# Patient Record
Sex: MALE | Race: WHITE | Employment: OTHER | URBAN - METROPOLITAN AREA
[De-identification: names, ages, dates, MRNs, and addresses within clinical notes are randomized per-mention and may not be internally consistent; named-entity substitution may affect disease eponyms.]

---

## 2017-02-03 ENCOUNTER — OFFICE VISIT (OUTPATIENT)
Dept: FAMILY MEDICINE | Facility: CLINIC | Age: 67
End: 2017-02-03
Payer: COMMERCIAL

## 2017-02-03 VITALS
BODY MASS INDEX: 34.47 KG/M2 | OXYGEN SATURATION: 97 % | WEIGHT: 240.2 LBS | HEART RATE: 81 BPM | TEMPERATURE: 97.8 F | DIASTOLIC BLOOD PRESSURE: 84 MMHG | SYSTOLIC BLOOD PRESSURE: 132 MMHG

## 2017-02-03 DIAGNOSIS — M70.21 OLECRANON BURSITIS OF RIGHT ELBOW: Primary | ICD-10-CM

## 2017-02-03 PROCEDURE — 20605 DRAIN/INJ JOINT/BURSA W/O US: CPT | Performed by: PHYSICIAN ASSISTANT

## 2017-02-03 NOTE — PROGRESS NOTES
"  SUBJECTIVE:                                                    Tee Hilton is a 66 year old male who presents to clinic today for the following health issues:      Musculoskeletal problem/pain      Duration: ***    Description  Location: Elbow    Intensity:  moderate    Accompanying signs and symptoms: none    History  Previous similar problem: YES  Previous evaluation:  {PREVIOUS ms evaluation:988120::\"none\"}    Precipitating or alleviating factors:  Trauma or overuse: { :646036}  Aggravating factors include: {AGGRAVATING MS FACTORS CHRONIC PROB:642969::\"none\"}    Therapies tried and outcome: {MS RELIEF ITEMS:422970::\"nothing\"}       {additional problems for provider to add:566748}    Problem list and histories reviewed & adjusted, as indicated.  Additional history: {NONE - AS DOCUMENTED:988903::\"as documented\"}    {HIST REVIEW/ LINKS 2:892599}    {PROVIDER CHARTING PREFERENCE:907750}    "

## 2017-02-03 NOTE — NURSING NOTE
"Chief Complaint   Patient presents with     Elbow Pain       Initial /84 mmHg  Pulse 81  Temp(Src) 97.8  F (36.6  C) (Oral)  Wt 240 lb 3.2 oz (108.954 kg)  SpO2 97% Estimated body mass index is 34.47 kg/(m^2) as calculated from the following:    Height as of 11/11/16: 5' 10\" (1.778 m).    Weight as of this encounter: 240 lb 3.2 oz (108.954 kg).  BP completed using cuff size: regular    Ramirez Lai MA    "

## 2017-02-03 NOTE — PROGRESS NOTES
SUBJECTIVE:                                                    Tee Hilton is a 66 year old male who presents to clinic today for the following health issues:      Right elbow f/u: swollen. No pain.  Bone spur. Likely resulting in recurrent bursitis.      Problem list and histories reviewed & adjusted, as indicated.  Additional history: as documented    Patient Active Problem List   Diagnosis     Elevated blood pressure reading without diagnosis of hypertension     Other male erectile dysfunction     Past Surgical History   Procedure Laterality Date     Tonsillectomy       Open reduction internal fixation tibia         Social History   Substance Use Topics     Smoking status: Never Smoker      Smokeless tobacco: Not on file     Alcohol Use: No      Comment: social use     Family History   Problem Relation Age of Onset     Gout Father          BP Readings from Last 3 Encounters:   02/03/17 132/84   11/11/16 154/94   10/28/16 130/86    Wt Readings from Last 3 Encounters:   02/03/17 240 lb 3.2 oz (108.954 kg)   11/11/16 250 lb (113.399 kg)   10/28/16 255 lb (115.667 kg)                    All other systems negative except as outline above  OBJECTIVE:    Modest olecranon bursitis. Minimal tenderness. No redness.  PROCEDURE:  JOINT ASPIRATION/INJECTION  After a discussion of risks, benefits and side effects of procedure, informed patient consent was obtained.  The right elbow was prepped and draped in the usual clean fashion (sterile not required for this procedure).  1.0 cc of 1% lidocaine was used for local analgesia.    ASPIRATION: Not performed. (or)  Using a 16 gauge needle and 20 cc syringe, 12 cc of yellow fluid was      aspirated without complications.      INJECTION:  Using 1.5 cc of 1% lidocaine mixed with 20 mg of depo medrol, the elbow was successfully injected without complication.  Patient did experience some pain relief following injection.    Tee was seen today for elbow pain.    Diagnoses and  all orders for this visit:    Olecranon bursitis of right elbow  -     DRAIN/INJECT MEDIUM JOINT/BURSA  -     DEPO MEDROL 40 MG  -     ORTHO  REFERRAL      Advised supportive and symptomatic treatment.  Follow up with Provider - if condition persists or worsens.

## 2017-02-03 NOTE — MR AVS SNAPSHOT
After Visit Summary   2/3/2017    Tee Hilton    MRN: [id number]           Patient Information     Date Of Birth          1950        Visit Information        Provider Department      2/3/2017 1:40 PM Jon Denson PA-C [facility]        Today's Diagnoses     Olecranon bursitis of right elbow    -  1        Follow-ups after your visit        Additional Services     ORTHO  REFERRAL       [facility] is referring you to the Orthopedic  Services at [facility].       The  Representative will assist you in the coordination of your Orthopedic and Musculoskeletal Care as prescribed by your physician.    The  Representative will call you within 1 business day to help schedule your appointment, or you may contact the  Representative at:    All areas ~ [phone]     Type of Referral : Surgical / Specialist     Dr jacob  Timeframe requested: Within 2 weeks    Coverage of these services is subject to the terms and limitations of your health insurance plan.  Please call member services at your health plan with any benefit or coverage questions.      If X-rays, CT or MRI's have been performed, please contact the facility where they were done to arrange for , prior to your scheduled appointment.  Please bring this referral request to your appointment and present it to your specialist.                  Who to contact     Normal or non-critical lab and imaging results will be communicated to you by MyChart, letter or phone within 4 business days after the clinic has received the results. If you do not hear from us within 7 days, please contact the clinic through MyChart or phone. If you have a critical or abnormal lab result, we will notify you by phone as soon as possible.  Submit refill requests through [employer] or call your pharmacy and they will forward the refill request to us. Please allow 3  "business days for your refill to be completed.          If you need to speak with a  for additional information , please call: [phone]             Additional Information About Your Visit        [employer] Information     [employer] lets you send messages to your doctor, view your test results, renew your prescriptions, schedule appointments and more. To sign up, go to [email or web address] . Click on \"Log in\" on the left side of the screen, which will take you to the Welcome page. Then click on \"Sign up Now\" on the right side of the page.     You will be asked to enter the access code listed below, as well as some personal information. Please follow the directions to create your username and password.     Your access code is: [email or web address]  Expires: 2017  2:18 PM     Your access code will  in 90 days. If you need help or a new code, please call your [facility] clinic or [phone].        Care EveryWhere ID     This is your Care EveryWhere ID. This could be used by other organizations to access your [facility] medical records  [id number]        Your Vitals Were     Pulse Temperature Pulse Oximetry             81 97.8  F (36.6  C) (Oral) 97%          Blood Pressure from Last 3 Encounters:   17 132/84   16 154/94   10/28/16 130/86    Weight from Last 3 Encounters:   17 240 lb 3.2 oz (108.954 kg)   16 250 lb (113.399 kg)   10/28/16 255 lb (115.667 kg)              We Performed the Following     DEPO MEDROL 40 MG     DRAIN/INJECT MEDIUM JOINT/BURSA     ORTHO  REFERRAL        Primary Care Provider Office Phone # Fax #    Jon Denson PA-C [phone]       [facility] [place] [address] [place] [address]        Thank you!     [facility] your care. Our goal is always to provide you with excellent care. Hearing back from our patients is one way we can continue to improve our services. Please take a " few minutes to complete the written survey that you may receive in the mail after your visit with us. Thank you!             Your Updated Medication List - Protect others around you: Learn how to safely use, store and throw away your medicines at www.disposemymeds.org.          This list is accurate as of: 2/3/17  2:18 PM.  Always use your most recent med list.                   Brand Name Dispense Instructions for use    PRILOSEC PO          tadalafil 20 MG tablet    CIALIS    6 tablet    Take 0.5-1 tablets (10-20 mg) by mouth daily as needed for erectile dysfunction Never use with nitroglycerin, terazosin or doxazosin.

## 2017-02-09 ENCOUNTER — OFFICE VISIT (OUTPATIENT)
Dept: ORTHOPEDICS | Facility: CLINIC | Age: 67
End: 2017-02-09
Payer: COMMERCIAL

## 2017-02-09 VITALS — WEIGHT: 238 LBS | HEIGHT: 70 IN | RESPIRATION RATE: 16 BRPM | BODY MASS INDEX: 34.07 KG/M2

## 2017-02-09 DIAGNOSIS — M70.21 OLECRANON BURSITIS OF RIGHT ELBOW: Primary | ICD-10-CM

## 2017-02-09 PROCEDURE — 99243 OFF/OP CNSLTJ NEW/EST LOW 30: CPT | Performed by: ORTHOPAEDIC SURGERY

## 2017-02-09 ASSESSMENT — PAIN SCALES - GENERAL: PAINLEVEL: MILD PAIN (3)

## 2017-02-09 NOTE — MR AVS SNAPSHOT
After Visit Summary   2/9/2017    Tee Hilton    MRN: [id number]           Patient Information     Date Of Birth          1950        Visit Information        Provider Department      2/9/2017 3:30 PM Jarrell Blanc MD [facility] [place]        Care Instructions    Please remember to call and schedule a follow up appointment if one was recommended at your earliest convenience.  Orthopedics CLINIC HOURS TELEPHONE NUMBER   Dr. Guanaco Contreras  Certified Medical Assistant   Monday & Wednesday   8am - 5pm  Thursday 1pm - 5pm  Friday 8am -11:30am Specialty schedulers:   [phone] to schedule your surgery.  Main Clinic:   [phone] to make an appointment with any provider.    Urgent Care locations:    [facility] Monday-Friday Closed  Saturday-Sunday 9am-5pm      Monday-Friday 12pm - 8pm  Saturday-Sunday 9am-5pm [phone]     If SURGERY has been recommended, please call our Specialty Schedulers at [phone] to schedule your procedure.    If you need a medication refill, please contact your pharmacy. Please allow 3 business days for your refill to be completed.    If an MRI or CT scan has been recommended, please call [facility] Schedulers at [phone] to schedule your appointment.  Use testhub (secure e-mail communication and access to your chart) to send a message or to make an appointment. Please ask how you can sign up for testhub.  Your care team's suggested websites for health information:   [email or web address] : Up to date and easily searchable information on multiple topics.   Www.health.[email or web address].[email or web address] : [place] dept of heat, public health issues in [place], N1N1            Follow-ups after your visit        Your next 10 appointments already scheduled     Feb 09, 2017  3:30 PM   New Visit with MD [facility] [place] ([facility] [place])     "[address] [address]  [place] [address] [phone]              Who to contact     If you have questions or need follow up information about today's clinic visit or your schedule please contact [facility] [place] directly at [phone].  Normal or non-critical lab and imaging results will be communicated to you by MyChart, letter or phone within 4 business days after the clinic has received the results. If you do not hear from us within 7 days, please contact the clinic through MyChart or phone. If you have a critical or abnormal lab result, we will notify you by phone as soon as possible.  Submit refill requests through [email or web address] or call your pharmacy and they will forward the refill request to us. Please allow 3 business days for your refill to be completed.          Additional Information About Your Visit        MyChart Information     [email or web address] lets you send messages to your doctor, view your test results, renew your prescriptions, schedule appointments and more. To sign up, go to [email or web address] . Click on \"Log in\" on the left side of the screen, which will take you to the Welcome page. Then click on \"Sign up Now\" on the right side of the page.     You will be asked to enter the access code listed below, as well as some personal information. Please follow the directions to create your username and password.     Your access code is: [email or web address]  Expires: 2017  2:18 PM     Your access code will  in 90 days. If you need help or a new code, please call your [facility] clinic or [phone].        Care EveryWhere ID     This is your Care EveryWhere ID. This could be used by other organizations to access your [facility] medical records  [id number]        Your Vitals Were     Respirations Height BMI (Body Mass Index)             16 1.778 m (5' 10\") 34.15 kg/m2          Blood Pressure from Last 3 Encounters:   17 132/84   16 154/94   10/28/16 " 130/86    Weight from Last 3 Encounters:   02/09/17 107.956 kg (238 lb)   02/03/17 108.954 kg (240 lb 3.2 oz)   11/11/16 113.399 kg (250 lb)              Today, you had the following     No orders found for display       Primary Care Provider Office Phone # Fax #    Jon Delia Denson PA-C [phone]       [facility] [address] [facility] [address]        Thank you!     Thank you for choosing [facility]  for your care. Our goal is always to provide you with excellent care. Hearing back from our patients is one way we can continue to improve our services. Please take a few minutes to complete the written survey that you may receive in the mail after your visit with us. Thank you!             Your Updated Medication List - Protect others around you: Learn how to safely use, store and throw away your medicines at www.disposemymeds.org.          This list is accurate as of: 2/9/17  3:20 PM.  Always use your most recent med list.                   Brand Name Dispense Instructions for use    PRILOSEC PO          tadalafil 20 MG tablet    CIALIS    6 tablet    Take 0.5-1 tablets (10-20 mg) by mouth daily as needed for erectile dysfunction Never use with nitroglycerin, terazosin or doxazosin.

## 2017-02-09 NOTE — PROGRESS NOTES
Chief Complaint:   Chief Complaint   Patient presents with     Elbow Pain     Right elbow recurrent olecranon bursitis. Onset: 8/2016. Last aspiration and injection: 2/3/17 with Jon Denson. He has had it drained 3 times. First time lasted about a 2 weeks, just aspiration, 2nd time lasted about a month. He has been putting up with it. He will have some pain with it. He has a spur on the elbow and if he hits that he will have a sharp pain but goes away quickly. He has had the spur for a long time. Doesn't remember any injury.       Tee Hilton is seen today in the [facility] for evaluation of recurrent right olecranon bursitis at the request of Mr Jno Denson PA-C.      HPI: eTe Hilton is a 66 year old male , right -hand dominant, who presents for evaluation and management of a recurrent olecranon bursitis and elbow pain, without specific injury. Pain has been present for 6 months, in 8/2016. His last aspiration and injection was on 2/3/2017 with Jon Denson. He has had the elbow drained 3 times. The first time lasted about week, with just an aspiration. The second time was injected with cortisone and lasted a month. He has been putting up with it. Intermittent pain. Elbow spur present. Hitting the spur will cause a sharp pain, but will pain dissipate quickly. He has had the spur for a long time.       He reports having mild pain/discomfort at the elbow. He denies numbness or tingling.   Pain severity: 3/10  Pain quality: aching and sharp  Frequency of symptoms: frequently  Associated symptoms: none  Aggravated by: with palpation, bumping the elbow  Relieved by: rest    Treatment up to this point: aspiration x3 and injection x2.  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    Past medical history:  has a past medical history of Gastroesophageal reflux disease without esophagitis.   Patient Active Problem List    Diagnosis Date Noted     Elevated blood pressure reading without diagnosis of hypertension 11/11/2016     Priority: Medium     Other male erectile dysfunction 11/11/2016     Priority: Medium       Past surgical history:  has past surgical history that includes tonsillectomy and Open reduction internal fixation tibia.     Medications:    Current Outpatient Prescriptions   Medication Sig Dispense Refill     tadalafil (CIALIS) 20 MG tablet Take 0.5-1 tablets (10-20 mg) by mouth daily as needed for erectile dysfunction Never use with nitroglycerin, terazosin or doxazosin. 6 tablet 11     Omeprazole (PRILOSEC PO)           Allergies:     Allergies   Allergen Reactions     Erythromycin Unknown     Upset stomach        Family History: family history includes Gout in his father.     Social History: marketing.  reports that he has never smoked. He does not have any smokeless tobacco history on file. He reports that he does not drink alcohol or use illicit drugs.    Review of Systems:  ROS: 10 point ROS neg other than the symptoms noted above in the HPI and past medical history.    Physical Exam  GENERAL APPEARANCE: healthy, alert, no distress.   SKIN: no suspicious lesions or rashes  RESPIRATORY: No increased work of breathing.  NEURO: Normal strength and tone, mentation intact and speech normal  PSYCH:  mentation appears normal and affect normal. Not anxious.    Resp 16  Ht 1.778 m (5' 10\")  Wt 107.956 kg (238 lb)  BMI 34.15 kg/m2       right UPPER EXTREMITY:    Sensation intact to light touch in median, radial, ulnar and axillary nerve distributions  Palpable 2+ radial pulse, brisk capillary refill to all fingers, wwp  Intact epl fpl fdp edc wrist flexion/extension biceps triceps deltoid  DTR's normal biceps and brachioradialis    ELBOW:  Skin intact. No open wounds. No " skin maceration.  Inspection: slight olecranon bursal swelling; otherwise no swelling, no ecchymosis, no distal bicep tendon defect  Tender: none  nontender to palpation over olecranon, tip of olecranon and palpable osteophyte  There is bogginess over the olecranon bursa  Range of Motion: all normal  Strength: within normal limits.   There is no deformity in the area.      X-rays: right elbow 10/14/2016  No evidence of acute fracture or malalignment. There is a prominent osteophyte off the olecranon with overlying soft tissue swelling.    Assessment: 66 year old male , right -hand dominant, with recurrent olecranon bursitis, olecranon osteophyte.    Plan:  * discussed with patient that history and examination consistent with olecranon bursitis, inflammation and swelling of the bursa over the tip of the elbow. Also underlying olecranon osteophyte or spur. That spur may have lucency to suggest fracture.     Treatment options discussed including nonoperative and surgical excision, with most cases resolving with nonoperative treatment. Risks and perceived benefits of each discussed.  * I recommend since recent injection/aspiration, to see how it does. I'd also recommend compression wrapping for several weeks to see if that can help prevents such a quick recurrence.    * rest  * activity modification  * NSAIDS  * compression wrapping  * elbow pad  * refrain from leaning on elbow or direct elbow pressure  * ice/heat  * return to clinic as needed.          Jarrell Blanc M.D., M.S.  Dept. of Orthopaedic Surgery  [facility]

## 2017-02-09 NOTE — PATIENT INSTRUCTIONS
Please remember to call and schedule a follow up appointment if one was recommended at your earliest convenience.  Orthopedics CLINIC HOURS TELEPHONE NUMBER   Dr. Guanaco Contreras  Certified Medical Assistant   Monday & Wednesday   8am - 5pm  Thursday 1pm - 5pm  Friday 8am -11:30am Specialty schedulers:   [phone] to schedule your surgery.  Main Clinic:   [phone] to make an appointment with any provider.    Urgent Care locations:    [facility] Monday-Friday Closed  Saturday-Sunday 9am-5pm      Monday-Friday 12pm - 8pm  Saturday-Sunday 9am-5pm [phone]     If SURGERY has been recommended, please call our Specialty Schedulers at [phone] to schedule your procedure.    If you need a medication refill, please contact your pharmacy. Please allow 3 business days for your refill to be completed.    If an MRI or CT scan has been recommended, please call [facility] Schedulers at [phone] to schedule your appointment.  Use [employer] (secure e-mail communication and access to your chart) to send a message or to make an appointment. Please ask how you can sign up for [employer].  Your care team's suggested websites for health information:   [email or web address] : Up to date and easily searchable information on multiple topics.   Www.health.[email or web address].mn.us : [place] dept of heat, public health issues in [place], N1N1

## 2017-02-09 NOTE — NURSING NOTE
"Chief Complaint   Patient presents with     Elbow Pain     Right elbow recurrent olecranon bursitis. Onset: 8/2016. Last aspiration and injection: 2/3/17 with Jon Denson. He has had it drained 3 times. First time lasted about a 2 weeks, just aspiration, 2nd time lasted about a month. He has been putting up with it. He will have some pain with it. He has a spur on the elbow and if he hits that he will have a sharp pain but goes away quickly. He has had the spur for a long time. Doesn't remember any injury.        Initial Resp 16  Ht 1.778 m (5' 10\")  Wt 107.956 kg (238 lb)  BMI 34.15 kg/m2 Estimated body mass index is 34.15 kg/(m^2) as calculated from the following:    Height as of this encounter: 1.778 m (5' 10\").    Weight as of this encounter: 107.956 kg (238 lb).  Medication Reconciliation: hany Miles Certified Medical Assistant    "

## 2017-02-09 NOTE — NURSING NOTE
The following medication was given:     MEDICATION: Depo Medrol 40mg  ROUTE: IM  SITE: Right Elbow  DOSE: 1cc  LOT #: [id number]  :  Fred Mcneal  EXPIRATION DATE:  01/2019  NDC#: 2168-1084-48

## 2017-03-24 ENCOUNTER — OFFICE VISIT (OUTPATIENT)
Dept: FAMILY MEDICINE | Facility: CLINIC | Age: 67
End: 2017-03-24
Payer: COMMERCIAL

## 2017-03-24 VITALS
WEIGHT: 220 LBS | TEMPERATURE: 97.6 F | HEART RATE: 70 BPM | SYSTOLIC BLOOD PRESSURE: 137 MMHG | BODY MASS INDEX: 31.57 KG/M2 | OXYGEN SATURATION: 96 % | DIASTOLIC BLOOD PRESSURE: 77 MMHG | RESPIRATION RATE: 18 BRPM

## 2017-03-24 DIAGNOSIS — J01.01 ACUTE RECURRENT MAXILLARY SINUSITIS: Primary | ICD-10-CM

## 2017-03-24 DIAGNOSIS — J20.9 ACUTE BRONCHITIS, UNSPECIFIED ORGANISM: ICD-10-CM

## 2017-03-24 PROCEDURE — 99213 OFFICE O/P EST LOW 20 MIN: CPT | Performed by: PHYSICIAN ASSISTANT

## 2017-03-24 RX ORDER — ALBUTEROL SULFATE 90 UG/1
2 AEROSOL, METERED RESPIRATORY (INHALATION) EVERY 6 HOURS PRN
Qty: 1 INHALER | Refills: 1 | Status: SHIPPED | OUTPATIENT
Start: 2017-03-24 | End: 2018-01-18

## 2017-03-24 RX ORDER — CODEINE PHOSPHATE AND GUAIFENESIN 10; 100 MG/5ML; MG/5ML
1 SOLUTION ORAL
Qty: 120 ML | Refills: 0 | Status: SHIPPED | OUTPATIENT
Start: 2017-03-24 | End: 2018-01-18

## 2017-03-24 RX ORDER — AMOXICILLIN 875 MG
875 TABLET ORAL 2 TIMES DAILY
Qty: 20 TABLET | Refills: 0 | Status: SHIPPED | OUTPATIENT
Start: 2017-03-24 | End: 2017-05-25

## 2017-03-24 NOTE — PROGRESS NOTES
SUBJECTIVE:                                                    Tee Hilton is a 66 year old male who presents to clinic today for the following health issues:      Acute Illness   Acute illness concerns: cough  Onset: 1 week    Fever: no    Chills/Sweats: no    Headache (location?): no    Sinus Pressure:no    Conjunctivitis:  no    Ear Pain: no    Rhinorrhea: YES    Congestion: YES    Sore Throat: no     Cough: yes productive green and brown with some blood    Wheeze: yes    Decreased Appetite: no    Nausea: no    Vomiting: no    Diarrhea:  no    Dysuria/Freq.: no    Fatigue/Achiness: no    Sick/Strep Exposure: no     Therapies Tried and outcome: tessalon given in urgent care 3 days ago with no relief          Problem list and histories reviewed & adjusted, as indicated.  Additional history: as documented        Reviewed and updated as needed this visit by clinical staff  Tobacco  Allergies  Meds  Med Hx  Surg Hx  Fam Hx  Soc Hx      Reviewed and updated as needed this visit by Provider         All other systems negative except as outline above  OBJECTIVE:  ENT exam reveals - bilateral TM fluid noted, neck without nodes, throat normal without erythema or exudate, maxillary sinus tender, post nasal drip noted, nasal mucosa congested and nasal mucosa pale and congested.  CHEST:no tachypnea, retractions or cyanosis, mild inspiratory wheezing heard diffusely throughout both lungs and S1, S2 normal, no murmur, no gallop, rate regular.    Tee was seen today for nose problem.    Diagnoses and all orders for this visit:    Acute recurrent maxillary sinusitis  -     amoxicillin (AMOXIL) 875 MG tablet; Take 1 tablet (875 mg) by mouth 2 times daily    Acute bronchitis, unspecified organism  -     albuterol (PROAIR HFA/PROVENTIL HFA/VENTOLIN HFA) 108 (90 BASE) MCG/ACT Inhaler; Inhale 2 puffs into the lungs every 6 hours as needed for shortness of breath / dyspnea or wheezing  -     guaiFENesin-codeine (ROBITUSSIN  AC) 100-10 MG/5ML SOLN solution; Take 5 mLs by mouth nightly as needed for cough      Advised supportive and symptomatic treatment.  Follow up with Provider - if condition persists or worsens.

## 2017-03-24 NOTE — MR AVS SNAPSHOT
"              After Visit Summary   3/24/2017    Tee Hilton    MRN: [id number]           Patient Information     Date Of Birth          1950        Visit Information        Provider Department      3/24/2017 10:00 AM Jon Denson PA-C [facility]        Today's Diagnoses     Acute recurrent maxillary sinusitis    -  1    Acute bronchitis, unspecified organism          Care Instructions    advil cold and sinus for congestion  Robitussin DM for daytime cough  Steamy showers  Fluids          Follow-ups after your visit        Who to contact     Normal or non-critical lab and imaging results will be communicated to you by Monetatehart, letter or phone within 4 business days after the clinic has received the results. If you do not hear from us within 7 days, please contact the clinic through Monetatehart or phone. If you have a critical or abnormal lab result, we will notify you by phone as soon as possible.  Submit refill requests through Night Zookeeper or call your pharmacy and they will forward the refill request to us. Please allow 3 business days for your refill to be completed.          If you need to speak with a  for additional information , please call: [phone]             Additional Information About Your Visit        Night Zookeeper Information     Night Zookeeper lets you send messages to your doctor, view your test results, renew your prescriptions, schedule appointments and more. To sign up, go to [email or web address] . Click on \"Log in\" on the left side of the screen, which will take you to the Welcome page. Then click on \"Sign up Now\" on the right side of the page.     You will be asked to enter the access code listed below, as well as some personal information. Please follow the directions to create your username and password.     Your access code is: [email or web address]  Expires: 2017  3:18 PM     Your access code will  in 90 days. If you need help or a new code, please call " your [place] clinic or [phone].        Care EveryWhere ID     This is your Care EveryWhere ID. This could be used by other organizations to access your [place] medical records  [id number]        Your Vitals Were     Pulse Temperature Respirations Pulse Oximetry BMI (Body Mass Index)       70 97.6  F (36.4  C) (Oral) 18 96% 31.57 kg/m2        Blood Pressure from Last 3 Encounters:   03/24/17 137/77   02/03/17 132/84   11/11/16 (!) 154/94    Weight from Last 3 Encounters:   03/24/17 220 lb (99.8 kg)   02/09/17 238 lb (108 kg)   02/03/17 240 lb 3.2 oz (109 kg)              Today, you had the following     No orders found for display         Today's Medication Changes          These changes are accurate as of: 3/24/17 10:47 AM.  If you have any questions, ask your nurse or doctor.               Start taking these medicines.        Dose/Directions    albuterol 108 (90 BASE) MCG/ACT Inhaler   Commonly known as:  PROAIR HFA/PROVENTIL HFA/VENTOLIN HFA   Used for:  Acute bronchitis, unspecified organism   Started by:  Jon Denson PA-C        Dose:  2 puff   Inhale 2 puffs into the lungs every 6 hours as needed for shortness of breath / dyspnea or wheezing   Quantity:  1 Inhaler   Refills:  1       amoxicillin 875 MG tablet   Commonly known as:  AMOXIL   Used for:  Acute recurrent maxillary sinusitis   Started by:  Jon Denson PA-C        Dose:  875 mg   Take 1 tablet (875 mg) by mouth 2 times daily   Quantity:  20 tablet   Refills:  0       guaiFENesin-codeine 100-10 MG/5ML Soln solution   Commonly known as:  ROBITUSSIN AC   Used for:  Acute bronchitis, unspecified organism   Started by:  Jon Denson PA-C        Dose:  1 tsp.   Take 5 mLs by mouth nightly as needed for cough   Quantity:  120 mL   Refills:  0            Where to get your medicines      These medications were sent to [facility] [address] [facility] [address]  [address] [address]      Phone:  [phone]     albuterol 108 (90 BASE) MCG/ACT Inhaler    amoxicillin 875 MG tablet         Some of these will need a paper prescription and others can be bought over the counter.  Ask your nurse if you have questions.     Bring a paper prescription for each of these medications     guaiFENesin-codeine 100-10 MG/5ML Soln solution                Primary Care Provider Office Phone # Fax #    Jon Denson PA-C [phone]       [facility] [address] [facility] [address]        Thank you!     Thank you for choosing [facility]  for your care. Our goal is always to provide you with excellent care. Hearing back from our patients is one way we can continue to improve our services. Please take a few minutes to complete the written survey that you may receive in the mail after your visit with us. Thank you!             Your Updated Medication List - Protect others around you: Learn how to safely use, store and throw away your medicines at www.disposemymeds.org.          This list is accurate as of: 3/24/17 10:47 AM.  Always use your most recent med list.                   Brand Name Dispense Instructions for use    albuterol 108 (90 BASE) MCG/ACT Inhaler    PROAIR HFA/PROVENTIL HFA/VENTOLIN HFA    1 Inhaler    Inhale 2 puffs into the lungs every 6 hours as needed for shortness of breath / dyspnea or wheezing       amoxicillin 875 MG tablet    AMOXIL    20 tablet    Take 1 tablet (875 mg) by mouth 2 times daily       guaiFENesin-codeine 100-10 MG/5ML Soln solution    ROBITUSSIN AC    120 mL    Take 5 mLs by mouth nightly as needed for cough       PRILOSEC PO          tadalafil 20 MG tablet    CIALIS    6 tablet    Take 0.5-1 tablets (10-20 mg) by mouth daily as needed for erectile dysfunction Never use with nitroglycerin, terazosin or doxazosin.

## 2017-05-18 RX ORDER — LIDOCAINE 40 MG/G
CREAM TOPICAL
Status: CANCELLED | OUTPATIENT
Start: 2017-05-18

## 2017-05-25 ENCOUNTER — SURGERY (OUTPATIENT)
Age: 67
End: 2017-05-25

## 2017-05-25 ENCOUNTER — HOSPITAL ENCOUNTER (OUTPATIENT)
Facility: AMBULATORY SURGERY CENTER | Age: 67
Discharge: HOME | End: 2017-05-25
Attending: SURGERY | Admitting: SURGERY
Payer: COMMERCIAL

## 2017-05-25 VITALS
RESPIRATION RATE: 14 BRPM | SYSTOLIC BLOOD PRESSURE: 129 MMHG | DIASTOLIC BLOOD PRESSURE: 78 MMHG | OXYGEN SATURATION: 95 %

## 2017-05-25 LAB — COLONOSCOPY: NORMAL

## 2017-05-25 PROCEDURE — 45385 COLONOSCOPY W/LESION REMOVAL: CPT | Mod: PT | Performed by: SURGERY

## 2017-05-25 PROCEDURE — 45380 COLONOSCOPY AND BIOPSY: CPT | Mod: XS

## 2017-05-25 PROCEDURE — 45385 COLONOSCOPY W/LESION REMOVAL: CPT

## 2017-05-25 PROCEDURE — 88305 TISSUE EXAM BY PATHOLOGIST: CPT | Performed by: SURGERY

## 2017-05-25 PROCEDURE — G8918 PT W/O PREOP ORDER IV AB PRO: HCPCS

## 2017-05-25 PROCEDURE — 45380 COLONOSCOPY AND BIOPSY: CPT | Mod: 59 | Performed by: SURGERY

## 2017-05-25 PROCEDURE — G8907 PT DOC NO EVENTS ON DISCHARG: HCPCS

## 2017-05-25 RX ORDER — FENTANYL CITRATE 50 UG/ML
INJECTION, SOLUTION INTRAMUSCULAR; INTRAVENOUS PRN
Status: DISCONTINUED | OUTPATIENT
Start: 2017-05-25 | End: 2017-05-25 | Stop reason: HOSPADM

## 2017-05-25 RX ORDER — DIPHENHYDRAMINE HYDROCHLORIDE 50 MG/ML
INJECTION INTRAMUSCULAR; INTRAVENOUS PRN
Status: DISCONTINUED | OUTPATIENT
Start: 2017-05-25 | End: 2017-05-25 | Stop reason: HOSPADM

## 2017-05-25 RX ADMIN — FENTANYL CITRATE 100 MCG: 50 INJECTION, SOLUTION INTRAMUSCULAR; INTRAVENOUS at 08:12

## 2017-05-25 RX ADMIN — DIPHENHYDRAMINE HYDROCHLORIDE 12.5 MG: 50 INJECTION INTRAMUSCULAR; INTRAVENOUS at 08:12

## 2017-05-25 RX ADMIN — FENTANYL CITRATE 50 MCG: 50 INJECTION, SOLUTION INTRAMUSCULAR; INTRAVENOUS at 08:17

## 2017-05-30 LAB — COPATH REPORT: NORMAL

## 2017-10-31 ENCOUNTER — RADIANT APPOINTMENT (OUTPATIENT)
Dept: GENERAL RADIOLOGY | Facility: CLINIC | Age: 67
End: 2017-10-31
Attending: PHYSICIAN ASSISTANT
Payer: COMMERCIAL

## 2017-10-31 ENCOUNTER — OFFICE VISIT (OUTPATIENT)
Dept: FAMILY MEDICINE | Facility: CLINIC | Age: 67
End: 2017-10-31
Payer: COMMERCIAL

## 2017-10-31 VITALS
HEIGHT: 70 IN | BODY MASS INDEX: 34.23 KG/M2 | DIASTOLIC BLOOD PRESSURE: 92 MMHG | WEIGHT: 239.13 LBS | OXYGEN SATURATION: 98 % | HEART RATE: 79 BPM | TEMPERATURE: 98.1 F | SYSTOLIC BLOOD PRESSURE: 150 MMHG

## 2017-10-31 DIAGNOSIS — M54.50 CHRONIC MIDLINE LOW BACK PAIN WITHOUT SCIATICA: Primary | ICD-10-CM

## 2017-10-31 DIAGNOSIS — G89.29 CHRONIC MIDLINE LOW BACK PAIN WITHOUT SCIATICA: ICD-10-CM

## 2017-10-31 DIAGNOSIS — R03.0 ELEVATED BLOOD PRESSURE READING WITHOUT DIAGNOSIS OF HYPERTENSION: ICD-10-CM

## 2017-10-31 DIAGNOSIS — M54.50 CHRONIC MIDLINE LOW BACK PAIN WITHOUT SCIATICA: ICD-10-CM

## 2017-10-31 DIAGNOSIS — G89.29 CHRONIC MIDLINE LOW BACK PAIN WITHOUT SCIATICA: Primary | ICD-10-CM

## 2017-10-31 PROCEDURE — 99214 OFFICE O/P EST MOD 30 MIN: CPT | Performed by: PHYSICIAN ASSISTANT

## 2017-10-31 PROCEDURE — 72100 X-RAY EXAM L-S SPINE 2/3 VWS: CPT

## 2017-10-31 RX ORDER — PREDNISONE 20 MG/1
20 TABLET ORAL 2 TIMES DAILY
Qty: 14 TABLET | Refills: 0 | Status: SHIPPED | OUTPATIENT
Start: 2017-10-31 | End: 2017-11-07

## 2017-10-31 NOTE — MR AVS SNAPSHOT
After Visit Summary   10/31/2017    Tee Hilton    MRN: [id number]           Patient Information     Date Of Birth          1950        Visit Information        Provider Department      10/31/2017 9:20 AM Jon Denson PA-C [facility]        Today's Diagnoses     Chronic midline low back pain without sciatica    -  1    Elevated blood pressure reading without diagnosis of hypertension           Follow-ups after your visit        Additional Services     CHIROPRACTIC REFERRAL       Your provider has referred you to: [facility]: [facility] [phone]    Please be aware that coverage of these services is subject to the terms and limitations of your health insurance plan.  Call member services at your health plan with any benefit or coverage questions.      Please bring the following to your appointment:    >>   Any x-rays, CTs or MRIs which have been performed.  Contact the facility where they were done to arrange for  prior to your scheduled appointment.    >>   List of current medications   >>   This referral request   >>   Any documents/labs given to you for this referral                  Who to contact     Normal or non-critical lab and imaging results will be communicated to you by RF Arrayshart, letter or phone within 4 business days after the clinic has received the results. If you do not hear from us within 7 days, please contact the clinic through RF Arrayshart or phone. If you have a critical or abnormal lab result, we will notify you by phone as soon as possible.  Submit refill requests through OurStage or call your pharmacy and they will forward the refill request to us. Please allow 3 business days for your refill to be completed.          If you need to speak with a  for additional information , please call: [phone]             Additional Information About Your Visit        RF Arrayshart Information     OurStage lets you send  "messages to your doctor, view your test results, renew your prescriptions, schedule appointments and more. To sign up, go to [email or web address] . Click on \"Log in\" on the left side of the screen, which will take you to the Welcome page. Then click on \"Sign up Now\" on the right side of the page.     You will be asked to enter the access code listed below, as well as some personal information. Please follow the directions to create your username and password.     Your access code is: [email or web address]  Expires: 2018 10:17 AM     Your access code will  in 90 days. If you need help or a new code, please call your [place] clinic or [phone].        Care EveryWhere ID     This is your Care EveryWhere ID. This could be used by other organizations to access your [place] medical records  [id number]        Your Vitals Were     Pulse Temperature Height Pulse Oximetry BMI (Body Mass Index)       79 98.1  F (36.7  C) (Oral) 5' 10\" (1.778 m) 98% 34.31 kg/m2        Blood Pressure from Last 3 Encounters:   10/31/17 (!) 150/92   17 129/78   17 137/77    Weight from Last 3 Encounters:   10/31/17 239 lb 2 oz (108.5 kg)   17 220 lb (99.8 kg)   17 238 lb (108 kg)              We Performed the Following     CHIROPRACTIC REFERRAL          Today's Medication Changes          These changes are accurate as of: 10/31/17 10:17 AM.  If you have any questions, ask your nurse or doctor.               Start taking these medicines.        Dose/Directions    predniSONE 20 MG tablet   Commonly known as:  DELTASONE   Used for:  Chronic midline low back pain without sciatica   Started by:  Jon Denson PA-C        Dose:  20 mg   Take 1 tablet (20 mg) by mouth 2 times daily for 7 days   Quantity:  14 tablet   Refills:  0            Where to get your medicines      These medications were sent to [facility]  [facility] [address] [facility]  [address] [facility] [address]     Phone: "  [phone]     predniSONE 20 MG tablet                Primary Care Provider Office Phone # Fax #    Jonwilliam Denson PA-C [phone]       [address]  [place] [address]        Equal Access to Services     [facility]: Hadii aad ku hadasho Soomaali, waaxda luqadaha, qaybta kaalmada adeegyada, waxay idiin hayaan adeeg kharash la'colinn ah. So [facility] [phone].    ATENCIÓN: Si habla español, tiene a campoverde disposición servicios gratuitos de asistencia lingüística. Llame al [phone].    We comply with applicable federal civil rights laws and [place] laws. We do not discriminate on the basis of race, color, national origin, age, disability, sex, sexual orientation, or gender identity.            Thank you!     Thank you for choosing [facility]  for your care. Our goal is always to provide you with excellent care. Hearing back from our patients is one way we can continue to improve our services. Please take a few minutes to complete the written survey that you may receive in the mail after your visit with us. Thank you!             Your Updated Medication List - Protect others around you: Learn how to safely use, store and throw away your medicines at www.disposemymeds.org.          This list is accurate as of: 10/31/17 10:17 AM.  Always use your most recent med list.                   Brand Name Dispense Instructions for use Diagnosis    albuterol 108 (90 BASE) MCG/ACT Inhaler    PROAIR HFA/PROVENTIL HFA/VENTOLIN HFA    1 Inhaler    Inhale 2 puffs into the lungs every 6 hours as needed for shortness of breath / dyspnea or wheezing    Acute bronchitis, unspecified organism       guaiFENesin-codeine 100-10 MG/5ML Soln solution    ROBITUSSIN AC    120 mL    Take 5 mLs by mouth nightly as needed for cough    Acute bronchitis, unspecified organism       omeprazole 20 MG CR capsule    priLOSEC     Take 20 mg by mouth        predniSONE 20 MG tablet    DELTASONE    14 tablet    Take 1  tablet (20 mg) by mouth 2 times daily for 7 days    Chronic midline low back pain without sciatica       tadalafil 20 MG tablet    CIALIS    6 tablet    Take 0.5-1 tablets (10-20 mg) by mouth daily as needed for erectile dysfunction Never use with nitroglycerin, terazosin or doxazosin.    Other male erectile dysfunction

## 2017-10-31 NOTE — NURSING NOTE
"Chief Complaint   Patient presents with     Back Pain     low back pain       Initial /90  Pulse 79  Temp 98.1  F (36.7  C) (Oral)  Ht 5' 10\" (1.778 m)  Wt 239 lb 2 oz (108.5 kg)  SpO2 98%  BMI 34.31 kg/m2 Estimated body mass index is 34.31 kg/(m^2) as calculated from the following:    Height as of this encounter: 5' 10\" (1.778 m).    Weight as of this encounter: 239 lb 2 oz (108.5 kg).  Medication Reconciliation: complete   Carleen Myles MA      "

## 2017-10-31 NOTE — PROGRESS NOTES
SUBJECTIVE:   Tee Hilton is a 66 year old male who presents to clinic today for the following health issues:      Back Pain       Duration: ongoing problem X2-3 years. Worse X2-3 weeks        Specific cause: none    Description:   Location of pain: low back both  Character of pain: sharp  Pain radiation:none  New numbness or weakness in legs, not attributed to pain:  no     Intensity: At its worst 8/10    History:   Pain interferes with job: No  History of back problems: no prior back problems  Any previous MRI or X-rays: None  Sees a specialist for back pain:  No  Therapies tried without relief: heat and NSAIDs    Alleviating factors:   Improved by: NSAIDs      Precipitating factors:  Worsened by: Lifting    Functional and Psychosocial Screen (GraphSQL STarT Back):      Last 2 scores:     MITCH START BACK TOTAL SCORE 10/31/2017   Total Score (all 9) 5                 Accompanying Signs & Symptoms:  Risk of Fracture:  None  Risk of Cauda Equina:  None  Risk of Infection:  None  Risk of Cancer:  None  Risk of Ankylosing Spondylitis:  Onset at age <35, male, AND morning back stiffness. no                       Problem list and histories reviewed & adjusted, as indicated.  Additional history: as documented    Patient Active Problem List   Diagnosis     Elevated blood pressure reading without diagnosis of hypertension     Other male erectile dysfunction     Chronic midline low back pain without sciatica     Past Surgical History:   Procedure Laterality Date     COLONOSCOPY N/A 5/25/2017    Procedure: COMBINED COLONOSCOPY, SINGLE OR MULTIPLE BIOPSY/POLYPECTOMY BY BIOPSY;;  Surgeon: Aquilino Garcia MD;  Location: [facility] OR     COLONOSCOPY WITH CO2 INSUFFLATION N/A 5/25/2017    Procedure: COLONOSCOPY WITH CO2 INSUFFLATION;  COLONOSCOPY SCREEN/ ORDAZ;  Surgeon: Aquilino Garcia MD;  Location: [facility] OR     OPEN REDUCTION INTERNAL FIXATION TIBIA       TONSILLECTOMY         Social History   Substance Use Topics      Smoking status: Never Smoker     Smokeless tobacco: Not on file     Alcohol use No      Comment: social use     Family History   Problem Relation Age of Onset     Gout Father          Current Outpatient Prescriptions   Medication Sig Dispense Refill     omeprazole (PRILOSEC) 20 MG CR capsule Take 20 mg by mouth       albuterol (PROAIR HFA/PROVENTIL HFA/VENTOLIN HFA) 108 (90 BASE) MCG/ACT Inhaler Inhale 2 puffs into the lungs every 6 hours as needed for shortness of breath / dyspnea or wheezing 1 Inhaler 1     guaiFENesin-codeine (ROBITUSSIN AC) 100-10 MG/5ML SOLN solution Take 5 mLs by mouth nightly as needed for cough 120 mL 0     tadalafil (CIALIS) 20 MG tablet Take 0.5-1 tablets (10-20 mg) by mouth daily as needed for erectile dysfunction Never use with nitroglycerin, terazosin or doxazosin. 6 tablet 11     Allergies   Allergen Reactions     Erythromycin Unknown     Upset stomach     Recent Labs   Lab Test  11/11/16   0744   LDL  151*   HDL  71   TRIG  73   CR  0.98   GFRESTIMATED  77   GFRESTBLACK  >90   GFR Calc     POTASSIUM  5.1      BP Readings from Last 3 Encounters:   10/31/17 (!) 150/92   05/25/17 129/78   03/24/17 137/77    Wt Readings from Last 3 Encounters:   10/31/17 239 lb 2 oz (108.5 kg)   03/24/17 220 lb (99.8 kg)   02/09/17 238 lb (108 kg)                          Reviewed and updated as needed this visit by clinical staffAllergies  Meds  Problems  Med Hx  Surg Hx  Fam Hx  Soc Hx      Reviewed and updated as needed this visit by Provider  Allergies  Meds  Problems  Med Hx  Surg Hx  Fam Hx  Soc Hx        All other systems negative except as outline above  OBJECTIVE:  BACK: Lumbosacral spine area reveals local tenderness.  Painful and reduced LS ROM noted. Straight leg raise is negative .  DTR's, motor strength and sensation normal, including heel and toe gait.  Perifpheral pulses are palpable.  Hipes and knees have full range of motion without pain.  No abdominal  tenderness, mass or organomegaly.  CHEST:chest clear to IPPA, no tachypnea, retractions or cyanosis and S1, S2 normal, no murmur, no gallop, rate regular.  Left eye normal, pupil reactive, normal conjunctiva, normal fundus, normal cornea.    Tee was seen today for back pain.    Diagnoses and all orders for this visit:    Chronic midline low back pain without sciatica  -     XR Lumbar Spine 2/3 Views; Future  -     predniSONE (DELTASONE) 20 MG tablet; Take 1 tablet (20 mg) by mouth 2 times daily for 7 days  -     CHIROPRACTIC REFERRAL    Elevated blood pressure reading without diagnosis of hypertension      Advised supportive and symptomatic treatment.  Follow up with Provider - if condition persists or worsens.   Recheck of his blood pressure at a cpe in 6-8 wks.

## 2018-01-18 ENCOUNTER — OFFICE VISIT (OUTPATIENT)
Dept: FAMILY MEDICINE | Facility: CLINIC | Age: 68
End: 2018-01-18
Payer: COMMERCIAL

## 2018-01-18 VITALS
HEART RATE: 80 BPM | OXYGEN SATURATION: 97 % | RESPIRATION RATE: 18 BRPM | DIASTOLIC BLOOD PRESSURE: 94 MMHG | SYSTOLIC BLOOD PRESSURE: 142 MMHG | TEMPERATURE: 98.4 F | BODY MASS INDEX: 34.36 KG/M2 | WEIGHT: 240 LBS | HEIGHT: 70 IN

## 2018-01-18 DIAGNOSIS — Z23 IMMUNIZATION DUE: ICD-10-CM

## 2018-01-18 DIAGNOSIS — Z13.6 CARDIOVASCULAR SCREENING; LDL GOAL LESS THAN 160: Primary | ICD-10-CM

## 2018-01-18 DIAGNOSIS — N52.8 OTHER MALE ERECTILE DYSFUNCTION: ICD-10-CM

## 2018-01-18 DIAGNOSIS — Z13.29 SCREENING FOR THYROID DISORDER: ICD-10-CM

## 2018-01-18 DIAGNOSIS — G89.29 CHRONIC MIDLINE LOW BACK PAIN WITHOUT SCIATICA: ICD-10-CM

## 2018-01-18 DIAGNOSIS — M54.50 CHRONIC MIDLINE LOW BACK PAIN WITHOUT SCIATICA: ICD-10-CM

## 2018-01-18 DIAGNOSIS — Z12.5 SPECIAL SCREENING FOR MALIGNANT NEOPLASM OF PROSTATE: ICD-10-CM

## 2018-01-18 DIAGNOSIS — R03.0 ELEVATED BLOOD PRESSURE READING WITHOUT DIAGNOSIS OF HYPERTENSION: ICD-10-CM

## 2018-01-18 LAB
ANION GAP SERPL CALCULATED.3IONS-SCNC: 7 MMOL/L (ref 3–14)
BUN SERPL-MCNC: 16 MG/DL (ref 7–30)
CALCIUM SERPL-MCNC: 8.9 MG/DL (ref 8.5–10.1)
CHLORIDE SERPL-SCNC: 104 MMOL/L (ref 94–109)
CHOLEST SERPL-MCNC: 220 MG/DL
CO2 SERPL-SCNC: 29 MMOL/L (ref 20–32)
CREAT SERPL-MCNC: 0.79 MG/DL (ref 0.66–1.25)
GFR SERPL CREATININE-BSD FRML MDRD: >90 ML/MIN/1.7M2
GLUCOSE SERPL-MCNC: 101 MG/DL (ref 70–99)
HDLC SERPL-MCNC: 77 MG/DL
LDLC SERPL CALC-MCNC: 129 MG/DL
NONHDLC SERPL-MCNC: 143 MG/DL
POTASSIUM SERPL-SCNC: 4 MMOL/L (ref 3.4–5.3)
PSA SERPL-ACNC: 3.42 UG/L (ref 0–4)
SODIUM SERPL-SCNC: 140 MMOL/L (ref 133–144)
TRIGL SERPL-MCNC: 68 MG/DL
TSH SERPL DL<=0.005 MIU/L-ACNC: 1.84 MU/L (ref 0.4–4)

## 2018-01-18 PROCEDURE — 80061 LIPID PANEL: CPT | Performed by: PHYSICIAN ASSISTANT

## 2018-01-18 PROCEDURE — G0103 PSA SCREENING: HCPCS | Performed by: PHYSICIAN ASSISTANT

## 2018-01-18 PROCEDURE — 80048 BASIC METABOLIC PNL TOTAL CA: CPT | Performed by: PHYSICIAN ASSISTANT

## 2018-01-18 PROCEDURE — 90732 PPSV23 VACC 2 YRS+ SUBQ/IM: CPT | Performed by: PHYSICIAN ASSISTANT

## 2018-01-18 PROCEDURE — 84443 ASSAY THYROID STIM HORMONE: CPT | Performed by: PHYSICIAN ASSISTANT

## 2018-01-18 PROCEDURE — 90471 IMMUNIZATION ADMIN: CPT | Performed by: PHYSICIAN ASSISTANT

## 2018-01-18 PROCEDURE — 36415 COLL VENOUS BLD VENIPUNCTURE: CPT | Performed by: PHYSICIAN ASSISTANT

## 2018-01-18 PROCEDURE — 99214 OFFICE O/P EST MOD 30 MIN: CPT | Mod: 25 | Performed by: PHYSICIAN ASSISTANT

## 2018-01-18 PROCEDURE — 90662 IIV NO PRSV INCREASED AG IM: CPT | Performed by: PHYSICIAN ASSISTANT

## 2018-01-18 PROCEDURE — 90472 IMMUNIZATION ADMIN EACH ADD: CPT | Performed by: PHYSICIAN ASSISTANT

## 2018-01-18 RX ORDER — TADALAFIL 20 MG/1
20 TABLET ORAL DAILY PRN
Qty: 6 TABLET | Refills: 11 | Status: SHIPPED | OUTPATIENT
Start: 2018-01-18 | End: 2018-02-22

## 2018-01-18 RX ORDER — SILDENAFIL CITRATE 20 MG/1
TABLET ORAL
Qty: 30 TABLET | Refills: 11 | Status: SHIPPED | OUTPATIENT
Start: 2018-01-18 | End: 2018-02-22

## 2018-01-18 ASSESSMENT — ANXIETY QUESTIONNAIRES
3. WORRYING TOO MUCH ABOUT DIFFERENT THINGS: NOT AT ALL
6. BECOMING EASILY ANNOYED OR IRRITABLE: NOT AT ALL
1. FEELING NERVOUS, ANXIOUS, OR ON EDGE: NOT AT ALL
7. FEELING AFRAID AS IF SOMETHING AWFUL MIGHT HAPPEN: NOT AT ALL
5. BEING SO RESTLESS THAT IT IS HARD TO SIT STILL: NOT AT ALL
IF YOU CHECKED OFF ANY PROBLEMS ON THIS QUESTIONNAIRE, HOW DIFFICULT HAVE THESE PROBLEMS MADE IT FOR YOU TO DO YOUR WORK, TAKE CARE OF THINGS AT HOME, OR GET ALONG WITH OTHER PEOPLE: NOT DIFFICULT AT ALL
2. NOT BEING ABLE TO STOP OR CONTROL WORRYING: NOT AT ALL
GAD7 TOTAL SCORE: 0

## 2018-01-18 ASSESSMENT — PATIENT HEALTH QUESTIONNAIRE - PHQ9
SUM OF ALL RESPONSES TO PHQ QUESTIONS 1-9: 0
5. POOR APPETITE OR OVEREATING: NOT AT ALL

## 2018-01-18 NOTE — MR AVS SNAPSHOT
"              After Visit Summary   1/18/2018    Tee Hilton    MRN: [id number]           Patient Information     Date Of Birth          1950        Visit Information        Provider Department      1/18/2018 8:20 AM Jon Denson PA-C [facility] [place]        Today's Diagnoses     CARDIOVASCULAR SCREENING; LDL GOAL LESS THAN 160    -  1    Other male erectile dysfunction        Elevated blood pressure reading without diagnosis of hypertension        Immunization due        Special screening for malignant neoplasm of prostate        Screening for thyroid disorder        Chronic midline low back pain without sciatica           Follow-ups after your visit        Future tests that were ordered for you today     Open Future Orders        Priority Expected Expires Ordered    MR Lumbar Spine w/o Contrast Routine  1/18/2019 1/18/2018            Who to contact     Normal or non-critical lab and imaging results will be communicated to you by Twelvehart, letter or phone within 4 business days after the clinic has received the results. If you do not hear from us within 7 days, please contact the clinic through Twelvehart or phone. If you have a critical or abnormal lab result, we will notify you by phone as soon as possible.  Submit refill requests through XillianTV or call your pharmacy and they will forward the refill request to us. Please allow 3 business days for your refill to be completed.          If you need to speak with a  for additional information , please call: [phone]             Additional Information About Your Visit        XillianTV Information     XillianTV lets you send messages to your doctor, view your test results, renew your prescriptions, schedule appointments and more. To sign up, go to [email or web address] . Click on \"Log in\" on the left side of the screen, which will take you to the Welcome page. Then click on \"Sign up Now\" on the right side of the page.     You " "will be asked to enter the access code listed below, as well as some personal information. Please follow the directions to create your username and password.     Your access code is: [email or web address]  Expires: 2018  9:17 AM     Your access code will  in 90 days. If you need help or a new code, please call your [place] clinic or [phone].        Care EveryWhere ID     This is your Care EveryWhere ID. This could be used by other organizations to access your [place] medical records  [id number]        Your Vitals Were     Pulse Temperature Respirations Height Pulse Oximetry BMI (Body Mass Index)    80 98.4  F (36.9  C) (Tympanic) 18 5' 10\" (1.778 m) 97% 34.44 kg/m2       Blood Pressure from Last 3 Encounters:   18 (!) 142/94   10/31/17 (!) 150/92   17 129/78    Weight from Last 3 Encounters:   18 240 lb (108.9 kg)   10/31/17 239 lb 2 oz (108.5 kg)   17 220 lb (99.8 kg)              We Performed the Following     Basic metabolic panel  (Ca, Cl, CO2, Creat, Gluc, K, Na, BUN)     FLU VACCINE, INCREASED ANTIGEN, PRESV FREE     Lipid panel reflex to direct LDL Fasting     PNEUMOCOCCAL VACCINE,ADULT,SQ OR IM     PSA, screen     TSH          Today's Medication Changes          These changes are accurate as of: 18  9:30 AM.  If you have any questions, ask your nurse or doctor.               Start taking these medicines.        Dose/Directions    sildenafil 20 MG tablet   Commonly known as:  REVATIO   Used for:  Other male erectile dysfunction   Started by:  Jon Denson PA-C        Take 2-5 tabs daily prn   Quantity:  30 tablet   Refills:  11         These medicines have changed or have updated prescriptions.        Dose/Directions    tadalafil 20 MG tablet   Commonly known as:  CIALIS   This may have changed:    - how much to take  - reasons to take this   Used for:  Other male erectile dysfunction   Changed by:  Jon Denson PA-C        Dose:  20 mg   Take 1 tablet (20 " mg) by mouth daily as needed Never use with nitroglycerin, terazosin or doxazosin.   Quantity:  6 tablet   Refills:  11         Stop taking these medicines if you haven't already. Please contact your care team if you have questions.     albuterol 108 (90 BASE) MCG/ACT Inhaler   Commonly known as:  PROAIR HFA/PROVENTIL HFA/VENTOLIN HFA   Stopped by:  Jon Denson PA-C           guaiFENesin-codeine 100-10 MG/5ML Soln solution   Commonly known as:  ROBITUSSIN AC   Stopped by:  Jon Denson PA-C                Where to get your medicines      These medications were sent to [facility] [address] [facility] [address] [facility]  [address] [facility] [address]     Phone:  [phone]     tadalafil 20 MG tablet         Some of these will need a paper prescription and others can be bought over the counter.  Ask your nurse if you have questions.     Bring a paper prescription for each of these medications     sildenafil 20 MG tablet                Primary Care Provider Office Phone # Fax #    Jon Denson PA-C [phone]       [address] [facility] [address]        Equal Access to Services     [facility]: Hadii aad ku hadasho Soomaali, waaxda luqadaha, qaybta kaalmada adeegyada, waxay idiin haycolinn laci doran. So [facility] [phone].    ATENCIÓN: Si habla español, tiene a campoverde disposición servicios gratuitos de asistencia lingüística. Llame al [phone].    We comply with applicable federal civil rights laws and [place] laws. We do not discriminate on the basis of race, color, national origin, age, disability, sex, sexual orientation, or gender identity.            Thank you!     Thank you for choosing [facility]  for your care. Our goal is always to provide you with excellent care. Hearing back from our patients is one way we can continue to improve our services. Please take a few minutes to complete the written survey that you may receive in  the mail after your visit with us. Thank you!             Your Updated Medication List - Protect others around you: Learn how to safely use, store and throw away your medicines at www.disposemymeds.org.          This list is accurate as of: 1/18/18  9:30 AM.  Always use your most recent med list.                   Brand Name Dispense Instructions for use Diagnosis    omeprazole 20 MG CR capsule    priLOSEC     Take 20 mg by mouth        sildenafil 20 MG tablet    REVATIO    30 tablet    Take 2-5 tabs daily prn    Other male erectile dysfunction       tadalafil 20 MG tablet    CIALIS    6 tablet    Take 1 tablet (20 mg) by mouth daily as needed Never use with nitroglycerin, terazosin or doxazosin.    Other male erectile dysfunction

## 2018-01-18 NOTE — PROGRESS NOTES
SUBJECTIVE:   Tee Hilton is a 67 year old male who presents to clinic today for the following health issues:      Hyperlipidemia Follow-Up      Rate your low fat/cholesterol diet?:none    Taking statin?  No    Other lipid medications/supplements?:  none    Hypertension Follow-up      Outpatient blood pressures are not being checked.    Low Salt Diet: not monitoring salt          Amount of exercise or physical activity: 4-5 days/week for an average of 30-45 minutes    Problems taking medications regularly: No    Medication side effects: none  Diet: regular (no restrictions)      Recheck of his low back pain. No radiculopathy.     Problem list and histories reviewed & adjusted, as indicated.  Additional history: as documented        Reviewed and updated as needed this visit by clinical staffTobacco  Allergies  Meds       Reviewed and updated as needed this visit by Provider         All other systems negative except as outline above  OBJECTIVE:  BACK: Lumbosacral spine area reveals local tenderness.  Painful and reduced LS ROM noted. Straight leg raise is negative  .  DTR's, motor strength and sensation normal, including heel and toe gait.  Perifpheral pulses are palpable.  Hipes and knees have full range of motion without pain.  No abdominal tenderness, mass or organomegaly.  Thyroid not palpable, not enlarged, no nodules detected.  Eye exam - right eye normal lid, conjunctiva, cornea, pupil and fundus, left eye normal lid, conjunctiva, cornea, pupil and fundus.  CHEST:chest clear to IPPA, no tachypnea, retractions or cyanosis and S1, S2 normal, no murmur, no gallop, rate regular.  Foot exam - both sides normal; no swelling, tenderness or skin or vascular lesions. Color and temperature is normal. Sensation is intact. Peripheral pulses are palpable. Toenails are normal.    Tee was seen today for hypertension and lipids.    Diagnoses and all orders for this visit:    CARDIOVASCULAR SCREENING; LDL GOAL LESS  THAN 160  -     Lipid panel reflex to direct LDL Fasting    Other male erectile dysfunction  -     tadalafil (CIALIS) 20 MG tablet; Take 1 tablet (20 mg) by mouth daily as needed Never use with nitroglycerin, terazosin or doxazosin.  -     sildenafil (REVATIO) 20 MG tablet; Take 2-5 tabs daily prn    Elevated blood pressure reading without diagnosis of hypertension  -     Basic metabolic panel  (Ca, Cl, CO2, Creat, Gluc, K, Na, BUN)    Immunization due  -     PNEUMOCOCCAL VACCINE,ADULT,SQ OR IM  -     FLU VACCINE, INCREASED ANTIGEN, PRESV FREE    Special screening for malignant neoplasm of prostate  -     PSA, screen    Screening for thyroid disorder  -     TSH    Chronic midline low back pain without sciatica  -     MR Lumbar Spine w/o Contrast; Future      work on lifestyle modification  Recheck of blood pressure in 3 mos

## 2018-01-18 NOTE — LETTER
February 6, 2018      Tee GONGORA Yobani  [address] [facility] [address]        Dear Tee,    We are writing to inform you of your test results.      Your PSA has improved as compared to last year and is now within normal limits.     Your cholesterol numbers have also improved. I still want you really working on a lower fat, higher fiber diet and consistent exercise.     The rest of your labs came back nice and normal as well.     Resulted Orders   Lipid panel reflex to direct LDL Fasting   Result Value Ref Range    Cholesterol 220 (H) <200 mg/dL      Comment:      Desirable:       <200 mg/dl    Triglycerides 68 <150 mg/dL      Comment:      Fasting specimen    HDL Cholesterol 77 >39 mg/dL    LDL Cholesterol Calculated 129 (H) <100 mg/dL      Comment:      Above desirable:  100-129 mg/dl  Borderline High:  130-159 mg/dL  High:             160-189 mg/dL  Very high:       >189 mg/dl      Non HDL Cholesterol 143 (H) <130 mg/dL      Comment:      Above Desirable:  130-159 mg/dl  Borderline high:  160-189 mg/dl  High:             190-219 mg/dl  Very high:       >219 mg/dl     Basic metabolic panel  (Ca, Cl, CO2, Creat, Gluc, K, Na, BUN)   Result Value Ref Range    Sodium 140 133 - 144 mmol/L    Potassium 4.0 3.4 - 5.3 mmol/L    Chloride 104 94 - 109 mmol/L    Carbon Dioxide 29 20 - 32 mmol/L    Anion Gap 7 3 - 14 mmol/L    Glucose 101 (H) 70 - 99 mg/dL      Comment:      Fasting specimen    Urea Nitrogen 16 7 - 30 mg/dL    Creatinine 0.79 0.66 - 1.25 mg/dL    GFR Estimate >90 >60 mL/min/1.7m2      Comment:      Non  GFR Calc    GFR Estimate If Black >90 >60 mL/min/1.7m2      Comment:       GFR Calc    Calcium 8.9 8.5 - 10.1 mg/dL   PSA, screen   Result Value Ref Range    PSA 3.42 0 - 4 ug/L      Comment:      Assay Method:  Chemiluminescence using Siemens Vista analyzer   TSH   Result Value Ref Range    TSH 1.84 0.40 - 4.00 mU/L       If you have any questions or concerns, please  call the clinic at the number listed above.       Sincerely,    Jon Denson PA-C/chirag

## 2018-01-19 ASSESSMENT — ANXIETY QUESTIONNAIRES: GAD7 TOTAL SCORE: 0

## 2018-01-23 ENCOUNTER — OFFICE VISIT (OUTPATIENT)
Dept: FAMILY MEDICINE | Facility: CLINIC | Age: 68
End: 2018-01-23
Payer: COMMERCIAL

## 2018-01-23 VITALS
OXYGEN SATURATION: 97 % | DIASTOLIC BLOOD PRESSURE: 92 MMHG | TEMPERATURE: 97.9 F | SYSTOLIC BLOOD PRESSURE: 165 MMHG | BODY MASS INDEX: 34.58 KG/M2 | HEART RATE: 84 BPM | WEIGHT: 241 LBS

## 2018-01-23 DIAGNOSIS — B34.9 VIRAL SYNDROME: Primary | ICD-10-CM

## 2018-01-23 DIAGNOSIS — R03.0 ELEVATED BLOOD PRESSURE READING WITHOUT DIAGNOSIS OF HYPERTENSION: ICD-10-CM

## 2018-01-23 PROCEDURE — 99213 OFFICE O/P EST LOW 20 MIN: CPT | Performed by: FAMILY MEDICINE

## 2018-01-23 NOTE — PATIENT INSTRUCTIONS

## 2018-01-23 NOTE — MR AVS SNAPSHOT
"              After Visit Summary   1/23/2018    Tee Hilton    MRN: [id number]           Patient Information     Date Of Birth          1950        Visit Information        Provider Department      1/23/2018 3:00 PM Jacqueline Moe MD [facility]        Today's Diagnoses     Viral syndrome    -  1    Elevated blood pressure reading without diagnosis of hypertension          Care Instructions      Viral Syndrome (Adult)  A viral illness may cause a number of symptoms. The symptoms depend on the part of the body that the virus affects. If it settles in your nose, throat, and lungs, it may cause cough, sore throat, congestion, and sometimes headache. If it settles in your stomach and intestinal tract, it may cause vomiting and diarrhea. Sometimes it causes vague symptoms like \"aching all over,\" feeling tired, loss of appetite, or fever.  A viral illness usually lasts 1 to 2 weeks, but sometimes it lasts longer. In some cases, a more serious infection can look like a viral syndrome in the first few days of the illness. You may need another exam and additional tests to know the difference. Watch for the warning signs listed below.  Home care  Follow these guidelines for taking care of yourself at home:    If symptoms are severe, rest at home for the first 2 to 3 days.    Stay away from cigarette smoke - both your smoke and the smoke from others.    You may use over-the-counter acetaminophen or ibuprofen for fever, muscle aching, and headache, unless another medicine was prescribed for this. If you have chronic liver or kidney disease or ever had a stomach ulcer or GI bleeding, talk with your doctor before using these medicines. No one who is younger than 18 and ill with a fever should take aspirin. It may cause severe disease or death.    Your appetite may be poor, so a light diet is fine. Avoid dehydration by drinking 8 to 12 8-ounce glasses of fluids each day. This may include water; orange " juice; lemonade; apple, grape, and cranberry juice; clear fruit drinks; electrolyte replacement and sports drinks; and decaffeinated teas and coffee. If you have been diagnosed with a kidney disease, ask your doctor how much and what types of fluids you should drink to prevent dehydration. If you have kidney disease, drinking too much fluid can cause it build up in the your body and be dangerous to your health.    Over-the-counter remedies won't shorten the length of the illness but may be helpful for cough, sore throat; and nasal and sinus congestion. Don't use decongestants if you have high blood pressure.  Follow-up care  Follow up with your healthcare provider if you do not improve over the next week.  Call 911  Get emergency medical care if any of the following occur:    Convulsion    Feeling weak, dizzy, or like you are going to faint    Chest pain, shortness of breath, wheezing, or difficulty breathing  When to seek medical advice  Call your healthcare provider right away if any of these occur:    Cough with lots of colored sputum (mucus) or blood in your sputum    Chest pain, shortness of breath, wheezing, or difficulty breathing    Severe headache; face, neck, or ear pain    Severe, constant pain in the lower right side of your belly (abdominal)    Continued vomiting (can t keep liquids down)    Frequent diarrhea (more than 5 times a day); blood (red or black color) or mucus in diarrhea    Feeling weak, dizzy, or like you are going to faint    Extreme thirst    Fever of 100.4 F (38 C) or higher, or as directed by your healthcare provider  Date Last Reviewed: 9/25/2015 2000-2017 [employer]. [address] [place] [address]. All rights reserved. This information is not intended as a substitute for professional medical care. Always follow your healthcare professional's instructions.                Follow-ups after your visit        Follow-up notes from your care team     Return if  "symptoms worsen or fail to improve.      Who to contact     Normal or non-critical lab and imaging results will be communicated to you by [employer]hart, letter or phone within 4 business days after the clinic has received the results. If you do not hear from us within 7 days, please contact the clinic through MyChart or phone. If you have a critical or abnormal lab result, we will notify you by phone as soon as possible.  Submit refill requests through Yelp or call your pharmacy and they will forward the refill request to us. Please allow 3 business days for your refill to be completed.          If you need to speak with a  for additional information , please call: [phone]             Additional Information About Your Visit        Yelp Information     Yelp lets you send messages to your doctor, view your test results, renew your prescriptions, schedule appointments and more. To sign up, go to [email or web address] . Click on \"Log in\" on the left side of the screen, which will take you to the Welcome page. Then click on \"Sign up Now\" on the right side of the page.     You will be asked to enter the access code listed below, as well as some personal information. Please follow the directions to create your username and password.     Your access code is: [email or web address]  Expires: 2018  9:17 AM     Your access code will  in 90 days. If you need help or a new code, please call your [facility] clinic or [phone].        Care EveryWhere ID     This is your Care EveryWhere ID. This could be used by other organizations to access your [facility] medical records  [id number]        Your Vitals Were     Pulse Temperature Pulse Oximetry BMI (Body Mass Index)          84 97.9  F (36.6  C) (Tympanic) 97% 34.58 kg/m2         Blood Pressure from Last 3 Encounters:   18 (!) 165/92   18 (!) 142/94   10/31/17 (!) 150/92    Weight from Last 3 Encounters:   18 241 lb (109.3 kg) "   01/18/18 240 lb (108.9 kg)   10/31/17 239 lb 2 oz (108.5 kg)              Today, you had the following     No orders found for display       Primary Care Provider Office Phone # Fax #    Jon Denson PA-C [phone]       [address] [place] [address]        Equal Access to Services     [facility]: Hadii aad ku hadasho Soomaali, waaxda luqadaha, qaybta kaalmada adeegyada, waxay idiin hayaan adeeg kharash la'aan . So [facility] [place] [phone].    ATENCIÓN: Si habla español, tiene a campoverde disposición servicios gratuitos de asistencia lingüística. [facility] [phone].    We comply with applicable federal civil rights laws and [place] laws. We do not discriminate on the basis of race, color, national origin, age, disability, sex, sexual orientation, or gender identity.            Thank you!     Thank you for choosing [facility]  for your care. Our goal is always to provide you with excellent care. Hearing back from our patients is one way we can continue to improve our services. Please take a few minutes to complete the written survey that you may receive in the mail after your visit with us. Thank you!             Your Updated Medication List - Protect others around you: Learn how to safely use, store and throw away your medicines at www.disposemymeds.org.          This list is accurate as of: 1/23/18  3:33 PM.  Always use your most recent med list.                   Brand Name Dispense Instructions for use Diagnosis    omeprazole 20 MG CR capsule    priLOSEC     Take 20 mg by mouth        sildenafil 20 MG tablet    REVATIO    30 tablet    Take 2-5 tabs daily prn    Other male erectile dysfunction       tadalafil 20 MG tablet    CIALIS    6 tablet    Take 1 tablet (20 mg) by mouth daily as needed Never use with nitroglycerin, terazosin or doxazosin.    Other male erectile dysfunction

## 2018-01-23 NOTE — PROGRESS NOTES
SUBJECTIVE:  Tee Hilton is a 67 year old male who presents with the following concerns;              Symptoms: cc Present Absent Comment   Fever/Chills  x  Highest 102- fevers for x4 days   Fatigue  x     Muscle Aches  x     Eye Irritation   x    Sneezing  x     Nasal Antoni/Drg  x  Nasal drinage   Sinus Pressure/Pain   x    Loss of smell   x    Dental pain   x    Sore Throat   x    Swollen Glands   x    Ear Pain/Fullness   x    Cough  x     Wheeze   x    Chest Pain   x    Shortness of breath   x    Rash       Other   x      Symptom duration:  x4 days   Sympom severity:  fevers have improved as of today   Treatments tried:  ibuprofen at 11 AM   Contacts:  none specific      Nonsmoker. No known history of asthma/copd.     Medications updated and reviewed.  Current Outpatient Prescriptions   Medication     omeprazole (PRILOSEC) 20 MG CR capsule     tadalafil (CIALIS) 20 MG tablet     sildenafil (REVATIO) 20 MG tablet     No current facility-administered medications for this visit.        Past, family and surgical history is updated and reviewed in the record.    ROS:  Other than noted above, general, HEENT, respiratory, cardiac and gastrointestinal systems are negative.    OBJECTIVE:  BP (!) 165/92  Pulse 84  Temp 97.9  F (36.6  C) (Tympanic)  Wt 241 lb (109.3 kg)  SpO2 97%  BMI 34.58 kg/m2  GENERAL:  Alert, no acute distress  EYES:  PERRL, EOM normal, conjunctiva and lids normal  HEENT:  Ears and TMs normal, oral mucosa and posterior oropharynx normal  RESP:  Lungs clear to auscultation.  CV: normal rate, regular rhythm, no murmur or gallop.    Assessment/Plan:     Tee was seen today for uri.    Diagnoses and all orders for this visit:    Viral syndrome  Reassured that this is self limiting.   Recommended supportive management. Increase fluid intake. Plenty of rest.   Tylenol+/-Ibuprofen as needed for discomfort and fever.      Elevated blood pressure reading without diagnosis of hypertension  Repeat BP  at the end of the visit was still elevated above goal.   Recommend a repeat BP with ancillary in 2-4 weeks  Continue dietary and lifestyle changes, if BP remains > 130/80 follow up with Jon for hypertension evaluation and management. Patient verbalized understanding.    Patient education and Handout given       Follow up if symptoms fail to improve or worsen.      The patient was in agreement with the plan today and had no questions or concerns prior to leaving the clinic.    Jacqueline Moe M.D    [facility]

## 2018-02-22 ENCOUNTER — OFFICE VISIT (OUTPATIENT)
Dept: FAMILY MEDICINE | Facility: CLINIC | Age: 68
End: 2018-02-22
Payer: COMMERCIAL

## 2018-02-22 VITALS
BODY MASS INDEX: 34.44 KG/M2 | DIASTOLIC BLOOD PRESSURE: 88 MMHG | RESPIRATION RATE: 18 BRPM | WEIGHT: 240 LBS | OXYGEN SATURATION: 97 % | SYSTOLIC BLOOD PRESSURE: 146 MMHG | HEART RATE: 98 BPM

## 2018-02-22 DIAGNOSIS — I10 BENIGN ESSENTIAL HYPERTENSION: ICD-10-CM

## 2018-02-22 DIAGNOSIS — F40.240 CLAUSTROPHOBIA: ICD-10-CM

## 2018-02-22 DIAGNOSIS — M54.42 ACUTE MIDLINE LOW BACK PAIN WITH LEFT-SIDED SCIATICA: Primary | ICD-10-CM

## 2018-02-22 PROCEDURE — 99213 OFFICE O/P EST LOW 20 MIN: CPT | Performed by: PHYSICIAN ASSISTANT

## 2018-02-22 RX ORDER — PREDNISONE 20 MG/1
20 TABLET ORAL 2 TIMES DAILY
Qty: 14 TABLET | Refills: 0 | Status: SHIPPED | OUTPATIENT
Start: 2018-02-22 | End: 2018-08-23

## 2018-02-22 RX ORDER — LISINOPRIL 10 MG/1
10 TABLET ORAL DAILY
Qty: 30 TABLET | Refills: 1 | Status: SHIPPED | OUTPATIENT
Start: 2018-02-22 | End: 2018-08-23

## 2018-02-22 RX ORDER — LISINOPRIL 10 MG/1
10 TABLET ORAL DAILY
Qty: 30 TABLET | Refills: 1 | Status: SHIPPED | OUTPATIENT
Start: 2018-02-22 | End: 2018-02-22

## 2018-02-22 RX ORDER — DIAZEPAM 5 MG
TABLET ORAL
Qty: 2 TABLET | Refills: 0 | Status: SHIPPED | OUTPATIENT
Start: 2018-02-22 | End: 2018-08-23

## 2018-02-22 RX ORDER — PREDNISONE 20 MG/1
20 TABLET ORAL 2 TIMES DAILY
Qty: 14 TABLET | Refills: 0 | Status: SHIPPED | OUTPATIENT
Start: 2018-02-22 | End: 2018-02-22

## 2018-02-22 NOTE — MR AVS SNAPSHOT
"              After Visit Summary   2018    Tee Hilton    MRN: [id number]           Patient Information     Date Of Birth          1950        Visit Information        Provider Department      2018 3:40 PM Jon Denson PA-C [facility] [place]        Today's Diagnoses     Acute midline low back pain with left-sided sciatica    -  1    Claustrophobia        Benign essential hypertension           Follow-ups after your visit        Who to contact     Normal or non-critical lab and imaging results will be communicated to you by [employer]art, letter or phone within 4 business days after the clinic has received the results. If you do not hear from us within 7 days, please contact the clinic through Oneexchangestreett or phone. If you have a critical or abnormal lab result, we will notify you by phone as soon as possible.  Submit refill requests through [employer] or call your pharmacy and they will forward the refill request to us. Please allow 3 business days for your refill to be completed.          If you need to speak with a  for additional information , please call: [phone]             Additional Information About Your Visit        ProgrammrharBevSpot Information     [employer] lets you send messages to your doctor, view your test results, renew your prescriptions, schedule appointments and more. To sign up, go to [email or web address] . Click on \"Log in\" on the left side of the screen, which will take you to the Welcome page. Then click on \"Sign up Now\" on the right side of the page.     You will be asked to enter the access code listed below, as well as some personal information. Please follow the directions to create your username and password.     Your access code is: [email or web address]  Expires: 2018  4:49 PM     Your access code will  in 90 days. If you need help or a new code, please call your [facility] clinic or [phone].        Care EveryWhere ID     This is your Care " EveryWhere ID. This could be used by other organizations to access your [facility] medical records  [id number]        Your Vitals Were     Pulse Respirations Pulse Oximetry BMI (Body Mass Index)          98 18 97% 34.44 kg/m2         Blood Pressure from Last 3 Encounters:   02/22/18 146/88   01/23/18 (!) 165/92   01/18/18 (!) 142/94    Weight from Last 3 Encounters:   02/22/18 240 lb (108.9 kg)   01/23/18 241 lb (109.3 kg)   01/18/18 240 lb (108.9 kg)              Today, you had the following     No orders found for display         Today's Medication Changes          These changes are accurate as of 2/22/18  4:49 PM.  If you have any questions, ask your nurse or doctor.               Start taking these medicines.        Dose/Directions    diazepam 5 MG tablet   Commonly known as:  VALIUM   Used for:  Acute midline low back pain with left-sided sciatica   Started by:  Jon Denson PA-C        Take 1-2 tabs one hour prior to mri.   Quantity:  2 tablet   Refills:  0       lisinopril 10 MG tablet   Commonly known as:  PRINIVIL/ZESTRIL   Used for:  Benign essential hypertension   Started by:  Jon Denson PA-C        Dose:  10 mg   Take 1 tablet (10 mg) by mouth daily   Quantity:  30 tablet   Refills:  1       predniSONE 20 MG tablet   Commonly known as:  DELTASONE   Used for:  Claustrophobia   Started by:  Jon Denson PA-C        Dose:  20 mg   Take 1 tablet (20 mg) by mouth 2 times daily   Quantity:  14 tablet   Refills:  0         Stop taking these medicines if you haven't already. Please contact your care team if you have questions.     sildenafil 20 MG tablet   Commonly known as:  REVATIO   Stopped by:  Jon Denson PA-C           tadalafil 20 MG tablet   Commonly known as:  CIALIS   Stopped by:  Jon Denson PA-C                Where to get your medicines      These medications were sent to [facility] - [address] [address]  [address] [address],  [place] [address]     Phone:  [phone]     lisinopril 10 MG tablet    predniSONE 20 MG tablet         Some of these will need a paper prescription and others can be bought over the counter.  Ask your nurse if you have questions.     Bring a paper prescription for each of these medications     diazepam 5 MG tablet                Primary Care Provider Office Phone # Fax #    Jon Delia Denson PA-C [phone]       [address]  [place] [address]        Equal Access to Services     [facility] : Hadii aad ku hadasho Soomaali, waaxda luqadaha, qaybta kaalmada adeegyada, waxay idiin hayaan adeeg kharash laclair . So [facility] [phone].    ATENCIÓN: Si habla español, tiene a campoverde disposición servicios gratuitos de asistencia lingüística. [facility] [phone].    We comply with applicable federal civil rights laws and [place] laws. We do not discriminate on the basis of race, color, national origin, age, disability, sex, sexual orientation, or gender identity.            Thank you!     Thank you for choosing [facility]  for your care. Our goal is always to provide you with excellent care. Hearing back from our patients is one way we can continue to improve our services. Please take a few minutes to complete the written survey that you may receive in the mail after your visit with us. Thank you!             Your Updated Medication List - Protect others around you: Learn how to safely use, store and throw away your medicines at www.disposemymeds.org.          This list is accurate as of 2/22/18  4:49 PM.  Always use your most recent med list.                   Brand Name Dispense Instructions for use Diagnosis    diazepam 5 MG tablet    VALIUM    2 tablet    Take 1-2 tabs one hour prior to mri.    Acute midline low back pain with left-sided sciatica       lisinopril 10 MG tablet    PRINIVIL/ZESTRIL    30 tablet    Take 1 tablet (10 mg) by mouth daily    Benign essential hypertension        omeprazole 20 MG CR capsule    priLOSEC     Take 20 mg by mouth        predniSONE 20 MG tablet    DELTASONE    14 tablet    Take 1 tablet (20 mg) by mouth 2 times daily    Claustrophobia

## 2018-02-22 NOTE — PROGRESS NOTES
SUBJECTIVE:   Tee Hilton is a 67 year old male who presents to clinic today for the following health issues:      Back pain follow up-was unable to do MRI needing sedative prescribed today    Left leg radiculopathy       Problem list and histories reviewed & adjusted, as indicated.  Additional history: as documented    BP Readings from Last 3 Encounters:   02/22/18 146/88   01/23/18 (!) 165/92   01/18/18 (!) 142/94    Wt Readings from Last 3 Encounters:   02/22/18 240 lb (108.9 kg)   01/23/18 241 lb (109.3 kg)   01/18/18 240 lb (108.9 kg)                    Reviewed and updated as needed this visit by clinical staff  Tobacco  Allergies  Meds  Med Hx  Surg Hx  Fam Hx  Soc Hx      Reviewed and updated as needed this visit by Provider         All other systems negative except as outline above  OBJECTIVE:  BACK: Lumbosacral spine area reveals local tenderness.  Painful and reduced LS ROM noted. Straight leg raise is negative.  DTR's, motor strength and sensation normal, including heel and toe gait.  Perifpheral pulses are palpable.  Hipes and knees have full range of motion without pain.  No abdominal tenderness, mass or organomegaly.    Tee was seen today for back pain.    Diagnoses and all orders for this visit:    Acute midline low back pain with left-sided sciatica  -     diazepam (VALIUM) 5 MG tablet; Take 1-2 tabs one hour prior to mri.    Claustrophobia  -     predniSONE (DELTASONE) 20 MG tablet; Take 1 tablet (20 mg) by mouth 2 times daily for 7 days    Benign essential hypertension  -     lisinopril (PRINIVIL/ZESTRIL) 10 MG tablet; Take 1 tablet (10 mg) by mouth daily      work on lifestyle modification  Mri pending of his lumbar spine.  Recheck of blood pressure in 1 mos .

## 2018-02-26 ENCOUNTER — RADIANT APPOINTMENT (OUTPATIENT)
Dept: MRI IMAGING | Facility: CLINIC | Age: 68
End: 2018-02-26
Attending: PHYSICIAN ASSISTANT
Payer: COMMERCIAL

## 2018-02-26 PROCEDURE — 72148 MRI LUMBAR SPINE W/O DYE: CPT | Mod: TC

## 2018-02-27 ENCOUNTER — MYC REFILL (OUTPATIENT)
Dept: FAMILY MEDICINE | Facility: CLINIC | Age: 68
End: 2018-02-27

## 2018-02-27 ENCOUNTER — TELEPHONE (OUTPATIENT)
Dept: NURSING | Facility: CLINIC | Age: 68
End: 2018-02-27

## 2018-02-27 ENCOUNTER — MYC MEDICAL ADVICE (OUTPATIENT)
Dept: FAMILY MEDICINE | Facility: CLINIC | Age: 68
End: 2018-02-27

## 2018-02-27 DIAGNOSIS — F40.240 CLAUSTROPHOBIA: ICD-10-CM

## 2018-02-27 DIAGNOSIS — M54.42 ACUTE MIDLINE LOW BACK PAIN WITH LEFT-SIDED SCIATICA: Primary | ICD-10-CM

## 2018-02-27 RX ORDER — OXYCODONE HYDROCHLORIDE 5 MG/1
5 TABLET ORAL EVERY 4 HOURS PRN
Qty: 20 TABLET | Refills: 0 | Status: SHIPPED | OUTPATIENT
Start: 2018-02-27 | End: 2018-08-23

## 2018-02-27 RX ORDER — PREDNISONE 20 MG/1
20 TABLET ORAL 2 TIMES DAILY
Qty: 14 TABLET | Refills: 0 | Status: CANCELLED | OUTPATIENT
Start: 2018-02-27

## 2018-02-27 NOTE — TELEPHONE ENCOUNTER
Routing refill request to provider for review/approval because:  Drug not on the [facility] refill protocol, last filled 2/22/18.  Ely Irene RN

## 2018-02-27 NOTE — TELEPHONE ENCOUNTER
[facility] has been reviewed. He had degenerative disk changes at multiple levels of his lumbar spine, but more specifically, he has a disk herniation at the L3-L4 level that appears to impinge his left nerve root resulting in referred pain into his right leg. As such, I am referring him to a spine specialist.

## 2018-02-27 NOTE — TELEPHONE ENCOUNTER
Message from Sferrat:  Original authorizing provider: BINTA Delvalle FRANSISCALizbeth Hilton would like a refill of the following medications:  predniSONE (DELTASONE) 20 MG tablet [Jon Denson PA-C]    Preferred pharmacy: [facility] - [address] [facility]    Comment:  Need for pain unless you would have something else I need after reviewing my MRI.

## 2018-02-27 NOTE — TELEPHONE ENCOUNTER
Call to pt to discuss all below , number to spine clinic given. All questions answered .  Pt state he wanted to get in tomorrow to discuss pain management , he states he would like something to get him through til he see spine specialist .

## 2018-02-28 RX ORDER — METHYLPREDNISOLONE 4 MG
TABLET, DOSE PACK ORAL
Qty: 21 TABLET | Refills: 0 | Status: SHIPPED | OUTPATIENT
Start: 2018-02-28 | End: 2018-08-23

## 2018-02-28 NOTE — TELEPHONE ENCOUNTER
"Message taken from other encounter \"[facility] has been reviewed. He had degenerative disk changes at multiple levels of his lumbar spine, but more specifically, he has a disk herniation at the L3-L4 level that appears to impinge his left nerve root resulting in referred pain into his right leg. As such, I am referring him to a spine specialist. \"  Patient given message.  "

## 2018-02-28 NOTE — TELEPHONE ENCOUNTER
Spoke with pharmacy, patient is there picking up meds now, gave pharmacist the below information, he will relay to patient.  Ely Irene RN

## 2018-02-28 NOTE — TELEPHONE ENCOUNTER
I routed a medrol dose darrell instead. This may work better than the prednisone I had him on and will allow me to taper him.

## 2018-03-05 ENCOUNTER — TRANSFERRED RECORDS (OUTPATIENT)
Dept: HEALTH INFORMATION MANAGEMENT | Facility: CLINIC | Age: 68
End: 2018-03-05

## 2018-03-23 ENCOUNTER — TRANSFERRED RECORDS (OUTPATIENT)
Dept: HEALTH INFORMATION MANAGEMENT | Facility: CLINIC | Age: 68
End: 2018-03-23

## 2018-06-18 ENCOUNTER — TRANSFERRED RECORDS (OUTPATIENT)
Dept: HEALTH INFORMATION MANAGEMENT | Facility: CLINIC | Age: 68
End: 2018-06-18

## 2018-07-16 DIAGNOSIS — I10 BENIGN ESSENTIAL HYPERTENSION: ICD-10-CM

## 2018-07-16 RX ORDER — LISINOPRIL 10 MG/1
TABLET ORAL
Qty: 30 TABLET | Refills: 0 | Status: SHIPPED | OUTPATIENT
Start: 2018-07-16 | End: 2018-08-23

## 2018-07-16 NOTE — TELEPHONE ENCOUNTER
kathrine is due for a recheck. Have him make an appt to see me for a recheck of his blood pressure

## 2018-07-16 NOTE — LETTER
July 17, 2018      Tee Hilton  [address] [facility] [address]        Dear Tee,     Your medication has been approved for lisinopril for a 30 day supply.    However, you are due for a follow up visit.     No further refills until you are seen.     Please schedule this visit at your earliest convenience.       Sincerely,        Jon Denson PA-C/chari

## 2018-08-23 ENCOUNTER — OFFICE VISIT (OUTPATIENT)
Dept: FAMILY MEDICINE | Facility: CLINIC | Age: 68
End: 2018-08-23
Payer: COMMERCIAL

## 2018-08-23 VITALS
DIASTOLIC BLOOD PRESSURE: 94 MMHG | HEIGHT: 70 IN | RESPIRATION RATE: 16 BRPM | TEMPERATURE: 98.2 F | OXYGEN SATURATION: 99 % | BODY MASS INDEX: 35.36 KG/M2 | HEART RATE: 104 BPM | SYSTOLIC BLOOD PRESSURE: 138 MMHG | WEIGHT: 247 LBS

## 2018-08-23 DIAGNOSIS — M54.42 CHRONIC MIDLINE LOW BACK PAIN WITH LEFT-SIDED SCIATICA: ICD-10-CM

## 2018-08-23 DIAGNOSIS — Z11.4 SCREENING FOR HIV (HUMAN IMMUNODEFICIENCY VIRUS): ICD-10-CM

## 2018-08-23 DIAGNOSIS — N52.8 OTHER MALE ERECTILE DYSFUNCTION: ICD-10-CM

## 2018-08-23 DIAGNOSIS — G89.29 CHRONIC MIDLINE LOW BACK PAIN WITH LEFT-SIDED SCIATICA: ICD-10-CM

## 2018-08-23 DIAGNOSIS — I49.9 IRREGULAR HEART RATE: ICD-10-CM

## 2018-08-23 DIAGNOSIS — R03.0 ELEVATED BLOOD PRESSURE READING WITHOUT DIAGNOSIS OF HYPERTENSION: Primary | ICD-10-CM

## 2018-08-23 DIAGNOSIS — I48.20 CHRONIC ATRIAL FIBRILLATION (H): ICD-10-CM

## 2018-08-23 DIAGNOSIS — I10 BENIGN ESSENTIAL HYPERTENSION: ICD-10-CM

## 2018-08-23 DIAGNOSIS — R06.83 SNORING: ICD-10-CM

## 2018-08-23 LAB
ANION GAP SERPL CALCULATED.3IONS-SCNC: 8 MMOL/L (ref 3–14)
BUN SERPL-MCNC: 24 MG/DL (ref 7–30)
CALCIUM SERPL-MCNC: 9.2 MG/DL (ref 8.5–10.1)
CHLORIDE SERPL-SCNC: 107 MMOL/L (ref 94–109)
CO2 SERPL-SCNC: 27 MMOL/L (ref 20–32)
CREAT SERPL-MCNC: 1.12 MG/DL (ref 0.66–1.25)
GFR SERPL CREATININE-BSD FRML MDRD: 66 ML/MIN/1.7M2
GLUCOSE SERPL-MCNC: 96 MG/DL (ref 70–99)
POTASSIUM SERPL-SCNC: 4.9 MMOL/L (ref 3.4–5.3)
SODIUM SERPL-SCNC: 142 MMOL/L (ref 133–144)
TSH SERPL DL<=0.005 MIU/L-ACNC: 2.41 MU/L (ref 0.4–4)

## 2018-08-23 PROCEDURE — 36415 COLL VENOUS BLD VENIPUNCTURE: CPT | Performed by: PHYSICIAN ASSISTANT

## 2018-08-23 PROCEDURE — 99215 OFFICE O/P EST HI 40 MIN: CPT | Performed by: PHYSICIAN ASSISTANT

## 2018-08-23 PROCEDURE — 80048 BASIC METABOLIC PNL TOTAL CA: CPT | Performed by: PHYSICIAN ASSISTANT

## 2018-08-23 PROCEDURE — 93000 ELECTROCARDIOGRAM COMPLETE: CPT | Performed by: PHYSICIAN ASSISTANT

## 2018-08-23 PROCEDURE — 84443 ASSAY THYROID STIM HORMONE: CPT | Performed by: PHYSICIAN ASSISTANT

## 2018-08-23 PROCEDURE — 87389 HIV-1 AG W/HIV-1&-2 AB AG IA: CPT | Performed by: PHYSICIAN ASSISTANT

## 2018-08-23 RX ORDER — METOPROLOL SUCCINATE 25 MG/1
25 TABLET, EXTENDED RELEASE ORAL DAILY
Qty: 90 TABLET | Refills: 1 | Status: SHIPPED | OUTPATIENT
Start: 2018-08-23 | End: 2018-12-06

## 2018-08-23 RX ORDER — LISINOPRIL 20 MG/1
20 TABLET ORAL DAILY
Qty: 90 TABLET | Refills: 1 | Status: SHIPPED | OUTPATIENT
Start: 2018-08-23 | End: 2018-09-20

## 2018-08-23 RX ORDER — SILDENAFIL CITRATE 20 MG/1
TABLET ORAL
Qty: 30 TABLET | Refills: 11 | Status: SHIPPED | OUTPATIENT
Start: 2018-08-23 | End: 2019-12-22

## 2018-08-23 ASSESSMENT — PAIN SCALES - GENERAL: PAINLEVEL: MODERATE PAIN (4)

## 2018-08-23 NOTE — MR AVS SNAPSHOT
After Visit Summary   8/23/2018    Tee Hilton    MRN: [id number]           Patient Information     Date Of Birth          1950        Visit Information        Provider Department      8/23/2018 10:20 AM Jon Denson PA-C [facility]        Today's Diagnoses     Elevated blood pressure reading without diagnosis of hypertension    -  1    Screening for HIV (human immunodeficiency virus)        Chronic midline low back pain with left-sided sciatica        Irregular heart rate        Chronic atrial fibrillation (H)        Benign essential hypertension        Snoring          Care Instructions      Understanding Atrial Fibrillation    An arrhythmia is any problem with the speed or pattern of the heartbeat. Atrial fibrillation (AFib) is a common type of arrhythmia. It causes fast, chaotic electrical signals in the atria. This leads to poor functioning of the heart. It also affects how much blood your heart can pump out to the body.  Afib may occur once in a while and go away on its own. Or it may continue for longer periods and need treatment.  AFib can lead to serious problems, such as stroke. Your healthcare provider will need to monitor and manage it.  What happens during atrial fibrillation?   The heart has an electrical system that sends signals to control the heartbeat. As signals move through the heart, they tell the heart s upper chambers (atria) and lower chambers (ventricles) when to squeeze (contract) and relax. This lets blood move through the heart and out to the body and lungs.  With AFib, the atria receive abnormal signals. This causes them to contract in a fast and irregular way, and out of sync with the ventricles. When this happens, the atria also have a harder time moving blood into the ventricles. Blood may then pool in the atria, which increases the risk for blood clots and stroke. The ventricles also may contract too quickly and irregularly. As a result,  they may not pump blood to the body and lungs as well as they should. This can weaken the heart muscle over time and cause heart failure.  What causes atrial fibrillation?  AFib is more common in older adults. It has many possible causes including:    Coronary artery disease    Heart valve disease    Heart attack    Heart surgery    High blood pressure    Thyroid disease    Diabetes    Lung disease    Sleep apnea    Heavy alcohol use  In some cases of AFib, doctors do not know the cause.  What are the symptoms of atrial fibrillation?  AFib may or may not cause symptoms. If symptoms do occur, they may include:    A fast, pounding, irregular heartbeat    Shortness of breath    Tiredness    Dizziness or fainting    Chest pain  How is atrial fibrillation treated?  Treatments for AFib can include any of the options below.    Medicines. You may be prescribed:  ? Heart rate medicines to help slow down the heartbeat  ? Heart rhythm medicines to help the heart beat more regularly  ? Anti-clotting medicines to help reduce the risk for blood clots and stroke    Electrical cardioversion. Your healthcare provider uses special pads or paddles to send one or more brief electrical shocks to the heart. This can help reset the heartbeat to normal.    Ablation. Long, thin tubes called catheters are threaded through a blood vessel to the heart. There, the catheters send out hot or cold energy to the areas causing the abnormal signals. This energy destroys the problem tissue or cells. This improves the chances that your heart will stay in normal rhythm without using medicines. If your heart rate and rhythm can t be controlled, you may need ablation and a pacemaker. These will help control the heart rate and regularity of the heartbeat.    Surgery. During surgery, your healthcare provider may use different methods to create scar tissue in the areas of the heart causing the abnormal signals. The scar tissue disrupts the abnormal signals  and may stop AFib from occurring.  What are the complications of atrial fibrillation?  These can include:    Blood clots    Stroke    Heart failure. This problem occurs when the heart muscle weakens so much that it can no longer pump blood well.  When should I call my healthcare provider?  Call your healthcare provider right away if you have any of these:    Symptoms that don t get better with treatment, or get worse    New symptoms   Date Last Reviewed: 5/1/2016 2000-2017 [employer]. [address] [place] [address]. All rights reserved. This information is not intended as a substitute for professional medical care. Always follow your healthcare professional's instructions.                Follow-ups after your visit        Additional Services     CARDIOLOGY EVAL ADULT REFERRAL       Preferred location:  [facility] [phone]   [email or web address]    Please be aware that coverage of these services is subject to the terms and limitations of your health insurance plan.  Call member services at your health plan with any benefit or coverage questions.      Please bring the following to your appointment:  Any x-rays, CTs or MRIs which have been performed. Contact the facility where they were done to arrange for  prior to your scheduled appointment.    List of current medications  This referral request   Any documents/labs given to you for this referral            SLEEP EVALUATION & MANAGEMENT REFERRAL - ADULT -[facility] [place]  [phone] (Age 15 and up)       Please be aware that coverage of these services is subject to the terms and limitations of your health insurance plan.  Call member services at your health plan with any benefit or coverage questions.      Please bring the following to your appointment:    >>   List of current medications   >>   This referral request   >>   Any documents/labs  given to you for this referral                SPINE SURGERY REFERRAL       Please choose Medical Spine Specialist (unless patient was seen by a Medical Spine Specialist within the past 6 months).  Surgical Evaluation is advised if the patient presents with one or more of the following red flags:     **Cauda Equina Syndrome  **Evidence of Spinal Tumor  **Fracture  **Infection  **Loss of Bowel or Bladder Control  **Sudden or Progressive Weakness  **Any other documented emergent neurological condition resulting from a Lumbar Spinal Condition.    You have been referred to: Spine Lumbar: Spine Surgeon: [facility]: [facility] [place] [phone]   [email or web address]    Dr Sheets    Please be aware that coverage of these services is subject to the terms and limitations of your health insurance plan.  Call member services at your health plan with any benefit or coverage questions.      Please bring the following to your appointment:    **Any x-rays, CTs or MRIs which have been performed.  Contact the facility where they were done to arrange for  prior to your scheduled appointment.    **List of current medications   **This referral request   **Any documents/labs given to you regarding this referral                  Future tests that were ordered for you today     Open Future Orders        Priority Expected Expires Ordered    SLEEP EVALUATION & MANAGEMENT REFERRAL - ADULT -[facility] [place]  [phone] (Age 15 and up) Routine  8/23/2019 8/23/2018            Who to contact     Normal or non-critical lab and imaging results will be communicated to you by MyChart, letter or phone within 4 business days after the clinic has received the results. If you do not hear from us within 7 days, please contact the clinic through MyChart or phone. If you have a critical or abnormal lab result, we will notify you by phone as soon as  "possible.  Submit refill requests through [email or web address] or call your pharmacy and they will forward the refill request to us. Please allow 3 business days for your refill to be completed.          If you need to speak with a  for additional information , please call: [phone]             Additional Information About Your Visit        [email or web address] Information     [email or web address] gives you secure access to your electronic health record. If you see a primary care provider, you can also send messages to your care team and make appointments. If you have questions, please call your primary care clinic.  If you do not have a primary care provider, please call [phone] and they will assist you.        Care EveryWhere ID     This is your Care EveryWhere ID. This could be used by other organizations to access your [facility] medical records  [id number]        Your Vitals Were     Pulse Temperature Respirations Height Pulse Oximetry BMI (Body Mass Index)    104 98.2  F (36.8  C) (Oral) 16 5' 10\" (1.778 m) 99% 35.44 kg/m2       Blood Pressure from Last 3 Encounters:   08/23/18 (!) 138/94   02/22/18 146/88   01/23/18 (!) 165/92    Weight from Last 3 Encounters:   08/23/18 247 lb (112 kg)   02/22/18 240 lb (108.9 kg)   01/23/18 241 lb (109.3 kg)              We Performed the Following     Basic metabolic panel  (Ca, Cl, CO2, Creat, Gluc, K, Na, BUN)     CARDIOLOGY EVAL ADULT REFERRAL     EKG 12-lead complete w/read - Clinics     HIV Screening     SPINE SURGERY REFERRAL     TSH with free T4 reflex          Today's Medication Changes          These changes are accurate as of 8/23/18 11:31 AM.  If you have any questions, ask your nurse or doctor.               Start taking these medicines.        Dose/Directions    metoprolol succinate 25 MG 24 hr tablet   Commonly known as:  TOPROL-XL   Used for:  Chronic atrial fibrillation (H)   Started by:  Jon Denson PA-C        Dose:  25 mg   Take 1 tablet (25 mg) by mouth daily "   Quantity:  90 tablet   Refills:  1       rivaroxaban ANTICOAGULANT 20 MG Tabs tablet   Commonly known as:  XARELTO   Used for:  Chronic atrial fibrillation (H)   Started by:  Jon Denson PA-C        Dose:  20 mg   Take 1 tablet (20 mg) by mouth daily (with dinner)   Quantity:  30 tablet   Refills:  3         These medicines have changed or have updated prescriptions.        Dose/Directions    lisinopril 20 MG tablet   Commonly known as:  PRINIVIL/ZESTRIL   This may have changed:    - medication strength  - See the new instructions.   Used for:  Benign essential hypertension   Changed by:  Jon Denson PA-C        Dose:  20 mg   Take 1 tablet (20 mg) by mouth daily   Quantity:  90 tablet   Refills:  1            Where to get your medicines      These medications were sent to [facility] - [place], [place] - [address] [facility]  [address] [facility] [address]     Phone:  [phone]     lisinopril 20 MG tablet    metoprolol succinate 25 MG 24 hr tablet    rivaroxaban ANTICOAGULANT 20 MG Tabs tablet                Primary Care Provider Office Phone # Fax #    Jon Denson PA-C [phone]       [address] [facility] [address]        Equal Access to Services     [facility]ZYNA [facility] HospitalFRANSISCA : Hadii aad ku hadasho Soomaali, waaxda luqadaha, qaybta kaalmada adeegyada, waxay idiin hayaan ademu overton . So [facility] [phone].    ATENCIÓN: Si habla español, tiene a campoverde disposición servicios gratuitos de asistencia lingüística. Llame al [phone].    We comply with applicable federal civil rights laws and [place] laws. We do not discriminate on the basis of race, color, national origin, age, disability, sex, sexual orientation, or gender identity.            Thank you!     Thank you for choosing [facility]  for your care. Our goal is always to provide you with excellent care. Hearing back from our patients is one way we can continue to improve our services.  Please take a few minutes to complete the written survey that you may receive in the mail after your visit with us. Thank you!             Your Updated Medication List - Protect others around you: Learn how to safely use, store and throw away your medicines at www.disposemymeds.org.          This list is accurate as of 8/23/18 11:31 AM.  Always use your most recent med list.                   Brand Name Dispense Instructions for use Diagnosis    lisinopril 20 MG tablet    PRINIVIL/ZESTRIL    90 tablet    Take 1 tablet (20 mg) by mouth daily    Benign essential hypertension       metoprolol succinate 25 MG 24 hr tablet    TOPROL-XL    90 tablet    Take 1 tablet (25 mg) by mouth daily    Chronic atrial fibrillation (H)       omeprazole 20 MG CR capsule    priLOSEC     Take 20 mg by mouth        rivaroxaban ANTICOAGULANT 20 MG Tabs tablet    XARELTO    30 tablet    Take 1 tablet (20 mg) by mouth daily (with dinner)    Chronic atrial fibrillation (H)

## 2018-08-23 NOTE — PROGRESS NOTES
SUBJECTIVE:   Tee Hilton is a 67 year old male who presents to clinic today for the following health issues:      Hypertension Follow-up      Outpatient blood pressures are not being checked.    Low Salt Diet: no added salt      Amount of exercise or physical activity: 4-5 times weekly    Problems taking medications regularly: No    Medication side effects: pt states when he lies down quickly he is getting some dizziness, room is spinning (last month)    Diet: regular (no restrictions)      irreg heart rate noted today.  Patient snores, but no witnessed apneic episodes.  Chronic low back pain with left sided sciatica. Currently symptoms are not that bothersome.     Problem list and histories reviewed & adjusted, as indicated.  Additional history: as documented    Patient Active Problem List   Diagnosis     Elevated blood pressure reading without diagnosis of hypertension     Other male erectile dysfunction     Chronic midline low back pain without sciatica     Past Surgical History:   Procedure Laterality Date     COLONOSCOPY N/A 5/25/2017    Procedure: COMBINED COLONOSCOPY, SINGLE OR MULTIPLE BIOPSY/POLYPECTOMY BY BIOPSY;;  Surgeon: Aquilino Garcia MD;  Location: [facility] OR     COLONOSCOPY WITH CO2 INSUFFLATION N/A 5/25/2017    Procedure: COLONOSCOPY WITH CO2 INSUFFLATION;  COLONOSCOPY SCREEN/ ORDAZ;  Surgeon: Aquilino Garcia MD;  Location: [facility] OR     OPEN REDUCTION INTERNAL FIXATION TIBIA       TONSILLECTOMY         Social History   Substance Use Topics     Smoking status: Never Smoker     Smokeless tobacco: Never Used     Alcohol use No      Comment: social use     Family History   Problem Relation Age of Onset     Gout Father          Current Outpatient Prescriptions   Medication Sig Dispense Refill     lisinopril (PRINIVIL/ZESTRIL) 20 MG tablet Take 1 tablet (20 mg) by mouth daily 90 tablet 1     metoprolol succinate (TOPROL-XL) 25 MG 24 hr tablet Take 1 tablet (25 mg) by mouth daily 90  tablet 1     omeprazole (PRILOSEC) 20 MG CR capsule Take 20 mg by mouth       rivaroxaban ANTICOAGULANT (XARELTO) 20 MG TABS tablet Take 1 tablet (20 mg) by mouth daily (with dinner) 30 tablet 3     [DISCONTINUED] lisinopril (PRINIVIL/ZESTRIL) 10 MG tablet TAKE 1 TABLET BY MOUTH ONCE DAILY 30 tablet 0     [DISCONTINUED] lisinopril (PRINIVIL/ZESTRIL) 10 MG tablet Take 1 tablet (10 mg) by mouth daily 30 tablet 1     Allergies   Allergen Reactions     Erythromycin Unknown     Upset stomach     Recent Labs   Lab Test  01/18/18   0932  11/11/16   0744   LDL  129*  151*   HDL  77  71   TRIG  68  73   CR  0.79  0.98   GFRESTIMATED  >90  77   GFRESTBLACK  >90  >90  African American GFR Calc     POTASSIUM  4.0  5.1   TSH  1.84   --       BP Readings from Last 3 Encounters:   08/23/18 (!) 138/94   02/22/18 146/88   01/23/18 (!) 165/92    Wt Readings from Last 3 Encounters:   08/23/18 247 lb (112 kg)   02/22/18 240 lb (108.9 kg)   01/23/18 241 lb (109.3 kg)                  Labs reviewed in EPIC    Reviewed and updated as needed this visit by clinical staff  Tobacco  Allergies  Meds  Med Hx  Surg Hx  Fam Hx  Soc Hx      Reviewed and updated as needed this visit by Provider         All other systems negative except as outline above  OBJECTIVE:  Eye exam - right eye normal lid, conjunctiva, cornea, pupil and fundus, left eye normal lid, conjunctiva, cornea, pupil and fundus.  Thyroid not palpable, not enlarged, no nodules detected.  CHEST:chest clear to IPPA, no tachypnea, retractions or cyanosis and Heart exam detail:irregularly irregular rhythm, chest is clear without rales or wheezing, no pedal edema, no JVD, no hepatosplenomegaly.  Foot exam - both sides normal; no swelling, tenderness or skin or vascular lesions. Color and temperature is normal. Sensation is intact. Peripheral pulses are palpable. Toenails are normal.  BACK: Lumbosacral spine area reveals local tenderness.  Painful and reduced LS ROM noted. Straight  leg raise is negative . DTR's, motor strength and sensation normal, including heel and toe gait.  Perifpheral pulses are palpable.  Hipes and knees have full range of motion without pain.  No abdominal tenderness, mass or organomegaly.    Tee was seen today for hypertension.    Diagnoses and all orders for this visit:    Elevated blood pressure reading without diagnosis of hypertension  -     Basic metabolic panel  (Ca, Cl, CO2, Creat, Gluc, K, Na, BUN)    Screening for HIV (human immunodeficiency virus)  -     HIV Screening    Chronic midline low back pain with left-sided sciatica  -     SPINE SURGERY REFERRAL    Irregular heart rate  -     TSH with free T4 reflex  -     EKG 12-lead complete w/read - Clinics    Chronic atrial fibrillation (H)  -     rivaroxaban ANTICOAGULANT (XARELTO) 20 MG TABS tablet; Take 1 tablet (20 mg) by mouth daily (with dinner)  -     metoprolol succinate (TOPROL-XL) 25 MG 24 hr tablet; Take 1 tablet (25 mg) by mouth daily  -     CARDIOLOGY EVAL ADULT REFERRAL  -     SLEEP EVALUATION & MANAGEMENT REFERRAL - ADULT -[facility] [place]  [phone] (Age 15 and up); Future    Benign essential hypertension  -     lisinopril (PRINIVIL/ZESTRIL) 20 MG tablet; Take 1 tablet (20 mg) by mouth daily    Snoring  -     SLEEP EVALUATION & MANAGEMENT REFERRAL - ADULT -[facility] [place]  [phone] (Age 15 and up); Future      work on lifestyle modification  Recheck in 3-4 wks for blood pressure recheck.

## 2018-08-23 NOTE — PATIENT INSTRUCTIONS
Understanding Atrial Fibrillation    An arrhythmia is any problem with the speed or pattern of the heartbeat. Atrial fibrillation (AFib) is a common type of arrhythmia. It causes fast, chaotic electrical signals in the atria. This leads to poor functioning of the heart. It also affects how much blood your heart can pump out to the body.  Afib may occur once in a while and go away on its own. Or it may continue for longer periods and need treatment.  AFib can lead to serious problems, such as stroke. Your healthcare provider will need to monitor and manage it.  What happens during atrial fibrillation?   The heart has an electrical system that sends signals to control the heartbeat. As signals move through the heart, they tell the heart s upper chambers (atria) and lower chambers (ventricles) when to squeeze (contract) and relax. This lets blood move through the heart and out to the body and lungs.  With AFib, the atria receive abnormal signals. This causes them to contract in a fast and irregular way, and out of sync with the ventricles. When this happens, the atria also have a harder time moving blood into the ventricles. Blood may then pool in the atria, which increases the risk for blood clots and stroke. The ventricles also may contract too quickly and irregularly. As a result, they may not pump blood to the body and lungs as well as they should. This can weaken the heart muscle over time and cause heart failure.  What causes atrial fibrillation?  AFib is more common in older adults. It has many possible causes including:    Coronary artery disease    Heart valve disease    Heart attack    Heart surgery    High blood pressure    Thyroid disease    Diabetes    Lung disease    Sleep apnea    Heavy alcohol use  In some cases of AFib, doctors do not know the cause.  What are the symptoms of atrial fibrillation?  AFib may or may not cause symptoms. If symptoms do occur, they may include:    A fast, pounding,  irregular heartbeat    Shortness of breath    Tiredness    Dizziness or fainting    Chest pain  How is atrial fibrillation treated?  Treatments for AFib can include any of the options below.    Medicines. You may be prescribed:  ? Heart rate medicines to help slow down the heartbeat  ? Heart rhythm medicines to help the heart beat more regularly  ? Anti-clotting medicines to help reduce the risk for blood clots and stroke    Electrical cardioversion. Your healthcare provider uses special pads or paddles to send one or more brief electrical shocks to the heart. This can help reset the heartbeat to normal.    Ablation. Long, thin tubes called catheters are threaded through a blood vessel to the heart. There, the catheters send out hot or cold energy to the areas causing the abnormal signals. This energy destroys the problem tissue or cells. This improves the chances that your heart will stay in normal rhythm without using medicines. If your heart rate and rhythm can t be controlled, you may need ablation and a pacemaker. These will help control the heart rate and regularity of the heartbeat.    Surgery. During surgery, your healthcare provider may use different methods to create scar tissue in the areas of the heart causing the abnormal signals. The scar tissue disrupts the abnormal signals and may stop AFib from occurring.  What are the complications of atrial fibrillation?  These can include:    Blood clots    Stroke    Heart failure. This problem occurs when the heart muscle weakens so much that it can no longer pump blood well.  When should I call my healthcare provider?  Call your healthcare provider right away if you have any of these:    Symptoms that don t get better with treatment, or get worse    New symptoms   Date Last Reviewed: 5/1/2016 2000-2017 [employer]. [address] [place] [address], [place] [address]. All rights reserved. This information is not intended as a substitute for professional  medical care. Always follow your healthcare professional's instructions.

## 2018-08-24 LAB — HIV 1+2 AB+HIV1 P24 AG SERPL QL IA: NONREACTIVE

## 2018-08-30 ENCOUNTER — OFFICE VISIT (OUTPATIENT)
Dept: CARDIOLOGY | Facility: CLINIC | Age: 68
End: 2018-08-30
Payer: COMMERCIAL

## 2018-08-30 VITALS
DIASTOLIC BLOOD PRESSURE: 75 MMHG | BODY MASS INDEX: 34.72 KG/M2 | SYSTOLIC BLOOD PRESSURE: 118 MMHG | HEART RATE: 75 BPM | WEIGHT: 242 LBS

## 2018-08-30 DIAGNOSIS — I48.19 PERSISTENT ATRIAL FIBRILLATION (H): Primary | ICD-10-CM

## 2018-08-30 PROCEDURE — 99204 OFFICE O/P NEW MOD 45 MIN: CPT | Performed by: INTERNAL MEDICINE

## 2018-08-30 NOTE — MR AVS SNAPSHOT
After Visit Summary   8/30/2018    Tee Hilton    MRN: [id number]           Patient Information     Date Of Birth          1950        Visit Information        Provider Department      8/30/2018 1:00 PM CLAY Erwin MD [facility] PHYSICIANS HEART AT [facility]        Today's Diagnoses     Persistent atrial fibrillation (H)    -  1      Care Instructions    Thank you for coming to the [facility] @ [facility]; please note the following instructions:    1. Nuclear stress test/ Lexiscan is shcedule on Wednesday September 19 at 10:45 AM in [place]. Address is printed in your discharge instructions.    2. ECHO is scheduled on Thursday September 6 at 3: 00 PM in [place]     3. 3 months follow-up with Dr Lake         If you have any questions regarding your visit please contact your care team:     Cardiology  Telephone Number   Samia GALE, ESTELITA PERAZA, ESTELITA MONTALVO, MALATHI HARMAN MA   [phone]    *After hours: [phone]   For scheduling appts:     [phone] or    [phone] (select option 1)    *After hours: [phone]     For the Device Clinic (Pacemakers and ICD's)  RN's :  Rachael Harris   During business hours: [phone]    *After business hours:  [phone] (select option 4)      Normal test result notifications will be released via [employer] or mailed within 7 business days.  All other test results, will be communicated via telephone once reviewed by your cardiologist.    If you need a medication refill please contact your pharmacy.  Please allow 3 business days for your refill to be completed.    As always, thank you for trusting us with your health care needs!  '          Follow-ups after your visit        Follow-up notes from your care team     Return in about 3 months (around 11/30/2018).      Your next 10 appointments already scheduled     Sep 06, 2018 11:20 AM CDT   [facility] Rupal Kwan NP    [facility]    [address] [place] [id number]   [phone]            Sep 06, 2018  3:00 PM CDT   Ech Complete with FKECHR1   [facility] ([facility])    [address] [place] [place] [address]  [facility] [address]   [phone]           1.  Please bring or wear a comfortable two-piece outfit. 2.  You may eat, drink and take your normal medicines. 3.  For any questions that cannot be answered, please contact the ordering physician 4.  Please do not wear perfumes or scented lotions on the day of your exam.            Sep 18, 2018 11:00 AM CDT   New Sleep Patient with Alexandre Rivers MD   [facility] ([facility])    [address] [place] [id number] [phone]            Sep 19, 2018 10:45 AM CDT   NM INJECTION with MGNMINJ1   [facility])    [address] [place] [id number] [phone]            Sep 19, 2018 11:30 AM CDT   NM SCAN3 with MGNM1   [facility])    [address] [place] [id number] [phone]            Sep 19, 2018 12:00 PM CDT   Ekg Stress Nm Lexiscan with MG STRESS RM, MG IMAGING NURSE, MG CARD, MG CV TECH   [facility])    [address] [place] [id number] [phone]            Sep 19, 2018 12:30 PM CDT   NM MPI WITH LEXISCAN with MGNM1   [facility])    [address] [place] [id number] [phone]           For a ONE day exam: Allow 3-4 hours for test. For a TWO day exam: Allow  minutes PER day for test.  On the day of your resting scan: Please stop eating 3 hours before the test. You may drink water or juice.  On the day of your stress test: Stop all caffeine  12 hours before the test. This includes coffee, tea, soda pop, chocolate and certain medicines (such as Anacin, Excedrin and NoDoz). Also avoid decaf coffee and tea, as these contain small amounts of caffeine.  Stop eating 3 hours before the test. You may drink water.  You may need to stop some medicines before the test. Follow your doctor s orders. - If you take a beta blocker: Do not take your beta-blocker on the day before your test, unless specifically told to by your doctor. And do not take it on the day of your test. Bring it with you to take after the test. - If you take Aggrenox or dipyridamole (Persantine, Permole), stop taking it 48 hours before your test. - If you take Viagra, Cialis or Levitra, stop taking it 48 hours before your test. - If you take theophylline or aminophylline, stop taking it 12 hours before your test.  Do not take nitrates on the day of your test. Do not wear your Nitro-Patch.  Please wear a loose two-piece outfit. If you will have an exercise test, bring rubber-soled walking shoes.  When you arrive, please tell us if you have a pacemaker or ICD (implantable defibrillator).  Please call your Imaging Department at your exam site with any questions.              Future tests that were ordered for you today     Open Future Orders        Priority Expected Expires Ordered    NM Lexiscan stress test Routine  10/29/2018 8/30/2018    Echocardiogram Complete Routine  8/30/2019 8/30/2018            Who to contact     If you have questions or need follow up information about today's clinic visit or your schedule please contact [facility] PHYSICIANS HEART AT [facility] directly at [phone].  Normal or non-critical lab and imaging results will be communicated to you by MyChart, letter or phone within 4 business days after the clinic has received the results. If you do not hear from us within 7 days, please contact the clinic through MyChart or phone. If you have a critical or  abnormal lab result, we will notify you by phone as soon as possible.  Submit refill requests through [employer] or call your pharmacy and they will forward the refill request to us. Please allow 3 business days for your refill to be completed.          Additional Information About Your Visit        [employer] Information     [employer] gives you secure access to your electronic health record. If you see a primary care provider, you can also send messages to your care team and make appointments. If you have questions, please call your primary care clinic.  If you do not have a primary care provider, please call [phone] and they will assist you.        Care EveryWhere ID     This is your Care EveryWhere ID. This could be used by other organizations to access your [place] medical records  [id number]        Your Vitals Were     Pulse BMI (Body Mass Index)                75 34.72 kg/m2           Blood Pressure from Last 3 Encounters:   08/30/18 118/75   08/23/18 (!) 138/94   02/22/18 146/88    Weight from Last 3 Encounters:   08/30/18 109.8 kg (242 lb)   08/23/18 112 kg (247 lb)   02/22/18 108.9 kg (240 lb)               Primary Care Provider Office Phone # Fax #    Jon Denson PA-C [phone]       [address] [place] [address]        Equal Access to Services     [facility] CenterFRANSISCA : Hadii aad ku hadasho Soomaali, waaxda luqadaha, qaybta kaalmada adeegyada, waxmckenna toro hayclaudia doran. So [facility] [phone].    ATENCIÓN: Si habla español, tiene a campoverde disposición servicios gratuitos de asistencia lingüística. Llame al [phone].    We comply with applicable federal civil rights laws and [place] laws. We do not discriminate on the basis of race, color, national origin, age, disability, sex, sexual orientation, or gender identity.            Thank you!     Thank you for choosing [facility] PHYSICIANS HEART AT [facility]  for your care. Our goal is always to provide  you with excellent care. Hearing back from our patients is one way we can continue to improve our services. Please take a few minutes to complete the written survey that you may receive in the mail after your visit with us. Thank you!             Your Updated Medication List - Protect others around you: Learn how to safely use, store and throw away your medicines at www.disposemymeds.org.          This list is accurate as of 8/30/18  1:48 PM.  Always use your most recent med list.                   Brand Name Dispense Instructions for use Diagnosis    IBUPROFEN PO      Take 600 mg by mouth 1-2 times daily        lisinopril 20 MG tablet    PRINIVIL/ZESTRIL    90 tablet    Take 1 tablet (20 mg) by mouth daily    Benign essential hypertension       metoprolol succinate 25 MG 24 hr tablet    TOPROL-XL    90 tablet    Take 1 tablet (25 mg) by mouth daily    Chronic atrial fibrillation (H)       omeprazole 20 MG CR capsule    priLOSEC     Take 20 mg by mouth        rivaroxaban ANTICOAGULANT 20 MG Tabs tablet    XARELTO    30 tablet    Take 1 tablet (20 mg) by mouth daily (with dinner)    Chronic atrial fibrillation (H)       sildenafil 20 MG tablet    REVATIO    30 tablet    Take 1 -5 tabs daily prn    Other male erectile dysfunction

## 2018-08-30 NOTE — NURSING NOTE
"Chief Complaint   Patient presents with     Atrial Fib     NEW 8/30/18 Dr. Jaya taylor diagnosis of afib. Seen by primary 8/23/18 EKG complete showing afib with HR 89 bpm. Started on Xarelto and Toprol. Pt reports no cardiac symptoms.       Initial /75 (BP Location: Right arm, Patient Position: Chair, Cuff Size: Adult Large)  Pulse 75  Wt 109.8 kg (242 lb)  BMI 34.72 kg/m2 Estimated body mass index is 34.72 kg/(m^2) as calculated from the following:    Height as of 8/23/18: 1.778 m (5' 10\").    Weight as of this encounter: 109.8 kg (242 lb)..  BP completed using cuff size: arin Orosco MA    "

## 2018-08-30 NOTE — PROGRESS NOTES
SUBJECTIVE:  Tee Hilton is a 67 year old male who presents for evaluation of Afib.  Detected on routine Physical recently. Started on NOAC and BB.  Patient asymptomatic.  HTN+. Lipids acceptable. No DM. Non smoker.  No excess coffee/alcohol.  Patient Active Problem List    Diagnosis Date Noted     Chronic midline low back pain without sciatica 10/31/2017     Priority: Medium     Elevated blood pressure reading without diagnosis of hypertension 11/11/2016     Priority: Medium     Other male erectile dysfunction 11/11/2016     Priority: Medium    .  Current Outpatient Prescriptions   Medication Sig     lisinopril (PRINIVIL/ZESTRIL) 20 MG tablet Take 1 tablet (20 mg) by mouth daily     metoprolol succinate (TOPROL-XL) 25 MG 24 hr tablet Take 1 tablet (25 mg) by mouth daily     omeprazole (PRILOSEC) 20 MG CR capsule Take 20 mg by mouth     rivaroxaban ANTICOAGULANT (XARELTO) 20 MG TABS tablet Take 1 tablet (20 mg) by mouth daily (with dinner)     sildenafil (REVATIO) 20 MG tablet Take 1 -5 tabs daily prn     No current facility-administered medications for this visit.      Past Medical History:   Diagnosis Date     Gastroesophageal reflux disease without esophagitis      Past Surgical History:   Procedure Laterality Date     COLONOSCOPY N/A 5/25/2017    Procedure: COMBINED COLONOSCOPY, SINGLE OR MULTIPLE BIOPSY/POLYPECTOMY BY BIOPSY;;  Surgeon: Aquilino Garcia MD;  Location: [facility] OR     COLONOSCOPY WITH CO2 INSUFFLATION N/A 5/25/2017    Procedure: COLONOSCOPY WITH CO2 INSUFFLATION;  COLONOSCOPY SCREEN/ ORDAZ;  Surgeon: Aquilino Garcia MD;  Location: [facility] OR     OPEN REDUCTION INTERNAL FIXATION TIBIA       TONSILLECTOMY       Allergies   Allergen Reactions     Erythromycin Unknown     Upset stomach     Social History     Social History     Marital status:      Spouse name: N/A     Number of children: 3     Years of education: N/A     Occupational History     sales and marketing      Social  History Main Topics     Smoking status: Never Smoker     Smokeless tobacco: Never Used     Alcohol use No      Comment: social use     Drug use: No     Sexual activity: Yes     Partners: Female     Other Topics Concern     Not on file     Social History Narrative     Family History   Problem Relation Age of Onset     Gout Father           REVIEW OF SYSTEMS:  General: negative, fever, chills, night sweats  Skin: negative, acne, rash and scaling  Eyes: negative, double vision, eye pain and photophobia  Ears/Nose/Throat: negative, nasal congestion and purulent rhinorrhea  Respiratory: No dyspnea on exertion, No cough, No hemoptysis and negative  Cardiovascular: negative, palpitations, tachycardia, irregular heart beat, chest pain, exertional chest pain or pressure, paroxysmal nocturnal dyspnea, dyspnea on exertion and orthopnea  Gastrointestinal: negative, dysphagia, nausea and vomiting  Genitourinary: negative, nocturia, dysuria and frequency  Musculoskeletal: negative, fracture, arthritis, joint pain and joint swelling  Neurologic: negative, headaches, syncope, stroke and seizures  Psychiatric: negative, nervous breakdown, thoughts of self-harm and thoughts of hurting someone else  Hematologic/Lymphatic/Immunologic: negative, bleeding disorder, chills and fever  Endocrine: negative, cold intolerance, heat intolerance and hot flashes       OBJECTIVE:  There were no vitals taken for this visit.  General Appearance: healthy, alert, active and no distress  Head: Normocephalic. No masses, lesions, tenderness or abnormalities  Eyes: conjuctiva clear, PERRL, EOM intact  Ears: External ears normal. Canals clear. TM's normal.  Nose: Nares normal  Mouth: normal  Neck: Supple, no cervical adenopathy, no thyromegaly  Lungs: clear to auscultation  Cardiac: regular rate and rhythm, normal S1 and S2, no murmur  Abdomen: Soft, nontender.  Normal bowel sounds.  No hepatosplenomegaly or abnormal masses  Extremities: no peripheral  edema, peripheral pulses normal  Musculoskeletal: Back with normal ROM. Motor strength intact.  Neurological: Cranial nerves 2-12 intact, motor strength intact       ASSESSMENT/PLAN:  Newly diagnosed Afib. Asymptomatic. Detected on routine physical.  On BB and NOAC.  HTN+. No other risk factors.  Not very active currently due to sciatica.  Reviewed EKG. Afib. Controlled rate.  Discussed options.  Continue current /Cardioversion/antiarrhythmic drugs/ablation.  Patient want to continue current meds for 3 months.  Will check a Lexiscan and an echo.  Per orders.   Return to Clinic 3 months.

## 2018-08-30 NOTE — PATIENT INSTRUCTIONS
Thank you for coming to the [facility] @ [facility]; please note the following instructions:    1. Nuclear stress test/ Lexiscan is shcedule on Wednesday September 19 at 10:45 AM in [place]. Address is printed in your discharge instructions.    2. ECHO is scheduled on Thursday September 6 at 3: 00 PM in [place]     3. 3 months follow-up with Dr Lake         If you have any questions regarding your visit please contact your care team:     Cardiology  Telephone Number   Samia GALE, ESTELITA PERAZA, ESTELITA MONTALVO, MALATHI HARMAN MA   [phone]    *After hours: [phone]   For scheduling appts:     [phone] or    [phone] (select option 1)    *After hours: [phone]     For the Device Clinic (Pacemakers and ICD's)  RN's :  Rachael Harris   During business hours: [phone]    *After business hours:  [phone] (select option 4)      Normal test result notifications will be released via [employer] or mailed within 7 business days.  All other test results, will be communicated via telephone once reviewed by your cardiologist.    If you need a medication refill please contact your pharmacy.  Please allow 3 business days for your refill to be completed.    As always, thank you for trusting us with your health care needs!  '

## 2018-08-30 NOTE — LETTER
8/30/2018    RE: Tee Hilton  [address] [facility] [address]     Dear Colleague,    Thank you for the opportunity to participate in the care of your patient, Tee Hilton, at the [facility]ART AT [facility]. Please see a copy of my visit note below.     SUBJECTIVE:  Tee Hilton is a 67 year old male who presents for evaluation of Afib.  Detected on routine Physical recently. Started on NOAC and BB.  Patient asymptomatic.  HTN+. Lipids acceptable. No DM. Non smoker.  No excess coffee/alcohol.  Patient Active Problem List    Diagnosis Date Noted     Chronic midline low back pain without sciatica 10/31/2017     Priority: Medium     Elevated blood pressure reading without diagnosis of hypertension 11/11/2016     Priority: Medium     Other male erectile dysfunction 11/11/2016     Priority: Medium    .  Current Outpatient Prescriptions   Medication Sig     lisinopril (PRINIVIL/ZESTRIL) 20 MG tablet Take 1 tablet (20 mg) by mouth daily     metoprolol succinate (TOPROL-XL) 25 MG 24 hr tablet Take 1 tablet (25 mg) by mouth daily     omeprazole (PRILOSEC) 20 MG CR capsule Take 20 mg by mouth     rivaroxaban ANTICOAGULANT (XARELTO) 20 MG TABS tablet Take 1 tablet (20 mg) by mouth daily (with dinner)     sildenafil (REVATIO) 20 MG tablet Take 1 -5 tabs daily prn     No current facility-administered medications for this visit.      Past Medical History:   Diagnosis Date     Gastroesophageal reflux disease without esophagitis      Past Surgical History:   Procedure Laterality Date     COLONOSCOPY N/A 5/25/2017    Procedure: COMBINED COLONOSCOPY, SINGLE OR MULTIPLE BIOPSY/POLYPECTOMY BY BIOPSY;;  Surgeon: Aquilino Garcia MD;  Location: [facility] OR     COLONOSCOPY WITH CO2 INSUFFLATION N/A 5/25/2017    Procedure: COLONOSCOPY WITH CO2 INSUFFLATION;  COLONOSCOPY SCREEN/ ORDAZ;  Surgeon: Aquilino Garcia MD;   Location: [facility] OR     OPEN REDUCTION INTERNAL FIXATION TIBIA       TONSILLECTOMY       Allergies   Allergen Reactions     Erythromycin Unknown     Upset stomach     Social History     Social History     Marital status:      Spouse name: N/A     Number of children: 3     Years of education: N/A     Occupational History     sales and marketing      Social History Main Topics     Smoking status: Never Smoker     Smokeless tobacco: Never Used     Alcohol use No      Comment: social use     Drug use: No     Sexual activity: Yes     Partners: Female     Other Topics Concern     Not on file     Social History Narrative     Family History   Problem Relation Age of Onset     Gout Father           REVIEW OF SYSTEMS:  General: negative, fever, chills, night sweats  Skin: negative, acne, rash and scaling  Eyes: negative, double vision, eye pain and photophobia  Ears/Nose/Throat: negative, nasal congestion and purulent rhinorrhea  Respiratory: No dyspnea on exertion, No cough, No hemoptysis and negative  Cardiovascular: negative, palpitations, tachycardia, irregular heart beat, chest pain, exertional chest pain or pressure, paroxysmal nocturnal dyspnea, dyspnea on exertion and orthopnea  Gastrointestinal: negative, dysphagia, nausea and vomiting  Genitourinary: negative, nocturia, dysuria and frequency  Musculoskeletal: negative, fracture, arthritis, joint pain and joint swelling  Neurologic: negative, headaches, syncope, stroke and seizures  Psychiatric: negative, nervous breakdown, thoughts of self-harm and thoughts of hurting someone else  Hematologic/Lymphatic/Immunologic: negative, bleeding disorder, chills and fever  Endocrine: negative, cold intolerance, heat intolerance and hot flashes       OBJECTIVE:  There were no vitals taken for this visit.  General Appearance: healthy, alert, active and no distress  Head: Normocephalic. No masses, lesions, tenderness or abnormalities  Eyes: conjuctiva clear, PERRL, EOM  intact  Ears: External ears normal. Canals clear. TM's normal.  Nose: Nares normal  Mouth: normal  Neck: Supple, no cervical adenopathy, no thyromegaly  Lungs: clear to auscultation  Cardiac: regular rate and rhythm, normal S1 and S2, no murmur  Abdomen: Soft, nontender.  Normal bowel sounds.  No hepatosplenomegaly or abnormal masses  Extremities: no peripheral edema, peripheral pulses normal  Musculoskeletal: Back with normal ROM. Motor strength intact.  Neurological: Cranial nerves 2-12 intact, motor strength intact       ASSESSMENT/PLAN:  Newly diagnosed Afib. Asymptomatic. Detected on routine physical.  On BB and NOAC.  HTN+. No other risk factors.  Not very active currently due to sciatica.  Reviewed EKG. Afib. Controlled rate.  Discussed options.  Continue current /Cardioversion/antiarrhythmic drugs/ablation.  Patient want to continue current meds for 3 months.  Will check a Lexiscan and an echo.  Per orders.   Return to Clinic 3 months.    Please do not hesitate to contact me if you have any questions/concerns.     Sincerely,     CLAY Erwin MD

## 2018-09-06 ENCOUNTER — RADIANT APPOINTMENT (OUTPATIENT)
Dept: CARDIOLOGY | Facility: CLINIC | Age: 68
End: 2018-09-06
Attending: INTERNAL MEDICINE
Payer: COMMERCIAL

## 2018-09-06 ENCOUNTER — OFFICE VISIT (OUTPATIENT)
Dept: NEUROSURGERY | Facility: CLINIC | Age: 68
End: 2018-09-06
Payer: COMMERCIAL

## 2018-09-06 VITALS
SYSTOLIC BLOOD PRESSURE: 148 MMHG | HEIGHT: 70 IN | DIASTOLIC BLOOD PRESSURE: 96 MMHG | HEART RATE: 86 BPM | OXYGEN SATURATION: 98 % | BODY MASS INDEX: 35.5 KG/M2 | WEIGHT: 248 LBS | TEMPERATURE: 97 F

## 2018-09-06 DIAGNOSIS — M54.16 LUMBAR RADICULOPATHY: Primary | ICD-10-CM

## 2018-09-06 DIAGNOSIS — I48.19 PERSISTENT ATRIAL FIBRILLATION (H): ICD-10-CM

## 2018-09-06 PROCEDURE — 99203 OFFICE O/P NEW LOW 30 MIN: CPT | Performed by: NURSE PRACTITIONER

## 2018-09-06 PROCEDURE — 40000264 ZZHC STATISTIC IV PUSH SINGLE INITIAL SUBSTANCE: Performed by: INTERNAL MEDICINE

## 2018-09-06 PROCEDURE — 93306 TTE W/DOPPLER COMPLETE: CPT | Mod: GC | Performed by: INTERNAL MEDICINE

## 2018-09-06 RX ADMIN — Medication 6 ML: at 16:00

## 2018-09-06 ASSESSMENT — PAIN SCALES - GENERAL: PAINLEVEL: MODERATE PAIN (5)

## 2018-09-06 NOTE — LETTER
9/6/2018         RE: Tee Hilton  [address] [facility] [address]        Dear Colleague,    Thank you for referring your patient, Tee Hilton, to the [facility]. Please see a copy of my visit note below.    Dr. Jarrett Sheets  [facility]  Neurosurgery Clinic Visit      CC: Low back and left leg pain    Primary care Provider: Jon Denson      Reason For Visit:   I was asked by Jon Denson PA-C to consult on the patient for chronic midline low back pain and left sciatica.      HPI: Tee Hilton is a 67 year old male with low back pain for approximately the past year with no known cause.  He states currently his pain is a stiffness mostly to the right lower back.  The pain increases with pressure from his belt to the right lower back as well as with increased activities throughout the day.  He also has a h/o pain along the left thigh and back of the left leg.  He states the LLE pain has occurred in episodes over the past year, and currently is minimal.  He attributes the improvement to PT and a series of injections with Dr. Ricky Dyer.  He denies weakness to BLE or foot drop.  He denies changes to bowel or bladder.        Pain right now:  5    Past Medical History:   Diagnosis Date     Gastroesophageal reflux disease without esophagitis        Past Medical History reviewed with patient during visit.    Past Surgical History:   Procedure Laterality Date     COLONOSCOPY N/A 5/25/2017    Procedure: COMBINED COLONOSCOPY, SINGLE OR MULTIPLE BIOPSY/POLYPECTOMY BY BIOPSY;;  Surgeon: Aquilino Garcia MD;  Location: [facility] OR     COLONOSCOPY WITH CO2 INSUFFLATION N/A 5/25/2017    Procedure: COLONOSCOPY WITH CO2 INSUFFLATION;  COLONOSCOPY SCREEN/ SUSHMA;  Surgeon: Aquilino Garcia MD;  Location: [facility] OR     OPEN REDUCTION INTERNAL FIXATION TIBIA       TONSILLECTOMY       Past Surgical History reviewed with patient during visit.    Current Outpatient  "Prescriptions   Medication     Famotidine (PEPCID PO)     IBUPROFEN PO     lisinopril (PRINIVIL/ZESTRIL) 20 MG tablet     metoprolol succinate (TOPROL-XL) 25 MG 24 hr tablet     omeprazole (PRILOSEC) 20 MG CR capsule     rivaroxaban ANTICOAGULANT (XARELTO) 20 MG TABS tablet     sildenafil (REVATIO) 20 MG tablet     No current facility-administered medications for this visit.        Allergies   Allergen Reactions     Erythromycin Unknown     Upset stomach       Social History     Social History     Marital status:      Spouse name: N/A     Number of children: 3     Years of education: N/A     Occupational History     sales and marketing      Social History Main Topics     Smoking status: Never Smoker     Smokeless tobacco: Never Used      Comment: never smoked     Alcohol use No      Comment: social use     Drug use: No     Sexual activity: Yes     Partners: Female     Other Topics Concern     None     Social History Narrative       Family History   Problem Relation Age of Onset     Gout Father          ROS: 10 point ROS neg other than the symptoms noted above in the HPI.    Vital Signs: BP (!) 152/96  Pulse 86  Temp 97  F (36.1  C) (Oral)  Ht 5' 10\" (1.778 m)  Wt 248 lb (112.5 kg)  SpO2 98%  BMI 35.58 kg/m2    Examination:  Constitutional:  Alert, well nourished, NAD.  HEENT: Normocephalic, atraumatic.   Pulm:  Without shortness of breath   CV:  No pitting edema of BLE.    Neurological:  Awake  Alert  Oriented x 3  Speech clear  Cranial nerves II - XII intact    Motor:  Shoulder Abduction:  Right:  5/5   Left:  5/5  Biceps:                      Right:  5/5   Left:  5/5  Triceps:                     Right:  5/5   Left:  5/5  Wrist Extensors:       Right:  5/5   Left:  5/5  Wrist Flexors:           Right:  5/5   Left:  5/5  Intrinsics:                   Right:  5/5   Left:  5/5  Hip Flexor:                Right: 5/5  Left:  5/5  Hip Adductor:             Right:  5/5  Left:  5/5  Hip Abductor:             " Right:  5/5  Left:  5/5  Gastroc Soleus:        Right:  5/5  Left:  5/5  Tib/Ant:                      Right:  5/5  Left:  5/5  EHL:                          Right:  5/5  Left:  5/5   Sensation normal to bilateral upper and lower extremities  Clonus negative  DTRs 1+ symmetric  Gait: Able to stand from a seated position. Normal non-antalgic, non-myelopathic gait.  Able to heel/toe walk without loss of balance  Cervical examination reveals good range of motion.  No tenderness to palpation of the cervical spine or paraspinous muscles bilaterally.    Lumbar examination reveals minimal tenderness of the spine and paraspinous muscles.  Hip height is symmetrical. Negative SI joint, sciatic notch or greater trochanteric tenderness to palpation bilaterally.  Straight leg raise is negative bilaterally.  FROM without difficulty    Imagin18:  IMPRESSION:   1. Multilevel degenerative disc disease. Mild central stenosis at  L2-L3 and L4-L5 related to multiple factors.  2. Small left foraminal extruded disc at L3-L4. Clinical correlation  for left-sided L3 nerve root symptoms is recommended.  3. Multilevel mild and moderate foraminal stenosis bilaterally as  described above.    Assessment/Plan:   Lumbar DDD  Lumbar Radiculopathy    Tee Hilton is a 67 year old male with low back pain for approximately the past year with no known cause.  He states currently his pain is a stiffness mostly to the right lower back.  The pain increases with pressure from his belt to the right lower back as well as with increased activities throughout the day.  He also has a h/o pain along the left thigh and back of the left leg.  He states the LLE pain has occurred in episodes over the past year, and currently is minimal.  He attributes the improvement to PT and a series of injections with Dr. Ricky Dyer.  He denies weakness to BLE or foot drop.  He denies changes to bowel or bladder.  We reviewed MRI results from February of this year  "and discussed significant findings at left L3-4, however, pt is unsure regarding pain to the left antolateral thigh when it does occur, \"I think it's in the thigh.\"  He seems to describe it more in the posterior leg, \"It's my sciatica.\"  He has noted benefit with injections, but considering he is concerned with recurring episodes of severe pain, he wants to see if surgical intervention is a consideration.  He states Dr. Dyer had suggested an updated MRI as well if pain continues.  The patient stated he would like a second opinion with us as well.  We will order the MRI and patient will schedule at his convenience.  We will contact him with results.    Patient Instructions   -Schedule lumbar MRI  -We will call you with restults  -Please contact the clinic if pain persists at [phone].  Patient to follow up with Primary Care provider regarding elevated blood pressure.          Rupal Kwan CNP  [facility]  [address] [address]    Tel [phone]  Pager [phone]    Again, thank you for allowing me to participate in the care of your patient.        Sincerely,        Rupal Kwan, NP    "

## 2018-09-06 NOTE — NURSING NOTE
"Tee Hilton is a 67 year old male who presents for:  Chief Complaint   Patient presents with     Neurologic Problem     referral from Dr. MELIDA Denson for low back pain with left sciatica        Vitals:    There were no vitals filed for this visit.    BMI:  Estimated body mass index is 34.72 kg/(m^2) as calculated from the following:    Height as of 8/23/18: 5' 10\" (1.778 m).    Weight as of 8/30/18: 242 lb (109.8 kg).    Pain Score:  Data Unavailable        Angeli Duvall, FERNANDA, AAS      "

## 2018-09-06 NOTE — PATIENT INSTRUCTIONS
-Schedule lumbar MRI  -We will call you with restults  -Please contact the clinic if pain persists at [phone].  Patient to follow up with Primary Care provider regarding elevated blood pressure.

## 2018-09-06 NOTE — PROGRESS NOTES
Dr. Jarrett Sheets  [facility]  Neurosurgery Clinic Visit      CC: Low back and left leg pain    Primary care Provider: Jon Denson      Reason For Visit:   I was asked by Jon Denson PA-C to consult on the patient for chronic midline low back pain and left sciatica.      HPI: Tee Hilton is a 67 year old male with low back pain for approximately the past year with no known cause.  He states currently his pain is a stiffness mostly to the right lower back.  The pain increases with pressure from his belt to the right lower back as well as with increased activities throughout the day.  He also has a h/o pain along the left thigh and back of the left leg.  He states the LLE pain has occurred in episodes over the past year, and currently is minimal.  He attributes the improvement to PT and a series of injections with Dr. Ricky Dyer.  He denies weakness to BLE or foot drop.  He denies changes to bowel or bladder.        Pain right now:  5    Past Medical History:   Diagnosis Date     Gastroesophageal reflux disease without esophagitis        Past Medical History reviewed with patient during visit.    Past Surgical History:   Procedure Laterality Date     COLONOSCOPY N/A 5/25/2017    Procedure: COMBINED COLONOSCOPY, SINGLE OR MULTIPLE BIOPSY/POLYPECTOMY BY BIOPSY;;  Surgeon: Aquilino Garcia MD;  Location: [facility]     COLONOSCOPY WITH CO2 INSUFFLATION N/A 5/25/2017    Procedure: COLONOSCOPY WITH CO2 INSUFFLATION;  COLONOSCOPY SCREEN/ SUSHMA;  Surgeon: Aquilino Garcia MD;  Location: [facility] OR     OPEN REDUCTION INTERNAL FIXATION TIBIA       TONSILLECTOMY       Past Surgical History reviewed with patient during visit.    Current Outpatient Prescriptions   Medication     Famotidine (PEPCID PO)     IBUPROFEN PO     lisinopril (PRINIVIL/ZESTRIL) 20 MG tablet     metoprolol succinate (TOPROL-XL) 25 MG 24 hr tablet     omeprazole (PRILOSEC) 20 MG CR capsule     rivaroxaban ANTICOAGULANT  "(XARELTO) 20 MG TABS tablet     sildenafil (REVATIO) 20 MG tablet     No current facility-administered medications for this visit.        Allergies   Allergen Reactions     Erythromycin Unknown     Upset stomach       Social History     Social History     Marital status:      Spouse name: N/A     Number of children: 3     Years of education: N/A     Occupational History     sales and marketing      Social History Main Topics     Smoking status: Never Smoker     Smokeless tobacco: Never Used      Comment: never smoked     Alcohol use No      Comment: social use     Drug use: No     Sexual activity: Yes     Partners: Female     Other Topics Concern     None     Social History Narrative       Family History   Problem Relation Age of Onset     Gout Father          ROS: 10 point ROS neg other than the symptoms noted above in the HPI.    Vital Signs: BP (!) 152/96  Pulse 86  Temp 97  F (36.1  C) (Oral)  Ht 5' 10\" (1.778 m)  Wt 248 lb (112.5 kg)  SpO2 98%  BMI 35.58 kg/m2    Examination:  Constitutional:  Alert, well nourished, NAD.  HEENT: Normocephalic, atraumatic.   Pulm:  Without shortness of breath   CV:  No pitting edema of BLE.    Neurological:  Awake  Alert  Oriented x 3  Speech clear  Cranial nerves II - XII intact    Motor:  Shoulder Abduction:  Right:  5/5   Left:  5/5  Biceps:                      Right:  5/5   Left:  5/5  Triceps:                     Right:  5/5   Left:  5/5  Wrist Extensors:       Right:  5/5   Left:  5/5  Wrist Flexors:           Right:  5/5   Left:  5/5  Intrinsics:                   Right:  5/5   Left:  5/5  Hip Flexor:                Right: 5/5  Left:  5/5  Hip Adductor:             Right:  5/5  Left:  5/5  Hip Abductor:             Right:  5/5  Left:  5/5  Gastroc Soleus:        Right:  5/5  Left:  5/5  Tib/Ant:                      Right:  5/5  Left:  5/5  EHL:                          Right:  5/5  Left:  5/5   Sensation normal to bilateral upper and lower " "extremities  Clonus negative  DTRs 1+ symmetric  Gait: Able to stand from a seated position. Normal non-antalgic, non-myelopathic gait.  Able to heel/toe walk without loss of balance  Cervical examination reveals good range of motion.  No tenderness to palpation of the cervical spine or paraspinous muscles bilaterally.    Lumbar examination reveals minimal tenderness of the spine and paraspinous muscles.  Hip height is symmetrical. Negative SI joint, sciatic notch or greater trochanteric tenderness to palpation bilaterally.  Straight leg raise is negative bilaterally.  FROM without difficulty    Imagin18:  IMPRESSION:   1. Multilevel degenerative disc disease. Mild central stenosis at  L2-L3 and L4-L5 related to multiple factors.  2. Small left foraminal extruded disc at L3-L4. Clinical correlation  for left-sided L3 nerve root symptoms is recommended.  3. Multilevel mild and moderate foraminal stenosis bilaterally as  described above.    Assessment/Plan:   Lumbar DDD  Lumbar Radiculopathy    Tee Hilton is a 67 year old male with low back pain for approximately the past year with no known cause.  He states currently his pain is a stiffness mostly to the right lower back.  The pain increases with pressure from his belt to the right lower back as well as with increased activities throughout the day.  He also has a h/o pain along the left thigh and back of the left leg.  He states the LLE pain has occurred in episodes over the past year, and currently is minimal.  He attributes the improvement to PT and a series of injections with Dr. Ricky Dyer.  He denies weakness to BLE or foot drop.  He denies changes to bowel or bladder.  We reviewed MRI results from February of this year and discussed significant findings at left L3-4, however, pt is unsure regarding pain to the left antolateral thigh when it does occur, \"I think it's in the thigh.\"  He seems to describe it more in the posterior leg, \"It's my " "sciatica.\"  He has noted benefit with injections, but considering he is concerned with recurring episodes of severe pain, he wants to see if surgical intervention is a consideration.  He states Dr. Dyer had suggested an updated MRI as well if pain continues.  The patient stated he would like a second opinion with us as well.  We will order the MRI and patient will schedule at his convenience.  We will contact him with results.    Patient Instructions   -Schedule lumbar MRI  -We will call you with restults  -Please contact the clinic if pain persists at [phone].  Patient to follow up with Primary Care provider regarding elevated blood pressure.          Rupal Kwan [facility]  [facility]  [address] [address]    Tel [phone]  Pager [phone]  "

## 2018-09-06 NOTE — MR AVS SNAPSHOT
After Visit Summary   9/6/2018    Tee Hilton    MRN: [id number]           Patient Information     Date Of Birth          1950        Visit Information        Provider Department      9/6/2018 11:20 AM Rupal Kwan NP [facility]        Today's Diagnoses     Lumbar radiculopathy    -  1      Care Instructions    -Schedule lumbar MRI  -We will call you with restults  -Please contact the clinic if pain persists at [phone].            Follow-ups after your visit        Your next 10 appointments already scheduled     Sep 06, 2018  3:00 PM CDT   Ech Complete with [id number]   [facility] ([facility])    [address]  [facility] [id number] [phone]           1.  Please bring or wear a comfortable two-piece outfit. 2.  You may eat, drink and take your normal medicines. 3.  For any questions that cannot be answered, please contact the ordering physician 4.  Please do not wear perfumes or scented lotions on the day of your exam.            Sep 18, 2018 11:00 AM CDT   New Sleep Patient with Alexandre Rivers MD   [facility] ([facility])    [address] [place] [id number] [phone]            Sep 19, 2018 10:45 AM CDT   NM INJECTION with [id number]   [facility])    [address] [place] [address] [id number] [phone]            Sep 19, 2018 11:30 AM CDT   NM SCAN3 with MGNM1   [facility])    [address] [place] [id number] [phone]            Sep 19, 2018 12:00 PM CDT   Ekg Stress Nm Lexiscan with MG STRESS RM, MG IMAGING NURSE, MG CARD, MG CV TECH   [facility])    [address] [place] [id number] [phone]            Sep 19, 2018  12:30 PM CDT   NM MPI WITH LEXISCAN with MGNM1   [facility]    [address] [place] [address] [phone]           For a ONE day exam: Allow 3-4 hours for test. For a TWO day exam: Allow  minutes PER day for test.  On the day of your resting scan: Please stop eating 3 hours before the test. You may drink water or juice.  On the day of your stress test: Stop all caffeine 12 hours before the test. This includes coffee, tea, soda pop, chocolate and certain medicines (such as Anacin, Excedrin and NoDoz). Also avoid decaf coffee and tea, as these contain small amounts of caffeine.  Stop eating 3 hours before the test. You may drink water.  You may need to stop some medicines before the test. Follow your doctor s orders. - If you take a beta blocker: Do not take your beta-blocker on the day before your test, unless specifically told to by your doctor. And do not take it on the day of your test. Bring it with you to take after the test. - If you take Aggrenox or dipyridamole (Persantine, Permole), stop taking it 48 hours before your test. - If you take Viagra, Cialis or Levitra, stop taking it 48 hours before your test. - If you take theophylline or aminophylline, stop taking it 12 hours before your test.  Do not take nitrates on the day of your test. Do not wear your Nitro-Patch.  Please wear a loose two-piece outfit. If you will have an exercise test, bring rubber-soled walking shoes.  When you arrive, please tell us if you have a pacemaker or ICD (implantable defibrillator).  Please call your Imaging Department at your exam site with any questions.            Sep 20, 2018  9:00 AM CDT   SHORT with Jon Denson PA-C   [facility] [place] ([facility] [place])    [address]  [place] [address] [phone]              Future tests that were ordered for you today     Open Future Orders        Priority Expected  "Expires Ordered    MR Lumbar Spine w/o Contrast Routine  9/6/2019 9/6/2018            Who to contact     If you have questions or need follow up information about today's clinic visit or your schedule please contact [facility] directly at [phone].  Normal or non-critical lab and imaging results will be communicated to you by MyChart, letter or phone within 4 business days after the clinic has received the results. If you do not hear from us within 7 days, please contact the clinic through [employer] or phone. If you have a critical or abnormal lab result, we will notify you by phone as soon as possible.  Submit refill requests through [employer] or call your pharmacy and they will forward the refill request to us. Please allow 3 business days for your refill to be completed.          Additional Information About Your Visit        [employer] Information     [employer] gives you secure access to your electronic health record. If you see a primary care provider, you can also send messages to your care team and make appointments. If you have questions, please call your primary care clinic.  If you do not have a primary care provider, please call [phone] and they will assist you.        Care EveryWhere ID     This is your Care EveryWhere ID. This could be used by other organizations to access your [place] medical records  [id number]        Your Vitals Were     Pulse Temperature Height Pulse Oximetry BMI (Body Mass Index)       86 97  F (36.1  C) (Oral) 5' 10\" (1.778 m) 98% 35.58 kg/m2        Blood Pressure from Last 3 Encounters:   09/06/18 (!) 152/96   08/30/18 118/75   08/23/18 (!) 138/94    Weight from Last 3 Encounters:   09/06/18 248 lb (112.5 kg)   08/30/18 242 lb (109.8 kg)   08/23/18 247 lb (112 kg)               Primary Care Provider Office Phone # Fax #    Jon Denson PA-C [phone] [address] [place] [address]        Equal Access to Services     GREGG BLACK " AH: Hadii janey szymanskitonieadelaida Kirsty, waaxda luqadaha, qaybta kabecky baltazar, harry michellein hayaadebora justicemu wise tian doran. So [facility] [phone].    ATENCIÓN: Si habla viv, tiene a campoverde disposición servicios gratuitos de asistencia lingüística. Llame al [phone].    We comply with applicable federal civil rights laws and [place] laws. We do not discriminate on the basis of race, color, national origin, age, disability, sex, sexual orientation, or gender identity.            Thank you!     Thank you for choosing [facility]  for your care. Our goal is always to provide you with excellent care. Hearing back from our patients is one way we can continue to improve our services. Please take a few minutes to complete the written survey that you may receive in the mail after your visit with us. Thank you!             Your Updated Medication List - Protect others around you: Learn how to safely use, store and throw away your medicines at www.disposemymeds.org.          This list is accurate as of 9/6/18 11:41 AM.  Always use your most recent med list.                   Brand Name Dispense Instructions for use Diagnosis    IBUPROFEN PO      Take 600 mg by mouth 1-2 times daily        lisinopril 20 MG tablet    PRINIVIL/ZESTRIL    90 tablet    Take 1 tablet (20 mg) by mouth daily    Benign essential hypertension       metoprolol succinate 25 MG 24 hr tablet    TOPROL-XL    90 tablet    Take 1 tablet (25 mg) by mouth daily    Chronic atrial fibrillation (H)       omeprazole 20 MG CR capsule    priLOSEC     Take 20 mg by mouth        PEPCID PO      Take 10 mg by mouth        rivaroxaban ANTICOAGULANT 20 MG Tabs tablet    XARELTO    30 tablet    Take 1 tablet (20 mg) by mouth daily (with dinner)    Chronic atrial fibrillation (H)       sildenafil 20 MG tablet    REVATIO    30 tablet    Take 1 -5 tabs daily prn    Other male erectile dysfunction

## 2018-09-07 ENCOUNTER — TELEPHONE (OUTPATIENT)
Dept: CARDIOLOGY | Facility: CLINIC | Age: 68
End: 2018-09-07

## 2018-09-07 NOTE — TELEPHONE ENCOUNTER
Patient called this AM to inquiry ECHO result, patient sounds anxious. Writer explained to patient that the  ECHO did show that he was on  Atrial fibrillation. The abnormalities that were found are more related to his AFIB. Writer explained to patient that the result of the Lexiscan scheduled on the 09.19  will tell us more about how  the blood is  flowing through the heart.Patient verbalized understanding    Writer sent a message to Dr Lkae to review ECHO in order to release final result to patient. Patient denied any symptoms, he is going out of the town for a few days. Writer advised patient to call the clinic if having any symptoms or to go to the ED. Patient agreeable with the plan. Writer awaiting Dr Lake's response.    Anita Tate RN

## 2018-09-11 ENCOUNTER — MYC MEDICAL ADVICE (OUTPATIENT)
Dept: CARDIOLOGY | Facility: CLINIC | Age: 68
End: 2018-09-11

## 2018-09-11 NOTE — TELEPHONE ENCOUNTER
Result note reviewed with patient.  Patient verbalized understanding.  Patient is scheduled for his nuclear stress test on 9/19 and to see Dr. Lake on 9/20 to review results and discuss cardioversion.  Samia Ryan RN      Notes Recorded by CLAY Erwin MD on 9/11/2018 at 8:29 AM  Hi Tee,  Your heart function is mildly reduced/ This si due to atrial fibrillation. Ideally need electrical shock and conversion to normal rhythm.Clinic will be contacting you regarding this.  Thanks. Dr. Lake.

## 2018-09-18 ENCOUNTER — TELEPHONE (OUTPATIENT)
Dept: CARDIOLOGY | Facility: CLINIC | Age: 68
End: 2018-09-18

## 2018-09-18 ENCOUNTER — OFFICE VISIT (OUTPATIENT)
Dept: SLEEP MEDICINE | Facility: CLINIC | Age: 68
End: 2018-09-18
Attending: PHYSICIAN ASSISTANT
Payer: COMMERCIAL

## 2018-09-18 VITALS
OXYGEN SATURATION: 98 % | HEART RATE: 78 BPM | WEIGHT: 241 LBS | HEIGHT: 71 IN | SYSTOLIC BLOOD PRESSURE: 156 MMHG | DIASTOLIC BLOOD PRESSURE: 101 MMHG | BODY MASS INDEX: 33.74 KG/M2

## 2018-09-18 DIAGNOSIS — I48.20 CHRONIC ATRIAL FIBRILLATION (H): ICD-10-CM

## 2018-09-18 DIAGNOSIS — I50.22 CHRONIC SYSTOLIC CONGESTIVE HEART FAILURE (H): ICD-10-CM

## 2018-09-18 DIAGNOSIS — Z78.9 ALCOHOL USE: ICD-10-CM

## 2018-09-18 DIAGNOSIS — I10 ESSENTIAL HYPERTENSION: Primary | ICD-10-CM

## 2018-09-18 DIAGNOSIS — E66.09 NON MORBID OBESITY DUE TO EXCESS CALORIES: ICD-10-CM

## 2018-09-18 DIAGNOSIS — G47.33 OSA (OBSTRUCTIVE SLEEP APNEA): ICD-10-CM

## 2018-09-18 PROBLEM — F10.90 ALCOHOL USE: Status: ACTIVE | Noted: 2018-09-18

## 2018-09-18 PROCEDURE — 99204 OFFICE O/P NEW MOD 45 MIN: CPT | Performed by: INTERNAL MEDICINE

## 2018-09-18 RX ORDER — ZOLPIDEM TARTRATE 5 MG/1
TABLET ORAL
Qty: 1 TABLET | Refills: 0 | Status: SHIPPED | OUTPATIENT
Start: 2018-09-18 | End: 2018-09-20

## 2018-09-18 NOTE — LETTER
9/18/2018         RE: Tee Hilton  [address] [facility] [address]        Dear Colleague,    Thank you for referring your patient, Tee Hilton, to the [facility]. Please see a copy of my visit note below.      Sleep Consultation:    Date on this visit: 9/18/2018    Tee Hilton is sent by Jon Denson for a sleep consultation regarding atrial fibrillation and hypertension in the context of possible sleep apnea.    Primary Physician: Jon Denson     He has a history of HTN, chronic low back pain, obesity, and recent diagnosis of atrial fibrillation with reduced heart function (EF 40 - 45%).    Schedule - Retired from [employer] as of June.  Has been enjoying long-term and trying to keep active.     Usually getting to bed around 9 - 10 PM and up around 5 AM to feed cat.  May sleep in a bit on the weekends.    Sleep Disordered Breathing - Snoring is variable but can get quite loud, especially when drinking.  No witnessed apneas but does have snort arousals; however, wife wears ear-plugs.   Has frequent nocturia (4/night).  No nocturnal GERD.   Upon waking feels ok, but never great.  He denies morning headaches.  Occasional morning dry mouth.  No morning sinus congestion.   Patient was counseled on the importance of driving while alert, to pull over if drowsy, or nap before getting into the vehicle if sleepy.    Movement/Behaviors - Occasional somniloquy.  No somnambulism.  No sleep related eating.  No dream enactment behavior.   Patient denies typical restless legs syndrome symptoms.     He denies bruxism.     Alcohol use - 3 - 4 nights per week will have 4 - 6 drinks in a night.   Caffeine intake - 4 cups/day of coffee, 1 sodas/day. Last caffeine intake is usually dinner.  Tobacco exposure - Former cigar smoker  Illicit substances - None    Lives with wife.  Youngest daughter just graduated.  Other kids are living independently.  Has 1 pet, a cat.     Does  have family history of snoring in father and brother.    Allergies:    Allergies   Allergen Reactions     Erythromycin Unknown     Upset stomach       Medications:    Current Outpatient Prescriptions   Medication Sig Dispense Refill     Famotidine (PEPCID PO) Take 10 mg by mouth       IBUPROFEN PO Take 600 mg by mouth 1-2 times daily       lisinopril (PRINIVIL/ZESTRIL) 20 MG tablet Take 1 tablet (20 mg) by mouth daily 90 tablet 1     metoprolol succinate (TOPROL-XL) 25 MG 24 hr tablet Take 1 tablet (25 mg) by mouth daily 90 tablet 1     omeprazole (PRILOSEC) 20 MG CR capsule Take 20 mg by mouth       rivaroxaban ANTICOAGULANT (XARELTO) 20 MG TABS tablet Take 1 tablet (20 mg) by mouth daily (with dinner) 30 tablet 3     sildenafil (REVATIO) 20 MG tablet Take 1 -5 tabs daily prn 30 tablet 11     zolpidem (AMBIEN) 5 MG tablet Take tablet by mouth 15 minutes prior to sleep, for Sleep Study 1 tablet 0       Problem List:  Patient Active Problem List    Diagnosis Date Noted     Chronic atrial fibrillation (H) 09/18/2018     Priority: Medium     Non morbid obesity due to excess calories 09/18/2018     Priority: Medium     Chronic systolic congestive heart failure (H) 09/18/2018     Priority: Medium     Alcohol use 09/18/2018     Priority: Medium     Chronic midline low back pain without sciatica 10/31/2017     Priority: Medium     Essential hypertension 11/11/2016     Priority: Medium     Other male erectile dysfunction 11/11/2016     Priority: Medium        Past Medical/Surgical History:  Past Medical History:   Diagnosis Date     Gastroesophageal reflux disease without esophagitis      Past Surgical History:   Procedure Laterality Date     COLONOSCOPY N/A 5/25/2017    Procedure: COMBINED COLONOSCOPY, SINGLE OR MULTIPLE BIOPSY/POLYPECTOMY BY BIOPSY;;  Surgeon: Aquilino Garcia MD;  Location: [facility] OR     COLONOSCOPY WITH CO2 INSUFFLATION N/A 5/25/2017    Procedure: COLONOSCOPY WITH CO2 INSUFFLATION;  COLONOSCOPY  "SCREEN/ SUSHMA;  Surgeon: Aquilino Garcia MD;  Location: [facility] OR     OPEN REDUCTION INTERNAL FIXATION TIBIA       TONSILLECTOMY         Social History:  Social History     Social History     Marital status:      Spouse name: N/A     Number of children: 3     Years of education: N/A     Occupational History     sales and marketing      Social History Main Topics     Smoking status: Never Smoker     Smokeless tobacco: Never Used      Comment: never smoked     Alcohol use No      Comment: social use     Drug use: No     Sexual activity: Yes     Partners: Female     Other Topics Concern     Not on file     Social History Narrative       Family History:  Family History   Problem Relation Age of Onset     Gout Father        Review of Systems:  A complete review of systems reviewed by me is negative with the exeption of what has been mentioned in the history of present illness.  Dry cough  Urinary frequency.    Physical Examination:  Vitals: BP (!) 156/101  Pulse 78  Ht 1.791 m (5' 10.5\")  Wt 109.3 kg (241 lb)  SpO2 98%  BMI 34.09 kg/m2  BMI= Body mass index is 34.09 kg/(m^2).    Neck Cir (cm): 40 cm    Vina Total Score 9/18/2018   Total score - Vina 5     KAUSHIK Total Score: 16 (09/18/18 1000)    General appearance: No apparent distress, well groomed.    HEENT:   Head: Normocephalic, atraumatic.  Eyes: PERRL  Nose: Nares patent.  No exudate.  No septal deviation noted.  Mouth: Teeth: Good   Tongue: Normal  Oropharynx:  Mallampati Classification: IV    Tonsils: Not visible    Uvula: Not visible    Neck: Supple without bruit.     Neck Cir (cm): 40 cm (9/18/2018 10:49 AM)    Cardiac: Irr/Irr.  No murmurs.  Radial pulses are intermittently weak, but symmetric.  Pulmonary: Symmetric air movement, lungs clear to auscultation bilaterally.  Musculoskeletal: No edema noted.  No clubbing or cyanosis.  Skin: Warm, dry, intact.  Neurologic: Alert and oriented to person, place and time   Gait is " normal.  Psychiatric: Affect pleasant.  Mood goood.    Impression/Plan:  1. AUGUSTO (obstructive sleep apnea)  Urgent split Polysomnography for upcoming cardioversion.  There is significant evidence that untreated Obstructive Sleep Apnea can adversely affect chances of successful and persistent cardioversion outcomes (i.e. Return to normal sinus rhythm).    At risk for Obstructive Sleep Apnea and Central Sleep Apnea.  No Adaptive Servo-Ventilation.    I have a high suspicion for AUGUSTO based on presence of snoring, snort arousals, and obesity with excessive daytime sleepiness.   Also at Risk for Central Sleep Apnea due to heart failure and atrial fibrillation  We discussed pathophysiology of sleep apnea, testing with overnight polysomnography, and treatment modalities (CPAP only discussed at this visit). We discussed consequences of untreated AUGUSTO. Patient is interested in treatment and willing to undergo overnight sleep testing.    Home sleep testing is inappropriate for this patient due to risk for Central Sleep Apnea (EF < 45%).    Study has been ordered today.      In case of insomnia during the study: one-time medication has been ordered.  - SLEEP EVALUATION & MANAGEMENT REFERRAL - [facility]  [phone] (Age 15 and up)  - Comprehensive Sleep Study; Future  - zolpidem (AMBIEN) 5 MG tablet; Take tablet by mouth 15 minutes prior to sleep, for Sleep Study  Dispense: 1 tablet; Refill: 0    2. Chronic atrial fibrillation (H)  Discussed link between atrial fibrillation and Sleep Apnea, both in terms of Obstructive Sleep Apnea as a risk factor for atrial fibrillation occurrence, recurrence, and persistence, and in terms of atrial fibrillation as a risk factor for Central Sleep Apnea.  - SLEEP EVALUATION & MANAGEMENT REFERRAL - [facility]  [phone] (Age 15 and up)    3. Non morbid obesity due to excess calories  We discussed the link between obesity,  sleep apnea, and health outcomes.  Patient was encouraged to decrease caloric intake and increase activity levels to try to move towards a normal weight.  He was encouraged to discuss further strategies with his primary care provider.     4. Chronic systolic congestive heart failure (H)  Concern for bidirectional risk with Central Sleep Apnea and association with atrial fibrillation.     5. Essential hypertension  Patient was counseled on the effects of untreated/sub-optimally treated hypertension.  He was told to discuss findings with his PCP.     6. Alcohol use  We discussed the link between alcohol and Obstructive Sleep Apnea, through multiple mechanisms, including upper airway relaxation, sleep fragmentation, and indirectly through weight gain.  He was encouraged to consider reduction of alcohol consumption and discuss further with primary care provider.      Literature provided regarding sleep apnea.      He will follow up with me in approximately two weeks after his sleep study has been competed to review the results and discuss plan of care.       Polysomnography reviewed.  Obstructive sleep apnea reviewed.  Complications of untreated sleep apnea were reviewed.    Alexandre Rivers MD, Sleep Physician  Sep 18, 2018     CC: Jon Denson          Again, thank you for allowing me to participate in the care of your patient.        Sincerely,        Alexandre Rivers MD

## 2018-09-18 NOTE — PROGRESS NOTES
Sleep Consultation:    Date on this visit: 9/18/2018    Tee Hilton is sent by Jon Denson for a sleep consultation regarding atrial fibrillation and hypertension in the context of possible sleep apnea.    Primary Physician: Jon Denson     He has a history of HTN, chronic low back pain, obesity, and recent diagnosis of atrial fibrillation with reduced heart function (EF 40 - 45%).    Schedule - Retired from [email or web address] as of June.  Has been enjoying [employer] and trying to keep active.     Usually getting to bed around 9 - 10 PM and up around 5 AM to feed cat.  May sleep in a bit on the weekends.    Sleep Disordered Breathing - Snoring is variable but can get quite loud, especially when drinking.  No witnessed apneas but does have snort arousals; however, wife wears ear-plugs.   Has frequent nocturia (4/night).  No nocturnal GERD.   Upon waking feels ok, but never great.  He denies morning headaches.  Occasional morning dry mouth.  No morning sinus congestion.   Patient was counseled on the importance of driving while alert, to pull over if drowsy, or nap before getting into the vehicle if sleepy.    Movement/Behaviors - Occasional somniloquy.  No somnambulism.  No sleep related eating.  No dream enactment behavior.   Patient denies typical restless legs syndrome symptoms.     He denies bruxism.     Alcohol use - 3 - 4 nights per week will have 4 - 6 drinks in a night.   Caffeine intake - 4 cups/day of coffee, 1 sodas/day. Last caffeine intake is usually dinner.  Tobacco exposure - Former cigar smoker  Illicit substances - None    Lives with wife.  Youngest daughter just graduated.  Other kids are living independently.  Has 1 pet, a cat.     Does have family history of snoring in father and brother.    Allergies:    Allergies   Allergen Reactions     Erythromycin Unknown     Upset stomach       Medications:    Current Outpatient Prescriptions   Medication Sig Dispense Refill     Famotidine  (PEPCID PO) Take 10 mg by mouth       IBUPROFEN PO Take 600 mg by mouth 1-2 times daily       lisinopril (PRINIVIL/ZESTRIL) 20 MG tablet Take 1 tablet (20 mg) by mouth daily 90 tablet 1     metoprolol succinate (TOPROL-XL) 25 MG 24 hr tablet Take 1 tablet (25 mg) by mouth daily 90 tablet 1     omeprazole (PRILOSEC) 20 MG CR capsule Take 20 mg by mouth       rivaroxaban ANTICOAGULANT (XARELTO) 20 MG TABS tablet Take 1 tablet (20 mg) by mouth daily (with dinner) 30 tablet 3     sildenafil (REVATIO) 20 MG tablet Take 1 -5 tabs daily prn 30 tablet 11     zolpidem (AMBIEN) 5 MG tablet Take tablet by mouth 15 minutes prior to sleep, for Sleep Study 1 tablet 0       Problem List:  Patient Active Problem List    Diagnosis Date Noted     Chronic atrial fibrillation (H) 09/18/2018     Priority: Medium     Non morbid obesity due to excess calories 09/18/2018     Priority: Medium     Chronic systolic congestive heart failure (H) 09/18/2018     Priority: Medium     Alcohol use 09/18/2018     Priority: Medium     Chronic midline low back pain without sciatica 10/31/2017     Priority: Medium     Essential hypertension 11/11/2016     Priority: Medium     Other male erectile dysfunction 11/11/2016     Priority: Medium        Past Medical/Surgical History:  Past Medical History:   Diagnosis Date     Gastroesophageal reflux disease without esophagitis      Past Surgical History:   Procedure Laterality Date     COLONOSCOPY N/A 5/25/2017    Procedure: COMBINED COLONOSCOPY, SINGLE OR MULTIPLE BIOPSY/POLYPECTOMY BY BIOPSY;;  Surgeon: Aquilino Garcia MD;  Location: [facility] OR     COLONOSCOPY WITH CO2 INSUFFLATION N/A 5/25/2017    Procedure: COLONOSCOPY WITH CO2 INSUFFLATION;  COLONOSCOPY SCREEN/ ORDAZ;  Surgeon: Aquilino Garcia MD;  Location: [facility] OR     OPEN REDUCTION INTERNAL FIXATION TIBIA       TONSILLECTOMY         Social History:  Social History     Social History     Marital status:      Spouse name: N/A     Number  "of children: 3     Years of education: N/A     Occupational History     sales and marketing      Social History Main Topics     Smoking status: Never Smoker     Smokeless tobacco: Never Used      Comment: never smoked     Alcohol use No      Comment: social use     Drug use: No     Sexual activity: Yes     Partners: Female     Other Topics Concern     Not on file     Social History Narrative       Family History:  Family History   Problem Relation Age of Onset     Gout Father        Review of Systems:  A complete review of systems reviewed by me is negative with the exeption of what has been mentioned in the history of present illness.  Dry cough  Urinary frequency.    Physical Examination:  Vitals: BP (!) 156/101  Pulse 78  Ht 1.791 m (5' 10.5\")  Wt 109.3 kg (241 lb)  SpO2 98%  BMI 34.09 kg/m2  BMI= Body mass index is 34.09 kg/(m^2).    Neck Cir (cm): 40 cm    [place]ona Total Score 9/18/2018   Total score - Corona 5     KAUSHIK Total Score: 16 (09/18/18 1000)    General appearance: No apparent distress, well groomed.    HEENT:   Head: Normocephalic, atraumatic.  Eyes: PERRL  Nose: Nares patent.  No exudate.  No septal deviation noted.  Mouth: Teeth: Good   Tongue: Normal  Oropharynx:  Mallampati Classification: IV    Tonsils: Not visible    Uvula: Not visible    Neck: Supple without bruit.     Neck Cir (cm): 40 cm (9/18/2018 10:49 AM)    Cardiac: Irr/Irr.  No murmurs.  Radial pulses are intermittently weak, but symmetric.  Pulmonary: Symmetric air movement, lungs clear to auscultation bilaterally.  Musculoskeletal: No edema noted.  No clubbing or cyanosis.  Skin: Warm, dry, intact.  Neurologic: Alert and oriented to person, place and time   Gait is normal.  Psychiatric: Affect pleasant.  Mood goood.    Impression/Plan:  1. AUGUSTO (obstructive sleep apnea)  Urgent split Polysomnography for upcoming cardioversion.  There is significant evidence that untreated Obstructive Sleep Apnea can adversely affect chances of " successful and persistent cardioversion outcomes (i.e. Return to normal sinus rhythm).    At risk for Obstructive Sleep Apnea and Central Sleep Apnea.  No Adaptive Servo-Ventilation.    I have a high suspicion for AUGUSTO based on presence of snoring, snort arousals, and obesity with excessive daytime sleepiness.   Also at Risk for Central Sleep Apnea due to heart failure and atrial fibrillation  We discussed pathophysiology of sleep apnea, testing with overnight polysomnography, and treatment modalities (CPAP only discussed at this visit). We discussed consequences of untreated AUGUSTO. Patient is interested in treatment and willing to undergo overnight sleep testing.    Home sleep testing is inappropriate for this patient due to risk for Central Sleep Apnea (EF < 45%).    Study has been ordered today.      In case of insomnia during the study: one-time medication has been ordered.  - SLEEP EVALUATION & MANAGEMENT REFERRAL - [facility]  [phone] (Age 15 and up)  - Comprehensive Sleep Study; Future  - zolpidem (AMBIEN) 5 MG tablet; Take tablet by mouth 15 minutes prior to sleep, for Sleep Study  Dispense: 1 tablet; Refill: 0    2. Chronic atrial fibrillation (H)  Discussed link between atrial fibrillation and Sleep Apnea, both in terms of Obstructive Sleep Apnea as a risk factor for atrial fibrillation occurrence, recurrence, and persistence, and in terms of atrial fibrillation as a risk factor for Central Sleep Apnea.  - SLEEP EVALUATION & MANAGEMENT REFERRAL - [facility]  [phone] (Age 15 and up)    3. Non morbid obesity due to excess calories  We discussed the link between obesity, sleep apnea, and health outcomes.  Patient was encouraged to decrease caloric intake and increase activity levels to try to move towards a normal weight.  He was encouraged to discuss further strategies with his primary care provider.     4. Chronic systolic  congestive heart failure (H)  Concern for bidirectional risk with Central Sleep Apnea and association with atrial fibrillation.     5. Essential hypertension  Patient was counseled on the effects of untreated/sub-optimally treated hypertension.  He was told to discuss findings with his PCP.     6. Alcohol use  We discussed the link between alcohol and Obstructive Sleep Apnea, through multiple mechanisms, including upper airway relaxation, sleep fragmentation, and indirectly through weight gain.  He was encouraged to consider reduction of alcohol consumption and discuss further with primary care provider.      Literature provided regarding sleep apnea.      He will follow up with me in approximately two weeks after his sleep study has been competed to review the results and discuss plan of care.       Polysomnography reviewed.  Obstructive sleep apnea reviewed.  Complications of untreated sleep apnea were reviewed.    Alexandre Rivers MD, Sleep Physician  Sep 18, 2018     CC: Jon Denson

## 2018-09-18 NOTE — PATIENT INSTRUCTIONS

## 2018-09-18 NOTE — NURSING NOTE
"Chief Complaint   Patient presents with     Sleep Problem     consult- Sleep contributing to A-Fib condition and lack of sleep.        Initial BP (!) 156/101  Pulse 78  Ht 1.791 m (5' 10.5\")  Wt 109.3 kg (241 lb)  SpO2 98%  BMI 34.09 kg/m2 Estimated body mass index is 34.09 kg/(m^2) as calculated from the following:    Height as of this encounter: 1.791 m (5' 10.5\").    Weight as of this encounter: 109.3 kg (241 lb).    Medication Reconciliation: complete    Neck circumference: 15.75 inches / 40 centimeters.        "

## 2018-09-19 ENCOUNTER — RADIANT APPOINTMENT (OUTPATIENT)
Dept: NUCLEAR MEDICINE | Facility: CLINIC | Age: 68
End: 2018-09-19
Attending: INTERNAL MEDICINE
Payer: COMMERCIAL

## 2018-09-19 ENCOUNTER — OFFICE VISIT (OUTPATIENT)
Dept: CARDIOLOGY | Facility: CLINIC | Age: 68
End: 2018-09-19
Attending: INTERNAL MEDICINE
Payer: COMMERCIAL

## 2018-09-19 VITALS — SYSTOLIC BLOOD PRESSURE: 149 MMHG | DIASTOLIC BLOOD PRESSURE: 97 MMHG | HEART RATE: 97 BPM

## 2018-09-19 DIAGNOSIS — I48.19 PERSISTENT ATRIAL FIBRILLATION (H): ICD-10-CM

## 2018-09-19 DIAGNOSIS — I48.19 PERSISTENT ATRIAL FIBRILLATION (H): Primary | ICD-10-CM

## 2018-09-19 PROCEDURE — 78452 HT MUSCLE IMAGE SPECT MULT: CPT | Performed by: INTERNAL MEDICINE

## 2018-09-19 PROCEDURE — 93017 CV STRESS TEST TRACING ONLY: CPT

## 2018-09-19 PROCEDURE — A9502 TC99M TETROFOSMIN: HCPCS | Performed by: INTERNAL MEDICINE

## 2018-09-19 PROCEDURE — 93016 CV STRESS TEST SUPVJ ONLY: CPT

## 2018-09-19 RX ORDER — REGADENOSON 0.08 MG/ML
0.4 INJECTION, SOLUTION INTRAVENOUS ONCE
Status: COMPLETED | OUTPATIENT
Start: 2018-09-19 | End: 2018-09-19

## 2018-09-19 RX ORDER — METOPROLOL TARTRATE 1 MG/ML
1 INJECTION, SOLUTION INTRAVENOUS ONCE
Status: COMPLETED | OUTPATIENT
Start: 2018-09-19 | End: 2018-09-19

## 2018-09-19 RX ADMIN — REGADENOSON 0.4 MG: 0.08 INJECTION, SOLUTION INTRAVENOUS at 11:50

## 2018-09-19 RX ADMIN — METOPROLOL TARTRATE 1 MG: 1 INJECTION, SOLUTION INTRAVENOUS at 11:58

## 2018-09-19 NOTE — PROGRESS NOTES
IV was started in the right AC with a 22g Jelco catheter and resting images were completed.      Patient's IV site was injected by RN with 0.4mg's of Lexiscan (Regadenoson) over a 15 second period, followed by a 5-mL saline flush.  The Nuclear Tech injected the Myoview tracer through the IV site 20 seconds later.      Patient offered C/O: short of breath      Total dose of Lexiscan was 0.4mg's.   Lexiscan NDC# 8939-1520-89   Total dose of Aminophylline was 0 mg  Aminophylline NDC# 6025-6087-36  Total dose of Metoprolol was 1 mg  Metoprolol NDC#  [address]-10  Total dose of Labetalol was 0 mg   Labetalol NDC# 5045-3785-11    Dr. Patton provided supervision of the tests performed today.

## 2018-09-19 NOTE — MR AVS SNAPSHOT
MRN:[id number]                      After Visit Summary   9/19/2018    Tee Hilton    MRN: [id number]           Visit Information        Provider Department      9/19/2018 12:00 PM MG CV TECH; MG CARD; MG IMAGING NURSE; MG STRESS RM [facility]        Your next 10 appointments already scheduled     Sep 19, 2018 12:00 PM CDT   Ekg Stress Nm Lexiscan with MG STRESS RM, MG IMAGING NURSE, MG CARD, MG CV TECH   [facility] ([facility])    [id number] [place] [address] [id number] [phone]            Sep 19, 2018 12:30 PM CDT   NM MPI WITH LEXISCAN with MGNM1   [facility])    [address] [place] [address] [id number] [phone]           How do I prepare for my exam? (Food and drink instructions) Day 1 & Day 2: Stop all caffeine 12 hours before the test. This includes coffee, tea, soda pop, chocolate and certain medicines (such as Anacin, Excedrin and NoDoz). Also avoid decaf coffee and tea, as these contain small amounts of caffeine. Stop eating 4 hours before the test. You may drink water.  How do I prepare for my exam? (Other instructions) You may need to stop some medicines before the test. Follow your doctor s orders. Day 1 & Day 2: *If you take a beta blocker: Do not take your beta-blocker on the day before your test, unless specifically told to by your doctor. And do not take it on the day of your test. Bring it with you to take after the test.  *If you take Aggrenox or dipyridamole (Persantine, Permole), stop taking it 48 hours before your test. *If you take Viagra, Cialis or Levitra, stop taking it 48 hours before your test. *If you take theophylline or aminophylline, stop taking it 12 hours before your test.  For patients with diabetes: *If you take insulin, call your diabetes care team. Ask if you should take a 1/2 dose the morning of your test. *If you take diabetes  medicine by mouth, don t take it on the morning of your test. Bring it with you to take after the test. (If you have questions, call your diabetes care team.)  *Do not take nitrates on the day of your test. Do not wear your Nitro-Patch. *No alcohol, smoking or other tobacco for 12 hours before the test.  What should I wear: Please wear a loose two-piece outfit. If you will have an exercise test, bring rubber-soled walking shoes.  How long does the exam take: *This test can take 1-2 days.* ONE day exam: Allow 3-4 hours for test. IF TWO day exam: Allow 30-60 minutes PER day for test.  What should I bring: Please bring a list of your medicines (including vitamins, minerals and over-the-counter drugs). Leave your valuables at home.  Do I need a :  No  is needed.  What do I need to tell my doctor? When you arrive, please tell us if you: * Have diabetes * Are breastfeeding * May be pregnant * Have a pacemaker of ICD (implantable defibrillator).  What should I do after the exam: No restrictions, You may resume normal activities.  What is this test: Your doctor has ordered a nuclear stress test to check how well blood is flowing through your heart. You will either exercise or take a medicine that mimics exercise; we will watch your heart.  Who should I call with questions: If you have any questions, please call the Imaging Department where you will have your exam. Directions, parking instructions, and other information is available on our website, [email or web address].            Sep 20, 2018  8:30 AM CDT   Return Visit with CLAY Erwin MD   [facility] PHYSIC[facility] HE[facility] AT [facility] ([facility])    [address] [address]   [phone]            Sep 20, 2018  9:40 AM CDT   SHORT with Jon Denson PA-C   [facility] ([facility])    [address] [facility] [id number] [phone]            Sep 22, 2018   8:00 PM CDT   PSG Split with BK BED 1   [facility] ([facility])    [address] [facility] [address]  [facility] [address]   [phone]            Oct 05, 2018  7:00 AM CDT   Return Sleep Patient with LINDA Calloway   [facility] ([facility])    [address] [facility] [address]  [facility] [address]   [phone]            Dec 06, 2018 11:00 AM CST   Return Visit with CLAY Erwin MD   [facility] PHYSICIANS HEART AT [facility] ([facility])    [address] [place] [address] 2nd Floor  [facility] [id number] [phone]              eVoter Information     eVoter gives you secure access to your electronic health record. If you see a primary care provider, you can also send messages to your care team and make appointments. If you have questions, please call your primary care clinic.  If you do not have a primary care provider, please call [phone] and they will assist you.      [employer]oter is an electronic gateway that provides easy, online access to your medical records. With [employer], you can request a clinic appointment, read your test results, renew a prescription or communicate with your care team.     To access your existing account, please contact your [facility] or call [phone] for assistance.        Care EveryWhere ID     This is your Care EveryWhere ID. This could be used by other organizations to access your [facility] medical records  [id number]        Equal Access to Services     GREGG BLACK : Hadii aad ku hadasho Soomaali, waaxda luqadaha, qaybta kaalmada adeegyada, harry doran. So [facility] [phone].    ATENCIÓN: Si habla español, tiene a campoverde disposición servicios gratuitos de asistencia lingüística. Llame al [phone].    We comply with applicable federal civil rights laws and [place] laws. We do not  discriminate on the basis of race, color, national origin, age, disability, sex, sexual orientation, or gender identity.

## 2018-09-20 ENCOUNTER — OFFICE VISIT (OUTPATIENT)
Dept: CARDIOLOGY | Facility: CLINIC | Age: 68
End: 2018-09-20
Payer: COMMERCIAL

## 2018-09-20 ENCOUNTER — CARE COORDINATION (OUTPATIENT)
Dept: CARDIOLOGY | Facility: CLINIC | Age: 68
End: 2018-09-20

## 2018-09-20 ENCOUNTER — OFFICE VISIT (OUTPATIENT)
Dept: FAMILY MEDICINE | Facility: CLINIC | Age: 68
End: 2018-09-20
Payer: COMMERCIAL

## 2018-09-20 VITALS
OXYGEN SATURATION: 99 % | TEMPERATURE: 98.1 F | RESPIRATION RATE: 16 BRPM | SYSTOLIC BLOOD PRESSURE: 134 MMHG | BODY MASS INDEX: 34.09 KG/M2 | WEIGHT: 241 LBS | HEART RATE: 83 BPM | DIASTOLIC BLOOD PRESSURE: 84 MMHG

## 2018-09-20 VITALS
SYSTOLIC BLOOD PRESSURE: 148 MMHG | OXYGEN SATURATION: 99 % | BODY MASS INDEX: 33.95 KG/M2 | HEART RATE: 100 BPM | WEIGHT: 240 LBS | DIASTOLIC BLOOD PRESSURE: 88 MMHG

## 2018-09-20 DIAGNOSIS — I10 BENIGN ESSENTIAL HYPERTENSION: ICD-10-CM

## 2018-09-20 DIAGNOSIS — I48.19 PERSISTENT ATRIAL FIBRILLATION (H): Primary | ICD-10-CM

## 2018-09-20 DIAGNOSIS — I48.20 CHRONIC ATRIAL FIBRILLATION (H): ICD-10-CM

## 2018-09-20 DIAGNOSIS — I48.0 PAROXYSMAL ATRIAL FIBRILLATION (H): Primary | ICD-10-CM

## 2018-09-20 DIAGNOSIS — I42.8 NON-ISCHEMIC CARDIOMYOPATHY (H): ICD-10-CM

## 2018-09-20 DIAGNOSIS — F40.240 CLAUSTROPHOBIA: Primary | ICD-10-CM

## 2018-09-20 PROCEDURE — 99214 OFFICE O/P EST MOD 30 MIN: CPT | Performed by: PHYSICIAN ASSISTANT

## 2018-09-20 PROCEDURE — 99214 OFFICE O/P EST MOD 30 MIN: CPT | Performed by: INTERNAL MEDICINE

## 2018-09-20 RX ORDER — LIDOCAINE 40 MG/G
CREAM TOPICAL
Status: CANCELLED | OUTPATIENT
Start: 2018-09-20

## 2018-09-20 RX ORDER — POTASSIUM CHLORIDE 1500 MG/1
20 TABLET, EXTENDED RELEASE ORAL
Status: CANCELLED | OUTPATIENT
Start: 2018-09-20

## 2018-09-20 RX ORDER — MAGNESIUM SULFATE HEPTAHYDRATE 40 MG/ML
2 INJECTION, SOLUTION INTRAVENOUS
Status: CANCELLED | OUTPATIENT
Start: 2018-09-20

## 2018-09-20 RX ORDER — LISINOPRIL 20 MG/1
40 TABLET ORAL DAILY
Qty: 180 TABLET | Refills: 1 | Status: SHIPPED | OUTPATIENT
Start: 2018-09-20 | End: 2019-04-15

## 2018-09-20 RX ORDER — POTASSIUM CHLORIDE 1500 MG/1
20 TABLET, EXTENDED RELEASE ORAL
Status: CANCELLED | OUTPATIENT
Start: 2018-09-20 | End: 2018-09-20

## 2018-09-20 RX ORDER — DIAZEPAM 5 MG
TABLET ORAL
Qty: 2 TABLET | Refills: 0 | Status: SHIPPED | OUTPATIENT
Start: 2018-09-20 | End: 2018-11-29

## 2018-09-20 RX ORDER — POTASSIUM CHLORIDE 1500 MG/1
40 TABLET, EXTENDED RELEASE ORAL
Status: CANCELLED | OUTPATIENT
Start: 2018-09-20

## 2018-09-20 NOTE — PROGRESS NOTES
SUBJECTIVE:   Tee Hilton is a 67 year old male who presents to clinic today for the following health issues:      Hypertension Follow-up--saw cardiology this morning currently in A-fib      Outpatient blood pressures are being checked at drLizbeth visits.  Results are elevated.    Low Salt Diet: not monitoring salt      Amount of exercise or physical activity: 4-5 days/week for an average of 30-45 minutes    Problems taking medications regularly: No    Medication side effects: none    Diet: regular (no restrictions)            Problem list and histories reviewed & adjusted, as indicated.  Additional history: as documented    BP Readings from Last 3 Encounters:   09/20/18 134/84   09/20/18 148/88   09/19/18 (!) 149/97    Wt Readings from Last 3 Encounters:   09/20/18 241 lb (109.3 kg)   09/20/18 240 lb (108.9 kg)   09/18/18 241 lb (109.3 kg)                    Reviewed and updated as needed this visit by clinical staff  Tobacco  Allergies  Meds       Reviewed and updated as needed this visit by Provider         All other systems negative except as outline above  OBJECTIVE:    Eye exam - right eye normal lid, conjunctiva, cornea, pupil and fundus, left eye normal lid, conjunctiva, cornea, pupil and fundus.  Thyroid not palpable, not enlarged, no nodules detected.  CHEST:chest clear to IPPA, no tachypnea, retractions or cyanosis and Heart exam detail:irregularly irregular rhythm, chest is clear without rales or wheezing, no pedal edema, no JVD, no hepatosplenomegaly.    Tee was seen today for hypertension.    Diagnoses and all orders for this visit:    Claustrophobia  -     diazepam (VALIUM) 5 MG tablet; Take 1-2 tabs 1 hour prior to his mri    Benign essential hypertension  -     lisinopril (PRINIVIL/ZESTRIL) 20 MG tablet; Take 2 tablets (40 mg) by mouth daily    Chronic atrial fibrillation (H)  -     rivaroxaban ANTICOAGULANT (XARELTO) 20 MG TABS tablet; Take 1 tablet (20 mg) by mouth daily (with  dinner)      work on lifestyle modification

## 2018-09-20 NOTE — NURSING NOTE
"Chief Complaint   Patient presents with     RECHECK     review results, discuss possible cardioversion. Pt reports some palpitations.       Initial /88 (BP Location: Left arm, Patient Position: Chair, Cuff Size: Adult Large)  Pulse 100  Wt 108.9 kg (240 lb)  SpO2 99%  BMI 33.95 kg/m2 Estimated body mass index is 33.95 kg/(m^2) as calculated from the following:    Height as of 9/18/18: 1.791 m (5' 10.5\").    Weight as of this encounter: 108.9 kg (240 lb)..  BP completed using cuff size: arin Orosco MA    "

## 2018-09-20 NOTE — PROGRESS NOTES
SUBJECTIVE:  Tee Hilton is a 67 year old male who presents for F/U.  Recently diagnosed afib on routine physical.  No symptoms.   Echo shows moderate LV dilation and reduced EF of 35 to 40%.  Here for discussion about cardioversion.    Patient Active Problem List    Diagnosis Date Noted     Chronic atrial fibrillation (H) 09/18/2018     Priority: Medium     Non morbid obesity due to excess calories 09/18/2018     Priority: Medium     Chronic systolic congestive heart failure (H) 09/18/2018     Priority: Medium     Alcohol use 09/18/2018     Priority: Medium     Chronic midline low back pain without sciatica 10/31/2017     Priority: Medium     Essential hypertension 11/11/2016     Priority: Medium     Other male erectile dysfunction 11/11/2016     Priority: Medium    .  Current Outpatient Prescriptions   Medication Sig     Famotidine (PEPCID PO) Take 10 mg by mouth     IBUPROFEN PO Take 600 mg by mouth 1-2 times daily     lisinopril (PRINIVIL/ZESTRIL) 20 MG tablet Take 1 tablet (20 mg) by mouth daily     metoprolol succinate (TOPROL-XL) 25 MG 24 hr tablet Take 1 tablet (25 mg) by mouth daily     omeprazole (PRILOSEC) 20 MG CR capsule Take 20 mg by mouth     rivaroxaban ANTICOAGULANT (XARELTO) 20 MG TABS tablet Take 1 tablet (20 mg) by mouth daily (with dinner)     sildenafil (REVATIO) 20 MG tablet Take 1 -5 tabs daily prn     No current facility-administered medications for this visit.      Facility-Administered Medications Ordered in Other Visits   Medication     sodium chloride (PF) 0.9% PF flush 10 mL     Past Medical History:   Diagnosis Date     Gastroesophageal reflux disease without esophagitis      Past Surgical History:   Procedure Laterality Date     COLONOSCOPY N/A 5/25/2017    Procedure: COMBINED COLONOSCOPY, SINGLE OR MULTIPLE BIOPSY/POLYPECTOMY BY BIOPSY;;  Surgeon: Aquilino Garcia MD;  Location: [facility] OR     COLONOSCOPY WITH CO2 INSUFFLATION N/A 5/25/2017    Procedure: COLONOSCOPY WITH  CO2 INSUFFLATION;  COLONOSCOPY SCREEN/ ORDAZ;  Surgeon: Aquilino Garcia MD;  Location: [facility] OR     OPEN REDUCTION INTERNAL FIXATION TIBIA       TONSILLECTOMY       Allergies   Allergen Reactions     Erythromycin Unknown     Upset stomach     Social History     Social History     Marital status:      Spouse name: N/A     Number of children: 3     Years of education: N/A     Occupational History     sales and marketing      Social History Main Topics     Smoking status: Never Smoker     Smokeless tobacco: Never Used      Comment: never smoked     Alcohol use No      Comment: social use     Drug use: No     Sexual activity: Yes     Partners: Female     Other Topics Concern     Not on file     Social History Narrative     Family History   Problem Relation Age of Onset     Gout Father           REVIEW OF SYSTEMS:  General: negative, fever, chills, night sweats  Skin: negative, acne, rash and scaling  Eyes: negative, double vision, eye pain and photophobia  Ears/Nose/Throat: negative  Respiratory: No dyspnea on exertion, No cough, No hemoptysis and negative  Cardiovascular: negative, palpitations, tachycardia, irregular heart beat, chest pain, exertional chest pain or pressure, paroxysmal nocturnal dyspnea, dyspnea on exertion and orthopnea         OBJECTIVE:  Blood pressure 148/88, pulse 100, weight 108.9 kg (240 lb), SpO2 99 %.  General Appearance: healthy, alert, active and no distress  Head: Normocephalic. No masses, lesions, tenderness or abnormalities  Eyes: conjuctiva clear, PERRL, EOM intact  Ears: External ears normal. Canals clear. TM's normal.  Nose: Nares normal  Mouth: normal  Neck: Supple, no cervical adenopathy, no thyromegaly  Lungs: clear to auscultation  Cardiac: regular rate and rhythm, normal S1 and S2, no murmur         ASSESSMENT/PLAN:  Afib of unknown duration,asympromatic,diagnosed during routine annual physical by PCP.  ECho low EF 35-40%. Moderate LVE.  Stress MPI normal,but severe  LVE and EF 41%.  Discussed the need to attempt establishing normal sinus rhythm.  On NOAC. Still will check a ELYSIA and plan for cardioverion.  Post cardioversion if no improvement in EF, will plan for angiogram.  Risk/benefit of Cardioversion explained. Will schedule cardioversion next week.  Per orders.   Return to Clinic 3 months with echo and EKG.

## 2018-09-20 NOTE — LETTER
9/20/2018      RE: Tee Hilton  [address] [facility] [address]       Dear Colleague,    Thank you for the opportunity to participate in the care of your patient, Tee Hilton, at the [facility] PHYSICIANS HEART AT [facility]. Please see a copy of my visit note below.       SUBJECTIVE:  Tee Hilton is a 67 year old male who presents for F/U.  Recently diagnosed afib on routine physical.  No symptoms.   Echo shows moderate LV dilation and reduced EF of 35 to 40%.  Here for discussion about cardioversion.    Patient Active Problem List    Diagnosis Date Noted     Chronic atrial fibrillation (H) 09/18/2018     Priority: Medium     Non morbid obesity due to excess calories 09/18/2018     Priority: Medium     Chronic systolic congestive heart failure (H) 09/18/2018     Priority: Medium     Alcohol use 09/18/2018     Priority: Medium     Chronic midline low back pain without sciatica 10/31/2017     Priority: Medium     Essential hypertension 11/11/2016     Priority: Medium     Other male erectile dysfunction 11/11/2016     Priority: Medium    .  Current Outpatient Prescriptions   Medication Sig     Famotidine (PEPCID PO) Take 10 mg by mouth     IBUPROFEN PO Take 600 mg by mouth 1-2 times daily     lisinopril (PRINIVIL/ZESTRIL) 20 MG tablet Take 1 tablet (20 mg) by mouth daily     metoprolol succinate (TOPROL-XL) 25 MG 24 hr tablet Take 1 tablet (25 mg) by mouth daily     omeprazole (PRILOSEC) 20 MG CR capsule Take 20 mg by mouth     rivaroxaban ANTICOAGULANT (XARELTO) 20 MG TABS tablet Take 1 tablet (20 mg) by mouth daily (with dinner)     sildenafil (REVATIO) 20 MG tablet Take 1 -5 tabs daily prn     No current facility-administered medications for this visit.      Facility-Administered Medications Ordered in Other Visits   Medication     sodium chloride (PF) 0.9% PF flush 10 mL     Past Medical History:   Diagnosis Date      Gastroesophageal reflux disease without esophagitis      Past Surgical History:   Procedure Laterality Date     COLONOSCOPY N/A 5/25/2017    Procedure: COMBINED COLONOSCOPY, SINGLE OR MULTIPLE BIOPSY/POLYPECTOMY BY BIOPSY;;  Surgeon: Aquilino Garcia MD;  Location: [facility]     COLONOSCOPY WITH CO2 INSUFFLATION N/A 5/25/2017    Procedure: COLONOSCOPY WITH CO2 INSUFFLATION;  COLONOSCOPY SCREEN/ ORDAZ;  Surgeon: Aquilino Garcia MD;  Location: [facility]     OPEN REDUCTION INTERNAL FIXATION TIBIA       TONSILLECTOMY       Allergies   Allergen Reactions     Erythromycin Unknown     Upset stomach     Social History     Social History     Marital status:      Spouse name: N/A     Number of children: 3     Years of education: N/A     Occupational History     sales and marketing      Social History Main Topics     Smoking status: Never Smoker     Smokeless tobacco: Never Used      Comment: never smoked     Alcohol use No      Comment: social use     Drug use: No     Sexual activity: Yes     Partners: Female     Other Topics Concern     Not on file     Social History Narrative     Family History   Problem Relation Age of Onset     Gout Father           REVIEW OF SYSTEMS:  General: negative, fever, chills, night sweats  Skin: negative, acne, rash and scaling  Eyes: negative, double vision, eye pain and photophobia  Ears/Nose/Throat: negative  Respiratory: No dyspnea on exertion, No cough, No hemoptysis and negative  Cardiovascular: negative, palpitations, tachycardia, irregular heart beat, chest pain, exertional chest pain or pressure, paroxysmal nocturnal dyspnea, dyspnea on exertion and orthopnea         OBJECTIVE:  Blood pressure 148/88, pulse 100, weight 108.9 kg (240 lb), SpO2 99 %.  General Appearance: healthy, alert, active and no distress  Head: Normocephalic. No masses, lesions, tenderness or abnormalities  Eyes: conjuctiva clear, PERRL, EOM intact  Ears: External ears normal. Canals clear. TM's  normal.  Nose: Nares normal  Mouth: normal  Neck: Supple, no cervical adenopathy, no thyromegaly  Lungs: clear to auscultation  Cardiac: regular rate and rhythm, normal S1 and S2, no murmur         ASSESSMENT/PLAN:  Afib of unknown duration,asympromatic,diagnosed during routine annual physical by PCP.  ECho low EF 35-40%. Moderate LVE.  Stress MPI normal,but severe LVE and EF 41%.  Discussed the need to attempt establishing normal sinus rhythm.  On NOAC. Still will check a ELYSIA and plan for cardioverion.  Post cardioversion if no improvement in EF, will plan for angiogram.  Risk/benefit of Cardioversion explained. Will schedule cardioversion next week.  Per orders.   Return to Clinic 3 months with echo and EKG.

## 2018-09-20 NOTE — PATIENT INSTRUCTIONS
.Thank you for coming to the [facility] @ [facility]; please note the following instructions:    You are scheduled for a Transesophageal Echocardiogram followed by a Cardioversion at the [facility] ([address], [place] [address], [phone]).       Follow these instructions:    1. Report to the GOLD waiting room in the [facility] on: _________    2. DO NOT EAT OR DRINK ANYTHING FOR 8 HOURS PRIOR TO ARRIVAL.     3. The morning of your procedure you may take your scheduled medications with a SIP of water. If you take diabetic medications or a diuretic, you may hold these.     4. You will receive medication that makes you sleepy; you will need a  and someone to stay with you for 24 hours following this procedure.    You should not make any legal decisions for 24 hours following discharge.         If your question concerns the schedule/appointment times, contact:  [facility] Procedure   [phone]                   If you have any questions regarding your visit please contact your care team:     Cardiology  Telephone Number   Samia GALE, ESTELITA PERAZA, ESTELITA MONTALVO, JAZMYNE HARMAN MA   [phone]    *After hours: [phone]   For scheduling appts:     [phone] or    [phone] (select option 1)    *After hours: [phone]     For the Device Clinic (Pacemakers and ICD's)  RN's :  Rachael aHrris   During business hours: [phone]    *After business hours:  [phone] (select option 4)      Normal test result notifications will be released via [employer] or mailed within 7 business days.  All other test results, will be communicated via telephone once reviewed by your cardiologist.    If you need a medication refill please contact your pharmacy.  Please allow 3 business days for your refill to be completed.    As always, thank you for trusting us with your health care needs!    Electrical Cardioversion     Cut away image of  heart showing SA node, right atrium, AV node, and left atrium   You had a cardioversion today. This is an electrical shock applied to the chest. The shock reset your heart rhythm back to normal. Your chest wall and chest muscles may feel sore for a few days. Some redness may appear on the skin on your chest where the cardioversion patches were applied. That will go away within a week.  To get ready for this procedure, you may have been given medicine to help you relax and to reduce pain. Depending on the medicine used, it could take up to 8 hours for the effect to wear off.  Home care  Follow these guidelines when caring for yourself at home:    You should be watched by a responsible adult for the next 8 hours. This is in case your condition gets worse.    Don t take any oral medicine for pain or sleep during the next 4 hours. These medicines might react with the medicines you were given in the hospital. This can cause a much stronger response than usual.    Don t drink any alcohol for the next 24 hours.    Don t drive or operate dangerous machinery during the next 24 hours.    Your healthcare provider will have prescribed medicines to stop the abnormal heart rhythm from coming back. Take these medicines as directed. Expect to take blood thinners for at least 4 weeks. This course of blood thinners should not be interrupted. Do not schedule other invasive procedures during this time. Interrupting this medicine could increase your risk for a stroke after a cardioversion.    You may use acetaminophen or ibuprofen to control pain, unless another pain medicine was prescribed. If you have chronic liver or kidney disease, talk with your provider before taking these medicines. Also talk with your provider if you ve had a stomach ulcer or gastrointestinal bleeding.  Follow-up care  Follow up with your healthcare provider, or as advised, if you aren t alert and back to your usual level of activity within 12 hours.   Call  911  Call 911 if you have:     Pain in your chest, arm, shoulder, neck or upper back    You have problems speaking or seeing    Weakness in an arm or leg    You are unable to move your arm or leg on one side of your body    You have uncrontrolled bleeding from blood thinning medicines   When to seek medical advice  Call your healthcare provider right away if any of these occur:    Weakness, dizziness, lightheadedness, or fainting    Shortness of breath    You feel like your heart is fluttering or beating fast, hard, or irregularly (palpitations)    More than minor skin discomfort or redness where the cardioversion pads were placed   Date Last Reviewed: 1/1/2017 2000-2017 [employer]. [address] [place] [address], [place] [address]. All rights reserved. This information is not intended as a substitute for professional medical care. Always follow your healthcare professional's instructions.

## 2018-09-20 NOTE — MR AVS SNAPSHOT
After Visit Summary   9/20/2018    Tee Hilton    MRN: [id number]           Patient Information     Date Of Birth          1950        Visit Information        Provider Department      9/20/2018 8:30 AM CLAY Erwin MD [facility] PHYSICIANS HEART AT [facility]        Care Instructions    .Thank you for coming to the [facility] @ [facility]; please note the following instructions:    You are scheduled for a Transesophageal Echocardiogram followed by a Cardioversion at the [facility] ([address], [facility] [address], [phone]).       Follow these instructions:    1. Report to the GOLD waiting room in the [facility] hospital on: _________    2. DO NOT EAT OR DRINK ANYTHING FOR 8 HOURS PRIOR TO ARRIVAL.     3. The morning of your procedure you may take your scheduled medications with a SIP of water. If you take diabetic medications or a diuretic, you may hold these.     4. You will receive medication that makes you sleepy; you will need a  and someone to stay with you for 24 hours following this procedure.    You should not make any legal decisions for 24 hours following discharge.         If your question concerns the schedule/appointment times, contact:  RONEY Herrmann Procedure   [phone]                   If you have any questions regarding your visit please contact your care team:     Cardiology  Telephone Number   Samia GALE, ESTELITA PERAZA, ESTELITA MONTALVO, JAZMYNE HARMAN MA   [phone]    *After hours: [phone]   For scheduling appts:     [phone] or    [phone] (select option 1)    *After hours: [phone]     For the Device Clinic (Pacemakers and ICD's)  RN's :  Rachael Harris   During business hours: [phone]    *After business hours:  [phone] (select option 4)      Normal test result notifications will be released via [employer] or mailed within 7 business days.  All  other test results, will be communicated via telephone once reviewed by your cardiologist.    If you need a medication refill please contact your pharmacy.  Please allow 3 business days for your refill to be completed.    As always, thank you for trusting us with your health care needs!    Electrical Cardioversion     Cut away image of heart showing SA node, right atrium, AV node, and left atrium   You had a cardioversion today. This is an electrical shock applied to the chest. The shock reset your heart rhythm back to normal. Your chest wall and chest muscles may feel sore for a few days. Some redness may appear on the skin on your chest where the cardioversion patches were applied. That will go away within a week.  To get ready for this procedure, you may have been given medicine to help you relax and to reduce pain. Depending on the medicine used, it could take up to 8 hours for the effect to wear off.  Home care  Follow these guidelines when caring for yourself at home:    You should be watched by a responsible adult for the next 8 hours. This is in case your condition gets worse.    Don t take any oral medicine for pain or sleep during the next 4 hours. These medicines might react with the medicines you were given in the hospital. This can cause a much stronger response than usual.    Don t drink any alcohol for the next 24 hours.    Don t drive or operate dangerous machinery during the next 24 hours.    Your healthcare provider will have prescribed medicines to stop the abnormal heart rhythm from coming back. Take these medicines as directed. Expect to take blood thinners for at least 4 weeks. This course of blood thinners should not be interrupted. Do not schedule other invasive procedures during this time. Interrupting this medicine could increase your risk for a stroke after a cardioversion.    You may use acetaminophen or ibuprofen to control pain, unless another pain medicine was prescribed. If you have  chronic liver or kidney disease, talk with your provider before taking these medicines. Also talk with your provider if you ve had a stomach ulcer or gastrointestinal bleeding.  Follow-up care  Follow up with your healthcare provider, or as advised, if you aren t alert and back to your usual level of activity within 12 hours.   Call 911  Call 911 if you have:     Pain in your chest, arm, shoulder, neck or upper back    You have problems speaking or seeing    Weakness in an arm or leg    You are unable to move your arm or leg on one side of your body    You have uncrontrolled bleeding from blood thinning medicines   When to seek medical advice  Call your healthcare provider right away if any of these occur:    Weakness, dizziness, lightheadedness, or fainting    Shortness of breath    You feel like your heart is fluttering or beating fast, hard, or irregularly (palpitations)    More than minor skin discomfort or redness where the cardioversion pads were placed   Date Last Reviewed: 1/1/2017 2000-2017 [employer]. [address] [place] [address]. All rights reserved. This information is not intended as a substitute for professional medical care. Always follow your healthcare professional's instructions.                Follow-ups after your visit        Your next 10 appointments already scheduled     Sep 20, 2018  9:40 AM CDT   [facility]-C   [facility]    [address] [facility] [address]   [phone]            Sep 22, 2018  8:00 PM CDT   PSG Split with BK BED 1   [facility] ([facility])    [address] [address]   [phone]            Oct 05, 2018  7:00 AM CDT   Return Sleep Patient with LINDA Calloway   [facility] ([facility])    [address] [address]  [address]   [phone]            Dec 06, 2018 11:00 AM CST   Return Visit with CLAY Erwin MD   [facility] PHYSICIANS HEART AT [facility] ([facility])    [address] [place] [address] [address]nd [facility] [id number] [phone]              Who to contact     If you have questions or need follow up information about today's clinic visit or your schedule please contact [facility] PHYSICIANS HEART AT [facility] directly at [phone].  Normal or non-critical lab and imaging results will be communicated to you by [employer], letter or phone within 4 business days after the clinic has received the results. If you do not hear from us within 7 days, please contact the clinic through [employer] or phone. If you have a critical or abnormal lab result, we will notify you by phone as soon as possible.  Submit refill requests through [employer] or call your pharmacy and they will forward the refill request to us. Please allow 3 business days for your refill to be completed.          Additional Information About Your Visit        [employer] Information     [employer] gives you secure access to your electronic health record. If you see a primary care provider, you can also send messages to your care team and make appointments. If you have questions, please call your primary care clinic.  If you do not have a primary care provider, please call [phone] and they will assist you.        Care EveryWhere ID     This is your Care EveryWhere ID. This could be used by other organizations to access your [place] medical records  [id number]        Your Vitals Were     Pulse Pulse Oximetry BMI (Body Mass Index)             100 99% 33.95 kg/m2          Blood Pressure from Last 3 Encounters:   09/20/18 148/88   09/19/18 (!) 149/97   09/18/18 (!) 156/101    Weight from Last 3 Encounters:   09/20/18 108.9 kg (240 lb)   09/18/18 109.3 kg (241 lb)   09/06/18 112.5 kg (248 lb)              Today, you had  the following     No orders found for display         Today's Medication Changes          These changes are accurate as of 9/20/18  8:47 AM.  If you have any questions, ask your nurse or doctor.               Stop taking these medicines if you haven't already. Please contact your care team if you have questions.     zolpidem 5 MG tablet   Commonly known as:  AMBIEN   Stopped by:  Jon Denson PA-C                    Primary Care Provider Office Phone # Fax #    Jon Denson PA-C [phone] [address] [facility] [address]        Equal Access to Services     [facility]: Hadii aad ku hadasho Soomaali, waaxda luqadaha, qaybta kaalmada adeegyada, waxmckenna idiin hayaan laci khkate overton . So [place] [phone].    ATENCIÓN: Si habla español, tiene a campoverde disposición servicios gratuitos de asistencia lingüística. [facility] [phone].    We comply with applicable federal civil rights laws and [place] laws. We do not discriminate on the basis of race, color, national origin, age, disability, sex, sexual orientation, or gender identity.            Thank you!     Thank you for choosing [facility] PHYSICIANS HEART AT [facility]  for your care. Our goal is always to provide you with excellent care. Hearing back from our patients is one way we can continue to improve our services. Please take a few minutes to complete the written survey that you may receive in the mail after your visit with us. Thank you!             Your Updated Medication List - Protect others around you: Learn how to safely use, store and throw away your medicines at www.disposemymeds.org.          This list is accurate as of 9/20/18  8:47 AM.  Always use your most recent med list.                   Brand Name Dispense Instructions for use Diagnosis    IBUPROFEN PO      Take 600 mg by mouth 1-2 times daily        lisinopril 20 MG tablet    PRINIVIL/ZESTRIL    90 tablet    Take 1 tablet (20 mg) by  mouth daily    Benign essential hypertension       metoprolol succinate 25 MG 24 hr tablet    TOPROL-XL    90 tablet    Take 1 tablet (25 mg) by mouth daily    Chronic atrial fibrillation (H)       omeprazole 20 MG CR capsule    priLOSEC     Take 20 mg by mouth        PEPCID PO      Take 10 mg by mouth        rivaroxaban ANTICOAGULANT 20 MG Tabs tablet    XARELTO    30 tablet    Take 1 tablet (20 mg) by mouth daily (with dinner)    Chronic atrial fibrillation (H)       sildenafil 20 MG tablet    REVATIO    30 tablet    Take 1 -5 tabs daily prn    Other male erectile dysfunction

## 2018-09-20 NOTE — MR AVS SNAPSHOT
After Visit Summary   9/20/2018    Tee Hilton    MRN: [id number]           Patient Information     Date Of Birth          1950        Visit Information        Provider Department      9/20/2018 9:40 AM Jon Denson PA-C [facility]        Today's Diagnoses     Claustrophobia    -  1    Benign essential hypertension        Chronic atrial fibrillation (H)           Follow-ups after your visit        Your next 10 appointments already scheduled     Sep 22, 2018  8:00 PM CDT   PSG Split with BK BED 1   [facility]    [address] [address] [address]   [phone]            Oct 05, 2018  7:00 AM CDT   Return Sleep Patient with LINDA Calloway   [facility]    [address] [address] [address]   [phone]            Dec 06, 2018 11:00 AM CST   Return Visit with CLAY Erwin MD   [facility] PHYSICIANS HEART AT [facility] ([facility])    [address] [place] [address] [address] [facility] [address]   [phone]              Future tests that were ordered for you today     Open Future Orders        Priority Expected Expires Ordered    Transesophageal Echocardiogram Routine  9/20/2019 9/20/2018            Who to contact     Normal or non-critical lab and imaging results will be communicated to you by MyChart, letter or phone within 4 business days after the clinic has received the results. If you do not hear from us within 7 days, please contact the clinic through MyChart or phone. If you have a critical or abnormal lab result, we will notify you by phone as soon as possible.  Submit refill requests through [employer] or call your pharmacy and they will forward the refill request to us. Please allow 3 business days for your refill to be completed.          If you need to speak with a   for additional information , please call: [phone]             Additional Information About Your Visit        [employer] Information     [employer] gives you secure access to your electronic health record. If you see a primary care provider, you can also send messages to your care team and make appointments. If you have questions, please call your primary care clinic.  If you do not have a primary care provider, please call [phone] and they will assist you.        Care EveryWhere ID     This is your Care EveryWhere ID. This could be used by other organizations to access your [place] medical records  [id number]        Your Vitals Were     Pulse Temperature Respirations Pulse Oximetry BMI (Body Mass Index)       83 98.1  F (36.7  C) (Tympanic) 16 99% 34.09 kg/m2        Blood Pressure from Last 3 Encounters:   09/20/18 134/84   09/20/18 148/88   09/19/18 (!) 149/97    Weight from Last 3 Encounters:   09/20/18 241 lb (109.3 kg)   09/20/18 240 lb (108.9 kg)   09/18/18 241 lb (109.3 kg)              Today, you had the following     No orders found for display         Today's Medication Changes          These changes are accurate as of 9/20/18 10:07 AM.  If you have any questions, ask your nurse or doctor.               Start taking these medicines.        Dose/Directions    diazepam 5 MG tablet   Commonly known as:  VALIUM   Used for:  Claustrophobia   Started by:  Jon Denson PA-C        Take 1-2 tabs 1 hour prior to his mri   Quantity:  2 tablet   Refills:  0         These medicines have changed or have updated prescriptions.        Dose/Directions    lisinopril 20 MG tablet   Commonly known as:  PRINIVIL/ZESTRIL   This may have changed:  how much to take   Used for:  Benign essential hypertension   Changed by:  Jon Denson PA-C        Dose:  40 mg   Take 2 tablets (40 mg) by mouth daily   Quantity:  180 tablet   Refills:  1         Stop taking these medicines if you haven't  already. Please contact your care team if you have questions.     IBUPROFEN PO   Stopped by:  Jon Denson PA-C           zolpidem 5 MG tablet   Commonly known as:  AMBIEN   Stopped by:  Jon Denson PA-C                Where to get your medicines      These medications were sent to [facility] - [place], [place] - [address] [facility]  [address] [facility] [address] [facility] [address]     Phone:  [phone]     lisinopril 20 MG tablet    rivaroxaban ANTICOAGULANT 20 MG Tabs tablet         Some of these will need a paper prescription and others can be bought over the counter.  Ask your nurse if you have questions.     Bring a paper prescription for each of these medications     diazepam 5 MG tablet                Primary Care Provider Office Phone # Fax #    Jon Denson PA-C [phone] [address]  [facility] [address]        Equal Access to Services     [facility]: Hadii aad ku hadasho Soomaali, waaxda luqadaha, qaybta kaalmada adeegyada, waxay idiin hayaan adeeg kharademetrius la'colinn . So [facility] [phone].    ATENCIÓN: Si habla español, tiene a campoverde disposición servicios gratuitos de asistencia lingüística. [facility] [phone].    We comply with applicable federal civil rights laws and [place] laws. We do not discriminate on the basis of race, color, national origin, age, disability, sex, sexual orientation, or gender identity.            Thank you!     Thank you for choosing [facility]  for your care. Our goal is always to provide you with excellent care. Hearing back from our patients is one way we can continue to improve our services. Please take a few minutes to complete the written survey that you may receive in the mail after your visit with us. Thank you!             Your Updated Medication List - Protect others around you: Learn how to safely use, store and throw away your medicines at www.disposemymeds.org.          This list is accurate as of 9/20/18  10:07 AM.  Always use your most recent med list.                   Brand Name Dispense Instructions for use Diagnosis    diazepam 5 MG tablet    VALIUM    2 tablet    Take 1-2 tabs 1 hour prior to his mri    Claustrophobia       lisinopril 20 MG tablet    PRINIVIL/ZESTRIL    180 tablet    Take 2 tablets (40 mg) by mouth daily    Benign essential hypertension       metoprolol succinate 25 MG 24 hr tablet    TOPROL-XL    90 tablet    Take 1 tablet (25 mg) by mouth daily    Chronic atrial fibrillation (H)       omeprazole 20 MG CR capsule    priLOSEC     Take 20 mg by mouth        PEPCID PO      Take 10 mg by mouth        rivaroxaban ANTICOAGULANT 20 MG Tabs tablet    XARELTO    30 tablet    Take 1 tablet (20 mg) by mouth daily (with dinner)    Chronic atrial fibrillation (H)       sildenafil 20 MG tablet    REVATIO    30 tablet    Take 1 -5 tabs daily prn    Other male erectile dysfunction

## 2018-09-22 ENCOUNTER — THERAPY VISIT (OUTPATIENT)
Dept: SLEEP MEDICINE | Facility: CLINIC | Age: 68
End: 2018-09-22
Payer: COMMERCIAL

## 2018-09-22 DIAGNOSIS — G47.39 OTHER SLEEP APNEA: Chronic | ICD-10-CM

## 2018-09-22 DIAGNOSIS — G47.33 OSA (OBSTRUCTIVE SLEEP APNEA): ICD-10-CM

## 2018-09-22 PROCEDURE — 95811 POLYSOM 6/>YRS CPAP 4/> PARM: CPT | Performed by: INTERNAL MEDICINE

## 2018-09-22 NOTE — MR AVS SNAPSHOT
After Visit Summary   9/22/2018    Tee Hilton    MRN: [id number]           Patient Information     Date Of Birth          1950        Visit Information        Provider Department      9/22/2018 8:00 PM BK BED 1 [facility]        Today's Diagnoses     AUGUSTO (obstructive sleep apnea)           Follow-ups after your visit        Your next 10 appointments already scheduled     Sep 24, 2018  7:00 AM CDT   LAB with UKELLEN LAB GOLD WAITING   [facility], Lab ([facility])    [address] [address]              Please do not eat 10-12 hours before your appointment if you are coming in fasting for labs on lipids, cholesterol, or glucose (sugar). This does not apply to pregnant women. Water, hot tea and black coffee (with nothing added) are okay. Do not drink other fluids, diet soda or chew gum.            Sep 24, 2018  7:30 AM CDT   Procedure 4 hour with U2A ROOM 2   Unit 2A [facility] ([facility])    [address] [facility] [address] [address]               Sep 24, 2018  8:30 AM CDT   [facility] ([facility]    [address] [address]   [phone]           1) NPO for 8 hours prior to the procedure except for sips of water with medications. 2) Hold insulin or oral diabetic medications morning of procedure. May take   dose long acting insulin. Follow further instructions of PMD. 3) Continue daily Coumadin. ~ If taking Digoxin, hold AM of procedure. ~ Plan to have a responsible adult take you home after the exam. You may not drive, take a bus or taxi by yourself. A responsible adult must stay with you for 24 hours after the test.            Sep 24, 2018  9:00 AM CDT   Ech Tc with DIXIETEERAutumn   [facility] ([facility])    [address] [facility] [address]   [phone]           1.  Please bring or wear a comfortable two-piece outfit. 2.  Arrival time: -   [facility]:  arrive 75 minutes prior to examination time. -   [facility]:  arrive 90 minutes prior to examination time. -   [facility]:   arrive 15 minutes prior to examination time. 3.  Plan to have a responsible adult take you home after the test. After the exam you may not drive, take a bus or taxi by yourself. -   Someone should stay with you for 6 hours after your test. 4.  No food or drink: -   6 hours before the test 5.  If you take antacids or water pills (diuretics): Do not take them until after your test. You may take blood pressure medicine with a few sips of water. 6.  If you have diabetes: -   Morning slots preferred -   If you take insulin, call your diabetes care team. Ask if you should take a   dose the morning of your test. -   If you take diabetes medicine by mouth, don't take it on the morning of your test. Bring it with you to take after the test. (If you have questions, call your diabetes care team.) 7.  Bring a list of any medicines you are taking. 8.  Do not drive for 24 hours after the test. 9.  For any questions that cannot be answered, please contact the ordering physician 10. Please do not wear perfumes or scented lotions on the day of your exam.            Oct 05, 2018  7:00 AM CDT   Return Sleep Patient with LINDA Calloway   [facility] ([facility])    [address] [place] [address]   [phone]            Oct 23, 2018  8:45 AM CDT   Return Visit with ANDRE Patel CNP   [facility] PHYSICIANS HEART AT [facility] ([facility])    [address] [place] [address] [address] [facility] [address]   [phone]            Dec 06, 2018 11:00 AM CST   Return Visit with CLAY Erwin MD   [facility]  PHYSICIANS HEART AT [facility] ([facility])    [address] [place] [address] 2nd Floor  [place] [address] [phone]              Who to contact     If you have questions or need follow up information about today's clinic visit or your schedule please contact [facility] directly at [phone].  Normal or non-critical lab and imaging results will be communicated to you by MyChart, letter or phone within 4 business days after the clinic has received the results. If you do not hear from us within 7 days, please contact the clinic through [employer] or phone. If you have a critical or abnormal lab result, we will notify you by phone as soon as possible.  Submit refill requests through Ling[employer] or call your pharmacy and they will forward the refill request to us. Please allow 3 business days for your refill to be completed.          Additional Information About Your Visit        [employer] Information     [employer] gives you secure access to your electronic health record. If you see a primary care provider, you can also send messages to your care team and make appointments. If you have questions, please call your primary care clinic.  If you do not have a primary care provider, please call [phone] and they will assist you.        Care EveryWhere ID     This is your Care EveryWhere ID. This could be used by other organizations to access your [place] medical records  [id number]         Blood Pressure from Last 3 Encounters:   09/20/18 134/84   09/20/18 148/88   09/19/18 (!) 149/97    Weight from Last 3 Encounters:   09/20/18 109.3 kg (241 lb)   09/20/18 108.9 kg (240 lb)   09/18/18 109.3 kg (241 lb)              We Performed the Following     Comprehensive Sleep Study        Primary Care Provider Office Phone # Fax #    Jon Denson PA-C [phone]       [address] [place] [address]        Equal Access to Services     GREGG BLACK AH: Krystyna Lofton,  wavickieda edsonadaha, qaybta kaalmada giovanny, harry garciadebora ah. So [facility] [phone].    ATENCIÓN: Si francesca nam, tiene a campoverde disposición servicios gratuitos de asistencia lingüística. Isha al [phone].    We comply with applicable federal civil rights laws and [place] laws. We do not discriminate on the basis of race, color, national origin, age, disability, sex, sexual orientation, or gender identity.            Thank you!     Thank you for choosing [facility]  for your care. Our goal is always to provide you with excellent care. Hearing back from our patients is one way we can continue to improve our services. Please take a few minutes to complete the written survey that you may receive in the mail after your visit with us. Thank you!             Your Updated Medication List - Protect others around you: Learn how to safely use, store and throw away your medicines at www.disposemymeds.org.          This list is accurate as of 9/22/18 11:59 PM.  Always use your most recent med list.                   Brand Name Dispense Instructions for use Diagnosis    diazepam 5 MG tablet    VALIUM    2 tablet    Take 1-2 tabs 1 hour prior to his mri    Claustrophobia       lisinopril 20 MG tablet    PRINIVIL/ZESTRIL    180 tablet    Take 2 tablets (40 mg) by mouth daily    Benign essential hypertension       metoprolol succinate 25 MG 24 hr tablet    TOPROL-XL    90 tablet    Take 1 tablet (25 mg) by mouth daily    Chronic atrial fibrillation (H)       omeprazole 20 MG CR capsule    priLOSEC     Take 20 mg by mouth        PEPCID PO      Take 10 mg by mouth        rivaroxaban ANTICOAGULANT 20 MG Tabs tablet    XARELTO    30 tablet    Take 1 tablet (20 mg) by mouth daily (with dinner)    Chronic atrial fibrillation (H)       sildenafil 20 MG tablet    REVATIO    30 tablet    Take 1 -5 tabs daily prn    Other male erectile dysfunction

## 2018-09-24 ENCOUNTER — SURGERY (OUTPATIENT)
Age: 68
End: 2018-09-24

## 2018-09-24 ENCOUNTER — APPOINTMENT (OUTPATIENT)
Dept: MEDSURG UNIT | Facility: CLINIC | Age: 68
End: 2018-09-24
Attending: INTERNAL MEDICINE
Payer: COMMERCIAL

## 2018-09-24 ENCOUNTER — APPOINTMENT (OUTPATIENT)
Dept: LAB | Facility: CLINIC | Age: 68
End: 2018-09-24
Attending: INTERNAL MEDICINE
Payer: COMMERCIAL

## 2018-09-24 ENCOUNTER — HOSPITAL ENCOUNTER (OUTPATIENT)
Facility: CLINIC | Age: 68
Discharge: HOME | End: 2018-09-24
Attending: INTERNAL MEDICINE | Admitting: INTERNAL MEDICINE
Payer: COMMERCIAL

## 2018-09-24 ENCOUNTER — HOSPITAL ENCOUNTER (OUTPATIENT)
Dept: CARDIOLOGY | Facility: CLINIC | Age: 68
End: 2018-09-24
Attending: INTERNAL MEDICINE | Admitting: INTERNAL MEDICINE
Payer: COMMERCIAL

## 2018-09-24 ENCOUNTER — ANESTHESIA EVENT (OUTPATIENT)
Dept: SURGERY | Facility: CLINIC | Age: 68
End: 2018-09-24
Payer: COMMERCIAL

## 2018-09-24 ENCOUNTER — ANESTHESIA (OUTPATIENT)
Dept: SURGERY | Facility: CLINIC | Age: 68
End: 2018-09-24
Payer: COMMERCIAL

## 2018-09-24 VITALS
DIASTOLIC BLOOD PRESSURE: 86 MMHG | SYSTOLIC BLOOD PRESSURE: 122 MMHG | TEMPERATURE: 98 F | HEART RATE: 73 BPM | RESPIRATION RATE: 14 BRPM | OXYGEN SATURATION: 98 %

## 2018-09-24 VITALS
RESPIRATION RATE: 18 BRPM | SYSTOLIC BLOOD PRESSURE: 120 MMHG | HEART RATE: 67 BPM | DIASTOLIC BLOOD PRESSURE: 76 MMHG | OXYGEN SATURATION: 96 %

## 2018-09-24 VITALS
OXYGEN SATURATION: 99 % | SYSTOLIC BLOOD PRESSURE: 110 MMHG | HEART RATE: 86 BPM | DIASTOLIC BLOOD PRESSURE: 78 MMHG | RESPIRATION RATE: 14 BRPM

## 2018-09-24 DIAGNOSIS — I48.0 PAROXYSMAL ATRIAL FIBRILLATION (H): ICD-10-CM

## 2018-09-24 DIAGNOSIS — I42.8 NON-ISCHEMIC CARDIOMYOPATHY (H): ICD-10-CM

## 2018-09-24 DIAGNOSIS — I48.19 PERSISTENT ATRIAL FIBRILLATION (H): ICD-10-CM

## 2018-09-24 PROBLEM — G47.39 OTHER SLEEP APNEA: Chronic | Status: ACTIVE | Noted: 2018-09-24

## 2018-09-24 LAB
ANION GAP SERPL CALCULATED.3IONS-SCNC: 6 MMOL/L (ref 3–14)
BUN SERPL-MCNC: 20 MG/DL (ref 7–30)
CALCIUM SERPL-MCNC: 9 MG/DL (ref 8.5–10.1)
CHLORIDE SERPL-SCNC: 106 MMOL/L (ref 94–109)
CO2 SERPL-SCNC: 26 MMOL/L (ref 20–32)
CREAT SERPL-MCNC: 1.03 MG/DL (ref 0.66–1.25)
GFR SERPL CREATININE-BSD FRML MDRD: 72 ML/MIN/1.7M2
GLUCOSE SERPL-MCNC: 104 MG/DL (ref 70–99)
MAGNESIUM SERPL-MCNC: 2.1 MG/DL (ref 1.6–2.3)
POTASSIUM SERPL-SCNC: 4.9 MMOL/L (ref 3.4–5.3)
SODIUM SERPL-SCNC: 139 MMOL/L (ref 133–144)

## 2018-09-24 PROCEDURE — 40000166 ZZH STATISTIC PP CARE STAGE 1

## 2018-09-24 PROCEDURE — 93320 DOPPLER ECHO COMPLETE: CPT | Mod: 26 | Performed by: INTERNAL MEDICINE

## 2018-09-24 PROCEDURE — 83735 ASSAY OF MAGNESIUM: CPT | Performed by: INTERNAL MEDICINE

## 2018-09-24 PROCEDURE — 25000128 H RX IP 250 OP 636: Performed by: NURSE ANESTHETIST, CERTIFIED REGISTERED

## 2018-09-24 PROCEDURE — 93312 ECHO TRANSESOPHAGEAL: CPT | Mod: 26 | Performed by: INTERNAL MEDICINE

## 2018-09-24 PROCEDURE — 25000128 H RX IP 250 OP 636: Performed by: INTERNAL MEDICINE

## 2018-09-24 PROCEDURE — 93325 DOPPLER ECHO COLOR FLOW MAPG: CPT | Mod: 26 | Performed by: INTERNAL MEDICINE

## 2018-09-24 PROCEDURE — 99152 MOD SED SAME PHYS/QHP 5/>YRS: CPT

## 2018-09-24 PROCEDURE — 93325 DOPPLER ECHO COLOR FLOW MAPG: CPT

## 2018-09-24 PROCEDURE — 92960 CARDIOVERSION ELECTRIC EXT: CPT | Performed by: INTERNAL MEDICINE

## 2018-09-24 PROCEDURE — 37000008 ZZH ANESTHESIA TECHNICAL FEE, 1ST 30 MIN

## 2018-09-24 PROCEDURE — 25000125 ZZHC RX 250: Performed by: INTERNAL MEDICINE

## 2018-09-24 PROCEDURE — 92960 CARDIOVERSION ELECTRIC EXT: CPT

## 2018-09-24 PROCEDURE — 80048 BASIC METABOLIC PNL TOTAL CA: CPT | Performed by: INTERNAL MEDICINE

## 2018-09-24 PROCEDURE — 93005 ELECTROCARDIOGRAM TRACING: CPT

## 2018-09-24 PROCEDURE — 36415 COLL VENOUS BLD VENIPUNCTURE: CPT | Performed by: INTERNAL MEDICINE

## 2018-09-24 PROCEDURE — 25800025 ZZH RX 258: Performed by: INTERNAL MEDICINE

## 2018-09-24 RX ORDER — PROPOFOL 10 MG/ML
INJECTION, EMULSION INTRAVENOUS PRN
Status: DISCONTINUED | OUTPATIENT
Start: 2018-09-24 | End: 2018-09-24

## 2018-09-24 RX ORDER — LIDOCAINE 40 MG/G
CREAM TOPICAL
Status: DISCONTINUED | OUTPATIENT
Start: 2018-09-24 | End: 2018-09-24 | Stop reason: HOSPADM

## 2018-09-24 RX ORDER — NALOXONE HYDROCHLORIDE 0.4 MG/ML
.1-.4 INJECTION, SOLUTION INTRAMUSCULAR; INTRAVENOUS; SUBCUTANEOUS
Status: DISCONTINUED | OUTPATIENT
Start: 2018-09-24 | End: 2018-09-25 | Stop reason: HOSPADM

## 2018-09-24 RX ORDER — FENTANYL CITRATE 50 UG/ML
25-50 INJECTION, SOLUTION INTRAMUSCULAR; INTRAVENOUS
Status: COMPLETED | OUTPATIENT
Start: 2018-09-24 | End: 2018-09-24

## 2018-09-24 RX ORDER — SODIUM CHLORIDE 9 MG/ML
INJECTION, SOLUTION INTRAVENOUS CONTINUOUS PRN
Status: DISCONTINUED | OUTPATIENT
Start: 2018-09-24 | End: 2018-09-24

## 2018-09-24 RX ORDER — FENTANYL CITRATE 50 UG/ML
25 INJECTION, SOLUTION INTRAMUSCULAR; INTRAVENOUS
Status: DISCONTINUED | OUTPATIENT
Start: 2018-09-24 | End: 2018-09-25 | Stop reason: HOSPADM

## 2018-09-24 RX ORDER — ACYCLOVIR 200 MG/1
5 CAPSULE ORAL ONCE
Status: COMPLETED | OUTPATIENT
Start: 2018-09-24 | End: 2018-09-24

## 2018-09-24 RX ORDER — POTASSIUM CHLORIDE 750 MG/1
20 TABLET, EXTENDED RELEASE ORAL
Status: DISCONTINUED | OUTPATIENT
Start: 2018-09-24 | End: 2018-09-24 | Stop reason: HOSPADM

## 2018-09-24 RX ORDER — POTASSIUM CHLORIDE 750 MG/1
40 TABLET, EXTENDED RELEASE ORAL
Status: DISCONTINUED | OUTPATIENT
Start: 2018-09-24 | End: 2018-09-24 | Stop reason: HOSPADM

## 2018-09-24 RX ORDER — LIDOCAINE 40 MG/G
CREAM TOPICAL
Status: DISCONTINUED | OUTPATIENT
Start: 2018-09-24 | End: 2018-09-25 | Stop reason: HOSPADM

## 2018-09-24 RX ORDER — FLUMAZENIL 0.1 MG/ML
0.2 INJECTION, SOLUTION INTRAVENOUS
Status: DISCONTINUED | OUTPATIENT
Start: 2018-09-24 | End: 2018-09-25 | Stop reason: HOSPADM

## 2018-09-24 RX ORDER — SODIUM CHLORIDE 9 MG/ML
INJECTION, SOLUTION INTRAVENOUS CONTINUOUS PRN
Status: DISCONTINUED | OUTPATIENT
Start: 2018-09-24 | End: 2018-09-25 | Stop reason: HOSPADM

## 2018-09-24 RX ADMIN — MIDAZOLAM HYDROCHLORIDE 2 MG: 2 INJECTION, SOLUTION INTRAMUSCULAR; INTRAVENOUS at 09:20

## 2018-09-24 RX ADMIN — SODIUM CHLORIDE: 9 INJECTION, SOLUTION INTRAVENOUS at 10:17

## 2018-09-24 RX ADMIN — SODIUM CHLORIDE 5 ML: 9 INJECTION, SOLUTION INTRAMUSCULAR; INTRAVENOUS; SUBCUTANEOUS at 09:25

## 2018-09-24 RX ADMIN — FENTANYL CITRATE 100 MCG: 50 INJECTION INTRAMUSCULAR; INTRAVENOUS at 09:17

## 2018-09-24 RX ADMIN — LIDOCAINE HYDROCHLORIDE 30 ML: 20 SOLUTION ORAL; TOPICAL at 09:05

## 2018-09-24 RX ADMIN — TOPICAL ANESTHETIC 0.5 ML: 200 SPRAY DENTAL; PERIODONTAL at 09:05

## 2018-09-24 RX ADMIN — PROPOFOL 60 MG: 10 INJECTION, EMULSION INTRAVENOUS at 10:19

## 2018-09-24 RX ADMIN — PHENYLEPHRINE HYDROCHLORIDE 100 MCG: 10 INJECTION, SOLUTION INTRAMUSCULAR; INTRAVENOUS; SUBCUTANEOUS at 10:19

## 2018-09-24 NOTE — ANESTHESIA CARE TRANSFER NOTE
Patient: Tee Hilton    Procedure(s):  Cardioversion     Diagnosis: Atrial Fibrillation   Diagnosis Additional Information: No value filed.    Anesthesia Type:   No value filed.     Note:  Airway :Nasal Cannula  Patient transferred to:Phase II  Comments: Pt denies pain or nausea. Report given to echo RN. Handoff Report: Identifed the Patient, Identified the Reponsible Provider, Reviewed the pertinent medical history, Discussed the surgical course, Reviewed Intra-OP anesthesia mangement and issues during anesthesia, Set expectations for post-procedure period and Allowed opportunity for questions and acknowledgement of understanding      Vitals: (Last set prior to Anesthesia Care Transfer)    CRNA VITALS  9/24/2018 1010 - 9/24/2018 1040      9/24/2018             NIBP: 121/79    Pulse: 60    SpO2: 100 %    Resp Rate (observed): 15    EKG: NSR;Sinus bradycardia                Electronically Signed By: ANDRE Durán CRNA  September 24, 2018  10:40 AM

## 2018-09-24 NOTE — OP NOTE
CARDIOVERSION    PROCEDURE:  direct current cardioversion  PROCEDURE DATE: 9/24/2018     Pre-procedure diagnosis:  At fib  Post-procedure diagnosis: same  Complications:  none    BRIEF CLINICAL HISTORY: \  Patient here for DCCV due to A Fib. This is first DCCV for him.     PROCEDURE:  The patient arrived at the Echo Laboratory in a fasting, non-sedated state.  Informed consent was previously obtained from patient, who understood indications, risks, and benefits of the procedure.    With help from Anesthesia, patient was deeply sedated.  150J of synchronized biphasic shock was delivered through the cardiac monitor, and the patient was successfully converted to normal sinus rhythm.  After sedation wore off, patient awoke and remained neurologically intact.  The patient tolerated the procedure well from a hemodynamic and respiratory standpoint without any complications.  He was observed in the Echo Laboratory and then discharged to home after sedation wore off and he was ambulatory.    After procedure I reviewed outcome with spouse on 2A.    IMPRESSION:  1.  Successful direct current cardioversion with 150J biphasic shock.    RECOMMENDATIONS/PLANS:  1.   Follow-up with Dr Lake  2.   Continue anticoagulation    I appreciated the opportunity to see and assess Mr Hilton and direct the procedure described above with EP Fellow. The above note summarizes my findings and current recommendations. Please do not hesitate to contact me if you have any questions or concerns.    Juvencio Garcia MD [facility]   Pager [phone]  Office [phone]

## 2018-09-24 NOTE — PROCEDURES
" SLEEP STUDY INTERPRETATION  SPLIT NIGHT STUDY      Patient: ELDA HERNÁNDEZ  YOB: 1950  Study Date: 9/22/2018  MRN: [id number]  Referring Provider: Jon Denson PA-C  Ordering Provider: Dr. Rivers    Indications for Polysomnography: The patient is a 67 y old Male who is 5' 10\" and weighs 241.0 lbs. His BMI is 34.1, Merrill sleepiness scale 5.0 and neck circumference is 40.0 cm. A diagnostic polysomnogram was performed to evaluate for snoring, snort arousals, obesity with excessive daytime sleepiness. Comorbid hypertension, heart failure and atrial fibrillation. After 125.5 minutes of sleep time the patient exhibited sufficient respiratory events qualifying him for a CPAP trial which was then initiated.    Polysomnogram Data: A full night polysomnogram recorded the standard physiologic parameters including EEG, EOG, EMG, ECG, nasal and oral airflow. Respiratory parameters of chest and abdominal movements were recorded with respiratory inductance plethysmography. Oxygen saturation was recorded by pulse oximetry.  Hypopnea scoring rule used: 1B 4%    Diagnostic PSG  Sleep Architecture:   The total recording time of polysomnogram was 230.9 minutes. The total sleep time was 125.5 minutes. Sleep latency was increased at 34.9 minutes with use of a sleep aid (zolpidem). REM latency was 113.5 minutes. Arousal index was increased at 54.0 arousals per hour. Sleep efficiency was decreased at 54.3%. Wake after sleep onset was 58.0 minutes. The patient spent 42.2% of sleep time in Stage N1, 47.0% in Stage N2, 0.0% in Stage N3, and 10.8% in REM. Time in REM supine was 0 minutes.    Respiration:     Events ? The polysomnogram revealed a presence of 41 obstructive, 14 central, and 38 mixed apneas resulting in an apnea index of 44.5 events per hour. There were 7 obstructive hypopneas and 0 central hypopneas resulting in an obstructive hypopnea index of 3.3 and central hypopnea index of 0 events per hour. The " combined apnea/hypopnea index was 47.8 events per hour (central apnea/hypopnea index was 6.7 events per hour).  The REM AHI was n/a. The supine AHI was 69.0 events per hour. The RERA index was 6.2 events per hour. The RDI was 54.0 events per hour.    Snoring - was reported as loud and intermittent.    Respiratory rate and pattern - was notable for improvement of events during REM sleep,  periods where periodic breathing is suspected and circulation times that are prolonged. Most of the events however appear to be terminated by a gasp arousal.      Sustained Sleep Associated Hypoventilation - Transcutaneous carbon dioxide monitoring was not used, however significant hypoventilation was not suggested by oximetry    Sleep Associated Hypoxemia - (Greater than 5 minutes O2 sat at or below 88%) was not present. Baseline oxygen saturation was 97.0%. Lowest oxygen saturation was 83.9%. Time spent less than or equal to 88% was 0.4 minutes. Time spent less than or equal to 89% was 0.9 minutes.     Treatment PSG  Sleep Architecture:   At 12:45:32 AM the patient was placed on PAP treatment and was titrated at pressures ranging from CPAP 5 cmH2O up to BiLevel 9/5/0 cmH2O. The total recording time of the treatment portion of the study was 292.0 minutes. The total sleep time was 52.0 minutes. During the treatment portion of the study the sleep latency was 77.5 minutes. REM latency was 142.5 minutes. Arousal index was 36.9 arousals per hour. Sleep efficiency was poor at 17.8%. Wake after sleep onset was 162.0 minutes. The patient spent 69.2% of total sleep time in Stage N1, 18.3% in Stage N2, 0.0% in Stage N3, and 12.5% in REM. Time in REM supine was 6.5 minutes.     Respiration:     No optimal pressure was determined due to poor sleep and poor tolerance of low-level PAP and bilevel PAP.    Movement Activity:   Periodic Limb Movements  o During the diagnostic portion of the study, there were 20 PLMs recorded. The PLM index was 9.6  movements per hour. The PLM Arousal Index was 1.0 per hour.  o During the treatment portion of the study, there were 0 PLMs recorded.     REM EMG Activity - Excessive transient/sustained muscle activity was not present.    Nocturnal Behavior - Abnormal sleep related behaviors were not noted     Bruxism - None apparent.    Cardiac Summary:  Principle rhythm was atrial fibrillation  During the diagnostic portion of the study, the average pulse rate was 75.1 bpm. The minimum pulse rate was 43.5 bpm while the maximum pulse rate was 105.4 bpm.    During the treatment portion of the study, the average pulse rate was 74.5 bpm. The minimum pulse rate was 41.9 bpm while the maximum pulse rate was 170.0 bpm.       Assessment:     Severe sleep disordered breathing, many findings suggested of periodic breathing though given presence of obstruction and poorly consolidated sleep this study was not definitive    Recommendations:    Recommend polysomnogram with all-night titration of PAP, possible ASV.     Could consider a trial of treatment of AUGUSTO with Auto?titrating PAP therapy with a range of 5 cmH2O to 18 cmH2O for acclimatization.  Recommend clinical follow up with sleep management team, including review of compliance measures.    Patient may be a candidate for dental appliance through referral to Sleep Dentistry for the treatment of obstructive sleep apnea and/or socially disruptive snoring.    If devices are not acceptable or effective, patient may benefit from evaluation of possible surgical options for the treatment of obstructive sleep apnea and/or socially disruptive snoring. If he is interested, would recommend referral to specialized ENT?Sleep provider.    Advice regarding the risks of drowsy driving.    Suggest optimizing sleep schedule and avoiding sleep deprivation.    Weight management (if BMI > 30).    Pharmacologic therapy should be used for management of restless legs syndrome only if present and clinically  indicated and not based on the presence of periodic limb movements alone.    Diagnostic Codes:   Obstructive Sleep Apnea G47.33        _____________________________________   Electronically Signed By: Marlo Ovalle MD 9/24/18         Range(%) Time in range (min)   0.0 - 88.0 0.4   0.0 - 89.0 0.9       Stage Min(mm Hg) Max(mm Hg)   Wake - -   NREM(1+2+3) - -   REM - -         Range(mmHg) Time in range (min)   55.0 - 100.0 -   Excluded data <20.0 & >65.0 231.0

## 2018-09-24 NOTE — PROGRESS NOTES
Pt arrived in ECHO department  for scheduled ELYSIA.   Procedure explained, questions answered and consent signed. Discharge instructions discussed with patient.  Pt's throat sprayed at 0905, therefore pt will not be able to eat or drink until 2 hours after at 1105.  Informed pt of this time and encouraged to start with warm fluids and soft foods.    Pt tolerated procedure well, and was given a total of 100 mcg IV fentanyl and 2 mg IV versed for conscious sedation.  Pt denied throat or chest pain after ELYSIA complete.   ELYSIA probe 56used for procedure.  No clots visualized on ELYSIA so proceeded with DCCV.  Anesthesia gave pt propofol for sedation and pt was DCCV at 150Joules to a SR.  Pt denied C.P or throat pain after procedure and was transferred back to 2A after awake and VSS  Report given to 2A RN.

## 2018-09-24 NOTE — PROGRESS NOTES
Recommend waiting for face to face visit to formulate a treatment plan.     Was already cardioverted today.

## 2018-09-24 NOTE — ANESTHESIA PREPROCEDURE EVALUATION
Anesthesia Plan      History & Physical Review  History and physical reviewed and following examination; no interval change.    ASA Status:  3 .    NPO Status:  > 8 hours    Plan for MAC with Intravenous induction. Maintenance will be Balanced.  Reason for MAC:  Chronic cardiopulmonary disease (G9)         Postoperative Care      Consents           Allergies   Allergen Reactions     Erythromycin Unknown     Upset stomach     Recent Results (from the past 4320 hour(s))   Echo complete with definity    Narrative    [id number]  [id number]  [id number]  [id number]^CHAYITO^K^P           [facility], Echocardiography Laboratory  [address] [place] [address]        Name: ELDA HERNÁNDEZ  MRN: [id number]  : 1950  Study Date: 2018 02:58 PM  Age: 67 yrs  Gender: Male  Patient Location: [facility]  Reason For Study: Persistent atrial fibrillation (H)  Ordering Physician: CLAY STRATTON  Referring Physician: CLAY STRATTON  Performed By: Bibiana Estrada RDCS     BSA: 2.3 m2  Height: 70 in  Weight: 242 lb  HR: 113  BP: 154/87 mmHg  _____________________________________________________________________________  __        Procedure  Echocardiogram with two-dimensional, color and spectral Doppler performed.  Contrast Definity. Definity (NDC #15638-700-67) given intravenously. Patient  was given 6ml mixture of 1.5ml Definity and 8.5ml saline. 4 ml wasted. IV  start location RAC .  _____________________________________________________________________________  __        Interpretation Summary     Patient in atrial fibrillation during the study.  Mildly (EF 40-45%) reduced left ventricular function is present. Mild diffuse  hypokinesis is present.  Right ventricular function, chamber size, wall motion, and thickness are  normal.  The inferior vena cava was normal in size with preserved respiratory  variability.Estimated mean right atrial pressure is 3 mmHg (normal).  No  pericardial effusion.  Mild tricuspid insufficiency is present.  _____________________________________________________________________________  __        Left Ventricle  Left ventricular wall thickness is normal. Left ventricular size is normal.  Diastolic function not assessed due to atrial fibrillation. Mildly (EF 40-45%)  reduced left ventricular function is present. The Ejection Fraction was  calculated using Bi-plane contrast. Mild diffuse hypokinesis is present.     Right Ventricle  Right ventricular function, chamber size, wall motion, and thickness are  normal.     Atria  Moderate biatrial enlargement is present. The atrial septum is intact as  assessed by color Doppler .     Mitral Valve  Mild mitral annular calcification is present. Mild mitral insufficiency is  present.        Aortic Valve  Mild aortic valve sclerosis is present. Trace aortic insufficiency is present.     Tricuspid Valve  The tricuspid valve is normal. Mild tricuspid insufficiency is present. Right  ventricular systolic pressure is 28mmHg above the right atrial pressure.  Pulmonary artery systolic pressure is normal.     Pulmonic Valve  Trace to mild pulmonic insufficiency is present.     Vessels  Visualized portions of the aorta are normal in size. The inferior vena cava  was normal in size with preserved respiratory variability. The pulmonary  artery cannot be assessed. Estimated mean right atrial pressure is 3 mmHg  (normal).     Pericardium  No pericardial effusion is present.        Compared to Previous Study  Previous study not available for comparison.     Attestation  I have personally viewed the imaging and agree with the interpretation and  report as documented by the fellow, Elizabeth Coker, and/or edited by me.  _____________________________________________________________________________  __     MMode/2D Measurements & Calculations  IVSd: 0.89 cm  LVIDd: 5.5 cm  LVIDs: 4.7 cm  LVPWd: 1.0 cm  FS: 14.5 %  LV mass(C)d: 201.2  grams  LV mass(C)dI: 88.9 grams/m2  Ao root diam: 3.5 cm  LA dimension: 5.0 cm  asc Aorta Diam: 3.3 cm  LA/Ao: 1.4  LVOT diam: 2.5 cm  LVOT area: 4.8 cm2  LA Volume (BP): 113.0 ml     LA Volume Index (BP): 50.0 ml/m2  RWT: 0.37        Doppler Measurements & Calculations  MV E max lobito: 109.1 cm/sec  MV dec time: 0.12 sec  Ao V2 max: 138.0 cm/sec  Ao max P.0 mmHg  ANDRES(V,D): 2.9 cm2  LV V1 max P.8 mmHg  LV V1 max: 82.7 cm/sec  LV V1 VTI: 14.5 cm  MR ERO: 0.17 cm2  MR volume: 24.3 ml  CO(LVOT): 6.9 l/min  CI(LVOT): 3.0 l/min/m2  SV(LVOT): 69.7 ml  SI(LVOT): 30.8 ml/m2  PA V2 max: 100.4 cm/sec  PA max P.0 mmHg  PI end-d lobito: 201.0 cm/sec  PI max lobito: 271.5 cm/sec  PI max P.5 mmHg  TR max lobito: 265.3 cm/sec  TR max P.2 mmHg  Pulm Sys Lobito: 21.7 cm/sec  Pulm Urbina Lobito: 87.9 cm/sec  Pulm S/D: 0.25  AV Lobito Ratio (DI): 0.60  E/E' av.9     Lateral E/e': 8.3  Medial E/e': 15.5           _____________________________________________________________________________  __           Report approved by: Lee Barraza 2018 05:38 PM        PAST MEDICAL HISTORY:   Past Medical History:   Diagnosis Date     Gastroesophageal reflux disease without esophagitis        PAST SURGICAL HISTORY:   Past Surgical History:   Procedure Laterality Date     COLONOSCOPY N/A 2017    Procedure: COMBINED COLONOSCOPY, SINGLE OR MULTIPLE BIOPSY/POLYPECTOMY BY BIOPSY;;  Surgeon: Aquilino Garcia MD;  Location: [facility] OR     COLONOSCOPY WITH CO2 INSUFFLATION N/A 2017    Procedure: COLONOSCOPY WITH CO2 INSUFFLATION;  COLONOSCOPY SCREEN/ ORDAZ;  Surgeon: Aquilino Garcia MD;  Location: [facility] OR     OPEN REDUCTION INTERNAL FIXATION TIBIA       TONSILLECTOMY         FAMILY HISTORY:   Family History   Problem Relation Age of Onset     Gout Father        SOCIAL HISTORY:   Social History   Substance Use Topics     Smoking status: Never Smoker     Smokeless tobacco: Never Used      Comment: never smoked      Alcohol use No      Comment: social use     Lab Results   Component Value Date    HGB 9.5 (L) 06/05/2007    CHOL 220 (H) 01/18/2018    TRIG 68 01/18/2018    HDL 77 01/18/2018     09/24/2018    BUN 20 09/24/2018    CO2 26 09/24/2018    TSH 2.41 08/23/2018    PSA 3.42 01/18/2018    INR 1.02 06/04/2007     Prescriptions Prior to Admission   Medication Sig Dispense Refill Last Dose     diazepam (VALIUM) 5 MG tablet Take 1-2 tabs 1 hour prior to his mri 2 tablet 0 Past Week at Unknown time     Famotidine (PEPCID PO) Take 10 mg by mouth   9/23/2018 at Unknown time     lisinopril (PRINIVIL/ZESTRIL) 20 MG tablet Take 2 tablets (40 mg) by mouth daily 180 tablet 1 9/24/2018 at 0600     metoprolol succinate (TOPROL-XL) 25 MG 24 hr tablet Take 1 tablet (25 mg) by mouth daily 90 tablet 1 9/24/2018 at 0600     omeprazole (PRILOSEC) 20 MG CR capsule Take 20 mg by mouth   9/23/2018 at Unknown time     rivaroxaban ANTICOAGULANT (XARELTO) 20 MG TABS tablet Take 1 tablet (20 mg) by mouth daily (with dinner) 30 tablet 3 9/23/2018 at 1700     sildenafil (REVATIO) 20 MG tablet Take 1 -5 tabs daily prn 30 tablet 11 Unknown at Unknown time

## 2018-09-24 NOTE — ANESTHESIA POSTPROCEDURE EVALUATION
Patient: Tee Hilton    Procedure(s):  Cardioversion     Diagnosis:Atrial Fibrillation   Diagnosis Additional Information: No value filed.    Anesthesia Type:  No value filed.    Note:  Anesthesia Post Evaluation    Patient location during evaluation: Echo Lab  Patient participation: Able to fully participate in evaluation  Level of consciousness: awake and alert  Pain management: adequate  Airway patency: patent  Cardiovascular status: acceptable and hemodynamically stable  Respiratory status: acceptable  Hydration status: acceptable  PONV: none     Anesthetic complications: None          Last vitals:  Vitals:    09/24/18 0740   BP: (!) 147/100   Pulse: 76   Temp: 36.7  C (98  F)   SpO2: 100%         Electronically Signed By: Israel Cruz MD  September 24, 2018  10:29 AM

## 2018-09-25 ENCOUNTER — TELEPHONE (OUTPATIENT)
Dept: CARDIOLOGY | Facility: CLINIC | Age: 68
End: 2018-09-25

## 2018-09-25 LAB
INTERPRETATION ECG - MUSE: NORMAL
SLPCOMP: NORMAL

## 2018-09-25 NOTE — TELEPHONE ENCOUNTER
"Patient had  Cardioversion done yesterday.Called clinic, left voice mail at 2:49 PM. Patient reported that he had an episode of AFIB last night his  heart rate was between 79 to 95 and  blood pressure 166/100. When he woke up this morning the blood pressure went down to 122/75, and he is having a little bit of chest pain that comes and goes.     Patient denied feeling any episode after last night. Patient reported talking  to Dr Lake over the phone,and Dr Lake's  office will contact him to set-up an appointment on 10.01 at 2: 00 pm.Patient denied any other symptoms like dizziness,fainting spells. Patient is schedule for an EKG at the clinic tomorrow.    Writer advised patient to go to the ED patient stated:\" I think I should be fine waiting for tomorrow, if I am feeling worse I will go.''      Writer advised patient to call 911 or go to ED for a full evaluation if symptoms are becoming severe and disabling during an episode of AFIB.    Anita Tate RN        "

## 2018-09-26 ENCOUNTER — ALLIED HEALTH/NURSE VISIT (OUTPATIENT)
Dept: CARDIOLOGY | Facility: CLINIC | Age: 68
End: 2018-09-26
Payer: COMMERCIAL

## 2018-09-26 DIAGNOSIS — I48.19 PERSISTENT ATRIAL FIBRILLATION (H): Primary | ICD-10-CM

## 2018-09-26 LAB — INTERPRETATION ECG - MUSE: NORMAL

## 2018-09-26 NOTE — MR AVS SNAPSHOT
After Visit Summary   9/26/2018    Tee Hilton    MRN: [id number]           Patient Information     Date Of Birth          1950        Visit Information        Provider Department      9/26/2018 9:00 AM FK ANCILLARY CARDS [facility] HEART AT [facility]        Today's Diagnoses     Persistent atrial fibrillation (H)    -  1       Follow-ups after your visit        Your next 10 appointments already scheduled     Oct 05, 2018  7:00 AM CDT   Return Sleep Patient with LINDA Calloway   [facility] ([facility])    [address] [place] [address]   [phone]            Oct 08, 2018  3:30 PM CDT   New Visit with Ariane Skinner MD   [facility] ([facility])    [address] [place] [address]   [phone]            Oct 23, 2018  8:45 AM CDT   Return Visit with ANDRE Patel CNP   [facility] ([facility])    [address] [place] [address]   [phone]            Dec 06, 2018 11:00 AM CST   Return Visit with CLAY Erwin MD   [facility] ([facility])    [address] [place] [address]   [phone]              Who to contact     If you have questions or need follow up information about today's clinic visit or your schedule please contact [facility] directly at [phone].  Normal or non-critical lab and imaging results will be communicated to you by MyChart, letter or phone within 4 business days after the clinic has received the results. If you do not hear from us within 7 days, please contact the clinic through MyChart or phone. If you have a critical or abnormal lab result, we will notify  you by phone as soon as possible.  Submit refill requests through [employer]Mx or call your pharmacy and they will forward the refill request to us. Please allow 3 business days for your refill to be completed.          Additional Information About Your Visit        Mitokynehart Information     [employer] gives you secure access to your electronic health record. If you see a primary care provider, you can also send messages to your care team and make appointments. If you have questions, please call your primary care clinic.  If you do not have a primary care provider, please call [phone] and they will assist you.        Care EveryWhere ID     This is your Care EveryWhere ID. This could be used by other organizations to access your [place] medical records  [id number]         Blood Pressure from Last 3 Encounters:   09/24/18 122/86   09/24/18 110/78   09/24/18 120/76    Weight from Last 3 Encounters:   09/20/18 109.3 kg (241 lb)   09/20/18 108.9 kg (240 lb)   09/18/18 109.3 kg (241 lb)              Today, you had the following     No orders found for display       Primary Care Provider Office Phone # Fax #    Jon Denson PA-C [phone]       [address] [place] [facility] [address]        Equal Access to Services     KATARZYNA BLACK : Hadii aad ku hadasho Soomaali, waaxda luqadaha, qaybta kaalmada adeegyada, waxay idiin haycolinn laci overton . So [facility] [phone].    ATENCIÓN: Si habla español, tiene a campoverde disposición servicios gratuitos de asistencia lingüística. Llcarlos al [phone].    We comply with applicable federal civil rights laws and [place] laws. We do not discriminate on the basis of race, color, national origin, age, disability, sex, sexual orientation, or gender identity.            Thank you!     Thank you for choosing [facility] PHYSICIANS HEART AT [facility]  for your care. Our goal is always to provide you with excellent care. Hearing back from our patients is  one way we can continue to improve our services. Please take a few minutes to complete the written survey that you may receive in the mail after your visit with us. Thank you!             Your Updated Medication List - Protect others around you: Learn how to safely use, store and throw away your medicines at www.disposemymeds.org.          This list is accurate as of 9/26/18 10:52 AM.  Always use your most recent med list.                   Brand Name Dispense Instructions for use Diagnosis    diazepam 5 MG tablet    VALIUM    2 tablet    Take 1-2 tabs 1 hour prior to his mri    Claustrophobia       lisinopril 20 MG tablet    PRINIVIL/ZESTRIL    180 tablet    Take 2 tablets (40 mg) by mouth daily    Benign essential hypertension       metoprolol succinate 25 MG 24 hr tablet    TOPROL-XL    90 tablet    Take 1 tablet (25 mg) by mouth daily    Chronic atrial fibrillation (H)       omeprazole 20 MG CR capsule    priLOSEC     Take 20 mg by mouth        PEPCID PO      Take 10 mg by mouth        rivaroxaban ANTICOAGULANT 20 MG Tabs tablet    XARELTO    30 tablet    Take 1 tablet (20 mg) by mouth daily (with dinner)    Chronic atrial fibrillation (H)       sildenafil 20 MG tablet    REVATIO    30 tablet    Take 1 -5 tabs daily prn    Other male erectile dysfunction

## 2018-09-26 NOTE — NURSING NOTE
"Patient came into the clinic for an EKG to determine if he is in afib or not.  EKG is interpreted as normal sinus rhythm.  Patient states that Tuesday in the morning 1:00 am he felt \"rotten\" and \"my blood pressure was high\".  Then Tuesday he continued to not feel well and felt he was in afib.  By Tuesday evening he felt better because he increased his water intake.  He feels he felt \"rotten\" because he was dehydrated.  Today he feels fine.  Patient wondering if he should follow up with Dr. Skinner for an ablation.      Will discuss situation with Dr. Lake in clinic tomorrow-possibly cancel consultation with Dr. Skinner.    Samia Ryan, RN  Care Coordinator  [facility]  [phone]      "

## 2018-09-27 ENCOUNTER — ALLIED HEALTH/NURSE VISIT (OUTPATIENT)
Dept: CARDIOLOGY | Facility: CLINIC | Age: 68
End: 2018-09-27
Payer: COMMERCIAL

## 2018-09-27 DIAGNOSIS — I48.91 ATRIAL FIBRILLATION (H): Primary | ICD-10-CM

## 2018-09-27 PROCEDURE — 93000 ELECTROCARDIOGRAM COMPLETE: CPT | Performed by: INTERNAL MEDICINE

## 2018-09-27 NOTE — MR AVS SNAPSHOT
After Visit Summary   9/27/2018    Tee Hilton    MRN: [id number]           Patient Information     Date Of Birth          1950        Visit Information        Provider Department      9/27/2018 1:30 PM FK ANCILLARY CARDS [facility] HEART AT [facility]        Today's Diagnoses     Atrial fibrillation (H)    -  1       Follow-ups after your visit        Your next 10 appointments already scheduled     Oct 05, 2018  7:00 AM CDT   Return Sleep Patient with LINDA Calloway   [facility]    [address] [address] [address]   [phone]            Dec 06, 2018 11:00 AM CST   Return Visit with CLAY Erwin MD   [facility] ([facility])    [address] [place] [address] 2nd Floor  [facility] [id number] [phone]              Who to contact     If you have questions or need follow up information about today's clinic visit or your schedule please contact [facility] directly at [phone].  Normal or non-critical lab and imaging results will be communicated to you by MusicNowhart, letter or phone within 4 business days after the clinic has received the results. If you do not hear from us within 7 days, please contact the clinic through MusicNowhart or phone. If you have a critical or abnormal lab result, we will notify you by phone as soon as possible.  Submit refill requests through [employer] or call your pharmacy and they will forward the refill request to us. Please allow 3 business days for your refill to be completed.          Additional Information About Your Visit        MyChart Information     [employer] gives you secure access to your electronic health record. If you see a primary care provider, you can also send messages to your care team and make appointments. If you have questions,  please call your primary care clinic.  If you do not have a primary care provider, please call [phone] and they will assist you.        Care EveryWhere ID     This is your Care EveryWhere ID. This could be used by other organizations to access your [place] medical records  [id number]         Blood Pressure from Last 3 Encounters:   09/24/18 122/86   09/24/18 110/78   09/24/18 120/76    Weight from Last 3 Encounters:   09/20/18 109.3 kg (241 lb)   09/20/18 108.9 kg (240 lb)   09/18/18 109.3 kg (241 lb)              We Performed the Following     EKG 12-lead complete w/read - Clinics        Primary Care Provider Office Phone # Fax #    Jon Denson PA-C [phone]       [address] [place] [address]        Equal Access to Services     [facility]: Hadii aad ku hadasho Soomaali, waaxda luqadaha, qaybta kaalmada adeegyada, waxay idiin hayaan adeeg kharash laclair . So [facility] [phone].    ATENCIÓN: Si habla español, tiene a campoverde disposición servicios gratuitos de asistencia lingüística. Isha al [phone].    We comply with applicable federal civil rights laws and [place] laws. We do not discriminate on the basis of race, color, national origin, age, disability, sex, sexual orientation, or gender identity.            Thank you!     Thank you for choosing [facility] PHYSICIANS HEART AT [facility]  for your care. Our goal is always to provide you with excellent care. Hearing back from our patients is one way we can continue to improve our services. Please take a few minutes to complete the written survey that you may receive in the mail after your visit with us. Thank you!             Your Updated Medication List - Protect others around you: Learn how to safely use, store and throw away your medicines at www.disposemymeds.org.          This list is accurate as of 9/27/18  1:41 PM.  Always use your most recent med list.                   Brand Name Dispense  Instructions for use Diagnosis    diazepam 5 MG tablet    VALIUM    2 tablet    Take 1-2 tabs 1 hour prior to his mri    Claustrophobia       lisinopril 20 MG tablet    PRINIVIL/ZESTRIL    180 tablet    Take 2 tablets (40 mg) by mouth daily    Benign essential hypertension       metoprolol succinate 25 MG 24 hr tablet    TOPROL-XL    90 tablet    Take 1 tablet (25 mg) by mouth daily    Chronic atrial fibrillation (H)       omeprazole 20 MG CR capsule    priLOSEC     Take 20 mg by mouth        PEPCID PO      Take 10 mg by mouth        rivaroxaban ANTICOAGULANT 20 MG Tabs tablet    XARELTO    30 tablet    Take 1 tablet (20 mg) by mouth daily (with dinner)    Chronic atrial fibrillation (H)       sildenafil 20 MG tablet    REVATIO    30 tablet    Take 1 -5 tabs daily prn    Other male erectile dysfunction

## 2018-09-28 ENCOUNTER — TELEPHONE (OUTPATIENT)
Dept: CARDIOLOGY | Facility: CLINIC | Age: 68
End: 2018-09-28

## 2018-09-28 NOTE — TELEPHONE ENCOUNTER
Per Dr Lake's verbal  order patient upcoming appointment with Dr Stephenson is cancelled as well as the one in October 26.    Patient is aware of cancellation. Patient was instructed to return in clinic to follow-up with Dr Lake on December 6 in [place] with and ECHO prior, scheduled in November 26.     Patient given instructions regarding ECHO. Patient verbalized understanding and in agreement with plan.      Anita Tate RN

## 2018-10-04 PROBLEM — I50.22 CHRONIC SYSTOLIC CONGESTIVE HEART FAILURE (H): Chronic | Status: ACTIVE | Noted: 2018-09-18

## 2018-10-05 ENCOUNTER — OFFICE VISIT (OUTPATIENT)
Dept: SLEEP MEDICINE | Facility: CLINIC | Age: 68
End: 2018-10-05
Payer: COMMERCIAL

## 2018-10-05 ENCOUNTER — TELEPHONE (OUTPATIENT)
Dept: SLEEP MEDICINE | Facility: CLINIC | Age: 68
End: 2018-10-05

## 2018-10-05 VITALS
SYSTOLIC BLOOD PRESSURE: 139 MMHG | HEART RATE: 98 BPM | HEIGHT: 71 IN | OXYGEN SATURATION: 100 % | WEIGHT: 234 LBS | DIASTOLIC BLOOD PRESSURE: 82 MMHG | BODY MASS INDEX: 32.76 KG/M2

## 2018-10-05 DIAGNOSIS — G47.39 OTHER SLEEP APNEA: ICD-10-CM

## 2018-10-05 DIAGNOSIS — G47.33 OSA (OBSTRUCTIVE SLEEP APNEA): Primary | ICD-10-CM

## 2018-10-05 PROCEDURE — 99214 OFFICE O/P EST MOD 30 MIN: CPT | Performed by: PHYSICIAN ASSISTANT

## 2018-10-05 RX ORDER — ZOLPIDEM TARTRATE 5 MG/1
5 TABLET ORAL
Qty: 30 TABLET | Refills: 0 | Status: SHIPPED | OUTPATIENT
Start: 2018-10-05 | End: 2018-11-29

## 2018-10-05 NOTE — PROGRESS NOTES
Sleep Study Follow-Up Visit:    Date on this visit: 10/5/2018    Tee Hilton comes in today for follow-up of his sleep study done on 9/22/2018 at the [facility] for A diagnostic polysomnogram was performed to evaluate for snoring, snort arousals, obesity with excessive daytime sleepiness. Comorbid hypertension, heart failure and atrial fibrillation. Sleep study interpreted by Dr. Ovalle.     Diagnostic PSG  Sleep Architecture:   The total recording time of polysomnogram was 230.9 minutes. The total sleep time was 125.5 minutes. Sleep latency was increased at 34.9 minutes with use of a sleep aid (zolpidem). REM latency was 113.5 minutes. Arousal index was increased at 54.0 arousals per hour. Sleep efficiency was decreased at 54.3%. Wake after sleep onset was 58.0 minutes. The patient spent 42.2% of sleep time in Stage N1, 47.0% in Stage N2, 0.0% in Stage N3, and 10.8% in REM. Time in REM supine was 0 minutes.     Respiration:     Events ? The polysomnogram revealed a presence of 41 obstructive, 14 central, and 38 mixed apneas resulting in an apnea index of 44.5 events per hour. There were 7 obstructive hypopneas and 0 central hypopneas resulting in an obstructive hypopnea index of 3.3 and central hypopnea index of 0 events per hour. The combined apnea/hypopnea index was 47.8 events per hour (central apnea/hypopnea index was 6.7 events per hour).  The REM AHI was n/a. The supine AHI was 69.0 events per hour. The RERA index was 6.2 events per hour. The RDI was 54.0 events per hour.    Snoring - was reported as loud and intermittent.    Respiratory rate and pattern - was notable for improvement of events during REM sleep,  periods where periodic breathing is suspected and circulation times that are prolonged. Most of the events however appear to be terminated by a gasp arousal.      Sustained Sleep Associated Hypoventilation - Transcutaneous carbon dioxide monitoring was not used, however  significant hypoventilation was not suggested by oximetry    Sleep Associated Hypoxemia - (Greater than 5 minutes O2 sat at or below 88%) was not present. Baseline oxygen saturation was 97.0%. Lowest oxygen saturation was 83.9%. Time spent less than or equal to 88% was 0.4 minutes. Time spent less than or equal to 89% was 0.9 minutes.      Treatment PSG  Sleep Architecture:   At 12:45:32 AM the patient was placed on PAP treatment and was titrated at pressures ranging from CPAP 5 cmH2O up to BiLevel 9/5/0 cmH2O. The total recording time of the treatment portion of the study was 292.0 minutes. The total sleep time was 52.0 minutes. During the treatment portion of the study the sleep latency was 77.5 minutes. REM latency was 142.5 minutes. Arousal index was 36.9 arousals per hour. Sleep efficiency was poor at 17.8%. Wake after sleep onset was 162.0 minutes. The patient spent 69.2% of total sleep time in Stage N1, 18.3% in Stage N2, 0.0% in Stage N3, and 12.5% in REM. Time in REM supine was 6.5 minutes.      Respiration:     No optimal pressure was determined due to poor sleep and poor tolerance of low-level PAP and bilevel PAP.     Movement Activity:   Periodic Limb Movements    During the diagnostic portion of the study, there were 20 PLMs recorded. The PLM index was 9.6 movements per hour. The PLM Arousal Index was 1.0 per hour.    During the treatment portion of the study, there were 0 PLMs recorded.     REM EMG Activity - Excessive transient/sustained muscle activity was not present.    Nocturnal Behavior - Abnormal sleep related behaviors were not noted     Bruxism - None apparent.     Cardiac Summary:  Principle rhythm was atrial fibrillation  During the diagnostic portion of the study, the average pulse rate was 75.1 bpm. The minimum pulse rate was 43.5 bpm while the maximum pulse rate was 105.4 bpm.     During the treatment portion of the study, the average pulse rate was 74.5 bpm. The minimum pulse rate was  41.9 bpm while the maximum pulse rate was 170.0 bpm.        Past medical/surgical history, family history, social history, medications and allergies were reviewed.      Problem List:  Patient Active Problem List    Diagnosis Date Noted     Other sleep apnea 09/24/2018     Priority: Medium     Study Date: 9/22/2018- (241.0 lbs) Polysomnogram revealed 41 obstructive, 14 central, and 38 mixed apneas resulting in an apnea index of 44.5 events per hour. There were 7 obstructive hypopneas and 0 central hypopneas resulting in an obstructive hypopnea index of 3.3 and central hypopnea index of 0 events per hour. The combined apnea/hypopnea index was 47.8 events per hour (central apnea/hypopnea index was 6.7 events per hour).  REM AHI was 0. The supine AHI was 69.0 events per hour.  RDI was 54.0 events per hour. Respiratory rate and pattern was notable for improvement of events during REM sleep,  periods where periodic breathing is suspected and circulation times that are prolonged. Most of the events however appear to be terminated by a gasp arousal.  Lowest oxygen saturation was 83.9%. Time spent less than or equal to 88% was 0.4 minutes. Time spent less than or equal to 89% was 0.9 minutes. No optimal pressure was determined due to poor sleep and poor tolerance of low-level PAP and bilevel PAP. Severe sleep disordered breathing, many findings suggested of periodic breathing though given presence of obstruction and poorly consolidated sleep this study was not definitive       Gastroesophageal reflux disease without esophagitis      Priority: Medium     Persistent atrial fibrillation (H) 09/18/2018     Priority: Medium     Non morbid obesity due to excess calories 09/18/2018     Priority: Medium     Chronic systolic congestive heart failure (H) 09/18/2018     Priority: Medium     Alcohol use 09/18/2018     Priority: Medium     Chronic midline low back pain without sciatica 10/31/2017     Priority: Medium     Essential  "hypertension 11/11/2016     Priority: Medium     Other male erectile dysfunction 11/11/2016     Priority: Medium    /82  Pulse 98  Ht 1.803 m (5' 11\")  Wt 106.1 kg (234 lb)  SpO2 100%  BMI 32.64 kg/m2    Impression/Plan:  1. Severe sleep disordered breathing, many findings suggested of periodic breathing though given presence of obstruction and poorly consolidated sleep this study was not definitive.  2. Principle rhythm was atrial fibrillation. He underwent cardioversion after the sleep test on 9/24/2018.    - Overnight polysomnogram was reviewed in detail today and a copy given to him for his records. Treatment options reviewed including all night PAP titration PSG. Patient is not prepared to undergo a sleep study at this time.  In any case patient is not a good candidate for ASV titration with last ECHO showing EF 40-45%. He is due for repeat ECHO in November.   - Will arrange treatment with auto-CPAP 7-12 cm/H20  - Ambien 5 mg prn while he acclimates to CPAP. Reviewed side effects including sedation, advised not to mix with alcohol and other sedating medications and call with any concerns or questions.   He will follow up with me in about 7 weeks, sooner if questions/concerns.     Twenty-five minutes spent with patient, all of which were spent face-to-face counseling, consulting, coordinating plan of care regarding sleep disordered breathing.      Sera Mcknight PA-C      CC: Jon Denson     "

## 2018-10-05 NOTE — NURSING NOTE
"Chief Complaint   Patient presents with     Study Results       Initial /85  Pulse 98  Ht 1.803 m (5' 11\")  Wt 106.1 kg (234 lb)  SpO2 100%  BMI 32.64 kg/m2 Estimated body mass index is 32.64 kg/(m^2) as calculated from the following:    Height as of this encounter: 1.803 m (5' 11\").    Weight as of this encounter: 106.1 kg (234 lb).    Medication Reconciliation: complete      "

## 2018-10-05 NOTE — MR AVS SNAPSHOT
After Visit Summary   10/5/2018    Tee Hilton    MRN: [id number]           Patient Information     Date Of Birth          1950        Visit Information        Provider Department      10/5/2018 7:00 AM Sera Mcknight PA [facility]        Today's Diagnoses     AUGUSTO (obstructive sleep apnea)    -  1    Other sleep apnea          Care Instructions      Your BMI is Body mass index is 32.64 kg/(m^2).  Weight management is a personal decision.  If you are interested in exploring weight loss strategies, the following discussion covers the approaches that may be successful. Body mass index (BMI) is one way to tell whether you are at a healthy weight, overweight, or obese. It measures your weight in relation to your height.  A BMI of 18.5 to 24.9 is in the healthy range. A person with a BMI of 25 to 29.9 is considered overweight, and someone with a BMI of 30 or greater is considered obese. More than two-thirds of American adults are considered overweight or obese.  Being overweight or obese increases the risk for further weight gain. Excess weight may lead to heart disease and diabetes.  Creating and following plans for healthy eating and physical activity may help you improve your health.  Weight control is part of healthy lifestyle and includes exercise, emotional health, and healthy eating habits. Careful eating habits lifelong are the mainstay of weight control. Though there are significant health benefits from weight loss, long-term weight loss with diet alone may be very difficult to achieve- studies show long-term success with dietary management in less than 10% of people. Attaining a healthy weight may be especially difficult to achieve in those with severe obesity. In some cases, medications, devices and surgical management might be considered.  What can you do?  If you are overweight or obese and are interested in methods for weight loss, you should discuss this with your  provider.     Consider reducing daily calorie intake by 500 calories.     Keep a food journal.     Avoiding skipping meals, consider cutting portions instead.    Diet combined with exercise helps maintain muscle while optimizing fat loss. Strength training is particularly important for building and maintaining muscle mass. Exercise helps reduce stress, increase energy, and improves fitness. Increasing exercise without diet control, however, may not burn enough calories to loose weight.       Start walking three days a week 10-20 minutes at a time    Work towards walking thirty minutes five days a week     Eventually, increase the speed of your walking for 1-2 minutes at time    In addition, we recommend that you review healthy lifestyles and methods for weight loss available through the National Institutes of Health patient information sites:  http://win.niddk.nih.gov/publications/index.htm    And look into health and wellness programs that may be available through your health insurance provider, employer, local community center, or eleuterio club.    Weight management plan: Patient was referred to their PCP to discuss a diet and exercise plan.                  Follow-ups after your visit        Follow-up notes from your care team     Return in about 6 weeks (around 11/16/2018).      Your next 10 appointments already scheduled     Nov 26, 2018  9:00 AM CST   Ech Complete with [id number]   [facility] [place] ([facility])    [address] [place] [id number] [phone]           1.  Please bring or wear a comfortable two-piece outfit. 2.  You may eat, drink and take your normal medicines. 3.  For any questions that cannot be answered, please contact the ordering physician 4.  Please do not wear perfumes or scented lotions on the day of your exam.            Dec 06, 2018 11:00 AM CST   Return Visit with CLAY Erwin MD   [facility] ([facility])    [address] [place] [address] 2nd Floor  [facility] [address] [phone]              Who to contact     If you have questions or need follow up information about today's clinic visit or your schedule please contact [facility] directly at [phone].  Normal or non-critical lab and imaging results will be communicated to you by MyChart, letter or phone within 4 business days after the clinic has received the results. If you do not hear from us within 7 days, please contact the clinic through [employer] or phone. If you have a critical or abnormal lab result, we will notify you by phone as soon as possible.  Submit refill requests through [employer] or call your pharmacy and they will forward the refill request to us. Please allow 3 business days for your refill to be completed.          Additional Information About Your Visit        [employer] Information     [employer] gives you secure access to your electronic health record. If you see a primary care provider, you can also send messages to your care team and make appointments. If you have questions, please call your primary care clinic.  If you do not have a primary care provider, please call [phone] and they will assist you.        Care EveryWhere ID     This is your Care EveryWhere ID. This could be used by other organizations to access your [place] medical records  [id number]        Your Vitals Were     Pulse Height Pulse Oximetry BMI (Body Mass Index)          98 1.803 m (5' 11\") 100% 32.64 kg/m2         Blood Pressure from Last 3 Encounters:   10/05/18 145/85   09/24/18 122/86   09/24/18 110/78    Weight from Last 3 Encounters:   10/05/18 106.1 kg (234 lb)   09/20/18 109.3 kg (241 lb)   09/20/18 108.9 kg (240 lb)              We Performed the Following     Sleep Comprehensive DME          Today's Medication Changes          These changes are accurate as of 10/5/18  7:27 AM.  If you have any " questions, ask your nurse or doctor.               Start taking these medicines.        Dose/Directions    zolpidem 5 MG tablet   Commonly known as:  AMBIEN   Used for:  AUGUSTO (obstructive sleep apnea)   Started by:  Sera Mcknight PA        Dose:  5 mg   Take 1 tablet (5 mg) by mouth nightly as needed for sleep   Quantity:  30 tablet   Refills:  0            Where to get your medicines      Some of these will need a paper prescription and others can be bought over the counter.  Ask your nurse if you have questions.     Bring a paper prescription for each of these medications     zolpidem 5 MG tablet                Primary Care Provider Office Phone # Fax #    Jon Delia Denson PA-C [phone]       [address] [place] [address]        Equal Access to Services     [facility] Hospital[facility]ANSISCA : Hadii janey harrington hadasho Soomaali, waaxda luqadaha, qaybta kaalmada adeegyada, harry overton . So [facility] [place] [phone].    ATENCIÓN: Si habla español, tiene a campoverde disposición servicios gratuitos de asistencia lingüística. [facility] [phone].    We comply with applicable federal civil rights laws and [place] laws. We do not discriminate on the basis of race, color, national origin, age, disability, sex, sexual orientation, or gender identity.            Thank you!     Thank you for choosing [facility]  for your care. Our goal is always to provide you with excellent care. Hearing back from our patients is one way we can continue to improve our services. Please take a few minutes to complete the written survey that you may receive in the mail after your visit with us. Thank you!             Your Updated Medication List - Protect others around you: Learn how to safely use, store and throw away your medicines at www.disposemymeds.org.          This list is accurate as of 10/5/18  7:27 AM.  Always use your most recent med list.                   Brand Name Dispense Instructions for use  Diagnosis    diazepam 5 MG tablet    VALIUM    2 tablet    Take 1-2 tabs 1 hour prior to his mri    Claustrophobia       lisinopril 20 MG tablet    PRINIVIL/ZESTRIL    180 tablet    Take 2 tablets (40 mg) by mouth daily    Benign essential hypertension       metoprolol succinate 25 MG 24 hr tablet    TOPROL-XL    90 tablet    Take 1 tablet (25 mg) by mouth daily    Chronic atrial fibrillation (H)       omeprazole 20 MG CR capsule    priLOSEC     Take 20 mg by mouth        PEPCID PO      Take 10 mg by mouth        rivaroxaban ANTICOAGULANT 20 MG Tabs tablet    XARELTO    30 tablet    Take 1 tablet (20 mg) by mouth daily (with dinner)    Chronic atrial fibrillation (H)       sildenafil 20 MG tablet    REVATIO    30 tablet    Take 1 -5 tabs daily prn    Other male erectile dysfunction       zolpidem 5 MG tablet    AMBIEN    30 tablet    Take 1 tablet (5 mg) by mouth nightly as needed for sleep    AUGUSTO (obstructive sleep apnea)

## 2018-10-05 NOTE — PATIENT INSTRUCTIONS

## 2018-10-05 NOTE — TELEPHONE ENCOUNTER
Called patient to schedule appointment for his CPAP setup. Scheduled appointment for 10/10/18 at 1pm with Farhana at [facility]. Gave address and location information ([address]).

## 2018-10-10 ENCOUNTER — DOCUMENTATION ONLY (OUTPATIENT)
Dept: SLEEP MEDICINE | Facility: CLINIC | Age: 68
End: 2018-10-10
Payer: COMMERCIAL

## 2018-10-10 DIAGNOSIS — G47.39 OTHER SLEEP APNEA: ICD-10-CM

## 2018-10-10 NOTE — PROGRESS NOTES
Patient was offered choice of vendor and chose [facility].  Patient Tee Hilton was set up at [facility] on October 10, 2018. Patient received a Resmed AirSense 10 Auto. Pressures were set at 7-12 cm H2O.   Patient s ramp is 5 cm H2O for Auto and FLEX/EPR is 2.  Patient received a [employer] Mask name: SIMPLUS  Full Face mask Size Medium, heated tubing and heated humidifier.  Patient is enrolled in the STM Program and does not need to meet compliance. Patient has a follow up on 11/29/18 with LINDA Garcia.    Farhana Velez

## 2018-10-15 ENCOUNTER — DOCUMENTATION ONLY (OUTPATIENT)
Dept: SLEEP MEDICINE | Facility: CLINIC | Age: 68
End: 2018-10-15

## 2018-10-15 DIAGNOSIS — G47.39 OTHER SLEEP APNEA: ICD-10-CM

## 2018-10-15 NOTE — PROGRESS NOTES
3 DAY STM VISIT    Diagnostic AHI: 47.8   PSG    Patient contacted for 3 day STM visit  Subjective measures:  Pt is going to start using night.  They were out of town for the weekend and did not want to start using therapy then.      Device type: Auto-CPAP  PAP settings from order::  CPAP min 7 cm  H20       CPAP max 12 cm  H20        Mask type:    Full Face Mask                 Assessment: No usage reporting but has a profile in [employer].  Action plan: Pt to have f/u 14 day STM visit.  Patient has a follow up visit scheduled:   yes within 31-90 days of set up.

## 2018-10-25 ENCOUNTER — DOCUMENTATION ONLY (OUTPATIENT)
Dept: SLEEP MEDICINE | Facility: CLINIC | Age: 68
End: 2018-10-25

## 2018-10-25 DIAGNOSIS — G47.39 OTHER SLEEP APNEA: ICD-10-CM

## 2018-10-25 NOTE — PROGRESS NOTES
14 DAY STM VISIT    Diagnostic AHI: 47.8   PSG    Message left for patient to return call     Assessment: Pt not meeting objective benchmarks for compliance     Action plan: waiting for patient to return call.  and pt to have 30 day STM visit.    Device type: Auto-CPAP  PAP settings: CPAP min 7.0 cm  H20     CPAP max 12.0 cm  H20    95th% pressure 6.2 cm   Mask type:  Full Face Mask  Objective measures: 14 day rolling measures      Compliance  0 %      Leak  1.8 lpm  last  upload      AHI 5.8   last  upload      Average number of minutes 5     Average hours of usage 0.1          Objective measure goal  Compliance   Goal >70%  Leak   Goal < 24 lpm  AHI  Goal < 5  Usage  Goal >240

## 2018-11-12 ENCOUNTER — DOCUMENTATION ONLY (OUTPATIENT)
Dept: SLEEP MEDICINE | Facility: CLINIC | Age: 68
End: 2018-11-12

## 2018-11-12 DIAGNOSIS — G47.39 OTHER SLEEP APNEA: ICD-10-CM

## 2018-11-12 NOTE — PROGRESS NOTES
30 DAY [facility] VISIT    Diagnostic AHI: 47.8   PSG    Subjective measures:   Pt feels that he is not able to fall asleep with the device.  He is willing to continue to use however.  He is committed to going to his follow up visit on 11/29/2018.  He did not feel any changed could be made on our part at this time.     Assessment: Pt not meeting objective benchmarks for compliance  Patient failing following subjective benchmarks: insomnia with mask on.   Action plan:   Patient has scheduled a follow up visit with LINDA Garcia on 11/29/2018.   Device type: Auto-CPAP  PAP settings: CPAP min 7.0 cm  H20     CPAP max 12.0 cm  H20       Mask type:  Full Face Mask  Objective measures: 14 day rolling measures      Compliance  0 %-pt has used twice in last month.         Objective measure goal  Compliance   Goal >70%  Leak   Goal < 24 lpm  AHI  Goal < 5  Usage  Goal >240

## 2018-11-12 NOTE — Clinical Note
HI.  [facility] FYI this patient is struggling with usage.  He states he is unable to fall asleep with device on.  He is willing to keep trying.  He did not feel any changes that we can make for him at this time would be beneficial.  He is committed to keeping his follow up visit with you on 11/29/2018.  Please let us know if you want him re enrolled again.  Thanks  Judith

## 2018-11-26 ENCOUNTER — RADIANT APPOINTMENT (OUTPATIENT)
Dept: CARDIOLOGY | Facility: CLINIC | Age: 68
End: 2018-11-26
Attending: INTERNAL MEDICINE
Payer: COMMERCIAL

## 2018-11-26 DIAGNOSIS — I48.91 ATRIAL FIBRILLATION (H): ICD-10-CM

## 2018-11-26 PROCEDURE — 93308 TTE F-UP OR LMTD: CPT | Performed by: INTERNAL MEDICINE

## 2018-11-26 PROCEDURE — 93321 DOPPLER ECHO F-UP/LMTD STD: CPT | Performed by: INTERNAL MEDICINE

## 2018-11-26 PROCEDURE — 93325 DOPPLER ECHO COLOR FLOW MAPG: CPT | Performed by: INTERNAL MEDICINE

## 2018-11-26 RX ADMIN — Medication 7 ML: at 09:45

## 2018-11-29 ENCOUNTER — OFFICE VISIT (OUTPATIENT)
Dept: SLEEP MEDICINE | Facility: CLINIC | Age: 68
End: 2018-11-29
Payer: COMMERCIAL

## 2018-11-29 VITALS
HEART RATE: 60 BPM | DIASTOLIC BLOOD PRESSURE: 98 MMHG | SYSTOLIC BLOOD PRESSURE: 143 MMHG | BODY MASS INDEX: 33.64 KG/M2 | WEIGHT: 235 LBS | HEIGHT: 70 IN | OXYGEN SATURATION: 99 %

## 2018-11-29 DIAGNOSIS — G47.39 OTHER SLEEP APNEA: ICD-10-CM

## 2018-11-29 DIAGNOSIS — G47.33 OSA (OBSTRUCTIVE SLEEP APNEA): ICD-10-CM

## 2018-11-29 PROCEDURE — 99214 OFFICE O/P EST MOD 30 MIN: CPT | Performed by: PHYSICIAN ASSISTANT

## 2018-11-29 RX ORDER — ZOLPIDEM TARTRATE 5 MG/1
5 TABLET ORAL
Qty: 30 TABLET | Refills: 0 | Status: SHIPPED | OUTPATIENT
Start: 2018-11-29 | End: 2018-12-27

## 2018-11-29 NOTE — MR AVS SNAPSHOT
After Visit Summary   11/29/2018    Tee Hilton    MRN: [id number]           Patient Information     Date Of Birth          1950        Visit Information        Provider Department      11/29/2018 9:30 AM Sera Mcknight PA [facility]        Today's Diagnoses     Other sleep apnea        AUGUSTO (obstructive sleep apnea)          Care Instructions      Your BMI is Body mass index is 33.72 kg/(m^2).  Weight management is a personal decision.  If you are interested in exploring weight loss strategies, the following discussion covers the approaches that may be successful. Body mass index (BMI) is one way to tell whether you are at a healthy weight, overweight, or obese. It measures your weight in relation to your height.  A BMI of 18.5 to 24.9 is in the healthy range. A person with a BMI of 25 to 29.9 is considered overweight, and someone with a BMI of 30 or greater is considered obese. More than two-thirds of American adults are considered overweight or obese.  Being overweight or obese increases the risk for further weight gain. Excess weight may lead to heart disease and diabetes.  Creating and following plans for healthy eating and physical activity may help you improve your health.  Weight control is part of healthy lifestyle and includes exercise, emotional health, and healthy eating habits. Careful eating habits lifelong are the mainstay of weight control. Though there are significant health benefits from weight loss, long-term weight loss with diet alone may be very difficult to achieve- studies show long-term success with dietary management in less than 10% of people. Attaining a healthy weight may be especially difficult to achieve in those with severe obesity. In some cases, medications, devices and surgical management might be considered.  What can you do?  If you are overweight or obese and are interested in methods for weight loss, you should discuss this with your  provider.     Consider reducing daily calorie intake by 500 calories.     Keep a food journal.     Avoiding skipping meals, consider cutting portions instead.    Diet combined with exercise helps maintain muscle while optimizing fat loss. Strength training is particularly important for building and maintaining muscle mass. Exercise helps reduce stress, increase energy, and improves fitness. Increasing exercise without diet control, however, may not burn enough calories to loose weight.       Start walking three days a week 10-20 minutes at a time    Work towards walking thirty minutes five days a week     Eventually, increase the speed of your walking for 1-2 minutes at time    In addition, we recommend that you review healthy lifestyles and methods for weight loss available through the National Institutes of Health patient information sites:  http://win.niddk.nih.gov/publications/index.htm    And look into health and wellness programs that may be available through your health insurance provider, employer, local community center, or eleuterio club.    Weight management plan: Patient was referred to their PCP to discuss a diet and exercise plan.              Follow-ups after your visit        Follow-up notes from your care team     Return in about 4 weeks (around 12/27/2018) for Re-Initiate STM.      Your next 10 appointments already scheduled     Dec 06, 2018 11:00 AM CST   Return Visit with CLAY Erwin MD   [facility] ([facility])    [address] [place] [id number] [phone]            Dec 27, 2018 10:00 AM CST   Return Sleep Patient with LINDA Calloway   [facility] ([facility]    [address] [address] [id number] [phone]              Who to contact     If you have questions or need follow up information about today's clinic visit or your schedule  "please contact [facility] directly at [phone].  Normal or non-critical lab and imaging results will be communicated to you by MyChart, letter or phone within 4 business days after the clinic has received the results. If you do not hear from us within 7 days, please contact the clinic through [employer] or phone. If you have a critical or abnormal lab result, we will notify you by phone as soon as possible.  Submit refill requests through [employer] or call your pharmacy and they will forward the refill request to us. Please allow 3 business days for your refill to be completed.          Additional Information About Your Visit        [employer] Information     [employer] gives you secure access to your electronic health record. If you see a primary care provider, you can also send messages to your care team and make appointments. If you have questions, please call your primary care clinic.  If you do not have a primary care provider, please call [phone] and they will assist you.        Care EveryWhere ID     This is your Care EveryWhere ID. This could be used by other organizations to access your [place] medical records  [id number]        Your Vitals Were     Pulse Height Pulse Oximetry BMI (Body Mass Index)          60 1.778 m (5' 10\") 99% 33.72 kg/m2         Blood Pressure from Last 3 Encounters:   11/29/18 (!) 148/94   10/05/18 139/82   09/24/18 122/86    Weight from Last 3 Encounters:   11/29/18 106.6 kg (235 lb)   10/05/18 106.1 kg (234 lb)   09/20/18 109.3 kg (241 lb)              Today, you had the following     No orders found for display         Where to get your medicines      Some of these will need a paper prescription and others can be bought over the counter.  Ask your nurse if you have questions.     Bring a paper prescription for each of these medications     zolpidem 5 MG tablet          Primary Care Provider Office Phone # Fax #    Jon Denson PA-C [phone]    "    [address]  [place] [address]        Equal Access to Services     MARYKATARZYNA SOFIA : Hadii aad ku hadtonieo Soomaali, waaxda luqadaha, qaybta kaalmada adesharrida, harry toro shiradebora aguero pollydemetrius overton . So [facility] [phone].    ATENCIÓN: Si habla español, tiene a campoverde disposición servicios gratuitos de asistencia lingüística. Llame al [phone].    We comply with applicable federal civil rights laws and [place] laws. We do not discriminate on the basis of race, color, national origin, age, disability, sex, sexual orientation, or gender identity.            Thank you!     Thank you for choosing [facility]  for your care. Our goal is always to provide you with excellent care. Hearing back from our patients is one way we can continue to improve our services. Please take a few minutes to complete the written survey that you may receive in the mail after your visit with us. Thank you!             Your Updated Medication List - Protect others around you: Learn how to safely use, store and throw away your medicines at www.disposemymeds.org.          This list is accurate as of 11/29/18 10:02 AM.  Always use your most recent med list.                   Brand Name Dispense Instructions for use Diagnosis    lisinopril 20 MG tablet    PRINIVIL/ZESTRIL    180 tablet    Take 2 tablets (40 mg) by mouth daily    Benign essential hypertension       metoprolol succinate ER 25 MG 24 hr tablet    TOPROL-XL    90 tablet    Take 1 tablet (25 mg) by mouth daily    Chronic atrial fibrillation (H)       omeprazole 20 MG DR capsule    priLOSEC     Take 20 mg by mouth        PEPCID PO      Take 10 mg by mouth        rivaroxaban ANTICOAGULANT 20 MG Tabs tablet    XARELTO    30 tablet    Take 1 tablet (20 mg) by mouth daily (with dinner)    Chronic atrial fibrillation (H)       sildenafil 20 MG tablet    REVATIO    30 tablet    Take 1 -5 tabs daily prn    Other male erectile dysfunction       zolpidem 5 MG tablet     AMBIEN    30 tablet    Take 1 tablet (5 mg) by mouth nightly as needed for sleep    AUGUSTO (obstructive sleep apnea)

## 2018-11-29 NOTE — NURSING NOTE
"Chief Complaint   Patient presents with     CPAP Follow Up     pt stopped using 1 month ago. Has difficulty falling asleep with cpap.        Initial There were no vitals taken for this visit. Estimated body mass index is 32.64 kg/(m^2) as calculated from the following:    Height as of 10/5/18: 1.803 m (5' 11\").    Weight as of 10/5/18: 106.1 kg (234 lb).    Medication Reconciliation: complete        "

## 2018-11-29 NOTE — PROGRESS NOTES
"Obstructive Sleep Apnea - PAP Follow-Up Visit:    Chief Complaint   Patient presents with     CPAP Follow Up     pt stopped using 1 month ago. Has difficulty falling asleep with cpap.        Tee Hilton comes in today for follow-up of their severe sleep apnea, managed with CPAP.     Tee Hilton initially presented with snoring, snort arousals, obesity with excessive daytime sleepiness. Comorbid hypertension, heart failure and atrial fibrillation.    Study Date: 9/22/2018- (241.0 lbs) Polysomnogram revealed 41 obstructive, 14 central, and 38 mixed apneas resulting in an apnea index of 44.5 events per hour. There were 7 obstructive hypopneas and 0 central hypopneas resulting in an obstructive hypopnea index of 3.3 and central hypopnea index of 0 events per hour. The combined apnea/hypopnea index was 47.8 events per hour (central apnea/hypopnea index was 6.7 events per hour).  REM AHI was 0. The supine AHI was 69.0 events per hour.  RDI was 54.0 events per hour. Respiratory rate and pattern was notable for improvement of events during REM sleep,  periods where periodic breathing is suspected and circulation times that are prolonged. Most of the events however appear to be terminated by a gasp arousal.  Lowest oxygen saturation was 83.9%. Time spent less than or equal to 88% was 0.4 minutes. Time spent less than or equal to 89% was 0.9 minutes. No optimal pressure was determined due to poor sleep and poor tolerance of low-level PAP and bilevel PAP. Severe sleep disordered breathing, many findings suggested of periodic breathing though given presence of obstruction and poorly consolidated sleep this study was not definitive    Overall, he rates the experience with PAP as 0 (0 poor, 10 great). The mask is not comfortable.  The mask is uncomfortable because of \"I am uncomfortable with it overall. Can't fall to sleep wearing it.\".  The mask is not leaking .  He is not snoring with the mask on. He is not having " gasp arousals.  He is not having significant oral/nasal dryness. The pressure is comfortable.     His PAP interface is Full Face Mask.    Bedtime is typically 2100. Usually it takes about 60 minutes to fall asleep with the mask on. Paolaien was helpful. His mind is overactive at night. Wake time is typically 0400.  Patient is using PAP therapy 0 hours per night. The patient is usually getting 5 hours of sleep per night.    He does not feel rested in the morning.    Total score - [place]: 7 (11/29/2018  9:00 AM)      KAUSHIK Total Score: 17    Respironics  Auto-PAP 7 - 12 cmH2O 30 day usage data:    0% of days with > 4 hours of use. 26/30 days with no use.   Average use 0 minutes per day.   Average leak LPM.  Average % of night in large leak %.    CPAP 90% pressure cm.   AHI  events per hour.    Current problems with PAP use include:  1. Put it on 4-5 nights only. He had difficulty falling asleep with it.   2. Low motivation to use CPAP.     Past medical/surgical history, family history, social history, medications and allergies were reviewed.      Problem List:  Patient Active Problem List    Diagnosis Date Noted     Other sleep apnea 09/24/2018     Priority: Medium     Study Date: 9/22/2018- (241.0 lbs) Polysomnogram revealed 41 obstructive, 14 central, and 38 mixed apneas resulting in an apnea index of 44.5 events per hour. There were 7 obstructive hypopneas and 0 central hypopneas resulting in an obstructive hypopnea index of 3.3 and central hypopnea index of 0 events per hour. The combined apnea/hypopnea index was 47.8 events per hour (central apnea/hypopnea index was 6.7 events per hour).  REM AHI was 0. The supine AHI was 69.0 events per hour.  RDI was 54.0 events per hour. Respiratory rate and pattern was notable for improvement of events during REM sleep,  periods where periodic breathing is suspected and circulation times that are prolonged. Most of the events however appear to be terminated by a gasp arousal.   "Lowest oxygen saturation was 83.9%. Time spent less than or equal to 88% was 0.4 minutes. Time spent less than or equal to 89% was 0.9 minutes. No optimal pressure was determined due to poor sleep and poor tolerance of low-level PAP and bilevel PAP. Severe sleep disordered breathing, many findings suggested of periodic breathing though given presence of obstruction and poorly consolidated sleep this study was not definitive       Gastroesophageal reflux disease without esophagitis      Priority: Medium     Persistent atrial fibrillation (H) 09/18/2018     Priority: Medium     Non morbid obesity due to excess calories 09/18/2018     Priority: Medium     Chronic systolic congestive heart failure (H) 09/18/2018     Priority: Medium     Alcohol use 09/18/2018     Priority: Medium     Chronic midline low back pain without sciatica 10/31/2017     Priority: Medium     Essential hypertension 11/11/2016     Priority: Medium     Other male erectile dysfunction 11/11/2016     Priority: Medium        BP (!) 148/94  Pulse 60  Ht 1.778 m (5' 10\")  Wt 106.6 kg (235 lb)  SpO2 99%  BMI 33.72 kg/m2    Impression/Plan:  Severe Sleep apnea. not tolerating PAP well. Daytime symptoms are not improved.   Potential consequences of untreated severe sleep apnea reviewed.   Patient expresses more willingess to use CPAP on a nightly bases.   Start using CPAP during the day when watching TV to acclimate. Put on CPAP mask evernight for at least an hour.  Re-enroll into [facility]    Psychophysiologic insomnia-  Ambien 5 mg prn while acclimating to CPAP. Will consider referral to Sleep Psychology.     Tee Hilton will follow up in about 4 week(s).     Twenty-five minutes spent with patient, all of which were spent face-to-face counseling, consulting, coordinating plan of care regarding AUGUSTO and insomnia.      Sera Mcknight PA-C    CC:  Jon Denson    "

## 2018-11-29 NOTE — PATIENT INSTRUCTIONS

## 2018-12-06 ENCOUNTER — OFFICE VISIT (OUTPATIENT)
Dept: CARDIOLOGY | Facility: CLINIC | Age: 68
End: 2018-12-06
Payer: COMMERCIAL

## 2018-12-06 ENCOUNTER — DOCUMENTATION ONLY (OUTPATIENT)
Dept: SLEEP MEDICINE | Facility: CLINIC | Age: 68
End: 2018-12-06
Payer: COMMERCIAL

## 2018-12-06 VITALS
DIASTOLIC BLOOD PRESSURE: 87 MMHG | HEART RATE: 64 BPM | OXYGEN SATURATION: 99 % | WEIGHT: 234 LBS | BODY MASS INDEX: 33.58 KG/M2 | SYSTOLIC BLOOD PRESSURE: 127 MMHG

## 2018-12-06 DIAGNOSIS — I48.20 CHRONIC ATRIAL FIBRILLATION (H): ICD-10-CM

## 2018-12-06 PROCEDURE — 99214 OFFICE O/P EST MOD 30 MIN: CPT | Performed by: INTERNAL MEDICINE

## 2018-12-06 RX ORDER — METOPROLOL SUCCINATE 50 MG/1
50 TABLET, EXTENDED RELEASE ORAL DAILY
Qty: 90 TABLET | Refills: 3 | Status: SHIPPED | OUTPATIENT
Start: 2018-12-06 | End: 2019-05-02

## 2018-12-06 NOTE — NURSING NOTE
"Chief Complaint   Patient presents with     RECHECK     3 months F/U per Dr Lake with ECHO 11/26. pt reports no cardiac symptoms.       Initial /90 (BP Location: Right arm, Patient Position: Chair, Cuff Size: Adult Large)  Pulse 95  Wt 106.1 kg (234 lb)  SpO2 99%  BMI 33.58 kg/m2 Estimated body mass index is 33.58 kg/(m^2) as calculated from the following:    Height as of 11/29/18: 1.778 m (5' 10\").    Weight as of this encounter: 106.1 kg (234 lb)..  BP completed using cuff size: arin Orosco MA    "

## 2018-12-06 NOTE — NURSING NOTE
Med Reconcile: Reviewed and verified all current medications with the patient. The updated medication list was printed and given to the patient.  Return Appointment: Patient given instructions regarding scheduling next clinic visit. Patient demonstrated understanding of this information and agreed to call with further questions or concerns.  Follow up with Dr. Jurado to discuss ablation/medication for afib  Per Dr. Lake, if patient needs a cortisone injection for her sciatica, hold Xarelto 48 hours prior and if he needs back surgery in the future Dr. Lake okayed surgery on a cardiac stand point  Medication Change: Patient was educated regarding prescribed medication change, including discussion of the indication, administration, side effects, and when to report to MD or RN. Patient demonstrated understanding of this information and agreed to call with further questions or concerns.  Patient stated he understood all health information given and agreed to call with further questions or concerns.  Samia Ryan RN

## 2018-12-06 NOTE — MR AVS SNAPSHOT
After Visit Summary   12/6/2018    Tee Hilton    MRN: [id number]           Patient Information     Date Of Birth          1950        Visit Information        Provider Department      12/6/2018 11:00 AM CLAY Erwin MD [facility] PHYSICIANS HEART AT [facility]        Today's Diagnoses     Chronic atrial fibrillation (H)          Care Instructions    Thank you for coming to the [facility] @ [facility]; please note the following instructions:    1. Increase metoprolol to 50 mg daily.  A new prescription was sent to your preferred pharmacy.    2.  Referral to EP electrophysiology cardiology.  You are scheduled to see Dr. Stephenson on Monday, December 17th at 3:00 pm.        If you have any questions regarding your visit please contact your care team:     Cardiology  Telephone Number   Samia GALE, ESTELITA PERAZA, ESTELITA MONTALVO, MALATHI [place], [place]   [phone]    *After hours: [phone]   For scheduling appts:     [phone] or    [phone] (select option 1)    *After hours: [phone]     For the Device Clinic (Pacemakers and ICD's)  RN's :  Rachael Harris   During business hours: [phone]    *After business hours:  [phone] (select option 4)      Normal test result notifications will be released via [employer] or mailed within 7 business days.  All other test results, will be communicated via telephone once reviewed by your cardiologist.    If you need a medication refill please contact your pharmacy.  Please allow 3 business days for your refill to be completed.    As always, thank you for trusting us with your health care needs!            Follow-ups after your visit        Follow-up notes from your care team     Return if symptoms worsen or fail to improve.      Your next 10 appointments already scheduled     Dec 14, 2018  8:45 AM CST   (Arrive by 7:45 AM)   MR LUMBAR SPINE W/O CONTRAST with BE1   [facility] [place]  ([facility])    [address] [facility] [address]  [place] [address] [phone]           How do I prepare for my exam? (Food and drink instructions) **If you will be receiving sedation or general anesthesia, please see special notes below.**  How do I prepare for my exam? (Other instructions) Take your medicines as usual, unless your doctor tells you not to. Please remove any body piercings and hair extensions before you arrive. Follow your doctor s orders. If you do not, we may have to postpone your exam. You may or may not receive IV contrast for this exam pending the discretion of the Radiologist.  You do not need to do anything special to prepare. **If you will be receiving sedation or general anesthesia, please see special notes below.**  What should I wear:  The MRI machine uses a strong magnet. Please wear clothes without metal (snaps, zippers). A sweatsuit works well, or we may give you a hospital gown.  How long does the exam take: Most tests take 30 to 60 minutes.  HOWEVER, IF YOUR DOCTOR PRESCRIBES ANESTHESIA please plan on spending four to five hours in the recovery room.  What should I bring: Bring a list of your current medicines to your exam (including vitamins, minerals and over-the-counter drugs). Also bring the results of similar scans you may have had.  Do I need a : **If you will be receiving sedation or general anesthesia, please see special notes below.**  What should I do after the exam? No Restrictions, You may resume normal activities.  What is this test: MRI (magnetic resonance imaging) uses a strong magnet and radio waves to look inside the body. An MRA (magnetic resonance angiogram) does the same thing, but it lets us look at your blood vessels. A computer turns the radio waves into pictures showing cross sections of the body, much like slices of bread. This helps us see any problems more clearly.  Who should I call with questions: Please call the Imaging Department at  your exam site with any questions. Directions, parking instructions, and other information is available on our website, [email or web address].  How do I prepare if I m having sedation or anesthesia? **IMPORTANT** THE INSTRUCTIONS BELOW ARE ONLY FOR THOSE PATIENTS WHO HAVE BEEN TOLD THEY WILL RECEIVE SEDATION OR GENERAL ANESTHESIA DURING THEIR MRI PROCEDURE:  IF YOU WILL RECEIVE SEDATION (take medicine to help you relax during your exam): You must get the medicine from your doctor before you arrive. Bring the medicine to the exam. Do not take it at home. Arrive one hour early. Bring someone who can take you home after the test. Your medicine will make you sleepy. After the exam, you may not drive, take a bus or take a taxi by yourself. No eating 8 hours before your exam. You may have clear liquids up until 4 hours before your exam. (Clear liquids include water, clear tea, black coffee and fruit juice without pulp.)  IF YOU WILL RECEIVE ANESTHESIA (be asleep for your exam): Arrive 1 1/2 hours early. Bring someone who can take you home after the test. You may not drive, take a bus or take a taxi by yourself. No eating 8 hours before your exam. You may have clear liquids up until 4 hours before your exam. (Clear liquids include water, clear tea, black coffee and fruit juice without pulp.) You will spend four to five hours in the recovery room.            Dec 17, 2018  3:00 PM CST   New Visit with Enrique Stephenson MD   [facility] PHYSICIANS HEART AT [facility] ([facility])    [address] [place] [address] [address] [facility] [address]   [phone]            Dec 18, 2018  9:40 AM CST   Return Visit with Rupal Kwan NP   [facility] ([facility])    [address] [place] [address]   [phone]            Dec 27, 2018 10:00 AM CST   Return Sleep Patient with LINDA Calloway   [facility] ([facility])    [address]  [address] [id number] [phone]              Who to contact     If you have questions or need follow up information about today's clinic visit or your schedule please contact [facility] PHYSICIANS HEART AT [facility] directly at [phone].  Normal or non-critical lab and imaging results will be communicated to you by MyChart, letter or phone within 4 business days after the clinic has received the results. If you do not hear from us within 7 days, please contact the clinic through [employer] or phone. If you have a critical or abnormal lab result, we will notify you by phone as soon as possible.  Submit refill requests through [employer] or call your pharmacy and they will forward the refill request to us. Please allow 3 business days for your refill to be completed.          Additional Information About Your Visit        MyChart Information     [employer] gives you secure access to your electronic health record. If you see a primary care provider, you can also send messages to your care team and make appointments. If you have questions, please call your primary care clinic.  If you do not have a primary care provider, please call [phone] and they will assist you.        Care EveryWhere ID     This is your Care EveryWhere ID. This could be used by other organizations to access your [facility] medical records  [id number]        Your Vitals Were     Pulse Pulse Oximetry BMI (Body Mass Index)             64 99% 33.58 kg/m2          Blood Pressure from Last 3 Encounters:   12/06/18 127/87   11/29/18 (!) 143/98   10/05/18 139/82    Weight from Last 3 Encounters:   12/06/18 106.1 kg (234 lb)   11/29/18 106.6 kg (235 lb)   10/05/18 106.1 kg (234 lb)              Today, you had the following     No orders found for display         Today's Medication Changes          These changes are accurate as of 12/6/18 11:30 AM.  If you have any questions, ask your nurse or  doctor.               These medicines have changed or have updated prescriptions.        Dose/Directions    metoprolol succinate ER 50 MG 24 hr tablet   Commonly known as:  TOPROL-XL   This may have changed:    - medication strength  - how much to take   Used for:  Chronic atrial fibrillation (H)   Changed by:  CLAY Erwin MD        Dose:  50 mg   Take 1 tablet (50 mg) by mouth daily   Quantity:  90 tablet   Refills:  3            Where to get your medicines      These medications were sent to [facility] - [place], [place] - [address]  [address], [facility] [address]     Phone:  [phone]     metoprolol succinate ER 50 MG 24 hr tablet                Primary Care Provider Office Phone # Fax #    Jon Denson PA-C [phone]       [address] [facility] [address] [place] [address] [place] [address]        Equal Access to Services     [facility]: Hadii aad ku hadasho Soomaali, waaxda luqadaha, qaybta kaalmada adeegyada, waxay idiin hayaan adeeg kharash tian . So [facility] [phone].    ATENCIÓN: Si habla español, tiene a campoverde disposición servicios gratuitos de asistencia lingüística. Isha al [phone].    We comply with applicable federal civil rights laws and [place] laws. We do not discriminate on the basis of race, color, national origin, age, disability, sex, sexual orientation, or gender identity.            Thank you!     Thank you for choosing [facility] PHYSICIANS HEART AT [facility]  for your care. Our goal is always to provide you with excellent care. Hearing back from our patients is one way we can continue to improve our services. Please take a few minutes to complete the written survey that you may receive in the mail after your visit with us. Thank you!             Your Updated Medication List - Protect others around you: Learn how to safely use, store and throw away your medicines at www.disposemymeds.org.          This list is accurate as of 12/6/18  11:30 AM.  Always use your most recent med list.                   Brand Name Dispense Instructions for use Diagnosis    lisinopril 20 MG tablet    PRINIVIL/ZESTRIL    180 tablet    Take 2 tablets (40 mg) by mouth daily    Benign essential hypertension       metoprolol succinate ER 50 MG 24 hr tablet    TOPROL-XL    90 tablet    Take 1 tablet (50 mg) by mouth daily    Chronic atrial fibrillation (H)       omeprazole 20 MG DR capsule    priLOSEC     Take 20 mg by mouth        PEPCID PO      Take 10 mg by mouth        rivaroxaban ANTICOAGULANT 20 MG Tabs tablet    XARELTO    30 tablet    Take 1 tablet (20 mg) by mouth daily (with dinner)    Chronic atrial fibrillation (H)       sildenafil 20 MG tablet    REVATIO    30 tablet    Take 1 -5 tabs daily prn    Other male erectile dysfunction       zolpidem 5 MG tablet    AMBIEN    30 tablet    Take 1 tablet (5 mg) by mouth nightly as needed for sleep    AUGUSTO (obstructive sleep apnea)

## 2018-12-06 NOTE — PROGRESS NOTES
SUBJECTIVE:  Tee Hilton is a 68 year old male who presents for follow up.  Afib. 3 months after cardioversion. On NOAC.  No complaints.    Patient Active Problem List    Diagnosis Date Noted     Other sleep apnea 09/24/2018     Priority: Medium     Study Date: 9/22/2018- (241.0 lbs) Polysomnogram revealed 41 obstructive, 14 central, and 38 mixed apneas resulting in an apnea index of 44.5 events per hour. There were 7 obstructive hypopneas and 0 central hypopneas resulting in an obstructive hypopnea index of 3.3 and central hypopnea index of 0 events per hour. The combined apnea/hypopnea index was 47.8 events per hour (central apnea/hypopnea index was 6.7 events per hour).  REM AHI was 0. The supine AHI was 69.0 events per hour.  RDI was 54.0 events per hour. Respiratory rate and pattern was notable for improvement of events during REM sleep,  periods where periodic breathing is suspected and circulation times that are prolonged. Most of the events however appear to be terminated by a gasp arousal.  Lowest oxygen saturation was 83.9%. Time spent less than or equal to 88% was 0.4 minutes. Time spent less than or equal to 89% was 0.9 minutes. No optimal pressure was determined due to poor sleep and poor tolerance of low-level PAP and bilevel PAP. Severe sleep disordered breathing, many findings suggested of periodic breathing though given presence of obstruction and poorly consolidated sleep this study was not definitive       Gastroesophageal reflux disease without esophagitis      Priority: Medium     Persistent atrial fibrillation (H) 09/18/2018     Priority: Medium     Non morbid obesity due to excess calories 09/18/2018     Priority: Medium     Chronic systolic congestive heart failure (H) 09/18/2018     Priority: Medium     Alcohol use 09/18/2018     Priority: Medium     Chronic midline low back pain without sciatica 10/31/2017     Priority: Medium     Essential hypertension 11/11/2016     Priority:  Medium     Other male erectile dysfunction 11/11/2016     Priority: Medium    .  Current Outpatient Prescriptions   Medication Sig     Famotidine (PEPCID PO) Take 10 mg by mouth     lisinopril (PRINIVIL/ZESTRIL) 20 MG tablet Take 2 tablets (40 mg) by mouth daily     metoprolol succinate (TOPROL-XL) 25 MG 24 hr tablet Take 1 tablet (25 mg) by mouth daily     omeprazole (PRILOSEC) 20 MG CR capsule Take 20 mg by mouth     rivaroxaban ANTICOAGULANT (XARELTO) 20 MG TABS tablet Take 1 tablet (20 mg) by mouth daily (with dinner)     sildenafil (REVATIO) 20 MG tablet Take 1 -5 tabs daily prn     zolpidem (AMBIEN) 5 MG tablet Take 1 tablet (5 mg) by mouth nightly as needed for sleep     No current facility-administered medications for this visit.      Facility-Administered Medications Ordered in Other Visits   Medication     sodium chloride (PF) 0.9% PF flush 10 mL     History reviewed. No pertinent past medical history.  Past Surgical History:   Procedure Laterality Date     ANESTHESIA CARDIOVERSION N/A 9/24/2018    Procedure: ANESTHESIA CARDIOVERSION;  Cardioversion ;  Surgeon: GENERIC ANESTHESIA PROVIDER;  Location: [facility] OR     COLONOSCOPY N/A 5/25/2017    Procedure: COMBINED COLONOSCOPY, SINGLE OR MULTIPLE BIOPSY/POLYPECTOMY BY BIOPSY;;  Surgeon: Aquilino Garcia MD;  Location: [facility] OR     COLONOSCOPY WITH CO2 INSUFFLATION N/A 5/25/2017    Procedure: COLONOSCOPY WITH CO2 INSUFFLATION;  COLONOSCOPY SCREEN/ ORDAZ;  Surgeon: Aquilino Garcia MD;  Location: [facility] OR     OPEN REDUCTION INTERNAL FIXATION TIBIA       TONSILLECTOMY       Allergies   Allergen Reactions     Erythromycin Unknown     Upset stomach     Social History     Social History     Marital status:      Spouse name: N/A     Number of children: 3     Years of education: N/A     Occupational History     sales and marketing      Social History Main Topics     Smoking status: Never Smoker     Smokeless tobacco: Never Used      Comment: never smoked      Alcohol use No      Comment: social use     Drug use: No     Sexual activity: Yes     Partners: Female     Other Topics Concern     Not on file     Social History Narrative     Family History   Problem Relation Age of Onset     Gout Father           REVIEW OF SYSTEMS:  General: negative, fever, chills, night sweats  Skin: negative, acne, rash and scaling  Eyes: negative, double vision, eye pain and photophobia  Ears/Nose/Throat: negative, nasal congestion and purulent rhinorrhea  Respiratory: No dyspnea on exertion, No cough, No hemoptysis and negative  Cardiovascular: negative, palpitations, tachycardia, irregular heart beat, chest pain, exertional chest pain or pressure, paroxysmal nocturnal dyspnea, dyspnea on exertion and orthopnea       OBJECTIVE:  Blood pressure 161/90, pulse 95, weight 106.1 kg (234 lb), SpO2 99 %.  General Appearance: alert and no distress  Head: Normocephalic. No masses, lesions, tenderness or abnormalities  Eyes: conjuctiva clear, PERRL, EOM intact  Ears: External ears normal. Canals clear. TM's normal.  Nose: Nares normal  Mouth: normal  Neck: Supple, no cervical adenopathy, no thyromegaly  Lungs: clear to auscultation  Cardiac: Afib, S1 and S2, no murmur       ASSESSMENT/PLAN:  Afib. 3 months post cardiovesion. On NOAC.  No symptoms.  EKG done today reviewed and result discussed with patient.  Back in Afib. Rate 100BPM.  Patient drinking more alcohol than he is admitting to.  Reviewed echo. Severe LVe by ESV. EF 40-45%.  Discussed the need for complete abstinence from alcohol.  Will increase BB Toprol XL to 50 mg daily.  As LA size is normal, will refer to EP for ablation/drug therapy.     Hold Xarelto 48 hrs prior to surgery or spinal injection.  Patient may proceed with planned back surgery.  Per orders.   Return to Clinic PRN.

## 2018-12-06 NOTE — PATIENT INSTRUCTIONS
Thank you for coming to the [facility] @ [facility]; please note the following instructions:    1. Increase metoprolol to 50 mg daily.  A new prescription was sent to your preferred pharmacy.    2.  Referral to EP electrophysiology cardiology.  You are scheduled to see Dr. Stephenson on Monday, December 17th at 3:00 pm.        If you have any questions regarding your visit please contact your care team:     Cardiology  Telephone Number   Samia GALE, ESTELITA PERAZA, ESTELITA MONTALVO, [facility]  [place]   [phone]    *After hours: [phone]   For scheduling appts:     [phone] or    [phone] (select option 1)    *After hours: [phone]     For the Device Clinic (Pacemakers and ICD's)  RN's :  Rachael Harris   During business hours: [phone]    *After business hours:  [phone] (select option 4)      Normal test result notifications will be released via [employer] or mailed within 7 business days.  All other test results, will be communicated via telephone once reviewed by your cardiologist.    If you need a medication refill please contact your pharmacy.  Please allow 3 business days for your refill to be completed.    As always, thank you for trusting us with your health care needs!

## 2018-12-06 NOTE — LETTER
12/6/2018      RE: Tee Hilton  [address] [facility] [address]       Dear Colleague,    Thank you for the opportunity to participate in the care of your patient, Tee Hilton, at the [facility] AT [facility]. Please see a copy of my visit note below.       SUBJECTIVE:  Tee Hilton is a 68 year old male who presents for follow up.  Afib. 3 months after cardioversion. On NOAC.  No complaints.    Patient Active Problem List    Diagnosis Date Noted     Other sleep apnea 09/24/2018     Priority: Medium     Study Date: 9/22/2018- (241.0 lbs) Polysomnogram revealed 41 obstructive, 14 central, and 38 mixed apneas resulting in an apnea index of 44.5 events per hour. There were 7 obstructive hypopneas and 0 central hypopneas resulting in an obstructive hypopnea index of 3.3 and central hypopnea index of 0 events per hour. The combined apnea/hypopnea index was 47.8 events per hour (central apnea/hypopnea index was 6.7 events per hour).  REM AHI was 0. The supine AHI was 69.0 events per hour.  RDI was 54.0 events per hour. Respiratory rate and pattern was notable for improvement of events during REM sleep,  periods where periodic breathing is suspected and circulation times that are prolonged. Most of the events however appear to be terminated by a gasp arousal.  Lowest oxygen saturation was 83.9%. Time spent less than or equal to 88% was 0.4 minutes. Time spent less than or equal to 89% was 0.9 minutes. No optimal pressure was determined due to poor sleep and poor tolerance of low-level PAP and bilevel PAP. Severe sleep disordered breathing, many findings suggested of periodic breathing though given presence of obstruction and poorly consolidated sleep this study was not definitive       Gastroesophageal reflux disease without esophagitis      Priority: Medium     Persistent atrial fibrillation (H) 09/18/2018      Priority: Medium     Non morbid obesity due to excess calories 09/18/2018     Priority: Medium     Chronic systolic congestive heart failure (H) 09/18/2018     Priority: Medium     Alcohol use 09/18/2018     Priority: Medium     Chronic midline low back pain without sciatica 10/31/2017     Priority: Medium     Essential hypertension 11/11/2016     Priority: Medium     Other male erectile dysfunction 11/11/2016     Priority: Medium    .  Current Outpatient Prescriptions   Medication Sig     Famotidine (PEPCID PO) Take 10 mg by mouth     lisinopril (PRINIVIL/ZESTRIL) 20 MG tablet Take 2 tablets (40 mg) by mouth daily     metoprolol succinate (TOPROL-XL) 25 MG 24 hr tablet Take 1 tablet (25 mg) by mouth daily     omeprazole (PRILOSEC) 20 MG CR capsule Take 20 mg by mouth     rivaroxaban ANTICOAGULANT (XARELTO) 20 MG TABS tablet Take 1 tablet (20 mg) by mouth daily (with dinner)     sildenafil (REVATIO) 20 MG tablet Take 1 -5 tabs daily prn     zolpidem (AMBIEN) 5 MG tablet Take 1 tablet (5 mg) by mouth nightly as needed for sleep     No current facility-administered medications for this visit.      Facility-Administered Medications Ordered in Other Visits   Medication     sodium chloride (PF) 0.9% PF flush 10 mL     History reviewed. No pertinent past medical history.  Past Surgical History:   Procedure Laterality Date     ANESTHESIA CARDIOVERSION N/A 9/24/2018    Procedure: ANESTHESIA CARDIOVERSION;  Cardioversion ;  Surgeon: GENERIC ANESTHESIA PROVIDER;  Location: [facility] OR     COLONOSCOPY N/A 5/25/2017    Procedure: COMBINED COLONOSCOPY, SINGLE OR MULTIPLE BIOPSY/POLYPECTOMY BY BIOPSY;;  Surgeon: Aquilino Garcia MD;  Location: [facility] OR     COLONOSCOPY WITH CO2 INSUFFLATION N/A 5/25/2017    Procedure: COLONOSCOPY WITH CO2 INSUFFLATION;  COLONOSCOPY SCREEN/ ORDAZ;  Surgeon: Aquilino Garcia MD;  Location: [facility] OR     OPEN REDUCTION INTERNAL FIXATION TIBIA       TONSILLECTOMY       Allergies   Allergen Reactions      Erythromycin Unknown     Upset stomach     Social History     Social History     Marital status:      Spouse name: N/A     Number of children: 3     Years of education: N/A     Occupational History     sales and marketing      Social History Main Topics     Smoking status: Never Smoker     Smokeless tobacco: Never Used      Comment: never smoked     Alcohol use No      Comment: social use     Drug use: No     Sexual activity: Yes     Partners: Female     Other Topics Concern     Not on file     Social History Narrative     Family History   Problem Relation Age of Onset     Gout Father           REVIEW OF SYSTEMS:  General: negative, fever, chills, night sweats  Skin: negative, acne, rash and scaling  Eyes: negative, double vision, eye pain and photophobia  Ears/Nose/Throat: negative, nasal congestion and purulent rhinorrhea  Respiratory: No dyspnea on exertion, No cough, No hemoptysis and negative  Cardiovascular: negative, palpitations, tachycardia, irregular heart beat, chest pain, exertional chest pain or pressure, paroxysmal nocturnal dyspnea, dyspnea on exertion and orthopnea       OBJECTIVE:  Blood pressure 161/90, pulse 95, weight 106.1 kg (234 lb), SpO2 99 %.  General Appearance: alert and no distress  Head: Normocephalic. No masses, lesions, tenderness or abnormalities  Eyes: conjuctiva clear, PERRL, EOM intact  Ears: External ears normal. Canals clear. TM's normal.  Nose: Nares normal  Mouth: normal  Neck: Supple, no cervical adenopathy, no thyromegaly  Lungs: clear to auscultation  Cardiac: Afib, S1 and S2, no murmur       ASSESSMENT/PLAN:  Afib. 3 months post cardiovesion. On NOAC.  No symptoms.  EKG done today reviewed and result discussed with patient.  Back in Afib. Rate 100BPM.  Patient drinking more alcohol than he is admitting to.  Reviewed echo. Severe LVe by ESV. EF 40-45%.  Discussed the need for complete abstinence from alcohol.  Will increase BB Toprol XL to 50 mg daily.  As LA size  is normal, will refer to EP for ablation/drug therapy.     Hold Xarelto 48 hrs prior to surgery or spinal injection.  Patient may proceed with planned back surgery.  Per orders.   Return to Clinic PRN.    Please do not hesitate to contact me if you have any questions/concerns.     Sincerely,     CLAY Erwin MD

## 2018-12-10 ENCOUNTER — MYC MEDICAL ADVICE (OUTPATIENT)
Dept: FAMILY MEDICINE | Facility: CLINIC | Age: 68
End: 2018-12-10

## 2018-12-10 ENCOUNTER — OFFICE VISIT (OUTPATIENT)
Dept: FAMILY MEDICINE | Facility: CLINIC | Age: 68
End: 2018-12-10
Payer: COMMERCIAL

## 2018-12-10 VITALS
DIASTOLIC BLOOD PRESSURE: 86 MMHG | HEIGHT: 70 IN | WEIGHT: 233 LBS | OXYGEN SATURATION: 100 % | SYSTOLIC BLOOD PRESSURE: 124 MMHG | HEART RATE: 78 BPM | BODY MASS INDEX: 33.36 KG/M2 | TEMPERATURE: 97.7 F

## 2018-12-10 DIAGNOSIS — M54.42 CHRONIC MIDLINE LOW BACK PAIN WITH LEFT-SIDED SCIATICA: Primary | ICD-10-CM

## 2018-12-10 DIAGNOSIS — Z23 NEED FOR PROPHYLACTIC VACCINATION AND INOCULATION AGAINST INFLUENZA: ICD-10-CM

## 2018-12-10 DIAGNOSIS — G89.29 CHRONIC MIDLINE LOW BACK PAIN WITH LEFT-SIDED SCIATICA: Primary | ICD-10-CM

## 2018-12-10 PROCEDURE — 99213 OFFICE O/P EST LOW 20 MIN: CPT | Mod: 25 | Performed by: PHYSICIAN ASSISTANT

## 2018-12-10 PROCEDURE — 90471 IMMUNIZATION ADMIN: CPT | Performed by: PHYSICIAN ASSISTANT

## 2018-12-10 PROCEDURE — 90662 IIV NO PRSV INCREASED AG IM: CPT | Performed by: PHYSICIAN ASSISTANT

## 2018-12-10 RX ORDER — OXYCODONE AND ACETAMINOPHEN 5; 325 MG/1; MG/1
1 TABLET ORAL EVERY 6 HOURS PRN
Qty: 25 TABLET | Refills: 0 | Status: SHIPPED | OUTPATIENT
Start: 2018-12-10 | End: 2019-05-02

## 2018-12-10 RX ORDER — PREDNISONE 20 MG/1
20 TABLET ORAL 2 TIMES DAILY
Qty: 14 TABLET | Refills: 0 | Status: SHIPPED | OUTPATIENT
Start: 2018-12-10 | End: 2019-05-02

## 2018-12-10 ASSESSMENT — MIFFLIN-ST. JEOR: SCORE: 1843.13

## 2018-12-10 NOTE — PROGRESS NOTES
SUBJECTIVE:   Tee Hilton is a 68 year old male who presents to clinic today for the following health issues:      Back Pain       Duration: 1-2 years with worse pain X 2 weeks         Specific cause: none    Description:   Location of pain: low back left  Character of pain: sharp  Pain radiation:radiates into the left leg and radiates into the left foot  New numbness or weakness in legs, not attributed to pain:  no     Intensity: At its worst 9/10    History:   Pain interferes with job: YES  History of back problems: previous herniated disc  Any previous MRI or X-rays: Yes- at [place].  Date 12/14/2018  Sees a specialist for back pain:  No  Therapies tried without relief: ibuprofen, steroid injections     Alleviating factors:   Improved by: none      Precipitating factors:  Worsened by: Walking          Accompanying Signs & Symptoms:  Risk of Fracture:  None  Risk of Cauda Equina:  None  Risk of Infection:  None  Risk of Cancer:  None  Risk of Ankylosing Spondylitis:  Onset at age <35, male, AND morning back stiffness. no                      Problem list and histories reviewed & adjusted, as indicated.  Additional history: as documented    Patient Active Problem List   Diagnosis     Essential hypertension     Other male erectile dysfunction     Chronic midline low back pain without sciatica     Persistent atrial fibrillation (H)     Non morbid obesity due to excess calories     Chronic systolic congestive heart failure (H)     Alcohol use     Gastroesophageal reflux disease without esophagitis     Other sleep apnea     Past Surgical History:   Procedure Laterality Date     ANESTHESIA CARDIOVERSION N/A 9/24/2018    Procedure: ANESTHESIA CARDIOVERSION;  Cardioversion ;  Surgeon: GENERIC ANESTHESIA PROVIDER;  Location: [facility] OR     COLONOSCOPY N/A 5/25/2017    Procedure: COMBINED COLONOSCOPY, SINGLE OR MULTIPLE BIOPSY/POLYPECTOMY BY BIOPSY;;  Surgeon: Aquilino Garcia MD;  Location:  OR     COLONOSCOPY  WITH CO2 INSUFFLATION N/A 5/25/2017    Procedure: COLONOSCOPY WITH CO2 INSUFFLATION;  COLONOSCOPY SCREEN/ SUSHMA;  Surgeon: Aquilino aGrcia MD;  Location: [facility] OR     OPEN REDUCTION INTERNAL FIXATION TIBIA       TONSILLECTOMY         Social History     Tobacco Use     Smoking status: Never Smoker     Smokeless tobacco: Never Used     Tobacco comment: never smoked   Substance Use Topics     Alcohol use: No     Alcohol/week: 0.0 oz     Comment: social use     Family History   Problem Relation Age of Onset     Gout Father          Current Outpatient Medications   Medication Sig Dispense Refill     Famotidine (PEPCID PO) Take 10 mg by mouth       lisinopril (PRINIVIL/ZESTRIL) 20 MG tablet Take 2 tablets (40 mg) by mouth daily 180 tablet 1     metoprolol succinate ER (TOPROL-XL) 50 MG 24 hr tablet Take 1 tablet (50 mg) by mouth daily 90 tablet 3     omeprazole (PRILOSEC) 20 MG CR capsule Take 20 mg by mouth       rivaroxaban ANTICOAGULANT (XARELTO) 20 MG TABS tablet Take 1 tablet (20 mg) by mouth daily (with dinner) 30 tablet 3     sildenafil (REVATIO) 20 MG tablet Take 1 -5 tabs daily prn 30 tablet 11     zolpidem (AMBIEN) 5 MG tablet Take 1 tablet (5 mg) by mouth nightly as needed for sleep 30 tablet 0     Allergies   Allergen Reactions     Erythromycin Unknown     Upset stomach     Recent Labs   Lab Test 09/24/18  0734 08/23/18  1059 01/18/18  0932 11/11/16  0744   LDL  --   --  129* 151*   HDL  --   --  77 71   TRIG  --   --  68 73   CR 1.03 1.12 0.79 0.98   GFRESTIMATED 72 66 >90 77   GFRESTBLACK 87 79 >90 >90   GFR Calc     POTASSIUM 4.9 4.9 4.0 5.1   TSH  --  2.41 1.84  --       BP Readings from Last 3 Encounters:   12/10/18 124/86   12/06/18 127/87   11/29/18 (!) 143/98    Wt Readings from Last 3 Encounters:   12/10/18 105.7 kg (233 lb)   12/06/18 106.1 kg (234 lb)   11/29/18 106.6 kg (235 lb)                  Labs reviewed in EPIC    Reviewed and updated as needed this visit by clinical  staff  Tobacco  Allergies  Meds       Reviewed and updated as needed this visit by Provider         All other systems negative except as outline above  OBJECTIVE:  BACK: Lumbosacral spine area reveals local tenderness.  Painful and reduced LS ROM noted. Straight leg raise is mildly pos at 70 degrees on left .  DTR's, motor strength and sensation normal, including heel and toe gait.  Perifpheral pulses are palpable.  Hipes and knees have full range of motion without pain.  No abdominal tenderness, mass or organomegaly.      Tee was seen today for back pain.    Diagnoses and all orders for this visit:    Chronic midline low back pain with left-sided sciatica  -     predniSONE (DELTASONE) 20 MG tablet; Take 20 mg by mouth 2 times daily for 7 days.  -     oxyCODONE-acetaminophen (PERCOCET) 5-325 MG tablet; Take 1 tablet by mouth every 6 hours as needed for pain    Need for prophylactic vaccination and inoculation against influenza  -     FLU VACCINE, INCREASED ANTIGEN, PRESV FREE, AGE 65+ [41703]  -     Vaccine Administration, Initial [30738]      Advised supportive and symptomatic treatment.  Follow up with Provider - if condition persists or worsens.     Injectable Influenza Immunization Documentation    1.  Is the person to be vaccinated sick today?   No    2. Does the person to be vaccinated have an allergy to a component   of the vaccine?   No  Egg Allergy Algorithm Link    3. Has the person to be vaccinated ever had a serious reaction   to influenza vaccine in the past?   No    4. Has the person to be vaccinated ever had Guillain-Barré syndrome?   No    Form completed by  leidy

## 2018-12-10 NOTE — TELEPHONE ENCOUNTER
Pt calling to check status of [employer] message. Pt is requesting to be fit in with Jon Denson PA-C today for sciatic pain. Please call [phone] to advise, OK to LM Thank You.    Jasmine Kirkland, [facility]  Diabetes and Nutrition Scheduling

## 2018-12-12 DIAGNOSIS — G47.33 OSA (OBSTRUCTIVE SLEEP APNEA): Primary | ICD-10-CM

## 2018-12-14 ENCOUNTER — DOCUMENTATION ONLY (OUTPATIENT)
Dept: SLEEP MEDICINE | Facility: CLINIC | Age: 68
End: 2018-12-14

## 2018-12-14 ENCOUNTER — ANCILLARY PROCEDURE (OUTPATIENT)
Dept: MRI IMAGING | Facility: CLINIC | Age: 68
End: 2018-12-14
Attending: NURSE PRACTITIONER
Payer: COMMERCIAL

## 2018-12-14 DIAGNOSIS — G47.39 OTHER SLEEP APNEA: ICD-10-CM

## 2018-12-14 DIAGNOSIS — M54.16 LUMBAR RADICULOPATHY: ICD-10-CM

## 2018-12-14 PROCEDURE — 72148 MRI LUMBAR SPINE W/O DYE: CPT | Mod: TC

## 2018-12-14 NOTE — PROGRESS NOTES
30 DAY [facility] VISIT    Diagnostic AHI:   PSG AHI: 47.8     Subjective measures:  Recently had a mask exchange and he is working his way up on his usage time.      Device type:   PAP Device: Auto-CPAP ()   Mask type:    Mask Interface: Nasal Mask        Assessment: Pt not meeting objective benchmarks for compliance-currently at 7%   Patient failing following subjective benchmarks: he feels that the mask change and has been working with the mask.        Action plan:   Patient has scheduled a follow up visit with LINDA Garcia on 12/27/2018.           Objective measure goal  Compliance   Goal >70%  Leak   Goal < 24 lpm  AHI  Goal < 5  Usage  Goal >240

## 2018-12-17 ENCOUNTER — OFFICE VISIT (OUTPATIENT)
Dept: CARDIOLOGY | Facility: CLINIC | Age: 68
End: 2018-12-17
Payer: COMMERCIAL

## 2018-12-17 ENCOUNTER — TELEPHONE (OUTPATIENT)
Dept: NEUROSURGERY | Facility: CLINIC | Age: 68
End: 2018-12-17

## 2018-12-17 VITALS
BODY MASS INDEX: 34.29 KG/M2 | OXYGEN SATURATION: 99 % | SYSTOLIC BLOOD PRESSURE: 140 MMHG | HEART RATE: 101 BPM | WEIGHT: 239 LBS | DIASTOLIC BLOOD PRESSURE: 92 MMHG

## 2018-12-17 DIAGNOSIS — I48.19 PERSISTENT ATRIAL FIBRILLATION (H): Primary | ICD-10-CM

## 2018-12-17 PROCEDURE — 99204 OFFICE O/P NEW MOD 45 MIN: CPT | Performed by: INTERNAL MEDICINE

## 2018-12-17 NOTE — NURSING NOTE
"Chief Complaint   Patient presents with     Atrial Fib     NEW referred by Dr. Lake to discuss medication management or ablation for persistent atrial fibrillation. Previous cardioversion 3 months ago but is back in afib. Recent echo showing a EF of 40-45% . per patient no bothersome symptoms       Initial BP (!) 140/92 (BP Location: Right arm, Patient Position: Sitting, Cuff Size: Adult Large)   Pulse 101   Wt 108.4 kg (239 lb)   SpO2 99%   BMI 34.29 kg/m   Estimated body mass index is 34.29 kg/m  as calculated from the following:    Height as of 12/10/18: 1.778 m (5' 10\").    Weight as of this encounter: 108.4 kg (239 lb)..  BP completed using cuff size: arin Rudd L.P.N.    "

## 2018-12-17 NOTE — TELEPHONE ENCOUNTER
LM for pt to call back.     No change from 2/2018 scan.     Has left disc herniation at L3-4  Moderate left L4-5 foraminal stenosis.      Would correlate with left sided symptoms.  Try injections. If failed or pain severe then see Dr. Jarrett Sheets.

## 2018-12-17 NOTE — LETTER
12/17/2018      RE: Tee Hilton  [address] [facility] [address]       Dear Colleague,    Thank you for the opportunity to participate in the care of your patient, Tee Hilton, at the [facility] HEART AT [facility] at [facility]. Please see a copy of my visit note below.    HPI:  We have been asked by Dr. Lake to see Mr. Tee Hilton for evaluation of PAF and consideration for pulmonary vein isolation. Mr. Hilton is a 68 year old gentleman who was noted to have and irregular heart rate and was found to be in AF at a clinic visit 8/23/2018. He was started on rivaroxaban and metoprolol succinate 25 mg daily. He was referred to Dr. Lake. Echo (in AF, September 2018) showed mildly reduced LV function (EF 40-45%), moderate biatrial enlargement. There was mild aortic sclerosis, mild MAC and mild mitral regurgitation. A lexiscan in August 2018 was negative for ischemic or infarct. He was successfully cardioverted to sinus rhythm 9/24/2018. He was still in sinus rhythm on ECG 9/27/2018. Echo 11/26/2018 showed him to be back in AF. LVEF remained at 40-45%. There was no interval change although this report judged the atria to be normal.     Mr. Hilton confirms that he he had no awareness of being in atrial fibrillation when he was first diagnosed and he denies any change in how he felt with cardioversion.  He denies chest pain, shortness of breath, palpitations, lightheadedness or fatigue.  His metoprolol has been increased to 50 mg daily.  He denies any problems with this medication including denying fatigue or symptoms of depression.  He notes that when he works out on an elliptical machine he will have heart rates in the 160s-170s, much higher than normal for him.  He denies problems with rivaroxaban and he denies symptoms suggestive of embolic events.    Past medical history is negative for myocardial infarction,  cerebrovascular accident or diabetes.  As noted, he has hypertension.    He is a non-smoker but admits to drinking 2-3 times a week.    Family history is positive for coronary artery disease and may be positive for sudden cardiac death (his uncle  in his sleep at age 63).    His problem list includes essential hypertension, chronic systolic heart failure, chronic low back pain, sleep apnea and EtOH use. Dr. Lake has counseled him on the fact that EtOH increases the risk of having recurrent AF.       PAST MEDICAL HISTORY:  History reviewed. No pertinent past medical history.    CURRENT MEDICATIONS:  Current Outpatient Medications   Medication Sig Dispense Refill     Famotidine (PEPCID PO) Take 10 mg by mouth       lisinopril (PRINIVIL/ZESTRIL) 20 MG tablet Take 2 tablets (40 mg) by mouth daily 180 tablet 1     metoprolol succinate ER (TOPROL-XL) 50 MG 24 hr tablet Take 1 tablet (50 mg) by mouth daily 90 tablet 3     omeprazole (PRILOSEC) 20 MG CR capsule Take 20 mg by mouth       predniSONE (DELTASONE) 20 MG tablet Take 20 mg by mouth 2 times daily for 7 days. 14 tablet 0     rivaroxaban ANTICOAGULANT (XARELTO) 20 MG TABS tablet Take 1 tablet (20 mg) by mouth daily (with dinner) 30 tablet 3     sildenafil (REVATIO) 20 MG tablet Take 1 -5 tabs daily prn 30 tablet 11     zolpidem (AMBIEN) 5 MG tablet Take 1 tablet (5 mg) by mouth nightly as needed for sleep 30 tablet 0       PAST SURGICAL HISTORY:  Past Surgical History:   Procedure Laterality Date     ANESTHESIA CARDIOVERSION N/A 2018    Procedure: ANESTHESIA CARDIOVERSION;  Cardioversion ;  Surgeon: GENERIC ANESTHESIA PROVIDER;  Location: [facility] OR     COLONOSCOPY N/A 2017    Procedure: COMBINED COLONOSCOPY, SINGLE OR MULTIPLE BIOPSY/POLYPECTOMY BY BIOPSY;;  Surgeon: Aquilino Garcia MD;  Location: [facility] OR     COLONOSCOPY WITH CO2 INSUFFLATION N/A 2017    Procedure: COLONOSCOPY WITH CO2 INSUFFLATION;  COLONOSCOPY SCREEN/ SUSHMA;  Surgeon:  Aquilino Garcia MD;  Location: [facility] OR     OPEN REDUCTION INTERNAL FIXATION TIBIA       TONSILLECTOMY         ALLERGIES:     Allergies   Allergen Reactions     Erythromycin Unknown     Upset stomach       FAMILY HISTORY:  Family History   Problem Relation Age of Onset     Gout Father        SOCIAL HISTORY:  Social History     Tobacco Use     Smoking status: Never Smoker     Smokeless tobacco: Never Used     Tobacco comment: never smoked   Substance Use Topics     Alcohol use: No     Alcohol/week: 0.0 oz     Comment: social use     Drug use: No     Exam:  BP (!) 140/92 (BP Location: Right arm, Patient Position: Sitting, Cuff Size: Adult Large)   Pulse 101   Wt 108.4 kg (239 lb)   SpO2 99%   BMI 34.29 kg/m     No acute distress.  Alert and oriented.  HEENT: EOMI, PERRL, oral mucosa moist, palate elevates symmetrically  Neck: No carotid bruits. No lymphadenopathy.  JVP 6-7 cm without HJR.  Cor: Irregularly irregular rhythm. Variable S1, Normal S2.  No murmur, rub, or gallop.    Lungs:  Clear  Abd: Soft, nontender, bowel sounds present.  No hepatosplenomegaly..  Extremities: No C/C/E.  Pulses (R/L, 4=normal):  Carotids 4/4, Radials 4/4, DP 1/1, TP 4/3  Neuro: Grossly intact.      Labs:  CBC RESULTS:   Lab Results   Component Value Date    HGB 9.5 (L) 06/05/2007       BMP RESULTS:  Lab Results   Component Value Date     09/24/2018    POTASSIUM 4.9 09/24/2018    CHLORIDE 106 09/24/2018    CO2 26 09/24/2018    ANIONGAP 6 09/24/2018     (H) 09/24/2018    BUN 20 09/24/2018    CR 1.03 09/24/2018    GFRESTIMATED 72 09/24/2018    GFRESTBLACK 87 09/24/2018    GRISELDA 9.0 09/24/2018        INR RESULTS:  Lab Results   Component Value Date    INR 1.02 06/04/2007       Procedures:  Echocardiogram:   Recent Results (from the past 8760 hour(s))   Echo limited with definity    Narrative    [id number]  [facility]  [id number]  [id number]^K^P           [facility], Echocardiography Laboratory  [address]  [place] [address]        Name: ELDA HERNÁNDEZ  MRN: [id number]  : 1950  Study Date: 2018 09:07 AM  Age: 68 yrs  Gender: Male  Patient Location: [place]  Reason For Study: , Atrial fibrillation (H)  Ordering Physician: CLAY STRATTON  Referring Physician: CLAY STRATTON  Performed By: Indu Manning RDCS     BSA: 2.3 m2  Height: 71 in  Weight: 234 lb  BP: 139/82 mmHg  _____________________________________________________________________________  __        Procedure  Limited Echocardiogram with portions of two-dimensional, color and spectral  Doppler performed. Contrast Definity. Definity (NDC #12483-088-39) given  intravenously. Patient was given 7ml mixture of 1.5ml Definity and 8.5ml  saline. 3 ml wasted. IV start location RAC . The patient's rhythm is atrial  fibrillation.  _____________________________________________________________________________  __        Interpretation Summary     The Ejection Fraction is estimated at 40-45%.  Mild diffuse hypokinesis is present.  Right ventricular function, chamber size, wall motion, and thickness are  normal.  The inferior vena cava is normal.  Estimated mean right atrial pressure is 3 mmHg (normal).  Right ventricular systolic pressure is 23mmHg above the right atrial pressure.  No pericardial effusion is present.  Proximal ascending aorta 3.8 cm (indexed to BSA 1.6 cm/m2 which is borderline  normal).     Compared to prior study (2018), no significant change.  _____________________________________________________________________________  __        Left Ventricle  Left ventricular wall thickness is normal. Left ventricular size is normal.  The Ejection Fraction is estimated at 40-45%. Diastolic function not assessed  due to atrial fibrillation. Mild diffuse hypokinesis is present.     Right Ventricle  Right ventricular function, chamber size, wall motion, and thickness are  normal.     Atria  Both atria appear normal. The  atrial septum is intact as assessed by color  Doppler .     Mitral Valve  The mitral valve is normal. Mild mitral insufficiency is present.        Aortic Valve  Trileaflet aortic sclerosis without stenosis. The aortic valve is tricuspid.  Trace to mild aortic insufficiency is present.     Tricuspid Valve  The tricuspid valve is normal. Mild tricuspid insufficiency is present.  Pulmonary artery systolic pressure is normal. Right ventricular systolic  pressure is 23mmHg above the right atrial pressure.     Pulmonic Valve  The pulmonic valve is normal.     Vessels  The aorta root is normal. Proximal ascending aorta 3.8 cm (indexed to BSA 1.6  cm/m2 which is borderline normal). The inferior vena cava is normal. Estimated  mean right atrial pressure is 3 mmHg (normal).     Pericardium  No pericardial effusion is present.     _____________________________________________________________________________  __  MMode/2D Measurements & Calculations  LA Volume (BP): 55.0 ml     LA Volume Index (BP): 24.4 ml/m2        Doppler Measurements & Calculations  TR max zoey: 241.6 cm/sec  TR max P.4 mmHg           _____________________________________________________________________________  __           Report approved by: Lee Georges 2018 12:20 PM      Echo complete with definity    Narrative    [id number]  [id number]  [id number]  [id number]^CHAYITO^CLAY^P           [facility], Echocardiography Laboratory  [address] [place] [address]        Name: ELDA HERNÁNDEZ  MRN: [id number]  : 1950  Study Date: 2018 02:58 PM  Age: 67 yrs  Gender: Male  Patient Location: [facility]  Reason For Study: Persistent atrial fibrillation (H)  Ordering Physician: CLAY STRATTON  Referring Physician: CLAY STRATTON  Performed By: Bibiana Estrada RDCS     BSA: 2.3 m2  Height: 70 in  Weight: 242 lb  HR: 113  BP: 154/87  mmHg  _____________________________________________________________________________  __        Procedure  Echocardiogram with two-dimensional, color and spectral Doppler performed.  Contrast Definity. Definity (NDC #11803-961-10) given intravenously. Patient  was given 6ml mixture of 1.5ml Definity and 8.5ml saline. 4 ml wasted. IV  start location RAC .  _____________________________________________________________________________  __        Interpretation Summary     Patient in atrial fibrillation during the study.  Mildly (EF 40-45%) reduced left ventricular function is present. Mild diffuse  hypokinesis is present.  Right ventricular function, chamber size, wall motion, and thickness are  normal.  The inferior vena cava was normal in size with preserved respiratory  variability.Estimated mean right atrial pressure is 3 mmHg (normal).  No pericardial effusion.  Mild tricuspid insufficiency is present.  _____________________________________________________________________________  __        Left Ventricle  Left ventricular wall thickness is normal. Left ventricular size is normal.  Diastolic function not assessed due to atrial fibrillation. Mildly (EF 40-45%)  reduced left ventricular function is present. The Ejection Fraction was  calculated using Bi-plane contrast. Mild diffuse hypokinesis is present.     Right Ventricle  Right ventricular function, chamber size, wall motion, and thickness are  normal.     Atria  Moderate biatrial enlargement is present. The atrial septum is intact as  assessed by color Doppler .     Mitral Valve  Mild mitral annular calcification is present. Mild mitral insufficiency is  present.        Aortic Valve  Mild aortic valve sclerosis is present. Trace aortic insufficiency is present.     Tricuspid Valve  The tricuspid valve is normal. Mild tricuspid insufficiency is present. Right  ventricular systolic pressure is 28mmHg above the right atrial pressure.  Pulmonary artery systolic  pressure is normal.     Pulmonic Valve  Trace to mild pulmonic insufficiency is present.     Vessels  Visualized portions of the aorta are normal in size. The inferior vena cava  was normal in size with preserved respiratory variability. The pulmonary  artery cannot be assessed. Estimated mean right atrial pressure is 3 mmHg  (normal).     Pericardium  No pericardial effusion is present.        Compared to Previous Study  Previous study not available for comparison.     Attestation  I have personally viewed the imaging and agree with the interpretation and  report as documented by the fellow, Elizabeth Coker, and/or edited by me.  _____________________________________________________________________________  __     MMode/2D Measurements & Calculations  IVSd: 0.89 cm  LVIDd: 5.5 cm  LVIDs: 4.7 cm  LVPWd: 1.0 cm  FS: 14.5 %  LV mass(C)d: 201.2 grams  LV mass(C)dI: 88.9 grams/m2  Ao root diam: 3.5 cm  LA dimension: 5.0 cm  asc Aorta Diam: 3.3 cm  LA/Ao: 1.4  LVOT diam: 2.5 cm  LVOT area: 4.8 cm2  LA Volume (BP): 113.0 ml     LA Volume Index (BP): 50.0 ml/m2  RWT: 0.37        Doppler Measurements & Calculations  MV E max lobito: 109.1 cm/sec  MV dec time: 0.12 sec  Ao V2 max: 138.0 cm/sec  Ao max P.0 mmHg  ANDRES(V,D): 2.9 cm2  LV V1 max P.8 mmHg  LV V1 max: 82.7 cm/sec  LV V1 VTI: 14.5 cm  MR ERO: 0.17 cm2  MR volume: 24.3 ml  CO(LVOT): 6.9 l/min  CI(LVOT): 3.0 l/min/m2  SV(LVOT): 69.7 ml  SI(LVOT): 30.8 ml/m2  PA V2 max: 100.4 cm/sec  PA max P.0 mmHg  PI end-d lobito: 201.0 cm/sec  PI max lobito: 271.5 cm/sec  PI max P.5 mmHg  TR max lobito: 265.3 cm/sec  TR max P.2 mmHg  Pulm Sys Lobito: 21.7 cm/sec  Pulm [place] Lobito: 87.9 cm/sec  Pulm S/D: 0.25  AV Lobito Ratio (DI): 0.60  E/E' av.9     Lateral E/e': 8.3  Medial E/e': 15.5           _____________________________________________________________________________  __           Report approved by: Lee Barraza 2018 05:38 PM          Assessment and  Plan:  Tee Hilton is a 68 year old gentleman with a recent diagnosis of asymptomatic AF. He is on rivaroxaban and metoprolol succinate 50 mg daily. His [id number] is 2 or 3 (age, hypertension, systolic heart failure although this may be a subacute effect of his rapid ventricular response).  His echo shows mildly reduced LV function (EF 40-45%), moderate biatrial enlargement.  He was successfully cardioverted to sinus rhythm 9/24/2018 but he had recurrence of AF.  I had a long discussion with the patient about atrial fibrillation, including the natural history of the disease, risk of embolic events, role of antithrombotic therapy, role of rate control therapy and the role of antiarrhythmic medications.  We briefly discussed atrial fibrillation ablation.  We discussed the AFFIRM trial.  His heart rate today and his description of his heart rate on the elliptical suggest that he does not have adequate rate control.  I have asked him to increase his metoprolol to 100 mg daily.  If he is tolerating this after 1 week we will obtain a 24-hour Holter monitor to assess his heart rate during activities of daily living.  Since he goes to the gym 3 or 4 times a week I have asked him to specifically go to the gym on a day he wears a monitor.  Assuming we can gain better control of his heart rate I would like to see him back in 3 months with a repeat echo to see if we have improved his LV function.  We will then consider whether it is worth pursuing a rhythm control strategy but I suspect that her rate control strategy would be the most appropriate long-term approach for him.  It was a pleasure meeting Tee Hilton today.    Enrique Stephenson

## 2018-12-17 NOTE — TELEPHONE ENCOUNTER
Patient returned call went over below message per ESTHER Sanchez. Patient states he has an appointment scheduled tomorrow to discuss results and plan with Rupal SANTANA in person. Patient would like to keep this appointment to discuss further.

## 2018-12-17 NOTE — PATIENT INSTRUCTIONS
Thank you for coming to the [facility] @ [place]; please note the following instructions:    Dr Stephenson recommended:     1. To increase Metoprolol succinate ( Toprol XL) 100 mg daily. You need to call clinic to let us know how you tolerate the increase so we can schedule the heart monitor    2. 24 hour Holter monitor 1 week after increasing Metoprolol     3. 3 months follow-up with an ECHO prior        If you have any questions regarding your visit please contact your care team:     Cardiology  Telephone Number   Samia GALE, ESTELITA PERAZA, RN   Evelia MONTALVO, MALATHI HARMAN, MA  Duong VARGAS, INGAN   [phone]    *After hours: [phone]   For scheduling appts:     [phone] or    [phone] (select option 1)    *After hours: [phone]     For the Device Clinic (Pacemakers and ICD's)  RN's :  Rachael Harris   During business hours: [phone]    *After business hours:  [phone] (select option 4)      Normal test result notifications will be released via [employer] or mailed within 7 business days.  All other test results, will be communicated via telephone once reviewed by your cardiologist.    If you need a medication refill please contact your pharmacy.  Please allow 3 business days for your refill to be completed.    As always, thank you for trusting us with your health care needs!

## 2018-12-18 NOTE — NURSING NOTE
Med Reconcile: Reviewed and verified all current medications with the patient. The updated medication list was printed and given to the patient.  Return Appointment: Patient given instructions regarding scheduling next clinic visit. Patient demonstrated understanding of this information and agreed to call with further questions or concerns.  3 month follow up with echocardiogram  Medication Change: Patient was educated regarding prescribed medication change, including discussion of the indication, administration, side effects, and when to report to MD or RN.  Increase metoprolol to 100 mg daily and if tolerating this dose then he will wear a 24 hour holter Patient demonstrated understanding of this information and agreed to call with further questions or concerns.  Patient stated he understood all health information given and agreed to call with further questions or concerns.  Samia Ryan RN

## 2018-12-18 NOTE — PROGRESS NOTES
HPI:  We have been asked by Dr. Lake to see Mr. Tee Hilton for evaluation of PAF and consideration for pulmonary vein isolation. Mr. Hilton is a 68 year old gentleman who was noted to have and irregular heart rate and was found to be in AF at a clinic visit 2018. He was started on rivaroxaban and metoprolol succinate 25 mg daily. He was referred to Dr. Lake. Echo (in AF, 2018) showed mildly reduced LV function (EF 40-45%), moderate biatrial enlargement. There was mild aortic sclerosis, mild MAC and mild mitral regurgitation. A lexiscan in 2018 was negative for ischemic or infarct. He was successfully cardioverted to sinus rhythm 2018. He was still in sinus rhythm on ECG 2018. Echo 2018 showed him to be back in AF. LVEF remained at 40-45%. There was no interval change although this report judged the atria to be normal.     Mr. Hilton confirms that he he had no awareness of being in atrial fibrillation when he was first diagnosed and he denies any change in how he felt with cardioversion.  He denies chest pain, shortness of breath, palpitations, lightheadedness or fatigue.  His metoprolol has been increased to 50 mg daily.  He denies any problems with this medication including denying fatigue or symptoms of depression.  He notes that when he works out on an elliptical machine he will have heart rates in the 160s-170s, much higher than normal for him.  He denies problems with rivaroxaban and he denies symptoms suggestive of embolic events.    Past medical history is negative for myocardial infarction, cerebrovascular accident or diabetes.  As noted, he has hypertension.    He is a non-smoker but admits to drinking 2-3 times a week.    Family history is positive for coronary artery disease and may be positive for sudden cardiac death (his uncle  in his sleep at age 63).    His problem list includes essential hypertension, chronic systolic heart failure, chronic  low back pain, sleep apnea and EtOH use. Dr. Lake has counseled him on the fact that EtOH increases the risk of having recurrent AF.       PAST MEDICAL HISTORY:  History reviewed. No pertinent past medical history.    CURRENT MEDICATIONS:  Current Outpatient Medications   Medication Sig Dispense Refill     Famotidine (PEPCID PO) Take 10 mg by mouth       lisinopril (PRINIVIL/ZESTRIL) 20 MG tablet Take 2 tablets (40 mg) by mouth daily 180 tablet 1     metoprolol succinate ER (TOPROL-XL) 50 MG 24 hr tablet Take 1 tablet (50 mg) by mouth daily 90 tablet 3     omeprazole (PRILOSEC) 20 MG CR capsule Take 20 mg by mouth       predniSONE (DELTASONE) 20 MG tablet Take 20 mg by mouth 2 times daily for 7 days. 14 tablet 0     rivaroxaban ANTICOAGULANT (XARELTO) 20 MG TABS tablet Take 1 tablet (20 mg) by mouth daily (with dinner) 30 tablet 3     sildenafil (REVATIO) 20 MG tablet Take 1 -5 tabs daily prn 30 tablet 11     zolpidem (AMBIEN) 5 MG tablet Take 1 tablet (5 mg) by mouth nightly as needed for sleep 30 tablet 0       PAST SURGICAL HISTORY:  Past Surgical History:   Procedure Laterality Date     ANESTHESIA CARDIOVERSION N/A 9/24/2018    Procedure: ANESTHESIA CARDIOVERSION;  Cardioversion ;  Surgeon: GENERIC ANESTHESIA PROVIDER;  Location: [facility] OR     COLONOSCOPY N/A 5/25/2017    Procedure: COMBINED COLONOSCOPY, SINGLE OR MULTIPLE BIOPSY/POLYPECTOMY BY BIOPSY;;  Surgeon: Aquilino Garcia MD;  Location: [facility] OR     COLONOSCOPY WITH CO2 INSUFFLATION N/A 5/25/2017    Procedure: COLONOSCOPY WITH CO2 INSUFFLATION;  COLONOSCOPY SCREEN/ ORDAZ;  Surgeon: Aquilino Garcia MD;  Location: [facility] OR     OPEN REDUCTION INTERNAL FIXATION TIBIA       TONSILLECTOMY         ALLERGIES:     Allergies   Allergen Reactions     Erythromycin Unknown     Upset stomach       FAMILY HISTORY:  Family History   Problem Relation Age of Onset     Gout Father        SOCIAL HISTORY:  Social History     Tobacco Use     Smoking status: Never Smoker      Smokeless tobacco: Never Used     Tobacco comment: never smoked   Substance Use Topics     Alcohol use: No     Alcohol/week: 0.0 oz     Comment: social use     Drug use: No       ROS:   Constitutional: No fever, chills, or sweats. Weight stable.   ENT: No visual disturbance, ear ache, epistaxis, sore throat.   Cardiovascular: As per HPI.   Respiratory: No cough, hemoptysis.    GI: No nausea, vomiting, hematemesis, melena, or hematochezia.   : No hematuria.   Integument: Negative.   Psychiatric: Negative.   Hematologic:  Easy bruising, no easy bleeding.  Neuro: Negative.   Endocrinology: No significant heat or cold intolerance   Musculoskeletal: No myalgia.    Exam:  BP (!) 140/92 (BP Location: Right arm, Patient Position: Sitting, Cuff Size: Adult Large)   Pulse 101   Wt 108.4 kg (239 lb)   SpO2 99%   BMI 34.29 kg/m    No acute distress.  Alert and oriented.  HEENT: EOMI, PERRL, oral mucosa moist, palate elevates symmetrically  Neck: No carotid bruits. No lymphadenopathy.  JVP 6-7 cm without HJR.  Cor: Irregularly irregular rhythm. Variable S1, Normal S2.  No murmur, rub, or gallop.    Lungs:  Clear  Abd: Soft, nontender, bowel sounds present.  No hepatosplenomegaly..  Extremities: No C/C/E.  Pulses (R/L, 4=normal):  Carotids 4/4, Radials 4/4, DP 1/1, TP 4/3  Neuro: Grossly intact.      Labs:  CBC RESULTS:   Lab Results   Component Value Date    HGB 9.5 (L) 06/05/2007       BMP RESULTS:  Lab Results   Component Value Date     09/24/2018    POTASSIUM 4.9 09/24/2018    CHLORIDE 106 09/24/2018    CO2 26 09/24/2018    ANIONGAP 6 09/24/2018     (H) 09/24/2018    BUN 20 09/24/2018    CR 1.03 09/24/2018    GFRESTIMATED 72 09/24/2018    GFRESTBLACK 87 09/24/2018    GRISELDA 9.0 09/24/2018        INR RESULTS:  Lab Results   Component Value Date    INR 1.02 06/04/2007       Procedures:  Echocardiogram:   Recent Results (from the past 8760 hour(s))   Echo limited with definity    Narrative     [id number]  [facility]  [id number]  [id number]^PEEODAALISSA^K^P           [facility], Echocardiography Laboratory  [address] [place] [address]        Name: ELDA HERNÁNDEZ  MRN: [id number]  : 1950  Study Date: 2018 09:07 AM  Age: 68 yrs  Gender: Male  Patient Location: [facility]  Reason For Study: , Atrial fibrillation (H)  Ordering Physician: CLAY STRATTON  Referring Physician: CLAY STRATTON  Performed By: Indu Manning RDCS     BSA: 2.3 m2  Height: 71 in  Weight: 234 lb  BP: 139/82 mmHg  _____________________________________________________________________________  __        Procedure  Limited Echocardiogram with portions of two-dimensional, color and spectral  Doppler performed. Contrast Definity. Definity (NDC #68809-388-43) given  intravenously. Patient was given 7ml mixture of 1.5ml Definity and 8.5ml  saline. 3 ml wasted. IV start location RAC . The patient's rhythm is atrial  fibrillation.  _____________________________________________________________________________  __        Interpretation Summary     The Ejection Fraction is estimated at 40-45%.  Mild diffuse hypokinesis is present.  Right ventricular function, chamber size, wall motion, and thickness are  normal.  The inferior vena cava is normal.  Estimated mean right atrial pressure is 3 mmHg (normal).  Right ventricular systolic pressure is 23mmHg above the right atrial pressure.  No pericardial effusion is present.  Proximal ascending aorta 3.8 cm (indexed to BSA 1.6 cm/m2 which is borderline  normal).     Compared to prior study (2018), no significant change.  _____________________________________________________________________________  __        Left Ventricle  Left ventricular wall thickness is normal. Left ventricular size is normal.  The Ejection Fraction is estimated at 40-45%. Diastolic function not assessed  due to atrial fibrillation. Mild diffuse hypokinesis is present.     Right  Ventricle  Right ventricular function, chamber size, wall motion, and thickness are  normal.     Atria  Both atria appear normal. The atrial septum is intact as assessed by color  Doppler .     Mitral Valve  The mitral valve is normal. Mild mitral insufficiency is present.        Aortic Valve  Trileaflet aortic sclerosis without stenosis. The aortic valve is tricuspid.  Trace to mild aortic insufficiency is present.     Tricuspid Valve  The tricuspid valve is normal. Mild tricuspid insufficiency is present.  Pulmonary artery systolic pressure is normal. Right ventricular systolic  pressure is 23mmHg above the right atrial pressure.     Pulmonic Valve  The pulmonic valve is normal.     Vessels  The aorta root is normal. Proximal ascending aorta 3.8 cm (indexed to BSA 1.6  cm/m2 which is borderline normal). The inferior vena cava is normal. Estimated  mean right atrial pressure is 3 mmHg (normal).     Pericardium  No pericardial effusion is present.     _____________________________________________________________________________  __  MMode/2D Measurements & Calculations  LA Volume (BP): 55.0 ml     LA Volume Index (BP): 24.4 ml/m2        Doppler Measurements & Calculations  TR max zoey: 241.6 cm/sec  TR max P.4 mmHg           _____________________________________________________________________________  __           Report approved by: Lee Georges 2018 12:20 PM      Echo complete with definity    Narrative    [id number]  [facility]  [id number]  [id number]^CHAYITO^K^P           [facility], Echocardiography Laboratory  [address] [place] [id number]        Name: ELDA HERNÁNDEZ  MRN: [id number]  : 1950  Study Date: 2018 02:58 PM  Age: 67 yrs  Gender: Male  Patient Location: [facility]  Reason For Study: Persistent atrial fibrillation (H)  Ordering Physician: CLAY STRATTON  Referring Physician: CLAY STRATTON  Performed By: Bibiana Estrada RDCS     BSA: 2.3  m2  Height: 70 in  Weight: 242 lb  HR: 113  BP: 154/87 mmHg  _____________________________________________________________________________  __        Procedure  Echocardiogram with two-dimensional, color and spectral Doppler performed.  Contrast Definity. Definity (NDC #[id number]) given intravenously. Patient  was given 6ml mixture of 1.5ml Definity and 8.5ml saline. 4 ml wasted. IV  start location RAC .  _____________________________________________________________________________  __        Interpretation Summary     Patient in atrial fibrillation during the study.  Mildly (EF 40-45%) reduced left ventricular function is present. Mild diffuse  hypokinesis is present.  Right ventricular function, chamber size, wall motion, and thickness are  normal.  The inferior vena cava was normal in size with preserved respiratory  variability.Estimated mean right atrial pressure is 3 mmHg (normal).  No pericardial effusion.  Mild tricuspid insufficiency is present.  _____________________________________________________________________________  __        Left Ventricle  Left ventricular wall thickness is normal. Left ventricular size is normal.  Diastolic function not assessed due to atrial fibrillation. Mildly (EF 40-45%)  reduced left ventricular function is present. The Ejection Fraction was  calculated using Bi-plane contrast. Mild diffuse hypokinesis is present.     Right Ventricle  Right ventricular function, chamber size, wall motion, and thickness are  normal.     Atria  Moderate biatrial enlargement is present. The atrial septum is intact as  assessed by color Doppler .     Mitral Valve  Mild mitral annular calcification is present. Mild mitral insufficiency is  present.        Aortic Valve  Mild aortic valve sclerosis is present. Trace aortic insufficiency is present.     Tricuspid Valve  The tricuspid valve is normal. Mild tricuspid insufficiency is present. Right  ventricular systolic pressure is 28mmHg above the  right atrial pressure.  Pulmonary artery systolic pressure is normal.     Pulmonic Valve  Trace to mild pulmonic insufficiency is present.     Vessels  Visualized portions of the aorta are normal in size. The inferior vena cava  was normal in size with preserved respiratory variability. The pulmonary  artery cannot be assessed. Estimated mean right atrial pressure is 3 mmHg  (normal).     Pericardium  No pericardial effusion is present.        Compared to Previous Study  Previous study not available for comparison.     Attestation  I have personally viewed the imaging and agree with the interpretation and  report as documented by the fellow, Elizabeth Coker, and/or edited by me.  _____________________________________________________________________________  __     MMode/2D Measurements & Calculations  IVSd: 0.89 cm  LVIDd: 5.5 cm  LVIDs: 4.7 cm  LVPWd: 1.0 cm  FS: 14.5 %  LV mass(C)d: 201.2 grams  LV mass(C)dI: 88.9 grams/m2  Ao root diam: 3.5 cm  LA dimension: 5.0 cm  asc Aorta Diam: 3.3 cm  LA/Ao: 1.4  LVOT diam: 2.5 cm  LVOT area: 4.8 cm2  LA Volume (BP): 113.0 ml     LA Volume Index (BP): 50.0 ml/m2  RWT: 0.37        Doppler Measurements & Calculations  MV E max lobito: 109.1 cm/sec  MV dec time: 0.12 sec  Ao V2 max: 138.0 cm/sec  Ao max P.0 mmHg  ANDRES(V,D): 2.9 cm2  LV V1 max P.8 mmHg  LV V1 max: 82.7 cm/sec  LV V1 VTI: 14.5 cm  MR ERO: 0.17 cm2  MR volume: 24.3 ml  CO(LVOT): 6.9 l/min  CI(LVOT): 3.0 l/min/m2  SV(LVOT): 69.7 ml  SI(LVOT): 30.8 ml/m2  PA V2 max: 100.4 cm/sec  PA max P.0 mmHg  PI end-d lobito: 201.0 cm/sec  PI max lobito: 271.5 cm/sec  PI max P.5 mmHg  TR max lobito: 265.3 cm/sec  TR max P.2 mmHg  Pulm Sys Lobito: 21.7 cm/sec  Pulm Urbina Lobito: 87.9 cm/sec  Pulm S/D: 0.25  AV Lobito Ratio (DI): 0.60  E/E' av.9     Lateral E/e': 8.3  Medial E/e': 15.5           _____________________________________________________________________________  __           Report approved by: Satish Martinez,  Lee 09/06/2018 05:38 PM          Assessment and Plan:  Tee Hilton is a 68 year old gentleman with a recent diagnosis of asymptomatic AF. He is on rivaroxaban and metoprolol succinate 50 mg daily. His [id number] is 2 or 3 (age, hypertension, systolic heart failure although this may be a subacute effect of his rapid ventricular response).  His echo shows mildly reduced LV function (EF 40-45%), moderate biatrial enlargement.  He was successfully cardioverted to sinus rhythm 9/24/2018 but he had recurrence of AF.  I had a long discussion with the patient about atrial fibrillation, including the natural history of the disease, risk of embolic events, role of antithrombotic therapy, role of rate control therapy and the role of antiarrhythmic medications.  We briefly discussed atrial fibrillation ablation.  We discussed the AFFIRM trial.  His heart rate today and his description of his heart rate on the elliptical suggest that he does not have adequate rate control.  I have asked him to increase his metoprolol to 100 mg daily.  If he is tolerating this after 1 week we will obtain a 24-hour Holter monitor to assess his heart rate during activities of daily living.  Since he goes to the gym 3 or 4 times a week I have asked him to specifically go to the gym on a day he wears a monitor.  Assuming we can gain better control of his heart rate I would like to see him back in 3 months with a repeat echo to see if we have improved his LV function.  We will then consider whether it is worth pursuing a rhythm control strategy but I suspect that her rate control strategy would be the most appropriate long-term approach for him.  It was a pleasure meeting Tee Hilton today.  Enrique FIELD

## 2018-12-19 ENCOUNTER — TELEPHONE (OUTPATIENT)
Dept: FAMILY MEDICINE | Facility: CLINIC | Age: 68
End: 2018-12-19

## 2018-12-19 ENCOUNTER — TELEPHONE (OUTPATIENT)
Dept: CARDIOLOGY | Facility: CLINIC | Age: 68
End: 2018-12-19

## 2018-12-19 NOTE — TELEPHONE ENCOUNTER
Find out if any anal pain/discomfort. Could be due to an anal fissure vs bleeding internal hemorrhoids

## 2018-12-19 NOTE — TELEPHONE ENCOUNTER
"Spoke with patient, was straining to have a bowel movement and was pushing hard. Noting blood both mixed in his stool and on the surface of stool.    When wiping noting blood also however patient stating \"not a lot\".     Water in toilet bowl was red tinted.     Per patient this has happened 2x in the past few days.  Denies passing just blood from rectum. Denies dizziness/lightheadedness.    Routing to MD to advise if this can wait until after new year as his only opening is today at 1:40 pm?    Becky Marcano, RN, BSN            "

## 2018-12-19 NOTE — TELEPHONE ENCOUNTER
Spoke with patient.   Providers note read as written.   Patient verbalized understanding.  Scheduled appointment for 1/2 (first opening with PCP),  RN advised s/s to monitor for and when to call clinic vs. ER   No further questions at this time.     Lili Vogt RN

## 2018-12-19 NOTE — TELEPHONE ENCOUNTER
Called patient back.     Denies anal pain/discomfort.     Pt hoping to get in to be seen this week.  Okay to work patient in on any particular day?    Becky Marcano, RN, BSN

## 2018-12-19 NOTE — TELEPHONE ENCOUNTER
Could be some diverticular bleeding . To treat that just consume a higher fiber diet to mitigate constipation. He just had a colonoscopy last year , so I doubt there is any sinister issue at play here. Recommend monitoring things and seeing me in clinic next week. I still suspect bleeding internal hemorrhoids vs diverticular bleeding.

## 2018-12-19 NOTE — TELEPHONE ENCOUNTER
Clinical information:  Tee GONGORA Yobani called with complaint of blood in stool this AM, per patient it was less than a teaspoon of blood. Patient also reported having some irritation in  Hemorrhoids last week due to constipation.  .  The las time patient was seen in clinic metoprolol dosage was increased to 100 mg daily, patient is concerned that it could  be a side effect of the medication.     Plan: Writer recommended patient to refrain from eating red color food today in order to monitor stool color.To increase fiber intact to ease constipation.  Patient is also taking Xarelto, writer advised to follow-up with PCP to rule out any complication.    Patient expressed understanding,and asked to call clinic with any questions or concerns.Pt in agreement with plan.    Anita Tate RN

## 2018-12-20 ENCOUNTER — TELEPHONE (OUTPATIENT)
Dept: CARDIOLOGY | Facility: CLINIC | Age: 68
End: 2018-12-20

## 2018-12-20 ENCOUNTER — OFFICE VISIT (OUTPATIENT)
Dept: NEUROSURGERY | Facility: CLINIC | Age: 68
End: 2018-12-20
Payer: COMMERCIAL

## 2018-12-20 ENCOUNTER — TELEPHONE (OUTPATIENT)
Dept: PALLIATIVE MEDICINE | Facility: CLINIC | Age: 68
End: 2018-12-20

## 2018-12-20 VITALS
SYSTOLIC BLOOD PRESSURE: 143 MMHG | DIASTOLIC BLOOD PRESSURE: 91 MMHG | HEART RATE: 67 BPM | WEIGHT: 239 LBS | BODY MASS INDEX: 34.29 KG/M2 | OXYGEN SATURATION: 99 % | TEMPERATURE: 97 F

## 2018-12-20 DIAGNOSIS — M54.16 LUMBAR RADICULOPATHY: Primary | ICD-10-CM

## 2018-12-20 PROCEDURE — 99213 OFFICE O/P EST LOW 20 MIN: CPT | Performed by: NURSE PRACTITIONER

## 2018-12-20 ASSESSMENT — PAIN SCALES - GENERAL: PAINLEVEL: SEVERE PAIN (6)

## 2018-12-20 NOTE — PATIENT INSTRUCTIONS
-Schedule lumbar epidural steroid injection  --If no benefit with injections return to see Dr. Sheets  -Please contact the clinic if pain persists at [phone].

## 2018-12-20 NOTE — PROGRESS NOTES
[facility]  Neurosurgery followup:    HPI: Tee Hilton is a 68 year old male who was seen at our office previously on 9/6/18 for evaluation of low back and LLE pain.  He states the pain continues with minimal to moderate relief with injections.  He had previously seen  Dr. Dyer who had ordered injections and had recommended consideration of surgery if the pain increased and did not improve following steroid injections.  The patient states the pain remains to the lower back radiating to the left anterior thigh.  He rarely has pain extending past the left knee.  He rates his pain 6/10.  He denies falls, foot drop or changes to bowel or bladder.  He continues to have increased pain with various daily activities, usually worse at night.       Exam:  Constitutional:  Alert, well nourished, NAD.  HEENT: Normocephalic, atraumatic.   Pulm:  Without shortness of breath   CV:  No pitting edema of BLE.      Neurological:  Awake  Alert  Oriented x 3  Motor exam:        IP Q DF PF EHL  R   5  5   5   5    5  L   5  5   5   5    5     Able to spontaneously move L/E bilaterally  Sensation intact throughout all L/E dermatomes     Imaging:Lumbar MRi 12/14/18:  IMPRESSION:    1. Multilevel degenerative disc disease. No significant change compared to 2/6/2018.  2. Again noted is a small left L3-L4 foraminal disc herniation extending up to the exiting left L3 nerve root.   3. There is also moderate left L4-L5 foraminal stenosis. Due to loss of disc space height and an annular disc bulge.     A/P:  Lumbar DDD  Lumbar Radiculopathy    Tee Hilton is a 68 year old male who was seen at our office previously on 9/6/18 for evaluation of low back and LLE pain.  He states the pain continues with minimal to moderate relief with injections.  He had previously seen  Dr. Dyer who had ordered injections and had recommended consideration of surgery if the pain increased and did not improve following steroid injections.  The  patient states the pain remains to the lower back radiating to the left anterior thigh.  He rarely has pain extending past the left knee.  He rates his pain 6/10.  He denies falls, foot drop or changes to bowel or bladder.  He continues to have increased pain with various daily activities, usually worse at night.  We reviewed results of his recent MRI and there have not been significant changes.  We discussed repeat injections, as he has limited to 2 so far this year.  He is planning to go out of state for the next couple of months to [facility] so he would like to schedule them before leaving.  He has also recently started on blood thinners for A-Fib and understands this would need to be held for a lumbar PATSY, only per the direction of his PCP and the pain clinic.  If he has not relief with the injection and/or the pain worsens he will come back to see Dr. Sheets to discuss possible surgery.      Patient Instructions   -Schedule lumbar epidural steroid injection  --If no benefit with injections return to see Dr. Sheets  -Please contact the clinic if pain persists at [phone].        Rupal Kwan [facility]  [facility]  [address]  [address] [place] [id number]    Tel [phone]  Pager [phone]

## 2018-12-20 NOTE — NURSING NOTE
"Tee Hilton is a 68 year old male who presents for:  Chief Complaint   Patient presents with     Neurologic Problem     follow up lumbar radiculopathy, discuss MRI and options        Vitals:    Vitals:    12/20/18 0946   BP: (!) 143/91   BP Location: Right arm   Patient Position: Sitting   Cuff Size: Adult Regular   Pulse: 67   Temp: 97  F (36.1  C)   TempSrc: Oral   SpO2: 99%   Weight: 239 lb (108.4 kg)       BMI:  Estimated body mass index is 34.29 kg/m  as calculated from the following:    Height as of 12/10/18: 5' 10\" (1.778 m).    Weight as of this encounter: 239 lb (108.4 kg).    Pain Score:  Severe Pain (6)        Angeli Duvall CMA, AAS    "

## 2018-12-20 NOTE — TELEPHONE ENCOUNTER
Pre-screening Questions for Radiology Injections:    Injection to be done at which interventional clinic site? [facility] [place]    Instruct patient to arrive as directed prior to the scheduled appointment time:    [place] AND [place]: 30 minutes before      Procedure ordered by Aniya    Procedure ordered? Lumbar Epidural Steroid Injection    What insurance would patient like us to bill for this procedure? [employer]      Worker's comp or MVA (motor vehicle accident) -Any injection DO NOT SCHEDULE and route to Farheen Nieto.      [employer] insurance - For SI joint injections, DO NOT SCHEDULE and route Farheen Nieto. [employer] NO PA REQUIRED EFFECTIVE 11/1/2017      [employer]- MBB's must be scheduled at LEAST two weeks apart      [employer] - Any injection besides hip/shoulder/knee joint DO NOT SCHEDULE and route to Farheen Nieto. She will obtain PA and call pt back to schedule procedure or notify pt of denial.       [employer]-Route to Farheen for review    Any chance of pregnancy? NO   If YES, do NOT schedule and route to RN pool    Is an  needed? No     Patient has a drive home? (mandatory) YES: ok    Is patient taking any blood thinners (plavix, coumadin, jantoven, warfarin, heparin, pradaxa or dabigatran )? Yes - Xarelto   If hold needed, do NOT schedule, route to RN pool     Is patient taking any aspirin products (includes Excedrin and Fiorinal)? No     If more than 325mg/day do NOT schedule; route to RN pool     For CERVICAL procedures, hold all aspirin products for 6 days.     Tell pt that if aspirin product is not held for 6 days, the procedure WILL BE cancelled.      Does the patient have a bleeding or clotting disorder? No     If YES, okay to schedule AND route to RN nurse pool    For any patients with platelet count <100, must be forwarded to provider    Is patient diabetic?  No  If YES, have them bring their glucometer.    Does patient have an active  infection or treated for one within the past week? No     Is patient currently taking any antibiotics?  No     For patients on chronic, preventative, or prophylactic antibiotics, procedures may be scheduled.     For patients on antibiotics for active or recent infection:    Darby Smyth Burton, Snitzer-antibiotic course must have been completed for 4 days    Is patient currently taking any steroid medications? (i.e. Prednisone, Medrol)  No     For patients on steroid medications:    Darby Smyth Burton, Snitzer-steroid course must have been completed for 4 days    Reviewed with patient:  If you are started on any steroids or antibiotics between now and your appointment, you must contact us because the procedure may need to be cancelled.  Yes    Is patient actively being treated for cancer or immunocompromised? No  If YES, do NOT schedule and route to RN pool     Are you able to get on and off an exam table with minimal or no assistance? Yes  If NO, do NOT schedule and route to RN pool    Are you able to roll over and lay on your stomach with minimal or no assistance? Yes  If NO, do NOT schedule and route to RN pool     Any allergies to contrast dye, iodine, shellfish, or numbing and steroid medications? No  If YES, route to RN pool AND add allergy information to appointment notes    Allergies: Erythromycin      Has the patient had a flu shot or any other vaccinations within 7 days before or after the procedure.  No     Does patient have an MRI/CT?  YES: mri  (SI joint, hip injections, lumbar sympathetic blocks, and stellate ganglion blocks do not require an MRI)    Was the MRI done w/in the last 3 years?  Yes    Was MRI done at [place]? Yes      If not, where was it done? N/A       If MRI was not done at [place], [facility] or [facility] do NOT schedule and route to nursing.  If pt has an imaging disc, the injection may be scheduled but pt has to bring disc to appt. If they show up w/out  disc the injection cannot be done    Reminders (please tell patient if applicable):       Instructed pt to arrive 30 minutes early for IV start if this is for a cervical procedure, ALL sympathetic (stellate ganglion, hypogastric, or lumbar sympathetic block) and all sedation procedures (RFA, spinal cord stimulation trials).  Not Applicable   -IVs are not routinely placed for Dr. Cody cervical cases   -Dr. Donahue: IVs for cervical ESIs and cervical TBDs (not CMBBs/facet inj)      If NPO for sedation, informed patient that it is okay to take medications with sips of water (except if they are to hold blood thinners).  Not Applicable   *DO take blood pressure medication if it is prescribed*      If this is for a cervical PATSY, informed patient that aspirin needs to be held for 6 days.   Not Applicable      For all patients not having spinal cord stimulator (SCS) trials or radiofrequency ablations (RFAs), informed patient:    IV sedation is not provided for this procedure.  If you feel that an oral anti-anxiety medication is needed, you can discuss this further with your referring provider or primary care provider.  The Pain Clinic provider will discuss specifics of what the procedure includes at your appointment.  Most procedures last 10-20 minutes.  We use numbing medications to help with any discomfort during the procedure.  Not Applicable      Do not schedule procedures requiring IV placement in the first appointment of the day or first appointment after lunch. Do NOT schedule at 0745, 0815 or 1245.       For patients 85 or older we recommend having an adult stay w/ them for the remainder of the day.       Does the patient have any questions?  NO  Chapis Calle  [facility]

## 2018-12-20 NOTE — TELEPHONE ENCOUNTER
MELIDA Denson,     Patient is requesting to schedule an injection with the [facility].     This would require the patient to hold:                 Rivaroxagan (Xarelto)         If CrCL > 30ml/min, stop 3 days prior to procedure    If CrCL < 30ml/min, stop 5 days prior to procedure    Restart 24 hours after procedure      We are requesting your approval to hold the medication for this time frame.    Please keep call open and route back to the PAIN NURSE [[id number]] pool.     If hold approved, we will contact the patient for scheduling.    Thank you.    Farhana VELAN, RN Care Coordinator  [facility]

## 2018-12-20 NOTE — TELEPHONE ENCOUNTER
"Patient stopped by the clinic today at 9:30 am stating that he feels that the metoprolol has been causing him diarrhea.  States he recently was experiencing constipation but starting this morning he has been experiencing multiple stools.  Denies any visible blood in stool or urine.  Yesterday he did have blood present in his stool but explained that he was straining during the bowel movement.  States \"I always eat a diet in high fiber\".    Situation was discussed with Dr. Stephenson.  Dr. Stephenson feels that his symptoms of diarrhea is not caused by metoprolol.  Advised for patient to wait it out for 1-2 days to see if it improves.  If it does not improve then make the following medication changes:    Decrease metoprolol to 50 mg daily and start diltiazem 120 mg daily with the plan to eventually go back to metoprolol 100 mg daily (along with the diltiazem).    Patient will call back if symptoms continue to discuss medication changes.      Samia Ryan RN  Care Coordinator  [facility]  [phone]      "

## 2018-12-20 NOTE — LETTER
12/20/2018         RE: Tee Hilton  [address] [facility] [address]        Dear Colleague,    Thank you for referring your patient, Tee Hilton, to the [facility]. Please see a copy of my visit note below.    [facility]  Neurosurgery followup:    HPI: Tee Hilton is a 68 year old male who was seen at our office previously on 9/6/18 for evaluation of low back and LLE pain.  He states the pain continues with minimal to moderate relief with injections.  He had previously seen  Dr. Dyer who had ordered injections and had recommended consideration of surgery if the pain increased and did not improve following steroid injections.  The patient states the pain remains to the lower back radiating to the left anterior thigh.  He rarely has pain extending past the left knee.  He rates his pain 6/10.  He denies falls, foot drop or changes to bowel or bladder.  He continues to have increased pain with various daily activities, usually worse at night.       Exam:  Constitutional:  Alert, well nourished, NAD.  HEENT: Normocephalic, atraumatic.   Pulm:  Without shortness of breath   CV:  No pitting edema of BLE.      Neurological:  Awake  Alert  Oriented x 3  Motor exam:        IP Q DF PF EHL  R   5  5   5   5    5  L   5  5   5   5    5     Able to spontaneously move L/E bilaterally  Sensation intact throughout all L/E dermatomes     Imaging:Lumbar MRi 12/14/18:  IMPRESSION:    1. Multilevel degenerative disc disease. No significant change compared to 2/6/2018.  2. Again noted is a small left L3-L4 foraminal disc herniation extending up to the exiting left L3 nerve root.   3. There is also moderate left L4-L5 foraminal stenosis. Due to loss of disc space height and an annular disc bulge.     A/P:  Lumbar DDD  Lumbar Radiculopathy    Tee Hilton is a 68 year old male who was seen at our office previously on 9/6/18 for evaluation of low back and LLE pain.  He states the  pain continues with minimal to moderate relief with injections.  He had previously seen  Dr. Dyer who had ordered injections and had recommended consideration of surgery if the pain increased and did not improve following steroid injections.  The patient states the pain remains to the lower back radiating to the left anterior thigh.  He rarely has pain extending past the left knee.  He rates his pain 6/10.  He denies falls, foot drop or changes to bowel or bladder.  He continues to have increased pain with various daily activities, usually worse at night.  We reviewed results of his recent MRI and there have not been significant changes.  We discussed repeat injections, as he has limited to 2 so far this year.  He is planning to go out of state for the next couple of months to [facility] so he would like to schedule them before leaving.  He has also recently started on blood thinners for A-Fib and understands this would need to be held for a lumbar PATSY, only per the direction of his PCP and the pain clinic.  If he has not relief with the injection and/or the pain worsens he will come back to see Dr. Sheets to discuss possible surgery.      Patient Instructions   -Schedule lumbar epidural steroid injection  --If no benefit with injections return to see Dr. Sheets  -Please contact the clinic if pain persists at [phone].        Rupal Kwan CNP  [facility]  [address]  [address] [place] [id number]    Tel [phone]  Pager [phone]        Again, thank you for allowing me to participate in the care of your patient.        Sincerely,        Rupal Kwan, HENRIETTA

## 2018-12-21 NOTE — TELEPHONE ENCOUNTER
I am agreeable to patient holding his anticoagulant medication for time frame requested by pain management.

## 2018-12-26 ENCOUNTER — ANCILLARY PROCEDURE (OUTPATIENT)
Dept: CARDIOLOGY | Facility: CLINIC | Age: 68
End: 2018-12-26
Attending: INTERNAL MEDICINE
Payer: COMMERCIAL

## 2018-12-26 DIAGNOSIS — I48.19 PERSISTENT ATRIAL FIBRILLATION (H): ICD-10-CM

## 2018-12-26 PROCEDURE — 93224 XTRNL ECG REC UP TO 48 HRS: CPT | Performed by: INTERNAL MEDICINE

## 2018-12-26 NOTE — TELEPHONE ENCOUNTER
Per the Creatinine Clearance Calculator: Results: 108.18 mL/min is your estimated creatinine clearance.    Xarelto to be held for 3 days.    Called pt.  Injection scheduled for:  1/7/19  xarelto hold starts on:  1/4/19  Injection intake questions reviewed?  Yes    Michell Garg RN-BSN  [facility]

## 2018-12-27 ENCOUNTER — OFFICE VISIT (OUTPATIENT)
Dept: SLEEP MEDICINE | Facility: CLINIC | Age: 68
End: 2018-12-27
Payer: COMMERCIAL

## 2018-12-27 VITALS
OXYGEN SATURATION: 99 % | HEIGHT: 71 IN | HEART RATE: 72 BPM | WEIGHT: 236 LBS | BODY MASS INDEX: 33.04 KG/M2 | DIASTOLIC BLOOD PRESSURE: 86 MMHG | SYSTOLIC BLOOD PRESSURE: 130 MMHG

## 2018-12-27 DIAGNOSIS — G47.39 OTHER SLEEP APNEA: ICD-10-CM

## 2018-12-27 DIAGNOSIS — G47.33 OSA (OBSTRUCTIVE SLEEP APNEA): Primary | ICD-10-CM

## 2018-12-27 PROCEDURE — 99214 OFFICE O/P EST MOD 30 MIN: CPT | Performed by: PHYSICIAN ASSISTANT

## 2018-12-27 RX ORDER — ZOLPIDEM TARTRATE 5 MG/1
5 TABLET ORAL
Qty: 30 TABLET | Refills: 1 | Status: SHIPPED | OUTPATIENT
Start: 2018-12-27 | End: 2019-05-02

## 2018-12-27 ASSESSMENT — MIFFLIN-ST. JEOR: SCORE: 1864.68

## 2018-12-27 NOTE — PATIENT INSTRUCTIONS
Your BMI is Body mass index is 33.38 kg/m .  Weight management is a personal decision.  If you are interested in exploring weight loss strategies, the following discussion covers the approaches that may be successful. Body mass index (BMI) is one way to tell whether you are at a healthy weight, overweight, or obese. It measures your weight in relation to your height.  A BMI of 18.5 to 24.9 is in the healthy range. A person with a BMI of 25 to 29.9 is considered overweight, and someone with a BMI of 30 or greater is considered obese. More than two-thirds of American adults are considered overweight or obese.  Being overweight or obese increases the risk for further weight gain. Excess weight may lead to heart disease and diabetes.  Creating and following plans for healthy eating and physical activity may help you improve your health.  Weight control is part of healthy lifestyle and includes exercise, emotional health, and healthy eating habits. Careful eating habits lifelong are the mainstay of weight control. Though there are significant health benefits from weight loss, long-term weight loss with diet alone may be very difficult to achieve- studies show long-term success with dietary management in less than 10% of people. Attaining a healthy weight may be especially difficult to achieve in those with severe obesity. In some cases, medications, devices and surgical management might be considered.  What can you do?  If you are overweight or obese and are interested in methods for weight loss, you should discuss this with your provider.     Consider reducing daily calorie intake by 500 calories.     Keep a food journal.     Avoiding skipping meals, consider cutting portions instead.    Diet combined with exercise helps maintain muscle while optimizing fat loss. Strength training is particularly important for building and maintaining muscle mass. Exercise helps reduce stress, increase energy, and improves fitness.  Increasing exercise without diet control, however, may not burn enough calories to loose weight.       Start walking three days a week 10-20 minutes at a time    Work towards walking thirty minutes five days a week     Eventually, increase the speed of your walking for 1-2 minutes at time    In addition, we recommend that you review healthy lifestyles and methods for weight loss available through the National Institutes of Health patient information sites:  http://win.niddk.nih.gov/publications/index.htm    And look into health and wellness programs that may be available through your health insurance provider, employer, local community center, or ele[employer]io club.    Weight management plan: Patient was referred to their PCP to discuss a diet and exercise plan.

## 2018-12-27 NOTE — NURSING NOTE
"Chief Complaint   Patient presents with     CPAP Follow Up       Initial /86   Pulse 72   Ht 1.791 m (5' 10.5\")   Wt 107 kg (236 lb)   SpO2 99%   BMI 33.38 kg/m   Estimated body mass index is 33.38 kg/m  as calculated from the following:    Height as of this encounter: 1.791 m (5' 10.5\").    Weight as of this encounter: 107 kg (236 lb).    Medication Reconciliation: complete      "

## 2018-12-27 NOTE — PROGRESS NOTES
Obstructive Sleep Apnea - PAP Follow-Up Visit:    Chief Complaint   Patient presents with     CPAP Follow Up       Tee Hilton comes in today for follow-up of their severe sleep apnea, managed with CPAP.     Tee Hilton initially presented with snoring, snort arousals, obesity with excessive daytime sleepiness. Comorbid hypertension, heart failure and atrial fibrillation.    Study Date: 9/22/2018- (241.0 lbs) Polysomnogram revealed 41 obstructive, 14 central, and 38 mixed apneas resulting in an apnea index of 44.5 events per hour. There were 7 obstructive hypopneas and 0 central hypopneas resulting in an obstructive hypopnea index of 3.3 and central hypopnea index of 0 events per hour. The combined apnea/hypopnea index was 47.8 events per hour (central apnea/hypopnea index was 6.7 events per hour).  REM AHI was 0. The supine AHI was 69.0 events per hour.  RDI was 54.0 events per hour. Respiratory rate and pattern was notable for improvement of events during REM sleep,  periods where periodic breathing is suspected and circulation times that are prolonged. Most of the events however appear to be terminated by a gasp arousal.  Lowest oxygen saturation was 83.9%. Time spent less than or equal to 88% was 0.4 minutes. Time spent less than or equal to 89% was 0.9 minutes. No optimal pressure was determined due to poor sleep and poor tolerance of low-level PAP and bilevel PAP. Severe sleep disordered breathing, many findings suggested of periodic breathing though given presence of obstruction and poorly consolidated sleep this study was not definitive    11/29/2018 visit:  [employer]ics  Auto-PAP 7 - 12 cmH2O 30 day usage data:    0% of days with > 4 hours of use. 26/30 days with no use.   Average use 0 minutes per day.   Average leak LPM.  Average % of night in large leak %.    CPAP 90% pressure cm.   AHI  events per hour.   Current problems with PAP use include:  1. Put it on 4-5 nights only. He had difficulty  falling asleep with it.   2. Low motivation to use CPAP.   Impression/Plan:  Severe Sleep apnea. not tolerating PAP well. Daytime symptoms are not improved.   Potential consequences of untreated severe sleep apnea reviewed.   Patient expresses more willingess to use CPAP on a nightly bases.   Start using CPAP during the day when watching TV to acclimate. Put on CPAP mask evernight for at least an hour.  [facility] into [facility]     Psychophysiologic insomnia-  Ambien 5 mg prn while acclimating to CPAP. Will consider referral to Sleep Psychology.     Today:  Overall, he rates the experience with PAP as 5 (0 poor, 10 great). The mask is comfortable.    The mask is not leaking .  He is not snoring with the mask on. He is not having gasp arousals.  He is having significant oral/nasal dryness. The pressure is comfortable.His PAP interface is Nasal Mask.    Bedtime is typically 2200. Usually it takes about 90 min minutes to fall asleep with the mask on. Wake time is typically 0600.  Patient is using PAP therapy 4 hours per night. The patient is usually getting 8 hours of sleep per night.    He does feel rested in the morning.    Total score - Lena: 7 (12/27/2018  9:48 AM)      No Data Recorded    ResMed   CPAP Auto-PAP download(7-12 cm/H20):  Average use 2 hours 21minutes/day.  12 total days of use.  2 days with <4 hours use.  18 nonuse days.  CPAP 95% pressure 7.8cm.    Current problems with PAP use include:  1. Sciatica for 2 weeks.     Past medical/surgical history, family history, social history, medications and allergies were reviewed.      Problem List:  Patient Active Problem List    Diagnosis Date Noted     Other sleep apnea 09/24/2018     Priority: Medium     Study Date: 9/22/2018- (241.0 lbs) Polysomnogram revealed 41 obstructive, 14 central, and 38 mixed apneas resulting in an apnea index of 44.5 events per hour. There were 7 obstructive hypopneas and 0 central hypopneas resulting in an obstructive hypopnea index  "of 3.3 and central hypopnea index of 0 events per hour. The combined apnea/hypopnea index was 47.8 events per hour (central apnea/hypopnea index was 6.7 events per hour).  REM AHI was 0. The supine AHI was 69.0 events per hour.  RDI was 54.0 events per hour. Respiratory rate and pattern was notable for improvement of events during REM sleep,  periods where periodic breathing is suspected and circulation times that are prolonged. Most of the events however appear to be terminated by a gasp arousal.  Lowest oxygen saturation was 83.9%. Time spent less than or equal to 88% was 0.4 minutes. Time spent less than or equal to 89% was 0.9 minutes. No optimal pressure was determined due to poor sleep and poor tolerance of low-level PAP and bilevel PAP. Severe sleep disordered breathing, many findings suggested of periodic breathing though given presence of obstruction and poorly consolidated sleep this study was not definitive       Gastroesophageal reflux disease without esophagitis      Priority: Medium     Persistent atrial fibrillation (H) 09/18/2018     Priority: Medium     Non morbid obesity due to excess calories 09/18/2018     Priority: Medium     Chronic systolic congestive heart failure (H) 09/18/2018     Priority: Medium     Alcohol use 09/18/2018     Priority: Medium     Chronic midline low back pain without sciatica 10/31/2017     Priority: Medium     Essential hypertension 11/11/2016     Priority: Medium     Other male erectile dysfunction 11/11/2016     Priority: Medium        Ht 1.791 m (5' 10.5\")   Wt 107 kg (236 lb)   BMI 33.38 kg/m      Impression/Plan:    1. Severe Sleep apnea-  Poor compliance. Today we reviewed treatment options including mandibular advancement device, upper airway surgery/stmulation therapies. He states that he is \"not ready to give up on CPAP\".  Continue CPAP.   Consequences of untreated severe AUGUSTO reviewed again.     2 Psychophysiologic insomnia-  Continue Ambien 5 mg prn while " acclimating to CPAP. Refill x2 months given.       Tee GONGORA Yobani will follow up after he gets back from [place] in March.      Twenty-five minutes spent with patient, all of which were spent face-to-face counseling, consulting, coordinating plan of care regarding AUGUSTO and insomnia.      Sera Mcknight PA-C

## 2018-12-28 ENCOUNTER — TRANSFERRED RECORDS (OUTPATIENT)
Dept: HEALTH INFORMATION MANAGEMENT | Facility: CLINIC | Age: 68
End: 2018-12-28

## 2019-01-02 ENCOUNTER — OFFICE VISIT (OUTPATIENT)
Dept: FAMILY MEDICINE | Facility: CLINIC | Age: 69
End: 2019-01-02
Payer: COMMERCIAL

## 2019-01-02 VITALS
TEMPERATURE: 97.8 F | WEIGHT: 238 LBS | RESPIRATION RATE: 16 BRPM | HEIGHT: 71 IN | SYSTOLIC BLOOD PRESSURE: 120 MMHG | DIASTOLIC BLOOD PRESSURE: 82 MMHG | BODY MASS INDEX: 33.32 KG/M2 | HEART RATE: 68 BPM | OXYGEN SATURATION: 98 %

## 2019-01-02 DIAGNOSIS — G89.29 CHRONIC MIDLINE LOW BACK PAIN WITH LEFT-SIDED SCIATICA: ICD-10-CM

## 2019-01-02 DIAGNOSIS — I48.20 CHRONIC ATRIAL FIBRILLATION (H): ICD-10-CM

## 2019-01-02 DIAGNOSIS — Z23 NEED FOR PROPHYLACTIC VACCINATION AND INOCULATION AGAINST INFLUENZA: Primary | ICD-10-CM

## 2019-01-02 DIAGNOSIS — K60.2 ANAL FISSURE: ICD-10-CM

## 2019-01-02 DIAGNOSIS — M54.42 CHRONIC MIDLINE LOW BACK PAIN WITH LEFT-SIDED SCIATICA: ICD-10-CM

## 2019-01-02 PROCEDURE — 99214 OFFICE O/P EST MOD 30 MIN: CPT | Performed by: PHYSICIAN ASSISTANT

## 2019-01-02 RX ORDER — PREDNISONE 20 MG/1
20 TABLET ORAL 2 TIMES DAILY
Qty: 14 TABLET | Refills: 0 | Status: SHIPPED | OUTPATIENT
Start: 2019-01-02 | End: 2019-05-02

## 2019-01-02 ASSESSMENT — MIFFLIN-ST. JEOR: SCORE: 1873.75

## 2019-01-02 NOTE — PROGRESS NOTES
SUBJECTIVE:   Tee Hilton is a 68 year old male who presents to clinic today for the following health issues:      Hemorrhoids       Duration: 2 weeks    Description:   Pain: no   Itching: no     Accompanying signs and symptoms:   Blood in stool: YES  Changes in stool pattern: YES    History (similar episodes/previous evaluation): None    Precipitating or alleviating factors: None    Therapies tried and outcome: none  Blood on tissue and on stool, not with in. No bruising or other sites of bleeding.   Some perianal discomfort. Symptoms , bleeding specifically has now resolved.   Recent review of colonoscopy: diverticular dx. No hemorrhoids noted by the colonoscopist . Suspect anal fissure.  Low back pain doing well. Will have patient cancel his epidural.   Recheck of afib. Doing well.  Problem list and histories reviewed & adjusted, as indicated.  Additional history: as documented    BP Readings from Last 3 Encounters:   01/02/19 120/82   12/27/18 130/86   12/20/18 (!) 143/91    Wt Readings from Last 3 Encounters:   01/02/19 108 kg (238 lb)   12/27/18 107 kg (236 lb)   12/20/18 108.4 kg (239 lb)                    Reviewed and updated as needed this visit by clinical staff  Tobacco  Allergies  Meds       Reviewed and updated as needed this visit by Provider         All other systems negative except as outline above  OBJECTIVE:    Eye exam - right eye normal lid, conjunctiva, cornea, pupil and fundus, left eye normal lid, conjunctiva, cornea, pupil and fundus.  Thyroid not palpable, not enlarged, no nodules detected.  CHEST:chest clear to IPPA, no tachypnea, retractions or cyanosis and Heart exam detail:irregularly irregular rhythm, chest is clear without rales or wheezing, no pedal edema, no JVD, no hepatosplenomegaly.  Lumbosacral spine area reveals no local tenderness or mass.  Full and painless lumbosacral range of motion is noted. Straight leg raise is negative at 90 degrees on both sides. DTR's,  motor strength and sensation normal, including heel and toe gait.  Peripheral pulses are palpable. Hips and knees have full range of motion without pain. No abdominal tenderness, mass or organomegaly.    Tee was seen today for rectal problem.    Diagnoses and all orders for this visit:    Need for prophylactic vaccination and inoculation against influenza    Chronic midline low back pain with left-sided sciatica  -     predniSONE (DELTASONE) 20 MG tablet; Take 20 mg by mouth 2 times daily for 7 days.  -     acetaminophen-codeine (TYLENOL #3) 300-30 MG tablet; Take 1 tablet by mouth every 6 hours as needed for severe pain  Take only prn for a flare of his low back pain . [place]  Chronic atrial fibrillation (H)    Anal fissure    higher fiber diet. More fluids.  Patient to cancel his epidural   work on lifestyle modification  Advised supportive and symptomatic treatment.  Follow up with Provider - if condition persists or worsens.

## 2019-03-15 ENCOUNTER — ANCILLARY PROCEDURE (OUTPATIENT)
Dept: CARDIOLOGY | Facility: CLINIC | Age: 69
End: 2019-03-15
Attending: INTERNAL MEDICINE
Payer: COMMERCIAL

## 2019-03-15 DIAGNOSIS — I48.19 PERSISTENT ATRIAL FIBRILLATION (H): ICD-10-CM

## 2019-03-15 PROCEDURE — 40000264 ZZHC STATISTIC IV PUSH SINGLE INITIAL SUBSTANCE: Performed by: INTERNAL MEDICINE

## 2019-03-15 PROCEDURE — 93306 TTE W/DOPPLER COMPLETE: CPT | Mod: GC | Performed by: INTERNAL MEDICINE

## 2019-03-15 RX ADMIN — Medication 3 ML: at 10:30

## 2019-04-01 ENCOUNTER — OFFICE VISIT (OUTPATIENT)
Dept: CARDIOLOGY | Facility: CLINIC | Age: 69
End: 2019-04-01
Payer: COMMERCIAL

## 2019-04-01 VITALS
DIASTOLIC BLOOD PRESSURE: 83 MMHG | OXYGEN SATURATION: 98 % | SYSTOLIC BLOOD PRESSURE: 127 MMHG | WEIGHT: 238 LBS | BODY MASS INDEX: 33.67 KG/M2 | HEART RATE: 51 BPM

## 2019-04-01 DIAGNOSIS — I48.19 PERSISTENT ATRIAL FIBRILLATION (H): Primary | ICD-10-CM

## 2019-04-01 DIAGNOSIS — I50.22 CHRONIC SYSTOLIC CONGESTIVE HEART FAILURE (H): Chronic | ICD-10-CM

## 2019-04-01 PROCEDURE — 99214 OFFICE O/P EST MOD 30 MIN: CPT | Performed by: INTERNAL MEDICINE

## 2019-04-01 NOTE — LETTER
4/1/2019      RE: Tee Hilton  [address] [facility] [address]       Dear Colleague,    Thank you for the opportunity to participate in the care of your patient, Tee Hilton, at the [facility] HEART AT [facility]. Please see a copy of my visit note below.    HPI:  Tee Hilton returns for follow-up. I first met, and last saw, him in December 2018. He is a 68 year old gentleman with a recent diagnosis of asymptomatic AF. He is on rivaroxaban and metoprolol succinate (we increased from 50 to 100 mg at his last visit). He also takes lisinopril 40 mg daily for hypertension. His [id number] is 2 or 3 (age, hypertension, systolic heart failure although this may be a subacute effect of his rapid ventricular response). His echo shows mildly reduced LV function (EF 40-45%), moderate biatrial enlargement. He was successfully cardioverted to sinus rhythm 9/24/2018 but he had recurrence of AF. He reported feeling no different in AF vs. sinus rhythm. He wore a Holter monitor (Dec 26-28, 2018) after we increased his metoprolol succinate to 100 mg daily. The monitor showed 100% AF with good rate control and he reported no symptoms.     At this time, Mr. Hilton reports feeling very well.  He denies chest pain, shortness of breath, palpitations, lightheadedness, dizziness or fatigue.    We repeated an echo 3/15/2019. She showed no significant change from 11/26/2018. In particular LVEF remained mildly reduced (40-45%, biplane 41%). The aortic root was normal, ascending aorta 3.9 cm (borderline enlarged).      PAST MEDICAL HISTORY:  History reviewed. No pertinent past medical history.    CURRENT MEDICATIONS:  Current Outpatient Medications   Medication Sig Dispense Refill     lisinopril (PRINIVIL/ZESTRIL) 20 MG tablet Take 2 tablets (40 mg) by mouth daily 180 tablet 1     metoprolol succinate ER (TOPROL-XL) 50 MG 24 hr tablet  Take 1 tablet (50 mg) by mouth daily (Patient taking differently: Take 100 mg by mouth daily ) 90 tablet 3     omeprazole (PRILOSEC) 20 MG CR capsule Take 20 mg by mouth       rivaroxaban ANTICOAGULANT (XARELTO) 20 MG TABS tablet Take 1 tablet (20 mg) by mouth daily (with dinner) 30 tablet 3     Famotidine (PEPCID PO) Take 10 mg by mouth       sildenafil (REVATIO) 20 MG tablet Take 1 -5 tabs daily prn 30 tablet 11     zolpidem (AMBIEN) 5 MG tablet Take 1 tablet (5 mg) by mouth nightly as needed for sleep (Patient not taking: Reported on 4/1/2019) 30 tablet 1       PAST SURGICAL HISTORY:  Past Surgical History:   Procedure Laterality Date     ANESTHESIA CARDIOVERSION N/A 9/24/2018    Procedure: ANESTHESIA CARDIOVERSION;  Cardioversion ;  Surgeon: GENERIC ANESTHESIA PROVIDER;  Location: [facility] OR     COLONOSCOPY N/A 5/25/2017    Procedure: COMBINED COLONOSCOPY, SINGLE OR MULTIPLE BIOPSY/POLYPECTOMY BY BIOPSY;;  Surgeon: Aquilino Garcia MD;  Location: [facility] OR     COLONOSCOPY WITH CO2 INSUFFLATION N/A 5/25/2017    Procedure: COLONOSCOPY WITH CO2 INSUFFLATION;  COLONOSCOPY SCREEN/ ORDAZ;  Surgeon: Aquilino Garcia MD;  Location: [facility] OR     OPEN REDUCTION INTERNAL FIXATION TIBIA       TONSILLECTOMY         ALLERGIES:     Allergies   Allergen Reactions     Erythromycin Unknown     Upset stomach       FAMILY HISTORY:  Family History   Problem Relation Age of Onset     Gout Father        SOCIAL HISTORY:  Social History     Tobacco Use     Smoking status: Never Smoker     Smokeless tobacco: Never Used     Tobacco comment: never smoked   Substance Use Topics     Alcohol use: No     Alcohol/week: 0.0 oz     Comment: social use     Drug use: No       ROS:   Constitutional: No fever, chills, or sweats. Weight stable.   ENT: No visual disturbance, ear ache, epistaxis, sore throat.   Cardiovascular: As per HPI.   Respiratory: No cough, hemoptysis.    GI: No nausea, vomiting, hematemesis, melena, or hematochezia.   : No  hematuria.   Integument: Negative.   Psychiatric: Negative.   Hematologic:  Easy bruising, no easy bleeding.  Neuro: Negative.   Endocrinology: No significant heat or cold intolerance   Musculoskeletal: No myalgia.    Exam:  /83 (BP Location: Right arm, Patient Position: Sitting, Cuff Size: Adult Large)   Pulse 51   Wt 108 kg (238 lb)   SpO2 98%   BMI 33.67 kg/m     No acute distress.  Alert and oriented.  HEENT:  Normocephalic, atraumatic, EOMI, sclera non-icteric, mucosa moist  Neck: No lymphadenopathy.  JVP 6 cm without HJR.  Cor: Irregularly irregular rhythm.  Controlled ventricular rate. Variable S1, normal S2.  No murmur, rub, or gallop.  PMI in Lf 5th ICS.  Lungs:  Clear  Abd: Soft, nontender, bowel sounds present.  No hepatosplenomegaly..  Extremities: No C/C.  No edema.    iPhone Ap confirms AF.        Labs:  CBC RESULTS:   Lab Results   Component Value Date    HGB 9.5 (L) 2007       BMP RESULTS:  Lab Results   Component Value Date     2018    POTASSIUM 4.9 2018    CHLORIDE 106 2018    CO2 26 2018    ANIONGAP 6 2018     (H) 2018    BUN 20 2018    CR 1.03 2018    GFRESTIMATED 72 2018    GFRESTBLACK 87 2018    GRISELDA 9.0 2018        INR RESULTS:  Lab Results   Component Value Date    INR 1.02 2007       Procedures:  Echocardiogram:   Recent Results (from the past 8760 hour(s))   Echo limited with definity    Narrative    [id number]  Formerly [facility]  [id number]  [id number]^CHAYITO^K^P           [facility], Echocardiography Laboratory  [address] [place] [address]        Name: ELDA HERNÁNDEZ  MRN: [id number]  : 1950  Study Date: 2018 09:07 AM  Age: 68 yrs  Gender: Male  Patient Location: [facility]  Reason For Study: , Atrial fibrillation (H)  Ordering Physician: CLAY STRATTON  Referring Physician: CLAY STRATTON  Performed By: Indu Manning RDCS     BSA: 2.3 m2  Height: 71  in  Weight: 234 lb  BP: 139/82 mmHg  _____________________________________________________________________________  __        Procedure  Limited Echocardiogram with portions of two-dimensional, color and spectral  Doppler performed. Contrast Definity. Definity (NDC #76407-524-64) given  intravenously. Patient was given 7ml mixture of 1.5ml Definity and 8.5ml  saline. 3 ml wasted. IV start location RAC . The patient's rhythm is atrial  fibrillation.  _____________________________________________________________________________  __        Interpretation Summary     The Ejection Fraction is estimated at 40-45%.  Mild diffuse hypokinesis is present.  Right ventricular function, chamber size, wall motion, and thickness are  normal.  The inferior vena cava is normal.  Estimated mean right atrial pressure is 3 mmHg (normal).  Right ventricular systolic pressure is 23mmHg above the right atrial pressure.  No pericardial effusion is present.  Proximal ascending aorta 3.8 cm (indexed to BSA 1.6 cm/m2 which is borderline  normal).     Compared to prior study (9/6/2018), no significant change.  _____________________________________________________________________________  __        Left Ventricle  Left ventricular wall thickness is normal. Left ventricular size is normal.  The Ejection Fraction is estimated at 40-45%. Diastolic function not assessed  due to atrial fibrillation. Mild diffuse hypokinesis is present.     Right Ventricle  Right ventricular function, chamber size, wall motion, and thickness are  normal.     Atria  Both atria appear normal. The atrial septum is intact as assessed by color  Doppler .     Mitral Valve  The mitral valve is normal. Mild mitral insufficiency is present.        Aortic Valve  Trileaflet aortic sclerosis without stenosis. The aortic valve is tricuspid.  Trace to mild aortic insufficiency is present.     Tricuspid Valve  The tricuspid valve is normal. Mild tricuspid insufficiency is  present.  Pulmonary artery systolic pressure is normal. Right ventricular systolic  pressure is 23mmHg above the right atrial pressure.     Pulmonic Valve  The pulmonic valve is normal.     Vessels  The aorta root is normal. Proximal ascending aorta 3.8 cm (indexed to BSA 1.6  cm/m2 which is borderline normal). The inferior vena cava is normal. Estimated  mean right atrial pressure is 3 mmHg (normal).     Pericardium  No pericardial effusion is present.     _____________________________________________________________________________  __  MMode/2D Measurements & Calculations  LA Volume (BP): 55.0 ml     LA Volume Index (BP): 24.4 ml/m2        Doppler Measurements & Calculations  TR max zoey: 241.6 cm/sec  TR max P.4 mmHg           _____________________________________________________________________________  __           Report approved by: Lee Georges 2018 12:20 PM      Echo complete with definity    Narrative    [id number]  [id number]  [id number]  [id number]^CHAYITO^K^P           [facility], Echocardiography Laboratory  [address] [place] [address]        Name: ELDA HERNÁNDEZ  MRN: [id number]  : 1950  Study Date: 2018 02:58 PM  Age: 67 yrs  Gender: Male  Patient Location: [facility]  Reason For Study: Persistent atrial fibrillation (H)  Ordering Physician: CLAY STRATTON  Referring Physician: CLAY STRATTON  Performed By: Bibiana Estrada RDCS     BSA: 2.3 m2  Height: 70 in  Weight: 242 lb  HR: 113  BP: 154/87 mmHg  _____________________________________________________________________________  __        Procedure  Echocardiogram with two-dimensional, color and spectral Doppler performed.  Contrast Definity. Definity (NDC #51200-945-80) given intravenously. Patient  was given 6ml mixture of 1.5ml Definity and 8.5ml saline. 4 ml wasted. IV  start location RAC .  _____________________________________________________________________________  __         Interpretation Summary     Patient in atrial fibrillation during the study.  Mildly (EF 40-45%) reduced left ventricular function is present. Mild diffuse  hypokinesis is present.  Right ventricular function, chamber size, wall motion, and thickness are  normal.  The inferior vena cava was normal in size with preserved respiratory  variability.Estimated mean right atrial pressure is 3 mmHg (normal).  No pericardial effusion.  Mild tricuspid insufficiency is present.  _____________________________________________________________________________  __        Left Ventricle  Left ventricular wall thickness is normal. Left ventricular size is normal.  Diastolic function not assessed due to atrial fibrillation. Mildly (EF 40-45%)  reduced left ventricular function is present. The Ejection Fraction was  calculated using Bi-plane contrast. Mild diffuse hypokinesis is present.     Right Ventricle  Right ventricular function, chamber size, wall motion, and thickness are  normal.     Atria  Moderate biatrial enlargement is present. The atrial septum is intact as  assessed by color Doppler .     Mitral Valve  Mild mitral annular calcification is present. Mild mitral insufficiency is  present.        Aortic Valve  Mild aortic valve sclerosis is present. Trace aortic insufficiency is present.     Tricuspid Valve  The tricuspid valve is normal. Mild tricuspid insufficiency is present. Right  ventricular systolic pressure is 28mmHg above the right atrial pressure.  Pulmonary artery systolic pressure is normal.     Pulmonic Valve  Trace to mild pulmonic insufficiency is present.     Vessels  Visualized portions of the aorta are normal in size. The inferior vena cava  was normal in size with preserved respiratory variability. The pulmonary  artery cannot be assessed. Estimated mean right atrial pressure is 3 mmHg  (normal).     Pericardium  No pericardial effusion is present.        Compared to Previous Study  Previous study not  available for comparison.     Attestation  I have personally viewed the imaging and agree with the interpretation and  report as documented by the fellow, Elizabeth Coker, and/or edited by me.  _____________________________________________________________________________  __     MMode/2D Measurements & Calculations  IVSd: 0.89 cm  LVIDd: 5.5 cm  LVIDs: 4.7 cm  LVPWd: 1.0 cm  FS: 14.5 %  LV mass(C)d: 201.2 grams  LV mass(C)dI: 88.9 grams/m2  Ao root diam: 3.5 cm  LA dimension: 5.0 cm  asc Aorta Diam: 3.3 cm  LA/Ao: 1.4  LVOT diam: 2.5 cm  LVOT area: 4.8 cm2  LA Volume (BP): 113.0 ml     LA Volume Index (BP): 50.0 ml/m2  RWT: 0.37        Doppler Measurements & Calculations  MV E max lobito: 109.1 cm/sec  MV dec time: 0.12 sec  Ao V2 max: 138.0 cm/sec  Ao max P.0 mmHg  ANDRES(V,D): 2.9 cm2  LV V1 max P.8 mmHg  LV V1 max: 82.7 cm/sec  LV V1 VTI: 14.5 cm  MR ERO: 0.17 cm2  MR volume: 24.3 ml  CO(LVOT): 6.9 l/min  CI(LVOT): 3.0 l/min/m2  SV(LVOT): 69.7 ml  SI(LVOT): 30.8 ml/m2  PA V2 max: 100.4 cm/sec  PA max P.0 mmHg  PI end-d lobito: 201.0 cm/sec  PI max lobito: 271.5 cm/sec  PI max P.5 mmHg  TR max lobito: 265.3 cm/sec  TR max P.2 mmHg  Pulm Sys Lobito: 21.7 cm/sec  Pulm Urbina Lobito: 87.9 cm/sec  Pulm S/D: 0.25  AV Lobito Ratio (DI): 0.60  E/E' av.9     Lateral E/e': 8.3  Medial E/e': 15.5           _____________________________________________________________________________  __           Report approved by: Lee Barraza 2018 05:38 PM          Assessment and Plan:  Tee Hilton is a 68 year old gentleman with asymptomatic AF. He is on rivaroxaban and metoprolol succinate (we increased from 50 to 100 mg at his last visit). He also takes lisinopril 40 mg daily for hypertension. His [id number] is 2 or 3 (age, hypertension, systolic heart failure although this may be a subacute effect of his rapid ventricular response). His echo shows mildly reduced LV function (EF 40-45%), unchanged with  cardioversion and rate control.  He is in AF at this time and continues to feel well in AF vs. sinus rhythm.  We reviewed results of the AFFIRM trial.  He will continue his current medications.  Unless he has problems sooner we will arrange follow-up in one year.  He could see Dr. Segovia or Kylie Cuevas.  I explained that I will be retiring in June.  It was a pleasure seeing Tee Hilton today and it has been a privilege to participate in his medical care.    Enrique Stephenson

## 2019-04-01 NOTE — PATIENT INSTRUCTIONS
Thank you for coming to the [facility] Heart @ [place]; please note the following instructions:    1. Follow up in 1 year with cardiology (Dr. Pearl Segovia)          If you have any questions regarding your visit please contact your care team:     Cardiology  Telephone Number   Samia GALE, RN  Anita HARMAN,RN  Evelia MONTALVO, MALATHI HARMAN, MA  Duong VARGAS, LPN   [phone]    *After hours: [phone]   For scheduling appts:     [phone] or    [phone] (select option 1)    *After hours: [phone]     For the Device Clinic (Pacemakers and ICD's)  RN's :  Rachael Harris   During business hours: [phone]    *After business hours:  [phone] (select option 4)      Normal test result notifications will be released via [facility] or mailed within 7 business days.  All other test results, will be communicated via telephone once reviewed by your cardiologist.    If you need a medication refill please contact your pharmacy.  Please allow 3 business days for your refill to be completed.    As always, thank you for trusting us with your health care needs!

## 2019-04-01 NOTE — NURSING NOTE
"Chief Complaint   Patient presents with     Atrial Fib      3 mo. follow up for Persistent atrial fibrillation ,Echo. 03/15/19. Per patient no bothersome symptoms.       Initial /83 (BP Location: Right arm, Patient Position: Sitting, Cuff Size: Adult Large)   Pulse 51   Wt 108 kg (238 lb)   SpO2 98%   BMI 33.67 kg/m   Estimated body mass index is 33.67 kg/m  as calculated from the following:    Height as of 1/2/19: 1.791 m (5' 10.5\").    Weight as of this encounter: 108 kg (238 lb)..  BP completed using cuff size: large  Patient here with another person, IPV. Screen not initiated. Duong Rudd LPN.      Duong MONTALVOPLizbethN.    "

## 2019-04-01 NOTE — PROGRESS NOTES
HPI:  Tee Hilton returns for follow-up. I first met, and last saw, him in December 2018. He is a 68 year old gentleman with a recent diagnosis of asymptomatic AF. He is on rivaroxaban and metoprolol succinate (we increased from 50 to 100 mg at his last visit). He also takes lisinopril 40 mg daily for hypertension. His [id number] is 2 or 3 (age, hypertension, systolic heart failure although this may be a subacute effect of his rapid ventricular response). His echo shows mildly reduced LV function (EF 40-45%), moderate biatrial enlargement. He was successfully cardioverted to sinus rhythm 9/24/2018 but he had recurrence of AF. He reported feeling no different in AF vs. sinus rhythm. He wore a Holter monitor (Dec 26-28, 2018) after we increased his metoprolol succinate to 100 mg daily. The monitor showed 100% AF with good rate control and he reported no symptoms.     At this time, Mr. Hilton reports feeling very well.  He denies chest pain, shortness of breath, palpitations, lightheadedness, dizziness or fatigue.    We repeated an echo 3/15/2019. She showed no significant change from 11/26/2018. In particular LVEF remained mildly reduced (40-45%, biplane 41%). The aortic root was normal, ascending aorta 3.9 cm (borderline enlarged).      PAST MEDICAL HISTORY:  History reviewed. No pertinent past medical history.    CURRENT MEDICATIONS:  Current Outpatient Medications   Medication Sig Dispense Refill     lisinopril (PRINIVIL/ZESTRIL) 20 MG tablet Take 2 tablets (40 mg) by mouth daily 180 tablet 1     metoprolol succinate ER (TOPROL-XL) 50 MG 24 hr tablet Take 1 tablet (50 mg) by mouth daily (Patient taking differently: Take 100 mg by mouth daily ) 90 tablet 3     omeprazole (PRILOSEC) 20 MG CR capsule Take 20 mg by mouth       rivaroxaban ANTICOAGULANT (XARELTO) 20 MG TABS tablet Take 1 tablet (20 mg) by mouth daily (with dinner) 30 tablet 3     Famotidine (PEPCID PO) Take 10 mg by mouth       sildenafil  (REVATIO) 20 MG tablet Take 1 -5 tabs daily prn 30 tablet 11     zolpidem (AMBIEN) 5 MG tablet Take 1 tablet (5 mg) by mouth nightly as needed for sleep (Patient not taking: Reported on 4/1/2019) 30 tablet 1       PAST SURGICAL HISTORY:  Past Surgical History:   Procedure Laterality Date     ANESTHESIA CARDIOVERSION N/A 9/24/2018    Procedure: ANESTHESIA CARDIOVERSION;  Cardioversion ;  Surgeon: GENERIC ANESTHESIA PROVIDER;  Location: [facility] OR     COLONOSCOPY N/A 5/25/2017    Procedure: COMBINED COLONOSCOPY, SINGLE OR MULTIPLE BIOPSY/POLYPECTOMY BY BIOPSY;;  Surgeon: Aquilino Garcia MD;  Location: [facility] OR     COLONOSCOPY WITH CO2 INSUFFLATION N/A 5/25/2017    Procedure: COLONOSCOPY WITH CO2 INSUFFLATION;  COLONOSCOPY SCREEN/ ORDAZ;  Surgeon: Aquilino Garcia MD;  Location: [facility] OR     OPEN REDUCTION INTERNAL FIXATION TIBIA       TONSILLECTOMY         ALLERGIES:     Allergies   Allergen Reactions     Erythromycin Unknown     Upset stomach       FAMILY HISTORY:  Family History   Problem Relation Age of Onset     Gout Father        SOCIAL HISTORY:  Social History     Tobacco Use     Smoking status: Never Smoker     Smokeless tobacco: Never Used     Tobacco comment: never smoked   Substance Use Topics     Alcohol use: No     Alcohol/week: 0.0 oz     Comment: social use     Drug use: No       ROS:   Constitutional: No fever, chills, or sweats. Weight stable.   ENT: No visual disturbance, ear ache, epistaxis, sore throat.   Cardiovascular: As per HPI.   Respiratory: No cough, hemoptysis.    GI: No nausea, vomiting, hematemesis, melena, or hematochezia.   : No hematuria.   Integument: Negative.   Psychiatric: Negative.   Hematologic:  Easy bruising, no easy bleeding.  Neuro: Negative.   Endocrinology: No significant heat or cold intolerance   Musculoskeletal: No myalgia.    Exam:  /83 (BP Location: Right arm, Patient Position: Sitting, Cuff Size: Adult Large)   Pulse 51   Wt 108 kg (238 lb)   SpO2 98%    BMI 33.67 kg/m    No acute distress.  Alert and oriented.  HEENT:  Normocephalic, atraumatic, EOMI, sclera non-icteric, mucosa moist  Neck: No lymphadenopathy.  JVP 6 cm without HJR.  Cor: Irregularly irregular rhythm.  Controlled ventricular rate. Variable S1, normal S2.  No murmur, rub, or gallop.  PMI in Lf 5th ICS.  Lungs:  Clear  Abd: Soft, nontender, bowel sounds present.  No hepatosplenomegaly..  Extremities: No C/C.  No edema.    iPhone Ap confirms AF.        Labs:  CBC RESULTS:   Lab Results   Component Value Date    HGB 9.5 (L) 2007       BMP RESULTS:  Lab Results   Component Value Date     2018    POTASSIUM 4.9 2018    CHLORIDE 106 2018    CO2 26 2018    ANIONGAP 6 2018     (H) 2018    BUN 20 2018    CR 1.03 2018    GFRESTIMATED 72 2018    GFRESTBLACK 87 2018    GRISELDA 9.0 2018        INR RESULTS:  Lab Results   Component Value Date    INR 1.02 2007       Procedures:  Echocardiogram:   Recent Results (from the past 8760 hour(s))   Echo limited with definity    Narrative    [id number]  [facility]  [id number]  [id number]^CHAYITO^CLAY^P           [facility], Echocardiography Laboratory  [address] [place] [address]        Name: ELDA HERNÁNDEZ  MRN: [id number]  : 1950  Study Date: 2018 09:07 AM  Age: 68 yrs  Gender: Male  Patient Location: [facility]  Reason For Study: , Atrial fibrillation (H)  Ordering Physician: CLAY STRATTON  Referring Physician: CLAY STRATTON  Performed By: Indu Manning RDCS     BSA: 2.3 m2  Height: 71 in  Weight: 234 lb  BP: 139/82 mmHg  _____________________________________________________________________________  __        Procedure  Limited Echocardiogram with portions of two-dimensional, color and spectral  Doppler performed. Contrast Definity. Definity (NDC #28504-428-09) given  intravenously. Patient was given 7ml mixture of 1.5ml Definity and  8.5ml  saline. 3 ml wasted. IV start location RAC . The patient's rhythm is atrial  fibrillation.  _____________________________________________________________________________  __        Interpretation Summary     The Ejection Fraction is estimated at 40-45%.  Mild diffuse hypokinesis is present.  Right ventricular function, chamber size, wall motion, and thickness are  normal.  The inferior vena cava is normal.  Estimated mean right atrial pressure is 3 mmHg (normal).  Right ventricular systolic pressure is 23mmHg above the right atrial pressure.  No pericardial effusion is present.  Proximal ascending aorta 3.8 cm (indexed to BSA 1.6 cm/m2 which is borderline  normal).     Compared to prior study (9/6/2018), no significant change.  _____________________________________________________________________________  __        Left Ventricle  Left ventricular wall thickness is normal. Left ventricular size is normal.  The Ejection Fraction is estimated at 40-45%. Diastolic function not assessed  due to atrial fibrillation. Mild diffuse hypokinesis is present.     Right Ventricle  Right ventricular function, chamber size, wall motion, and thickness are  normal.     Atria  Both atria appear normal. The atrial septum is intact as assessed by color  Doppler .     Mitral Valve  The mitral valve is normal. Mild mitral insufficiency is present.        Aortic Valve  Trileaflet aortic sclerosis without stenosis. The aortic valve is tricuspid.  Trace to mild aortic insufficiency is present.     Tricuspid Valve  The tricuspid valve is normal. Mild tricuspid insufficiency is present.  Pulmonary artery systolic pressure is normal. Right ventricular systolic  pressure is 23mmHg above the right atrial pressure.     Pulmonic Valve  The pulmonic valve is normal.     Vessels  The aorta root is normal. Proximal ascending aorta 3.8 cm (indexed to BSA 1.6  cm/m2 which is borderline normal). The inferior vena cava is normal. Estimated  mean  right atrial pressure is 3 mmHg (normal).     Pericardium  No pericardial effusion is present.     _____________________________________________________________________________  __  MMode/2D Measurements & Calculations  LA Volume (BP): 55.0 ml     LA Volume Index (BP): 24.4 ml/m2        Doppler Measurements & Calculations  TR max zoey: 241.6 cm/sec  TR max P.4 mmHg           _____________________________________________________________________________  __           Report approved by: Lee Georges 2018 12:20 PM      Echo complete with definity    Narrative    [id number]  [id number]  [id number]  [id number]^CHAYITO^CLAY^P           [facility], Echocardiography Laboratory  [address] [place] [address]        Name: ELDA HERNÁNDEZ  MRN: [id number]  : 1950  Study Date: 2018 02:58 PM  Age: 67 yrs  Gender: Male  Patient Location: [facility]  Reason For Study: Persistent atrial fibrillation (H)  Ordering Physician: CLAY STRATTON  Referring Physician: CLAY STRATTON  Performed By: Bibiana Estrada RDCS     BSA: 2.3 m2  Height: 70 in  Weight: 242 lb  HR: 113  BP: 154/87 mmHg  _____________________________________________________________________________  __        Procedure  Echocardiogram with two-dimensional, color and spectral Doppler performed.  Contrast Definity. Definity (NDC #16698-457-96) given intravenously. Patient  was given 6ml mixture of 1.5ml Definity and 8.5ml saline. 4 ml wasted. IV  start location RAC .  _____________________________________________________________________________  __        Interpretation Summary     Patient in atrial fibrillation during the study.  Mildly (EF 40-45%) reduced left ventricular function is present. Mild diffuse  hypokinesis is present.  Right ventricular function, chamber size, wall motion, and thickness are  normal.  The inferior vena cava was normal in size with preserved respiratory  variability.Estimated mean  right atrial pressure is 3 mmHg (normal).  No pericardial effusion.  Mild tricuspid insufficiency is present.  _____________________________________________________________________________  __        Left Ventricle  Left ventricular wall thickness is normal. Left ventricular size is normal.  Diastolic function not assessed due to atrial fibrillation. Mildly (EF 40-45%)  reduced left ventricular function is present. The Ejection Fraction was  calculated using Bi-plane contrast. Mild diffuse hypokinesis is present.     Right Ventricle  Right ventricular function, chamber size, wall motion, and thickness are  normal.     Atria  Moderate biatrial enlargement is present. The atrial septum is intact as  assessed by color Doppler .     Mitral Valve  Mild mitral annular calcification is present. Mild mitral insufficiency is  present.        Aortic Valve  Mild aortic valve sclerosis is present. Trace aortic insufficiency is present.     Tricuspid Valve  The tricuspid valve is normal. Mild tricuspid insufficiency is present. Right  ventricular systolic pressure is 28mmHg above the right atrial pressure.  Pulmonary artery systolic pressure is normal.     Pulmonic Valve  Trace to mild pulmonic insufficiency is present.     Vessels  Visualized portions of the aorta are normal in size. The inferior vena cava  was normal in size with preserved respiratory variability. The pulmonary  artery cannot be assessed. Estimated mean right atrial pressure is 3 mmHg  (normal).     Pericardium  No pericardial effusion is present.        Compared to Previous Study  Previous study not available for comparison.     Attestation  I have personally viewed the imaging and agree with the interpretation and  report as documented by the fellow, Elizabeth Coker, and/or edited by me.  _____________________________________________________________________________  __     MMode/2D Measurements & Calculations  IVSd: 0.89 cm  LVIDd: 5.5 cm  LVIDs: 4.7  cm  LVPWd: 1.0 cm  FS: 14.5 %  LV mass(C)d: 201.2 grams  LV mass(C)dI: 88.9 grams/m2  Ao root diam: 3.5 cm  LA dimension: 5.0 cm  asc Aorta Diam: 3.3 cm  LA/Ao: 1.4  LVOT diam: 2.5 cm  LVOT area: 4.8 cm2  LA Volume (BP): 113.0 ml     LA Volume Index (BP): 50.0 ml/m2  RWT: 0.37        Doppler Measurements & Calculations  MV E max lobito: 109.1 cm/sec  MV dec time: 0.12 sec  Ao V2 max: 138.0 cm/sec  Ao max P.0 mmHg  ANDRES(V,D): 2.9 cm2  LV V1 max P.8 mmHg  LV V1 max: 82.7 cm/sec  LV V1 VTI: 14.5 cm  MR ERO: 0.17 cm2  MR volume: 24.3 ml  CO(LVOT): 6.9 l/min  CI(LVOT): 3.0 l/min/m2  SV(LVOT): 69.7 ml  SI(LVOT): 30.8 ml/m2  PA V2 max: 100.4 cm/sec  PA max P.0 mmHg  PI end-d lobito: 201.0 cm/sec  PI max lobito: 271.5 cm/sec  PI max P.5 mmHg  TR max lobito: 265.3 cm/sec  TR max P.2 mmHg  Pulm Sys Lobito: 21.7 cm/sec  Pulm [place] Lobito: 87.9 cm/sec  Pulm S/D: 0.25  AV Lobito Ratio (DI): 0.60  E/E' av.9     Lateral E/e': 8.3  Medial E/e': 15.5           _____________________________________________________________________________  __           Report approved by: Lee Barraza 2018 05:38 PM          Assessment and Plan:  Tee Yobani is a 68 year old gentleman with asymptomatic AF. He is on rivaroxaban and metoprolol succinate (we increased from 50 to 100 mg at his last visit). He also takes lisinopril 40 mg daily for hypertension. His [id number] is 2 or 3 (age, hypertension, systolic heart failure although this may be a subacute effect of his rapid ventricular response). His echo shows mildly reduced LV function (EF 40-45%), unchanged with cardioversion and rate control.  He is in AF at this time and continues to feel well in AF vs. sinus rhythm.  We reviewed results of the AFFIRM trial.  He will continue his current medications.  Unless he has problems sooner we will arrange follow-up in one year.  He could see Dr. Segovia or Kylie Cuevas.  I explained that I will be retiring in .  It was a  pleasure seeing Tee Hilton today and it has been a privilege to participate in his medical care.  Enrique FIELD

## 2019-04-08 NOTE — TELEPHONE ENCOUNTER
"Requested Prescriptions   Pending Prescriptions Disp Refills     lisinopril (PRINIVIL/ZESTRIL) 10 MG tablet [Pharmacy Med Name: LISINOPRIL 10MG  TAB] 30 tablet 0    Last Written Prescription Date:  6-14-18  Last Fill Quantity: 30,  # refills: 0   Last office visit: 2/22/2018 with prescribing provider:  2-22-18   Future Office Visit:   Sig: TAKE 1 TABLET BY MOUTH ONCE DAILY    ACE Inhibitors (Including Combos) Protocol Failed    7/16/2018  6:35 AM       Failed - Blood pressure under 140/90 in past 12 months    BP Readings from Last 3 Encounters:   02/22/18 146/88   01/23/18 (!) 165/92   01/18/18 (!) 142/94                Passed - Recent (12 mo) or future (30 days) visit within the authorizing provider's specialty    Patient had office visit in the last 12 months or has a visit in the next 30 days with authorizing provider or within the authorizing provider's specialty.  See \"Patient Info\" tab in inbasket, or \"Choose Columns\" in Meds & Orders section of the refill encounter.           Passed - Patient is age 18 or older       Passed - Normal serum creatinine on file in past 12 months    Recent Labs   Lab Test  01/18/18   0932   CR  0.79            Passed - Normal serum potassium on file in past 12 months    Recent Labs   Lab Test  01/18/18   0932   POTASSIUM  4.0             " Patient is scheduled for surgery with Dr. Zaragoza     Spoke or left message with: patient via phone call     Date of Surgery: 5/1/19     Location: [facility]     Informed patient they will need an adult  yes     Pre-op with surgeon (if applicable): yes     H&P: Scheduled with PCP     Additional imaging/appointments: n/a     Surgery packet: given in clinic      Additional comments: patient wanted to stress the importance for a 3 day hospital stay, in order for medicare to cover rehab for after surgery

## 2019-04-16 DIAGNOSIS — I48.20 CHRONIC ATRIAL FIBRILLATION (H): ICD-10-CM

## 2019-04-16 NOTE — TELEPHONE ENCOUNTER
Routing refill request to provider for review/approval because:  Labs out of range:  Creatinine     Lab Results   Component Value Date    CR 1.03 09/24/2018     Lili Vogt RN, BSN, PHN

## 2019-04-16 NOTE — TELEPHONE ENCOUNTER
Requested Prescriptions   Pending Prescriptions Disp Refills     XARELTO 20 MG TABS tablet [Pharmacy Med Name: XARELTO 20MG        TAB] 30 tablet 3     Sig: TAKE 1 TABLET BY MOUTH ONCE DAILY WITH  DINNER   Last Written Prescription Date:  3-17-19  Last Fill Quantity: 30,  # refills: 3   Last office visit: 1/2/2019 with prescribing provider:  1-2-19   Future Office Visit:      Direct Oral Anticoagulant Agents Failed - 4/16/2019 10:06 AM        Failed - Normal Platelets on file in past 12 months     No lab results found.            Failed - Creatinine Clearance greater than 50 ml/min on file in past 3 mos     No lab results found.          Failed - Serum creatinine less than or equal to 1.4 on file in past 3 mos     Recent Labs   Lab Test 09/24/18  0734   CR 1.03             Passed - Medication is active on med list        Passed - Patient is 18 years of age or older        Passed - Recent (6 mo) or future (30 days) visit within the authorizing provider's specialty

## 2019-04-17 RX ORDER — RIVAROXABAN 20 MG/1
TABLET, FILM COATED ORAL
Qty: 30 TABLET | Refills: 3 | Status: SHIPPED | OUTPATIENT
Start: 2019-04-17 | End: 2019-05-02

## 2019-04-24 ENCOUNTER — DOCUMENTATION ONLY (OUTPATIENT)
Dept: SLEEP MEDICINE | Facility: CLINIC | Age: 69
End: 2019-04-24

## 2019-04-24 DIAGNOSIS — G47.39 OTHER SLEEP APNEA: ICD-10-CM

## 2019-04-24 NOTE — PROGRESS NOTES
6 month [facility] Recheck Visit     Diagnostic AHI: 47.8    PSG    Subjective measures:  Call was disconnect.  Called back and was not able to reach patient.     Assessment: Pt not meeting objective benchmarks for compliance     Action plan:   pt to follow up per provider request       Device type: Auto-CPAP  PAP settings: CPAP min 7.0 cm  H20     CPAP max 12.0 cm  H20

## 2019-05-02 ENCOUNTER — OFFICE VISIT (OUTPATIENT)
Dept: FAMILY MEDICINE | Facility: CLINIC | Age: 69
End: 2019-05-02
Payer: COMMERCIAL

## 2019-05-02 VITALS
HEART RATE: 73 BPM | BODY MASS INDEX: 33.74 KG/M2 | TEMPERATURE: 97.8 F | SYSTOLIC BLOOD PRESSURE: 132 MMHG | DIASTOLIC BLOOD PRESSURE: 84 MMHG | HEIGHT: 71 IN | WEIGHT: 241 LBS | RESPIRATION RATE: 18 BRPM | OXYGEN SATURATION: 97 %

## 2019-05-02 DIAGNOSIS — I48.20 CHRONIC ATRIAL FIBRILLATION (H): ICD-10-CM

## 2019-05-02 DIAGNOSIS — Z12.5 SCREENING PSA (PROSTATE SPECIFIC ANTIGEN): ICD-10-CM

## 2019-05-02 DIAGNOSIS — Z13.6 ENCOUNTER FOR ABDOMINAL AORTIC ANEURYSM (AAA) SCREENING: Primary | ICD-10-CM

## 2019-05-02 DIAGNOSIS — I50.22 CHRONIC SYSTOLIC CONGESTIVE HEART FAILURE (H): Chronic | ICD-10-CM

## 2019-05-02 DIAGNOSIS — Z13.6 CARDIOVASCULAR SCREENING; LDL GOAL LESS THAN 160: ICD-10-CM

## 2019-05-02 DIAGNOSIS — I48.19 PERSISTENT ATRIAL FIBRILLATION (H): ICD-10-CM

## 2019-05-02 DIAGNOSIS — I10 ESSENTIAL HYPERTENSION: Chronic | ICD-10-CM

## 2019-05-02 DIAGNOSIS — I50.22 CHRONIC SYSTOLIC HEART FAILURE (H): ICD-10-CM

## 2019-05-02 DIAGNOSIS — I10 BENIGN ESSENTIAL HYPERTENSION: ICD-10-CM

## 2019-05-02 DIAGNOSIS — G47.33 OSA (OBSTRUCTIVE SLEEP APNEA): ICD-10-CM

## 2019-05-02 LAB
ALBUMIN SERPL-MCNC: 4 G/DL (ref 3.4–5)
ALP SERPL-CCNC: 55 U/L (ref 40–150)
ALT SERPL W P-5'-P-CCNC: 22 U/L (ref 0–70)
ANION GAP SERPL CALCULATED.3IONS-SCNC: 3 MMOL/L (ref 3–14)
AST SERPL W P-5'-P-CCNC: 12 U/L (ref 0–45)
BILIRUB SERPL-MCNC: 0.6 MG/DL (ref 0.2–1.3)
BUN SERPL-MCNC: 25 MG/DL (ref 7–30)
CALCIUM SERPL-MCNC: 9.3 MG/DL (ref 8.5–10.1)
CHLORIDE SERPL-SCNC: 106 MMOL/L (ref 94–109)
CO2 SERPL-SCNC: 29 MMOL/L (ref 20–32)
CREAT SERPL-MCNC: 1.16 MG/DL (ref 0.66–1.25)
GFR SERPL CREATININE-BSD FRML MDRD: 65 ML/MIN/{1.73_M2}
GLUCOSE SERPL-MCNC: 92 MG/DL (ref 70–99)
NT-PROBNP SERPL-MCNC: 1408 PG/ML (ref 0–125)
POTASSIUM SERPL-SCNC: 5 MMOL/L (ref 3.4–5.3)
PROT SERPL-MCNC: 6.9 G/DL (ref 6.8–8.8)
PSA SERPL-ACNC: 2.85 UG/L (ref 0–4)
SODIUM SERPL-SCNC: 138 MMOL/L (ref 133–144)

## 2019-05-02 PROCEDURE — 80053 COMPREHEN METABOLIC PANEL: CPT | Performed by: PHYSICIAN ASSISTANT

## 2019-05-02 PROCEDURE — 83880 ASSAY OF NATRIURETIC PEPTIDE: CPT | Performed by: PHYSICIAN ASSISTANT

## 2019-05-02 PROCEDURE — 99214 OFFICE O/P EST MOD 30 MIN: CPT | Performed by: PHYSICIAN ASSISTANT

## 2019-05-02 PROCEDURE — 36415 COLL VENOUS BLD VENIPUNCTURE: CPT | Performed by: PHYSICIAN ASSISTANT

## 2019-05-02 PROCEDURE — G0103 PSA SCREENING: HCPCS | Performed by: PHYSICIAN ASSISTANT

## 2019-05-02 RX ORDER — METOPROLOL SUCCINATE 50 MG/1
100 TABLET, EXTENDED RELEASE ORAL DAILY
Qty: 90 TABLET | Refills: 1 | Status: SHIPPED | OUTPATIENT
Start: 2019-05-02 | End: 2019-09-04

## 2019-05-02 RX ORDER — ZOLPIDEM TARTRATE 5 MG/1
5 TABLET ORAL
Qty: 30 TABLET | Refills: 1 | Status: SHIPPED | OUTPATIENT
Start: 2019-05-02 | End: 2019-06-24

## 2019-05-02 RX ORDER — LISINOPRIL 20 MG/1
TABLET ORAL
Qty: 180 TABLET | Refills: 1 | Status: SHIPPED | OUTPATIENT
Start: 2019-05-02 | End: 2020-02-12

## 2019-05-02 ASSESSMENT — MIFFLIN-ST. JEOR: SCORE: 1887.36

## 2019-05-02 NOTE — PROGRESS NOTES
SUBJECTIVE:   Tee Hilton is a 68 year old male who presents to clinic today for the following   health issues:      Hypertension Follow-up      Outpatient blood pressures are not being checked.    Low Salt Diet: not monitoring salt      Amount of exercise or physical activity: 4-5 days/week for an average of 45-60 minutes goes to the gym    Problems taking medications regularly: No    Medication side effects: none    Diet: low salt        Heart Failure Follow-up    Symptoms:    Shortness of breath: none    Lower extremity edema: none    Chest pain: No    Using more pillows than normal: No    Cough at night: No    Weight:    Checking weight daily: Yes    Weight change: none    Cardiology visits, ER/UC, or hospital admissions since last visit: None    Medication side effects: none    Tee denies symptoms of hfref. No ZEPEDA or fatigue or edema.   Mehdi. Not tolerating or using his cpap.     Additional history: as documented    Reviewed  and updated as needed this visit by clinical staff  Tobacco  Allergies  Meds         Reviewed and updated as needed this visit by Provider         BP Readings from Last 3 Encounters:   05/02/19 132/84   04/01/19 127/83   01/02/19 120/82    Wt Readings from Last 3 Encounters:   05/02/19 109.3 kg (241 lb)   04/01/19 108 kg (238 lb)   01/02/19 108 kg (238 lb)                    All other systems negative except as outline above  OBJECTIVE:  Eye exam - right eye normal lid, conjunctiva, cornea, pupil and fundus, left eye normal lid, conjunctiva, cornea, pupil and fundus.  Thyroid not palpable, not enlarged, no nodules detected.  CHEST:chest clear to IPPA, no tachypnea, retractions or cyanosis and Heart exam detail:irregularly irregular rhythm, chest is clear without rales or wheezing, no pedal edema, no JVD, no hepatosplenomegaly.  No edema  The abdomen is soft without tenderness, guarding, mass, rebound or organomegaly. Bowel sounds are normal. No CVA tenderness or inguinal  adenopathy noted.      Tee was seen today for hypertension and atrial fib.    Diagnoses and all orders for this visit:    Encounter for abdominal aortic aneurysm (AAA) screening  -      Aorta Medicare AAA Screening; Future    Chronic systolic congestive heart failure (H)  -     Comprehensive metabolic panel (BMP + Alb, Alk Phos, ALT, AST, Total. Bili, TP)  -     HEART FAILURE ACTION PLAN    Essential hypertension    Persistent atrial fibrillation (H)    CARDIOVASCULAR SCREENING; LDL GOAL LESS THAN 160  -     Lipid panel reflex to direct LDL Fasting    Screening PSA (prostate specific antigen)  -     PSA, screen    Benign essential hypertension  -     lisinopril (PRINIVIL/ZESTRIL) 20 MG tablet; TAKE 2 TABLETS BY MOUTH ONCE DAILY    Chronic atrial fibrillation (H)  -     metoprolol succinate ER (TOPROL-XL) 50 MG 24 hr tablet; Take 2 tablets (100 mg) by mouth daily  -     rivaroxaban ANTICOAGULANT (XARELTO) 20 MG TABS tablet; TAKE 1 TABLET BY MOUTH ONCE DAILY WITH  DINNER    AUGUSTO (obstructive sleep apnea)  -     zolpidem (AMBIEN) 5 MG tablet; Take 1 tablet (5 mg) by mouth nightly as needed for sleep  -     OTOLARYNGOLOGY REFERRAL    Chronic systolic heart failure (H)  -     BNP-N terminal pro      work on lifestyle modification  Recheck in 6 mos

## 2019-05-06 ENCOUNTER — ANCILLARY PROCEDURE (OUTPATIENT)
Dept: ULTRASOUND IMAGING | Facility: CLINIC | Age: 69
End: 2019-05-06
Attending: PHYSICIAN ASSISTANT
Payer: COMMERCIAL

## 2019-05-06 DIAGNOSIS — Z13.6 ENCOUNTER FOR ABDOMINAL AORTIC ANEURYSM (AAA) SCREENING: ICD-10-CM

## 2019-05-06 PROCEDURE — 76706 US ABDL AORTA SCREEN AAA: CPT

## 2019-05-17 ENCOUNTER — OFFICE VISIT (OUTPATIENT)
Dept: OTOLARYNGOLOGY | Facility: CLINIC | Age: 69
End: 2019-05-17
Payer: COMMERCIAL

## 2019-05-17 ENCOUNTER — TELEPHONE (OUTPATIENT)
Dept: OTOLARYNGOLOGY | Facility: CLINIC | Age: 69
End: 2019-05-17

## 2019-05-17 VITALS
SYSTOLIC BLOOD PRESSURE: 152 MMHG | HEART RATE: 78 BPM | RESPIRATION RATE: 12 BRPM | BODY MASS INDEX: 33.6 KG/M2 | DIASTOLIC BLOOD PRESSURE: 99 MMHG | HEIGHT: 71 IN | WEIGHT: 240 LBS | OXYGEN SATURATION: 99 %

## 2019-05-17 DIAGNOSIS — J34.2 DEVIATED NASAL SEPTUM: Primary | ICD-10-CM

## 2019-05-17 PROCEDURE — 99204 OFFICE O/P NEW MOD 45 MIN: CPT | Performed by: OTOLARYNGOLOGY

## 2019-05-17 ASSESSMENT — MIFFLIN-ST. JEOR: SCORE: 1882.82
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
## 2019-05-17 NOTE — PATIENT INSTRUCTIONS
Scheduling Information  To schedule your CT/MRI scan, please contact [facility] at [phone] OR [facility] at [phone]    To schedule your Surgery, please contact our Specialty Schedulers at [phone]      ENT Clinic Locations Clinic Hours Telephone Number     [facility]  [address] [place] [address]   Monday:           1:00pm -- 5:00pm    Friday:              8:00am - 12:00pm   To schedule/reschedule an appointment with   Dr. Avila,   please contact our   Specialty Scheduling Department at:     [phone]       [facility]  [address] [place] [address] Tuesday:          8:00am -- 2:00pm         Urgent Care Locations Clinic Hours Telephone Numbers     [facility]  [address] [place] [address]     Monday-Friday:     11:00am - 9:00pm    Saturday-Sunday:  9:00am - 5:00pm   [phone]     [facility]  [address] [place] [address]     Monday-Friday:      5:00pm - 9:00pm     Saturday-Sunday:  9:00am - 5:00pm   [phone]
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
## 2019-05-30 NOTE — TELEPHONE ENCOUNTER
Type of surgery: endoscopic septoplasty and bilateral turbinate reduction CPT code 01072, 90163  Deviated nasal septum [J34.2]  - Primary   Location of surgery: [facility]  Date and time of surgery: 7-9-18  TBD  Surgeon: Dr Avila  Pre-Op Appt Date: 6-25-19  Post-Op Appt Date: 7-18-19 & 8-1-19   Packet sent out: Yes  Pre-cert/Authorization completed:  Prior auth not required for this [employer] procedure. Cpt code 96716 reference number [id number]. Cpt code 19652 reference number [id number].   Date: 05/31/2019    Insurance is valid 07/01/2019

## 2019-06-24 DIAGNOSIS — G47.33 OSA (OBSTRUCTIVE SLEEP APNEA): ICD-10-CM

## 2019-06-24 NOTE — TELEPHONE ENCOUNTER
Routing refill request to provider for review/approval because:Last refilled   30 tablet 1 5/2/2019       Drug not on the [facility] refill protocol

## 2019-06-24 NOTE — TELEPHONE ENCOUNTER
Requested Prescriptions   Pending Prescriptions Disp Refills     zolpidem (AMBIEN) 5 MG tablet [Pharmacy Med Name: ZOLPIDEM 5MG        TAB] 30 tablet 1     Sig: TAKE 1 TABLET BY MOUTH NIGHTLY AS NEEDED FOR SLEEP       There is no refill protocol information for this order      Last Written Prescription Date:  5-23-19  Last Fill Quantity: 30,  # refills: 1   Last office visit: 5/2/2019 with prescribing provider:  5-2-19   Future Office Visit:   Next 5 appointments (look out 90 days)    Jun 25, 2019  9:40 AM CDT  Pre-Op physical with Jon Denson PA-C  [facility] [place] ([facility] [place]) [address] [facility]  [place] [address] [phone]

## 2019-06-25 ENCOUNTER — OFFICE VISIT (OUTPATIENT)
Dept: FAMILY MEDICINE | Facility: CLINIC | Age: 69
End: 2019-06-25
Payer: COMMERCIAL

## 2019-06-25 VITALS
WEIGHT: 238.8 LBS | OXYGEN SATURATION: 98 % | BODY MASS INDEX: 33.43 KG/M2 | TEMPERATURE: 97.7 F | RESPIRATION RATE: 18 BRPM | HEART RATE: 94 BPM | DIASTOLIC BLOOD PRESSURE: 100 MMHG | SYSTOLIC BLOOD PRESSURE: 144 MMHG | HEIGHT: 71 IN

## 2019-06-25 DIAGNOSIS — I48.19 PERSISTENT ATRIAL FIBRILLATION (H): ICD-10-CM

## 2019-06-25 DIAGNOSIS — J34.2 NASAL SEPTAL DEVIATION: ICD-10-CM

## 2019-06-25 DIAGNOSIS — Z01.818 PREOP GENERAL PHYSICAL EXAM: Primary | ICD-10-CM

## 2019-06-25 DIAGNOSIS — I10 ESSENTIAL HYPERTENSION: ICD-10-CM

## 2019-06-25 LAB
ANION GAP SERPL CALCULATED.3IONS-SCNC: 7 MMOL/L (ref 3–14)
BASOPHILS # BLD AUTO: 0 10E9/L (ref 0–0.2)
BASOPHILS NFR BLD AUTO: 0.1 %
BUN SERPL-MCNC: 25 MG/DL (ref 7–30)
CALCIUM SERPL-MCNC: 9.6 MG/DL (ref 8.5–10.1)
CHLORIDE SERPL-SCNC: 104 MMOL/L (ref 94–109)
CO2 SERPL-SCNC: 26 MMOL/L (ref 20–32)
CREAT SERPL-MCNC: 1.21 MG/DL (ref 0.66–1.25)
DIFFERENTIAL METHOD BLD: ABNORMAL
EOSINOPHIL # BLD AUTO: 0.1 10E9/L (ref 0–0.7)
EOSINOPHIL NFR BLD AUTO: 0.7 %
ERYTHROCYTE [DISTWIDTH] IN BLOOD BY AUTOMATED COUNT: 12.3 % (ref 10–15)
GFR SERPL CREATININE-BSD FRML MDRD: 62 ML/MIN/{1.73_M2}
GLUCOSE SERPL-MCNC: 98 MG/DL (ref 70–99)
HCT VFR BLD AUTO: 39.6 % (ref 40–53)
HGB BLD-MCNC: 13.6 G/DL (ref 13.3–17.7)
LYMPHOCYTES # BLD AUTO: 1.2 10E9/L (ref 0.8–5.3)
LYMPHOCYTES NFR BLD AUTO: 14.6 %
MCH RBC QN AUTO: 31.9 PG (ref 26.5–33)
MCHC RBC AUTO-ENTMCNC: 34.3 G/DL (ref 31.5–36.5)
MCV RBC AUTO: 93 FL (ref 78–100)
MONOCYTES # BLD AUTO: 0.7 10E9/L (ref 0–1.3)
MONOCYTES NFR BLD AUTO: 8.8 %
NEUTROPHILS # BLD AUTO: 6.1 10E9/L (ref 1.6–8.3)
NEUTROPHILS NFR BLD AUTO: 75.8 %
PLATELET # BLD AUTO: 257 10E9/L (ref 150–450)
POTASSIUM SERPL-SCNC: 4.4 MMOL/L (ref 3.4–5.3)
RBC # BLD AUTO: 4.27 10E12/L (ref 4.4–5.9)
SODIUM SERPL-SCNC: 137 MMOL/L (ref 133–144)
WBC # BLD AUTO: 8.1 10E9/L (ref 4–11)

## 2019-06-25 PROCEDURE — 80048 BASIC METABOLIC PNL TOTAL CA: CPT | Performed by: PHYSICIAN ASSISTANT

## 2019-06-25 PROCEDURE — 99215 OFFICE O/P EST HI 40 MIN: CPT | Performed by: PHYSICIAN ASSISTANT

## 2019-06-25 PROCEDURE — 85025 COMPLETE CBC W/AUTO DIFF WBC: CPT | Performed by: PHYSICIAN ASSISTANT

## 2019-06-25 PROCEDURE — 93000 ELECTROCARDIOGRAM COMPLETE: CPT | Performed by: PHYSICIAN ASSISTANT

## 2019-06-25 PROCEDURE — 36415 COLL VENOUS BLD VENIPUNCTURE: CPT | Performed by: PHYSICIAN ASSISTANT

## 2019-06-25 RX ORDER — AMLODIPINE BESYLATE 5 MG/1
5 TABLET ORAL DAILY
Qty: 90 TABLET | Refills: 1 | Status: SHIPPED | OUTPATIENT
Start: 2019-06-25 | End: 2019-12-10

## 2019-06-25 RX ORDER — ZOLPIDEM TARTRATE 5 MG/1
TABLET ORAL
Qty: 30 TABLET | Refills: 1 | Status: SHIPPED | OUTPATIENT
Start: 2019-06-25 | End: 2019-08-13

## 2019-06-25 ASSESSMENT — PAIN SCALES - GENERAL: PAINLEVEL: NO PAIN (0)

## 2019-06-25 ASSESSMENT — MIFFLIN-ST. JEOR: SCORE: 1877.38
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
## 2019-07-02 ENCOUNTER — OFFICE VISIT (OUTPATIENT)
Dept: FAMILY MEDICINE | Facility: CLINIC | Age: 69
End: 2019-07-02
Payer: COMMERCIAL

## 2019-07-02 VITALS
SYSTOLIC BLOOD PRESSURE: 120 MMHG | HEART RATE: 91 BPM | BODY MASS INDEX: 33.24 KG/M2 | OXYGEN SATURATION: 99 % | WEIGHT: 235 LBS | RESPIRATION RATE: 18 BRPM | DIASTOLIC BLOOD PRESSURE: 82 MMHG

## 2019-07-02 DIAGNOSIS — I10 ESSENTIAL HYPERTENSION: Primary | ICD-10-CM

## 2019-07-02 PROCEDURE — 99213 OFFICE O/P EST LOW 20 MIN: CPT | Performed by: PHYSICIAN ASSISTANT

## 2019-07-02 NOTE — PROGRESS NOTES
Subjective     Tee Hilton is a 68 year old male who presents to clinic today for the following health issues:    HPI   Hypertension Follow-up      Do you check your blood pressure regularly outside of the clinic? No     Are you following a low salt diet? No    Are your blood pressures ever more than 140 on the top number (systolic) OR more   than 90 on the bottom number (diastolic), for example 140/90? Yes    Amount of exercise or physical activity: 4-5 days/week for an average of 45-60 minutes    Problems taking medications regularly: No    Medication side effects: none    Diet: regular (no restrictions)          Allergies   Allergen Reactions     Erythromycin Unknown     Upset stomach     BP Readings from Last 3 Encounters:   07/02/19 120/82   06/25/19 (!) 144/100   05/17/19 (!) 152/99    Wt Readings from Last 3 Encounters:   07/02/19 106.6 kg (235 lb)   06/25/19 108.3 kg (238 lb 12.8 oz)   05/17/19 108.9 kg (240 lb)                      Reviewed and updated as needed this visit by Provider         Review of Systems   ROS COMP: Constitutional, HEENT, cardiovascular, pulmonary, GI, , musculoskeletal, neuro, skin, endocrine and psych systems are negative, except as otherwise noted.      Objective    /82   Pulse 91   Resp 18   Wt 106.6 kg (235 lb)   SpO2 99%   BMI 33.24 kg/m    Body mass index is 33.24 kg/m .  Physical Exam   Eye exam - right eye normal lid, conjunctiva, cornea, pupil and fundus, left eye normal lid, conjunctiva, cornea, pupil and fundus.  Thyroid not palpable, not enlarged, no nodules detected.  CHEST:chest clear to IPPA, no tachypnea, retractions or cyanosis and Heart exam detail:irregularly irregular rhythm, chest is clear without rales or wheezing, no pedal edema, no JVD, no hepatosplenomegaly.  No edema    Tee was seen today for hypertension.    Diagnoses and all orders for this visit:    Essential hypertension      Patient approved for surgery.  Recheck in 6 mos   work on  lifestyle modification

## 2019-07-08 ENCOUNTER — TELEPHONE (OUTPATIENT)
Dept: OTOLARYNGOLOGY | Facility: CLINIC | Age: 69
End: 2019-07-08

## 2019-07-08 ENCOUNTER — ANESTHESIA EVENT (OUTPATIENT)
Dept: SURGERY | Facility: AMBULATORY SURGERY CENTER | Age: 69
End: 2019-07-08

## 2019-07-08 NOTE — TELEPHONE ENCOUNTER
Reason for call: Surgery scheduled for 9:30 am tomorrow (7-9-19)  Patient called regarding (reason for call): Surgery  Additional comments: Patient states he will stop taking Xarelto as of today and wonders if this is enough time to stop prior to his surgery tomorrow am? Please call.    Phone number to reach patient:  Home number on file [phone] (home)    Best Time:  Today please    Can we leave a detailed message on this number?  NO

## 2019-07-08 NOTE — TELEPHONE ENCOUNTER
Called and informed patient that Dr. Avila recommends he proceed with surgery.   Advised that anesthesia team is also reviewing his case and will call if there is an issue.  Patient verbalized understanding and has no questions.    Derrick Arguello RN....7/8/2019 2:27 PM

## 2019-07-08 NOTE — TELEPHONE ENCOUNTER
Patient is returning a missed call from care team, would like a call back as soon as possible please.

## 2019-07-09 ENCOUNTER — HOSPITAL ENCOUNTER (OUTPATIENT)
Facility: AMBULATORY SURGERY CENTER | Age: 69
Discharge: HOME | End: 2019-07-09
Attending: OTOLARYNGOLOGY | Admitting: OTOLARYNGOLOGY
Payer: COMMERCIAL

## 2019-07-09 ENCOUNTER — ANESTHESIA (OUTPATIENT)
Dept: SURGERY | Facility: AMBULATORY SURGERY CENTER | Age: 69
End: 2019-07-09
Payer: COMMERCIAL

## 2019-07-09 ENCOUNTER — TELEPHONE (OUTPATIENT)
Dept: OTOLARYNGOLOGY | Facility: CLINIC | Age: 69
End: 2019-07-09

## 2019-07-09 VITALS
TEMPERATURE: 97 F | HEART RATE: 78 BPM | DIASTOLIC BLOOD PRESSURE: 75 MMHG | SYSTOLIC BLOOD PRESSURE: 127 MMHG | OXYGEN SATURATION: 99 % | RESPIRATION RATE: 16 BRPM

## 2019-07-09 DIAGNOSIS — I48.0 PAROXYSMAL ATRIAL FIBRILLATION (H): ICD-10-CM

## 2019-07-09 DIAGNOSIS — G89.18 ACUTE POST-OPERATIVE PAIN: Primary | ICD-10-CM

## 2019-07-09 DIAGNOSIS — J34.2 DEVIATED NASAL SEPTUM: ICD-10-CM

## 2019-07-09 PROCEDURE — 30520 REPAIR OF NASAL SEPTUM: CPT

## 2019-07-09 PROCEDURE — 30140 RESECT INFERIOR TURBINATE: CPT | Mod: 50 | Performed by: OTOLARYNGOLOGY

## 2019-07-09 PROCEDURE — 30520 REPAIR OF NASAL SEPTUM: CPT | Performed by: OTOLARYNGOLOGY

## 2019-07-09 PROCEDURE — G8907 PT DOC NO EVENTS ON DISCHARG: HCPCS

## 2019-07-09 PROCEDURE — G8916 PT W IV AB GIVEN ON TIME: HCPCS

## 2019-07-09 PROCEDURE — 30140 RESECT INFERIOR TURBINATE: CPT | Mod: 50

## 2019-07-09 RX ORDER — CEFAZOLIN SODIUM 1 G/3ML
1 INJECTION, POWDER, FOR SOLUTION INTRAMUSCULAR; INTRAVENOUS SEE ADMIN INSTRUCTIONS
Status: DISCONTINUED | OUTPATIENT
Start: 2019-07-09 | End: 2019-07-10 | Stop reason: HOSPADM

## 2019-07-09 RX ORDER — NALOXONE HYDROCHLORIDE 0.4 MG/ML
.1-.4 INJECTION, SOLUTION INTRAMUSCULAR; INTRAVENOUS; SUBCUTANEOUS
Status: DISCONTINUED | OUTPATIENT
Start: 2019-07-09 | End: 2019-07-10 | Stop reason: HOSPADM

## 2019-07-09 RX ORDER — DEXAMETHASONE SODIUM PHOSPHATE 4 MG/ML
INJECTION, SOLUTION INTRA-ARTICULAR; INTRALESIONAL; INTRAMUSCULAR; INTRAVENOUS; SOFT TISSUE PRN
Status: DISCONTINUED | OUTPATIENT
Start: 2019-07-09 | End: 2019-07-09 | Stop reason: HOSPADM

## 2019-07-09 RX ORDER — DEXAMETHASONE SODIUM PHOSPHATE 4 MG/ML
10 INJECTION, SOLUTION INTRA-ARTICULAR; INTRALESIONAL; INTRAMUSCULAR; INTRAVENOUS; SOFT TISSUE ONCE
Status: COMPLETED | OUTPATIENT
Start: 2019-07-09 | End: 2019-07-09

## 2019-07-09 RX ORDER — FENTANYL CITRATE 50 UG/ML
INJECTION, SOLUTION INTRAMUSCULAR; INTRAVENOUS PRN
Status: DISCONTINUED | OUTPATIENT
Start: 2019-07-09 | End: 2019-07-09

## 2019-07-09 RX ORDER — ONDANSETRON 2 MG/ML
INJECTION INTRAMUSCULAR; INTRAVENOUS PRN
Status: DISCONTINUED | OUTPATIENT
Start: 2019-07-09 | End: 2019-07-09

## 2019-07-09 RX ORDER — LIDOCAINE HYDROCHLORIDE AND EPINEPHRINE BITARTRATE 20; .01 MG/ML; MG/ML
INJECTION, SOLUTION SUBCUTANEOUS PRN
Status: DISCONTINUED | OUTPATIENT
Start: 2019-07-09 | End: 2019-07-09 | Stop reason: HOSPADM

## 2019-07-09 RX ORDER — PROPOFOL 10 MG/ML
INJECTION, EMULSION INTRAVENOUS PRN
Status: DISCONTINUED | OUTPATIENT
Start: 2019-07-09 | End: 2019-07-09

## 2019-07-09 RX ORDER — HYDROMORPHONE HYDROCHLORIDE 1 MG/ML
.3-.5 INJECTION, SOLUTION INTRAMUSCULAR; INTRAVENOUS; SUBCUTANEOUS EVERY 10 MIN PRN
Status: DISCONTINUED | OUTPATIENT
Start: 2019-07-09 | End: 2019-07-10 | Stop reason: HOSPADM

## 2019-07-09 RX ORDER — LIDOCAINE 40 MG/G
CREAM TOPICAL
Status: DISCONTINUED | OUTPATIENT
Start: 2019-07-09 | End: 2019-07-10 | Stop reason: HOSPADM

## 2019-07-09 RX ORDER — OXYCODONE AND ACETAMINOPHEN 5; 325 MG/1; MG/1
1 TABLET ORAL EVERY 4 HOURS PRN
Qty: 40 TABLET | Refills: 0 | Status: SHIPPED | OUTPATIENT
Start: 2019-07-09 | End: 2019-07-11

## 2019-07-09 RX ORDER — SODIUM CHLORIDE, SODIUM LACTATE, POTASSIUM CHLORIDE, CALCIUM CHLORIDE 600; 310; 30; 20 MG/100ML; MG/100ML; MG/100ML; MG/100ML
INJECTION, SOLUTION INTRAVENOUS CONTINUOUS
Status: DISCONTINUED | OUTPATIENT
Start: 2019-07-09 | End: 2019-07-10 | Stop reason: HOSPADM

## 2019-07-09 RX ORDER — CEFAZOLIN SODIUM 2 G/100ML
2 INJECTION, SOLUTION INTRAVENOUS
Status: COMPLETED | OUTPATIENT
Start: 2019-07-09 | End: 2019-07-09

## 2019-07-09 RX ORDER — ONDANSETRON 2 MG/ML
4 INJECTION INTRAMUSCULAR; INTRAVENOUS EVERY 30 MIN PRN
Status: DISCONTINUED | OUTPATIENT
Start: 2019-07-09 | End: 2019-07-10 | Stop reason: HOSPADM

## 2019-07-09 RX ORDER — LIDOCAINE HYDROCHLORIDE 20 MG/ML
INJECTION, SOLUTION INFILTRATION; PERINEURAL PRN
Status: DISCONTINUED | OUTPATIENT
Start: 2019-07-09 | End: 2019-07-09

## 2019-07-09 RX ORDER — ACETAMINOPHEN 325 MG/1
975 TABLET ORAL ONCE
Status: COMPLETED | OUTPATIENT
Start: 2019-07-09 | End: 2019-07-09

## 2019-07-09 RX ORDER — OXYCODONE HCL 5 MG/5 ML
5 SOLUTION, ORAL ORAL EVERY 4 HOURS PRN
Status: DISCONTINUED | OUTPATIENT
Start: 2019-07-09 | End: 2019-07-10 | Stop reason: HOSPADM

## 2019-07-09 RX ORDER — ONDANSETRON 4 MG/1
4 TABLET, ORALLY DISINTEGRATING ORAL EVERY 30 MIN PRN
Status: DISCONTINUED | OUTPATIENT
Start: 2019-07-09 | End: 2019-07-10 | Stop reason: HOSPADM

## 2019-07-09 RX ORDER — FENTANYL CITRATE 50 UG/ML
25-50 INJECTION, SOLUTION INTRAMUSCULAR; INTRAVENOUS
Status: DISCONTINUED | OUTPATIENT
Start: 2019-07-09 | End: 2019-07-10 | Stop reason: HOSPADM

## 2019-07-09 RX ORDER — LIDOCAINE HYDROCHLORIDE AND EPINEPHRINE 10; 10 MG/ML; UG/ML
INJECTION, SOLUTION INFILTRATION; PERINEURAL PRN
Status: DISCONTINUED | OUTPATIENT
Start: 2019-07-09 | End: 2019-07-09 | Stop reason: HOSPADM

## 2019-07-09 RX ADMIN — LIDOCAINE HYDROCHLORIDE 60 MG: 20 INJECTION, SOLUTION INFILTRATION; PERINEURAL at 09:34

## 2019-07-09 RX ADMIN — SODIUM CHLORIDE, SODIUM LACTATE, POTASSIUM CHLORIDE, CALCIUM CHLORIDE: 600; 310; 30; 20 INJECTION, SOLUTION INTRAVENOUS at 09:30

## 2019-07-09 RX ADMIN — Medication 30 MG: at 09:34

## 2019-07-09 RX ADMIN — ACETAMINOPHEN 975 MG: 325 TABLET ORAL at 08:26

## 2019-07-09 RX ADMIN — CEFAZOLIN SODIUM 2 G: 2 INJECTION, SOLUTION INTRAVENOUS at 09:40

## 2019-07-09 RX ADMIN — DEXAMETHASONE SODIUM PHOSPHATE 10 MG: 4 INJECTION, SOLUTION INTRA-ARTICULAR; INTRALESIONAL; INTRAMUSCULAR; INTRAVENOUS; SOFT TISSUE at 09:39

## 2019-07-09 RX ADMIN — PROPOFOL 200 MG: 10 INJECTION, EMULSION INTRAVENOUS at 09:34

## 2019-07-09 RX ADMIN — Medication 0.5 MCG/KG/MIN: at 09:51

## 2019-07-09 RX ADMIN — ONDANSETRON 4 MG: 2 INJECTION INTRAMUSCULAR; INTRAVENOUS at 09:44

## 2019-07-09 RX ADMIN — FENTANYL CITRATE 50 MCG: 50 INJECTION, SOLUTION INTRAMUSCULAR; INTRAVENOUS at 09:34

## 2019-07-09 RX ADMIN — SODIUM CHLORIDE, SODIUM LACTATE, POTASSIUM CHLORIDE, CALCIUM CHLORIDE: 600; 310; 30; 20 INJECTION, SOLUTION INTRAVENOUS at 08:42

## 2019-07-09 RX ADMIN — Medication 100 MCG: at 09:54

## 2019-07-09 RX ADMIN — Medication 5 MG: at 10:56

## 2019-07-09 ASSESSMENT — ENCOUNTER SYMPTOMS: DYSRHYTHMIAS: 1

## 2019-07-09 NOTE — TELEPHONE ENCOUNTER
Reason for call:  Symptom   Symptom or request: Nose bleeding since surgery done this morning    Duration (how long have symptoms been present): For the past hour now  Have you been treated for this before? No    Additional comments: Left nostril is bleeding more and is using the gauze. Call to advise.    Phone number to reach patient:  Other phone number:  [phone]*    Best Time:  asap    Can we leave a detailed message on this number?  NO

## 2019-07-09 NOTE — ANESTHESIA CARE TRANSFER NOTE
Patient: Tee Hilton    Procedure(s):  ENDOSCOPIC SEPTOPLASTY, BILATERAL TURBINATE REDUCTION    Diagnosis: DEVIATED NASAL SEPTUM  Diagnosis Additional Information: No value filed.    Anesthesia Type:   General     Note:  Airway :Face Mask  Patient transferred to:PACU  Comments: Awake, comfortable, sats 100%< Report to RN.Handoff Report: Identifed the Patient, Identified the Reponsible Provider, Reviewed the pertinent medical history, Discussed the surgical course, Reviewed Intra-OP anesthesia mangement and issues during anesthesia, Set expectations for post-procedure period and Allowed opportunity for questions and acknowledgement of understanding      Vitals: (Last set prior to Anesthesia Care Transfer)    CRNA VITALS  7/9/2019 1011 - 7/9/2019 1111      7/9/2019             Pulse:  69    SpO2:  98 %                Electronically Signed By: ANDRE Wolf CRNA  July 9, 2019  12:05 PM
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
## 2019-07-09 NOTE — DISCHARGE INSTRUCTIONS
[facility]  Same-Day Surgery   Adult Discharge Orders & Instructions   For 24 hours after surgery  1. Get plenty of rest.  A responsible adult must stay with you for at least 24 hours after you leave the hospital.   2. Do not drive or use heavy equipment.  If you have weakness or tingling, don't drive or use heavy equipment until this feeling goes away.  3. Do not drink alcohol.  4. Avoid strenuous or risky activities.  Ask for help when climbing stairs.   5. You may feel lightheaded.  IF so, sit for a few minutes before standing.  Have someone help you get up.   6. If you have nausea (feel sick to your stomach): Drink only clear liquids such as apple juice, ginger ale, broth or 7-Up.  Rest may also help.  Be sure to drink enough fluids.  Move to a regular diet as you feel able.  7. You may have a slight fever. Call the doctor if your fever is over 100 F (37.7 C) (taken under the tongue) or lasts longer than 24 hours.  8. You may have a dry mouth, a sore throat, muscle aches or trouble sleeping.  These should go away after 24 hours.  9. Do not make important or legal decisions.   Call your doctor for any of the followin.  Signs of infection (fever, growing tenderness at the surgery site, a large amount of drainage or bleeding, severe pain, foul-smelling drainage, redness, swelling).    2. It has been over 8 to 10 hours since surgery and you are still not able to urinate (pass water).    3.  Headache for over 24 hours.    4.  Numbness, tingling or weakness the day after surgery (if you had spinal anesthesia)  To contact Dr Yoon call:  [phone]    REASON FOR MESSAGE  Patient is requesting:        Instructions for Sinus Surgery    Recovery - Everyone recovers differently from a general anesthetic.  Symptoms such as fatigue, nausea, light-headedness, and sometimes a low grade fever (up to 100 degrees) are not unusual.  As your body removes the anesthetic drugs from circulation, these  symptoms will resolve.  Your nose will be sore after surgery, and you may even have symptoms similar to a sinus infection with headache, congestion, and pressure.  These will resolve with healing.  For several days you may experience bloody drainage from the nose, please use the drip pad as necessary for this.  If there is persistent bleeding, please call the office during business hours or the on call ENT physician after hours.  There are no diet restrictions after sinus surgery, and you can resume your home medications.  Please blow your nose very gently for two weeks after surgery.  Limit your activity to no strenuous activities until I see you for the first follow-up visit.      Medications - You were sent home with narcotic pain medication.  If you can tolerate the discomfort during your recovery by using just plain Tylenol or ibuprofen (advil), please do so.  However, do not hesitate to use the stronger pain medication if needed.  If you were sent home with an antibiotic, it is primarily used to help the healing process.  If it causes loose bowel movements or other signs of intolerance, it is appropriate to discontinue it.  By far the most important measure you can take to speed recovery, and maximize the chances of long term success of sinus surgery is using the sinus rinses at least three time per day for the first month after surgery.   Please start these:     Tomorrow    Complications - Problems related to sinus surgery almost always are detected during the operation, and special instruction will be given in that situation.  However, unexpected things can happen, and are all related to the structures around the sinus cavities.  Symptoms that should alert you to a possible problem include: severe eye pain or eye swelling, persistent heavy bleeding from the nose, and high fevers with headache and neck pain.  Any of these symptoms should be called into my office or to the on call ENT if after hours.  The most  common non-emergency complication of sinus surgery is the formation of scar tissue which can re-block the sinuses.  This is addressed below.    Follow-up -  As you have noted, there are quite a few follow-up visits after sinus surgery.  This is done to aggressively manage the most common complication of this technique, which is scar tissue blocking the sinuses.  These visits will require the examination of your nose and possibly removal of crusts of dry mucous and blood, with possible removal of early scar tissue.  Please prepare for these visits by using your sinus rinses.    If there are any questions or issues with the above, or if there are other issues that concern you, always feel free to call the clinic and I am happy to speak with you as soon as I can.    Gama Avila MD  Otolaryngology  [facility]  [phone] After hours, [facility] option    Instructions for Septoplasty    Recovery - Everyone recovers differently from a general anesthetic.  Symptoms such as fatigue, nausea, lightheadedness, and sometimes a low grade fever (up to 100 degrees) are not unusual.  As your body removes the anesthetic drugs from circulation, these symptoms will resolve.  Your nose will be sore and throbbing after surgery, and you may even have symptoms similar to a sinus infection with headache, congestion, and pressure.  These will resolve with healing.  For several days you may experience bloody drainage from the nose, please use the drip pad as necessary for this.  If there is persistent bleeding, please call the office during business hours or the on call ENT physician after hours.  It is common for the front teeth to ache after septoplasty and sinus surgery.  This resolves with healing.  There are no diet restrictions after septoplasty, and you can resume your home medications.  Please blow your nose very very gently for two weeks after surgery.  Limit your activity to no strenuous activities  until I see you for the first follow up visit, and sleep with your head elevated.    Medications - You were sent home with narcotic pain medication.  If you can tolerate the discomfort during your recovery by using just plain Tylenol or ibuprofen (advil), please do so.  However, do not hesitate to use the stronger pain medication if needed.  If you were sent home with an antibiotic, it is primarily used to help the healing process.  If it causes loose bowel movements or other signs of intolerance, it is appropriate to discontinue it.      Complications - Problems related to septoplasty almost always are detected during the operation, and special instruction will be given in that situation.  However, unexpected things can happen.  If you experience persistent bleeding, fevers, changes in vision, and severe headaches, this may be the sign of an infection.  Any of these symptoms should be called into my office or to the on call ENT if after hours.   The most common non-emergency distant complication of septoplasty is the septum healing crooked.  Although rare, this can happen.  There may be small plastic pieces placed inside your nose during surgery (splints).  These help to promote the septum into healing in its straightened position, and will be removed at the follow up visit.    Follow up - As mentioned, splints may be removed at the follow up visit.  This is not as bad as it sounds.  And afterwards, the improvement you will expereince in breathing from the septoplasty is usually dramatic and immediate.  I will then see you 4 to 6 weeks after that visit to make sure that everything has healed appropriately.    If there are any questions or issues with the above, or if there are other issues that concern you, always feel free to call the clinic and I am happy to speak with you as soon as I can.    Gama Avila MD  Otolaryngology  [facility]  [phone] After hours, [facility] option

## 2019-07-09 NOTE — ANESTHESIA POSTPROCEDURE EVALUATION
Anesthesia POST Procedure Evaluation    Patient: Tee Hilton   MRN:     [id number] Gender:   male   Age:    68 year old :      1950        Preoperative Diagnosis: DEVIATED NASAL SEPTUM   Procedure(s):  ENDOSCOPIC SEPTOPLASTY, BILATERAL TURBINATE REDUCTION   Postop Comments: No value filed.       Anesthesia Type:  General  General    Reportable Event: NO     PAIN: Uncomplicated   Sign Out status: Comfortable, Well controlled pain     PONV: No PONV   Sign Out status:  No Nausea or Vomiting     Neuro/Psych: Uneventful perioperative course   Sign Out Status: Preoperative baseline; Age appropriate mentation     Airway/Resp.: Uneventful perioperative course   Sign Out Status: Non labored breathing, age appropriate RR; Resp. Status within EXPECTED Parameters     CV: Uneventful perioperative course   Sign Out status: Appropriate BP and perfusion indices; Appropriate HR/Rhythm     Disposition:   Sign Out in:  PACU  Disposition:  Phase II; Home  Recovery Course: Uneventful  Follow-Up: Not required           Last Anesthesia Record Vitals:  [facility] VITALS  2019 1011 - 2019 1111      2019             Pulse:  69    SpO2:  98 %          Last PACU Vitals:  Vitals Value Taken Time   /89 2019 10:45 AM   Temp 36.3  C (97.3  F) 2019 10:43 AM   Pulse 71 2019 10:45 AM   Resp 16 2019 10:45 AM   SpO2 100 % 2019 10:45 AM   Temp src     NIBP     Pulse     SpO2     Resp     Temp     Ht Rate     Temp 2           Electronically Signed By: Vamshi Kennedy MD, 2019, 1:54 PM

## 2019-07-09 NOTE — TELEPHONE ENCOUNTER
Spoke with Gillian, patient's wife who states that Dr. Avila had called them back and again after the documented note below and advised them that things would continue to ooze a bit but that as long as it is slowing down not to be concerned. Patient is sitting upright, applying ice. Gillian states that it is still oozing slowly out of the left nostril, but that it is no where near it was earlier when she had called. Advised her that if things were to  again, as it is after hours they should go to the emergency room. Per the providers note, if it were to continue to ooze after another few hours possibly the splints would need to be removed and packing put in place. Also advised not to restart blood thinner until TH morning. She verbalized understanding to this.     Debby Matias RN on 7/9/2019 at 6:56 PM

## 2019-07-09 NOTE — ANESTHESIA PREPROCEDURE EVALUATION
Anesthesia Pre-Procedure Evaluation    Patient: Tee iHlton   MRN:     [id number] Gender:   male   Age:    68 year old :      1950        Preoperative Diagnosis: DEVIATED NASAL SEPTUM   Procedure(s):  ENDOSCOPIC SEPTOPLASTY, BILATERAL TURBINATE REDUCTION     Past Medical History:   Diagnosis Date     Gastroesophageal reflux disease      Hypertension      Irregular heart beat      Sleep apnea       Past Surgical History:   Procedure Laterality Date     ANESTHESIA CARDIOVERSION N/A 2018    Procedure: ANESTHESIA CARDIOVERSION;  Cardioversion ;  Surgeon: GENERIC ANESTHESIA PROVIDER;  Location: [facility] OR     COLONOSCOPY N/A 2017    Procedure: COMBINED COLONOSCOPY, SINGLE OR MULTIPLE BIOPSY/POLYPECTOMY BY BIOPSY;;  Surgeon: Aquilino Garcia MD;  Location: [facility] OR     COLONOSCOPY WITH CO2 INSUFFLATION N/A 2017    Procedure: COLONOSCOPY WITH CO2 INSUFFLATION;  COLONOSCOPY SCREEN/ ORDAZ;  Surgeon: Aquilino Garcia MD;  Location: [facility] OR     OPEN REDUCTION INTERNAL FIXATION TIBIA       TONSILLECTOMY            Anesthesia Evaluation     .             ROS/MED HX    ENT/Pulmonary:  - neg pulmonary ROS   (+)sleep apnea, , . .    Neurologic:  - neg neurologic ROS     Cardiovascular:  - neg cardiovascular ROS   (+) hypertension----. : . CHF Last EF: 41 . . :. dysrhythmias a-fib, Irregular Heartbeat/Palpitations, .       METS/Exercise Tolerance:     Hematologic:  - neg hematologic  ROS       Musculoskeletal:  - neg musculoskeletal ROS       GI/Hepatic:  - neg GI/hepatic ROS   (+) GERD       Renal/Genitourinary:  - ROS Renal section negative       Endo:  - neg endo ROS       Psychiatric:  - neg psychiatric ROS       Infectious Disease:  - neg infectious disease ROS       Malignancy:      - no malignancy   Other:    - neg other ROS                     PHYSICAL EXAM:   Mental Status/Neuro: A/A/O   Airway: Facies: Feasible  Mallampati: I  Mouth/Opening: Full  TM distance: > 6 cm  Neck ROM: Full  "  Respiratory: Auscultation: CTAB     Resp. Rate: Normal     Resp. Effort: Normal      CV: Rhythm: Regular  Rate: Age appropriate  Heart: Normal Sounds   Comments:      Dental: Normal                  Lab Results   Component Value Date    WBC 8.1 06/25/2019    HGB 13.6 06/25/2019    HCT 39.6 (L) 06/25/2019     06/25/2019     06/25/2019    POTASSIUM 4.4 06/25/2019    CHLORIDE 104 06/25/2019    CO2 26 06/25/2019    BUN 25 06/25/2019    CR 1.21 06/25/2019    GLC 98 06/25/2019    GRISELDA 9.6 06/25/2019    MAG 2.1 09/24/2018    ALBUMIN 4.0 05/02/2019    PROTTOTAL 6.9 05/02/2019    ALT 22 05/02/2019    AST 12 05/02/2019    ALKPHOS 55 05/02/2019    BILITOTAL 0.6 05/02/2019    PTT 26 06/04/2007    INR 1.02 06/04/2007    TSH 2.41 08/23/2018       Preop Vitals  BP Readings from Last 3 Encounters:   07/09/19 (!) 145/102   07/02/19 120/82   06/25/19 (!) 144/100    Pulse Readings from Last 3 Encounters:   07/02/19 91   06/25/19 94   05/17/19 78      Resp Readings from Last 3 Encounters:   07/09/19 18   07/02/19 18   06/25/19 18    SpO2 Readings from Last 3 Encounters:   07/09/19 99%   07/02/19 99%   06/25/19 98%      Temp Readings from Last 1 Encounters:   07/09/19 36.3  C (97.4  F) (Temporal)    Ht Readings from Last 1 Encounters:   06/25/19 1.791 m (5' 10.5\")      Wt Readings from Last 1 Encounters:   07/02/19 106.6 kg (235 lb)    Estimated body mass index is 33.24 kg/m  as calculated from the following:    Height as of 6/25/19: 1.791 m (5' 10.5\").    Weight as of 7/2/19: 106.6 kg (235 lb).     LDA:  Peripheral IV 07/09/19 Left Hand (Active)   Site Assessment WDL 7/9/2019  8:41 AM   Line Status Infusing 7/9/2019  8:41 AM   Phlebitis Scale 0-->no symptoms 7/9/2019  8:41 AM   Infiltration Scale 0 7/9/2019  8:41 AM   Infiltration Site Treatment Method  None 7/9/2019  8:41 AM   Extravasation? No 7/9/2019  8:41 AM   Dressing Intervention New dressing  7/9/2019  8:41 AM   Number of days: 0            Assessment:   ASA " SCORE: 3    NPO Status: > 6 hours since completed [employer]   Documentation: H&P complete; Preop Testing complete; Consents complete   Proceeding: Proceed without further delay  Tobacco Use:  NO Active use of Tobacco/UNKNOWN Tobacco use status     Plan:   Anes. Type:  General   Pre-Induction: Midazolam IV; Acetaminophen PO   Induction:  IV (Standard)   Airway: Oral ETT   Access/Monitoring: PIV   Maintenance: Balanced   Emergence: Procedure Site   Logistics: Same Day Surgery     Postop Pain/Sedation Strategy:  Standard-Options: Opioids PRN     PONV Management:  Adult Risk Factors:, Non-Smoker, Postop Opioids  Prevention: Ondansetron; Dexamethasone     CONSENT: Direct conversation   Plan and risks discussed with: Patient   Blood Products: Consent Deferred (Minimal Blood Loss)                         Vamshi Kennedy MD

## 2019-07-09 NOTE — TELEPHONE ENCOUNTER
Called and spoke to patient's wife.   Caller states that the patient is post-op today with epistaxis in the left nostril.   Patient has changed gauze 3 times since 1200 and is concerned.   Encourage patient to plug nostril to try and get bleeding to resolve for 15 minutes.   Will route to provider to see if patient needs to be seen today.   Vy Up RN

## 2019-07-09 NOTE — TELEPHONE ENCOUNTER
Called and spoke with patient and his wife, discussed strategies for stopping the oozing following his septoplasty today.  If continues to ooze after another few hours, then call in.  Worst case the splints can be removed and he might need to be packed.      Hold on restarting blood thinner until Thursday morning.

## 2019-07-10 ENCOUNTER — NURSE TRIAGE (OUTPATIENT)
Dept: NURSING | Facility: CLINIC | Age: 69
End: 2019-07-10

## 2019-07-10 ENCOUNTER — TELEPHONE (OUTPATIENT)
Dept: OTOLARYNGOLOGY | Facility: CLINIC | Age: 69
End: 2019-07-10

## 2019-07-10 ENCOUNTER — OFFICE VISIT (OUTPATIENT)
Dept: OTOLARYNGOLOGY | Facility: CLINIC | Age: 69
End: 2019-07-10
Payer: COMMERCIAL

## 2019-07-10 VITALS
OXYGEN SATURATION: 94 % | WEIGHT: 235 LBS | DIASTOLIC BLOOD PRESSURE: 84 MMHG | HEIGHT: 71 IN | HEART RATE: 64 BPM | BODY MASS INDEX: 32.9 KG/M2 | SYSTOLIC BLOOD PRESSURE: 140 MMHG | RESPIRATION RATE: 12 BRPM

## 2019-07-10 DIAGNOSIS — J34.2 DEVIATED NASAL SEPTUM: Primary | ICD-10-CM

## 2019-07-10 DIAGNOSIS — R04.0 EPISTAXIS: ICD-10-CM

## 2019-07-10 PROCEDURE — 99024 POSTOP FOLLOW-UP VISIT: CPT | Performed by: OTOLARYNGOLOGY

## 2019-07-10 ASSESSMENT — MIFFLIN-ST. JEOR: SCORE: 1860.14

## 2019-07-10 NOTE — PROGRESS NOTES
History of Present Illness - Tee Hilton is a 68 year old male who is status post septoplasty and inferior turbinate reduction on 7/9/2019.  He is a patient of Dr. Barboza who had persistent oozing and dripping of blood from the left nasal cavity overnight and today.  He was on Xarelto up until the day prior to the surgery.  He also has some hypertension has not taken his blood pressure medicine 2 days.  He was added on to the clinic this morning due to the oozing on the left side.  No bleeding on the right side.  Pain is been controlled.  No profuse bleeding where he asked to hold pressure but he does change the gauze frequently.    Exam -   General - The patient is in no distress.  Alert and oriented to person and place, answers questions and cooperates with examination appropriately.   Eyes - Extraocular movements intact.  Sclera were not icteric or injected, conjunctiva were pink and moist.  Nose -nasal dorsum is straight.  Septum straight.  Bilateral Hemphill splints in good position.  He does have a big clot left nasal cavity.  I suctioned this.  He then had a little slow persistent fresh bleeding anteriorly on the left.  I sprayed the nasal cavity in the left with phenylephrine lidocaine spray.  I packed 3 small pieces of Surgicel around the lateral aspect and inferior aspect and superior aspect of the Hemphill splint on the left.  Seem to slow and stop the bleeding.  Right nasal cavity seem normal.  Mouth -small amount of fresh blood in the posterior oral cavity.    A/P - Tee Hilton has had persistent left epistaxis following surgery.  It has not been copious or unmanageable.  Therefore he left the Hemphill splint in place and packed around this.  I suspect that this is a combination of his Xarelto and not stopping it soon enough and possibly some hypertension.  Packed around the splint today I encouraged him to get Afrin and use that as needed if there is bleeding.  He can also hold pressure by pinching  the nasal tip.  If this does not improve over the next year to the left return.  If he has any major bleeding especially in the after hours he would have to go the ER.  Lastly I do not want him to take his Xarelto starting tomorrow unless he has 24 hours without significant bleeding.

## 2019-07-10 NOTE — TELEPHONE ENCOUNTER
Spoke to Gillian Milner, patient's wife. (Also see phone encounter 7/9/19 regarding Epistaxis post septoplasty). Bleeding had slowed down last night and then around 10pm bleeding increased again to steady trickle over night requiring multiple dressing changes, and this morning there is a steady trickle- changed dressing x3.      Per provider note, if bleeding were to continue patient should be seen, splints may need to be removed and packing put in place.  Routing to provider/ clinical staff to determine appointment time.     Debby Matias RN on 7/10/2019 at 7:35 AM

## 2019-07-10 NOTE — PATIENT INSTRUCTIONS
General Scheduling Information  To schedule your CT/MRI scan, please contact [place] [facility] at [phone]   [address] [place] [address]    To schedule your Surgery, please contact our Specialty Schedulers at [phone]    ENT Clinic Locations Clinic Hours Telephone Number     [facility]  [address] [place] [address]   Tuesday:       8:00am -- 4:00pm    Wednesday:  8:00am - 4:00pm   To schedule an appointment with   Dr. Rod,   please contact our   Specialty Scheduling Department at:     [phone]       [facility]  [address] [place] [address]   Friday:          8:00am - 4:00pm         Urgent Care Locations Clinic Hours Telephone Numbers     [facility]  [address] [place] [address]     Monday-Friday:     11:00pm - 9:00pm    Saturday-Sunday:  9:00am - 5:00pm   [phone]     [facility]  [address] [place] [address]     Monday-Friday:      5:00pm - 9:00pm     Saturday-Sunday:  9:00am - 5:00pm   [phone]

## 2019-07-10 NOTE — TELEPHONE ENCOUNTER
Patient is still bleeding. According to his wife, if it didn't stop, they should be seen.  I connected with scheduling to see if there is an appointment. I advised, if there is none, to call the clinic at 8 a.m. To see if they can squeeze him into the schedule. Post op yesterday, ENT at [facility]..  Mendy Soto RN-[facility]

## 2019-07-10 NOTE — LETTER
7/10/2019         RE: Tee Hilton  [address] [facility] [address]        Dear Colleague,    Thank you for referring your patient, Tee Hilton, to the [facility]. Please see a copy of my visit note below.    History of Present Illness - Tee Hilton is a 68 year old male who is status post septoplasty and inferior turbinate reduction on 7/9/2019.  He is a patient of Dr. Barboza who had persistent oozing and dripping of blood from the left nasal cavity overnight and today.  He was on Xarelto up until the day prior to the surgery.  He also has some hypertension has not taken his blood pressure medicine 2 days.  He was added on to the clinic this morning due to the oozing on the left side.  No bleeding on the right side.  Pain is been controlled.  No profuse bleeding where he asked to hold pressure but he does change the gauze frequently.    Exam -   General - The patient is in no distress.  Alert and oriented to person and place, answers questions and cooperates with examination appropriately.   Eyes - Extraocular movements intact.  Sclera were not icteric or injected, conjunctiva were pink and moist.  Nose -nasal dorsum is straight.  Septum straight.  Bilateral Hemphill splints in good position.  He does have a big clot left nasal cavity.  I suctioned this.  He then had a little slow persistent fresh bleeding anteriorly on the left.  I sprayed the nasal cavity in the left with phenylephrine lidocaine spray.  I packed 3 small pieces of Surgicel around the lateral aspect and inferior aspect and superior aspect of the Hemphill splint on the left.  Seem to slow and stop the bleeding.  Right nasal cavity seem normal.  Mouth -small amount of fresh blood in the posterior oral cavity.    A/P - Tee Hilton has had persistent left epistaxis following surgery.  It has not been copious or unmanageable.  Therefore he left the Hemphill splint in place and packed around this.  I suspect that  this is a combination of his Xarelto and not stopping it soon enough and possibly some hypertension.  Packed around the splint today I encouraged him to get Afrin and use that as needed if there is bleeding.  He can also hold pressure by pinching the nasal tip.  If this does not improve over the next year to the left return.  If he has any major bleeding especially in the after hours he would have to go the ER.  Lastly I do not want him to take his Xarelto starting tomorrow unless he has 24 hours without significant bleeding.    Again, thank you for allowing me to participate in the care of your patient.        Sincerely,        Zoran Rod MD

## 2019-07-11 ENCOUNTER — OFFICE VISIT (OUTPATIENT)
Dept: OTOLARYNGOLOGY | Facility: CLINIC | Age: 69
End: 2019-07-11
Payer: COMMERCIAL

## 2019-07-11 ENCOUNTER — TELEPHONE (OUTPATIENT)
Dept: NURSING | Facility: CLINIC | Age: 69
End: 2019-07-11

## 2019-07-11 VITALS
SYSTOLIC BLOOD PRESSURE: 149 MMHG | OXYGEN SATURATION: 98 % | WEIGHT: 235 LBS | BODY MASS INDEX: 32.9 KG/M2 | HEART RATE: 65 BPM | HEIGHT: 71 IN | DIASTOLIC BLOOD PRESSURE: 92 MMHG

## 2019-07-11 DIAGNOSIS — J34.2 DEVIATED NASAL SEPTUM: Primary | ICD-10-CM

## 2019-07-11 DIAGNOSIS — G89.18 ACUTE POST-OPERATIVE PAIN: ICD-10-CM

## 2019-07-11 PROCEDURE — 99024 POSTOP FOLLOW-UP VISIT: CPT | Performed by: OTOLARYNGOLOGY

## 2019-07-11 RX ORDER — OXYCODONE AND ACETAMINOPHEN 5; 325 MG/1; MG/1
1 TABLET ORAL EVERY 4 HOURS PRN
Qty: 40 TABLET | Refills: 0 | Status: SHIPPED | OUTPATIENT
Start: 2019-07-11 | End: 2019-11-20

## 2019-07-11 ASSESSMENT — MIFFLIN-ST. JEOR: SCORE: 1860.14

## 2019-07-11 NOTE — LETTER
"    7/11/2019         RE: Tee Hilton  [address] [facility] [address]        Dear Colleague,    Thank you for referring your patient, Tee Hilton, to the [facility]. Please see a copy of my visit note below.    History of Present Illness - Tee Hilton is a 68 year old male who is 2 days post endoscopic septoplasty. He is anticoagulated, and stopped his Xeralta the day before surgery.    He has had trickling and dripping from the LEFT side.  He was seen yesterday by Dr. Alcaraz, but due to continued dripping, he wanted to be seen again today.    Past medical history -   Patient Active Problem List   Diagnosis     Essential hypertension     Other male erectile dysfunction     Chronic midline low back pain without sciatica     Persistent atrial fibrillation (H)     Non morbid obesity due to excess calories     Chronic systolic congestive heart failure (H)     Alcohol use     Gastroesophageal reflux disease without esophagitis     Other sleep apnea       B/P: 149/92, Temperature: Data Unavailable, Pulse: 65, Respirations: Data Unavailable  Vitals: BP (!) 149/92   Pulse 65   Ht 1.791 m (5' 10.5\")   Wt 106.6 kg (235 lb)   SpO2 98%   BMI 33.24 kg/m     BMI= Body mass index is 33.24 kg/m .    General - The patient is well nourished and well developed, and appears to have good nutritional status.  Alert and oriented to person and place, answers questions and cooperates with examination appropriately.   Head and Face - Normocephalic and atraumatic, with no gross asymmetry noted of the contour of the facial features.  The facial nerve is intact, with strong symmetric movements.  Eyes - Extraocular movements intact, and the pupils were reactive to light.  Sclera were not icteric or injected, conjunctiva were pink and moist.  Mouth - Examination of the oral cavity shows pink, healthy, moist mucosa.  No lesions or ulceration noted.  The dentition are in good repair.  The tongue is " mobile and midline.  Nose - I removed the splints and replaced with laminated Roderikc packs.    A/P - Tee Hilton  I have removed the doyles, and replaced with Roderick's, and I will remove them at the post op next week.    Again, thank you for allowing me to participate in the care of your patient.        Sincerely,        Alexander Avila MD

## 2019-07-11 NOTE — PROGRESS NOTES
"History of Present Illness - Tee Hilton is a 68 year old male who is 2 days post endoscopic septoplasty. He is anticoagulated, and stopped his Xeralta the day before surgery.    He has had trickling and dripping from the LEFT side.  He was seen yesterday by Dr. Alcaraz, but due to continued dripping, he wanted to be seen again today.    Past medical history -   Patient Active Problem List   Diagnosis     Essential hypertension     Other male erectile dysfunction     Chronic midline low back pain without sciatica     Persistent atrial fibrillation (H)     Non morbid obesity due to excess calories     Chronic systolic congestive heart failure (H)     Alcohol use     Gastroesophageal reflux disease without esophagitis     Other sleep apnea       B/P: 149/92, Temperature: Data Unavailable, Pulse: 65, Respirations: Data Unavailable  Vitals: BP (!) 149/92   Pulse 65   Ht 1.791 m (5' 10.5\")   Wt 106.6 kg (235 lb)   SpO2 98%   BMI 33.24 kg/m    BMI= Body mass index is 33.24 kg/m .    General - The patient is well nourished and well developed, and appears to have good nutritional status.  Alert and oriented to person and place, answers questions and cooperates with examination appropriately.   Head and Face - Normocephalic and atraumatic, with no gross asymmetry noted of the contour of the facial features.  The facial nerve is intact, with strong symmetric movements.  Eyes - Extraocular movements intact, and the pupils were reactive to light.  Sclera were not icteric or injected, conjunctiva were pink and moist.  Mouth - Examination of the oral cavity shows pink, healthy, moist mucosa.  No lesions or ulceration noted.  The dentition are in good repair.  The tongue is mobile and midline.  Nose - I removed the splints and replaced with laminated Roderick packs.    A/P - Tee Hilton  I have removed the doyles, and replaced with Roderick's, and I will remove them at the post op next week.  "

## 2019-07-11 NOTE — TELEPHONE ENCOUNTER
Called and spoke to patient.   Patient calls with continuous epistaxis post-surgery.    Patient states that since his appointment with Dr. Rod yesterday he has changed his dressing about a dozen times.   Patient is also concerned about his blood pressure.   Patient states that his blood pressure was 160/100 and now 140/90.   Patient states took 400 mg of ibuprofen this morning.   Advised patient to hold ibuprofen and to use Tylenol if needed.   Will route to provider and team for review.   Vy Up RN

## 2019-07-12 ENCOUNTER — TELEPHONE (OUTPATIENT)
Dept: OTOLARYNGOLOGY | Facility: CLINIC | Age: 69
End: 2019-07-12

## 2019-07-12 NOTE — TELEPHONE ENCOUNTER
"Patient and wife calling regarding appt today with ENT and packing (see epic).\" The packing is coming out, especially on the left side and it's starting to bleed again.\" Advised ANGELIC.  Cira Dawkins RN [facility]      "

## 2019-07-12 NOTE — TELEPHONE ENCOUNTER
Called and spoke to patient.   Patient sneezed and packing placed during office visit in left nostril come out.   Patient was seen in the ED last night.   Patient is requesting a same day appointment however no ENT is in today.   Patient denies bleeding, states that he has a contraption on his face he wants taken care of today.   Will route to ENT team for suggestions.   Vy Up RN

## 2019-07-12 NOTE — TELEPHONE ENCOUNTER
Called and spoke with patient, and he is not bleeding at all.  He is doing fine at this point and can make it to Monday.  I will see him at [place].

## 2019-07-13 ENCOUNTER — HOSPITAL ENCOUNTER (EMERGENCY)
Facility: CLINIC | Age: 69
Discharge: HOME | End: 2019-07-13
Attending: FAMILY MEDICINE | Admitting: FAMILY MEDICINE
Payer: COMMERCIAL

## 2019-07-13 VITALS
SYSTOLIC BLOOD PRESSURE: 127 MMHG | RESPIRATION RATE: 16 BRPM | HEART RATE: 74 BPM | WEIGHT: 230 LBS | OXYGEN SATURATION: 98 % | HEIGHT: 71 IN | BODY MASS INDEX: 32.2 KG/M2 | TEMPERATURE: 98 F | DIASTOLIC BLOOD PRESSURE: 84 MMHG

## 2019-07-13 DIAGNOSIS — J34.89 NASAL OBSTRUCTION: ICD-10-CM

## 2019-07-13 DIAGNOSIS — R04.0 EPISTAXIS: ICD-10-CM

## 2019-07-13 PROCEDURE — 99283 EMERGENCY DEPT VISIT LOW MDM: CPT

## 2019-07-13 PROCEDURE — 99283 EMERGENCY DEPT VISIT LOW MDM: CPT | Mod: Z6 | Performed by: FAMILY MEDICINE

## 2019-07-13 ASSESSMENT — MIFFLIN-ST. JEOR: SCORE: 1845.4

## 2019-07-13 NOTE — ED AVS SNAPSHOT
[facility] Emergency Department  [address] [facility] [address]  Phone:  [phone]  Fax:  [phone]                                    Tee Hilton   MRN: [id number]    Department:  [facility] Emergency Department   Date of Visit:  7/13/2019           After Visit Summary Signature Page    I have received my discharge instructions, and my questions have been answered. I have discussed any challenges I see with this plan with the nurse or doctor.    ..........................................................................................................................................  Patient/Patient Representative Signature      ..........................................................................................................................................  Patient Representative Print Name and Relationship to Patient    ..................................................               ................................................  Date                                   Time    ..........................................................................................................................................  Reviewed by Signature/Title    ...................................................              ..............................................  Date                                               Time          22EPIC Rev 08/18

## 2019-07-14 NOTE — DISCHARGE INSTRUCTIONS
Leave main post op packing in place.    Continue antibiotic.    Re start Xarelto tomorrow - July 14.    See Dr Avila as planned July 15.

## 2019-07-14 NOTE — ED PROVIDER NOTES
"  History     Chief Complaint   Patient presents with     Post-op Problem     bilateral septoplasty on Tuesday, has nasal packing in due to bleeding and wants the packing changed     HPI  Tee Hilton is a 68 year old male with a history significant for persistent atrial fibrillation with anticoagulant use and congestive heart failure who presents to the ED for evaluation of a post-operative problem. The patient underwent an endoscopic septoplasty on 7/9/2019. He followed-up with ENT on 7/11/2019 to have stents removed from his nose, and packing was placed due to continued bleeding. A few hours after the packing was placed, the patient reportedly sneezed and the packing \"popped out\". The patient was then seen in the [facility] emergency department to have the packing replaced. Today, the patient presents to the emergency department to have part of his nasal packing removed and to ensure that his nose has stopped bleeding. He reports that he has another appointment with ENT on 7/15/2019. The patient also notes that he has not taken Xarelto for the past one week and is wondering when he may start taking it again.      Allergies:  Allergies   Allergen Reactions     Erythromycin Unknown     Upset stomach       Problem List:    Patient Active Problem List    Diagnosis Date Noted     Other sleep apnea 09/24/2018     Priority: Medium     Study Date: 9/22/2018- (241.0 lbs) Polysomnogram revealed 41 obstructive, 14 central, and 38 mixed apneas resulting in an apnea index of 44.5 events per hour. There were 7 obstructive hypopneas and 0 central hypopneas resulting in an obstructive hypopnea index of 3.3 and central hypopnea index of 0 events per hour. The combined apnea/hypopnea index was 47.8 events per hour (central apnea/hypopnea index was 6.7 events per hour).  REM AHI was 0. The supine AHI was 69.0 events per hour.  RDI was 54.0 events per hour. Respiratory rate and pattern was notable for improvement of " events during REM sleep,  periods where periodic breathing is suspected and circulation times that are prolonged. Most of the events however appear to be terminated by a gasp arousal.  Lowest oxygen saturation was 83.9%. Time spent less than or equal to 88% was 0.4 minutes. Time spent less than or equal to 89% was 0.9 minutes. No optimal pressure was determined due to poor sleep and poor tolerance of low-level PAP and bilevel PAP. Severe sleep disordered breathing, many findings suggested of periodic breathing though given presence of obstruction and poorly consolidated sleep this study was not definitive       Gastroesophageal reflux disease without esophagitis      Priority: Medium     Persistent atrial fibrillation (H) 09/18/2018     Priority: Medium     Non morbid obesity due to excess calories 09/18/2018     Priority: Medium     Chronic systolic congestive heart failure (H) 09/18/2018     Priority: Medium     Alcohol use 09/18/2018     Priority: Medium     Chronic midline low back pain without sciatica 10/31/2017     Priority: Medium     Essential hypertension 11/11/2016     Priority: Medium     Other male erectile dysfunction 11/11/2016     Priority: Medium        Past Medical History:    Past Medical History:   Diagnosis Date     Gastroesophageal reflux disease      Hypertension      Irregular heart beat      Sleep apnea        Past Surgical History:    Past Surgical History:   Procedure Laterality Date     ANESTHESIA CARDIOVERSION N/A 9/24/2018    Procedure: ANESTHESIA CARDIOVERSION;  Cardioversion ;  Surgeon: GENERIC ANESTHESIA PROVIDER;  Location: [facility] OR     COLONOSCOPY N/A 5/25/2017    Procedure: COMBINED COLONOSCOPY, SINGLE OR MULTIPLE BIOPSY/POLYPECTOMY BY BIOPSY;;  Surgeon: Aquilino Garcia MD;  Location: [facility] OR     COLONOSCOPY WITH CO2 INSUFFLATION N/A 5/25/2017    Procedure: COLONOSCOPY WITH CO2 INSUFFLATION;  COLONOSCOPY SCREEN/ ORDAZ;  Surgeon: Aquilino Garcia MD;  Location:  OR      "OPEN REDUCTION INTERNAL FIXATION TIBIA       SEPTOPLASTY, TURBINOPLASTY, COMBINED Bilateral 7/9/2019    Procedure: ENDOSCOPIC SEPTOPLASTY, BILATERAL TURBINATE REDUCTION;  Surgeon: Alexander Avila MD;  Location: [facility] OR     TONSILLECTOMY         Family History:    Family History   Problem Relation Age of Onset     Gout Father      Lung Cancer Father        Social History:  Marital Status:   [2]  Social History     Tobacco Use     Smoking status: Never Smoker     Smokeless tobacco: Never Used     Tobacco comment: never smoked   Substance Use Topics     Alcohol use: Yes     Alcohol/week: 0.0 oz     Comment: social use     Drug use: No        Medications:      amLODIPine (NORVASC) 5 MG tablet   amoxicillin-clavulanate (AUGMENTIN) 875-125 MG tablet   Famotidine (PEPCID PO)   lisinopril (PRINIVIL/ZESTRIL) 20 MG tablet   metoprolol succinate ER (TOPROL-XL) 50 MG 24 hr tablet   omeprazole (PRILOSEC) 20 MG CR capsule   oxyCODONE-acetaminophen (PERCOCET) 5-325 MG tablet   rivaroxaban ANTICOAGULANT (XARELTO) 20 MG TABS tablet   sildenafil (REVATIO) 20 MG tablet   zolpidem (AMBIEN) 5 MG tablet         Review of Systems   Constitutional: Negative for chills and fever.   HENT: Positive for nosebleeds.    Respiratory: Negative for shortness of breath.    Cardiovascular: Negative for chest pain.   Gastrointestinal: Negative for nausea.   Neurological: Negative for weakness.   Hematological: Does not bruise/bleed easily.       Physical Exam   BP: 143/88  Pulse: 74  Heart Rate: 100  Temp: 98  F (36.7  C)  Resp: 16  Height: 180.3 cm (5' 11\")  Weight: 104.3 kg (230 lb)  SpO2: 97 %      Physical Exam   HENT:   Head: Normocephalic.   Nose:  Soft, white external packing was removed from each nostril.  There is deeper packing in each nostril with an attached dark suture taped to either cheek.  After removal of the external packing, there was no sign of bleeding or crusting.   Eyes: Conjunctivae are normal.   Cardiovascular: "   Irreg, normal rate.   Pulmonary/Chest: No respiratory distress.   Lymphadenopathy:     He has no cervical adenopathy.   Skin: Skin is warm and dry.   Psychiatric: He has a normal mood and affect.       ED Course        Procedures               Critical Care time:  none               No results found for this or any previous visit (from the past 24 hour(s)).    Medications - No data to display       19:08 Patient assessed.      Assessments & Plan (with Medical Decision Making)     Patient is status post septoplasty.  He has atrial fibrillation and long-term use of anticoagulation.    He will have follow-up with ENT July 15 for removal of remainder of packing.    I did suggest that he probably could just restart his Xarelto on July 15.  Patient voices understanding of recommendations.              I have reviewed the nursing notes.    I have reviewed the findings, diagnosis, plan and need for follow up with the patient.          Medication List      There are no discharge medications for this visit.         Final diagnoses:   Nasal obstruction   Epistaxis       This document serves as a record of the services and decisions personally performed and made by Ricky Hardy MD. It was created on HIS/HER behalf by Elva Mott, a trained medical scribe. The creation of this document is based the provider's statements to the medical scribe.  Elva Mott 7:09 PM 7/13/2019    Provider:   The information in this document, created by the medical scribe for me, accurately reflects the services I personally performed and the decisions made by me. I have reviewed and approved this document for accuracy prior to leaving the patient care area.  Ricky Hardy MD 7:09 PM 7/13/2019 7/13/2019   [facility] EMERGENCY DEPARTMENT     Ricky Hardy MD  07/15/19 [id number]

## 2019-07-14 NOTE — ED NOTES
Pt here wanting his bilateral nasal packing removed, he had a bilateral septoplasty done on Tuesday and had additional packing placed on Thursday due to bleeding. Takes Xarelto for a-fib, has not taken since Sunday due to surgery.

## 2019-07-15 ENCOUNTER — TELEPHONE (OUTPATIENT)
Dept: FAMILY MEDICINE | Facility: CLINIC | Age: 69
End: 2019-07-15

## 2019-07-15 ENCOUNTER — OFFICE VISIT (OUTPATIENT)
Dept: OTOLARYNGOLOGY | Facility: CLINIC | Age: 69
End: 2019-07-15
Payer: COMMERCIAL

## 2019-07-15 VITALS
BODY MASS INDEX: 32.08 KG/M2 | HEART RATE: 89 BPM | DIASTOLIC BLOOD PRESSURE: 83 MMHG | SYSTOLIC BLOOD PRESSURE: 110 MMHG | WEIGHT: 230 LBS | TEMPERATURE: 97.6 F

## 2019-07-15 DIAGNOSIS — J34.2 DEVIATED NASAL SEPTUM: Primary | ICD-10-CM

## 2019-07-15 PROCEDURE — 99024 POSTOP FOLLOW-UP VISIT: CPT | Performed by: OTOLARYNGOLOGY

## 2019-07-15 ASSESSMENT — ENCOUNTER SYMPTOMS
SHORTNESS OF BREATH: 0
CHILLS: 0
FEVER: 0
NAUSEA: 0
WEAKNESS: 0
BRUISES/BLEEDS EASILY: 0

## 2019-07-15 ASSESSMENT — PAIN SCALES - GENERAL: PAINLEVEL: NO PAIN (0)

## 2019-07-15 NOTE — TELEPHONE ENCOUNTER
Patient has appointment today at [place] 12:00 noon with Dr. Avila. Routing to [place], unclear if patient still needs to be notified. Thank you!    Debby Matias RN on 7/15/2019 at 8:54 AM

## 2019-07-15 NOTE — LETTER
7/15/2019         RE: Tee Hilton  [address] [facility] [address]        Dear Colleague,    Thank you for referring your patient, Tee Hilton, to the [facility]. Please see a copy of my visit note below.    History of Present Illness - Tee Hilton is a 68 year old male who is status post septoplasty on 7/9/19.      He has had the most exceptional of post op courses.  He had oozing post op that would not resolve, and ended up seeing Dr. Rod for surgicel, then saw me the next day for Hemphill removal and replacement with laminated packing placement.  That did stop the oozing, but then he somehow sneezed the left pack out.  He went to [facility], and was repacked.  He is here today at my request to have it removed.    B/P: Data Unavailable, Temperature: Data Unavailable, Pulse: Data Unavailable, Respirations: Data Unavailable  General - The patient is well nourished and well developed, and appears to have good nutritional status.  Alert and oriented to person and place, answers questions and cooperates with examination appropriately.   Head and Face - Normocephalic and atraumatic, with no gross asymmetry noted of the contour of the facial features.  The facial nerve is intact, with strong symmetric movements.  Eyes - Extraocular movements intact, and the pupils were reactive to light.  Sclera were not icteric or injected, conjunctiva were pink and moist.  Mouth - Examination of the oral cavity shows pink, healthy, moist mucosa.  No lesions or ulceration noted.  The dentition are in good repair.  The tongue is mobile and midline.  Nose - After removing the packing bilaterally, the nose was dry and the septum is thankfully still straight.  The area of synechiae and scar resection on the LEFT is healing with eschar appropriately. No bleeding.    A/P - Tee Hilton has had a nice result from septoplasty, despite the oozing that occurred..  The position of the septum and nasal  tip look good.  I will see the patient in one month for another follow up.  I cautioned them to avoid any trauma to the nose, hard blowing, or twisting.      Again, thank you for allowing me to participate in the care of your patient.        Sincerely,        Alexander Avila MD

## 2019-07-15 NOTE — NURSING NOTE
"Initial /83 (BP Location: Right arm, Patient Position: Chair, Cuff Size: Adult Regular)   Pulse 89   Temp 97.6  F (36.4  C) (Oral)   Wt 104.3 kg (230 lb)   BMI 32.08 kg/m   Estimated body mass index is 32.08 kg/m  as calculated from the following:    Height as of 7/13/19: 1.803 m (5' 11\").    Weight as of this encounter: 104.3 kg (230 lb). .    Ana Felder LPN    "

## 2019-07-15 NOTE — PROGRESS NOTES
History of Present Illness - Tee Hilton is a 68 year old male who is status post septoplasty on 7/9/19.      He has had the most exceptional of post op courses.  He had oozing post op that would not resolve, and ended up seeing Dr. Rod for surgicel, then saw me the next day for Hemphill removal and replacement with laminated packing placement.  That did stop the oozing, but then he somehow sneezed the left pack out.  He went to [facility], and was repacked.  He is here today at my request to have it removed.    B/P: Data Unavailable, Temperature: Data Unavailable, Pulse: Data Unavailable, Respirations: Data Unavailable  General - The patient is well nourished and well developed, and appears to have good nutritional status.  Alert and oriented to person and place, answers questions and cooperates with examination appropriately.   Head and Face - Normocephalic and atraumatic, with no gross asymmetry noted of the contour of the facial features.  The facial nerve is intact, with strong symmetric movements.  Eyes - Extraocular movements intact, and the pupils were reactive to light.  Sclera were not icteric or injected, conjunctiva were pink and moist.  Mouth - Examination of the oral cavity shows pink, healthy, moist mucosa.  No lesions or ulceration noted.  The dentition are in good repair.  The tongue is mobile and midline.  Nose - After removing the packing bilaterally, the nose was dry and the septum is thankfully still straight.  The area of synechiae and scar resection on the LEFT is healing with eschar appropriately. No bleeding.    A/P - Tee Hilton has had a nice result from septoplasty, despite the oozing that occurred..  The position of the septum and nasal tip look good.  I will see the patient in one month for another follow up.  I cautioned them to avoid any trauma to the nose, hard blowing, or twisting.

## 2019-07-15 NOTE — TELEPHONE ENCOUNTER
Patient is calling to see provider today @ [place] stated that provider insisted he be seen    Please call to discuss  Thank you

## 2019-08-01 ENCOUNTER — OFFICE VISIT (OUTPATIENT)
Dept: OTOLARYNGOLOGY | Facility: CLINIC | Age: 69
End: 2019-08-01
Payer: COMMERCIAL

## 2019-08-01 VITALS
SYSTOLIC BLOOD PRESSURE: 122 MMHG | WEIGHT: 230 LBS | HEART RATE: 83 BPM | HEIGHT: 71 IN | BODY MASS INDEX: 32.2 KG/M2 | OXYGEN SATURATION: 99 % | DIASTOLIC BLOOD PRESSURE: 81 MMHG

## 2019-08-01 DIAGNOSIS — J34.2 DEVIATED NASAL SEPTUM: Primary | ICD-10-CM

## 2019-08-01 PROCEDURE — 99024 POSTOP FOLLOW-UP VISIT: CPT | Performed by: OTOLARYNGOLOGY

## 2019-08-01 ASSESSMENT — MIFFLIN-ST. JEOR: SCORE: 1845.4

## 2019-08-01 NOTE — LETTER
"    8/1/2019         RE: Tee Hilton  [address] [facility] [address]        Dear Colleague,    Thank you for referring your patient, Tee Hilton, to the [facility]. Please see a copy of my visit note below.    History of Present Illness - Tee Hilton is a 68 year old male status post septoplasty on 7/9/19.  He is here for his one month post op.    He tells me that things have gone great, the LEFT side breathes wonderfully now.    Past medical history -   Patient Active Problem List   Diagnosis     Essential hypertension     Other male erectile dysfunction     Chronic midline low back pain without sciatica     Persistent atrial fibrillation (H)     Non morbid obesity due to excess calories     Chronic systolic congestive heart failure (H)     Alcohol use     Gastroesophageal reflux disease without esophagitis     Other sleep apnea       B/P: 122/81, Temperature: Data Unavailable, Pulse: 83, Respirations: Data Unavailable  Vitals: /81   Pulse 83   Ht 1.803 m (5' 11\")   Wt 104.3 kg (230 lb)   SpO2 99%   BMI 32.08 kg/m     BMI= Body mass index is 32.08 kg/m .    General - The patient is well nourished and well developed, and appears to have good nutritional status.  Alert and oriented to person and place, answers questions and cooperates with examination appropriately.   Head and Face - Normocephalic and atraumatic, with no gross asymmetry noted of the contour of the facial features.  The facial nerve is intact, with strong symmetric movements.  Eyes - Extraocular movements intact, and the pupils were reactive to light.  Sclera were not icteric or injected, conjunctiva were pink and moist.  Mouth - Examination of the oral cavity shows pink, healthy, moist mucosa.  No lesions or ulceration noted.  The dentition are in good repair.  The tongue is mobile and midline.  Nose - The RIGHT nasal airway is straight and wide open. The LEFT nasal airway is relatively more " narrow, but is clear and straight.  I was able to pass a blunt probe all the way back and sweep it down, no synechiae between the septum and inferior turbinate.    A/P - Tee Hilton  (J34.2) Deviated nasal septum  (primary encounter diagnosis)    Although a very challenging post op course, and excellent outcome.  Follow up as needed.    Again, thank you for allowing me to participate in the care of your patient.        Sincerely,        Alexander Avila MD

## 2019-08-01 NOTE — PROGRESS NOTES
"History of Present Illness - Tee Hilton is a 68 year old male status post septoplasty on 7/9/19.  He is here for his one month post op.    He tells me that things have gone great, the LEFT side breathes wonderfully now.    Past medical history -   Patient Active Problem List   Diagnosis     Essential hypertension     Other male erectile dysfunction     Chronic midline low back pain without sciatica     Persistent atrial fibrillation (H)     Non morbid obesity due to excess calories     Chronic systolic congestive heart failure (H)     Alcohol use     Gastroesophageal reflux disease without esophagitis     Other sleep apnea       B/P: 122/81, Temperature: Data Unavailable, Pulse: 83, Respirations: Data Unavailable  Vitals: /81   Pulse 83   Ht 1.803 m (5' 11\")   Wt 104.3 kg (230 lb)   SpO2 99%   BMI 32.08 kg/m    BMI= Body mass index is 32.08 kg/m .    General - The patient is well nourished and well developed, and appears to have good nutritional status.  Alert and oriented to person and place, answers questions and cooperates with examination appropriately.   Head and Face - Normocephalic and atraumatic, with no gross asymmetry noted of the contour of the facial features.  The facial nerve is intact, with strong symmetric movements.  Eyes - Extraocular movements intact, and the pupils were reactive to light.  Sclera were not icteric or injected, conjunctiva were pink and moist.  Mouth - Examination of the oral cavity shows pink, healthy, moist mucosa.  No lesions or ulceration noted.  The dentition are in good repair.  The tongue is mobile and midline.  Nose - The RIGHT nasal airway is straight and wide open. The LEFT nasal airway is relatively more narrow, but is clear and straight.  I was able to pass a blunt probe all the way back and sweep it down, no synechiae between the septum and inferior turbinate.    A/P - Tee Hilton  (J34.2) Deviated nasal septum  (primary encounter " diagnosis)    Although a very challenging post op course, and excellent outcome.  Follow up as needed.

## 2019-08-01 NOTE — PATIENT INSTRUCTIONS
Scheduling Information  To schedule your CT/MRI scan, please contact [facility] at [phone] OR [facility] at [phone]    To schedule your Surgery, please contact our Specialty Schedulers at [phone]      ENT Clinic Locations Clinic Hours Telephone Number     [facility]  [address] [place] [address]   Monday:           1:00pm -- 5:00pm    Friday:              8:00am - 12:00pm   To schedule/reschedule an appointment with   Dr. Avila,   please contact our   Specialty Scheduling Department at:     [phone]       [facility]  [address] [place] [address] Tuesday:          8:00am -- 2:00pm         Urgent Care Locations Clinic Hours Telephone Numbers     [facility]  [address] [place] [address]     Monday-Friday:     11:00am - 9:00pm    Saturday-Sunday:  9:00am - 5:00pm   [phone]     [facility]  [address] [place] [address]     Monday-Friday:      5:00pm - 9:00pm     Saturday-Sunday:  9:00am - 5:00pm   [phone]

## 2019-08-12 NOTE — MR AVS SNAPSHOT
After Visit Summary   9/18/2018    Tee Hilton    MRN: [id number]           Patient Information     Date Of Birth          1950        Visit Information        Provider Department      9/18/2018 11:00 AM Alexandre Rivers MD [facility]        Today's Diagnoses     Essential hypertension    -  1    AUGUSTO (obstructive sleep apnea)        Chronic atrial fibrillation (H)        Non morbid obesity due to excess calories        Chronic systolic congestive heart failure (H)        Alcohol use          Care Instructions      Your BMI is Body mass index is 34.09 kg/(m^2).  Weight management is a personal decision.  If you are interested in exploring weight loss strategies, the following discussion covers the approaches that may be successful. Body mass index (BMI) is one way to tell whether you are at a healthy weight, overweight, or obese. It measures your weight in relation to your height.  A BMI of 18.5 to 24.9 is in the healthy range. A person with a BMI of 25 to 29.9 is considered overweight, and someone with a BMI of 30 or greater is considered obese. More than two-thirds of American adults are considered overweight or obese.  Being overweight or obese increases the risk for further weight gain. Excess weight may lead to heart disease and diabetes.  Creating and following plans for healthy eating and physical activity may help you improve your health.  Weight control is part of healthy lifestyle and includes exercise, emotional health, and healthy eating habits. Careful eating habits lifelong are the mainstay of weight control. Though there are significant health benefits from weight loss, long-term weight loss with diet alone may be very difficult to achieve- studies show long-term success with dietary management in less than 10% of people. Attaining a healthy weight may be especially difficult to achieve in those with severe obesity. In some cases, medications, devices  and surgical management might be considered.  What can you do?  If you are overweight or obese and are interested in methods for weight loss, you should discuss this with your provider.     Consider reducing daily calorie intake by 500 calories.     Keep a food journal.     Avoiding skipping meals, consider cutting portions instead.    Diet combined with exercise helps maintain muscle while optimizing fat loss. Strength training is particularly important for building and maintaining muscle mass. Exercise helps reduce stress, increase energy, and improves fitness. Increasing exercise without diet control, however, may not burn enough calories to loose weight.       Start walking three days a week 10-20 minutes at a time    Work towards walking thirty minutes five days a week     Eventually, increase the speed of your walking for 1-2 minutes at time    In addition, we recommend that you review healthy lifestyles and methods for weight loss available through the National Institutes of Health patient information sites:  http://win.niddk.nih.gov/publications/index.htm    And look into health and wellness programs that may be available through your health insurance provider, employer, local community center, or eleuterio club.    Weight management plan: Patient was referred to their PCP to discuss a diet and exercise plan.              Follow-ups after your visit        Your next 10 appointments already scheduled     Sep 19, 2018 10:45 AM CDT   NM INJECTION with MGNMINJ1   [facility])    [id number] [place] [id number] [phone]            Sep 19, 2018 11:30 AM CDT   NM SCAN3 with MGNM1   [facility])    [id number] [place] [address] [phone]            Sep 19, 2018 12:00 PM CDT   Ekg Stress Nm Lexiscan with MG STRESS RM, MG IMAGING NURSE, MG CARD, MG CV TECH   [facility]  ([facility])    [address] [place] [id number] [phone]            Sep 19, 2018 12:30 PM CDT   NM MPI WITH LEXISCAN with MGNM1   [facility]    [address] [place] [id number] [phone]           How do I prepare for my exam? (Food and drink instructions) Day 1 & Day 2: Stop all caffeine 12 hours before the test. This includes coffee, tea, soda pop, chocolate and certain medicines (such as Anacin, Excedrin and NoDoz). Also avoid decaf coffee and tea, as these contain small amounts of caffeine. Stop eating 4 hours before the test. You may drink water.  How do I prepare for my exam? (Other instructions) You may need to stop some medicines before the test. Follow your doctor s orders. Day 1 & Day 2: *If you take a beta blocker: Do not take your beta-blocker on the day before your test, unless specifically told to by your doctor. And do not take it on the day of your test. Bring it with you to take after the test.  *If you take Aggrenox or dipyridamole (Persantine, Permole), stop taking it 48 hours before your test. *If you take Viagra, Cialis or Levitra, stop taking it 48 hours before your test. *If you take theophylline or aminophylline, stop taking it 12 hours before your test.  For patients with diabetes: *If you take insulin, call your diabetes care team. Ask if you should take a 1/2 dose the morning of your test. *If you take diabetes medicine by mouth, don t take it on the morning of your test. Bring it with you to take after the test. (If you have questions, call your diabetes care team.)  *Do not take nitrates on the day of your test. Do not wear your Nitro-Patch. *No alcohol, smoking or other tobacco for 12 hours before the test.  What should I wear: Please wear a loose two-piece outfit. If you will have an exercise test, bring rubber-soled walking shoes.  How long does the exam take: *This test can take  1-2 days.* ONE day exam: Allow 3-4 hours for test. IF TWO day exam: Allow 30-60 minutes PER day for test.  What should I bring: Please bring a list of your medicines (including vitamins, minerals and over-the-counter drugs). Leave your valuables at home.  Do I need a :  No  is needed.  What do I need to tell my doctor? When you arrive, please tell us if you: * Have diabetes * Are breastfeeding * May be pregnant * Have a pacemaker of ICD (implantable defibrillator).  What should I do after the exam: No restrictions, You may resume normal activities.  What is this test: Your doctor has ordered a nuclear stress test to check how well blood is flowing through your heart. You will either exercise or take a medicine that mimics exercise; we will watch your heart.  Who should I call with questions: If you have any questions, please call the Imaging Department where you will have your exam. Directions, parking instructions, and other information is available on our website, [email or web address].            Sep 20, 2018  8:30 AM CDT   Return Visit with CLAY Erwin MD   [facility] ([facility])    [address] [facility] [address]   [phone]            Sep 20, 2018  9:40 AM CDT   SHORT with Jon Denson PA-C   [facility] ([facility])    [address] [place] [address]   [phone]            Sep 22, 2018  8:00 PM CDT   PSG Split with BK BED 1   [facility] ([facility])    [address] [address]   [phone]              Future tests that were ordered for you today     Open Future Orders        Priority Expected Expires Ordered    Comprehensive Sleep Study Routine  3/17/2019 9/18/2018            Who to contact     If you have questions or need follow up information about today's clinic visit or  "your schedule please contact [facility] directly at [phone].  Normal or non-critical lab and imaging results will be communicated to you by MyChart, letter or phone within 4 business days after the clinic has received the results. If you do not hear from us within 7 days, please contact the clinic through [employer] or phone. If you have a critical or abnormal lab result, we will notify you by phone as soon as possible.  Submit refill requests through [employer] or call your pharmacy and they will forward the refill request to us. Please allow 3 business days for your refill to be completed.          Additional Information About Your Visit        [employer] Information     [employer] gives you secure access to your electronic health record. If you see a primary care provider, you can also send messages to your care team and make appointments. If you have questions, please call your primary care clinic.  If you do not have a primary care provider, please call [phone] and they will assist you.        Care EveryWhere ID     This is your Care EveryWhere ID. This could be used by other organizations to access your [place] medical records  [id number]        Your Vitals Were     Pulse Height Pulse Oximetry BMI (Body Mass Index)          78 1.791 m (5' 10.5\") 98% 34.09 kg/m2         Blood Pressure from Last 3 Encounters:   09/18/18 (!) 156/101   09/06/18 (!) 148/96   08/30/18 118/75    Weight from Last 3 Encounters:   09/18/18 109.3 kg (241 lb)   09/06/18 112.5 kg (248 lb)   08/30/18 109.8 kg (242 lb)              We Performed the Following     SLEEP EVALUATION & MANAGEMENT REFERRAL - ADULT -[facility]  [phone] (Age 15 and up)          Today's Medication Changes          These changes are accurate as of 9/18/18 12:02 PM.  If you have any questions, ask your nurse or doctor.               Start taking these medicines.        Dose/Directions    zolpidem 5 MG tablet   Commonly " known as:  AMBIEN   Used for:  AUGUSTO (obstructive sleep apnea)   Started by:  Alexandre Rivers MD        Take tablet by mouth 15 minutes prior to sleep, for Sleep Study   Quantity:  1 tablet   Refills:  0            Where to get your medicines      Some of these will need a paper prescription and others can be bought over the counter.  Ask your nurse if you have questions.     Bring a paper prescription for each of these medications     zolpidem 5 MG tablet                Primary Care Provider Office Phone # Fax #    Jon Denson PA-C [phone]       [address] [facility] [address]        Equal Access to Services     [facility]: Hadii aad ku hadasho Soomaali, waaxda luqadaha, qaybta kaalmada adeegyada, waxay cortez haycolinn laci overton . So [facility] [phone].    ATENCIÓN: Si habla español, tiene a campoverde disposición servicios gratuitos de asistencia lingüística. [facility] [phone].    We comply with applicable federal civil rights laws and [place] laws. We do not discriminate on the basis of race, color, national origin, age, disability, sex, sexual orientation, or gender identity.            Thank you!     Thank you for choosing [facility]  for your care. Our goal is always to provide you with excellent care. Hearing back from our patients is one way we can continue to improve our services. Please take a few minutes to complete the written survey that you may receive in the mail after your visit with us. Thank you!             Your Updated Medication List - Protect others around you: Learn how to safely use, store and throw away your medicines at www.disposemymeds.org.          This list is accurate as of 9/18/18 12:02 PM.  Always use your most recent med list.                   Brand Name Dispense Instructions for use Diagnosis    IBUPROFEN PO      Take 600 mg by mouth 1-2 times daily        lisinopril 20 MG tablet    PRINIVIL/ZESTRIL    90 tablet     Take 1 tablet (20 mg) by mouth daily    Benign essential hypertension       metoprolol succinate 25 MG 24 hr tablet    TOPROL-XL    90 tablet    Take 1 tablet (25 mg) by mouth daily    Chronic atrial fibrillation (H)       omeprazole 20 MG CR capsule    priLOSEC     Take 20 mg by mouth        PEPCID PO      Take 10 mg by mouth        rivaroxaban ANTICOAGULANT 20 MG Tabs tablet    XARELTO    30 tablet    Take 1 tablet (20 mg) by mouth daily (with dinner)    Chronic atrial fibrillation (H)       sildenafil 20 MG tablet    REVATIO    30 tablet    Take 1 -5 tabs daily prn    Other male erectile dysfunction       zolpidem 5 MG tablet    AMBIEN    1 tablet    Take tablet by mouth 15 minutes prior to sleep, for Sleep Study    AUGUSTO (obstructive sleep apnea)          Alternatives Discussed Intro (Do Not Add Period): I discussed alternative treatments to Mohs surgery and specifically discussed the risks and benefits of

## 2019-08-13 ENCOUNTER — MYC REFILL (OUTPATIENT)
Dept: FAMILY MEDICINE | Facility: CLINIC | Age: 69
End: 2019-08-13

## 2019-08-13 DIAGNOSIS — G47.33 OSA (OBSTRUCTIVE SLEEP APNEA): ICD-10-CM

## 2019-08-13 NOTE — TELEPHONE ENCOUNTER
Routing refill request to provider for review/approval because:  Drug not on the [facility] refill protocol. Please see comment

## 2019-08-14 ENCOUNTER — MYC REFILL (OUTPATIENT)
Dept: FAMILY MEDICINE | Facility: CLINIC | Age: 69
End: 2019-08-14

## 2019-08-14 DIAGNOSIS — G47.33 OSA (OBSTRUCTIVE SLEEP APNEA): ICD-10-CM

## 2019-08-14 RX ORDER — ZOLPIDEM TARTRATE 5 MG/1
5 TABLET ORAL
Qty: 30 TABLET | Refills: 1 | Status: CANCELLED | OUTPATIENT
Start: 2019-08-14

## 2019-08-14 RX ORDER — ZOLPIDEM TARTRATE 5 MG/1
5 TABLET ORAL
Qty: 30 TABLET | Refills: 1 | Status: SHIPPED | OUTPATIENT
Start: 2019-08-14 | End: 2019-09-10

## 2019-09-04 DIAGNOSIS — I48.20 CHRONIC ATRIAL FIBRILLATION (H): ICD-10-CM

## 2019-09-04 NOTE — TELEPHONE ENCOUNTER
"Requested Prescriptions   Pending Prescriptions Disp Refills     metoprolol succinate ER (TOPROL-XL) 50 MG 24 hr tablet [Pharmacy Med Name: METOPROLOL ER 50MG  TAB] 90 tablet 1     Sig: TAKE 2 TABLETS BY MOUTH ONCE DAILY  Last Written Prescription Date:  7/26/19  Last Fill Quantity: 90,  # refills: 1   Last office visit: 7/2/2019 with prescribing provider:  CLAY Denson   Future Office Visit:         Beta-Blockers Protocol Passed - 9/4/2019  3:50 PM        Passed - Blood pressure under 140/90 in past 12 months     BP Readings from Last 3 Encounters:   08/01/19 122/81   07/15/19 110/83   07/13/19 127/84                 Passed - Patient is age 6 or older        Passed - Recent (12 mo) or future (30 days) visit within the authorizing provider's specialty     Patient had office visit in the last 12 months or has a visit in the next 30 days with authorizing provider or within the authorizing provider's specialty.  See \"Patient Info\" tab in inbasket, or \"Choose Columns\" in Meds & Orders section of the refill encounter.              Passed - Medication is active on med list        "

## 2019-09-06 RX ORDER — METOPROLOL SUCCINATE 50 MG/1
TABLET, EXTENDED RELEASE ORAL
Qty: 90 TABLET | Refills: 0 | Status: SHIPPED | OUTPATIENT
Start: 2019-09-06 | End: 2019-10-24

## 2019-09-06 NOTE — TELEPHONE ENCOUNTER
Patient due for recheck in November.   Prescription approved per [facility] Refill Protocol.  Reminder to schedule follow up sent to pharmacy.     Lili Messer RN, BSN, PHN

## 2019-09-09 ENCOUNTER — TELEPHONE (OUTPATIENT)
Dept: FAMILY MEDICINE | Facility: CLINIC | Age: 69
End: 2019-09-09

## 2019-09-09 DIAGNOSIS — G47.33 OSA (OBSTRUCTIVE SLEEP APNEA): ICD-10-CM

## 2019-09-09 NOTE — TELEPHONE ENCOUNTER
Pharmacy is calling to get the rx for zolpidem (AMBIEN) 5 MG tablet refilled early.  Patient is stating that he is going out of town on the day that it can be refilled.  Patient needs this refilled no later than tomorrow.  Please call pharmacy to advise.  Thank you

## 2019-09-10 RX ORDER — ZOLPIDEM TARTRATE 5 MG/1
5 TABLET ORAL
Qty: 30 TABLET | Refills: 1 | Status: SHIPPED | OUTPATIENT
Start: 2019-09-10 | End: 2019-11-20

## 2019-10-24 ENCOUNTER — MYC REFILL (OUTPATIENT)
Dept: FAMILY MEDICINE | Facility: CLINIC | Age: 69
End: 2019-10-24

## 2019-10-24 DIAGNOSIS — I48.20 CHRONIC ATRIAL FIBRILLATION (H): ICD-10-CM

## 2019-10-25 RX ORDER — METOPROLOL SUCCINATE 50 MG/1
100 TABLET, EXTENDED RELEASE ORAL DAILY
Qty: 90 TABLET | Refills: 1 | Status: SHIPPED | OUTPATIENT
Start: 2019-10-25 | End: 2019-12-22

## 2019-10-25 NOTE — TELEPHONE ENCOUNTER
Prescription approved per [facility]ill Protocol.  Patient due for BP recheck in January.     Lili Messer, RN, BSN, PHN

## 2019-11-08 ENCOUNTER — HEALTH MAINTENANCE LETTER (OUTPATIENT)
Age: 69
End: 2019-11-08

## 2019-11-19 NOTE — PROGRESS NOTES
"SUBJECTIVE:   Tee Hilton is a 69 year old male who presents for Preventive Visit.    Recheck of his initial insomnia. 5 mg of ambien is not working all that well. Although he tolerates it well.   Some urinary frequency and nocturia for a long time. No hesitancy or profound change in urine stream.   Recheck of his blood pressure/htn.   Are you in the first 12 months of your Medicare Part B coverage?  No    Physical Health:    In general, how would you rate your overall physical health? good    Outside of work, how many days during the week do you exercise? 4-5 days/week    Outside of work, approximately how many minutes a day do you exercise?greater than 60 minutes  If you drink alcohol do you typically have >3 drinks per day or >7 drinks per week? Yes - AUDIT SCORE:     No flowsheet data found.    Do you usually eat at least 4 servings of fruit and vegetables a day, include whole grains & fiber and avoid regularly eating high fat or \"junk\" foods? Yes    Do you have any problems taking medications regularly?  No    Do you have any side effects from medications? not applicable    Needs assistance for the following daily activities: no assistance needed    Which of the following safety concerns are present in your home?  none identified     Hearing impairment: Yes, Need to ask people to speak up or repeat themselves.    Difficulty understanding soft or whispered speech.    In the past 6 months, have you been bothered by leaking of urine? no    Mental Health:    In general, how would you rate your overall mental or emotional health? good  PHQ-2 Score:      Do you feel safe in your environment? Yes    Have you ever done Advance Care Planning? (For example, a Health Directive, POLST, or a discussion with a medical provider or your loved ones about your wishes): Yes, patient states has an Advance Care Planning document and will bring a copy to the clinic.    Additional concerns to address?  YES  Low back " pain-ongoing  Larger quantities for his medications -going out of state  A-fib  Occasional blood in stools  Difficulty sleeping wants to discuss Ambien    Fall risk:  Fallen 2 or more times in the past year?: No  Any fall with injury in the past year?: No    Cognitive Screenin) Repeat 3 items (Leader, Season, Table)    2) Clock draw: NORMAL  3) 3 item recall: Recalls 1 object   Results: NORMAL clock, 1-2 items recalled: COGNITIVE IMPAIRMENT LESS LIKELY    Mini-CogTM Copyright MIKY Toth. Licensed by the author for use in [facility] [employer]; reprinted with permission (keyla@[facility]). All rights reserved.      Do you have sleep apnea, excessive snoring or daytime drowsiness?: yes            Reviewed and updated as needed this visit by clinical staff  Tobacco  Allergies  Meds         Reviewed and updated as needed this visit by Provider        Social History     Tobacco Use     Smoking status: Never Smoker     Smokeless tobacco: Never Used     Tobacco comment: never smoked   Substance Use Topics     Alcohol use: Yes     Alcohol/week: 0.0 standard drinks     Comment: social use                           Current providers sharing in care for this patient include:   Patient Care Team:  Jon Denson PA-C as PCP - General (Physician Assistant)  Jon Denson PA-C as Assigned PCP    The following health maintenance items are reviewed in Epic and correct as of today:  Health Maintenance   Topic Date Due     URINE DRUG SCREEN  1950     ZOSTER IMMUNIZATION (2 of 3) 2014     MEDICARE ANNUAL WELLNESS VISIT  2017     LIPID  2019     CRISTINA ASSESSMENT  2019     PHQ-9  2019     FALL RISK ASSESSMENT  12/10/2019     BMP  2019     ALT  2020     CBC  2020     HF ACTION PLAN  2022     COLONOSCOPY  2022     DTAP/TDAP/TD IMMUNIZATION (2 - Td) 2024     ADVANCE CARE PLANNING  2024     HEPATITIS C SCREENING  Completed     INFLUENZA VACCINE   Completed     PNEUMOCOCCAL IMMUNIZATION 65+ LOW/MEDIUM RISK  Completed     AORTIC ANEURYSM SCREENING (SYSTEM ASSIGNED)  Completed     IPV IMMUNIZATION  Aged Out     MENINGITIS IMMUNIZATION  Aged Out     Lab work is in process  Labs reviewed in EPIC  BP Readings from Last 3 Encounters:   11/20/19 118/84   08/01/19 122/81   07/15/19 110/83    Wt Readings from Last 3 Encounters:   11/20/19 111.1 kg (245 lb)   08/01/19 104.3 kg (230 lb)   07/15/19 104.3 kg (230 lb)                  Patient Active Problem List   Diagnosis     Essential hypertension     Other male erectile dysfunction     Chronic midline low back pain without sciatica     Persistent atrial fibrillation     Non morbid obesity due to excess calories     Chronic systolic congestive heart failure (H)     Alcohol use     Gastroesophageal reflux disease without esophagitis     Other sleep apnea     Past Surgical History:   Procedure Laterality Date     ANESTHESIA CARDIOVERSION N/A 9/24/2018    Procedure: ANESTHESIA CARDIOVERSION;  Cardioversion ;  Surgeon: GENERIC ANESTHESIA PROVIDER;  Location: [facility] OR     COLONOSCOPY N/A 5/25/2017    Procedure: COMBINED COLONOSCOPY, SINGLE OR MULTIPLE BIOPSY/POLYPECTOMY BY BIOPSY;;  Surgeon: Aquilino Garcia MD;  Location: [facility] OR     COLONOSCOPY WITH CO2 INSUFFLATION N/A 5/25/2017    Procedure: COLONOSCOPY WITH CO2 INSUFFLATION;  COLONOSCOPY SCREEN/ ORDAZ;  Surgeon: Aquilino Garcia MD;  Location: [facility] OR     OPEN REDUCTION INTERNAL FIXATION TIBIA       SEPTOPLASTY, TURBINOPLASTY, COMBINED Bilateral 7/9/2019    Procedure: ENDOSCOPIC SEPTOPLASTY, BILATERAL TURBINATE REDUCTION;  Surgeon: Alexander Avila MD;  Location: [facility] OR     TONSILLECTOMY         Social History     Tobacco Use     Smoking status: Never Smoker     Smokeless tobacco: Never Used     Tobacco comment: never smoked   Substance Use Topics     Alcohol use: Yes     Alcohol/week: 0.0 standard drinks     Comment: social use     Family History   Problem  "Relation Age of Onset     Gout Father      Lung Cancer Father          Current Outpatient Medications   Medication Sig Dispense Refill     amLODIPine (NORVASC) 5 MG tablet Take 1 tablet (5 mg) by mouth daily 90 tablet 1     Famotidine (PEPCID PO) Take 10 mg by mouth       lisinopril (PRINIVIL/ZESTRIL) 20 MG tablet TAKE 2 TABLETS BY MOUTH ONCE DAILY 180 tablet 1     metoprolol succinate ER (TOPROL-XL) 50 MG 24 hr tablet Take 2 tablets (100 mg) by mouth daily TAKE 2 TABLETS BY MOUTH ONCE DAILY 90 tablet 1     omeprazole (PRILOSEC) 20 MG CR capsule Take 20 mg by mouth       oxybutynin ER (DITROPAN-XL) 5 MG 24 hr tablet Take 1 tablet (5 mg) by mouth daily 30 tablet 1     rivaroxaban ANTICOAGULANT (XARELTO) 20 MG TABS tablet TAKE 1 TABLET BY MOUTH ONCE DAILY WITH  DINNER 90 tablet 1     sildenafil (REVATIO) 20 MG tablet Take 1 -5 tabs daily prn 30 tablet 11     zolpidem (AMBIEN) 10 MG tablet Take 1 tablet (10 mg) by mouth nightly as needed for sleep 30 tablet 1     Allergies   Allergen Reactions     Erythromycin Unknown     Upset stomach         ROS:  Constitutional, HEENT, cardiovascular, pulmonary, GI, , musculoskeletal, neuro, skin, endocrine and psych systems are negative, except as otherwise noted.    OBJECTIVE:   /84   Pulse 93   Temp 97.5  F (36.4  C) (Tympanic)   Resp 16   Ht 1.803 m (5' 11\")   Wt 111.1 kg (245 lb)   SpO2 98%   BMI 34.17 kg/m   Estimated body mass index is 34.17 kg/m  as calculated from the following:    Height as of this encounter: 1.803 m (5' 11\").    Weight as of this encounter: 111.1 kg (245 lb).  EXAM:   GENERAL: healthy, alert and no distress  EYES: Eyes grossly normal to inspection, PERRL and conjunctivae and sclerae normal  HENT: ear canals and TM's normal, nose and mouth without ulcers or lesions  NECK: no adenopathy, no asymmetry, masses, or scars and thyroid normal to palpation  RESP: lungs clear to auscultation - no rales, rhonchi or wheezes  CV: regular rate and " "rhythm, normal S1 S2, no S3 or S4, no murmur, click or rub, no peripheral edema and peripheral pulses strong  ABDOMEN: soft, nontender, no hepatosplenomegaly, no masses and bowel sounds normal  MS: no gross musculoskeletal defects noted, no edema  SKIN: no suspicious lesions or rashes  NEURO: Normal strength and tone, mentation intact and speech normal  PSYCH: mentation appears normal, affect normal/bright        ASSESSMENT / PLAN:       ICD-10-CM    1. Encounter for Medicare annual wellness exam Z00.00    2. AUGUSTO (obstructive sleep apnea) G47.33 zolpidem (AMBIEN) 10 MG tablet   3. Essential hypertension I10 Basic metabolic panel  (Ca, Cl, CO2, Creat, Gluc, K, Na, BUN)   4. Chronic atrial fibrillation I48.20    5. CARDIOVASCULAR SCREENING; LDL GOAL LESS THAN 160 Z13.6 Lipid panel reflex to direct LDL Fasting   6. Overactive bladder N32.81 oxybutynin ER (DITROPAN-XL) 5 MG 24 hr tablet     work on lifestyle modification    COUNSELING:  Reviewed preventive health counseling, as reflected in patient instructions       Regular exercise       Healthy diet/nutrition    Estimated body mass index is 34.17 kg/m  as calculated from the following:    Height as of this encounter: 1.803 m (5' 11\").    Weight as of this encounter: 111.1 kg (245 lb).    Weight management plan: Discussed healthy diet and exercise guidelines     reports that he has never smoked. He has never used smokeless tobacco.      Appropriate preventive services were discussed with this patient, including applicable screening as appropriate for cardiovascular disease, diabetes, osteopenia/osteoporosis, and glaucoma.  As appropriate for age/gender, discussed screening for colorectal cancer, prostate cancer, breast cancer, and cervical cancer. Checklist reviewing preventive services available has been given to the patient.    Reviewed patients plan of care and provided an AVS. The Basic Care Plan (routine screening as documented in Health Maintenance) for Tee" meets the Care Plan requirement. This Care Plan has been established and reviewed with the Patient.    Counseling Resources:  ATP IV Guidelines  Pooled Cohorts Equation Calculator  Breast Cancer Risk Calculator  FRAX Risk Assessment  ICSI Preventive Guidelines  Dietary Guidelines for Americans, 2010  USDA's MyPlate  ASA Prophylaxis  Lung CA Screening    Jon Denson PA-C  [facility]

## 2019-11-19 NOTE — PATIENT INSTRUCTIONS
Patient Education   Personalized Prevention Plan  You are due for the preventive services outlined below.  Your care team is available to assist you in scheduling these services.  If you have already completed any of these items, please share that information with your care team to update in your medical record.  Health Maintenance Due   Topic Date Due     URINE DRUG SCREEN  1950     Zoster (Shingles) Vaccine (2 of 3) 09/05/2014     Annual Wellness Visit  11/11/2017     Cholesterol Lab  01/18/2019     Anxiety Assessment  01/18/2019     Depression Assessment  01/18/2019     Pneumococcal Vaccine (2 of 2 - PCV13) 01/18/2019     Flu Vaccine (1) 09/01/2019     FALL RISK ASSESSMENT  12/10/2019

## 2019-11-20 ENCOUNTER — OFFICE VISIT (OUTPATIENT)
Dept: FAMILY MEDICINE | Facility: CLINIC | Age: 69
End: 2019-11-20
Payer: COMMERCIAL

## 2019-11-20 VITALS
TEMPERATURE: 97.5 F | OXYGEN SATURATION: 98 % | HEIGHT: 71 IN | WEIGHT: 245 LBS | BODY MASS INDEX: 34.3 KG/M2 | RESPIRATION RATE: 16 BRPM | SYSTOLIC BLOOD PRESSURE: 118 MMHG | DIASTOLIC BLOOD PRESSURE: 84 MMHG | HEART RATE: 93 BPM

## 2019-11-20 DIAGNOSIS — Z00.00 ENCOUNTER FOR MEDICARE ANNUAL WELLNESS EXAM: Primary | ICD-10-CM

## 2019-11-20 DIAGNOSIS — Z13.6 CARDIOVASCULAR SCREENING; LDL GOAL LESS THAN 160: ICD-10-CM

## 2019-11-20 DIAGNOSIS — G47.33 OSA (OBSTRUCTIVE SLEEP APNEA): ICD-10-CM

## 2019-11-20 DIAGNOSIS — I10 ESSENTIAL HYPERTENSION: ICD-10-CM

## 2019-11-20 DIAGNOSIS — N32.81 OVERACTIVE BLADDER: ICD-10-CM

## 2019-11-20 DIAGNOSIS — I48.20 CHRONIC ATRIAL FIBRILLATION (H): ICD-10-CM

## 2019-11-20 LAB
ANION GAP SERPL CALCULATED.3IONS-SCNC: 6 MMOL/L (ref 3–14)
BUN SERPL-MCNC: 28 MG/DL (ref 7–30)
CALCIUM SERPL-MCNC: 9.6 MG/DL (ref 8.5–10.1)
CHLORIDE SERPL-SCNC: 103 MMOL/L (ref 94–109)
CHOLEST SERPL-MCNC: 243 MG/DL
CO2 SERPL-SCNC: 26 MMOL/L (ref 20–32)
CREAT SERPL-MCNC: 1.35 MG/DL (ref 0.66–1.25)
GFR SERPL CREATININE-BSD FRML MDRD: 54 ML/MIN/{1.73_M2}
GLUCOSE SERPL-MCNC: 96 MG/DL (ref 70–99)
HDLC SERPL-MCNC: 76 MG/DL
LDLC SERPL CALC-MCNC: 151 MG/DL
NONHDLC SERPL-MCNC: 167 MG/DL
POTASSIUM SERPL-SCNC: 5.1 MMOL/L (ref 3.4–5.3)
SODIUM SERPL-SCNC: 135 MMOL/L (ref 133–144)
TRIGL SERPL-MCNC: 82 MG/DL

## 2019-11-20 PROCEDURE — 80061 LIPID PANEL: CPT | Performed by: PHYSICIAN ASSISTANT

## 2019-11-20 PROCEDURE — 90472 IMMUNIZATION ADMIN EACH ADD: CPT | Performed by: PHYSICIAN ASSISTANT

## 2019-11-20 PROCEDURE — 36415 COLL VENOUS BLD VENIPUNCTURE: CPT | Performed by: PHYSICIAN ASSISTANT

## 2019-11-20 PROCEDURE — 90662 IIV NO PRSV INCREASED AG IM: CPT | Performed by: PHYSICIAN ASSISTANT

## 2019-11-20 PROCEDURE — 90471 IMMUNIZATION ADMIN: CPT | Performed by: PHYSICIAN ASSISTANT

## 2019-11-20 PROCEDURE — 99397 PER PM REEVAL EST PAT 65+ YR: CPT | Mod: 25 | Performed by: PHYSICIAN ASSISTANT

## 2019-11-20 PROCEDURE — 90670 PCV13 VACCINE IM: CPT | Performed by: PHYSICIAN ASSISTANT

## 2019-11-20 PROCEDURE — 99213 OFFICE O/P EST LOW 20 MIN: CPT | Mod: 25 | Performed by: PHYSICIAN ASSISTANT

## 2019-11-20 PROCEDURE — 80048 BASIC METABOLIC PNL TOTAL CA: CPT | Performed by: PHYSICIAN ASSISTANT

## 2019-11-20 RX ORDER — OXYBUTYNIN CHLORIDE 5 MG/1
5 TABLET, EXTENDED RELEASE ORAL DAILY
Qty: 30 TABLET | Refills: 1 | Status: SHIPPED | OUTPATIENT
Start: 2019-11-20 | End: 2020-08-26

## 2019-11-20 RX ORDER — ZOLPIDEM TARTRATE 10 MG/1
10 TABLET ORAL
Qty: 30 TABLET | Refills: 1 | Status: SHIPPED | OUTPATIENT
Start: 2019-11-20 | End: 2019-12-22

## 2019-11-20 ASSESSMENT — ANXIETY QUESTIONNAIRES
GAD7 TOTAL SCORE: 4
IF YOU CHECKED OFF ANY PROBLEMS ON THIS QUESTIONNAIRE, HOW DIFFICULT HAVE THESE PROBLEMS MADE IT FOR YOU TO DO YOUR WORK, TAKE CARE OF THINGS AT HOME, OR GET ALONG WITH OTHER PEOPLE: NOT DIFFICULT AT ALL
3. WORRYING TOO MUCH ABOUT DIFFERENT THINGS: NOT AT ALL
5. BEING SO RESTLESS THAT IT IS HARD TO SIT STILL: SEVERAL DAYS
1. FEELING NERVOUS, ANXIOUS, OR ON EDGE: NOT AT ALL
7. FEELING AFRAID AS IF SOMETHING AWFUL MIGHT HAPPEN: SEVERAL DAYS
2. NOT BEING ABLE TO STOP OR CONTROL WORRYING: NOT AT ALL
6. BECOMING EASILY ANNOYED OR IRRITABLE: SEVERAL DAYS

## 2019-11-20 ASSESSMENT — MIFFLIN-ST. JEOR: SCORE: 1908.44

## 2019-11-20 ASSESSMENT — PATIENT HEALTH QUESTIONNAIRE - PHQ9
5. POOR APPETITE OR OVEREATING: SEVERAL DAYS
SUM OF ALL RESPONSES TO PHQ QUESTIONS 1-9: 7

## 2019-11-20 NOTE — NURSING NOTE
Prior to immunization administration, verified patients identity using patient s name and date of birth. Please see Immunization Activity for additional information.     Screening Questionnaire for Adult Immunization    Are you sick today?   No   Do you have allergies to medications, food, a vaccine component or latex?   No   Have you ever had a serious reaction after receiving a vaccination?   No   Do you have a long-term health problem with heart disease, lung disease, asthma, kidney disease, metabolic disease (e.g. diabetes), anemia, or other blood disorder?   No   Do you have cancer, leukemia, HIV/AIDS, or any other immune system problem?   No   In the past 3 months, have you taken medications that affect  your immune system, such as prednisone, other steroids, or anticancer drugs; drugs for the treatment of rheumatoid arthritis, Crohn s disease, or psoriasis; or have you had radiation treatments?   No   Have you had a seizure, or a brain or other nervous system problem?   No   During the past year, have you received a transfusion of blood or blood     products, or been given immune (gamma) globulin or antiviral drug?   No   For women: Are you pregnant or is there a chance you could become        pregnant during the next month?   No   Have you received any vaccinations in the past 4 weeks?   No     Immunization questionnaire answers were all negative.        Per orders of Jon Denson, injection of pneumoccol given by Vidya Domingo. Patient instructed to remain in clinic for 15 minutes afterwards, and to report any adverse reaction to me immediately.       Screening performed by Vidya Domingo on 11/20/2019 at 9:42 AM.

## 2019-11-21 ASSESSMENT — ANXIETY QUESTIONNAIRES: GAD7 TOTAL SCORE: 4

## 2019-12-10 ENCOUNTER — MYC REFILL (OUTPATIENT)
Dept: FAMILY MEDICINE | Facility: CLINIC | Age: 69
End: 2019-12-10

## 2019-12-10 DIAGNOSIS — I10 ESSENTIAL HYPERTENSION: ICD-10-CM

## 2019-12-10 NOTE — TELEPHONE ENCOUNTER
Patient was seen for physical on 11/20/19 and advised to return in 6 month for BP check.     RN unable to approve 6 month refill due to:  Creatinine     Creatinine   Date Value Ref Range Status   11/20/2019 1.35 (H) 0.66 - 1.25 mg/dL Final       Lili Messer, RN, BSN, PHN

## 2019-12-11 RX ORDER — AMLODIPINE BESYLATE 5 MG/1
5 TABLET ORAL DAILY
Qty: 90 TABLET | Refills: 1 | Status: SHIPPED | OUTPATIENT
Start: 2019-12-11 | End: 2020-05-27

## 2019-12-22 ENCOUNTER — MYC REFILL (OUTPATIENT)
Dept: FAMILY MEDICINE | Facility: CLINIC | Age: 69
End: 2019-12-22

## 2019-12-22 DIAGNOSIS — N52.8 OTHER MALE ERECTILE DYSFUNCTION: ICD-10-CM

## 2019-12-22 DIAGNOSIS — G47.33 OSA (OBSTRUCTIVE SLEEP APNEA): ICD-10-CM

## 2019-12-22 DIAGNOSIS — I48.20 CHRONIC ATRIAL FIBRILLATION (H): ICD-10-CM

## 2019-12-24 RX ORDER — METOPROLOL SUCCINATE 50 MG/1
100 TABLET, EXTENDED RELEASE ORAL DAILY
Qty: 90 TABLET | Refills: 1 | Status: SHIPPED | OUTPATIENT
Start: 2019-12-24 | End: 2020-08-26

## 2019-12-24 RX ORDER — ZOLPIDEM TARTRATE 10 MG/1
10 TABLET ORAL
Qty: 30 TABLET | Refills: 1 | Status: SHIPPED | OUTPATIENT
Start: 2019-12-24 | End: 2020-01-16

## 2019-12-24 RX ORDER — SILDENAFIL CITRATE 20 MG/1
TABLET ORAL
Qty: 30 TABLET | Refills: 11 | Status: SHIPPED | OUTPATIENT
Start: 2019-12-24 | End: 2023-12-27

## 2020-01-15 ENCOUNTER — MYC REFILL (OUTPATIENT)
Dept: FAMILY MEDICINE | Facility: CLINIC | Age: 70
End: 2020-01-15

## 2020-01-15 DIAGNOSIS — G47.33 OSA (OBSTRUCTIVE SLEEP APNEA): ICD-10-CM

## 2020-01-16 ENCOUNTER — TELEPHONE (OUTPATIENT)
Dept: FAMILY MEDICINE | Facility: CLINIC | Age: 70
End: 2020-01-16

## 2020-01-16 DIAGNOSIS — G47.33 OSA (OBSTRUCTIVE SLEEP APNEA): ICD-10-CM

## 2020-01-16 NOTE — TELEPHONE ENCOUNTER
Routing refill request to provider for review/approval because:  Drug not on the [facility] refill protocol. Patient reports must be sent by MD

## 2020-01-16 NOTE — TELEPHONE ENCOUNTER
Reason for Call:  Other call back    Detailed comments: Patient is in [place], pharmacy will not honor refill on Ambien. Per pharmacy, an MD has to to send the RX. Patient would like a call to advise.     Phone Number Patient can be reached at: Home number on file [phone] (home)    Best Time: ASAP    Can we leave a detailed message on this number? YES    Call taken on 1/16/2020 at 2:20 PM by Bridget Fajardo

## 2020-01-17 RX ORDER — ZOLPIDEM TARTRATE 10 MG/1
10 TABLET ORAL
Qty: 30 TABLET | Refills: 0 | Status: SHIPPED | OUTPATIENT
Start: 2020-01-17 | End: 2020-02-11

## 2020-02-04 RX ORDER — ZOLPIDEM TARTRATE 10 MG/1
10 TABLET ORAL
Qty: 30 TABLET | Refills: 1 | OUTPATIENT
Start: 2020-02-04

## 2020-02-10 ENCOUNTER — MYC MEDICAL ADVICE (OUTPATIENT)
Dept: FAMILY MEDICINE | Facility: CLINIC | Age: 70
End: 2020-02-10

## 2020-02-10 DIAGNOSIS — G47.33 OSA (OBSTRUCTIVE SLEEP APNEA): ICD-10-CM

## 2020-02-11 ENCOUNTER — MYC MEDICAL ADVICE (OUTPATIENT)
Dept: FAMILY MEDICINE | Facility: CLINIC | Age: 70
End: 2020-02-11

## 2020-02-11 DIAGNOSIS — I10 BENIGN ESSENTIAL HYPERTENSION: ICD-10-CM

## 2020-02-11 DIAGNOSIS — G47.33 OSA (OBSTRUCTIVE SLEEP APNEA): ICD-10-CM

## 2020-02-11 DIAGNOSIS — I48.20 CHRONIC ATRIAL FIBRILLATION (H): ICD-10-CM

## 2020-02-11 RX ORDER — ZOLPIDEM TARTRATE 10 MG/1
10 TABLET ORAL
Qty: 30 TABLET | Refills: 0 | Status: CANCELLED | OUTPATIENT
Start: 2020-02-11

## 2020-02-11 RX ORDER — ZOLPIDEM TARTRATE 10 MG/1
10 TABLET ORAL
Qty: 30 TABLET | Refills: 0 | Status: SHIPPED | OUTPATIENT
Start: 2020-02-11 | End: 2020-02-14

## 2020-02-12 RX ORDER — LISINOPRIL 20 MG/1
TABLET ORAL
Qty: 180 TABLET | Refills: 1 | Status: SHIPPED | OUTPATIENT
Start: 2020-02-12 | End: 2020-08-26

## 2020-02-13 NOTE — TELEPHONE ENCOUNTER
Ambien charles'd up and routed to Dr. Cole and Dr. Moe. Please advise.    Becky Marcano, RN, BSN

## 2020-02-14 RX ORDER — ZOLPIDEM TARTRATE 10 MG/1
10 TABLET ORAL
Qty: 30 TABLET | Refills: 0 | Status: SHIPPED | OUTPATIENT
Start: 2020-02-14 | End: 2020-04-20

## 2020-03-10 DIAGNOSIS — I48.20 CHRONIC ATRIAL FIBRILLATION (H): ICD-10-CM

## 2020-03-12 RX ORDER — RIVAROXABAN 20 MG/1
TABLET, FILM COATED ORAL
Qty: 90 TABLET | Refills: 0 | OUTPATIENT
Start: 2020-03-12

## 2020-04-07 ENCOUNTER — VIRTUAL VISIT (OUTPATIENT)
Dept: CARDIOLOGY | Facility: CLINIC | Age: 70
End: 2020-04-07
Payer: COMMERCIAL

## 2020-04-07 VITALS — DIASTOLIC BLOOD PRESSURE: 80 MMHG | BODY MASS INDEX: 33.47 KG/M2 | SYSTOLIC BLOOD PRESSURE: 115 MMHG | WEIGHT: 240 LBS

## 2020-04-07 DIAGNOSIS — I10 BENIGN ESSENTIAL HYPERTENSION: ICD-10-CM

## 2020-04-07 DIAGNOSIS — I48.21 PERMANENT ATRIAL FIBRILLATION (H): Primary | ICD-10-CM

## 2020-04-07 PROBLEM — K21.9 GASTROESOPHAGEAL REFLUX DISEASE: Status: ACTIVE | Noted: 2020-04-07

## 2020-04-07 PROBLEM — I48.91 ATRIAL FIBRILLATION (H): Status: ACTIVE | Noted: 2020-04-07

## 2020-04-07 PROCEDURE — 99213 OFFICE O/P EST LOW 20 MIN: CPT | Mod: TEL | Performed by: INTERNAL MEDICINE

## 2020-04-07 NOTE — NURSING NOTE
"Chief Complaint   Patient presents with     Atrial Fib     Persistent atrial fibrillation visit,Per patient chest discomfort,flutter occationally as usual.feb. 2020 [place] [facility]. for bp.       Initial /80   Wt 108.9 kg (240 lb)   BMI 33.47 kg/m   Estimated body mass index is 33.47 kg/m  as calculated from the following:    Height as of 11/20/19: 1.803 m (5' 11\").    Weight as of this encounter: 108.9 kg (240 lb)..  BP completed using cuff size: arin Rudd L.P.N.    "

## 2020-04-07 NOTE — LETTER
April 7, 2020      TO: Tee Hilton  [address]  [facility] [address]         Dear Tee    Thank you for coming to the [facility] [place] by telephone visit; please note the following instructions:     1.  Echocardiogram is ordered which will be performed after the coronavirus is over.  We will see you in clinic once we review her echocardiogram.Scheduling and Nurse Message Telephone number #[phone]        It was a pleasure at your last clinic visit. Please do not hesitate to call me if you have any questions or concerns.    Sincerely,      Pearl Segovia MD

## 2020-04-07 NOTE — PATIENT INSTRUCTIONS
Thank you for coming to the [facility]; please note the following instructions:     1.  Echocardiogram is ordered which will be performed after the coronavirus is over.  We will see you in clinic once we review her echocardiogram.Scheduling Telephone number #[phone]              If you have any questions regarding your visit please contact your care team:     Cardiology  Telephone Number   Samia GALE, RN  Hannah BURDICK,RN  Evelia MONTALVO, MALATHI HARMAN, MA  Duong VARGAS, INGAN   [phone]   (select option 1)    *After hours: [phone]     For scheduling appts:     [phone] or    [phone] (select option 1)    *After hours: [phone]     For the Device Clinic (Pacemakers and ICD's)  RN's :  Rachael Harris   During business hours: [phone]    *After business hours:  [phone] (select option 4)      Normal test result notifications will be released via [employer] or mailed within 7 business days.  All other test results, will be communicated via telephone once reviewed by your cardiologist.    If you need a medication refill please contact your pharmacy.  Please allow 3 business days for your refill to be completed.    As always, thank you for trusting us with your health care needs!

## 2020-04-07 NOTE — PROGRESS NOTES
"Tee Hilton is a 69 year old male who is being evaluated via a billable telephone visit.      The patient has been notified of following:     \"This telephone visit will be conducted via a call between you and your physician/provider. We have found that certain health care needs can be provided without the need for a physical exam.  This service lets us provide the care you need with a short phone conversation.  If a prescription is necessary we can send it directly to your pharmacy.  If lab work is needed we can place an order for that and you can then stop by our lab to have the test done at a later time.    If during the course of the call the physician/provider feels a telephone visit is not appropriate, you will not be charged for this service.\"     Patient has given verbal consent for Telephone visit?  Yes    HPI:  Tee Hilton is a 69-year-old gentleman who complains of    Chief Complaint   Patient presents with     Atrial Fib     Persistent atrial fibrillation visit,Per patient chest discomfort,flutter occationally as usual.feb. 2020 [place] [facility]. for bp.     I have called and talked to patient on the phone for 12 minutes today to recheck on history of persistent atrial fibrillation, mildly reduced LV systolic function and hypertension.    Patient was last seen in clinic a year ago.      He has a history of permanent atrial fibrillation since 2018.  He is currently on rate control with metoprolol 100 mg and anticoagulation with rivaroxaban 20 mg.    On February 4, 2020 patient was admitted to outside hospital in [place] with elevated blood pressure.  Patient was symptomatic at that time with associated headache and dizziness.  Blood pressure was elevated at 170/115 mmHg.  He was admitted to the hospital for less than 24 hours.  He was started on amlodipine 5 mg.  Blood pressure was well controlled at 115-130 mmHg systolic when he was discharged.  Patient's lab tests from outside hospital on 2/4/2020 " showed NT proBNP of 1838, INR 1.2, magnesium 2.8, potassium 3.7 BUN 20, creatinine 1.0 and normal blood count. He had a carotid Doppler ultrasound which showed no significant lesion in the extracranial carotid artery system.    Since then the patient reports well-controlled blood pressure at home.  He continues to exercise regularly.  Today he exercised and he checked his blood pressure half an hour later and it was 115/80 mmHg.  He has no side effects with amlodipine.  He continues to take lisinopril 40 mg, metoprolol 100 mg, Xarelto 20 mg and amlodipine 5 mg.    He is overall feeling well.  Denies dizziness, lightheadedness, chest pain or shortness of breath. He reports very rare palpitations.    He is currently self isolating himself due to coronavirus outbreak.    No prior history of CAD.  He does not smoke.  Occasional alcohol use.    Patient's last echocardiogram a year ago showed EF of 40 to 45%.  RV was mild to moderate dilated with normal function.  No valvular disease was noted.    I have reviewed and updated the patient's Past Medical History, Social History, Family History and Medication List.    ALLERGIES  Erythromycin    Assessment and plan:  Tee Hilton is a 69-year-old man with past medical history significant for hypertension, permanent atrial fibrillation and mildly reduced LV systolic dysfunction.    Patient reports overall feeling well.  He has no concerning cardiac symptoms.  Compliant with his medications.  He was started on amlodipine 5 mg which is new since his last hospital admission in [place] a month ago for hypertensive emergency.      I recommended patient to continue current treatment     Once the coronavirus outbreak is over we will plan to obtain a new transthoracic echocardiogram to recheck LV function.      I will see patient in clinic once I review results of echocardiogram.  I did not make any medication changes today    Phone call duration: 12 minutes phone call started 11:04  SKYLER MARTINO MD  [facility] Division of Cardiology  Pager [phone]

## 2020-04-20 ENCOUNTER — MYC REFILL (OUTPATIENT)
Dept: FAMILY MEDICINE | Facility: CLINIC | Age: 70
End: 2020-04-20

## 2020-04-20 DIAGNOSIS — G47.33 OSA (OBSTRUCTIVE SLEEP APNEA): ICD-10-CM

## 2020-04-21 NOTE — TELEPHONE ENCOUNTER
Routing refill request to provider for review/approval because:  Drug not on the [facility] refill protocol. Last written 2/14/20

## 2020-04-22 RX ORDER — ZOLPIDEM TARTRATE 10 MG/1
10 TABLET ORAL
Qty: 30 TABLET | Refills: 0 | Status: SHIPPED | OUTPATIENT
Start: 2020-04-22 | End: 2020-05-25

## 2020-05-25 ENCOUNTER — MYC REFILL (OUTPATIENT)
Dept: FAMILY MEDICINE | Facility: CLINIC | Age: 70
End: 2020-05-25

## 2020-05-25 DIAGNOSIS — G47.33 OSA (OBSTRUCTIVE SLEEP APNEA): ICD-10-CM

## 2020-05-25 NOTE — TELEPHONE ENCOUNTER
Routing refill request to provider for review/approval because:  Drug not on the [facility] refill protocol   Last Written Prescription Date:  4/22/20  Last Fill Quantity: 30,  # refills: 0   Last office visit: 11/20/2019 with prescribing provider:  armen Denson   Future Office Visit:  Due for follow up 5/20/20

## 2020-05-27 ENCOUNTER — VIRTUAL VISIT (OUTPATIENT)
Dept: FAMILY MEDICINE | Facility: CLINIC | Age: 70
End: 2020-05-27
Payer: COMMERCIAL

## 2020-05-27 DIAGNOSIS — M25.512 ACUTE PAIN OF LEFT SHOULDER: Primary | ICD-10-CM

## 2020-05-27 DIAGNOSIS — I48.20 CHRONIC ATRIAL FIBRILLATION (H): ICD-10-CM

## 2020-05-27 DIAGNOSIS — G47.33 OSA (OBSTRUCTIVE SLEEP APNEA): ICD-10-CM

## 2020-05-27 DIAGNOSIS — I10 ESSENTIAL HYPERTENSION: ICD-10-CM

## 2020-05-27 DIAGNOSIS — I50.22 CHRONIC SYSTOLIC HEART FAILURE (H): ICD-10-CM

## 2020-05-27 PROCEDURE — 99214 OFFICE O/P EST MOD 30 MIN: CPT | Mod: 95 | Performed by: PHYSICIAN ASSISTANT

## 2020-05-27 RX ORDER — AMLODIPINE BESYLATE 5 MG/1
5 TABLET ORAL DAILY
Qty: 90 TABLET | Refills: 1 | Status: SHIPPED | OUTPATIENT
Start: 2020-05-27 | End: 2020-08-26

## 2020-05-27 RX ORDER — METOPROLOL SUCCINATE 100 MG/1
100 TABLET, EXTENDED RELEASE ORAL DAILY
Qty: 90 TABLET | Refills: 1 | Status: SHIPPED | OUTPATIENT
Start: 2020-05-27 | End: 2020-08-26

## 2020-05-27 RX ORDER — ZOLPIDEM TARTRATE 10 MG/1
10 TABLET ORAL
Qty: 30 TABLET | Refills: 0 | Status: SHIPPED | OUTPATIENT
Start: 2020-05-27 | End: 2020-06-17

## 2020-05-27 NOTE — TELEPHONE ENCOUNTER
kathrine is due for a visit with me. Schedule him for a virtual (video or phone based on patient preference. Always promote a video visit first) visit with me.  For a med check.

## 2020-05-27 NOTE — PROGRESS NOTES
"Tee Hilton is a 69 year old male who is being evaluated via a billable video visit.      The patient has been notified of following:     \"This video visit will be conducted via a call between you and your physician/provider. We have found that certain health care needs can be provided without the need for an in-person physical exam.  This service lets us provide the care you need with a video conversation.  If a prescription is necessary we can send it directly to your pharmacy.  If lab work is needed we can place an order for that and you can then stop by our lab to have the test done at a later time.    Video visits are billed at different rates depending on your insurance coverage.  Please reach out to your insurance provider with any questions.    If during the course of the call the physician/provider feels a video visit is not appropriate, you will not be charged for this service.\"    Patient has given verbal consent for Video visit? Yes    How would you like to obtain your AVS? Miranda    Patient would like the video invitation sent by: Text to cell phone: [phone]    Will anyone else be joining your video visit? No    Subjective     Tee Hilton is a 69 year old male who presents today via video visit for the following health issues:    HPI  Joint Pain -  Left shoulder    Onset: x 1 month    Description:   Location: left shoulder  Character: Dull ache    Intensity: mild, moderate    Progression of Symptoms: same    Accompanying Signs & Symptoms:  Other symptoms: radiation of pain to arm    History:   Previous similar pain: no       Precipitating factors:   Trauma or overuse: no     Alleviating factors:  Improved by: nothing    Therapies Tried and outcome: none    Limited rom. Worse with overhead activity.   No weakness.  No paresthesias.   Notes some crepitations.   No issue during the noc.     No chest pain/sob/palps. Blood pressure's well controlled.       Video Start Time: 1:50 " PM    Medication Followup of Zolpidem    Taking Medication as prescribed: yes    Side Effects:  None    Medication Helping Symptoms:  yes   Working well. No side effects.     Patient Active Problem List   Diagnosis     Essential hypertension     Other male erectile dysfunction     Chronic midline low back pain without sciatica     Persistent atrial fibrillation     Non morbid obesity due to excess calories     Chronic systolic congestive heart failure (H)     Alcohol use     Gastroesophageal reflux disease without esophagitis     Other sleep apnea     Atrial fibrillation (H)     Gastroesophageal reflux disease     Past Surgical History:   Procedure Laterality Date     ANESTHESIA CARDIOVERSION N/A 9/24/2018    Procedure: ANESTHESIA CARDIOVERSION;  Cardioversion ;  Surgeon: GENERIC ANESTHESIA PROVIDER;  Location: [facility]     COLONOSCOPY N/A 5/25/2017    Procedure: COMBINED COLONOSCOPY, SINGLE OR MULTIPLE BIOPSY/POLYPECTOMY BY BIOPSY;;  Surgeon: Aquilino Garcia MD;  Location[facility]     COLONOSCOPY WITH CO2 INSUFFLATION N/A 5/25/2017    Procedure: COLONOSCOPY WITH CO2 INSUFFLATION;  COLONOSCOPY SCREEN/ ORDAZ;  Surgeon: Aquilino Garcia MD;  Location: [facility]     OPEN REDUCTION INTERNAL FIXATION TIBIA       SEPTOPLASTY, TURBINOPLASTY, COMBINED Bilateral 7/9/2019    Procedure: ENDOSCOPIC SEPTOPLASTY, BILATERAL TURBINATE REDUCTION;  Surgeon: Alexander Avila MD;  Location: [facility]     TONSILLECTOMY         Social History     Tobacco Use     Smoking status: Never Smoker     Smokeless tobacco: Never Used     Tobacco comment: never smoked   Substance Use Topics     Alcohol use: Yes     Alcohol/week: 0.0 standard drinks     Comment: social use     Family History   Problem Relation Age of Onset     Gout Father      Lung Cancer Father          Current Outpatient Medications   Medication Sig Dispense Refill     amLODIPine (NORVASC) 5 MG tablet Take 1 tablet (5 mg) by mouth daily 90 tablet 1     lisinopril  (PRINIVIL/ZESTRIL) 20 MG tablet TAKE 2 TABLETS BY MOUTH ONCE DAILY 180 tablet 1     metoprolol succinate ER (TOPROL-XL) 100 MG 24 hr tablet Take 1 tablet (100 mg) by mouth daily 90 tablet 1     omeprazole (PRILOSEC) 20 MG CR capsule Take 20 mg by mouth       rivaroxaban ANTICOAGULANT (XARELTO ANTICOAGULANT) 20 MG TABS tablet TAKE 1 TABLET BY MOUTH ONCE DAILY WITH  DINNER 90 tablet 1     sildenafil (REVATIO) 20 MG tablet Take 1 -5 tabs daily prn 30 tablet 11     zolpidem (AMBIEN) 10 MG tablet Take 1 tablet (10 mg) by mouth nightly as needed for sleep 30 tablet 0     Famotidine (PEPCID PO) Take 10 mg by mouth       metoprolol succinate ER (TOPROL-XL) 50 MG 24 hr tablet Take 2 tablets (100 mg) by mouth daily TAKE 2 TABLETS BY MOUTH ONCE DAILY (Patient not taking: Reported on 5/27/2020) 90 tablet 1     oxybutynin ER (DITROPAN-XL) 5 MG 24 hr tablet Take 1 tablet (5 mg) by mouth daily (Patient not taking: Reported on 5/27/2020) 30 tablet 1     Allergies   Allergen Reactions     Erythromycin Unknown     Upset stomach     Recent Labs   Lab Test 11/20/19  1012 06/25/19  1100 05/02/19  1106  08/23/18  1059 01/18/18  0932 11/11/16  0744   *  --   --   --   --  129* 151*   HDL 76  --   --   --   --  77 71   TRIG 82  --   --   --   --  68 73   ALT  --   --  22  --   --   --   --    CR 1.35* 1.21 1.16   < > 1.12 0.79 0.98   GFRESTIMATED 54* 62 65   < > 66 >90 77   GFRESTBLACK 63 72 75   < > 79 >90 >90   GFR Calc     POTASSIUM 5.1 4.4 5.0   < > 4.9 4.0 5.1   TSH  --   --   --   --  2.41 1.84  --     < > = values in this interval not displayed.      BP Readings from Last 3 Encounters:   04/07/20 115/80   11/20/19 118/84   08/01/19 122/81    Wt Readings from Last 3 Encounters:   04/07/20 108.9 kg (240 lb)   11/20/19 111.1 kg (245 lb)   08/01/19 104.3 kg (230 lb)                    Reviewed and updated as needed this visit by Provider         Review of Systems   Constitutional, HEENT, cardiovascular, pulmonary,  "GI, , musculoskeletal, neuro, skin, endocrine and psych systems are negative, except as otherwise noted.      Objective    There were no vitals taken for this visit.  Estimated body mass index is 33.47 kg/m  as calculated from the following:    Height as of 11/20/19: 1.803 m (5' 11\").    Weight as of 4/7/20: 108.9 kg (240 lb).  Physical Exam     GENERAL: Healthy, alert and no distress  EYES: Eyes grossly normal to inspection.  No discharge or erythema, or obvious scleral/conjunctival abnormalities.  RESP: No audible wheeze, cough, or visible cyanosis.  No visible retractions or increased work of breathing.    SKIN: Visible skin clear. No significant rash, abnormal pigmentation or lesions.  NEURO: Cranial nerves grossly intact.  Mentation and speech appropriate for age.  PSYCH: Mentation appears normal, affect normal/bright, judgement and insight intact, normal speech and appearance well-groomed.      Diagnostic Test Results:  Labs reviewed in Epic      Tee was seen today for shoulder pain.    Diagnoses and all orders for this visit:    Acute pain of left shoulder  Suspect impingement syndrome vs a labral tear. Advised supportive and symptomatic treatment.  Follow up with Provider - if condition persists or worsens.     Essential hypertension  -     amLODIPine (NORVASC) 5 MG tablet; Take 1 tablet (5 mg) by mouth daily    Chronic atrial fibrillation  -     metoprolol succinate ER (TOPROL-XL) 100 MG 24 hr tablet; Take 1 tablet (100 mg) by mouth daily  -     rivaroxaban ANTICOAGULANT (XARELTO ANTICOAGULANT) 20 MG TABS tablet; TAKE 1 TABLET BY MOUTH ONCE DAILY WITH  DINNER    AUGUSTO (obstructive sleep apnea)    Chronic systolic heart failure (H)            Video-Visit Details    Type of service:  Video Visit    Video End Time:2:11 PM    Originating Location (pt. Location): Home    Distant Location (provider location):  [facility]     Platform used for Video Visit: Klye Denson" BINTA

## 2020-05-28 RX ORDER — ZOLPIDEM TARTRATE 10 MG/1
TABLET ORAL
Qty: 30 TABLET | Refills: 0 | OUTPATIENT
Start: 2020-05-28

## 2020-06-17 ENCOUNTER — MYC REFILL (OUTPATIENT)
Dept: FAMILY MEDICINE | Facility: CLINIC | Age: 70
End: 2020-06-17

## 2020-06-17 DIAGNOSIS — G47.33 OSA (OBSTRUCTIVE SLEEP APNEA): ICD-10-CM

## 2020-06-18 RX ORDER — ZOLPIDEM TARTRATE 10 MG/1
TABLET ORAL
Qty: 30 TABLET | Refills: 0 | OUTPATIENT
Start: 2020-06-18

## 2020-06-18 NOTE — TELEPHONE ENCOUNTER
Routing refill request to provider for review/approval because:  Drug not on the [facility] refill protocol     Last OV with Jon: 5/27/20 EVISIT with advised F/U in 3 months for physical and BP check    Last written: 5/27/20 for #30    Becky Marcano, RN, BSN

## 2020-06-18 NOTE — TELEPHONE ENCOUNTER
Duplicate. Message sent to pharmacy. Original request sent to provider for approval.    Becky Marcano RN, BSN

## 2020-06-19 RX ORDER — ZOLPIDEM TARTRATE 10 MG/1
10 TABLET ORAL
Qty: 30 TABLET | Refills: 0 | Status: SHIPPED | OUTPATIENT
Start: 2020-06-19 | End: 2020-07-17

## 2020-07-17 ENCOUNTER — MYC REFILL (OUTPATIENT)
Dept: FAMILY MEDICINE | Facility: CLINIC | Age: 70
End: 2020-07-17

## 2020-07-17 DIAGNOSIS — G47.33 OSA (OBSTRUCTIVE SLEEP APNEA): ICD-10-CM

## 2020-07-19 NOTE — TELEPHONE ENCOUNTER
Routing refill request to provider for review/approval because:  Drug not on the [facility] refill protocol   Last Written Prescription Date:  6/19/20  Last Fill Quantity: 30,  # refills: 0   Last office visit:Virtual visit on 5/27/20, due for follow up 8/27/20  Future Office Visit:

## 2020-07-20 RX ORDER — ZOLPIDEM TARTRATE 10 MG/1
10 TABLET ORAL
Qty: 30 TABLET | Refills: 0 | Status: SHIPPED | OUTPATIENT
Start: 2020-07-20 | End: 2020-08-18

## 2020-08-18 ENCOUNTER — MYC REFILL (OUTPATIENT)
Dept: FAMILY MEDICINE | Facility: CLINIC | Age: 70
End: 2020-08-18

## 2020-08-18 DIAGNOSIS — G47.33 OSA (OBSTRUCTIVE SLEEP APNEA): ICD-10-CM

## 2020-08-19 NOTE — TELEPHONE ENCOUNTER
Routing refill request to provider for review/approval because:  Drug not on the [facility] refill protocol     Last Written Prescription Date:  7/20/20  Last Fill Quantity: #30,  refills: 0     Last Virtual  Visit:5/27/20 with prescribing provider:  Jon Denson PA-C with the following instructions:     Return in about 3 months (around 8/27/2020) for BP Recheck, Physical Exam. My Chart reminder sent to patient.       Future Office Visit:  None    Rachael James RN BSN

## 2020-08-21 DIAGNOSIS — G47.33 OSA (OBSTRUCTIVE SLEEP APNEA): ICD-10-CM

## 2020-08-21 RX ORDER — ZOLPIDEM TARTRATE 10 MG/1
10 TABLET ORAL
Qty: 30 TABLET | Refills: 0 | Status: SHIPPED | OUTPATIENT
Start: 2020-08-21 | End: 2020-09-14

## 2020-08-24 RX ORDER — ZOLPIDEM TARTRATE 10 MG/1
TABLET ORAL
Qty: 30 TABLET | Refills: 0 | OUTPATIENT
Start: 2020-08-24

## 2020-08-25 ASSESSMENT — ENCOUNTER SYMPTOMS
DYSURIA: 0
WEAKNESS: 0
NERVOUS/ANXIOUS: 0
COUGH: 0
SHORTNESS OF BREATH: 0
FEVER: 0
FREQUENCY: 1
PARESTHESIAS: 0
HEARTBURN: 0
HEMATOCHEZIA: 0
HEADACHES: 0
CONSTIPATION: 0
ARTHRALGIAS: 1
CHILLS: 0
DIZZINESS: 0
PALPITATIONS: 0
HEMATURIA: 0
SORE THROAT: 0
ABDOMINAL PAIN: 0
MYALGIAS: 1
JOINT SWELLING: 0
EYE PAIN: 0
NAUSEA: 0
DIARRHEA: 0

## 2020-08-25 ASSESSMENT — ACTIVITIES OF DAILY LIVING (ADL): CURRENT_FUNCTION: NO ASSISTANCE NEEDED

## 2020-08-26 ENCOUNTER — OFFICE VISIT (OUTPATIENT)
Dept: FAMILY MEDICINE | Facility: CLINIC | Age: 70
End: 2020-08-26
Payer: COMMERCIAL

## 2020-08-26 ENCOUNTER — ANCILLARY PROCEDURE (OUTPATIENT)
Dept: GENERAL RADIOLOGY | Facility: CLINIC | Age: 70
End: 2020-08-26
Attending: PHYSICIAN ASSISTANT
Payer: COMMERCIAL

## 2020-08-26 VITALS
SYSTOLIC BLOOD PRESSURE: 127 MMHG | DIASTOLIC BLOOD PRESSURE: 77 MMHG | HEART RATE: 65 BPM | WEIGHT: 241.2 LBS | HEIGHT: 69 IN | RESPIRATION RATE: 20 BRPM | OXYGEN SATURATION: 97 % | TEMPERATURE: 97.5 F | BODY MASS INDEX: 35.73 KG/M2

## 2020-08-26 DIAGNOSIS — M25.512 CHRONIC LEFT SHOULDER PAIN: ICD-10-CM

## 2020-08-26 DIAGNOSIS — I10 BENIGN ESSENTIAL HYPERTENSION: ICD-10-CM

## 2020-08-26 DIAGNOSIS — G89.29 CHRONIC LEFT SHOULDER PAIN: ICD-10-CM

## 2020-08-26 DIAGNOSIS — I77.89 ASCENDING AORTA ENLARGEMENT (H): Primary | ICD-10-CM

## 2020-08-26 DIAGNOSIS — I10 ESSENTIAL HYPERTENSION: ICD-10-CM

## 2020-08-26 DIAGNOSIS — H91.93 BILATERAL HEARING LOSS, UNSPECIFIED HEARING LOSS TYPE: ICD-10-CM

## 2020-08-26 DIAGNOSIS — I48.20 CHRONIC ATRIAL FIBRILLATION (H): ICD-10-CM

## 2020-08-26 DIAGNOSIS — E66.01 MORBID OBESITY (H): ICD-10-CM

## 2020-08-26 LAB
ALT SERPL W P-5'-P-CCNC: 23 U/L (ref 0–70)
ANION GAP SERPL CALCULATED.3IONS-SCNC: 6 MMOL/L (ref 3–14)
BASOPHILS # BLD AUTO: 0 10E9/L (ref 0–0.2)
BASOPHILS NFR BLD AUTO: 0.2 %
BUN SERPL-MCNC: 26 MG/DL (ref 7–30)
CALCIUM SERPL-MCNC: 9.3 MG/DL (ref 8.5–10.1)
CHLORIDE SERPL-SCNC: 103 MMOL/L (ref 94–109)
CO2 SERPL-SCNC: 28 MMOL/L (ref 20–32)
CREAT SERPL-MCNC: 1.38 MG/DL (ref 0.66–1.25)
DIFFERENTIAL METHOD BLD: NORMAL
EOSINOPHIL # BLD AUTO: 0.1 10E9/L (ref 0–0.7)
EOSINOPHIL NFR BLD AUTO: 0.7 %
ERYTHROCYTE [DISTWIDTH] IN BLOOD BY AUTOMATED COUNT: 12.8 % (ref 10–15)
GFR SERPL CREATININE-BSD FRML MDRD: 52 ML/MIN/{1.73_M2}
GLUCOSE SERPL-MCNC: 102 MG/DL (ref 70–99)
HCT VFR BLD AUTO: 42.4 % (ref 40–53)
HGB BLD-MCNC: 14.5 G/DL (ref 13.3–17.7)
LYMPHOCYTES # BLD AUTO: 1.6 10E9/L (ref 0.8–5.3)
LYMPHOCYTES NFR BLD AUTO: 16.9 %
MCH RBC QN AUTO: 31.7 PG (ref 26.5–33)
MCHC RBC AUTO-ENTMCNC: 34.2 G/DL (ref 31.5–36.5)
MCV RBC AUTO: 93 FL (ref 78–100)
MONOCYTES # BLD AUTO: 1 10E9/L (ref 0–1.3)
MONOCYTES NFR BLD AUTO: 10.9 %
NEUTROPHILS # BLD AUTO: 6.8 10E9/L (ref 1.6–8.3)
NEUTROPHILS NFR BLD AUTO: 71.3 %
PLATELET # BLD AUTO: 328 10E9/L (ref 150–450)
POTASSIUM SERPL-SCNC: 5.1 MMOL/L (ref 3.4–5.3)
RBC # BLD AUTO: 4.57 10E12/L (ref 4.4–5.9)
SODIUM SERPL-SCNC: 137 MMOL/L (ref 133–144)
WBC # BLD AUTO: 9.5 10E9/L (ref 4–11)

## 2020-08-26 PROCEDURE — 20610 DRAIN/INJ JOINT/BURSA W/O US: CPT | Mod: LT | Performed by: PHYSICIAN ASSISTANT

## 2020-08-26 PROCEDURE — 73030 X-RAY EXAM OF SHOULDER: CPT | Mod: LT

## 2020-08-26 PROCEDURE — 84460 ALANINE AMINO (ALT) (SGPT): CPT | Performed by: PHYSICIAN ASSISTANT

## 2020-08-26 PROCEDURE — 80048 BASIC METABOLIC PNL TOTAL CA: CPT | Performed by: PHYSICIAN ASSISTANT

## 2020-08-26 PROCEDURE — 85025 COMPLETE CBC W/AUTO DIFF WBC: CPT | Performed by: PHYSICIAN ASSISTANT

## 2020-08-26 PROCEDURE — 99213 OFFICE O/P EST LOW 20 MIN: CPT | Mod: 25 | Performed by: PHYSICIAN ASSISTANT

## 2020-08-26 PROCEDURE — 36415 COLL VENOUS BLD VENIPUNCTURE: CPT | Performed by: PHYSICIAN ASSISTANT

## 2020-08-26 PROCEDURE — 99397 PER PM REEVAL EST PAT 65+ YR: CPT | Mod: 25 | Performed by: PHYSICIAN ASSISTANT

## 2020-08-26 RX ORDER — METOPROLOL SUCCINATE 100 MG/1
100 TABLET, EXTENDED RELEASE ORAL DAILY
Qty: 90 TABLET | Refills: 1 | Status: SHIPPED | OUTPATIENT
Start: 2020-08-26 | End: 2021-02-25

## 2020-08-26 RX ORDER — LISINOPRIL 20 MG/1
TABLET ORAL
Qty: 180 TABLET | Refills: 1 | Status: SHIPPED | OUTPATIENT
Start: 2020-08-26 | End: 2021-02-18

## 2020-08-26 RX ORDER — AMLODIPINE BESYLATE 5 MG/1
5 TABLET ORAL DAILY
Qty: 90 TABLET | Refills: 1 | Status: SHIPPED | OUTPATIENT
Start: 2020-08-26 | End: 2021-06-04

## 2020-08-26 RX ORDER — METHYLPREDNISOLONE ACETATE 40 MG/ML
40 INJECTION, SUSPENSION INTRA-ARTICULAR; INTRALESIONAL; INTRAMUSCULAR; SOFT TISSUE ONCE
Status: COMPLETED | OUTPATIENT
Start: 2020-08-26 | End: 2020-08-26

## 2020-08-26 RX ADMIN — METHYLPREDNISOLONE ACETATE 40 MG: 40 INJECTION, SUSPENSION INTRA-ARTICULAR; INTRALESIONAL; INTRAMUSCULAR; SOFT TISSUE at 15:55

## 2020-08-26 ASSESSMENT — ENCOUNTER SYMPTOMS
COUGH: 0
HEMATURIA: 0
HEARTBURN: 0
HEMATOCHEZIA: 0
DIARRHEA: 0
ARTHRALGIAS: 1
CHILLS: 0
SORE THROAT: 0
DYSURIA: 0
NAUSEA: 0
SHORTNESS OF BREATH: 0
PALPITATIONS: 0
NERVOUS/ANXIOUS: 0
MYALGIAS: 1
CONSTIPATION: 0
EYE PAIN: 0
ABDOMINAL PAIN: 0
DIZZINESS: 0
FREQUENCY: 1
WEAKNESS: 0
FEVER: 0
HEADACHES: 0
PARESTHESIAS: 0
JOINT SWELLING: 0

## 2020-08-26 ASSESSMENT — ACTIVITIES OF DAILY LIVING (ADL): CURRENT_FUNCTION: NO ASSISTANCE NEEDED

## 2020-08-26 ASSESSMENT — MIFFLIN-ST. JEOR: SCORE: 1857.84

## 2020-08-26 NOTE — PROGRESS NOTES
"SUBJECTIVE:   Tee Hilton is a 69 year old male who presents for Preventive Visit.    Are you in the first 12 months of your Medicare coverage?  No    Healthy Habits:     In general, how would you rate your overall health?  Good    Frequency of exercise:  4-5 days/week    Duration of exercise:  45-60 minutes    Do you usually eat at least 4 servings of fruit and vegetables a day, include whole grains    & fiber and avoid regularly eating high fat or \"junk\" foods?  No    Taking medications regularly:  Yes    Ability to successfully perform activities of daily living:  No assistance needed    Home Safety:  No safety concerns identified    Hearing Impairment:  Feel that people are mumbling or not speaking clearly and need to ask people to speak up or repeat themselves    In the past 6 months, have you been bothered by leaking of urine?  No    In general, how would you rate your overall mental or emotional health?  Good      PHQ-2 Total Score: 2    Do you feel safe in your environment? Yes    Have you ever done Advance Care Planning? (For example, a Health Directive, POLST, or a discussion with a medical provider or your loved ones about your wishes): No, advance care planning information given to patient to review.  Patient plans to discuss their wishes with loved ones or provider.      Fall risk: 0  Fallen 2 or more times in the past year?: No  Any fall with injury in the past year?: No    Cognitive Screening   1) Repeat 3 items (Leader, Season, Table)    2) Clock draw: NORMAL  3) 3 item recall: Recalls 3 objects  Results: 3 items recalled: COGNITIVE IMPAIRMENT LESS LIKELY    Mini-CogTM Copyright MIKY Toth. Licensed by the author for use in [facility]; reprinted with permission (keyla@.[facility]). All rights reserved.      Do you have sleep apnea, excessive snoring or daytime drowsiness?: yes    Reviewed and updated as needed this visit by clinical staff  Tobacco  Allergies  Meds  Fam Hx  Soc Hx    "     Reviewed and updated as needed this visit by Provider        Social History     Tobacco Use     Smoking status: Never Smoker     Smokeless tobacco: Never Used     Tobacco comment: never smoked   Substance Use Topics     Alcohol use: Yes     Alcohol/week: 0.0 standard drinks     Comment: social use         Alcohol Use 8/25/2020   Prescreen: >3 drinks/day or >7 drinks/week? Not Applicable   Prescreen: >3 drinks/day or >7 drinks/week? -           Recheck Left Shoulder Pain  -5/27/20 virtual visit  Dec rom. No obvious use, maybe overuse  Due for echocardiogram - per patient  - order placed 04/2020 by cardiologist    Drowsiness/Fatigue  - concerned that meds are making him drowsy    Current providers sharing in care for this patient include:   Patient Care Team:  Jon Denson PA-C as PCP - General (Physician Assistant)  Jon Denson PA-C as Assigned PCP    The following health maintenance items are reviewed in Epic and correct as of today:  Health Maintenance   Topic Date Due     URINE DRUG SCREEN  1950     ZOSTER IMMUNIZATION (2 of 3) 09/05/2014     ALT  05/02/2020     BMP  05/20/2020     CBC  06/25/2020     INFLUENZA VACCINE (1) 09/01/2020     MEDICARE ANNUAL WELLNESS VISIT  11/20/2020     LIPID  11/20/2020     FALL RISK ASSESSMENT  11/20/2020     HF ACTION PLAN  05/02/2022     COLORECTAL CANCER SCREENING  05/25/2022     DTAP/TDAP/TD IMMUNIZATION (2 - Td) 07/11/2024     ADVANCE CARE PLANNING  11/20/2024     TSH W/FREE T4 REFLEX  Completed     HEPATITIS C SCREENING  Completed     PHQ-2  Completed     PNEUMOCOCCAL IMMUNIZATION 65+ LOW/MEDIUM RISK  Completed     AORTIC ANEURYSM SCREENING (SYSTEM ASSIGNED)  Completed     IPV IMMUNIZATION  Aged Out     MENINGITIS IMMUNIZATION  Aged Out     HEPATITIS B IMMUNIZATION  Aged Out     Lab work is in process  Labs reviewed in EPIC  BP Readings from Last 3 Encounters:   08/26/20 127/77   04/07/20 115/80   11/20/19 118/84    Wt Readings from Last 3 Encounters:    08/26/20 109.4 kg (241 lb 3.2 oz)   04/07/20 108.9 kg (240 lb)   11/20/19 111.1 kg (245 lb)                  Patient Active Problem List   Diagnosis     Essential hypertension     Other male erectile dysfunction     Chronic midline low back pain without sciatica     Persistent atrial fibrillation (H)     Non morbid obesity due to excess calories     Chronic systolic congestive heart failure (H)     Alcohol use     Gastroesophageal reflux disease without esophagitis     Other sleep apnea     Atrial fibrillation (H)     Gastroesophageal reflux disease     Past Surgical History:   Procedure Laterality Date     ANESTHESIA CARDIOVERSION N/A 9/24/2018    Procedure: ANESTHESIA CARDIOVERSION;  Cardioversion ;  Surgeon: GENERIC ANESTHESIA PROVIDER;  Location: [facility]     COLONOSCOPY N/A 5/25/2017    Procedure: COMBINED COLONOSCOPY, SINGLE OR MULTIPLE BIOPSY/POLYPECTOMY BY BIOPSY;;  Surgeon: Aquilino Garcia MD;  Location: [facility]     COLONOSCOPY WITH CO2 INSUFFLATION N/A 5/25/2017    Procedure: COLONOSCOPY WITH CO2 INSUFFLATION;  COLONOSCOPY SCREEN/ ORDAZ;  Surgeon: Aquilino Garcia MD;  Location: [facility]     OPEN REDUCTION INTERNAL FIXATION TIBIA       SEPTOPLASTY, TURBINOPLASTY, COMBINED Bilateral 7/9/2019    Procedure: ENDOSCOPIC SEPTOPLASTY, BILATERAL TURBINATE REDUCTION;  Surgeon: Alexander Avila MD;  Location: [facility]     TONSILLECTOMY         Social History     Tobacco Use     Smoking status: Never Smoker     Smokeless tobacco: Never Used     Tobacco comment: never smoked   Substance Use Topics     Alcohol use: Yes     Alcohol/week: 0.0 standard drinks     Comment: social use     Family History   Problem Relation Age of Onset     Gout Father      Lung Cancer Father          Current Outpatient Medications   Medication Sig Dispense Refill     amLODIPine (NORVASC) 5 MG tablet Take 1 tablet (5 mg) by mouth daily 90 tablet 1     Famotidine (PEPCID PO) Take 10 mg by mouth       lisinopril (PRINIVIL/ZESTRIL) 20  MG tablet TAKE 2 TABLETS BY MOUTH ONCE DAILY 180 tablet 1     metoprolol succinate ER (TOPROL-XL) 100 MG 24 hr tablet Take 1 tablet (100 mg) by mouth daily 90 tablet 1     omeprazole (PRILOSEC) 20 MG CR capsule Take 20 mg by mouth       rivaroxaban ANTICOAGULANT (XARELTO ANTICOAGULANT) 20 MG TABS tablet TAKE 1 TABLET BY MOUTH ONCE DAILY WITH  DINNER 90 tablet 1     sildenafil (REVATIO) 20 MG tablet Take 1 -5 tabs daily prn 30 tablet 11     zolpidem (AMBIEN) 10 MG tablet Take 1 tablet (10 mg) by mouth nightly as needed for sleep 30 tablet 0     Allergies   Allergen Reactions     Erythromycin Unknown     Upset stomach     Recent Labs   Lab Test 11/20/19  1012 06/25/19  1100 05/02/19  1106  08/23/18  1059 01/18/18  0932 11/11/16  0744   *  --   --   --   --  129* 151*   HDL 76  --   --   --   --  77 71   TRIG 82  --   --   --   --  68 73   ALT  --   --  22  --   --   --   --    CR 1.35* 1.21 1.16   < > 1.12 0.79 0.98   GFRESTIMATED 54* 62 65   < > 66 >90 77   GFRESTBLACK 63 72 75   < > 79 >90 >90   GFR Calc     POTASSIUM 5.1 4.4 5.0   < > 4.9 4.0 5.1   TSH  --   --   --   --  2.41 1.84  --     < > = values in this interval not displayed.          Review of Systems   Constitutional: Negative for chills and fever.   HENT: Positive for hearing loss. Negative for congestion, ear pain and sore throat.    Eyes: Negative for pain and visual disturbance.   Respiratory: Negative for cough and shortness of breath.    Cardiovascular: Negative for chest pain, palpitations and peripheral edema.   Gastrointestinal: Negative for abdominal pain, constipation, diarrhea, heartburn, hematochezia and nausea.   Genitourinary: Positive for frequency and impotence. Negative for discharge, dysuria, genital sores, hematuria and urgency.   Musculoskeletal: Positive for arthralgias and myalgias. Negative for joint swelling.   Skin: Negative for rash.   Neurological: Negative for dizziness, weakness, headaches and  "paresthesias.   Psychiatric/Behavioral: Negative for mood changes. The patient is not nervous/anxious.      Constitutional, HEENT, cardiovascular, pulmonary, GI, , musculoskeletal, neuro, skin, endocrine and psych systems are negative, except as otherwise noted.    OBJECTIVE:   /77   Pulse 65   Temp 97.5  F (36.4  C) (Tympanic)   Resp 20   Ht 1.75 m (5' 8.9\")   Wt 109.4 kg (241 lb 3.2 oz)   SpO2 97%   BMI 35.72 kg/m   Estimated body mass index is 35.72 kg/m  as calculated from the following:    Height as of this encounter: 1.75 m (5' 8.9\").    Weight as of this encounter: 109.4 kg (241 lb 3.2 oz).  Physical Exam  GENERAL: healthy, alert and no distress  EYES: Eyes grossly normal to inspection, PERRL and conjunctivae and sclerae normal  HENT: ear canals and TM's normal, nose and mouth without ulcers or lesions  NECK: no adenopathy, no asymmetry, masses, or scars and thyroid normal to palpation  RESP: lungs clear to auscultation - no rales, rhonchi or wheezes  CV: regular rate and rhythm, normal S1 S2, no S3 or S4, no murmur, click or rub, no peripheral edema and peripheral pulses strong  ABDOMEN: soft, nontender, no hepatosplenomegaly, no masses and bowel sounds normal  SKIN: no suspicious lesions or rashes  NEURO: Normal strength and tone, mentation intact and speech normal  PSYCH: mentation appears normal, affect normal/bright  Shoulder exam shows positive impingement signs are present with pain at high arc of abduction and forward flexion on left. There is tenderness of the lateral shoulder.       The risks, benefits and potential complications (including but not limited to, bleeding, infection, pain, scar, damage to adjacent structures, atrophy or necrosis of soft tissue, skin blanching, failure to relieve symptoms) of injection were discussed with the patient. Questions were addressed and answered.The patient elected to proceed. Written informed consent was obtained. The correct procedural site " "was identified and confirmed. A Left shoulder intraarticular injection was performed using 1mL Depo Medrol 40mg per mL and 2mL (0.25% marcaine) of local anesthetic after sterile prep, to the correct procedural site. Sterile bandaid applied. This was tolerated well by the patient. No apparent complications.Did also discuss that if diabetic, recommend close monitoring of blood sugars over the next week as cortisone injections can temporarily elevate blood sugars.    Diagnostic Test Results:  Labs reviewed in Epic    ASSESSMENT / PLAN:       ICD-10-CM    1. Ascending aorta enlargement (H)  I77.89 Echocardiogram Complete   2. Benign essential hypertension  I10 lisinopril (ZESTRIL) 20 MG tablet   3. Essential hypertension  I10 ALT     Basic metabolic panel  (Ca, Cl, CO2, Creat, Gluc, K, Na, BUN)     CBC with platelets and differential     amLODIPine (NORVASC) 5 MG tablet   4. Chronic atrial fibrillation (H)  I48.20 rivaroxaban ANTICOAGULANT (XARELTO ANTICOAGULANT) 20 MG TABS tablet     metoprolol succinate ER (TOPROL-XL) 100 MG 24 hr tablet     Echocardiogram Complete   5. Morbid obesity (H)  E66.01    6. Chronic left shoulder pain  M25.512 XR Shoulder Left G/E 3 Views    G89.29 methylPREDNISolone (DEPO-MEDROL) injection 40 mg     DRAIN/INJECT LARGE JOINT/BURSA       COUNSELING:  Reviewed preventive health counseling, as reflected in patient instructions       Regular exercise       Healthy diet/nutrition    Estimated body mass index is 35.72 kg/m  as calculated from the following:    Height as of this encounter: 1.75 m (5' 8.9\").    Weight as of this encounter: 109.4 kg (241 lb 3.2 oz).    Weight management plan: Discussed healthy diet and exercise guidelines    He reports that he has never smoked. He has never used smokeless tobacco.      Appropriate preventive services were discussed with this patient, including applicable screening as appropriate for cardiovascular disease, diabetes, osteopenia/osteoporosis, and " glaucoma.  As appropriate for age/gender, discussed screening for colorectal cancer, prostate cancer, breast cancer, and cervical cancer. Checklist reviewing preventive services available has been given to the patient.    Reviewed patients plan of care and provided an AVS. The Basic Care Plan (routine screening as documented in Health Maintenance) for Tee meets the Care Plan requirement. This Care Plan has been established and reviewed with the Patient.    Counseling Resources:  ATP IV Guidelines  Pooled Cohorts Equation Calculator  Breast Cancer Risk Calculator  Breast Cancer: Medication to Reduce Risk  FRAX Risk Assessment  ICSI Preventive Guidelines  Dietary Guidelines for Americans, 2010  USDA's MyPlate  ASA Prophylaxis  Lung CA Screening    Jon Denson PA-C  [facility]    Identified Health Risks:

## 2020-08-28 ENCOUNTER — TELEPHONE (OUTPATIENT)
Dept: CARDIOLOGY | Facility: CLINIC | Age: 70
End: 2020-08-28

## 2020-08-28 NOTE — TELEPHONE ENCOUNTER
Left message to return clinic call to  to schedule Echocardiogram then Pearl Segovia MD.,Cardiology visit  . Duong Rudd L.P.N.

## 2020-09-14 ENCOUNTER — MYC REFILL (OUTPATIENT)
Dept: FAMILY MEDICINE | Facility: CLINIC | Age: 70
End: 2020-09-14

## 2020-09-14 DIAGNOSIS — G47.33 OSA (OBSTRUCTIVE SLEEP APNEA): ICD-10-CM

## 2020-09-16 RX ORDER — ZOLPIDEM TARTRATE 10 MG/1
10 TABLET ORAL
Qty: 30 TABLET | Refills: 0 | Status: SHIPPED | OUTPATIENT
Start: 2020-09-16 | End: 2020-10-13

## 2020-10-01 ENCOUNTER — ANCILLARY PROCEDURE (OUTPATIENT)
Dept: CARDIOLOGY | Facility: CLINIC | Age: 70
End: 2020-10-01
Payer: COMMERCIAL

## 2020-10-01 DIAGNOSIS — I77.89 ASCENDING AORTA ENLARGEMENT (H): ICD-10-CM

## 2020-10-01 DIAGNOSIS — I48.20 CHRONIC ATRIAL FIBRILLATION (H): ICD-10-CM

## 2020-10-01 PROCEDURE — 99207 PR STATISTIC IV PUSH SINGLE INITIAL SUBSTANCE: CPT | Performed by: INTERNAL MEDICINE

## 2020-10-01 PROCEDURE — 93306 TTE W/DOPPLER COMPLETE: CPT | Mod: GC | Performed by: INTERNAL MEDICINE

## 2020-10-01 RX ADMIN — Medication 3 ML: at 14:00

## 2020-10-07 ENCOUNTER — MYC MEDICAL ADVICE (OUTPATIENT)
Dept: CARDIOLOGY | Facility: CLINIC | Age: 70
End: 2020-10-07

## 2020-10-13 ENCOUNTER — MYC REFILL (OUTPATIENT)
Dept: FAMILY MEDICINE | Facility: CLINIC | Age: 70
End: 2020-10-13

## 2020-10-13 DIAGNOSIS — G47.33 OSA (OBSTRUCTIVE SLEEP APNEA): ICD-10-CM

## 2020-10-14 DIAGNOSIS — G47.33 OSA (OBSTRUCTIVE SLEEP APNEA): ICD-10-CM

## 2020-10-14 RX ORDER — ZOLPIDEM TARTRATE 10 MG/1
TABLET ORAL
Qty: 30 TABLET | Refills: 0 | OUTPATIENT
Start: 2020-10-14

## 2020-10-14 NOTE — TELEPHONE ENCOUNTER
Routing refill request to provider for review/approval because:  Drug not on the [facility] refill protocol     Farhana VELAN, RN, CPN

## 2020-10-14 NOTE — TELEPHONE ENCOUNTER
Routing refill request to provider for review/approval because:  Drug not on the [facility] refill protocol     zolpidem (AMBIEN) 10 MG tablet  Last Written Prescription Date:  9/16/20  Last Fill Quantity: 30,  # refills: 0   Last office visit: 8/26/20 with prescribing provider:  armen Denson Office Visit:

## 2020-10-15 RX ORDER — ZOLPIDEM TARTRATE 10 MG/1
10 TABLET ORAL
Qty: 30 TABLET | Refills: 0 | Status: SHIPPED | OUTPATIENT
Start: 2020-10-15 | End: 2020-11-11

## 2020-10-21 NOTE — TELEPHONE ENCOUNTER
Patient went to the e/r last night to have nasal packing replaced. He needs it checked today. Please call with a time.    ambulatory

## 2020-11-09 ENCOUNTER — MYC REFILL (OUTPATIENT)
Dept: FAMILY MEDICINE | Facility: CLINIC | Age: 70
End: 2020-11-09

## 2020-11-09 DIAGNOSIS — G47.33 OSA (OBSTRUCTIVE SLEEP APNEA): ICD-10-CM

## 2020-11-09 RX ORDER — ZOLPIDEM TARTRATE 10 MG/1
10 TABLET ORAL
Qty: 30 TABLET | Refills: 0 | Status: CANCELLED | OUTPATIENT
Start: 2020-11-09

## 2020-11-10 DIAGNOSIS — G47.33 OSA (OBSTRUCTIVE SLEEP APNEA): ICD-10-CM

## 2020-11-10 NOTE — TELEPHONE ENCOUNTER
Routing refill request to provider for review/approval because:  Drug not on the [facility] refill protocol     Ana VELAN, RN

## 2020-11-11 RX ORDER — ZOLPIDEM TARTRATE 10 MG/1
TABLET ORAL
Qty: 30 TABLET | Refills: 0 | Status: SHIPPED | OUTPATIENT
Start: 2020-11-11 | End: 2020-12-08

## 2020-12-06 ENCOUNTER — HEALTH MAINTENANCE LETTER (OUTPATIENT)
Age: 70
End: 2020-12-06

## 2020-12-07 ENCOUNTER — MYC REFILL (OUTPATIENT)
Dept: FAMILY MEDICINE | Facility: CLINIC | Age: 70
End: 2020-12-07

## 2020-12-07 DIAGNOSIS — G47.33 OSA (OBSTRUCTIVE SLEEP APNEA): ICD-10-CM

## 2020-12-07 RX ORDER — ZOLPIDEM TARTRATE 10 MG/1
10 TABLET ORAL
Qty: 30 TABLET | Refills: 0 | Status: CANCELLED | OUTPATIENT
Start: 2020-12-07

## 2020-12-08 RX ORDER — ZOLPIDEM TARTRATE 10 MG/1
TABLET ORAL
Qty: 30 TABLET | Refills: 0 | Status: SHIPPED | OUTPATIENT
Start: 2020-12-08 | End: 2020-12-30

## 2020-12-30 ENCOUNTER — MYC REFILL (OUTPATIENT)
Dept: FAMILY MEDICINE | Facility: CLINIC | Age: 70
End: 2020-12-30

## 2020-12-30 DIAGNOSIS — G47.33 OSA (OBSTRUCTIVE SLEEP APNEA): ICD-10-CM

## 2020-12-31 RX ORDER — ZOLPIDEM TARTRATE 10 MG/1
10 TABLET ORAL
Qty: 30 TABLET | Refills: 0 | Status: SHIPPED | OUTPATIENT
Start: 2020-12-31 | End: 2021-01-28

## 2020-12-31 RX ORDER — ZOLPIDEM TARTRATE 10 MG/1
TABLET ORAL
Qty: 30 TABLET | Refills: 0 | OUTPATIENT
Start: 2020-12-31

## 2021-01-11 ENCOUNTER — OFFICE VISIT (OUTPATIENT)
Dept: FAMILY MEDICINE | Facility: CLINIC | Age: 71
End: 2021-01-11
Payer: COMMERCIAL

## 2021-01-11 VITALS
DIASTOLIC BLOOD PRESSURE: 88 MMHG | BODY MASS INDEX: 36.49 KG/M2 | HEART RATE: 77 BPM | SYSTOLIC BLOOD PRESSURE: 130 MMHG | WEIGHT: 246.4 LBS | TEMPERATURE: 98.9 F | OXYGEN SATURATION: 98 % | RESPIRATION RATE: 16 BRPM

## 2021-01-11 DIAGNOSIS — I10 BENIGN ESSENTIAL HYPERTENSION: Primary | ICD-10-CM

## 2021-01-11 DIAGNOSIS — E79.0 HYPERURICEMIA: ICD-10-CM

## 2021-01-11 DIAGNOSIS — M1A.4710 OTHER SECONDARY CHRONIC GOUT OF RIGHT FOOT WITHOUT TOPHUS: ICD-10-CM

## 2021-01-11 DIAGNOSIS — G89.29 CHRONIC LEFT SHOULDER PAIN: ICD-10-CM

## 2021-01-11 DIAGNOSIS — M25.512 CHRONIC LEFT SHOULDER PAIN: ICD-10-CM

## 2021-01-11 PROCEDURE — 20610 DRAIN/INJ JOINT/BURSA W/O US: CPT | Mod: LT | Performed by: PHYSICIAN ASSISTANT

## 2021-01-11 PROCEDURE — 99214 OFFICE O/P EST MOD 30 MIN: CPT | Mod: 25 | Performed by: PHYSICIAN ASSISTANT

## 2021-01-11 RX ORDER — METHYLPREDNISOLONE 4 MG
TABLET, DOSE PACK ORAL
Qty: 21 TABLET | Refills: 0 | Status: ON HOLD | OUTPATIENT
Start: 2021-01-11 | End: 2021-02-04

## 2021-01-11 RX ORDER — METHYLPREDNISOLONE ACETATE 40 MG/ML
40 INJECTION, SUSPENSION INTRA-ARTICULAR; INTRALESIONAL; INTRAMUSCULAR; SOFT TISSUE ONCE
Status: COMPLETED | OUTPATIENT
Start: 2021-01-11 | End: 2021-01-11

## 2021-01-11 RX ORDER — ALLOPURINOL 100 MG/1
100 TABLET ORAL DAILY
Qty: 90 TABLET | Refills: 3 | Status: SHIPPED | OUTPATIENT
Start: 2021-01-11 | End: 2021-12-14

## 2021-01-11 RX ADMIN — METHYLPREDNISOLONE ACETATE 40 MG: 40 INJECTION, SUSPENSION INTRA-ARTICULAR; INTRALESIONAL; INTRAMUSCULAR; SOFT TISSUE at 10:30

## 2021-01-11 NOTE — PROGRESS NOTES
Daniella Oliveira is a 70 year old who presents to clinic today for the following health issues     HPI       ED/UC Followup:    Facility:  [facility] [place]  Date of visit: 12/31/20  Reason for visit: Possible Gout  Current Status: Feels better, was treated with Prednisione     fhx of gout.   Some redness and inc callor posterior right heal.  Occasional right first mtp joint.     Recently elevated uric acid level.     Left shoulder pain flaring up again.  Recheck of his blood pressure     Review of Systems   Constitutional, HEENT, cardiovascular, pulmonary, GI, , musculoskeletal, neuro, skin, endocrine and psych systems are negative, except as otherwise noted.      Objective    /88   Pulse 77   Temp 98.9  F (37.2  C) (Tympanic)   Resp 16   Wt 111.8 kg (246 lb 6.4 oz)   SpO2 98%   BMI 36.49 kg/m    Body mass index is 36.49 kg/m .  Physical Exam   Eye exam - right eye normal lid, conjunctiva, cornea, pupil and fundus, left eye normal lid, conjunctiva, cornea, pupil and fundus.  Thyroid not palpable, not enlarged, no nodules detected.  No foot /heal redness or swelling or tenderness.  CHEST:chest clear to IPPA, no tachypnea, retractions or cyanosis and Heart exam detail:irregularly irregular rhythm, chest is clear without rales or wheezing, no pedal edema, no JVD, no hepatosplenomegaly.  Shoulder exam shows positive impingement signs are present with pain at high arc of abduction and forward flexion on left. There is tenderness of the lateral shoulder.        The risks, benefits and potential complications (including but not limited to, bleeding, infection, pain, scar, damage to adjacent structures, atrophy or necrosis of soft tissue, skin blanching, failure to relieve symptoms) of injection were discussed with the patient. Questions were addressed and answered.The patient elected to proceed. Written informed consent was obtained. The correct procedural site was identified and confirmed. A  Left shoulder intraarticular injection was performed using 1mL Depo Medrol 40mg per mL and 2mL (0.25% marcaine) of local anesthetic after sterile prep, to the correct procedural site. Sterile bandaid applied. This was tolerated well by the patient. No apparent complications.Did also discuss that if diabetic, recommend close monitoring of blood sugars over the next week as cortisone injections can temporarily elevate blood sugars.        Tee was seen today for recheck.    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    work on lifestyle modification  Advised supportive and symptomatic treatment.  Follow up with Provider - if condition persists or worsens.

## 2021-01-11 NOTE — PATIENT INSTRUCTIONS
Patient Education     Eating to Prevent Gout  Gout is a painful form of arthritis caused by an excess of uric acid. This is a waste product made by the body. It builds up in the body and forms crystals that collect in the joints, causing a gout attack. Alcohol and certain foods can trigger a gout attack. Below are some guidelines for changing your diet to help you manage gout. Your healthcare provider can work with you to determine the best eating plan for you. Know that diet is only one part of managing gout. Take your medicines as prescribed and follow the other guidelines your healthcare provider has given you.  Foods to limit  Eating too many foods containing purines may increase the levels of uric acid in your body and increase your risk for a gout attack. It may be best to limit these high-purine foods:    Alcohol (beer and red wine). You may be told to avoid alcohol completely.    Certain fish (anchovies, sardines, fish roes, herring, tuna, mussels, codfish, scallops, trout, and chaparro)    Certain meats (red meat, processed meat, jimenez, turkey, wild game, and goose)    Sauces and gravies made with meat    Organ meats (such as liver, kidneys, sweetbreads, and tripe)    Legumes (such as dried beans and peas)    Mushrooms, spinach, asparagus, and cauliflower    Yeast and yeast extract supplements  Foods to try  Some foods may be helpful for people with gout. You may want to try adding some of the following foods to your diet:    Dark berries. These include blueberries, blackberries, and cherries. These berries contain chemicals that may lower uric acid.    Tofu. Tofu, which is made from soy, is a good source of protein. Studies have shown that it may be a better choice than meat for people with gout.    Omega fatty acids. These acids are found in fatty fish (such as salmon), certain oils (such as flax, olive, or nut oils), or nuts. They may help prevent inflammation due to gout.  The following guidelines are  recommended by the American Medical Association for people with gout. Your diet should be:    High in fiber, whole grains, fruits, and vegetables.    Low in protein (15% of calories should come from protein. Choose lean sources, such as soy, lean meats, and poultry).    Low in fat (no more than 30% of calories should come from fat, with only 10% coming from animal, or saturated, fat).  [employer] last reviewed this educational content on 11/1/2017 2000-2020 [employer], [employer]. [address] [place] [address]. All rights reserved. This information is not intended as a substitute for professional medical care. Always follow your healthcare professional's instructions.           Patient Education     Gout Diet  Gout is a painful condition caused by an excess of uric acid, a waste product made by the body. Uric acid forms crystals that collect in the joints. The immune response to these crystals brings on symptoms of joint pain and swelling. This is called a gout attack. Often, medications and diet changes are combined to manage gout. Below are some guidelines for changing your diet to help you manage gout and prevent attacks. Your healthcare provider will help you determine the best eating plan for you.  Eating to manage gout  Weight loss for those who are overweight may help reduce gout attacks.  Eat less of these foods  Eating too many foods containing purines may raise the levels of uric acid in your body. This raises your risk for a gout attack. Try to limit these foods and drinks:    Alcohol, such as beer and red wine. You may be told to avoid alcohol completely.    Soft drinks that contain sugar or high fructose corn syrup    Certain fish, including anchovies, sardines, fish eggs, and herring    Shellfish    Certain meats, such as red meat, hot dogs, luncheon meats, and turkey    Organ meats, such as liver, kidneys, and sweetbreads    Legumes, such as dried beans and peas    Other high fat foods  such as gravy, whole milk, and high fat cheeses    Vegetables such as asparagus, cauliflower, spinach, and mushrooms used to be thought to contribute to an increased risk for a gout attack, but recent studies show that high purine vegetables don't increase the risk for a gout attack.  Eat more of these foods  Other foods may be helpful for people with gout. Add some of these foods to your diet:    Cherries contain chemicals that may lower uric acid.    Omega fatty acids. These are found in some fatty fish such as salmon, certain oils (flax, olive, or nut), and nuts themselves. Omega fatty acids may help prevent inflammation due to gout.    Dairy products that are low-fat or fat-free, such as cheese and yogurt    Complex carbohydrate foods, including whole grains, brown rice, oats, and beans    Coffee, in moderation    Water, approximately 64 ounces per day  Follow-up care  Follow up with your healthcare provider as advised.  When to seek medical advice  Call your healthcare provider right away if any of these occur:    Return of gout symptoms, usually at night:    Severe pain, swelling, and heat in a joint, especially the base of the big toe    Affected joint is hard to move    Skin of the affected joint is purple or red    Fever of 100.4 F (38 C) or higher    Pain that doesn't get better even with prescribed medicine   Sanna last reviewed this educational content on 6/1/2018 2000-2020 [employer], [employer] [address] [place] [address], [place] [address]. All rights reserved. This information is not intended as a substitute for professional medical care. Always follow your healthcare professional's instructions.

## 2021-01-19 ENCOUNTER — MYC MEDICAL ADVICE (OUTPATIENT)
Dept: FAMILY MEDICINE | Facility: CLINIC | Age: 71
End: 2021-01-19

## 2021-01-19 NOTE — TELEPHONE ENCOUNTER
Charmaine message sent to patient.    Pat BSN-RN  Triage Nurse  [facility]: [facility]

## 2021-01-27 NOTE — PROGRESS NOTES
Tee is a 70 year old who is being evaluated via a billable telephone visit.      What phone number would you like to be contacted at? [phone]  How would you like to obtain your AVS? Miranda Brewer     Tee is a 70 year old who presents to clinic today for the following health issues     HPI       Followup of Shingrix vaccine     Pt received shingrix vaccine. Pt was ok with first does but second dose is experiencing headaches, body aches, and elevated temp 100-101 without OTC medication. X 7 days. Informed pt this is a normal reaction to this vaccine because it is a live vaccine. Pt is wondering how long this can last and if there is something else he can take.     No cough. No profound cold symptoms. No rash.   covid test earlier this week was negative.     Review of Systems   Constitutional, HEENT, cardiovascular, pulmonary, GI, , musculoskeletal, neuro, skin, endocrine and psych systems are negative, except as otherwise noted.      Objective           Vitals:  No vitals were obtained today due to virtual visit.    Physical Exam   healthy, alert and no distress  PSYCH: Alert and oriented times 3; coherent speech, normal   rate and volume, able to articulate logical thoughts, able   to abstract reason, no tangential thoughts, no hallucinations   or delusions  His affect is normal  RESP: No cough, no audible wheezing, able to talk in full sentences  Remainder of exam unable to be completed due to telephone visits        Tee was seen today for medication problem.    Diagnoses and all orders for this visit:    Primary insomnia  -     zolpidem (AMBIEN) 10 MG tablet; Take 1 tablet (10 mg) by mouth nightly as needed    AUGUSTO (obstructive sleep apnea)    Nausea  -     ondansetron (ZOFRAN) 4 MG tablet; Take 1 tablet (4 mg) by mouth every 8 hours as needed for nausea      Flu like symptoms : query vaccine side effects vs viral syndrome (covid test was negative).   Advised supportive and symptomatic  treatment.  Follow up with Provider - if condition persists or worsens.     Phone call duration: 12 minutes

## 2021-01-28 ENCOUNTER — VIRTUAL VISIT (OUTPATIENT)
Dept: FAMILY MEDICINE | Facility: CLINIC | Age: 71
End: 2021-01-28
Payer: COMMERCIAL

## 2021-01-28 DIAGNOSIS — F51.01 PRIMARY INSOMNIA: Primary | ICD-10-CM

## 2021-01-28 DIAGNOSIS — R11.0 NAUSEA: ICD-10-CM

## 2021-01-28 DIAGNOSIS — G47.33 OSA (OBSTRUCTIVE SLEEP APNEA): ICD-10-CM

## 2021-01-28 PROCEDURE — 99213 OFFICE O/P EST LOW 20 MIN: CPT | Mod: 95 | Performed by: PHYSICIAN ASSISTANT

## 2021-01-28 RX ORDER — ONDANSETRON 4 MG/1
4 TABLET, FILM COATED ORAL EVERY 8 HOURS PRN
Qty: 30 TABLET | Refills: 1 | Status: SHIPPED | OUTPATIENT
Start: 2021-01-28 | End: 2021-05-21

## 2021-01-28 RX ORDER — ZOLPIDEM TARTRATE 10 MG/1
10 TABLET ORAL
Qty: 30 TABLET | Refills: 0 | Status: SHIPPED | OUTPATIENT
Start: 2021-01-28 | End: 2021-03-08

## 2021-01-29 ENCOUNTER — HOSPITAL ENCOUNTER (EMERGENCY)
Facility: CLINIC | Age: 71
Discharge: HOME | End: 2021-01-29
Attending: EMERGENCY MEDICINE | Admitting: EMERGENCY MEDICINE
Payer: COMMERCIAL

## 2021-01-29 ENCOUNTER — APPOINTMENT (OUTPATIENT)
Dept: GENERAL RADIOLOGY | Facility: CLINIC | Age: 71
End: 2021-01-29
Attending: EMERGENCY MEDICINE
Payer: COMMERCIAL

## 2021-01-29 VITALS
DIASTOLIC BLOOD PRESSURE: 64 MMHG | BODY MASS INDEX: 35.55 KG/M2 | WEIGHT: 240 LBS | RESPIRATION RATE: 16 BRPM | HEART RATE: 86 BPM | OXYGEN SATURATION: 95 % | TEMPERATURE: 99.3 F | SYSTOLIC BLOOD PRESSURE: 136 MMHG

## 2021-01-29 DIAGNOSIS — R50.9 ACUTE FEBRILE ILLNESS: ICD-10-CM

## 2021-01-29 DIAGNOSIS — N18.32 STAGE 3B CHRONIC KIDNEY DISEASE (H): ICD-10-CM

## 2021-01-29 LAB
ALBUMIN SERPL-MCNC: 3.2 G/DL (ref 3.4–5)
ALBUMIN UR-MCNC: 30 MG/DL
ALP SERPL-CCNC: 105 U/L (ref 40–150)
ALT SERPL W P-5'-P-CCNC: 36 U/L (ref 0–70)
ANION GAP SERPL CALCULATED.3IONS-SCNC: 5 MMOL/L (ref 3–14)
APPEARANCE UR: ABNORMAL
AST SERPL W P-5'-P-CCNC: 19 U/L (ref 0–45)
BASOPHILS # BLD AUTO: 0 10E9/L (ref 0–0.2)
BASOPHILS NFR BLD AUTO: 0.3 %
BILIRUB SERPL-MCNC: 1.1 MG/DL (ref 0.2–1.3)
BILIRUB UR QL STRIP: NEGATIVE
BUN SERPL-MCNC: 20 MG/DL (ref 7–30)
CALCIUM SERPL-MCNC: 9.1 MG/DL (ref 8.5–10.1)
CHLORIDE SERPL-SCNC: 99 MMOL/L (ref 94–109)
CO2 SERPL-SCNC: 26 MMOL/L (ref 20–32)
COLOR UR AUTO: YELLOW
CREAT SERPL-MCNC: 1.9 MG/DL (ref 0.66–1.25)
DIFFERENTIAL METHOD BLD: ABNORMAL
EOSINOPHIL # BLD AUTO: 0.3 10E9/L (ref 0–0.7)
EOSINOPHIL NFR BLD AUTO: 1.9 %
ERYTHROCYTE [DISTWIDTH] IN BLOOD BY AUTOMATED COUNT: 11.9 % (ref 10–15)
FLUAV RNA RESP QL NAA+PROBE: NEGATIVE
FLUBV RNA RESP QL NAA+PROBE: NEGATIVE
GFR SERPL CREATININE-BSD FRML MDRD: 35 ML/MIN/{1.73_M2}
GLUCOSE SERPL-MCNC: 124 MG/DL (ref 70–99)
GLUCOSE UR STRIP-MCNC: NEGATIVE MG/DL
HCT VFR BLD AUTO: 34.9 % (ref 40–53)
HGB BLD-MCNC: 12.2 G/DL (ref 13.3–17.7)
HGB UR QL STRIP: ABNORMAL
HYALINE CASTS #/AREA URNS LPF: 9 /LPF (ref 0–2)
IMM GRANULOCYTES # BLD: 0.1 10E9/L (ref 0–0.4)
IMM GRANULOCYTES NFR BLD: 0.4 %
KETONES UR STRIP-MCNC: NEGATIVE MG/DL
LABORATORY COMMENT REPORT: NORMAL
LACTATE BLD-SCNC: 0.9 MMOL/L (ref 0.7–2)
LEUKOCYTE ESTERASE UR QL STRIP: NEGATIVE
LYMPHOCYTES # BLD AUTO: 0.7 10E9/L (ref 0.8–5.3)
LYMPHOCYTES NFR BLD AUTO: 5.7 %
MCH RBC QN AUTO: 31.2 PG (ref 26.5–33)
MCHC RBC AUTO-ENTMCNC: 35 G/DL (ref 31.5–36.5)
MCV RBC AUTO: 89 FL (ref 78–100)
MONOCYTES # BLD AUTO: 1.6 10E9/L (ref 0–1.3)
MONOCYTES NFR BLD AUTO: 12.1 %
MUCOUS THREADS #/AREA URNS LPF: PRESENT /LPF
NEUTROPHILS # BLD AUTO: 10.3 10E9/L (ref 1.6–8.3)
NEUTROPHILS NFR BLD AUTO: 79.6 %
NITRATE UR QL: NEGATIVE
NRBC # BLD AUTO: 0 10*3/UL
NRBC BLD AUTO-RTO: 0 /100
PH UR STRIP: 5 PH (ref 5–7)
PLATELET # BLD AUTO: 418 10E9/L (ref 150–450)
POTASSIUM SERPL-SCNC: 4.3 MMOL/L (ref 3.4–5.3)
PROT SERPL-MCNC: 7.6 G/DL (ref 6.8–8.8)
RBC # BLD AUTO: 3.91 10E12/L (ref 4.4–5.9)
RBC #/AREA URNS AUTO: 2 /HPF (ref 0–2)
RSV RNA SPEC QL NAA+PROBE: NORMAL
SARS-COV-2 RNA RESP QL NAA+PROBE: NEGATIVE
SODIUM SERPL-SCNC: 130 MMOL/L (ref 133–144)
SOURCE: ABNORMAL
SP GR UR STRIP: 1.02 (ref 1–1.03)
SPECIMEN SOURCE: NORMAL
SQUAMOUS #/AREA URNS AUTO: 1 /HPF (ref 0–1)
UROBILINOGEN UR STRIP-MCNC: 2 MG/DL (ref 0–2)
WBC # BLD AUTO: 13 10E9/L (ref 4–11)
WBC #/AREA URNS AUTO: 4 /HPF (ref 0–5)

## 2021-01-29 PROCEDURE — 258N000003 HC RX IP 258 OP 636: Performed by: EMERGENCY MEDICINE

## 2021-01-29 PROCEDURE — 80053 COMPREHEN METABOLIC PANEL: CPT | Performed by: EMERGENCY MEDICINE

## 2021-01-29 PROCEDURE — 99285 EMERGENCY DEPT VISIT HI MDM: CPT | Performed by: EMERGENCY MEDICINE

## 2021-01-29 PROCEDURE — C9803 HOPD COVID-19 SPEC COLLECT: HCPCS | Performed by: EMERGENCY MEDICINE

## 2021-01-29 PROCEDURE — 71046 X-RAY EXAM CHEST 2 VIEWS: CPT

## 2021-01-29 PROCEDURE — 87636 SARSCOV2 & INF A&B AMP PRB: CPT | Performed by: EMERGENCY MEDICINE

## 2021-01-29 PROCEDURE — 250N000013 HC RX MED GY IP 250 OP 250 PS 637: Performed by: EMERGENCY MEDICINE

## 2021-01-29 PROCEDURE — 99284 EMERGENCY DEPT VISIT MOD MDM: CPT | Mod: 25 | Performed by: EMERGENCY MEDICINE

## 2021-01-29 PROCEDURE — 96360 HYDRATION IV INFUSION INIT: CPT | Performed by: EMERGENCY MEDICINE

## 2021-01-29 PROCEDURE — 81001 URINALYSIS AUTO W/SCOPE: CPT | Performed by: EMERGENCY MEDICINE

## 2021-01-29 PROCEDURE — 85025 COMPLETE CBC W/AUTO DIFF WBC: CPT | Performed by: EMERGENCY MEDICINE

## 2021-01-29 PROCEDURE — 83605 ASSAY OF LACTIC ACID: CPT | Performed by: EMERGENCY MEDICINE

## 2021-01-29 RX ORDER — ACETAMINOPHEN 500 MG
1000 TABLET ORAL ONCE
Status: COMPLETED | OUTPATIENT
Start: 2021-01-29 | End: 2021-01-29

## 2021-01-29 RX ADMIN — ACETAMINOPHEN 1000 MG: 500 TABLET ORAL at 07:10

## 2021-01-29 RX ADMIN — SODIUM CHLORIDE, POTASSIUM CHLORIDE, SODIUM LACTATE AND CALCIUM CHLORIDE 500 ML: 600; 310; 30; 20 INJECTION, SOLUTION INTRAVENOUS at 08:05

## 2021-01-29 ASSESSMENT — ENCOUNTER SYMPTOMS
SHORTNESS OF BREATH: 0
COLOR CHANGE: 0
FATIGUE: 1
EYE REDNESS: 0
HEADACHES: 1
NAUSEA: 1
DIARRHEA: 0
DYSURIA: 0
COUGH: 0
VOMITING: 0
SORE THROAT: 0
ARTHRALGIAS: 0
EYE PAIN: 0
FEVER: 1
ABDOMINAL PAIN: 0
FREQUENCY: 0

## 2021-01-29 NOTE — DISCHARGE INSTRUCTIONS
Your evaluation in the emergency department was reassuring.  Your symptoms are most likely caused by a self-limiting viral illness.  Your influenza and Covid testing were negative.  You should not take ibuprofen.  You may take 1000 mg of acetaminophen (Tylenol) every 6 hours as needed for fever or headache.  If your symptoms do not resolve over the weekend you should follow-up with your primary care provider on Monday.  If your symptoms worsen or you develop any new symptoms you find concerning, you should return to the emergency department for further evaluation and treatment.

## 2021-01-29 NOTE — ED TRIAGE NOTES
Pt presents for eval of fever. Pt received a shingles shot 1 week ago and has not been feeling well since. He had a negative covid test on Monday.  Virtual visit yesterday.  Nausea- not eating.

## 2021-01-29 NOTE — ED PROVIDER NOTES
History     Chief Complaint   Patient presents with     Fever     fevers, nausea     HPI  Tee Hilton is a 70 year old male with history of persistent atrial fibrillation, HFrEF, hypertension, obesity, and GERD, who presents to the emergency department for evaluation of fever.  Chart review shows that recently received second dose of Shingrix vaccine and has been experience headache, body aches, elevated temperature of 100-1 01 for 7 days.  Virtual visit yesterday. Fever thought to be secondary to viral illness v reaction from vaccine.  Second dose of Shingrix vaccination 10 days ago.  Symptom onset a few days later.  Complains of fatigue, fever, headache, mild body aches, and nausea.  Denies any cough, chest pain, shortness of breath.  No sore throat, rash, or urinary symptoms.  No extremity edema.  Drinking lots of fluids.  Reports negative Covid testing 5 days ago.    The patient's PMHx, Surgical Hx, Allergies, and Medications were all reviewed with the patient.    Allergies:  Allergies   Allergen Reactions     Erythromycin Unknown     Upset stomach       Problem List:    Patient Active Problem List    Diagnosis Date Noted     Morbid obesity (H) 08/26/2020     Priority: Medium     Atrial fibrillation (H) 04/07/2020     Priority: Medium     Gastroesophageal reflux disease 04/07/2020     Priority: Medium     Other sleep apnea 09/24/2018     Priority: Medium     Study Date: 9/22/2018- (241.0 lbs) Polysomnogram revealed 41 obstructive, 14 central, and 38 mixed apneas resulting in an apnea index of 44.5 events per hour. There were 7 obstructive hypopneas and 0 central hypopneas resulting in an obstructive hypopnea index of 3.3 and central hypopnea index of 0 events per hour. The combined apnea/hypopnea index was 47.8 events per hour (central apnea/hypopnea index was 6.7 events per hour).  REM AHI was 0. The supine AHI was 69.0 events per hour.  RDI was 54.0 events per hour. Respiratory rate and pattern was  notable for improvement of events during REM sleep,  periods where periodic breathing is suspected and circulation times that are prolonged. Most of the events however appear to be terminated by a gasp arousal.  Lowest oxygen saturation was 83.9%. Time spent less than or equal to 88% was 0.4 minutes. Time spent less than or equal to 89% was 0.9 minutes. No optimal pressure was determined due to poor sleep and poor tolerance of low-level PAP and bilevel PAP. Severe sleep disordered breathing, many findings suggested of periodic breathing though given presence of obstruction and poorly consolidated sleep this study was not definitive       Gastroesophageal reflux disease without esophagitis      Priority: Medium     Persistent atrial fibrillation (H) 09/18/2018     Priority: Medium     Non morbid obesity due to excess calories 09/18/2018     Priority: Medium     Chronic systolic congestive heart failure (H) 09/18/2018     Priority: Medium     Alcohol use 09/18/2018     Priority: Medium     Chronic midline low back pain without sciatica 10/31/2017     Priority: Medium     Essential hypertension 11/11/2016     Priority: Medium     Other male erectile dysfunction 11/11/2016     Priority: Medium        Past Medical History:    Past Medical History:   Diagnosis Date     Gastroesophageal reflux disease      Hypertension      Irregular heart beat      Sleep apnea        Past Surgical History:    Past Surgical History:   Procedure Laterality Date     ANESTHESIA CARDIOVERSION N/A 9/24/2018    Procedure: ANESTHESIA CARDIOVERSION;  Cardioversion ;  Surgeon: GENERIC ANESTHESIA PROVIDER;  Location: [facility] OR     COLONOSCOPY N/A 5/25/2017    Procedure: COMBINED COLONOSCOPY, SINGLE OR MULTIPLE BIOPSY/POLYPECTOMY BY BIOPSY;;  Surgeon: Aquilino Garcia MD;  Location: [facility] OR     COLONOSCOPY WITH CO2 INSUFFLATION N/A 5/25/2017    Procedure: COLONOSCOPY WITH CO2 INSUFFLATION;  COLONOSCOPY SCREEN/ ORDAZ;  Surgeon: Aquilino Garcia  MD Nick;  Location: [facility] OR     OPEN REDUCTION INTERNAL FIXATION TIBIA       SEPTOPLASTY, TURBINOPLASTY, COMBINED Bilateral 7/9/2019    Procedure: ENDOSCOPIC SEPTOPLASTY, BILATERAL TURBINATE REDUCTION;  Surgeon: Alexander Avila MD;  Location: [facility] OR     TONSILLECTOMY         Family History:    Family History   Problem Relation Age of Onset     Gout Father      Lung Cancer Father        Social History:  Marital Status:   [2]  Social History     Tobacco Use     Smoking status: Never Smoker     Smokeless tobacco: Never Used     Tobacco comment: never smoked   Substance Use Topics     Alcohol use: Yes     Alcohol/week: 0.0 standard drinks     Comment: social use     Drug use: No        Medications:         allopurinol (ZYLOPRIM) 100 MG tablet       amLODIPine (NORVASC) 5 MG tablet       Famotidine (PEPCID PO)       lisinopril (ZESTRIL) 20 MG tablet       methylPREDNISolone (MEDROL DOSEPAK) 4 MG tablet therapy pack       metoprolol succinate ER (TOPROL-XL) 100 MG 24 hr tablet       omeprazole (PRILOSEC) 20 MG CR capsule       ondansetron (ZOFRAN) 4 MG tablet       rivaroxaban ANTICOAGULANT (XARELTO ANTICOAGULANT) 20 MG TABS tablet       sildenafil (REVATIO) 20 MG tablet       zolpidem (AMBIEN) 10 MG tablet          Review of Systems   Constitutional: Positive for fatigue and fever.   HENT: Negative for congestion and sore throat.    Eyes: Negative for pain and redness.   Respiratory: Negative for cough and shortness of breath.    Cardiovascular: Negative for chest pain and leg swelling.   Gastrointestinal: Positive for nausea. Negative for abdominal pain, diarrhea and vomiting.   Genitourinary: Negative for dysuria, frequency and urgency.   Musculoskeletal: Negative for arthralgias.   Skin: Negative for color change and rash.   Neurological: Positive for headaches.       Physical Exam   BP: (!) 142/84  Pulse: 99  Temp: 101.3  F (38.5  C)  Resp: 16  Weight: 108.9 kg (240 lb)  SpO2: 95 %    Physical Exam  GEN:  Awake, alert, and cooperative. No acute distress.   HENT: MMM. External ears and nose normal bilaterally.  EYES: EOM intact. Conjunctiva clear. No discharge.   NECK: Symmetric, freely mobile.   CV : Regular rate and rhythm.  Extremities warm and well perfused.  PULM: Normal effort. No wheezes. ?rales in left base.   ABD: Soft, non-tender, non-distended. No rebound or guarding.   NEURO: Normal speech. Following commands. CN II-XII grossly intact. Answering questions and interacting appropriately.   EXT: No gross deformity.  No pedal edema  INT: Warm. No diaphoresis. Normal color.        ED Course        Procedures           Critical Care time:  none               Results for orders placed or performed during the hospital encounter of 01/29/21 (from the past 24 hour(s))   Symptomatic Influenza A/B & SARS-CoV2 (COVID-19) Virus PCR Multiplex    Specimen: Nasopharyngeal   Result Value Ref Range    Flu A/B & SARS-COV-2 PCR Source Nasopharyngeal     SARS-CoV-2 PCR Result NEGATIVE     Influenza A PCR Negative NEG^Negative    Influenza B PCR Negative NEG^Negative    Respiratory Syncytial Virus PCR (Note)     Flu A/B & SARS-CoV-2 PCR Comment (Note)    CBC with platelets differential   Result Value Ref Range    WBC 13.0 (H) 4.0 - 11.0 10e9/L    RBC Count 3.91 (L) 4.4 - 5.9 10e12/L    Hemoglobin 12.2 (L) 13.3 - 17.7 g/dL    Hematocrit 34.9 (L) 40.0 - 53.0 %    MCV 89 78 - 100 fl    MCH 31.2 26.5 - 33.0 pg    MCHC 35.0 31.5 - 36.5 g/dL    RDW 11.9 10.0 - 15.0 %    Platelet Count 418 150 - 450 10e9/L    Diff Method Automated Method     % Neutrophils 79.6 %    % Lymphocytes 5.7 %    % Monocytes 12.1 %    % Eosinophils 1.9 %    % Basophils 0.3 %    % Immature Granulocytes 0.4 %    Nucleated RBCs 0 0 /100    Absolute Neutrophil 10.3 (H) 1.6 - 8.3 10e9/L    Absolute Lymphocytes 0.7 (L) 0.8 - 5.3 10e9/L    Absolute Monocytes 1.6 (H) 0.0 - 1.3 10e9/L    Absolute Eosinophils 0.3 0.0 - 0.7 10e9/L    Absolute Basophils 0.0 0.0 - 0.2  10e9/L    Abs Immature Granulocytes 0.1 0 - 0.4 10e9/L    Absolute Nucleated RBC 0.0    Comprehensive metabolic panel   Result Value Ref Range    Sodium 130 (L) 133 - 144 mmol/L    Potassium 4.3 3.4 - 5.3 mmol/L    Chloride 99 94 - 109 mmol/L    Carbon Dioxide 26 20 - 32 mmol/L    Anion Gap 5 3 - 14 mmol/L    Glucose 124 (H) 70 - 99 mg/dL    Urea Nitrogen 20 7 - 30 mg/dL    Creatinine 1.90 (H) 0.66 - 1.25 mg/dL    GFR Estimate 35 (L) >60 mL/min/[1.73_m2]    GFR Estimate If Black 41 (L) >60 mL/min/[1.73_m2]    Calcium 9.1 8.5 - 10.1 mg/dL    Bilirubin Total 1.1 0.2 - 1.3 mg/dL    Albumin 3.2 (L) 3.4 - 5.0 g/dL    Protein Total 7.6 6.8 - 8.8 g/dL    Alkaline Phosphatase 105 40 - 150 U/L    ALT 36 0 - 70 U/L    AST 19 0 - 45 U/L   Lactate for Sepsis Protocol   Result Value Ref Range    Lactate for Sepsis Protocol 0.9 0.7 - 2.0 mmol/L   UA reflex to Microscopic   Result Value Ref Range    Color Urine Yellow     Appearance Urine Slightly Cloudy     Glucose Urine Negative NEG^Negative mg/dL    Bilirubin Urine Negative NEG^Negative    Ketones Urine Negative NEG^Negative mg/dL    Specific Gravity Urine 1.023 1.003 - 1.035    Blood Urine Small (A) NEG^Negative    pH Urine 5.0 5.0 - 7.0 pH    Protein Albumin Urine 30 (A) NEG^Negative mg/dL    Urobilinogen mg/dL 2.0 0.0 - 2.0 mg/dL    Nitrite Urine Negative NEG^Negative    Leukocyte Esterase Urine Negative NEG^Negative    Source Midstream Urine     RBC Urine 2 0 - 2 /HPF    WBC Urine 4 0 - 5 /HPF    Squamous Epithelial /HPF Urine 1 0 - 1 /HPF    Mucous Urine Present (A) NEG^Negative /LPF    Hyaline Casts 9 (H) 0 - 2 /LPF   Chest XR,  PA & LAT    Narrative    CHEST TWO VIEWS   1/29/2021 8:40 AM     HISTORY: Fever without known source. Question rales in left base.    COMPARISON: None.      Impression    IMPRESSION: PA and lateral views of the chest. Lungs are clear. Heart  is normal in size. No effusions are evident. No pneumothorax.       Medications   acetaminophen (TYLENOL)  "tablet 1,000 mg (1,000 mg Oral Given 1/29/21 4810)   lactated ringers BOLUS 500 mL (500 mLs Intravenous New Bag 1/29/21 0805)       Assessments & Plan (with Medical Decision Making)   70 year old male with past medical history of persistent atrial fibrillation, GERD, obesity, heart failure, and hypertension, with 1 week of fever, nausea, headache, and fatigue.  Received second dose Shingrix 2 to 3 days prior to symptom onset.  On arrival patient had temperature of 101.3.  1 g of acetaminophen given.  No cough or dyspnea reported.  No obvious source of infection.  CBC with mild leukocytosis of 13.0 with left shift.  Covid and influenza testing negative.  Questionable rales in left base.  Chest x-ray without acute infiltrate, effusion, pneumothorax.  Chest imaging reviewed personally as well as report from radiology which is noted above.  Lactate normal.  CMP with creatinine of 1.9 which is up from 1.38 5 months ago.  He has some decreased oral intake as well as recent ibuprofen use.  Both could be contributing to this.  He is tolerating oral fluids and was given 500 cc lactated Ringer's solution.  On further evaluation his fever had resolved and he was feeling much improved.  Urinalysis without evidence of acute infection.  I suspect his symptoms are most likely related to a self-limiting viral illness.  Based on my review, similar symptoms following Shingrix vaccination have been reported but typically resolve after 2 to 3 days.  He reports feeling hungry and would like to go get something to eat.  He does have a prescription for ondansetron and which he received after his virtual visit yesterday.  His wife is now present at bedside and expresses some concern that he seemed a little bit confused this morning.  She says through the course of his illness occasionally he has seemed a little \"off\".  Do not see any signs of CVA on my examination today.  He is alert and oriented.  GCS 15.  She states he is returned to his " baseline.  Recommend he follows up with his primary care provider on Monday if not improved.  ED return precautions discussed.    I have reviewed the nursing notes.         New Prescriptions    No medications on file       Final diagnoses:   Acute febrile illness     Kartik Hatfield MD    1/29/2021   [facility] EMERGENCY DEPT    Disclaimer: This note consists of words and symbols derived from keyboarding and dictation using voice recognition software.  As a result, there may be errors that have gone undetected.  Please consider this when interpreting information found in this note.             Kartik Hatfield MD  01/29/21 0935

## 2021-01-30 ENCOUNTER — HOSPITAL ENCOUNTER (INPATIENT)
Facility: CLINIC | Age: 71
LOS: 4 days | Discharge: HOME HEALTH | End: 2021-02-04
Attending: EMERGENCY MEDICINE | Admitting: INTERNAL MEDICINE
Payer: COMMERCIAL

## 2021-01-30 ENCOUNTER — APPOINTMENT (OUTPATIENT)
Dept: CT IMAGING | Facility: CLINIC | Age: 71
End: 2021-01-30
Attending: EMERGENCY MEDICINE
Payer: COMMERCIAL

## 2021-01-30 DIAGNOSIS — E87.1 HYPONATREMIA: ICD-10-CM

## 2021-01-30 DIAGNOSIS — N17.9 ACUTE KIDNEY INJURY (H): ICD-10-CM

## 2021-01-30 DIAGNOSIS — M79.10 MYALGIA: ICD-10-CM

## 2021-01-30 DIAGNOSIS — R51.9 ACUTE INTRACTABLE HEADACHE, UNSPECIFIED HEADACHE TYPE: ICD-10-CM

## 2021-01-30 DIAGNOSIS — M1A.4710 OTHER SECONDARY CHRONIC GOUT OF RIGHT FOOT WITHOUT TOPHUS: ICD-10-CM

## 2021-01-30 DIAGNOSIS — R50.9 FEVER AND CHILLS: ICD-10-CM

## 2021-01-30 DIAGNOSIS — G00.1 STREPTOCOCCUS PNEUMONIAE MENINGITIS: Primary | ICD-10-CM

## 2021-01-30 PROBLEM — D72.829 LEUKOCYTOSIS: Status: ACTIVE | Noted: 2021-01-30

## 2021-01-30 PROBLEM — E86.0 DEHYDRATION: Status: ACTIVE | Noted: 2021-01-30

## 2021-01-30 PROBLEM — A41.9 SEPSIS (H): Status: ACTIVE | Noted: 2021-01-30

## 2021-01-30 PROBLEM — I48.20 CHRONIC ATRIAL FIBRILLATION (H): Status: ACTIVE | Noted: 2021-01-30

## 2021-01-30 PROBLEM — I10 ESSENTIAL HYPERTENSION: Status: ACTIVE | Noted: 2021-01-30

## 2021-01-30 LAB
ALBUMIN SERPL-MCNC: 3.2 G/DL (ref 3.4–5)
ALP SERPL-CCNC: 99 U/L (ref 40–150)
ALT SERPL W P-5'-P-CCNC: 40 U/L (ref 0–70)
ANION GAP SERPL CALCULATED.3IONS-SCNC: 7 MMOL/L (ref 3–14)
AST SERPL W P-5'-P-CCNC: 20 U/L (ref 0–45)
BASOPHILS # BLD AUTO: 0 10E9/L (ref 0–0.2)
BASOPHILS NFR BLD AUTO: 0.3 %
BILIRUB SERPL-MCNC: 1 MG/DL (ref 0.2–1.3)
BUN SERPL-MCNC: 19 MG/DL (ref 7–30)
CALCIUM SERPL-MCNC: 9.2 MG/DL (ref 8.5–10.1)
CHLORIDE SERPL-SCNC: 98 MMOL/L (ref 94–109)
CO2 SERPL-SCNC: 23 MMOL/L (ref 20–32)
CREAT SERPL-MCNC: 1.84 MG/DL (ref 0.66–1.25)
CRP SERPL-MCNC: 95.6 MG/L (ref 0–8)
DIFFERENTIAL METHOD BLD: ABNORMAL
EOSINOPHIL # BLD AUTO: 0.2 10E9/L (ref 0–0.7)
EOSINOPHIL NFR BLD AUTO: 1 %
ERYTHROCYTE [DISTWIDTH] IN BLOOD BY AUTOMATED COUNT: 11.9 % (ref 10–15)
GFR SERPL CREATININE-BSD FRML MDRD: 37 ML/MIN/{1.73_M2}
GLUCOSE BLDC GLUCOMTR-MCNC: 116 MG/DL (ref 70–99)
GLUCOSE SERPL-MCNC: 120 MG/DL (ref 70–99)
HCT VFR BLD AUTO: 33.5 % (ref 40–53)
HGB BLD-MCNC: 11.8 G/DL (ref 13.3–17.7)
IMM GRANULOCYTES # BLD: 0.1 10E9/L (ref 0–0.4)
IMM GRANULOCYTES NFR BLD: 0.6 %
LACTATE BLD-SCNC: 0.8 MMOL/L (ref 0.7–2)
LACTATE BLD-SCNC: 1.1 MMOL/L (ref 0.7–2)
LYMPHOCYTES # BLD AUTO: 1.1 10E9/L (ref 0.8–5.3)
LYMPHOCYTES NFR BLD AUTO: 7 %
MCH RBC QN AUTO: 31.4 PG (ref 26.5–33)
MCHC RBC AUTO-ENTMCNC: 35.2 G/DL (ref 31.5–36.5)
MCV RBC AUTO: 89 FL (ref 78–100)
MONOCYTES # BLD AUTO: 2 10E9/L (ref 0–1.3)
MONOCYTES NFR BLD AUTO: 13 %
NEUTROPHILS # BLD AUTO: 11.9 10E9/L (ref 1.6–8.3)
NEUTROPHILS NFR BLD AUTO: 78.1 %
NRBC # BLD AUTO: 0 10*3/UL
NRBC BLD AUTO-RTO: 0 /100
PLATELET # BLD AUTO: 432 10E9/L (ref 150–450)
POTASSIUM SERPL-SCNC: 4.4 MMOL/L (ref 3.4–5.3)
PROCALCITONIN SERPL-MCNC: 0.17 NG/ML
PROT SERPL-MCNC: 7.5 G/DL (ref 6.8–8.8)
RBC # BLD AUTO: 3.76 10E12/L (ref 4.4–5.9)
SODIUM SERPL-SCNC: 128 MMOL/L (ref 133–144)
SODIUM SERPL-SCNC: 129 MMOL/L (ref 133–144)
SODIUM UR-SCNC: 37 MMOL/L
TROPONIN I SERPL-MCNC: <0.015 UG/L (ref 0–0.04)
WBC # BLD AUTO: 15.2 10E9/L (ref 4–11)

## 2021-01-30 PROCEDURE — 999N000147 HC STATISTIC PT IP EVAL DEFER: Performed by: PHYSICAL THERAPIST

## 2021-01-30 PROCEDURE — 70450 CT HEAD/BRAIN W/O DYE: CPT

## 2021-01-30 PROCEDURE — 999N001017 HC STATISTIC GLUCOSE BY METER IP

## 2021-01-30 PROCEDURE — 36415 COLL VENOUS BLD VENIPUNCTURE: CPT | Performed by: INTERNAL MEDICINE

## 2021-01-30 PROCEDURE — 87070 CULTURE OTHR SPECIMN AEROBIC: CPT | Performed by: EMERGENCY MEDICINE

## 2021-01-30 PROCEDURE — 250N000013 HC RX MED GY IP 250 OP 250 PS 637: Performed by: INTERNAL MEDICINE

## 2021-01-30 PROCEDURE — 86140 C-REACTIVE PROTEIN: CPT | Performed by: INTERNAL MEDICINE

## 2021-01-30 PROCEDURE — 258N000003 HC RX IP 258 OP 636: Performed by: INTERNAL MEDICINE

## 2021-01-30 PROCEDURE — 84300 ASSAY OF URINE SODIUM: CPT | Performed by: INTERNAL MEDICINE

## 2021-01-30 PROCEDURE — 99285 EMERGENCY DEPT VISIT HI MDM: CPT | Mod: 25 | Performed by: EMERGENCY MEDICINE

## 2021-01-30 PROCEDURE — 84295 ASSAY OF SERUM SODIUM: CPT | Performed by: INTERNAL MEDICINE

## 2021-01-30 PROCEDURE — 96376 TX/PRO/DX INJ SAME DRUG ADON: CPT | Performed by: EMERGENCY MEDICINE

## 2021-01-30 PROCEDURE — 89051 BODY FLUID CELL COUNT: CPT | Performed by: EMERGENCY MEDICINE

## 2021-01-30 PROCEDURE — 96374 THER/PROPH/DIAG INJ IV PUSH: CPT | Performed by: EMERGENCY MEDICINE

## 2021-01-30 PROCEDURE — 84484 ASSAY OF TROPONIN QUANT: CPT | Performed by: EMERGENCY MEDICINE

## 2021-01-30 PROCEDURE — G0378 HOSPITAL OBSERVATION PER HR: HCPCS

## 2021-01-30 PROCEDURE — 84145 PROCALCITONIN (PCT): CPT | Performed by: INTERNAL MEDICINE

## 2021-01-30 PROCEDURE — 96376 TX/PRO/DX INJ SAME DRUG ADON: CPT

## 2021-01-30 PROCEDURE — 250N000013 HC RX MED GY IP 250 OP 250 PS 637: Performed by: EMERGENCY MEDICINE

## 2021-01-30 PROCEDURE — 84157 ASSAY OF PROTEIN OTHER: CPT | Performed by: EMERGENCY MEDICINE

## 2021-01-30 PROCEDURE — 87483 CNS DNA AMP PROBE TYPE 12-25: CPT | Performed by: EMERGENCY MEDICINE

## 2021-01-30 PROCEDURE — 80053 COMPREHEN METABOLIC PANEL: CPT | Performed by: EMERGENCY MEDICINE

## 2021-01-30 PROCEDURE — 93010 ELECTROCARDIOGRAM REPORT: CPT | Performed by: EMERGENCY MEDICINE

## 2021-01-30 PROCEDURE — 83605 ASSAY OF LACTIC ACID: CPT | Performed by: EMERGENCY MEDICINE

## 2021-01-30 PROCEDURE — 87529 HSV DNA AMP PROBE: CPT | Performed by: EMERGENCY MEDICINE

## 2021-01-30 PROCEDURE — 250N000011 HC RX IP 250 OP 636: Performed by: EMERGENCY MEDICINE

## 2021-01-30 PROCEDURE — 85025 COMPLETE CBC W/AUTO DIFF WBC: CPT | Performed by: EMERGENCY MEDICINE

## 2021-01-30 PROCEDURE — 82945 GLUCOSE OTHER FLUID: CPT | Performed by: EMERGENCY MEDICINE

## 2021-01-30 PROCEDURE — 86592 SYPHILIS TEST NON-TREP QUAL: CPT | Performed by: EMERGENCY MEDICINE

## 2021-01-30 PROCEDURE — 87205 SMEAR GRAM STAIN: CPT | Performed by: EMERGENCY MEDICINE

## 2021-01-30 PROCEDURE — 87040 BLOOD CULTURE FOR BACTERIA: CPT | Performed by: INTERNAL MEDICINE

## 2021-01-30 PROCEDURE — 83605 ASSAY OF LACTIC ACID: CPT | Performed by: INTERNAL MEDICINE

## 2021-01-30 PROCEDURE — 87015 SPECIMEN INFECT AGNT CONCNTJ: CPT | Performed by: EMERGENCY MEDICINE

## 2021-01-30 PROCEDURE — 93005 ELECTROCARDIOGRAM TRACING: CPT | Performed by: EMERGENCY MEDICINE

## 2021-01-30 PROCEDURE — 87798 DETECT AGENT NOS DNA AMP: CPT | Performed by: EMERGENCY MEDICINE

## 2021-01-30 RX ORDER — ONDANSETRON 2 MG/ML
4 INJECTION INTRAMUSCULAR; INTRAVENOUS EVERY 6 HOURS PRN
Status: DISCONTINUED | OUTPATIENT
Start: 2021-01-30 | End: 2021-02-04 | Stop reason: HOSPADM

## 2021-01-30 RX ORDER — ACETAMINOPHEN 500 MG
1000 TABLET ORAL ONCE
Status: COMPLETED | OUTPATIENT
Start: 2021-01-30 | End: 2021-01-30

## 2021-01-30 RX ORDER — HYDROMORPHONE HYDROCHLORIDE 1 MG/ML
0.5 INJECTION, SOLUTION INTRAMUSCULAR; INTRAVENOUS; SUBCUTANEOUS
Status: COMPLETED | OUTPATIENT
Start: 2021-01-30 | End: 2021-01-30

## 2021-01-30 RX ORDER — ACETAMINOPHEN 650 MG/1
650 SUPPOSITORY RECTAL EVERY 4 HOURS PRN
Status: DISCONTINUED | OUTPATIENT
Start: 2021-01-30 | End: 2021-02-04 | Stop reason: HOSPADM

## 2021-01-30 RX ORDER — NALOXONE HYDROCHLORIDE 0.4 MG/ML
0.4 INJECTION, SOLUTION INTRAMUSCULAR; INTRAVENOUS; SUBCUTANEOUS
Status: DISCONTINUED | OUTPATIENT
Start: 2021-01-30 | End: 2021-02-04 | Stop reason: HOSPADM

## 2021-01-30 RX ORDER — NALOXONE HYDROCHLORIDE 0.4 MG/ML
0.2 INJECTION, SOLUTION INTRAMUSCULAR; INTRAVENOUS; SUBCUTANEOUS
Status: DISCONTINUED | OUTPATIENT
Start: 2021-01-30 | End: 2021-02-04 | Stop reason: HOSPADM

## 2021-01-30 RX ORDER — ACETAMINOPHEN 325 MG/1
650 TABLET ORAL EVERY 4 HOURS PRN
Status: DISCONTINUED | OUTPATIENT
Start: 2021-01-30 | End: 2021-02-04 | Stop reason: HOSPADM

## 2021-01-30 RX ORDER — ALLOPURINOL 100 MG/1
100 TABLET ORAL DAILY
Status: DISCONTINUED | OUTPATIENT
Start: 2021-01-30 | End: 2021-02-04 | Stop reason: HOSPADM

## 2021-01-30 RX ORDER — ONDANSETRON 2 MG/ML
4 INJECTION INTRAMUSCULAR; INTRAVENOUS EVERY 6 HOURS PRN
Status: DISCONTINUED | OUTPATIENT
Start: 2021-01-30 | End: 2021-01-30

## 2021-01-30 RX ORDER — HYDROMORPHONE HYDROCHLORIDE 1 MG/ML
.3-.5 INJECTION, SOLUTION INTRAMUSCULAR; INTRAVENOUS; SUBCUTANEOUS
Status: DISCONTINUED | OUTPATIENT
Start: 2021-01-30 | End: 2021-02-04 | Stop reason: HOSPADM

## 2021-01-30 RX ORDER — METOPROLOL SUCCINATE 100 MG/1
100 TABLET, EXTENDED RELEASE ORAL DAILY
Status: DISCONTINUED | OUTPATIENT
Start: 2021-01-30 | End: 2021-02-04 | Stop reason: HOSPADM

## 2021-01-30 RX ORDER — ONDANSETRON 4 MG/1
4 TABLET, ORALLY DISINTEGRATING ORAL EVERY 6 HOURS PRN
Status: DISCONTINUED | OUTPATIENT
Start: 2021-01-30 | End: 2021-01-30

## 2021-01-30 RX ORDER — SODIUM CHLORIDE 9 MG/ML
INJECTION, SOLUTION INTRAVENOUS CONTINUOUS
Status: ACTIVE | OUTPATIENT
Start: 2021-01-30 | End: 2021-01-30

## 2021-01-30 RX ORDER — PROCHLORPERAZINE 25 MG
12.5 SUPPOSITORY, RECTAL RECTAL EVERY 12 HOURS PRN
Status: DISCONTINUED | OUTPATIENT
Start: 2021-01-30 | End: 2021-02-04 | Stop reason: HOSPADM

## 2021-01-30 RX ORDER — FAMOTIDINE 10 MG
10 TABLET ORAL 2 TIMES DAILY
Status: DISCONTINUED | OUTPATIENT
Start: 2021-01-30 | End: 2021-02-04 | Stop reason: HOSPADM

## 2021-01-30 RX ORDER — ZOLPIDEM TARTRATE 5 MG/1
10 TABLET ORAL
Status: DISCONTINUED | OUTPATIENT
Start: 2021-01-30 | End: 2021-01-31

## 2021-01-30 RX ORDER — PROCHLORPERAZINE MALEATE 5 MG
5 TABLET ORAL EVERY 6 HOURS PRN
Status: DISCONTINUED | OUTPATIENT
Start: 2021-01-30 | End: 2021-02-04 | Stop reason: HOSPADM

## 2021-01-30 RX ORDER — ACETAMINOPHEN 500 MG
1000 TABLET ORAL EVERY 8 HOURS PRN
Status: DISCONTINUED | OUTPATIENT
Start: 2021-01-30 | End: 2021-02-04 | Stop reason: HOSPADM

## 2021-01-30 RX ORDER — ONDANSETRON 4 MG/1
4 TABLET, ORALLY DISINTEGRATING ORAL EVERY 6 HOURS PRN
Status: DISCONTINUED | OUTPATIENT
Start: 2021-01-30 | End: 2021-02-04 | Stop reason: HOSPADM

## 2021-01-30 RX ORDER — AMOXICILLIN 250 MG
1 CAPSULE ORAL AT BEDTIME
Status: DISCONTINUED | OUTPATIENT
Start: 2021-01-30 | End: 2021-02-04 | Stop reason: HOSPADM

## 2021-01-30 RX ADMIN — ACETAMINOPHEN 1000 MG: 500 TABLET ORAL at 19:41

## 2021-01-30 RX ADMIN — ONDANSETRON 4 MG: 4 TABLET, ORALLY DISINTEGRATING ORAL at 16:16

## 2021-01-30 RX ADMIN — ZOLPIDEM TARTRATE 10 MG: 5 TABLET ORAL at 21:09

## 2021-01-30 RX ADMIN — HYDROMORPHONE HYDROCHLORIDE 0.5 MG: 1 INJECTION, SOLUTION INTRAMUSCULAR; INTRAVENOUS; SUBCUTANEOUS at 04:04

## 2021-01-30 RX ADMIN — HYDROMORPHONE HYDROCHLORIDE 0.5 MG: 1 INJECTION, SOLUTION INTRAMUSCULAR; INTRAVENOUS; SUBCUTANEOUS at 06:26

## 2021-01-30 RX ADMIN — HYDROMORPHONE HYDROCHLORIDE 0.5 MG: 1 INJECTION, SOLUTION INTRAMUSCULAR; INTRAVENOUS; SUBCUTANEOUS at 12:12

## 2021-01-30 RX ADMIN — ACETAMINOPHEN 1000 MG: 500 TABLET ORAL at 12:12

## 2021-01-30 RX ADMIN — DOCUSATE SODIUM AND SENNOSIDES 1 TABLET: 8.6; 5 TABLET ORAL at 20:32

## 2021-01-30 RX ADMIN — SODIUM CHLORIDE: 9 INJECTION, SOLUTION INTRAVENOUS at 07:48

## 2021-01-30 RX ADMIN — ACETAMINOPHEN 1000 MG: 500 TABLET ORAL at 04:01

## 2021-01-30 RX ADMIN — SODIUM CHLORIDE: 9 INJECTION, SOLUTION INTRAVENOUS at 15:18

## 2021-01-30 RX ADMIN — HYDROMORPHONE HYDROCHLORIDE 1 MG: 2 TABLET ORAL at 23:58

## 2021-01-30 RX ADMIN — FAMOTIDINE 10 MG: 10 TABLET, FILM COATED ORAL at 07:41

## 2021-01-30 RX ADMIN — OMEPRAZOLE 20 MG: 20 CAPSULE, DELAYED RELEASE ORAL at 07:41

## 2021-01-30 RX ADMIN — ALLOPURINOL 100 MG: 100 TABLET ORAL at 07:41

## 2021-01-30 RX ADMIN — FAMOTIDINE 10 MG: 10 TABLET, FILM COATED ORAL at 17:23

## 2021-01-30 RX ADMIN — HYDROMORPHONE HYDROCHLORIDE 1 MG: 2 TABLET ORAL at 17:32

## 2021-01-30 RX ADMIN — METOPROLOL SUCCINATE 100 MG: 100 TABLET, EXTENDED RELEASE ORAL at 07:41

## 2021-01-30 RX ADMIN — HYDROMORPHONE HYDROCHLORIDE 0.5 MG: 1 INJECTION, SOLUTION INTRAMUSCULAR; INTRAVENOUS; SUBCUTANEOUS at 05:24

## 2021-01-30 ASSESSMENT — ENCOUNTER SYMPTOMS
NECK STIFFNESS: 0
HEADACHES: 1
WEAKNESS: 0
LIGHT-HEADEDNESS: 0
DYSURIA: 0
HALLUCINATIONS: 0
FEVER: 1
ACTIVITY CHANGE: 1
RHINORRHEA: 0
DIAPHORESIS: 1
MYALGIAS: 1
NAUSEA: 1
SHORTNESS OF BREATH: 0
SORE THROAT: 0
CONSTIPATION: 1
WOUND: 0
FATIGUE: 1
APPETITE CHANGE: 0
SLEEP DISTURBANCE: 1
DIARRHEA: 0
CHILLS: 0
SEIZURES: 0
BACK PAIN: 0
COUGH: 0
SPEECH DIFFICULTY: 0
CONFUSION: 1
ABDOMINAL PAIN: 0
CHEST TIGHTNESS: 0
VOMITING: 0
NUMBNESS: 0

## 2021-01-30 ASSESSMENT — MIFFLIN-ST. JEOR: SCORE: 1875.25

## 2021-01-30 NOTE — PLAN OF CARE
Problem: Adult Inpatient Plan of Care  Goal: Optimal Comfort and Wellbeing  Outcome: No Change     0.5 IV Dilaudid and 1000 mg tylenol given for headache.  Pt and wife Gillian say that these headaches are often worse around noon.  Dilaudid was partially effective but continues to have trouble coping with head pain.  Cold cloth applied and aromatherapy given.

## 2021-01-30 NOTE — PROGRESS NOTES
[facility] Medicine Progress Note  Date of Service: 01/30/2021    Assessment & Plan   Tee Hilton is a 70 year old male who presented on 1/30/2021 with 8 days of fevers, myalgias and intermittent confusion.    AMINA (acute kidney injury) on chronic kidney disease     Creatinine 1.38 Aug, 2020.  Presented to the ED 1/29 with creatinine 1.90 and then 1.84 on 1/30 early morning.  Suspect prerenal due to dehydration related to current illness.   -Follow with fluid resuscitation   -Holding lisinopril    Fevers    Started 2-3 days after his second shingles vaccine and symptoms ongoing 8 days on arrival to ED 1/30.  Associated with severe headaches that are frontal and over the top of his head. No significant neck pain.  No runny nose or congestion.  No sore throat.  No cough.  No shortness of breath or chest pain.  Has had some myalgias and weakness.   Had outpatient Covid testing that was negative.  No nausea vomiting.  No diarrhea.  Wife is not ill.  Presented to the ED on 1/29 with wife who expressed concerns of some confusion.  Urinalysis negative.  Covid testing was negative.  He was given some fluids and discharged home.  He returned on 1/30 with fever to 104.  Work-up once again does not reveal a source.  White blood cell count is elevated at 15.2 and is mildly left shifted with 78% neutrophils.  In ED a lumbar puncture was felt appropriate however patient is on anticoagulation and this has to be held 48 hours.    As I see later on 1/30, patient's not much changed.  No confusion.  No meningismus.  CRP was added on and is elevated at 95.6.  No localizing signs or symptoms and clinical condition is not worsening so holding on antibiotics at this time.   -Procalcitonin pending   -Continue supportive cares at this time   -Plan LP tomorrow   -Follow clinically for localizing signs or symptoms      Hyponatremia, mild, suspect due to some dehydration    Sodium is 128.  It was  130 the day before.  Suspect this is due to his hypovolemia but lisinopril could also play a role.  After initial IV fluids sodium is 129.   -Continue normal saline IV fluid and will recheck in the morning      Hypertension  On amlodipine, metoprolol and lisinopril at home.  Blood pressures are adequate currently.   -Holding lisinopril and amlodipine   -Continue metoprolol    Chronic atrial fibrillation (H)    Rate controlled with metoprolol.  Patient is on rivaroxaban for anticoagulation.   -Continue metoprolol   -Holding rivaroxaban for LP      DVT Prophylaxis: Low Risk/Ambulatory with no VTE prophylaxis indicated  Code Status: Full Code    Lines: PIV   Davis catheter: None    Discussion: Clinically patient remained stable though still with fevers and feeling unwell.  No indication to advance to inpatient given overall stability.    Disposition: Anticipate discharge likely tomorrow after LP results assuming they are normal and patient is improving    Attestation:  Total time: 45 minutes    Manuel Gonzalez MD       Interval History   HPI reviewed with patient and is noted in assessment and plan.  He is not much improved.  Still has some headache.  He felt like he was drinking fluids at home is unsure why he would be dehydrated.  Is making urine.    Physical Exam   Temp:  [98.7  F (37.1  C)-100.6  F (38.1  C)] 100.3  F (37.9  C)  Pulse:  [] 88  Resp:  [11-27] 18  BP: (112-169)/() 145/108  SpO2:  [90 %-97 %] 96 %    Weights:   Vitals:    01/30/21 0320 01/30/21 0639   Weight: 108.9 kg (240 lb) 109.9 kg (242 lb 4.6 oz)    Body mass index is 34.76 kg/m .    Constitutional: Alert and oriented x4, appears uncomfortable but otherwise no acute distress and is nontoxic  CV: Regular, no murmur, no edema  Respiratory: CTA bilaterally  GI: Soft, non-tender, bowel sounds normal  Skin: Warm and dry  Musculoskeletal: Neck is supple    Data   Recent Labs   Lab 01/30/21  1026 01/30/21  0324 01/29/21  0705   WBC  --  15.2*  13.0*   HGB  --  11.8* 12.2*   MCV  --  89 89   PLT  --  432 418   * 128* 130*   POTASSIUM  --  4.4 4.3   CHLORIDE  --  98 99   CO2  --  23 26   BUN  --  19 20   CR  --  1.84* 1.90*   ANIONGAP  --  7 5   GRISELDA  --  9.2 9.1   GLC  --  120* 124*   ALBUMIN  --  3.2* 3.2*   PROTTOTAL  --  7.5 7.6   BILITOTAL  --  1.0 1.1   ALKPHOS  --  99 105   ALT  --  40 36   AST  --  20 19   TROPI  --  <0.015  --        Recent Labs   Lab 01/30/21  0324 01/29/21  0705   * 124*   *  --         Unresulted Labs Ordered in the Past 30 Days of this Admission     Date and Time Order Name Status Description    1/30/2021 1025 Procalcitonin In process            Imaging  Recent Results (from the past 24 hour(s))   CT Head w/o Contrast    Narrative    EXAM: CT HEAD W/O CONTRAST  LOCATION: [facility]  DATE/TIME: 1/30/2021 4:20 AM    INDICATION: Headache, new or worsening (Age >= 50y)  COMPARISON: None.  TECHNIQUE: Routine CT Head without IV contrast. Multiplanar reformats. Dose reduction techniques were used.    FINDINGS:  INTRACRANIAL CONTENTS: No intracranial hemorrhage, extraaxial collection, or mass effect.  No CT evidence of acute infarct. Normal parenchymal attenuation. Normal ventricles and sulci.     VISUALIZED ORBITS/SINUSES/MASTOIDS: No intraorbital abnormality. No paranasal sinus mucosal disease. No middle ear or mastoid effusion.    BONES/SOFT TISSUES: No acute abnormality.      Impression    IMPRESSION:  1.  No acute intracranial process.        I reviewed all new labs and imaging results over the last 24 hours. I personally reviewed no images or EKG's today.    Medications       allopurinol  100 mg Oral Daily     famotidine  10 mg Oral BID     metoprolol succinate ER  100 mg Oral Daily     omeprazole  20 mg Oral QAM AC     senna-docusate  1 tablet Oral At Bedtime   Reviewed    Manuel Gonzalez MD

## 2021-01-30 NOTE — ED PROVIDER NOTES
History     Chief Complaint   Patient presents with     Fever     104 at home.  Tylenol last at 10pm     HPI  Tee Hilton is a 70 year old male with history of obesity, atrial fibrillation, and hypertension presenting for evaluation of roughly 8 days of infectious symptoms including headache, fevers, and myalgias.  10 days ago patient had his second shingles shot and he began to have symptoms about 3 days later.  Has had persistent symptoms ever since.  Had a virtual visit few days ago as well as an ED visit yesterday for evaluation of his persistent symptoms.  No clear cause of his infectious symptoms identified yesterday.  He did feel better after receiving acetaminophen in the ED.  Has reportedly been taking Tylenol about every 6 hours but his symptoms progressively worsened through the day and evening last night leading to inability to sleep due to his persistent discomfort.  Patient reports a throbbing frontal bilateral headache which is currently rated 10 out of 10.  Patient reports some very mild neck discomfort but is able to move his neck fully.  Reports diffuse myalgias.  Reports occasional brief chest pains lasting a few minutes and resolving however does have associated mild nausea and diaphoresis with these episodes.  No current chest pain.  Reports normal appetite and p.o. intake but reports some recent constipation with last bowel movement about 3 days ago.  Denies change in urination.  No known sick contacts.  Has had 2 - Covid swabs recently, last swab negative yesterday.      ==================================================================    CHART REVIEW:      Echo 10/1/20    Interpretation Summary     Borderline (EF 50-55%) reduced left ventricular function is present.  Moderate right ventricular dilation is present. Global right ventricular  function is normal.  Mild dilatation of the aorta is present. Ascending aorta 4.0 cm.  IVC diameter and respiratory changes fall into an  intermediate range  suggesting an RA pressure of 8 mmHg.  No pericardial effusion is present.     This study was compared with the study from 3/15/2019. Ascending aorta  dimensions are stable. LV systolic function is minimally improved.        END CHART REVIEW  ==================================================================    Allergies:  Allergies   Allergen Reactions     Erythromycin Unknown     Upset stomach       Problem List:    Patient Active Problem List    Diagnosis Date Noted     Morbid obesity (H) 08/26/2020     Priority: Medium     Atrial fibrillation (H) 04/07/2020     Priority: Medium     Gastroesophageal reflux disease 04/07/2020     Priority: Medium     Other sleep apnea 09/24/2018     Priority: Medium     Study Date: 9/22/2018- (241.0 lbs) Polysomnogram revealed 41 obstructive, 14 central, and 38 mixed apneas resulting in an apnea index of 44.5 events per hour. There were 7 obstructive hypopneas and 0 central hypopneas resulting in an obstructive hypopnea index of 3.3 and central hypopnea index of 0 events per hour. The combined apnea/hypopnea index was 47.8 events per hour (central apnea/hypopnea index was 6.7 events per hour).  REM AHI was 0. The supine AHI was 69.0 events per hour.  RDI was 54.0 events per hour. Respiratory rate and pattern was notable for improvement of events during REM sleep,  periods where periodic breathing is suspected and circulation times that are prolonged. Most of the events however appear to be terminated by a gasp arousal.  Lowest oxygen saturation was 83.9%. Time spent less than or equal to 88% was 0.4 minutes. Time spent less than or equal to 89% was 0.9 minutes. No optimal pressure was determined due to poor sleep and poor tolerance of low-level PAP and bilevel PAP. Severe sleep disordered breathing, many findings suggested of periodic breathing though given presence of obstruction and poorly consolidated sleep this study was not definitive       Gastroesophageal  reflux disease without esophagitis      Priority: Medium     Persistent atrial fibrillation (H) 09/18/2018     Priority: Medium     Non morbid obesity due to excess calories 09/18/2018     Priority: Medium     Chronic systolic congestive heart failure (H) 09/18/2018     Priority: Medium     Alcohol use 09/18/2018     Priority: Medium     Chronic midline low back pain without sciatica 10/31/2017     Priority: Medium     Essential hypertension 11/11/2016     Priority: Medium     Other male erectile dysfunction 11/11/2016     Priority: Medium        Past Medical History:    Past Medical History:   Diagnosis Date     Gastroesophageal reflux disease      Hypertension      Irregular heart beat      Sleep apnea        Past Surgical History:    Past Surgical History:   Procedure Laterality Date     ANESTHESIA CARDIOVERSION N/A 9/24/2018    Procedure: ANESTHESIA CARDIOVERSION;  Cardioversion ;  Surgeon: GENERIC ANESTHESIA PROVIDER;  Location: [facility] OR     COLONOSCOPY N/A 5/25/2017    Procedure: COMBINED COLONOSCOPY, SINGLE OR MULTIPLE BIOPSY/POLYPECTOMY BY BIOPSY;;  Surgeon: Aquilino Garcia MD;  Location: [facility] OR     COLONOSCOPY WITH CO2 INSUFFLATION N/A 5/25/2017    Procedure: COLONOSCOPY WITH CO2 INSUFFLATION;  COLONOSCOPY SCREEN/ ORDAZ;  Surgeon: Aquilino Garcia MD;  Location: [facility] OR     OPEN REDUCTION INTERNAL FIXATION TIBIA       SEPTOPLASTY, TURBINOPLASTY, COMBINED Bilateral 7/9/2019    Procedure: ENDOSCOPIC SEPTOPLASTY, BILATERAL TURBINATE REDUCTION;  Surgeon: Alexander Avila MD;  Location: [facility] OR     TONSILLECTOMY         Family History:    Family History   Problem Relation Age of Onset     Gout Father      Lung Cancer Father        Social History:  Marital Status:   [2]  Social History     Tobacco Use     Smoking status: Never Smoker     Smokeless tobacco: Never Used     Tobacco comment: never smoked   Substance Use Topics     Alcohol use: Yes     Alcohol/week: 0.0 standard drinks     Comment:  social use     Drug use: No        Medications:         allopurinol (ZYLOPRIM) 100 MG tablet       amLODIPine (NORVASC) 5 MG tablet       Famotidine (PEPCID PO)       lisinopril (ZESTRIL) 20 MG tablet       methylPREDNISolone (MEDROL DOSEPAK) 4 MG tablet therapy pack       metoprolol succinate ER (TOPROL-XL) 100 MG 24 hr tablet       omeprazole (PRILOSEC) 20 MG CR capsule       ondansetron (ZOFRAN) 4 MG tablet       rivaroxaban ANTICOAGULANT (XARELTO ANTICOAGULANT) 20 MG TABS tablet       sildenafil (REVATIO) 20 MG tablet       zolpidem (AMBIEN) 10 MG tablet          Review of Systems   Constitutional: Positive for activity change, diaphoresis, fatigue and fever. Negative for appetite change and chills.   HENT: Negative for congestion, rhinorrhea and sore throat.    Eyes: Negative for visual disturbance.   Respiratory: Negative for cough, chest tightness and shortness of breath.    Cardiovascular: Negative for chest pain.   Gastrointestinal: Positive for constipation and nausea. Negative for abdominal pain, diarrhea and vomiting.   Genitourinary: Negative for decreased urine volume and dysuria.   Musculoskeletal: Positive for myalgias. Negative for back pain and neck stiffness.   Skin: Negative for rash and wound.   Neurological: Positive for headaches. Negative for seizures, syncope, speech difficulty, weakness, light-headedness and numbness.   Psychiatric/Behavioral: Positive for confusion (Per wife, occasional) and sleep disturbance (Due to ongoing pain). Negative for hallucinations.   All other systems reviewed and are negative.      Physical Exam   BP: (!) 156/97  Pulse: 121  Temp: 100.6  F (38.1  C)  Resp: 18  Weight: 108.9 kg (240 lb)  SpO2: 96 %      Physical Exam  Vitals signs and nursing note reviewed.   Constitutional:       Appearance: He is obese. He is not ill-appearing or diaphoretic.   HENT:      Head: Atraumatic.      Nose: Nose normal.      Mouth/Throat:      Pharynx: Oropharynx is clear.   Eyes:       Conjunctiva/sclera: Conjunctivae normal.   Neck:      Musculoskeletal: Normal range of motion and neck supple. No neck rigidity.   Cardiovascular:      Rate and Rhythm: Tachycardia present. Rhythm irregular.      Pulses: Normal pulses.   Pulmonary:      Effort: Pulmonary effort is normal.      Breath sounds: Normal breath sounds. No wheezing or rhonchi.   Abdominal:      General: Bowel sounds are normal.      Palpations: Abdomen is soft.      Tenderness: There is no abdominal tenderness. There is no guarding.      Comments: Protuberant   Musculoskeletal: Normal range of motion.   Skin:     General: Skin is warm and dry.      Capillary Refill: Capillary refill takes less than 2 seconds.   Neurological:      Mental Status: He is alert and oriented to person, place, and time.      Sensory: No sensory deficit.      Motor: No weakness.   Psychiatric:         Mood and Affect: Mood normal.         ED Course        Procedures               EKG Interpretation:      Interpreted by Milan Hall MD  Time reviewed: 0405  Symptoms at time of EKG: None   Rhythm: atrial fibrillation - rapid  Rate: 139  Axis: Normal  Ectopy: none  Conduction: normal  ST Segments/ T Waves: ST Segment Depression V4, V5 and V6  Q Waves: III  Comparison to prior: Grossly similar to previous but with slight depressions laterally which are new compared EKG 6/25/2019    Clinical Impression: Rapid atrial fibrillation with nonspecific ST changes                     Results for orders placed or performed during the hospital encounter of 01/30/21 (from the past 24 hour(s))   CBC with platelets differential   Result Value Ref Range    WBC 15.2 (H) 4.0 - 11.0 10e9/L    RBC Count 3.76 (L) 4.4 - 5.9 10e12/L    Hemoglobin 11.8 (L) 13.3 - 17.7 g/dL    Hematocrit 33.5 (L) 40.0 - 53.0 %    MCV 89 78 - 100 fl    MCH 31.4 26.5 - 33.0 pg    MCHC 35.2 31.5 - 36.5 g/dL    RDW 11.9 10.0 - 15.0 %    Platelet Count 432 150 - 450 10e9/L    Diff Method Automated  Method     % Neutrophils 78.1 %    % Lymphocytes 7.0 %    % Monocytes 13.0 %    % Eosinophils 1.0 %    % Basophils 0.3 %    % Immature Granulocytes 0.6 %    Nucleated RBCs 0 0 /100    Absolute Neutrophil 11.9 (H) 1.6 - 8.3 10e9/L    Absolute Lymphocytes 1.1 0.8 - 5.3 10e9/L    Absolute Monocytes 2.0 (H) 0.0 - 1.3 10e9/L    Absolute Eosinophils 0.2 0.0 - 0.7 10e9/L    Absolute Basophils 0.0 0.0 - 0.2 10e9/L    Abs Immature Granulocytes 0.1 0 - 0.4 10e9/L    Absolute Nucleated RBC 0.0    Comprehensive metabolic panel   Result Value Ref Range    Sodium 128 (L) 133 - 144 mmol/L    Potassium 4.4 3.4 - 5.3 mmol/L    Chloride 98 94 - 109 mmol/L    Carbon Dioxide 23 20 - 32 mmol/L    Anion Gap 7 3 - 14 mmol/L    Glucose 120 (H) 70 - 99 mg/dL    Urea Nitrogen 19 7 - 30 mg/dL    Creatinine 1.84 (H) 0.66 - 1.25 mg/dL    GFR Estimate 37 (L) >60 mL/min/[1.73_m2]    GFR Estimate If Black 43 (L) >60 mL/min/[1.73_m2]    Calcium 9.2 8.5 - 10.1 mg/dL    Bilirubin Total 1.0 0.2 - 1.3 mg/dL    Albumin 3.2 (L) 3.4 - 5.0 g/dL    Protein Total 7.5 6.8 - 8.8 g/dL    Alkaline Phosphatase 99 40 - 150 U/L    ALT 40 0 - 70 U/L    AST 20 0 - 45 U/L   Lactic acid whole blood   Result Value Ref Range    Lactic Acid 1.1 0.7 - 2.0 mmol/L   Glucose by meter   Result Value Ref Range    Glucose 116 (H) 70 - 99 mg/dL   Troponin I   Result Value Ref Range    Troponin I ES <0.015 0.000 - 0.045 ug/L   CT Head w/o Contrast    Narrative    EXAM: CT HEAD W/O CONTRAST  LOCATION: [facility]  DATE/TIME: 1/30/2021 4:20 AM    INDICATION: Headache, new or worsening (Age >= 50y)  COMPARISON: None.  TECHNIQUE: Routine CT Head without IV contrast. Multiplanar reformats. Dose reduction techniques were used.    FINDINGS:  INTRACRANIAL CONTENTS: No intracranial hemorrhage, extraaxial collection, or mass effect.  No CT evidence of acute infarct. Normal parenchymal attenuation. Normal ventricles and sulci.     VISUALIZED ORBITS/SINUSES/MASTOIDS: No  intraorbital abnormality. No paranasal sinus mucosal disease. No middle ear or mastoid effusion.    BONES/SOFT TISSUES: No acute abnormality.      Impression    IMPRESSION:  1.  No acute intracranial process.       Medications   HYDROmorphone (PF) (DILAUDID) injection 0.5 mg (0.5 mg Intravenous Given 1/30/21 0404)   acetaminophen (TYLENOL) tablet 1,000 mg (1,000 mg Oral Given 1/30/21 0401)     4:41 AM; I reviewed the CT of the head: No significant hemorrhage or mass.  Formal read pending    4:51 AM: Due to patient being on Xarelto, unable to do the LP today.  Recommendation is to wait 48 hours after last dose which patient reports was around 9:45 AM yesterday.  Therefore he would be safe to perform the LP anytime after 9:45 AM January 31    5:22 AM: Patient reassessed.  Resting comfortably.  Reports slight improvement with the initial dose of Dilaudid.  Would like some more medications for his pain.  Advised him of the need to delay a lumbar puncture until tomorrow due to his being on Xarelto.  Plan to admit for symptom control and LP tomorrow.    5:30 AM: Discussed with Cruz Keyes, tele-hospitalist. Accepts for admission.      Assessments & Plan (with Medical Decision Making)  70-year-old male with obesity, A. fib, and hypertension presenting for evaluation of 8 days of headaches, fevers, and myalgias.  Did receive his Shingrix shot 10 days ago and has had persistent symptoms over the past 8 days.  Symptoms are currently lasting longer than would be expected for adverse effect of the Shingrix vaccine and likely represent a secondary infection.  Patient well-appearing and hemodynamically normal but complaining of severe headache pain as well as myalgias and fever which are not adequately controlled taking 4 g of Tylenol daily.  Unable to take ibuprofen due to being on Xarelto.  Patient does not have significant neck pain or stiffness but does have a significant headache and reportedly has had some bouts of mild  confusion raising concern for possible mild encephalitis/meningitis.  Given the length of symptoms in the nontoxic-appearing of him at this time, this is likely viral in origin however patient feels he cannot tolerate the symptoms adequately at home and requires additional IV opiates for symptom control.  Had plan to do an LP in the ED however because patient is on Xarelto, this would not be a safe procedure to do until tomorrow after 9:45 AM.  Patient therefore admitted for observation and symptom control treatment with plan for LP tomorrow.     I have reviewed the nursing notes.    I have reviewed the findings, diagnosis, plan and need for follow up with the patient.       New Prescriptions    No medications on file       Final diagnoses:   Fever and chills   Myalgia   Acute intractable headache, unspecified headache type - possible viral meningitis   Hyponatremia   Acute kidney injury (H)       1/30/2021   [facility] EMERGENCY DEPT     Hall, Milan Romero MD  01/30/21 0632

## 2021-01-30 NOTE — PLAN OF CARE
PT- order received, pt was up walking in the hallway without assistive device, steady on his feet, lives with wife, no stairs, does not have any concerns about returning home, able to demonstrate walking and transfers indep and safe, recommend discharge to home, no further skilled PT needs.

## 2021-01-30 NOTE — H&P
[facility]     History and Physical - Hospitalist Service       Date of Admission:  1/30/2021    Assessment & Plan   Tee Hilton is a 70 year old male admitted on 1/30/2021 possible early sepsis with concern for viral symptoms, hyponatremia, acute kidney injury and dehydration.        Possible early sepsis though no clear source of infection with viral like symptoms. Admit the patient to Medical Telemetry under observation. Continue IV fluid monitor hemodynamics. Note lactic acid normal though the patient does have a fever an leukocytosis.  UA/CXR as well as COVID testing negative.  The patient is saturating well on room air.   Consider LP on 1/31/21 around 9:45 AM as the patient was recently on Xarelto.  Hold empiric antibiotic for now until LP is done or unless if the patient becomes unstable/hypotensive.   Follow up blood cultures.    Acute kidney injury. Likely from dehydration continue IV fluid and monitor renal function.    Hyponatremia appear likely secondary to hypovolemia/ dehydration.  Continue normal saline and monitor sodium closely.      Dehydration. IVF.    Mild chronic systolic heart failure.  No sign of volume overload.  Continue to monitor for now with IVF as above (slightly dry).      Atrial fibrillation continue home metoprolol and hold Xarelto for now due to plan LP.    Hypertension. Hold  Home blood pressure medication and monitor blood pressure.  Will treat as needed    DVT prophylaxis again hold anticoagulation and SCDs for now in anticipation for LP.      Code status full code    Disposition likely home in 2 days    Cruz Keyes MD 01/30/2021, 5:47 AM     The patient's care was discussed with the Bedside Nurse.    Cruz Keyes MD  [facility]   Contact information available via [facility] Paging/Directory      ______________________________________________________________________    Chief Complaint   Fever, nausea, headache and body  aches    History is obtained from the patient    History of Present Illness   Tee Hilton is a 70 year old male with past medical history of chronic mild systolic heart failure (EF 50%), atrial fibrillation on Xarelto, hypertension, obstructive sleep apnea and GERD presents with complaints of nausea, body aches, headache and fever. Of note, the patient had a virtual visit from his PCP and our ER visit yesterday and was sent home after no clear etiology was found (COVID testing was done yesterday in our ER and was negative).  However, per report, the patient started to have these symptoms three days after his second shingle vaccine dose about 10 days ago. It was reported also that the patient was tested for COVID 5 days ago and was also negative. Due to the patient's nausea and fatigue, the patient did admit to have poor oral intake.  The patient however denies any recent coughing, chest pain, shortness of breath, vomiting, diarrhea or dysuria.    In our emergency room, the patient has leukocytosis with WBC at the 15. Patient sodium was 128 and creatinine of 1.84. UA was negative for UTI and chest x-ray was not suggestive of pneumonia.  Blood cultures were drawn and antibiotics were held due to no clear source of infection.  CT head prelim results shows no acute finding.  LP was planned by our ER physician but due to the fact that the patient recent took his Xalrelto (9:45 AM on 1/29//21) the plan was to admit the patient to observation and obtain LP around 9:45 AM on 1/31/21.      Review of Systems    The 10 point Review of Systems is negative other than noted in the HPI or here.     Past Medical History    I have reviewed this patient's medical history and updated it with pertinent information if needed.   Past Medical History:   Diagnosis Date     Gastroesophageal reflux disease      Hypertension      Irregular heart beat      Sleep apnea        Past Surgical History   I have reviewed this patient's surgical  history and updated it with pertinent information if needed.  Past Surgical History:   Procedure Laterality Date     ANESTHESIA CARDIOVERSION N/A 9/24/2018    Procedure: ANESTHESIA CARDIOVERSION;  Cardioversion ;  Surgeon: GENERIC ANESTHESIA PROVIDER;  Location: [facility] OR     COLONOSCOPY N/A 5/25/2017    Procedure: COMBINED COLONOSCOPY, SINGLE OR MULTIPLE BIOPSY/POLYPECTOMY BY BIOPSY;;  Surgeon: Aquilino Garcia MD;  Location: [facility] OR     COLONOSCOPY WITH CO2 INSUFFLATION N/A 5/25/2017    Procedure: COLONOSCOPY WITH CO2 INSUFFLATION;  COLONOSCOPY SCREEN/ ORDAZ;  Surgeon: Aquilino Garcia MD;  Location: [facility] OR     OPEN REDUCTION INTERNAL FIXATION TIBIA       SEPTOPLASTY, TURBINOPLASTY, COMBINED Bilateral 7/9/2019    Procedure: ENDOSCOPIC SEPTOPLASTY, BILATERAL TURBINATE REDUCTION;  Surgeon: Alexander Avila MD;  Location: [facility] OR     TONSILLECTOMY         Social History   I have reviewed this patient's social history and updated it with pertinent information if needed.  Social History     Tobacco Use     Smoking status: Never Smoker     Smokeless tobacco: Never Used     Tobacco comment: never smoked   Substance Use Topics     Alcohol use: Yes     Alcohol/week: 0.0 standard drinks     Comment: social use     Drug use: No       Family History   I have reviewed this patient's family history and updated it with pertinent information if needed.  Family History   Problem Relation Age of Onset     Gout Father      Lung Cancer Father        Prior to Admission Medications   Prior to Admission Medications   Prescriptions Last Dose Informant Patient Reported? Taking?   Famotidine (PEPCID PO)   Yes No   Sig: Take 10 mg by mouth   allopurinol (ZYLOPRIM) 100 MG tablet   No No   Sig: Take 1 tablet (100 mg) by mouth daily   amLODIPine (NORVASC) 5 MG tablet   No No   Sig: Take 1 tablet (5 mg) by mouth daily   lisinopril (ZESTRIL) 20 MG tablet   No No   Sig: TAKE 2 TABLETS BY MOUTH ONCE DAILY   methylPREDNISolone (MEDROL  DOSEPAK) 4 MG tablet therapy pack   No No   Sig: Follow Package Directions   metoprolol succinate ER (TOPROL-XL) 100 MG 24 hr tablet   No No   Sig: Take 1 tablet (100 mg) by mouth daily   omeprazole (PRILOSEC) 20 MG CR capsule   Yes No   Sig: Take 20 mg by mouth   ondansetron (ZOFRAN) 4 MG tablet   No No   Sig: Take 1 tablet (4 mg) by mouth every 8 hours as needed for nausea   rivaroxaban ANTICOAGULANT (XARELTO ANTICOAGULANT) 20 MG TABS tablet   No No   Sig: TAKE 1 TABLET BY MOUTH ONCE DAILY WITH  DINNER   sildenafil (REVATIO) 20 MG tablet   No No   Sig: Take 1 -5 tabs daily prn   zolpidem (AMBIEN) 10 MG tablet   No No   Sig: Take 1 tablet (10 mg) by mouth nightly as needed      Facility-Administered Medications: None     Allergies   Allergies   Allergen Reactions     Erythromycin Unknown     Upset stomach       Physical Exam   Vital Signs: Temp: 99.3  F (37.4  C) Temp src: Oral BP: 112/70 Pulse: 115   Resp: 19 SpO2: 94 % O2 Device: None (Room air)    Weight: 240 lbs 0 oz    GENERAL: The patient is not in any acute distressed. Awake and alert.  HEENT: Nonicteric sclerae, PERRLA, EOMI. Oropharynx clear. Moist mucous membranes. Conjunctivae appear well perfused.  HEART: Regular rate and rhythm without murmurs. No lower extremities edema.  LUNGS: Clear to auscultation bilaterally. No wheezing, crackles or rhonchi  ABDOMEN: Soft, positive bowel sounds, nontender.  SKIN: No rash, no excessive bruising, petechiae, or purpura.  NEUROLOGIC: AxO x 3.  Cranial nerves II-XII intact without motor/sensory deficit.        Data   Data reviewed today: I reviewed all medications, new labs and imaging results over the last 24 hours.  Recent Labs   Lab 01/30/21  0324 01/29/21  0705   WBC 15.2* 13.0*   HGB 11.8* 12.2*   MCV 89 89    418   * 130*   POTASSIUM 4.4 4.3   CHLORIDE 98 99   CO2 23 26   BUN 19 20   CR 1.84* 1.90*   ANIONGAP 7 5   GRISELDA 9.2 9.1   * 124*   ALBUMIN 3.2* 3.2*   PROTTOTAL 7.5 7.6   BILITOTAL 1.0  "1.1   ALKPHOS 99 105   ALT 40 36   AST 20 19   TROPI <0.015  --      Recent Results (from the past 24 hour(s))   Chest XR,  PA & LAT    Narrative    CHEST TWO VIEWS   1/29/2021 8:40 AM     HISTORY: Fever without known source. Question rales in left base.    COMPARISON: None.      Impression    IMPRESSION: PA and lateral views of the chest. Lungs are clear. Heart  is normal in size. No effusions are evident. No pneumothorax.    VANDANA HUNT MD   CT Head w/o Contrast    Narrative    EXAM: CT HEAD W/O CONTRAST  LOCATION: [facility]  DATE/TIME: 1/30/2021 4:20 AM    INDICATION: Headache, new or worsening (Age >= 50y)  COMPARISON: None.  TECHNIQUE: Routine CT Head without IV contrast. Multiplanar reformats. Dose reduction techniques were used.    FINDINGS:  INTRACRANIAL CONTENTS: No intracranial hemorrhage, extraaxial collection, or mass effect.  No CT evidence of acute infarct. Normal parenchymal attenuation. Normal ventricles and sulci.     VISUALIZED ORBITS/SINUSES/MASTOIDS: No intraorbital abnormality. No paranasal sinus mucosal disease. No middle ear or mastoid effusion.    BONES/SOFT TISSUES: No acute abnormality.      Impression    IMPRESSION:  1.  No acute intracranial process.   Telemed statement : The patient was evaluated via telemedicine and was in agreement with the plan.     The nurse was at the bedside during the entire encounter which took place virtually from  [place] .     The patient was evaluated at  [facility]\"    "

## 2021-01-30 NOTE — ED NOTES
"Pt states his head is \"swollen.\" States he feels swollen and extreme pressure. Pt denies trauma.   "

## 2021-01-30 NOTE — PLAN OF CARE
"[place] [facility]    Patient admitted to room 2302 at approximately 0640 via cart from emergency room. Patient was accompanied by transport tech.     Verbal SBAR report received from Anna ED RN prior to patient arrival.     Patient ambulated to bed independently. Patient alert and oriented X 3. Pain is controlled with current analgesics.  Medication(s) being used: narcotic analgesics including Dilaudid.  . Admission vital signs: Blood pressure 138/87, pulse 97, temperature 98.7  F (37.1  C), temperature source Oral, resp. rate 18, height 1.778 m (5' 10\"), weight 109.9 kg (242 lb 4.6 oz), SpO2 93 %. Patient was oriented to plan of care, call light, bed controls, tv, telephone, bathroom, and visiting hours.     Risk Assessment    The following safety risks were identified during admission: none. Yellow risk band applied: YES.     Skin Initial Assessment    Skin check not completed.         Education    Patient has a [place] to Observation order: Yes  Observation education completed and documented: Yes      Chante Dejesus RN      "

## 2021-01-30 NOTE — PROGRESS NOTES
Reviewing chart due to high probability of Admit to Med/Surg unit. Ray Fischer RN on 1/30/2021 at 5:57 AM

## 2021-01-31 ENCOUNTER — ANESTHESIA EVENT (OUTPATIENT)
Dept: MEDSURG UNIT | Facility: CLINIC | Age: 71
End: 2021-01-31
Payer: COMMERCIAL

## 2021-01-31 ENCOUNTER — ANESTHESIA (OUTPATIENT)
Dept: MEDSURG UNIT | Facility: CLINIC | Age: 71
End: 2021-01-31
Payer: COMMERCIAL

## 2021-01-31 LAB
ALBUMIN UR-MCNC: NEGATIVE MG/DL
ANION GAP SERPL CALCULATED.3IONS-SCNC: 6 MMOL/L (ref 3–14)
APPEARANCE CSF: ABNORMAL
APPEARANCE UR: CLEAR
BACTERIA #/AREA URNS HPF: ABNORMAL /HPF
BASOPHILS # BLD AUTO: 0.1 10E9/L (ref 0–0.2)
BASOPHILS # BLD AUTO: 0.1 10E9/L (ref 0–0.2)
BASOPHILS NFR BLD AUTO: 0.3 %
BASOPHILS NFR BLD AUTO: 0.4 %
BILIRUB UR QL STRIP: NEGATIVE
BUN SERPL-MCNC: 22 MG/DL (ref 7–30)
C GATTII+NEOFOR DNA CSF QL NAA+NON-PROBE: NEGATIVE
CALCIUM SERPL-MCNC: 8.7 MG/DL (ref 8.5–10.1)
CHLORIDE SERPL-SCNC: 98 MMOL/L (ref 94–109)
CMV DNA CSF QL NAA+NON-PROBE: NEGATIVE
CO2 SERPL-SCNC: 24 MMOL/L (ref 20–32)
COLOR CSF: COLORLESS
COLOR UR AUTO: YELLOW
CREAT SERPL-MCNC: 1.6 MG/DL (ref 0.66–1.25)
CREAT SERPL-MCNC: 1.75 MG/DL (ref 0.66–1.25)
CREAT UR-MCNC: 165 MG/DL
DIFFERENTIAL METHOD BLD: ABNORMAL
DIFFERENTIAL METHOD BLD: ABNORMAL
E COLI K1 DNA CSF QL NAA+NON-PROBE: NEGATIVE
EOSINOPHIL # BLD AUTO: 0.1 10E9/L (ref 0–0.7)
EOSINOPHIL # BLD AUTO: 0.1 10E9/L (ref 0–0.7)
EOSINOPHIL NFR BLD AUTO: 0.5 %
EOSINOPHIL NFR BLD AUTO: 0.7 %
ERYTHROCYTE [DISTWIDTH] IN BLOOD BY AUTOMATED COUNT: 11.9 % (ref 10–15)
ERYTHROCYTE [DISTWIDTH] IN BLOOD BY AUTOMATED COUNT: 11.9 % (ref 10–15)
EV RNA CSF QL NAA+NON-PROBE: NEGATIVE
FRACT EXCRET NA UR+SERPL-RTO: 0.4 %
GFR SERPL CREATININE-BSD FRML MDRD: 39 ML/MIN/{1.73_M2}
GFR SERPL CREATININE-BSD FRML MDRD: 44 ML/MIN/{1.73_M2}
GLUCOSE CSF-MCNC: 43 MG/DL (ref 40–70)
GLUCOSE SERPL-MCNC: 150 MG/DL (ref 70–99)
GLUCOSE UR STRIP-MCNC: NEGATIVE MG/DL
GP B STREP DNA CSF QL NAA+NON-PROBE: NEGATIVE
GRAM STN SPEC: NORMAL
HAEM INFLU DNA CSF QL NAA+NON-PROBE: NEGATIVE
HCT VFR BLD AUTO: 31.9 % (ref 40–53)
HCT VFR BLD AUTO: 32.5 % (ref 40–53)
HGB BLD-MCNC: 11.3 G/DL (ref 13.3–17.7)
HGB BLD-MCNC: 11.3 G/DL (ref 13.3–17.7)
HGB UR QL STRIP: ABNORMAL
HHV6 DNA CSF QL NAA+NON-PROBE: NEGATIVE
HSV1 DNA CSF QL NAA+NON-PROBE: NEGATIVE
HSV2 DNA CSF QL NAA+NON-PROBE: NEGATIVE
IMM GRANULOCYTES # BLD: 0.1 10E9/L (ref 0–0.4)
IMM GRANULOCYTES # BLD: 0.1 10E9/L (ref 0–0.4)
IMM GRANULOCYTES NFR BLD: 0.5 %
IMM GRANULOCYTES NFR BLD: 0.6 %
KETONES UR STRIP-MCNC: NEGATIVE MG/DL
L MONOCYTOG DNA CSF QL NAA+NON-PROBE: NEGATIVE
LABORATORY COMMENT REPORT: ABNORMAL
LACTATE BLD-SCNC: 0.9 MMOL/L (ref 0.7–2)
LEUKOCYTE ESTERASE UR QL STRIP: NEGATIVE
LYMPH ABN NFR CSF MANUAL: 3 %
LYMPHOCYTES # BLD AUTO: 0.9 10E9/L (ref 0.8–5.3)
LYMPHOCYTES # BLD AUTO: 1.2 10E9/L (ref 0.8–5.3)
LYMPHOCYTES NFR BLD AUTO: 6.3 %
LYMPHOCYTES NFR BLD AUTO: 7.6 %
Lab: NORMAL
MCH RBC QN AUTO: 31.2 PG (ref 26.5–33)
MCH RBC QN AUTO: 31.6 PG (ref 26.5–33)
MCHC RBC AUTO-ENTMCNC: 34.8 G/DL (ref 31.5–36.5)
MCHC RBC AUTO-ENTMCNC: 35.4 G/DL (ref 31.5–36.5)
MCV RBC AUTO: 89 FL (ref 78–100)
MCV RBC AUTO: 90 FL (ref 78–100)
MONOCYTES # BLD AUTO: 1.6 10E9/L (ref 0–1.3)
MONOCYTES # BLD AUTO: 1.9 10E9/L (ref 0–1.3)
MONOCYTES NFR BLD AUTO: 10.8 %
MONOCYTES NFR BLD AUTO: 12.2 %
MONOS+MACROS NFR CSF MANUAL: 10 %
MUCOUS THREADS #/AREA URNS LPF: PRESENT /LPF
N MEN DNA CSF QL NAA+NON-PROBE: NEGATIVE
NEUTROPHILS # BLD AUTO: 12.1 10E9/L (ref 1.6–8.3)
NEUTROPHILS # BLD AUTO: 12.2 10E9/L (ref 1.6–8.3)
NEUTROPHILS NFR BLD AUTO: 78.5 %
NEUTROPHILS NFR BLD AUTO: 81.6 %
NEUTROPHILS NFR CSF MANUAL: 87 %
NITRATE UR QL: NEGATIVE
NRBC # BLD AUTO: 0 10*3/UL
NRBC # BLD AUTO: 0 10*3/UL
NRBC BLD AUTO-RTO: 0 /100
NRBC BLD AUTO-RTO: 0 /100
PARECHOVIRUS A RNA CSF QL NAA+NON-PROBE: NEGATIVE
PH UR STRIP: 6 PH (ref 5–7)
PLATELET # BLD AUTO: 376 10E9/L (ref 150–450)
PLATELET # BLD AUTO: 421 10E9/L (ref 150–450)
POTASSIUM SERPL-SCNC: 4 MMOL/L (ref 3.4–5.3)
PROT CSF-MCNC: 51 MG/DL (ref 15–60)
RBC # BLD AUTO: 3.58 10E12/L (ref 4.4–5.9)
RBC # BLD AUTO: 3.62 10E12/L (ref 4.4–5.9)
RBC # CSF MANUAL: 17 /UL (ref 0–2)
RBC #/AREA URNS AUTO: 3 /HPF (ref 0–2)
RETICS # AUTO: 44 10E9/L (ref 25–95)
RETICS/RBC NFR AUTO: 1.2 % (ref 0.5–2)
S PNEUM DNA CSF QL NAA+NON-PROBE: POSITIVE
SODIUM SERPL-SCNC: 126 MMOL/L (ref 133–144)
SODIUM SERPL-SCNC: 128 MMOL/L (ref 133–144)
SODIUM UR-SCNC: 45 MMOL/L
SOURCE: ABNORMAL
SP GR UR STRIP: 1.01 (ref 1–1.03)
SPECIMEN SOURCE: NORMAL
TUBE # CSF: 4 #
UROBILINOGEN UR STRIP-MCNC: 4 MG/DL (ref 0–2)
VZV DNA CSF QL NAA+NON-PROBE: NEGATIVE
WBC # BLD AUTO: 14.8 10E9/L (ref 4–11)
WBC # BLD AUTO: 15.5 10E9/L (ref 4–11)
WBC # CSF MANUAL: 598 /UL (ref 0–5)
WBC #/AREA URNS AUTO: 1 /HPF (ref 0–5)

## 2021-01-31 PROCEDURE — 250N000011 HC RX IP 250 OP 636: Performed by: INTERNAL MEDICINE

## 2021-01-31 PROCEDURE — G0378 HOSPITAL OBSERVATION PER HR: HCPCS

## 2021-01-31 PROCEDURE — 84300 ASSAY OF URINE SODIUM: CPT | Performed by: INTERNAL MEDICINE

## 2021-01-31 PROCEDURE — 85045 AUTOMATED RETICULOCYTE COUNT: CPT | Performed by: INTERNAL MEDICINE

## 2021-01-31 PROCEDURE — 82565 ASSAY OF CREATININE: CPT | Performed by: INTERNAL MEDICINE

## 2021-01-31 PROCEDURE — 80048 BASIC METABOLIC PNL TOTAL CA: CPT | Performed by: INTERNAL MEDICINE

## 2021-01-31 PROCEDURE — 83605 ASSAY OF LACTIC ACID: CPT | Performed by: INTERNAL MEDICINE

## 2021-01-31 PROCEDURE — 36415 COLL VENOUS BLD VENIPUNCTURE: CPT | Performed by: INTERNAL MEDICINE

## 2021-01-31 PROCEDURE — 99233 SBSQ HOSP IP/OBS HIGH 50: CPT | Performed by: INTERNAL MEDICINE

## 2021-01-31 PROCEDURE — 120N000001 HC R&B MED SURG/OB

## 2021-01-31 PROCEDURE — 250N000013 HC RX MED GY IP 250 OP 250 PS 637: Performed by: INTERNAL MEDICINE

## 2021-01-31 PROCEDURE — 85025 COMPLETE CBC W/AUTO DIFF WBC: CPT | Performed by: INTERNAL MEDICINE

## 2021-01-31 PROCEDURE — 84295 ASSAY OF SERUM SODIUM: CPT | Performed by: INTERNAL MEDICINE

## 2021-01-31 PROCEDURE — 85060 BLOOD SMEAR INTERPRETATION: CPT | Performed by: INTERNAL MEDICINE

## 2021-01-31 PROCEDURE — 81001 URINALYSIS AUTO W/SCOPE: CPT | Performed by: INTERNAL MEDICINE

## 2021-01-31 PROCEDURE — 999N001109 HC STATISTIC MORPHOLOGY W/INTERP HISTOLOGY TC 85060: Performed by: INTERNAL MEDICINE

## 2021-01-31 PROCEDURE — 250N000009 HC RX 250: Performed by: NURSE ANESTHETIST, CERTIFIED REGISTERED

## 2021-01-31 PROCEDURE — 250N000011 HC RX IP 250 OP 636: Performed by: EMERGENCY MEDICINE

## 2021-01-31 PROCEDURE — 82570 ASSAY OF URINE CREATININE: CPT | Performed by: INTERNAL MEDICINE

## 2021-01-31 PROCEDURE — 999N000011 HC STATISTIC ANESTHESIA CASE

## 2021-01-31 PROCEDURE — 258N000003 HC RX IP 258 OP 636: Performed by: INTERNAL MEDICINE

## 2021-01-31 RX ORDER — ZOLPIDEM TARTRATE 12.5 MG/1
12.5 TABLET, FILM COATED, EXTENDED RELEASE ORAL AT BEDTIME
Status: DISCONTINUED | OUTPATIENT
Start: 2021-01-31 | End: 2021-01-31

## 2021-01-31 RX ORDER — ZOLPIDEM TARTRATE 5 MG/1
10 TABLET ORAL
Status: DISCONTINUED | OUTPATIENT
Start: 2021-01-31 | End: 2021-02-04 | Stop reason: HOSPADM

## 2021-01-31 RX ORDER — SODIUM CHLORIDE 9 MG/ML
INJECTION, SOLUTION INTRAVENOUS CONTINUOUS
Status: DISCONTINUED | OUTPATIENT
Start: 2021-01-31 | End: 2021-02-03

## 2021-01-31 RX ORDER — ALPRAZOLAM 0.5 MG
0.5 TABLET ORAL
Status: DISCONTINUED | OUTPATIENT
Start: 2021-01-31 | End: 2021-02-04 | Stop reason: HOSPADM

## 2021-01-31 RX ORDER — LIDOCAINE HYDROCHLORIDE 10 MG/ML
INJECTION, SOLUTION EPIDURAL; INFILTRATION; INTRACAUDAL; PERINEURAL PRN
Status: DISCONTINUED | OUTPATIENT
Start: 2021-01-31 | End: 2021-01-31

## 2021-01-31 RX ORDER — CEFTRIAXONE SODIUM 2 G/50ML
2 INJECTION, SOLUTION INTRAVENOUS EVERY 12 HOURS
Status: DISCONTINUED | OUTPATIENT
Start: 2021-01-31 | End: 2021-02-04 | Stop reason: HOSPADM

## 2021-01-31 RX ORDER — CEFAZOLIN SODIUM 1 G/50ML
2000 SOLUTION INTRAVENOUS EVERY 12 HOURS
Status: DISCONTINUED | OUTPATIENT
Start: 2021-01-31 | End: 2021-02-03

## 2021-01-31 RX ORDER — DEXAMETHASONE SODIUM PHOSPHATE 4 MG/ML
16 INJECTION, SOLUTION INTRA-ARTICULAR; INTRALESIONAL; INTRAMUSCULAR; INTRAVENOUS; SOFT TISSUE EVERY 6 HOURS
Status: DISCONTINUED | OUTPATIENT
Start: 2021-01-31 | End: 2021-02-01

## 2021-01-31 RX ADMIN — ACETAMINOPHEN 650 MG: 325 TABLET, FILM COATED ORAL at 07:49

## 2021-01-31 RX ADMIN — FAMOTIDINE 10 MG: 10 TABLET, FILM COATED ORAL at 19:07

## 2021-01-31 RX ADMIN — METOPROLOL SUCCINATE 100 MG: 100 TABLET, EXTENDED RELEASE ORAL at 07:46

## 2021-01-31 RX ADMIN — ONDANSETRON 4 MG: 2 INJECTION INTRAMUSCULAR; INTRAVENOUS at 20:19

## 2021-01-31 RX ADMIN — OMEPRAZOLE 20 MG: 20 CAPSULE, DELAYED RELEASE ORAL at 07:45

## 2021-01-31 RX ADMIN — ACETAMINOPHEN 650 MG: 325 TABLET, FILM COATED ORAL at 20:36

## 2021-01-31 RX ADMIN — ACETAMINOPHEN 650 MG: 325 TABLET, FILM COATED ORAL at 03:31

## 2021-01-31 RX ADMIN — VANCOMYCIN HYDROCHLORIDE 2000 MG: 10 INJECTION, POWDER, LYOPHILIZED, FOR SOLUTION INTRAVENOUS at 23:23

## 2021-01-31 RX ADMIN — SODIUM CHLORIDE: 9 INJECTION, SOLUTION INTRAVENOUS at 22:29

## 2021-01-31 RX ADMIN — ALLOPURINOL 100 MG: 100 TABLET ORAL at 07:45

## 2021-01-31 RX ADMIN — ACETAMINOPHEN 650 MG: 325 TABLET, FILM COATED ORAL at 12:36

## 2021-01-31 RX ADMIN — CEFTRIAXONE SODIUM 2 G: 2 INJECTION, SOLUTION INTRAVENOUS at 22:29

## 2021-01-31 RX ADMIN — SODIUM CHLORIDE 500 ML: 9 INJECTION, SOLUTION INTRAVENOUS at 20:13

## 2021-01-31 RX ADMIN — DEXAMETHASONE SODIUM PHOSPHATE 16 MG: 4 INJECTION, SOLUTION INTRAMUSCULAR; INTRAVENOUS at 22:23

## 2021-01-31 RX ADMIN — ZOLPIDEM TARTRATE 10 MG: 5 TABLET ORAL at 22:22

## 2021-01-31 RX ADMIN — LIDOCAINE HYDROCHLORIDE 5 ML: 10 INJECTION, SOLUTION EPIDURAL; INFILTRATION; INTRACAUDAL; PERINEURAL at 12:02

## 2021-01-31 RX ADMIN — FAMOTIDINE 10 MG: 10 TABLET, FILM COATED ORAL at 07:46

## 2021-01-31 RX ADMIN — ONDANSETRON 4 MG: 4 TABLET, ORALLY DISINTEGRATING ORAL at 09:39

## 2021-01-31 RX ADMIN — DOCUSATE SODIUM AND SENNOSIDES 1 TABLET: 8.6; 5 TABLET ORAL at 20:34

## 2021-01-31 RX ADMIN — ACETAMINOPHEN 650 MG: 325 TABLET, FILM COATED ORAL at 16:17

## 2021-01-31 ASSESSMENT — ACTIVITIES OF DAILY LIVING (ADL)
DIFFICULTY_COMMUNICATING: NO
WALKING_OR_CLIMBING_STAIRS_DIFFICULTY: NO
CONCENTRATING,_REMEMBERING_OR_MAKING_DECISIONS_DIFFICULTY: NO
DRESSING/BATHING_DIFFICULTY: NO
WEAR_GLASSES_OR_BLIND: NO
TOILETING_ISSUES: NO
DOING_ERRANDS_INDEPENDENTLY_DIFFICULTY: NO
FALL_HISTORY_WITHIN_LAST_SIX_MONTHS: NO
DIFFICULTY_EATING/SWALLOWING: NO
ADLS_ACUITY_SCORE: 17

## 2021-01-31 ASSESSMENT — ENCOUNTER SYMPTOMS: DYSRHYTHMIAS: 1

## 2021-01-31 NOTE — PLAN OF CARE
Patient alert and oriented. Temp elevated to 102.8 this evening, PRN tylenol given and on-call provider notified.  Blood cultures ordered.  Patient c/o constipation this evening, PRN senna ordered per on-call provider.

## 2021-01-31 NOTE — PLAN OF CARE
Problem: Adult Inpatient Plan of Care  Goal: Optimal Comfort and Wellbeing  1/30/2021 1315 by Zoila Encinas, RN  Outcome: No Change      1 mg PO Dilaudid given for headache which was helpful.  Fever of 102.8, too early for next dose of extra-strength Tylenol.  Forgetful.  Stand-by assist to bathroom.  Sepsis protocol triggered by temp of 100.3 F and HR of 101; lactic normal.

## 2021-01-31 NOTE — PLAN OF CARE
"Pt is on observation status. Pt still having constant headache with some relief from Tylenol. Blood pressure (!) 148/83, pulse 98, temperature 100.9  F (38.3  C), temperature source Oral, resp. rate 16, height 1.778 m (5' 10\"), weight 109.9 kg (242 lb 4.6 oz), SpO2 95 %. Up ambulating independently in room. Steady on feet. Pt lost IV access and is declining new access at this time. Low grade temp persists. Blood pressures intermittently elevated. Cuff was too small. Improved when measured with correct cuff size. Pt to have lumbar puncture today. Continue to assess per POC.    "

## 2021-01-31 NOTE — ANESTHESIA CARE TRANSFER NOTE
Patient: Tee Hilton    * No procedures listed *    Diagnosis: * No pre-op diagnosis entered *  Diagnosis Additional Information: No value filed.    Anesthesia Type:   Spinal     Note:    Oropharynx: oropharynx clear of all foreign objects  Level of Consciousness: awake  Oxygen Supplementation: room air    Independent Airway: airway patency satisfactory and stable  Dentition: dentition unchanged  Vital Signs Stable: post-procedure vital signs reviewed and stable  Report to RN Given: handoff report given  Patient transferred to: Medical/Surgical Unit  Comments: LP only, PT HOB 10 degrees. Instructed to lay for 1 hour  Handoff Report: Identifed the Patient, Identified the Reponsible Provider, Reviewed the pertinent medical history, Discussed the surgical course, Reviewed Intra-OP anesthesia mangement and issues during anesthesia, Set expectations for post-procedure period and Allowed opportunity for questions and acknowledgement of understanding      Vitals: (Last set prior to Anesthesia Care Transfer)    Electronically Signed By: Sheba Espinal CRNA, APRN CRNA  January 31, 2021  12:18 PM

## 2021-01-31 NOTE — ANESTHESIA PREPROCEDURE EVALUATION
Anesthesia Pre-Procedure Evaluation    Patient: Tee Hilton   MRN: [id number] : 1950        Preoperative Diagnosis: * No pre-op diagnosis entered *   Procedure : * No procedures listed *     Past Medical History:   Diagnosis Date     Gastroesophageal reflux disease      Hypertension      Irregular heart beat      Sleep apnea       Past Surgical History:   Procedure Laterality Date     ANESTHESIA CARDIOVERSION N/A 2018    Procedure: ANESTHESIA CARDIOVERSION;  Cardioversion ;  Surgeon: GENERIC ANESTHESIA PROVIDER;  Location: [facility] OR     COLONOSCOPY N/A 2017    Procedure: COMBINED COLONOSCOPY, SINGLE OR MULTIPLE BIOPSY/POLYPECTOMY BY BIOPSY;;  Surgeon: Aquilino Garcia MD;  Location: [facility] OR     COLONOSCOPY WITH CO2 INSUFFLATION N/A 2017    Procedure: COLONOSCOPY WITH CO2 INSUFFLATION;  COLONOSCOPY SCREEN/ ORDAZ;  Surgeon: Aquilino Garcia MD;  Location: [facility] OR     OPEN REDUCTION INTERNAL FIXATION TIBIA       SEPTOPLASTY, TURBINOPLASTY, COMBINED Bilateral 2019    Procedure: ENDOSCOPIC SEPTOPLASTY, BILATERAL TURBINATE REDUCTION;  Surgeon: Alexander Avila MD;  Location: [facility] OR     TONSILLECTOMY        Allergies   Allergen Reactions     Erythromycin GI Disturbance     Upset stomach      Social History     Tobacco Use     Smoking status: Never Smoker     Smokeless tobacco: Never Used     Tobacco comment: never smoked   Substance Use Topics     Alcohol use: Yes     Alcohol/week: 0.0 standard drinks     Comment: social use      Wt Readings from Last 1 Encounters:   21 109.9 kg (242 lb 4.6 oz)        Anesthesia Evaluation   Pt has had prior anesthetic.         ROS/MED HX  ENT/Pulmonary:     (+) sleep apnea,     Neurologic:       Cardiovascular:     (+) hypertension-----CHF dysrhythmias, a-fib, Irregular Heartbeat/Palpitations,     METS/Exercise Tolerance:     Hematologic:       Musculoskeletal:       GI/Hepatic:     (+) GERD, liver disease,     Renal/Genitourinary:     (+)  renal disease,     Endo:     (+) Obesity,     Psychiatric/Substance Use:       Infectious Disease:       Malignancy:       Other:               OUTSIDE LABS:  CBC:   Lab Results   Component Value Date    WBC 14.8 (H) 01/31/2021    WBC 15.2 (H) 01/30/2021    HGB 11.3 (L) 01/31/2021    HGB 11.8 (L) 01/30/2021    HCT 32.5 (L) 01/31/2021    HCT 33.5 (L) 01/30/2021     01/31/2021     01/30/2021     BMP:   Lab Results   Component Value Date     (L) 01/31/2021     (L) 01/30/2021    POTASSIUM 4.0 01/31/2021    POTASSIUM 4.4 01/30/2021    CHLORIDE 98 01/31/2021    CHLORIDE 98 01/30/2021    CO2 24 01/31/2021    CO2 23 01/30/2021    BUN 22 01/31/2021    BUN 19 01/30/2021    CR 1.75 (H) 01/31/2021    CR 1.84 (H) 01/30/2021     (H) 01/31/2021     (H) 01/30/2021     COAGS:   Lab Results   Component Value Date    PTT 26 06/04/2007    INR 1.02 06/04/2007     POC:   Lab Results   Component Value Date     (H) 01/30/2021     HEPATIC:   Lab Results   Component Value Date    ALBUMIN 3.2 (L) 01/30/2021    PROTTOTAL 7.5 01/30/2021    ALT 40 01/30/2021    AST 20 01/30/2021    ALKPHOS 99 01/30/2021    BILITOTAL 1.0 01/30/2021     OTHER:   Lab Results   Component Value Date    LACT 1.1 01/30/2021    GRISELDA 8.7 01/31/2021    MAG 2.1 09/24/2018    TSH 2.41 08/23/2018    CRP 95.6 (H) 01/30/2021       Anesthesia Plan   Procedure only, no anesthetic delivered    History & Physical Review      ASA Status:  3. NPO Status:  NPO Appropriate. .  Plan for Spinal           Comments: LUMBAR PUNCTURE ONLY.       Consents  Anesthesia Plan(s) and associated risks, benefits, and realistic alternatives discussed.    Questions answered and patient/representative(s) expressed understanding.    Discussed with:  Patient.             Postoperative Care            Sheba Espinal, CRNA, APRN CRNA

## 2021-01-31 NOTE — PROGRESS NOTES
Was informed by patient and primary staff that patient is to have a LP today at 10 am.  Looked at PTA list and last dose of xarelto unclear.    Patient and patients wife in room.  Asked wife when he had last dose.  Wife confirms last dose was at 10 AM on Friday 1/29.

## 2021-01-31 NOTE — PROGRESS NOTES
[facility] Medicine Progress Note  Date of Service: 01/31/2021    Assessment & Plan   Tee Hilton is a 70 year old male who presented on 1/30/2021 with 8 days of fevers, myalgias and intermittent confusion.    AMINA (acute kidney injury) on chronic kidney disease     Creatinine 1.38 Aug, 2020.  Presented to the ED 1/29 with creatinine 1.90 and then 1.84 on 1/30 early morning.  Suspect prerenal due to dehydration related to current illness.     Creatinine 1.75 today.    - check FENa   -Follow with fluid resuscitation   -Holding lisinopril    Fevers    Started 2-3 days after his second shingles vaccine and symptoms ongoing 8 days on arrival to ED 1/30.  Associated with severe headaches that are frontal and over the top of his head. No significant neck pain.  No runny nose or congestion.  No sore throat.  No cough.  No shortness of breath or chest pain.  Has had some myalgias and weakness.   Had outpatient Covid testing that was negative.  No nausea vomiting.  No diarrhea.  Wife is not ill.  Presented to the ED on 1/29 with wife who expressed concerns of some confusion.  Urinalysis negative.  Covid testing was negative.  He was given some fluids and discharged home.  He returned on 1/30 with fever to 104.  Work-up once again does not reveal a source.  White blood cell count is elevated at 15.2 and is mildly left shifted with 78% neutrophils.  In ED a lumbar puncture was felt appropriate however patient is on anticoagulation and this has to be held 48 hours.    As I see later on 1/30, patient's not much changed.  No confusion.  No meningismus.  CRP was added on and is elevated at 95.6.  No localizing signs or symptoms and clinical condition is not worsening so holding on antibiotics at this time. Procalcitonin 0.17.     Continues to have fevers, headache without meningismus, mild encephalopathy at times. Culatures negative. LP completed today 1/31 and awaiting results.     -Continue supportive cares at this time   -Follow clinically for localizing signs or symptoms   - If LP shows aseptic pattern, consider checking MRI brain. Patient has severe claustrophobia which may make this a difficult exam to complete, so will wait for CSF labs and reassess need for MRI tomorrow.     ADDDENDUM: CSF labs returned in the evening and show 598 cells, 87% neutrophils. Meningitis/encephalitis PCR panel positive for Strep pneumoniae. Ceftriaxone 2g IV q12h and dexamethasone 16 mg IV q6h ordered by tele-hospitalist cross cover and I agree. Reviewed recs in UTD and vancomycin recommended until sensitivities known due to uncommon but possible resistance so added with pharmacist to dose. Patient made aware of diagnosis and is alert and oriented and NAD.     Hyponatremia, mild, suspect due to some dehydration    Sodium is 128.  It was 130 the day before.  Suspect this is due to his hypovolemia but lisinopril could also play a role.  After initial IV fluids sodium is 129 -> 128.   - checking FENa but will empirically increase IVF, 500 mL NS bolus  ADDENDUM: FENa low, 04%, consistent with prerenal azotemia.    - continue IVF      Hypertension    On amlodipine, metoprolol and lisinopril at home.  Blood pressures are adequate currently.   -Holding lisinopril and amlodipine   -Continue metoprolol    Chronic atrial fibrillation (H)    Rate controlled with metoprolol.  Patient is on rivaroxaban for anticoagulation.     Rivaroxaban held 2 days for LP now completed.    -Continue metoprolol   - resume anticoagulation in the morning (Patient takes his rivaroxaban and all his meds once in the morning)      DVT Prophylaxis: Low Risk/Ambulatory with no VTE prophylaxis indicated  Code Status: Full Code    Lines: PIV   Davis catheter: None    Discussion: Clinically, the patient has remained about the same since admission.  Has intermittent mild confusion so not appropriate for discharge.  Discussed at bedside with patient  and wife at length.    Disposition: Anticipate discharge unclear pending results of work-up and clinical course    Attestation:  Total time: 45 minutes with > 50% counseling patient and wife on findings, diagnoses, plans    Manuel Gonzalez MD  [facility] Medicine        Interval History   Still with headache that waxes and wanes.  Still has periods of high fever up to 102.  Wife and nursing note patient has episodes of mild disorientation.  No new symptoms.  No neck pain, sore throat, runny nose, cough, shortness of breath, chest pain, abdominal pain.    Physical Exam   Temp:  [100.2  F (37.9  C)-102.8  F (39.3  C)] 102  F (38.9  C)  Pulse:  [] 108  Resp:  [16-18] 16  BP: (138-186)/(72-94) 149/87  SpO2:  [95 %-98 %] 98 %    Weights:   Vitals:    01/30/21 0320 01/30/21 0639   Weight: 108.9 kg (240 lb) 109.9 kg (242 lb 4.6 oz)    Body mass index is 34.76 kg/m .    Constitutional: Alert and oriented x4, nontoxic appearing, mentation appears a bit slowed from yesterday but otherwise appropriate  CV: Regular, no murmur, no edema  Respiratory: CTA bilaterally  GI: Soft, non-tender, bowel sounds normal  Skin: Warm and mildly diaphoretic on his back and axilla otherwise dry  Neurologic: Neck is supple, face moves symmetrically, speech is fluent, moves all extremities equally.    Data   Recent Labs   Lab 01/31/21  0434 01/30/21  1026 01/30/21  0324 01/29/21  0705   WBC 14.8*  --  15.2* 13.0*   HGB 11.3*  --  11.8* 12.2*   MCV 90  --  89 89     --  432 418   * 129* 128* 130*   POTASSIUM 4.0  --  4.4 4.3   CHLORIDE 98  --  98 99   CO2 24  --  23 26   BUN 22  --  19 20   CR 1.75*  --  1.84* 1.90*   ANIONGAP 6  --  7 5   GRISELDA 8.7  --  9.2 9.1   *  --  120* 124*   ALBUMIN  --   --  3.2* 3.2*   PROTTOTAL  --   --  7.5 7.6   BILITOTAL  --   --  1.0 1.1   ALKPHOS  --   --  99 105   ALT  --   --  40 36   AST  --   --  20 19   TROPI  --   --  <0.015  --        Recent Labs   Lab 01/31/21  0434 01/30/21  0324  01/29/21  0705   * 120* 124*   BGM  --  116*  --         Unresulted Labs Ordered in the Past 30 Days of this Admission     Date and Time Order Name Status Description    1/30/2021 1948 Blood culture Preliminary     1/30/2021 1948 Blood culture Preliminary     1/30/2021 0350 Varicella Zoster DNA PCR CSF or Skin Swab (1 mL minimum) In process     1/30/2021 0350 HSV Types 1 and 2 Qualitative PCR CSF: Tube 3 In process     1/30/2021 0350 Meningitis/Encephalitis Panel Qual PCR CSF: Tube 3 In process     1/30/2021 0350 VDRL CSF: Tube 2 In process     1/30/2021 0350 Cell count with differential CSF: Tube 4 In process     1/30/2021 0350 CSF Culture Aerobic Bacterial In process     1/30/2021 0350 Gram stain In process     1/30/2021 0350 Protein total CSF: Tube 2 In process     1/30/2021 0350 Glucose CSF: Tube 2 In process            Imaging: No results found for this or any previous visit (from the past 24 hour(s)).     I reviewed all new labs and imaging results over the last 24 hours. I personally reviewed no images or EKG's today.    Medications     sodium chloride         sodium chloride 0.9%  500 mL Intravenous Once     allopurinol  100 mg Oral Daily     famotidine  10 mg Oral BID     metoprolol succinate ER  100 mg Oral Daily     omeprazole  20 mg Oral QAM AC     senna-docusate  1 tablet Oral At Bedtime   Reviewed iveth Gonzalez MD  [facility] Medicine

## 2021-01-31 NOTE — ANESTHESIA PROCEDURE NOTES
Pre-Procedure   Staff -   CRNA: Sheba Espinal APRN CRNA  Performed By: CRNA  Location: floor  Procedure Start/Stop Times: 1/31/2021 11:59 AM and 1/31/2021 12:11 PM  Pre-Anesthestic Checklist: patient identified, risks and benefits discussed, informed consent, monitors and equipment checked, pre-op evaluation and at physician/surgeon's request  Timeout:  Correct Patient: Yes   Correct Procedure: Yes   Correct Site: Yes   Correct Position: Yes   Correct Laterality: N/A   Site Marked: N/A    Procedure Documentation  Procedure: lumbar puncture  Patient Position:sitting  Patient Prep/Sterile Barriers: sterile gloves, mask, Betadine, patient draped  Insertion Site: L3-4. (midline approach).  Spinal Needle (gauge): 25   Spinal/LP Needle Length (inches): 3.5  Spinal Needle Type: Conrad tipIntroducer used  # of attempts: 1 and # of redirects:     Assessment/Narrative      Paresthesias: No.  6 mL of  CSF fluid: clear.  Opening pressure was cmH2O while sitting.

## 2021-01-31 NOTE — UTILIZATION REVIEW
[facility] Utilization Review  Admission Status; Secondary Review Determination     Admission Date: 1/30/2021  3:16 AM      Under the authority of the Utilization Management Committee, the utilization review process indicated a secondary review on the above patient.  The review outcome is based on review of the medical records, discussions with staff, and applying clinical experience noted on the date of the review.        (X)      Inpatient Status Appropriate - This patient's medical care is consistent with medical management for inpatient care and reasonable inpatient medical practice.          RATIONALE FOR DETERMINATION   70-year-old male with history of chronic kidney disease stage III, admitted with fevers, chills, myalgias, acute intractable headache with hyponatremia and acute kidney injury.  Patient has elevated creatinine to 1.9, likely secondary to prerenal, with fevers.  Patient had second shingles vaccine and developed fevers with severe headaches, and chills.  Outpatient Covid test was negative, also found to be confused, was given IV fluids and then discharged home, returned the next day with fevers up to 104, with leukocytosis, anticoagulation has to be held for 48 hours prior to lumbar puncture.  CRP elevated, no localizing signs, holding off on antibiotics, plan for LP today.  Patient continues to have ongoing fevers up to 102 this morning with elevated blood pressures.  Blood cultures negative, checking for blood cultures, meningitis, encephalitis, HSV, VZV panel.  Complex patient who needs further work-up of fevers with ongoing symptoms, needs close monitoring in the hospital and is at risk of acute decompensation, anticipate more than 2 midnight hospital stay, recommend change to inpatient status      The severity of illness, intensity of service provided, expected LOS and risk for adverse outcome make the care complex, high risk and appropriate for hospital admission.The patient  requires hospital based medical care which is anticipated to require a stay of 2 or more midnights.        The information on this document is developed by the utilization review team in order for the business office to ensure compliance.  This only denotes the appropriateness of proper admission status and does not reflect the quality of care rendered.              Sincerely,       Bernadette Cage MD  Physician Advisor  Utilization Review-[place]    Phone: [phone]

## 2021-01-31 NOTE — ANESTHESIA POSTPROCEDURE EVALUATION
Patient: Tee Hilton    * No procedures listed *    Diagnosis:* No pre-op diagnosis entered *  Diagnosis Additional Information: No value filed.    Anesthesia Type:  Spinal    Note:  Disposition: Inpatient   Postop Pain Control: Uneventful            Sign Out: Well controlled pain   PONV: No   Neuro/Psych: Uneventful            Sign Out: Acceptable/Baseline neuro status   Airway/Respiratory: Uneventful            Sign Out: Acceptable/Baseline resp. status   CV/Hemodynamics: Uneventful            Sign Out: Acceptable CV status   Other NRE: NONE   DID A NON-ROUTINE EVENT OCCUR? No         Last vitals:  Vitals:    01/31/21 0741 01/31/21 0807 01/31/21 1056   BP: 138/72 (!) 173/88 (!) 145/90   Pulse: 106 105 106   Resp: 16 16 16   Temp: 38.8  C (101.9  F) 37.9  C (100.2  F) 38.2  C (100.8  F)   SpO2: 98% 95% 98%       Electronically Signed By: Sheba Espinal CRNA, APRN CRNA  January 31, 2021  12:20 PM

## 2021-02-01 ENCOUNTER — APPOINTMENT (OUTPATIENT)
Dept: LAB | Facility: CLINIC | Age: 71
End: 2021-02-01
Attending: PHYSICIAN ASSISTANT
Payer: COMMERCIAL

## 2021-02-01 LAB
ALBUMIN SERPL-MCNC: 3.1 G/DL (ref 3.4–5)
ALP SERPL-CCNC: 88 U/L (ref 40–150)
ALT SERPL W P-5'-P-CCNC: 30 U/L (ref 0–70)
ANION GAP SERPL CALCULATED.3IONS-SCNC: 7 MMOL/L (ref 3–14)
AST SERPL W P-5'-P-CCNC: 19 U/L (ref 0–45)
BILIRUB SERPL-MCNC: 0.6 MG/DL (ref 0.2–1.3)
BUN SERPL-MCNC: 19 MG/DL (ref 7–30)
CALCIUM SERPL-MCNC: 9.1 MG/DL (ref 8.5–10.1)
CHLORIDE SERPL-SCNC: 100 MMOL/L (ref 94–109)
CO2 SERPL-SCNC: 22 MMOL/L (ref 20–32)
CREAT SERPL-MCNC: 1.44 MG/DL (ref 0.66–1.25)
CRP SERPL-MCNC: 49.3 MG/L (ref 0–8)
GFR SERPL CREATININE-BSD FRML MDRD: 49 ML/MIN/{1.73_M2}
GLUCOSE SERPL-MCNC: 139 MG/DL (ref 70–99)
HSV1 DNA CSF QL NAA+PROBE: NOT DETECTED
HSV2 DNA CSF QL NAA+PROBE: NOT DETECTED
MICROBIOLOGIST REVIEW: NORMAL
POTASSIUM SERPL-SCNC: 4.8 MMOL/L (ref 3.4–5.3)
PROCALCITONIN SERPL-MCNC: 0.09 NG/ML
PROT SERPL-MCNC: 7.8 G/DL (ref 6.8–8.8)
SODIUM SERPL-SCNC: 129 MMOL/L (ref 133–144)
SPECIMEN TYPE: NORMAL
VARICELLA ZOSTER DNA PCR COMMENT: NORMAL
VZV DNA SPEC QL NAA+PROBE: NORMAL

## 2021-02-01 PROCEDURE — 250N000011 HC RX IP 250 OP 636: Performed by: INTERNAL MEDICINE

## 2021-02-01 PROCEDURE — 84145 PROCALCITONIN (PCT): CPT | Performed by: INTERNAL MEDICINE

## 2021-02-01 PROCEDURE — 99233 SBSQ HOSP IP/OBS HIGH 50: CPT | Performed by: FAMILY MEDICINE

## 2021-02-01 PROCEDURE — 120N000001 HC R&B MED SURG/OB

## 2021-02-01 PROCEDURE — 250N000013 HC RX MED GY IP 250 OP 250 PS 637: Performed by: INTERNAL MEDICINE

## 2021-02-01 PROCEDURE — 86140 C-REACTIVE PROTEIN: CPT | Performed by: INTERNAL MEDICINE

## 2021-02-01 PROCEDURE — 258N000003 HC RX IP 258 OP 636: Performed by: INTERNAL MEDICINE

## 2021-02-01 PROCEDURE — 250N000011 HC RX IP 250 OP 636: Performed by: FAMILY MEDICINE

## 2021-02-01 PROCEDURE — 36415 COLL VENOUS BLD VENIPUNCTURE: CPT | Performed by: INTERNAL MEDICINE

## 2021-02-01 PROCEDURE — 80053 COMPREHEN METABOLIC PANEL: CPT | Performed by: INTERNAL MEDICINE

## 2021-02-01 PROCEDURE — 250N000013 HC RX MED GY IP 250 OP 250 PS 637: Performed by: FAMILY MEDICINE

## 2021-02-01 RX ORDER — DEXAMETHASONE SODIUM PHOSPHATE 4 MG/ML
10 INJECTION, SOLUTION INTRA-ARTICULAR; INTRALESIONAL; INTRAMUSCULAR; INTRAVENOUS; SOFT TISSUE EVERY 6 HOURS
Status: COMPLETED | OUTPATIENT
Start: 2021-02-01 | End: 2021-02-03

## 2021-02-01 RX ORDER — LISINOPRIL 40 MG/1
40 TABLET ORAL DAILY
Status: DISCONTINUED | OUTPATIENT
Start: 2021-02-01 | End: 2021-02-02

## 2021-02-01 RX ADMIN — LISINOPRIL 40 MG: 40 TABLET ORAL at 08:11

## 2021-02-01 RX ADMIN — DEXAMETHASONE SODIUM PHOSPHATE 16 MG: 4 INJECTION, SOLUTION INTRAMUSCULAR; INTRAVENOUS at 05:06

## 2021-02-01 RX ADMIN — DEXAMETHASONE SODIUM PHOSPHATE 10 MG: 4 INJECTION, SOLUTION INTRAMUSCULAR; INTRAVENOUS at 10:56

## 2021-02-01 RX ADMIN — Medication 1 MG: at 01:06

## 2021-02-01 RX ADMIN — ALPRAZOLAM 0.5 MG: 0.5 TABLET ORAL at 01:06

## 2021-02-01 RX ADMIN — ACETAMINOPHEN 650 MG: 325 TABLET, FILM COATED ORAL at 16:00

## 2021-02-01 RX ADMIN — DOCUSATE SODIUM AND SENNOSIDES 1 TABLET: 8.6; 5 TABLET ORAL at 21:15

## 2021-02-01 RX ADMIN — CEFTRIAXONE SODIUM 2 G: 2 INJECTION, SOLUTION INTRAVENOUS at 21:15

## 2021-02-01 RX ADMIN — RIVAROXABAN 20 MG: 10 TABLET, FILM COATED ORAL at 08:10

## 2021-02-01 RX ADMIN — FAMOTIDINE 10 MG: 10 TABLET, FILM COATED ORAL at 18:04

## 2021-02-01 RX ADMIN — DEXAMETHASONE SODIUM PHOSPHATE 10 MG: 4 INJECTION, SOLUTION INTRAMUSCULAR; INTRAVENOUS at 18:04

## 2021-02-01 RX ADMIN — ACETAMINOPHEN 650 MG: 325 TABLET, FILM COATED ORAL at 01:06

## 2021-02-01 RX ADMIN — METOPROLOL SUCCINATE 100 MG: 100 TABLET, EXTENDED RELEASE ORAL at 08:10

## 2021-02-01 RX ADMIN — OMEPRAZOLE 20 MG: 20 CAPSULE, DELAYED RELEASE ORAL at 08:11

## 2021-02-01 RX ADMIN — ALLOPURINOL 100 MG: 100 TABLET ORAL at 08:11

## 2021-02-01 RX ADMIN — ZOLPIDEM TARTRATE 10 MG: 5 TABLET ORAL at 21:15

## 2021-02-01 RX ADMIN — CEFTRIAXONE SODIUM 2 G: 2 INJECTION, SOLUTION INTRAVENOUS at 09:42

## 2021-02-01 RX ADMIN — VANCOMYCIN HYDROCHLORIDE 2000 MG: 10 INJECTION, POWDER, LYOPHILIZED, FOR SOLUTION INTRAVENOUS at 00:30

## 2021-02-01 RX ADMIN — VANCOMYCIN HYDROCHLORIDE 2000 MG: 10 INJECTION, POWDER, LYOPHILIZED, FOR SOLUTION INTRAVENOUS at 10:56

## 2021-02-01 ASSESSMENT — ACTIVITIES OF DAILY LIVING (ADL)
ADLS_ACUITY_SCORE: 14

## 2021-02-01 NOTE — CONSULTS
Pharmacy Vancomycin Initial Note  Date of Service 2021  Patient's  1950  70 year old, male    Indication: Meningitis    Current estimated CrCl = Estimated Creatinine Clearance: 54.9 mL/min (A) (based on SCr of 1.6 mg/dL (H)).    Creatinine for last 3 days  2021:  7:05 AM Creatinine 1.90 mg/dL  2021:  3:24 AM Creatinine 1.84 mg/dL  2021:  4:34 AM Creatinine 1.75 mg/dL;  4:57 PM Creatinine 1.60 mg/dL    Recent Vancomycin Level(s) for last 3 days  No results found for requested labs within last 72 hours.      Vancomycin IV Administrations (past 72 hours)      No vancomycin orders with administrations in past 72 hours.                Nephrotoxins and other renal medications (From now, onward)    Start     Dose/Rate Route Frequency Ordered Stop    21 2230  vancomycin (VANCOCIN) 2,000 mg in sodium chloride 0.9 % 500 mL intermittent infusion      2,000 mg  over 2 Hours Intravenous EVERY 12 HOURS 21 2201            Contrast Orders - past 72 hours (72h ago, onward)    None                Plan:  1.  Start vancomycin  2000 mg IV q12h.   2.  Goal Trough Level: 15-20 mg/L   3.  Pharmacy will check trough levels as appropriate in 1-3 Days.    4. Serum creatinine levels will be ordered daily for the first week of therapy and at least twice weekly for subsequent weeks.    5. Herscher method utilized to dose vancomycin therapy: Method 1    Aj Nicholas [facility]

## 2021-02-01 NOTE — PLAN OF CARE
Problem: Adult Inpatient Plan of Care  Goal: Optimal Comfort and Wellbeing  Outcome: No Change   Wife Gillian expressed concern about patient being forgetful and exhibiting uncharacteristic behaviors (such as startling awake and flailing arms).  Fever spiked higher (102 degrees F) about same time as yesterday, around 1600.  650 mg PRN Tylenol given about every four hours.  Independent in room, gait steady, able to ambulate hallway.  IV came out at change of shift so fluid bolus was held and handed off to oncoming RN.

## 2021-02-01 NOTE — PROVIDER NOTIFICATION
DATE:  1/31/2021   TIME OF RECEIPT FROM LAB:  2105  LAB TEST: + streptococcus pneumoniae from LP panel of tests  LAB VALUE:  + streptococcus pneumoniae    RESULTS GIVEN WITH READ-BACK TO (PROVIDER):  rCuz Hayden  TIME LAB VALUE REPORTED TO PROVIDER:  2108

## 2021-02-01 NOTE — SIGNIFICANT EVENT
Significant Event Note    Contacted with csf that was pcr postive for streptococcus pnuemonia.  Chart reviewed.  Reviewed ID reference to ensure.  Dexamethasone started with rocephin 2 g iv every 12 hours.  Appropriate isolation precautions in place.      Cruz Hayden MD

## 2021-02-01 NOTE — CONSULTS
Care Management Initial Consult    General Information  Assessment completed with: Patient, Spouse, Cory       Primary Care Provider verified and updated as needed: Yes   Readmission within the last 30 days: no previous admission in last 30 days         Advance Care Planning: Advance Care Planning Reviewed: no concerns identified          Communication Assessment  Patient's communication style: spoken language (English or Bilingual)    Hearing Difficulty or Deaf: no   Wear Glasses or Blind: no    Cognitive  Cognitive/Neuro/Behavioral: WDL  Level of Consciousness: alert  Arousal Level: opens eyes spontaneously  Orientation: oriented x 4  Mood/Behavior: calm, cooperative  Best Language: 0 - No aphasia  Speech: clear, spontaneous    Living Environment:   People in home: spouse  Gillian       Able to return to prior arrangements: yes       Family/Social Support:  Care provided by: self  Provides care for: no one  Marital Status:   Wife  Gillian       Description of Support System: Supportive, Involved    Support Assessment: Adequate family and caregiver support    Current Resources:   Skilled Home Care Services:  None  Community Resources: None  Equipment currently used at home: none  Supplies currently used at home: None    Financial Concerns: No concerns identified           Lifestyle & Psychosocial Needs:        Socioeconomic History     Marital status:      Spouse name: Not on file     Number of children: 3     Years of education: Not on file     Highest education level: Not on file   Occupational History     Occupation: sales and marketing     Tobacco Use     Smoking status: Never Smoker     Smokeless tobacco: Never Used     Tobacco comment: never smoked   Substance and Sexual Activity     Alcohol use: Yes     Alcohol/week: 0.0 standard drinks     Comment: social use     Drug use: No     Sexual activity: Yes     Partners: Female         Mental Health Status:  Mental Health Status: No Current  Concerns       Chemical Dependency Status:  Chemical Dependency Status: No Current Concerns          Additional Information:    Consulted due to an elevated risk for readmission (20%).  Met with the patient and his wife, Gillian for discharge planning.  Introduced myself and role.    The patient lives with his wife independently in the community.  There are no discharge needs identified.    CCRC task completed for a post hospital follow up appointment.  PCP is [facility].  A hand off for Clinic Care Coordination will be completed upon discharge.    Plan:  Home      Indu Salgado RN

## 2021-02-01 NOTE — PROGRESS NOTES
[facility]      Subjective:  Feels better  Less ha  Able to walk    Review of Systems:  CONSTITUTIONAL:fever  INTEGUMENTARY/SKIN: NEGATIVE for worrisome rashes, moles or lesions  EYES: NEGATIVE for vision changes or irritation  ENT/MOUTH: NEGATIVE for ear, mouth and throat problems  RESP: NEGATIVE for significant cough or SOB  BREAST: NEGATIVE for masses, tenderness or discharge  CV: NEGATIVE for chest pain, palpitations or peripheral edema  GI: NEGATIVE for nausea, abdominal pain, heartburn, or change in bowel habits  : NEGATIVE for frequency, dysuria, or hematuria  MUSCULOSKELETAL: NEGATIVE for significant arthralgias or myalgia  NEURO: ha  ENDOCRINE: NEGATIVE for temperature intolerance, skin/hair changes  HEME: NEGATIVE for bleeding problems  PSYCHIATRIC: NEGATIVE for changes in mood or affect    Physical Exam:  Vitals Were Reviewed    Patient Vitals for the past 16 hrs:   BP Temp Temp src Pulse Resp SpO2   02/01/21 0707 (!) 141/101 98.6  F (37  C) Oral 96 16 98 %   02/01/21 0106 (!) 146/82 98.6  F (37  C) Oral 99 18 96 %   01/31/21 2323 130/83 98.6  F (37  C) Oral 80 18 95 %   01/31/21 1900 119/69 -- -- 84 -- --   01/31/21 1622 (!) 149/87 -- -- 108 -- 98 %         Intake/Output Summary (Last 24 hours) at 2/1/2021 0745  Last data filed at 1/31/2021 1000  Gross per 24 hour   Intake --   Output 100 ml   Net -100 ml       GENERAL APPEARANCE: alert, nad  EYES: conjunctiva clear, eyes grossly normal  NECK: mild discomfort with full forward flexion  RESP: lungs clear to auscultation - no rales, rhonchi or wheezes  CV: regular rate and rhythm, normal S1 S2, no S3 or S4 and no murmur, click or rub   ABDOMEN: soft, nontender, no HSM or masses and bowel sounds normal  MS: no clubbing, cyanosis; no edema  SKIN: clear without significant rashes or lesions  NEURO: Normal strength and tone, sensory exam grossly normal, mentation intact and speech normal    Lab:  Recent Labs   Lab Test 02/01/21  8242  01/31/21  1657 01/31/21  0434   * 126* 128*   POTASSIUM 4.8  --  4.0   CHLORIDE 100  --  98   CO2 22  --  24   ANIONGAP 7  --  6   *  --  150*   BUN 19  --  22   CR 1.44* 1.60* 1.75*   GRISELDA 9.1  --  8.7     CBC RESULTS:   Recent Labs   Lab Test 01/31/21  1820 01/31/21  0434   WBC 15.5* 14.8*   RBC 3.58* 3.62*   HGB 11.3* 11.3*   HCT 31.9* 32.5*    421       Results for orders placed or performed during the hospital encounter of 01/30/21 (from the past 24 hour(s))   UA with Microscopic   Result Value Ref Range    Color Urine Yellow     Appearance Urine Clear     Glucose Urine Negative NEG^Negative mg/dL    Bilirubin Urine Negative NEG^Negative    Ketones Urine Negative NEG^Negative mg/dL    Specific Gravity Urine 1.013 1.003 - 1.035    Blood Urine Small (A) NEG^Negative    pH Urine 6.0 5.0 - 7.0 pH    Protein Albumin Urine Negative NEG^Negative mg/dL    Urobilinogen mg/dL 4.0 (H) 0.0 - 2.0 mg/dL    Nitrite Urine Negative NEG^Negative    Leukocyte Esterase Urine Negative NEG^Negative    Source Midstream Urine     WBC Urine 1 0 - 5 /HPF    RBC Urine 3 (H) 0 - 2 /HPF    Bacteria Urine Few (A) NEG^Negative /HPF    Mucous Urine Present (A) NEG^Negative /LPF   Lactic acid level STAT   Result Value Ref Range    Lactate for Sepsis Protocol 0.9 0.7 - 2.0 mmol/L   Sodium   Result Value Ref Range    Sodium 126 (L) 133 - 144 mmol/L   Creatinine   Result Value Ref Range    Creatinine 1.60 (H) 0.66 - 1.25 mg/dL    GFR Estimate 44 (L) >60 mL/min/[1.73_m2]    GFR Estimate If Black 50 (L) >60 mL/min/[1.73_m2]   CBC with platelets differential   Result Value Ref Range    WBC 15.5 (H) 4.0 - 11.0 10e9/L    RBC Count 3.58 (L) 4.4 - 5.9 10e12/L    Hemoglobin 11.3 (L) 13.3 - 17.7 g/dL    Hematocrit 31.9 (L) 40.0 - 53.0 %    MCV 89 78 - 100 fl    MCH 31.6 26.5 - 33.0 pg    MCHC 35.4 31.5 - 36.5 g/dL    RDW 11.9 10.0 - 15.0 %    Platelet Count 376 150 - 450 10e9/L    Diff Method Automated Method     % Neutrophils 78.5 %     % Lymphocytes 7.6 %    % Monocytes 12.2 %    % Eosinophils 0.7 %    % Basophils 0.4 %    % Immature Granulocytes 0.6 %    Nucleated RBCs 0 0 /100    Absolute Neutrophil 12.2 (H) 1.6 - 8.3 10e9/L    Absolute Lymphocytes 1.2 0.8 - 5.3 10e9/L    Absolute Monocytes 1.9 (H) 0.0 - 1.3 10e9/L    Absolute Eosinophils 0.1 0.0 - 0.7 10e9/L    Absolute Basophils 0.1 0.0 - 0.2 10e9/L    Abs Immature Granulocytes 0.1 0 - 0.4 10e9/L    Absolute Nucleated RBC 0.0    Reticulocyte Count   Result Value Ref Range    % Retic 1.2 0.5 - 2.0 %    Absolute Retic 44.0 25 - 95 10e9/L   Fractional excretion of sodium   Result Value Ref Range    Creatinine Urine 165 mg/dL    Sodium Urine mmol/L 45 mmol/L    %FENA 0.4 %   Pharmacy to Dose Vanco    Aj Hunter, [facility]     2021 10:04 PM  Pharmacy Vancomycin Initial Note  Date of Service 2021  Patient's  1950  70 year old, male    Indication: Meningitis    Current estimated CrCl = Estimated Creatinine Clearance: 54.9   mL/min (A) (based on SCr of 1.6 mg/dL (H)).    Creatinine for last 3 days  2021:  7:05 AM Creatinine 1.90 mg/dL  2021:  3:24 AM Creatinine 1.84 mg/dL  2021:  4:34 AM Creatinine 1.75 mg/dL;  4:57 PM Creatinine   1.60 mg/dL    Recent Vancomycin Level(s) for last 3 days  No results found for requested labs within last 72 hours.      Vancomycin IV Administrations (past 72 hours)      No vancomycin orders with administrations in past 72 hours.                Nephrotoxins and other renal medications (From now, onward)    Start     Dose/Rate Route Frequency Ordered Stop    21  vancomycin (VANCOCIN) 2,000 mg in sodium chloride   0.9 % 500 mL intermittent infusion      2,000 mg  over 2 Hours Intravenous EVERY 12 HOURS 21            Contrast Orders - past 72 hours (72h ago, onward)    None                Plan:  1.  Start vancomycin  2000 mg IV q12h.   2.  Goal Trough Level: 15-20 mg/L   3.  Pharmacy will check trough  levels as appropriate in 1-3 Days.      4. Serum creatinine levels will be ordered daily for the first   week of therapy and at least twice weekly for subsequent weeks.    5. [place] method utilized to dose vancomycin therapy: Method 1    [facility]     Comprehensive metabolic panel   Result Value Ref Range    Sodium 129 (L) 133 - 144 mmol/L    Potassium 4.8 3.4 - 5.3 mmol/L    Chloride 100 94 - 109 mmol/L    Carbon Dioxide 22 20 - 32 mmol/L    Anion Gap 7 3 - 14 mmol/L    Glucose 139 (H) 70 - 99 mg/dL    Urea Nitrogen 19 7 - 30 mg/dL    Creatinine 1.44 (H) 0.66 - 1.25 mg/dL    GFR Estimate 49 (L) >60 mL/min/[1.73_m2]    GFR Estimate If Black 57 (L) >60 mL/min/[1.73_m2]    Calcium 9.1 8.5 - 10.1 mg/dL    Bilirubin Total 0.6 0.2 - 1.3 mg/dL    Albumin 3.1 (L) 3.4 - 5.0 g/dL    Protein Total 7.8 6.8 - 8.8 g/dL    Alkaline Phosphatase 88 40 - 150 U/L    ALT 30 0 - 70 U/L    AST 19 0 - 45 U/L   CRP inflammation   Result Value Ref Range    CRP Inflammation 49.3 (H) 0.0 - 8.0 mg/L       Assessment and Plan:    Tee Hilton is a 70 year old male who presented on 1/30/2021 with 8 days of fevers, myalgias and intermittent confusion.    Strep pneumonia meningitis  Started 2-3 days after his second shingles vaccine and symptoms ongoing 8 days on arrival to ED 1/30.  Associated with severe headaches that are frontal and over the top of his head. No significant neck pain.  No runny nose or congestion.  No sore throat.  No cough.  No shortness of breath or chest pain.  Has had some myalgias and weakness.   Had outpatient Covid testing that was negative.  No nausea vomiting.  No diarrhea.  Wife is not ill.  Presented to the ED on 1/29 with wife who expressed concerns of some confusion.  Urinalysis negative.  Covid testing was negative.  He was given some fluids and discharged home.  He returned on 1/30 with fever to 104.  Work-up once again does not reveal a source.  White blood cell count is elevated at 15.2 and is  mildly left shifted with 78% neutrophils.  In ED a lumbar puncture was felt appropriate deferred due to anticoagulation     CSF labs returned in the evening and show 598 cells, 87% neutrophils. Meningitis/encephalitis PCR panel positive for Strep pneumoniae. Ceftriaxone 2g IV q12h and dexamethasone 16 mg IV q6h ordered by tele-hospitalist cross cover. . Vancomycin recommended until sensitivities known due to uncommon but possible resistance so added with pharmacist to dose.        AMINA (acute kidney injury) on chronic kidney disease   Creatinine 1.38 Aug, 2020.  Presented to the ED 1/29 with creatinine 1.90 and then 1.84 on 1/30 early morning.  Suspect prerenal due to dehydration related to current illness.     Creat 1.9--1.84--1.75--1.6--1.44  Will restart lisinopril.  Holding amlodopine.    Iv to 50 ml per hour     Hyponatremia, mild, suspect due to some dehydration  Sodium 128 upon admit  FENa low, 04%, consistent with prerenal azotemia.   Sodium 129 02/01/21.      Hypertension  On amlodipine, metoprolol and lisinopril at home.  Blood pressures are adequate currently.  Continued metoprolol  Restart lisinopril 02/01/21   Holding amlodopine     Chronic atrial fibrillation (H)  Rate controlled with metoprolol.  Patient is on rivaroxaban for anticoagulation.   Rivaroxaban held 2 days for LP now completed.   Continue metoprolol  resumed anticoagulation 02/01/21      DVT Prophylaxis: Low Risk/Ambulatory with no VTE prophylaxis indicated  Code Status: Full Code     Lines: PIV   Davis catheter: None     Plan-will need two weeks of atbs, continue decadron for 48 hours, continue rocephin and vanco for now. Will check on decadron dosing. Reduce iv to 50 ml per hour.    10:46 AM discussed with ID  Recommends decadron for two-four days 10 mg q 6.

## 2021-02-01 NOTE — PROGRESS NOTES
"Patient up with SBA. Reporting mild headache, PRN tylenol administered. A&O, forgetful. NS infusing at 50ml/hr. Supportive wife at bedside for much of the day. VS stable, afebrile. /70 (BP Location: Left arm)   Pulse 82   Temp 97.6  F (36.4  C) (Oral)   Resp 16   Ht 1.778 m (5' 10\")   Wt 109.9 kg (242 lb 4.6 oz)   SpO2 99%   BMI 34.76 kg/m      "

## 2021-02-01 NOTE — PLAN OF CARE
"Pt is alert, forgetful. Not at baseline mental status per family. Blood pressure (!) 146/82, pulse 99, temperature 98.6  F (37  C), temperature source Oral, resp. rate 18, height 1.778 m (5' 10\"), weight 109.9 kg (242 lb 4.6 oz), SpO2 96 %.  Afebrile at present. Taking tylenol for mild headache that is improving. Pt had lumbar puncture yesterday and is being treated for strep pneumoniae with IV antibiotics and IV Decadron. IV fluids infusing. Pt up with SBA, forgets that IV is infusing. Bed alarm on to alert staff so IV does not get pulled out. Pt resting intermittently. Continue to assess per POC.    "

## 2021-02-02 LAB
ANION GAP SERPL CALCULATED.3IONS-SCNC: 5 MMOL/L (ref 3–14)
BUN SERPL-MCNC: 33 MG/DL (ref 7–30)
CALCIUM SERPL-MCNC: 8.7 MG/DL (ref 8.5–10.1)
CHLORIDE SERPL-SCNC: 105 MMOL/L (ref 94–109)
CO2 SERPL-SCNC: 25 MMOL/L (ref 20–32)
COPATH REPORT: NORMAL
CREAT SERPL-MCNC: 1.72 MG/DL (ref 0.66–1.25)
ERYTHROCYTE [DISTWIDTH] IN BLOOD BY AUTOMATED COUNT: 12 % (ref 10–15)
GFR SERPL CREATININE-BSD FRML MDRD: 40 ML/MIN/{1.73_M2}
GLUCOSE SERPL-MCNC: 144 MG/DL (ref 70–99)
HCT VFR BLD AUTO: 33.9 % (ref 40–53)
HGB BLD-MCNC: 11.6 G/DL (ref 13.3–17.7)
MCH RBC QN AUTO: 30.6 PG (ref 26.5–33)
MCHC RBC AUTO-ENTMCNC: 34.2 G/DL (ref 31.5–36.5)
MCV RBC AUTO: 89 FL (ref 78–100)
PLATELET # BLD AUTO: 462 10E9/L (ref 150–450)
POTASSIUM SERPL-SCNC: 4.8 MMOL/L (ref 3.4–5.3)
RBC # BLD AUTO: 3.79 10E12/L (ref 4.4–5.9)
SODIUM SERPL-SCNC: 135 MMOL/L (ref 133–144)
WBC # BLD AUTO: 23.4 10E9/L (ref 4–11)

## 2021-02-02 PROCEDURE — 85027 COMPLETE CBC AUTOMATED: CPT | Performed by: FAMILY MEDICINE

## 2021-02-02 PROCEDURE — 258N000003 HC RX IP 258 OP 636: Performed by: INTERNAL MEDICINE

## 2021-02-02 PROCEDURE — 36415 COLL VENOUS BLD VENIPUNCTURE: CPT | Performed by: FAMILY MEDICINE

## 2021-02-02 PROCEDURE — 250N000013 HC RX MED GY IP 250 OP 250 PS 637: Performed by: INTERNAL MEDICINE

## 2021-02-02 PROCEDURE — 120N000001 HC R&B MED SURG/OB

## 2021-02-02 PROCEDURE — 99233 SBSQ HOSP IP/OBS HIGH 50: CPT | Performed by: FAMILY MEDICINE

## 2021-02-02 PROCEDURE — 250N000013 HC RX MED GY IP 250 OP 250 PS 637: Performed by: FAMILY MEDICINE

## 2021-02-02 PROCEDURE — 250N000011 HC RX IP 250 OP 636: Performed by: INTERNAL MEDICINE

## 2021-02-02 PROCEDURE — 258N000003 HC RX IP 258 OP 636: Performed by: FAMILY MEDICINE

## 2021-02-02 PROCEDURE — 80048 BASIC METABOLIC PNL TOTAL CA: CPT | Performed by: FAMILY MEDICINE

## 2021-02-02 PROCEDURE — 82565 ASSAY OF CREATININE: CPT | Performed by: FAMILY MEDICINE

## 2021-02-02 PROCEDURE — 250N000011 HC RX IP 250 OP 636: Performed by: FAMILY MEDICINE

## 2021-02-02 PROCEDURE — 250N000011 HC RX IP 250 OP 636: Performed by: EMERGENCY MEDICINE

## 2021-02-02 RX ORDER — AMLODIPINE BESYLATE 5 MG/1
5 TABLET ORAL DAILY
Status: DISCONTINUED | OUTPATIENT
Start: 2021-02-03 | End: 2021-02-04 | Stop reason: HOSPADM

## 2021-02-02 RX ORDER — CARBOXYMETHYLCELLULOSE SODIUM 5 MG/ML
1 SOLUTION/ DROPS OPHTHALMIC
Status: DISCONTINUED | OUTPATIENT
Start: 2021-02-02 | End: 2021-02-04 | Stop reason: HOSPADM

## 2021-02-02 RX ADMIN — ALPRAZOLAM 0.5 MG: 0.5 TABLET ORAL at 01:06

## 2021-02-02 RX ADMIN — DEXAMETHASONE SODIUM PHOSPHATE 10 MG: 4 INJECTION, SOLUTION INTRAMUSCULAR; INTRAVENOUS at 06:22

## 2021-02-02 RX ADMIN — CEFTRIAXONE SODIUM 2 G: 2 INJECTION, SOLUTION INTRAVENOUS at 21:30

## 2021-02-02 RX ADMIN — ALLOPURINOL 100 MG: 100 TABLET ORAL at 08:01

## 2021-02-02 RX ADMIN — ZOLPIDEM TARTRATE 10 MG: 5 TABLET ORAL at 21:30

## 2021-02-02 RX ADMIN — VANCOMYCIN HYDROCHLORIDE 2000 MG: 10 INJECTION, POWDER, LYOPHILIZED, FOR SOLUTION INTRAVENOUS at 13:16

## 2021-02-02 RX ADMIN — SODIUM CHLORIDE 1000 ML: 9 INJECTION, SOLUTION INTRAVENOUS at 11:53

## 2021-02-02 RX ADMIN — METOPROLOL SUCCINATE 100 MG: 100 TABLET, EXTENDED RELEASE ORAL at 08:01

## 2021-02-02 RX ADMIN — RIVAROXABAN 20 MG: 10 TABLET, FILM COATED ORAL at 08:01

## 2021-02-02 RX ADMIN — LISINOPRIL 40 MG: 40 TABLET ORAL at 08:01

## 2021-02-02 RX ADMIN — DEXAMETHASONE SODIUM PHOSPHATE 10 MG: 4 INJECTION, SOLUTION INTRAMUSCULAR; INTRAVENOUS at 11:52

## 2021-02-02 RX ADMIN — DOCUSATE SODIUM AND SENNOSIDES 1 TABLET: 8.6; 5 TABLET ORAL at 20:28

## 2021-02-02 RX ADMIN — CEFTRIAXONE SODIUM 2 G: 2 INJECTION, SOLUTION INTRAVENOUS at 10:00

## 2021-02-02 RX ADMIN — DEXAMETHASONE SODIUM PHOSPHATE 10 MG: 4 INJECTION, SOLUTION INTRAMUSCULAR; INTRAVENOUS at 18:07

## 2021-02-02 RX ADMIN — OMEPRAZOLE 20 MG: 20 CAPSULE, DELAYED RELEASE ORAL at 06:27

## 2021-02-02 RX ADMIN — ONDANSETRON 4 MG: 4 TABLET, ORALLY DISINTEGRATING ORAL at 20:28

## 2021-02-02 RX ADMIN — CARBOXYMETHYLCELLULOSE SODIUM 1 DROP: 5 SOLUTION/ DROPS OPHTHALMIC at 13:16

## 2021-02-02 RX ADMIN — DEXAMETHASONE SODIUM PHOSPHATE 10 MG: 4 INJECTION, SOLUTION INTRAMUSCULAR; INTRAVENOUS at 00:31

## 2021-02-02 RX ADMIN — ACETAMINOPHEN 650 MG: 325 TABLET, FILM COATED ORAL at 18:13

## 2021-02-02 ASSESSMENT — ACTIVITIES OF DAILY LIVING (ADL)
ADLS_ACUITY_SCORE: 14

## 2021-02-02 ASSESSMENT — MIFFLIN-ST. JEOR: SCORE: 1876.25

## 2021-02-02 NOTE — PLAN OF CARE
Patient was up ambulating earlier this shift. He was given ambien at bedtime. He accidentally pulled his IV out and a new one was placed at 0030. He was given xanax at 0106 because he couldn't fall back to sleep.

## 2021-02-02 NOTE — PLAN OF CARE
"Alert and oriented. Moves independently. IV abx rocephin and vanco. IVF NS at 75ml/hr. Tolerating diet, good appetite. BM today. Ambulating in hallways. C/o mild HA today, medicated with tylenol. Denies nausea.    BP (!) 141/76 (BP Location: Right arm)   Pulse 83   Temp 98.5  F (36.9  C) (Oral)   Resp 16   Ht 1.778 m (5' 10\")   Wt 110 kg (242 lb 8.1 oz)   SpO2 100%   BMI 34.80 kg/m   on RA.    Vero Plascencia RN on 2/2/2021 at 6:37 PM    "

## 2021-02-02 NOTE — PROGRESS NOTES
Care Management Follow Up    Length of Stay (days): 2    Expected Discharge Date: 02/03/21     Concerns to be Addressed: all concerns addressed in this encounter     Patient plan of care discussed at interdisciplinary rounds: Yes    Anticipated Discharge Disposition: Home     Anticipated Discharge Services:  None  Anticipated Discharge DME: None    Patient/family educated on Medicare website which has current facility and service quality ratings: no  Education Provided on the Discharge Plan:  N/A  Patient/Family in Agreement with the Plan: yes    Referrals Placed by CM/SW:    Private pay costs discussed: Not applicable    Additional Information:    Per the discussion in Interdisciplinary Rounds, the patient may be hospitalized at least 5 more days.  Cancelled the [facility] Task.    Plan:  Home      Indu Salgado RN

## 2021-02-02 NOTE — PROGRESS NOTES
[facility]      Subjective:  He feels much better  Sl cough  No cp  Ha gone  Eating wo nausea  No abd pain  No neck pain    Review of Systems:  CONSTITUTIONAL: NEGATIVE for fever, chills, change in weight  INTEGUMENTARY/SKIN: NEGATIVE for worrisome rashes, moles or lesions  EYES: NEGATIVE for vision changes or irritation  ENT/MOUTH: NEGATIVE for ear, mouth and throat problems  RESP:sl cough  BREAST: NEGATIVE for masses, tenderness or discharge  CV: NEGATIVE for chest pain, palpitations or peripheral edema  GI: NEGATIVE for nausea, abdominal pain, heartburn, or change in bowel habits  : NEGATIVE for frequency, dysuria, or hematuria  MUSCULOSKELETAL: NEGATIVE for significant arthralgias or myalgia  NEURO: NEGATIVE for weakness, dizziness or paresthesias  ENDOCRINE: NEGATIVE for temperature intolerance, skin/hair changes  HEME: NEGATIVE for bleeding problems  PSYCHIATRIC: NEGATIVE for changes in mood or affect    Physical Exam:  Vitals Were Reviewed    Patient Vitals for the past 16 hrs:   BP Temp Temp src Pulse Resp SpO2 Weight   02/02/21 0750 125/78 98.4  F (36.9  C) Oral 67 16 99 % --   02/02/21 0500 118/67 97.3  F (36.3  C) Oral 69 18 98 % 110 kg (242 lb 8.1 oz)   02/02/21 0133 121/77 97.5  F (36.4  C) Oral 80 16 98 % --   02/01/21 2000 122/75 97.4  F (36.3  C) Oral 74 16 -- --         Intake/Output Summary (Last 24 hours) at 2/2/2021 1116  Last data filed at 2/2/2021 0900  Gross per 24 hour   Intake 2008 ml   Output --   Net 2008 ml       GENERAL APPEARANCE: healthy, alert and no distress  EYES: conjunctiva clear, eyes grossly normal  RESP: lungs clear to auscultation - no rales, rhonchi or wheezes  CV: regular rate and rhythm, normal S1 S2, no S3 or S4 and no murmur, click or rub   ABDOMEN: soft, nontender, no HSM or masses and bowel sounds normal  MS: no clubbing, cyanosis; no edema  SKIN: clear without significant rashes or lesions    Lab:  Recent Labs   Lab Test 02/02/21  0440  02/01/21  0438    129*   POTASSIUM 4.8 4.8   CHLORIDE 105 100   CO2 25 22   ANIONGAP 5 7   * 139*   BUN 33* 19   CR 1.72* 1.44*   GRISELDA 8.7 9.1     CBC RESULTS:   Recent Labs   Lab Test 02/02/21  0440 01/31/21  1820   WBC 23.4* 15.5*   RBC 3.79* 3.58*   HGB 11.6* 11.3*   HCT 33.9* 31.9*   * 376       Results for orders placed or performed during the hospital encounter of 01/30/21 (from the past 24 hour(s))   CBC with platelets   Result Value Ref Range    WBC 23.4 (H) 4.0 - 11.0 10e9/L    RBC Count 3.79 (L) 4.4 - 5.9 10e12/L    Hemoglobin 11.6 (L) 13.3 - 17.7 g/dL    Hematocrit 33.9 (L) 40.0 - 53.0 %    MCV 89 78 - 100 fl    MCH 30.6 26.5 - 33.0 pg    MCHC 34.2 31.5 - 36.5 g/dL    RDW 12.0 10.0 - 15.0 %    Platelet Count 462 (H) 150 - 450 10e9/L   Basic metabolic panel   Result Value Ref Range    Sodium 135 133 - 144 mmol/L    Potassium 4.8 3.4 - 5.3 mmol/L    Chloride 105 94 - 109 mmol/L    Carbon Dioxide 25 20 - 32 mmol/L    Anion Gap 5 3 - 14 mmol/L    Glucose 144 (H) 70 - 99 mg/dL    Urea Nitrogen 33 (H) 7 - 30 mg/dL    Creatinine 1.72 (H) 0.66 - 1.25 mg/dL    GFR Estimate 40 (L) >60 mL/min/[1.73_m2]    GFR Estimate If Black 46 (L) >60 mL/min/[1.73_m2]    Calcium 8.7 8.5 - 10.1 mg/dL       Assessment and Plan:    Tee Hilton is a 70 year old male who presented on 1/30/2021 with 8 days of fevers, myalgias and intermittent confusion.     Strep pneumonia meningitis  Metabolic/infectious encephalopathy related to above  Started 2-3 days after his second shingles vaccine and symptoms ongoing 8 days on arrival to ED 1/30.  Associated with severe headaches that are frontal and over the top of his head. No significant neck pain.  No runny nose or congestion.  No sore throat.  No cough.  No shortness of breath or chest pain.  Has had some myalgias and weakness.   Had outpatient Covid testing that was negative.  No nausea vomiting.  No diarrhea.  Wife is not ill.  Presented to the ED on 1/29 with  wife who expressed concerns of some confusion.  Urinalysis negative.  Covid testing was negative.  He was given some fluids and discharged home.  He returned on 1/30 with fever to 104.  Work-up once again does not reveal a source.  White blood cell count is elevated at 15.2 and is mildly left shifted with 78% neutrophils.  In ED a lumbar puncture was felt appropriate deferred due to anticoagulation     CSF labs returned in the evening and show 598 cells, 87% neutrophils. Meningitis/encephalitis PCR panel positive for Strep pneumoniae. But culture of csf neg so far. Ceftriaxone 2g IV q12h. Vancomycin recommended until sensitivities known due to uncommon but possible resistance so added with pharmacist to dose.     Unable to stop vanco since I don't have a positive csf  Culture with sens.  WBC is up 23k (suspect from steroids)        AMINA (acute kidney injury) on chronic kidney disease   Creatinine 1.38 Aug, 2020.  Presented to the ED 1/29 with creatinine 1.90 and then 1.84 on 1/30 early morning.  Suspect prerenal due to dehydration related to current illness.      Creat 1.9--1.84--1.75--1.6--1.44--1.72  Stop lisinopril 02/02/21.  Start amlodopine 2/3/21   BUN is up 02/02/21 also-one liter ns over 2 hours.  Iv 75 ml per hour     Hyponatremia, mild, suspect due to some dehydration  Sodium 128 upon admit  FENa low, 04%, consistent with prerenal azotemia.   Resolved       Hypertension  On amlodipine, metoprolol and lisinopril at home.  Blood pressures are adequate currently.  Continued metoprolol  Hold lisinopril.  Restart amlodopine 2/3/21.     Chronic atrial fibrillation (H)  Rate controlled with metoprolol.  Patient is on rivaroxaban for anticoagulation.   Rivaroxaban held 2 days for LP now completed.   Continue metoprolol  resumed anticoagulation 02/01/21      DVT Prophylaxis: Low Risk/Ambulatory with no VTE prophylaxis indicated  Code Status: Full Code     Lines: PIV   Davis catheter: None     Plan-will need two  weeks of atbs, continue decadron for 48 hours (ordered to stop at noon 2/3/21 , continue rocephin and vanco for now. Hold lisinopril and start amlodopine 2/3/21.    Will continue vanco for now and wait to see if csf culture becomes pos. If it doesn't-will need to talk to ID again.    discussed with ID 2/1/21

## 2021-02-03 ENCOUNTER — APPOINTMENT (OUTPATIENT)
Dept: GENERAL RADIOLOGY | Facility: CLINIC | Age: 71
End: 2021-02-03
Attending: FAMILY MEDICINE
Payer: COMMERCIAL

## 2021-02-03 LAB
ANION GAP SERPL CALCULATED.3IONS-SCNC: 7 MMOL/L (ref 3–14)
BUN SERPL-MCNC: 37 MG/DL (ref 7–30)
CALCIUM SERPL-MCNC: 8.1 MG/DL (ref 8.5–10.1)
CHLORIDE SERPL-SCNC: 106 MMOL/L (ref 94–109)
CO2 SERPL-SCNC: 23 MMOL/L (ref 20–32)
CREAT SERPL-MCNC: 1.5 MG/DL (ref 0.66–1.25)
ERYTHROCYTE [DISTWIDTH] IN BLOOD BY AUTOMATED COUNT: 12.2 % (ref 10–15)
GFR SERPL CREATININE-BSD FRML MDRD: 47 ML/MIN/{1.73_M2}
GLUCOSE SERPL-MCNC: 160 MG/DL (ref 70–99)
HCT VFR BLD AUTO: 31.4 % (ref 40–53)
HGB BLD-MCNC: 10.8 G/DL (ref 13.3–17.7)
MCH RBC QN AUTO: 31.3 PG (ref 26.5–33)
MCHC RBC AUTO-ENTMCNC: 34.4 G/DL (ref 31.5–36.5)
MCV RBC AUTO: 91 FL (ref 78–100)
PLATELET # BLD AUTO: 438 10E9/L (ref 150–450)
POTASSIUM SERPL-SCNC: 4.4 MMOL/L (ref 3.4–5.3)
RBC # BLD AUTO: 3.45 10E12/L (ref 4.4–5.9)
SODIUM SERPL-SCNC: 136 MMOL/L (ref 133–144)
VANCOMYCIN SERPL-MCNC: 33.1 MG/L
VDRL CSF QL: NON REACTIVE
WBC # BLD AUTO: 23.2 10E9/L (ref 4–11)

## 2021-02-03 PROCEDURE — 99233 SBSQ HOSP IP/OBS HIGH 50: CPT | Performed by: FAMILY MEDICINE

## 2021-02-03 PROCEDURE — 250N000013 HC RX MED GY IP 250 OP 250 PS 637: Performed by: INTERNAL MEDICINE

## 2021-02-03 PROCEDURE — 85027 COMPLETE CBC AUTOMATED: CPT | Performed by: FAMILY MEDICINE

## 2021-02-03 PROCEDURE — 80048 BASIC METABOLIC PNL TOTAL CA: CPT | Performed by: FAMILY MEDICINE

## 2021-02-03 PROCEDURE — 272N000579 HC TRAY POWER PICC SOLO 4FR SINGLE LUMEN

## 2021-02-03 PROCEDURE — 80202 ASSAY OF VANCOMYCIN: CPT | Performed by: INTERNAL MEDICINE

## 2021-02-03 PROCEDURE — 250N000011 HC RX IP 250 OP 636: Performed by: INTERNAL MEDICINE

## 2021-02-03 PROCEDURE — 36415 COLL VENOUS BLD VENIPUNCTURE: CPT | Performed by: FAMILY MEDICINE

## 2021-02-03 PROCEDURE — 36415 COLL VENOUS BLD VENIPUNCTURE: CPT | Performed by: INTERNAL MEDICINE

## 2021-02-03 PROCEDURE — 120N000001 HC R&B MED SURG/OB

## 2021-02-03 PROCEDURE — 258N000003 HC RX IP 258 OP 636: Performed by: FAMILY MEDICINE

## 2021-02-03 PROCEDURE — 250N000011 HC RX IP 250 OP 636: Performed by: FAMILY MEDICINE

## 2021-02-03 PROCEDURE — 250N000013 HC RX MED GY IP 250 OP 250 PS 637: Performed by: FAMILY MEDICINE

## 2021-02-03 PROCEDURE — 36569 INSJ PICC 5 YR+ W/O IMAGING: CPT

## 2021-02-03 PROCEDURE — 999N000065 XR CHEST PORT 1 VW

## 2021-02-03 PROCEDURE — 258N000003 HC RX IP 258 OP 636: Performed by: INTERNAL MEDICINE

## 2021-02-03 RX ORDER — HEPARIN SODIUM,PORCINE 10 UNIT/ML
2-5 VIAL (ML) INTRAVENOUS
Status: DISCONTINUED | OUTPATIENT
Start: 2021-02-03 | End: 2021-02-04 | Stop reason: HOSPADM

## 2021-02-03 RX ORDER — LIDOCAINE 40 MG/G
CREAM TOPICAL
Status: DISCONTINUED | OUTPATIENT
Start: 2021-02-03 | End: 2021-02-04 | Stop reason: HOSPADM

## 2021-02-03 RX ADMIN — METOPROLOL SUCCINATE 100 MG: 100 TABLET, EXTENDED RELEASE ORAL at 08:40

## 2021-02-03 RX ADMIN — AMLODIPINE BESYLATE 5 MG: 5 TABLET ORAL at 08:40

## 2021-02-03 RX ADMIN — DEXAMETHASONE SODIUM PHOSPHATE 10 MG: 4 INJECTION, SOLUTION INTRAMUSCULAR; INTRAVENOUS at 12:40

## 2021-02-03 RX ADMIN — CARBOXYMETHYLCELLULOSE SODIUM 1 DROP: 5 SOLUTION/ DROPS OPHTHALMIC at 10:19

## 2021-02-03 RX ADMIN — DOCUSATE SODIUM AND SENNOSIDES 1 TABLET: 8.6; 5 TABLET ORAL at 21:23

## 2021-02-03 RX ADMIN — ZOLPIDEM TARTRATE 10 MG: 5 TABLET ORAL at 22:04

## 2021-02-03 RX ADMIN — RIVAROXABAN 20 MG: 10 TABLET, FILM COATED ORAL at 08:40

## 2021-02-03 RX ADMIN — CEFTRIAXONE SODIUM 2 G: 2 INJECTION, SOLUTION INTRAVENOUS at 09:27

## 2021-02-03 RX ADMIN — ALPRAZOLAM 0.5 MG: 0.5 TABLET ORAL at 00:30

## 2021-02-03 RX ADMIN — CEFTRIAXONE SODIUM 2 G: 2 INJECTION, SOLUTION INTRAVENOUS at 21:23

## 2021-02-03 RX ADMIN — SODIUM CHLORIDE: 9 INJECTION, SOLUTION INTRAVENOUS at 00:21

## 2021-02-03 RX ADMIN — OMEPRAZOLE 20 MG: 20 CAPSULE, DELAYED RELEASE ORAL at 06:05

## 2021-02-03 RX ADMIN — DEXAMETHASONE SODIUM PHOSPHATE 10 MG: 4 INJECTION, SOLUTION INTRAMUSCULAR; INTRAVENOUS at 06:04

## 2021-02-03 RX ADMIN — ALLOPURINOL 100 MG: 100 TABLET ORAL at 08:40

## 2021-02-03 RX ADMIN — VANCOMYCIN HYDROCHLORIDE 2000 MG: 10 INJECTION, POWDER, LYOPHILIZED, FOR SOLUTION INTRAVENOUS at 00:21

## 2021-02-03 RX ADMIN — DEXAMETHASONE SODIUM PHOSPHATE 10 MG: 4 INJECTION, SOLUTION INTRAMUSCULAR; INTRAVENOUS at 00:21

## 2021-02-03 ASSESSMENT — ACTIVITIES OF DAILY LIVING (ADL)
ADLS_ACUITY_SCORE: 14

## 2021-02-03 NOTE — PROGRESS NOTES
Antimicrobial Stewardship Team Note    Antimicrobial Stewardship Program - A joint venture between [facility] and  Physicians to optimize antibiotic management.  NOT a formal consult - Restricted Antimicrobial Review     Patient: Tee Hilton  MRN: [id number]  Allergies: Erythromycin    Brief Summary: Tee Hilton is a 70 year old male with a PMHx significant for atrial fibrillation, HFrEF, hypertension, obesity, and GERD who was admitted 1/30/2021 for complaints of fatigue, fever, headache, mild body aches, and nausea.    History of Present Illness:   Patient presented to the emergency department reporting 8 days of fever, nausea, severe frontal bilateral headache, and fatigue that started 2-3 days after his second dose of the Shingrix vaccine. He reported some neck discomfort but was able to move his neck fully. Patient's wife also noted the patient was demonstrating some occasional confusion. At presentation the patient was hemodynamically stable and had a fever with Tmax 101.3, mild leukocytosis (13.0) with left shift, lactate 0.9, and procalcitonin 0.17. Creatinine was increased to 1.9 on admission (1.38 in August 2020). Head CT showed no acute intracranial process and chest x-ray showed lungs were clear with no infiltrates. Patient is negative for COVID-19 and influenza A/B. Urinalysis did not show signs of urinary tract infection.    Blood cultures collected 1/30 show no growth to date. CSF labs showed 589 WBCs, 87% neutrophils, glucose 43, protein 51, and RBC 17. Meningitis PCR panel was positive for Streptococcus pneumoniae. CSF Varicella Zoster, HSV 1 and 2, and remaining meningitis panel negative. No bacterial growth on CSF gram stain. Ceftriaxone, vancomycin, and dexamethasone were started 1/31. CSF bacterial culture collected 1/30 remains negative; provider has a plan in place to stop vancomycin once there is a positive CSF culture with sensitivities available. Labs most  recently show increased WBC at 23 (dexamethasone - high dose, stopping today)         Active Anti-infective Medications   (From admission, onward)                 Start     Stop    01/31/21 2230  vancomycin (VANCOCIN) injection  2,000 mg,   Intravenous,   EVERY 12 HOURS     Meningitis        --    01/31/21 2130  cefTRIAXone IN D5W  2 g,   Intravenous,   100 mL/hr,   EVERY 12 HOURS     Meningitis        --                  Assessment: Streptococcus pneumoniae meningitis  Today the patient is afebrile and vitally stable and is clinically improving on current antibiotic regimen. CSF labs were concerning for bacterial infection with CSF WBC >500, neutrophils predominating, low glucose, and higher protein. The patient's meningitis PCR panel was positive for Streptococcus pneumoniae and he is on appropriate empiric antibiotic therapy for S. pneumoniae meningitis. Given that there is no growth on gram stain or culture and susceptibility data available, agree to continue vancomycin in addition to ceftriaxone at this time due to concern for penicillin and cephalosporin resistance and high risk site of infection. However, the antibiogram for [facility] shows 96% susceptibility for ceftriaxone against Streptococcus pneumoniae in meningitis so it has a high chance of adequately covering S. pneumoniae as monotherapy. As CSF cultures have remained negative for 4 days, there is a low likelihood of S pneumoniae being isolated at this point so unfortunately will not have susceptibility data to guide de-escalation. Agree with plan to consult with the ID Advice Line regarding continued need for vancomycin if there is concern with discharging patient on 2 IV antibiotics. Additionally, recommend a total duration of 10 days of antibiotics for S. pneumoniae meningitis.    Recommendations:  Agree with continuing vancomycin and ceftriaxone at this time  Agree with consulting ID Advice Line to discuss need for vancomycin  Recommend a total  duration of 10 days antibiotic therapy      Discussed with ID Staff Rosi Marx MD, Elva Rutledge, PharmD, BCIDP, and Nga Oden, PharmD    Lory Catherine, 4th Year Pharmacy Student  Pager: [phone]      Vital Signs/Clinical Features:  Vitals         02/01 0700  -  02/02 0659 02/02 0700  -  02/03 0659 02/03 0700  -  02/03 1231   Most Recent    Temp ( F) 97.3 -  98.6    96.5 -  98.5      97.9     97.9 (36.6)    Pulse 69 -  96    67 -  89    66 -  79     66    Resp 16 -  18    16 -  18    16 -  18     18    /70 -  141/101    114/65 -  141/76    133/77 -  135/70     133/77    SpO2 (%) 96 -  99    98 -  100    97 -  100     97            Labs  Estimated Creatinine Clearance: 58.5 mL/min (A) (based on SCr of 1.5 mg/dL (H)).  Recent Labs   Lab Test 01/30/21  0324 01/31/21  0434 01/31/21  1657 02/01/21  0438 02/02/21  0440 02/03/21  0407   CR 1.84* 1.75* 1.60* 1.44* 1.72* 1.50*       Recent Labs   Lab Test 06/25/19  1100 08/26/20  1253 01/29/21  0705 01/30/21  0324 01/31/21  0434 01/31/21  1820 02/02/21  0440 02/03/21  0407   WBC 8.1 9.5 13.0* 15.2* 14.8* 15.5* 23.4* 23.2*   ANEU 6.1 6.8 10.3* 11.9* 12.1* 12.2*  --   --    ALYM 1.2 1.6 0.7* 1.1 0.9 1.2  --   --    BARBER 0.7 1.0 1.6* 2.0* 1.6* 1.9*  --   --    AEOS 0.1 0.1 0.3 0.2 0.1 0.1  --   --    HGB 13.6 14.5 12.2* 11.8* 11.3* 11.3* 11.6* 10.8*   HCT 39.6* 42.4 34.9* 33.5* 32.5* 31.9* 33.9* 31.4*   MCV 93 93 89 89 90 89 89 91    328 418 432 421 376 462* 438       Recent Labs   Lab Test 05/02/19  1106 08/26/20  1253 01/29/21  0705 01/30/21  0324 02/01/21  0438   BILITOTAL 0.6  --  1.1 1.0 0.6   ALKPHOS 55  --  105 99 88   ALBUMIN 4.0  --  3.2* 3.2* 3.1*   AST 12  --  19 20 19   ALT 22 23 36 40 30       Recent Labs   Lab Test 01/30/21  0324 01/30/21  1025 02/01/21  0438   PCAL  --  0.17 0.09   LACT 1.1  --   --    CRP 95.6*  --  49.3*             Culture Results:  7-Day Micro Results       Procedure Component Value Units Date/Time    Blood culture  [[id number]] Collected: 01/30/21 2014    Order Status: Completed Lab Status: Preliminary result Updated: 02/03/21 0534    Specimen: Blood      Specimen Description Blood Left Arm     Culture Micro No growth after 4 days    Blood culture [[id number]] Collected: 01/30/21 2014    Order Status: Completed Lab Status: Preliminary result Updated: 02/03/21 0534    Specimen: Blood      Specimen Description Blood Left Hand     Culture Micro No growth after 4 days    Glucose CSF: Tube 2 [[id number]] Collected: 01/30/21 1210    Order Status: Completed Lab Status: Final result Updated: 01/31/21 1945    Specimen: CSF      Glucose CSF 43 mg/dL      Comment: CSF glucose concentrations are about 60 percent of normal plasma glucose.       Protein total CSF: Tube 2 [[id number]] Collected: 01/30/21 1210    Order Status: Completed Lab Status: Final result Updated: 01/31/21 1945    Specimen: CSF      Protein Total CSF 51 mg/dL     Gram stain [[id number]] Collected: 01/30/21 1210    Order Status: Completed Lab Status: Final result Updated: 01/31/21 2105    Specimen: Cerebrospinal fluid      Specimen Description Cerebrospinal fluid     Special Requests TUBE 3     Gram Stain No organisms seen      Many  WBC'S seen  predominantly PMN's        Quantification of host cells and microbiological organisms was done on a cytocentrifuged   preparation.      CSF Culture Aerobic Bacterial [[id number]] Collected: 01/30/21 1210    Order Status: Completed Lab Status: Preliminary result Updated: 02/01/21 1343    Specimen: Cerebrospinal fluid      Specimen Description Cerebrospinal fluid     Special Requests TUBE 3     Culture Micro Culture negative monitoring continues    Cell count with differential CSF: Tube 4 [[id number]]  (Abnormal) Collected: 01/30/21 1210    Order Status: Completed Lab Status: Edited Result - FINAL Updated: 01/31/21 2045    Specimen: CSF      WBC  /uL      RBC CSF 17 /uL      Tube Number 4 #      Color CSF Colorless     Appearance CSF Slightly Cloudy      % Neutrophils CSF 87 %      % Lymphocytes CSF 3 %      % Mono/Macros CSF 10 %     VDRL CSF: Tube 2 [[id number]] Collected: 01/30/21 1210    Order Status: Completed Lab Status: Final result Updated: 02/03/21 0637    Specimen: CSF      VDRL CSF Non Reactive     Comment: (Note)  Because the VDRL was Non Reactive, the VDRL titer was not   performed.  Performed by [facility],  [address] [place] [id number] [phone]  [email or web address], Terri Barrett MD, Lab. Director         Meningitis/Encephalitis Panel Qual PCR CSF: Tube 3 [[id number]]  (Abnormal) Collected: 01/30/21 1210    Order Status: Completed Lab Status: Final result Updated: 01/31/21 2103    Specimen: Cerebrospinal fluid      Escherichia coli K1 Negative     Haemophilus influenzae Negative     Listeria monocytogenes Negative     Neisseria meningitidis Negative     Streptococcus agalactiae (GBS) Negative     Streptococcus pneumoniae Positive     Comment: Critical Value/Significant Value called to and read back by  Heidi Schroeder RN 01/31/21 @4550 [facility]          Cytomegalovirus Negative     Enterovirus Negative     Herpes Simplex Virus 1 Negative     Comment: Recommend testing with another molecular method if clinical suspicion for   infection is high.          Herpes Simplex Virus 2 Negative     Comment: Recommend testing with another molecular method if clinical suspicion for   infection is high.          Human Herpres Virus 6 Negative     Human Parechovirus Negative     Varicella Zoster Virus Negative     Cryptococcus neoformans/gattii Negative     Comment: Recommend testing for cryptococcal antigen if clinical suspicion for infection   is high.          Meningitis Comment Assay performed using the FDA-cleared FilmArray ME Panel from [employer]     Comment: A negative result does not rule out the presence of PCR inhibitors in the   specimen or target nucleic acids in concentration below the limit of detection   of the assay.  A negative result  should not rule out central nervous system   infection with a high probability for meningitis or encephalitis. The assay   does not test for all potential infectious agents.  Results are intended to aid in the diagnosis of illness and are meant to be   used in conjunction with other clinical findings.  This test has been verified and is performed by the Infectious Diseases   Diagnostic Laboratory at [facility]. This laboratory is certified under   the Clinical Laboratory Improvement Amendments of 1988 (CLIA-88) as qualified   to perform high complexity clinical laboratory testing.         HSV Types 1 and 2 Qualitative PCR CSF: Tube 3 [[id number]] Collected: 01/30/21 1210    Order Status: Completed Lab Status: Final result Updated: 02/01/21 0842    Specimen: Cerebrospinal fluid      Herpes Simplex Type 1 CSF Not Detected     Comment: Herpes simplex virus Type 1 DNA NOT detected, presumed negative for HSV-1. A   negative result does not rule out the presence of PCR inhibitors or HSV DNA in   concentrations below the limit of detection of the assay.          Herpes Simplex Type 2 CSF Not Detected     Comment: Herpes simplex virus Type 2 DNA NOT detected, presumed negative for HSV-2. A   negative result does not rule out the presence of PCR inhibitors or HSV DNA in   concentrations below the limit of detection of the assay.          HSV CSF Comment (Note)     Comment: The [employer] Simplexa HSV 1 & 2 Direct assay is a FDA approved,   real-time PCR test for the qualitative detection and differentiation of Herpes   Simplex virus Type 1 & 2 DNA in cerebrospinal fluid (CSF).  Testing is   performed by the Infectious Diseases Diagnostic Laboratory at the [facility].         Varicella Zoster DNA PCR CSF or Skin Swab (1 mL minimum) [[id number]] Collected: 01/30/21 1210    Order Status: Completed Lab Status: Final result Updated: 02/01/21 1509    Specimen: Cerebrospinal fluid       Varicella Zoster DNA PCR Specimen Type Cerebrospinal fluid     Varicella Zoster DNA PCR Result VZV DNA Not Detected, presumed negative for Varicella zoster virus.     Comment: A negative result does not rule out the presence of PCR inhibitors or VZV DNA   in concentrations below the limit of detection of the assay.                                                                                        Varicella Zoster DNA PCR Comment See Comment Below     Comment:    The qualitative Varicella zoster virus DNA assay is a real-time polymerase   chain reaction (PCR) utilizing analyte specific reagents manufactured by [employer]. This test was developed and its performance characteristics   determined by the [facility] Infectious Diseases   Diagnostic Clinical Laboratory.  Analyte Specific Reagents (ASRs) are used in many laboratory tests necessary   for standard medical care and generally do not require FDA approval. The FDA   has determined that such clearance is not necessary. This test is used for   clinical purposes.  It should not be regarded as investigational or for   research.  This Laboratory is certified under the Clinical Laboratory Improvement   Amendments of 1988 (CLIA-88) as qualified to perform high complexity clinical   laboratory testing.                 Recent Labs   Lab Test 01/29/21  0827 01/31/21  1015   URINEPH 5.0 6.0   NITRITE Negative Negative   LEUKEST Negative Negative   WBCU 4 1       Recent Labs   Lab Test 01/30/21  1210   CWBC 598*   CNEU 87   CLYM 3   CMONO 10   CRBC 17*   CGLU 43   CTP 51   CVD Non Reactive                   Imaging: Chest Xr,  Pa & Lat    Result Date: 1/29/2021  CHEST TWO VIEWS   1/29/2021 8:40 AM HISTORY: Fever without known source. Question rales in left base. COMPARISON: None.     IMPRESSION: PA and lateral views of the chest. Lungs are clear. Heart is normal in size. No effusions are evident. No pneumothorax. VANDANA HUNT MD    Ct Head W/o  Contrast    Result Date: 1/30/2021  EXAM: CT HEAD W/O CONTRAST LOCATION: [facility] DATE/TIME: 1/30/2021 4:20 AM INDICATION: Headache, new or worsening (Age >= 50y) COMPARISON: None. TECHNIQUE: Routine CT Head without IV contrast. Multiplanar reformats. Dose reduction techniques were used. FINDINGS: INTRACRANIAL CONTENTS: No intracranial hemorrhage, extraaxial collection, or mass effect.  No CT evidence of acute infarct. Normal parenchymal attenuation. Normal ventricles and sulci. VISUALIZED ORBITS/SINUSES/MASTOIDS: No intraorbital abnormality. No paranasal sinus mucosal disease. No middle ear or mastoid effusion. BONES/SOFT TISSUES: No acute abnormality.     IMPRESSION: 1.  No acute intracranial process.

## 2021-02-03 NOTE — PLAN OF CARE
Patient is alert and oriented.  Vital signs stable, afebrile.  Up ambulating independently.  Denies headache today.  Continues to receive IV rocephin, vanco was stopped.  Otherwise saline locked.  Will have PICC placed today.

## 2021-02-03 NOTE — PROGRESS NOTES
Care Management Follow Up    Length of Stay (days): 3    Expected Discharge Date: 02/04/21     Concerns to be Addressed: IV Infusion Therapy    Pt will need IV antibiotics on discharge  Patient plan of care discussed at interdisciplinary rounds: Yes    Anticipated Discharge Disposition: Home Infusion  Disposition Comments: Pt accepting of [facility] services on discharge for IV antibiotic therapy  Anticipated Discharge Services:  RN--IV therapy education/teaching  Anticipated Discharge DME: None    Patient/family educated on Medicare website which has current facility and service quality ratings: no  Education Provided on the Discharge Plan: yes   Patient/Family in Agreement with the Plan: yes    Referrals Placed by CM/SW: Internal Clinic Care Coordination, Home Infusion  Private pay costs discussed: insurance costs deductibles/copays    Additional Information:  CM received update in ID rounds today that Pt will need IV antibiotics on discharge. CM called [facility] to verify Pt's home infusion benefit through his [employer] [place]of[place] plan.    $1500 deductible, 80% coverage with 20% pt responsibility until $3,000 out-of-pocket max is met.     HOLLI met with the Pt and spouse-Gillian to relay home infusion options/benefits vs outpatient infusion. Pt stated he wishes to proceed with a referral to [facility]. Pt's wife Gillian stated she plans to assist with receiving teaching and administration of medication.     Plan:    Home with [facility]    CM to follow to assist with further discharge needs as they arise    SARAHI Wood

## 2021-02-03 NOTE — PROGRESS NOTES
[facility]      Subjective:  Feeling normal  No ha  No fever ,chills, neck pian  eating    Review of Systems:  CONSTITUTIONAL: NEGATIVE for fever, chills, change in weight  INTEGUMENTARY/SKIN: NEGATIVE for worrisome rashes, moles or lesions  EYES: NEGATIVE for vision changes or irritation  ENT/MOUTH: NEGATIVE for ear, mouth and throat problems  RESP: NEGATIVE for significant cough or SOB  BREAST: NEGATIVE for masses, tenderness or discharge  CV: NEGATIVE for chest pain, palpitations or peripheral edema  GI: NEGATIVE for nausea, abdominal pain, heartburn, or change in bowel habits  : NEGATIVE for frequency, dysuria, or hematuria  MUSCULOSKELETAL: NEGATIVE for significant arthralgias or myalgia  NEURO: NEGATIVE for weakness, dizziness or paresthesias  ENDOCRINE: NEGATIVE for temperature intolerance, skin/hair changes  HEME: NEGATIVE for bleeding problems  PSYCHIATRIC: NEGATIVE for changes in mood or affect    Physical Exam:  Vitals Were Reviewed    Patient Vitals for the past 16 hrs:   BP Temp Temp src Pulse Resp SpO2   02/03/21 0747 135/70 97.9  F (36.6  C) Oral 79 16 100 %   02/03/21 0224 123/57 96.5  F (35.8  C) Oral 82 18 99 %   02/02/21 2305 114/65 97.2  F (36.2  C) Oral 78 18 98 %   02/02/21 2003 136/76 98.1  F (36.7  C) Oral 77 16 98 %         Intake/Output Summary (Last 24 hours) at 2/3/2021 0920  Last data filed at 2/3/2021 0700  Gross per 24 hour   Intake 3505 ml   Output --   Net 3505 ml       GENERAL APPEARANCE: healthy, alert and no distress  EYES: conjunctiva clear, eyes grossly normal  RESP: lungs clear to auscultation - no rales, rhonchi or wheezes  CV: regular rate and rhythm, normal S1 S2, no S3 or S4 and no murmur, click or rub   ABDOMEN: soft, nontender, no HSM or masses and bowel sounds normal  MS: no clubbing, cyanosis; no edema  SKIN: clear without significant rashes or lesions  NEURO: Normal strength and tone, sensory exam grossly normal, mentation intact and speech  normal    Lab:  Recent Labs   Lab Test 02/03/21  0407 02/02/21  0440    135   POTASSIUM 4.4 4.8   CHLORIDE 106 105   CO2 23 25   ANIONGAP 7 5   * 144*   BUN 37* 33*   CR 1.50* 1.72*   GRISELDA 8.1* 8.7     CBC RESULTS:   Recent Labs   Lab Test 02/03/21  0407 02/02/21  0440   WBC 23.2* 23.4*   RBC 3.45* 3.79*   HGB 10.8* 11.6*   HCT 31.4* 33.9*    462*       Results for orders placed or performed during the hospital encounter of 01/30/21 (from the past 24 hour(s))   CBC with platelets   Result Value Ref Range    WBC 23.2 (H) 4.0 - 11.0 10e9/L    RBC Count 3.45 (L) 4.4 - 5.9 10e12/L    Hemoglobin 10.8 (L) 13.3 - 17.7 g/dL    Hematocrit 31.4 (L) 40.0 - 53.0 %    MCV 91 78 - 100 fl    MCH 31.3 26.5 - 33.0 pg    MCHC 34.4 31.5 - 36.5 g/dL    RDW 12.2 10.0 - 15.0 %    Platelet Count 438 150 - 450 10e9/L   Basic metabolic panel   Result Value Ref Range    Sodium 136 133 - 144 mmol/L    Potassium 4.4 3.4 - 5.3 mmol/L    Chloride 106 94 - 109 mmol/L    Carbon Dioxide 23 20 - 32 mmol/L    Anion Gap 7 3 - 14 mmol/L    Glucose 160 (H) 70 - 99 mg/dL    Urea Nitrogen 37 (H) 7 - 30 mg/dL    Creatinine 1.50 (H) 0.66 - 1.25 mg/dL    GFR Estimate 47 (L) >60 mL/min/[1.73_m2]    GFR Estimate If Black 55 (L) >60 mL/min/[1.73_m2]    Calcium 8.1 (L) 8.5 - 10.1 mg/dL       Assessment and Plan:    Tee Hilton is a 70 year old male who presented on 1/30/2021 with 8 days of fevers, myalgias and intermittent confusion.     Strep pneumonia meningitis (PCR positive but csf culture neg to date)  Metabolic/infectious encephalopathy related to above  Started 2-3 days after his second shingles vaccine and symptoms ongoing 8 days on arrival to ED 1/30.  Associated with severe headaches that are frontal and over the top of his head. No significant neck pain.  No runny nose or congestion.  No sore throat.  No cough.  No shortness of breath or chest pain.  Has had some myalgias and weakness.   Had outpatient Covid testing that was  negative.  No nausea vomiting.  No diarrhea.  Wife is not ill.  Presented to the ED on 1/29 with wife who expressed concerns of some confusion.  Urinalysis negative.  Covid testing was negative.  He was given some fluids and discharged home.  He returned on 1/30 with fever to 104.  Work-up once again does not reveal a source.  White blood cell count is elevated at 15.2 and is mildly left shifted with 78% neutrophils.  In ED a lumbar puncture was felt appropriate deferred due to anticoagulation     CSF labs returned in the evening and show 598 cells, 87% neutrophils. Meningitis/encephalitis PCR panel positive for Strep pneumoniae. But culture of csf neg so far. Ceftriaxone 2g IV q12h. Vancomycin recommended until sensitivities known due to uncommon but possible resistance so added with pharmacist to dose.      Unable to stop vanco since I don't have a positive csf  culture with sens.  WBC is up 23k (suspect to some degree from steroids)  Steroids will be completed 02/03/21.        AMINA (acute kidney injury) on chronic kidney disease   Creatinine 1.38 Aug, 2020.  Presented to the ED 1/29 with creatinine 1.90 and then 1.84 on 1/30 early morning.  Suspect prerenal due to dehydration related to current illness.      Creat 1.9--1.84--1.75--1.6--1.44--1.72--1.5  holding lisinopril  since 02/02/21.  Start amlodopine 2/3/21        Hyponatremia, mild, suspect due to some dehydration  Sodium 128 upon admit  FENa low, 04%, consistent with prerenal azotemia.   Resolved       Hypertension  On amlodipine, metoprolol and lisinopril at home.  Blood pressures are adequate currently.  Continued metoprolol  Holding lisinopril.  Restarted amlodopine 2/3/21.     Chronic atrial fibrillation (H)  Rate controlled with metoprolol.  Patient is on rivaroxaban for anticoagulation.   Rivaroxaban held 2 days for LP now completed.   Continue metoprolol  resumed anticoagulation 02/01/21      DVT Prophylaxis: Low Risk/Ambulatory with no VTE  prophylaxis indicated  Code Status: Full Code     Lines: PIV   Davis catheter: None     Plan-will need two weeks of atbs,Stopping decadron today.    Will continue vanco for now and do a phone consult with ID again.  Insert picc line. Home tomorrow?      2:41 PM   Page placed to ID [facility]-no reply    3:05 PM   Discussed with ID MD on [facility] hotline  Rocephin covers 98% of strep pneumo at [facility].  Stopping vanco seems reasonable given the chronic kidney ds and risk of kidney injury.  He will need close follow up and need to be observed.

## 2021-02-03 NOTE — PHARMACY-VANCOMYCIN DOSING SERVICE
Pharmacy Vancomycin Note  Date of Service February 3, 2021  Patient's  1950   70 year old, male    Indication: Meningitis  Goal Trough Level: 15-20 mg/L  Day of Therapy: 4  Current Vancomycin regimen:  2000 mg IV q12h    Current estimated CrCl = Estimated Creatinine Clearance: 58.5 mL/min (A) (based on SCr of 1.5 mg/dL (H)).    Creatinine for last 3 days  2021:  4:57 PM Creatinine 1.60 mg/dL  2021:  4:38 AM Creatinine 1.44 mg/dL  2021:  4:40 AM Creatinine 1.72 mg/dL  2/3/2021:  4:07 AM Creatinine 1.50 mg/dL    Recent Vancomycin Levels (past 3 days)  2/3/2021: 12:00 PM Vancomycin Level 33.1 mg/L    Vancomycin IV Administrations (past 72 hours)                   vancomycin (VANCOCIN) 2,000 mg in sodium chloride 0.9 % 500 mL intermittent infusion (mg) 2,000 mg New Bag 21 0021     2,000 mg New Bag 21 1316     2,000 mg New Bag 21 1056     2,000 mg New Bag  0030     2,000 mg New Bag 21 2323                Nephrotoxins and other renal medications (From now, onward)    Start     Dose/Rate Route Frequency Ordered Stop    21  [Held by provider]  vancomycin (VANCOCIN) 2,000 mg in sodium chloride 0.9 % 500 mL intermittent infusion     (Held by provider since Wed 2/3/2021 at 1242 by Zia Moreno MD.Hold Reason: Other.Hold Comments: Supratherapeutic level, determining future timings to aid in outpatient treatment)    2,000 mg  over 2 Hours Intravenous EVERY 12 HOURS 21 220               Contrast Orders - past 72 hours (72h ago, onward)    None          Interpretation of levels and current regimen:  Trough level is  Supratherapeutic    Has serum creatinine changed > 50% in last 72 hours: No, but did present with AMINA that is now improving    Urine output:  unable to determine    Renal Function: Improving    Plan:  1.  Of note, patient did miss a midnight dose between -, making it difficult to eval level, but overall averages out to ~1600mg q12h.  Therefore, will hold dose until 2200 on 2/3 to be given after ceftriaxone, estimating trough will be ~18mg/dL at this time. Then, will restart dose at 1500mg q24h.  2.  Pharmacy will check trough levels as appropriate in 1-3 Days.    3. Serum creatinine levels will be ordered daily for the first week of therapy and at least twice weekly for subsequent weeks.      Serge Cosby RPH        .

## 2021-02-04 ENCOUNTER — HOME INFUSION (PRE-WILLOW HOME INFUSION) (OUTPATIENT)
Dept: PHARMACY | Facility: CLINIC | Age: 71
End: 2021-02-04

## 2021-02-04 VITALS
HEART RATE: 74 BPM | BODY MASS INDEX: 34.72 KG/M2 | RESPIRATION RATE: 20 BRPM | TEMPERATURE: 98.6 F | SYSTOLIC BLOOD PRESSURE: 136 MMHG | OXYGEN SATURATION: 99 % | WEIGHT: 242.51 LBS | DIASTOLIC BLOOD PRESSURE: 67 MMHG | HEIGHT: 70 IN

## 2021-02-04 LAB
ANION GAP SERPL CALCULATED.3IONS-SCNC: 6 MMOL/L (ref 3–14)
BUN SERPL-MCNC: 32 MG/DL (ref 7–30)
CALCIUM SERPL-MCNC: 8.4 MG/DL (ref 8.5–10.1)
CHLORIDE SERPL-SCNC: 106 MMOL/L (ref 94–109)
CO2 SERPL-SCNC: 25 MMOL/L (ref 20–32)
CREAT SERPL-MCNC: 1.35 MG/DL (ref 0.66–1.25)
ERYTHROCYTE [DISTWIDTH] IN BLOOD BY AUTOMATED COUNT: 12.3 % (ref 10–15)
GFR SERPL CREATININE-BSD FRML MDRD: 53 ML/MIN/{1.73_M2}
GLUCOSE SERPL-MCNC: 91 MG/DL (ref 70–99)
HCT VFR BLD AUTO: 33.2 % (ref 40–53)
HGB BLD-MCNC: 11.6 G/DL (ref 13.3–17.7)
MCH RBC QN AUTO: 31.4 PG (ref 26.5–33)
MCHC RBC AUTO-ENTMCNC: 34.9 G/DL (ref 31.5–36.5)
MCV RBC AUTO: 90 FL (ref 78–100)
PLATELET # BLD AUTO: 465 10E9/L (ref 150–450)
POTASSIUM SERPL-SCNC: 4.2 MMOL/L (ref 3.4–5.3)
RBC # BLD AUTO: 3.69 10E12/L (ref 4.4–5.9)
SODIUM SERPL-SCNC: 137 MMOL/L (ref 133–144)
WBC # BLD AUTO: 20.8 10E9/L (ref 4–11)

## 2021-02-04 PROCEDURE — 250N000013 HC RX MED GY IP 250 OP 250 PS 637: Performed by: INTERNAL MEDICINE

## 2021-02-04 PROCEDURE — 250N000011 HC RX IP 250 OP 636: Performed by: INTERNAL MEDICINE

## 2021-02-04 PROCEDURE — 99239 HOSP IP/OBS DSCHRG MGMT >30: CPT | Performed by: INTERNAL MEDICINE

## 2021-02-04 PROCEDURE — 250N000013 HC RX MED GY IP 250 OP 250 PS 637: Performed by: FAMILY MEDICINE

## 2021-02-04 PROCEDURE — 80048 BASIC METABOLIC PNL TOTAL CA: CPT | Performed by: FAMILY MEDICINE

## 2021-02-04 PROCEDURE — 85027 COMPLETE CBC AUTOMATED: CPT | Performed by: FAMILY MEDICINE

## 2021-02-04 RX ORDER — METHYLPREDNISOLONE 4 MG
TABLET, DOSE PACK ORAL
Qty: 21 TABLET | Refills: 0
Start: 2021-02-04 | End: 2021-03-18

## 2021-02-04 RX ORDER — CEFTRIAXONE SODIUM 2 G/50ML
2 INJECTION, SOLUTION INTRAVENOUS EVERY 12 HOURS
Start: 2021-02-04 | End: 2021-02-10

## 2021-02-04 RX ADMIN — ALLOPURINOL 100 MG: 100 TABLET ORAL at 08:47

## 2021-02-04 RX ADMIN — METOPROLOL SUCCINATE 100 MG: 100 TABLET, EXTENDED RELEASE ORAL at 08:47

## 2021-02-04 RX ADMIN — AMLODIPINE BESYLATE 5 MG: 5 TABLET ORAL at 08:47

## 2021-02-04 RX ADMIN — RIVAROXABAN 20 MG: 10 TABLET, FILM COATED ORAL at 08:47

## 2021-02-04 RX ADMIN — CEFTRIAXONE SODIUM 2 G: 2 INJECTION, SOLUTION INTRAVENOUS at 08:52

## 2021-02-04 RX ADMIN — OMEPRAZOLE 20 MG: 20 CAPSULE, DELAYED RELEASE ORAL at 06:28

## 2021-02-04 ASSESSMENT — ACTIVITIES OF DAILY LIVING (ADL)
ADLS_ACUITY_SCORE: 14

## 2021-02-04 NOTE — PROGRESS NOTES
[facility]  Procedure Note         PICC      Tee Hilton  MRN# [id number]   February 3, 2021, 7:00 PM Indication: infection           Pause for the cause: Consent for catheter placement procedure signed  Time out completed  Patient ID's verified using two distinct indicators  All necessary equipment is present   Type of line to be used: PICC   Full barrier precautions used: Yes   Skin preparation: Chloraprep   Date of insertion: February 3, 2021, 7:00 PM   Device type: Single lumen, valved, 4.0   Catheter brand: WellDoc   Lot number: [id number]   Insertion location: Right basilic vein   Method of placement: Venipuncture  MST  Ultrasound   Number of attempts: With ultrasound: y   Without ultrasound: n   Difficulty threading: no    IV device: Dressing dry and intact  Chlorhexidine patch   Arm circumference: Adults 10 cm   extremity circumference: 40 cm   Internal length: 49 cm    visible catheter length: 0 cm   Total catheter length: 49 cm   Tip termination: Low svc   Method of verification: Chest x-ray    patency post placement: Positive blood return  Flushes without difficulty   Line flush: Line flush documented on the eMARx   Placement verified by: Registered Nurse   Catheter placed by: Dilma Up   Discontinuation form initiated: No   Patient tolerance: Tolerated well   PICC Insertion Education Complete: Yes      Summary:  This procedure was performed without difficulty and he tolerated the procedure well with no immediate complications.       Recorded by Dilma Up    Attestation:  This patient has been seen and evaluated by me, Dilma Up RN.

## 2021-02-04 NOTE — DISCHARGE SUMMARY
[facility]   Discharge Summary  Hospital Medicine       Date of Admission:  1/30/2021  Date of Discharge:  2/4/2021 11:30 AM  Discharging Provider: Manuel Gonzalez MD      Identification and Chief Compaint: Tee Hilton is a 70 year old male who presented on 1/30/2021 with complaint of 8 days of fevers, myalgias and intermittent confusion.    Discharge Diagnoses   Streptococcus pneumoniae meningitis  Metabolic/infectious encephalopathy due to meningitis  Acute kidney injury on chronic kidney disease   Hyponatremia  Chronic atrial fibrillation   Long term anticoagulation  Hypertension     Follow-ups Needed After Discharge   Follow-up Appointments     Follow-up and recommended labs and tests       Follow up with primary care provider, Jon Denson, within 7 days for   hospital follow- up.  The following labs/tests are recommended: CBC and   BMP next week when seen in clinic.           Unresulted Labs Ordered in the Past 30 Days of this Admission     Date and Time Order Name Status Description    1/30/2021 1948 Blood culture Preliminary     1/30/2021 1948 Blood culture Preliminary     1/30/2021 0350 CSF Culture Aerobic Bacterial Preliminary       These results will be followed up by Dr. Gonzalez if positive and changes therapy plan    Hospital Course   Tee Hilton is a 70 year old male who presented on 1/30/2021 with 8 days of fevers, myalgias and intermittent confusion.     Strep pneumonia meningitis (PCR positive but csf culture neg to date)  Metabolic/infectious encephalopathy related to above    Started 2-3 days after his second shingles vaccine and symptoms ongoing 8 days on arrival to ED 1/30.  Associated with severe headaches that are frontal and over the top of his head. No significant neck pain.  No runny nose or congestion.  No sore throat.  No cough.  No shortness of breath or chest pain.  Has had some myalgias and weakness.   Had outpatient Covid testing that  was negative. Wife is not ill.  Presented to the ED on 1/29 with wife who expressed concerns of some confusion.  Urinalysis negative.  Covid testing was negative.  He was given some fluids and discharged home.  He returned on 1/30 with fever to 104.  Work-up once again does not reveal a source.  No meningismus. White blood cell count is elevated at 15.2 and is mildly left shifted with 78% neutrophils.  In ED a lumbar puncture was felt appropriate but deferred due to anticoagulation.     Anticoagulation held 48 hours. Clinically, continued to have headache and fever and clinically unchanged. LP performed and reported as clear at that time. However, CSF labs returned in the evening and showed 598 cells, 87% neutrophils. Meningitis/encephalitis PCR panel positive for Strep pneumoniae. CSF culture obtained prior to antibiotics. Ceftriaxone 2g IV q12h started that evening 1/31. Vancomycin recommended until sensitivities known due to uncommon but possible resistance so added this also 1/31. Started on dexamethasone IV q 6 hours as well.     Clinically, fever and headache resolved rapidly. He completed >2 days of high dose IV dexamethasone. CSF cultures have shown NGTD so unable to determine sensitivities. Per ID, 98% of Strep pneumoniae is sensitive to ceftriaxone, and patient has kidney disease which increases risks of vancomycin. So vancomycin discontinued after 3 days.     Patient is clearly improved on discharge. He is alert and oriented and NAD. Neurologic exam is normal. Patient's wife feels he is not quite at baseline mental status, yet. Antimicrobial Stewardship Team recommended 10 days of IV ceftriaxone. I discussed with ID, and 10 days may be adequate but if still having symptoms or other signs of infection, would continue antibiotics for full 14 days.     He will have home IV infusion of ceftriaxone 2 grams q 12 hours and follow up with PCP next week on 2/9 which will be end of day #9 and start of day #10 that  evening. Recheck WBC then as this is still elevated at time of discharge and if elevated and/or patient is having any remaining symptoms, recommend to extend antibiotics to 14 days. I discussed with LINDA Talavera, by phone on 2/5. Return to ED if headache or fevers recur.      AMINA (acute kidney injury) on chronic kidney disease     Creatinine 1.38 Aug, 2020.  Presented to the ED 1/29 with creatinine 1.90 and then 1.84 on 1/30 early morning.  Suspect prerenal due to dehydration related to current illness.     Creatinine improved daily and is at baseline, 1.35, on day of discharge. He is resuming his lisinopril on discharge.      Hyponatremia, mild, suspect due to some dehydration    Sodium 128 upon admit. FENa low, 04%, consistent with prerenal azotemia. Resolved       Hypertension    On amlodipine, metoprolol and lisinopril at home.  Blood pressures are adequate currently.    Continued metoprolol. Restarted amlodopine 2/3/21. Restarting lisinopril on discharge. I suggested he start with 1 tab, 20 mg, today and check BP. If not low and no dizziness, can resume home dose, 40 mg, tomorrow.     Chronic atrial fibrillation (H)    Rate controlled with metoprolol.  Patient is on rivaroxaban for anticoagulation. Rivaroxaban held 2 days for LP now completed. Resumed rivaroxaban 2/1/2021 - patient takes in the morning.  Continue metoprolol. Has good rate control.     Consultations This Hospital Stay   PHYSICAL THERAPY ADULT IP CONSULT  PHARMACY TO DOSE VANCO  CARE MANAGEMENT / SOCIAL WORK IP CONSULT  VASCULAR ACCESS ADULT IP CONSULT    Code Status   Full Code    The discharge plan was discussed with the patient and his wife at bedside, and they expressed understanding.     Time Spent on this Encounter   Total time on this discharge was 40 minutes.       Manuel Gonzalez MD  [facility]   ______________________________________________________________________    Physical Exam   Vital Signs: Temp:  98.6  F (37  C) Temp src: Oral BP: 136/67 Pulse: 74   Resp: 20 SpO2: 99 % O2 Device: None (Room air)    Weight: 242 lbs 8.1 oz  Constitutional: alert and oriented, NAD, nontoxic, smiling. Mentation appears grossly normal (though wife feels he is not quite back to his baseline)  CV: Regular  Respiratory: CTA bilaterally  GI: Soft, nontender  Skin: Warm and dry  Neck: supple       Primary Care Physician   Jon Denson  [id number]  [place] [address]     Discharge Disposition   Discharged to home  Condition at discharge: Stable    Significant Results and Procedures   Results for orders placed or performed during the hospital encounter of 01/30/21   CT Head w/o Contrast    Narrative    EXAM: CT HEAD W/O CONTRAST  LOCATION: [facility]  DATE/TIME: 1/30/2021 4:20 AM    INDICATION: Headache, new or worsening (Age >= 50y)  COMPARISON: None.  TECHNIQUE: Routine CT Head without IV contrast. Multiplanar reformats. Dose reduction techniques were used.    FINDINGS:  INTRACRANIAL CONTENTS: No intracranial hemorrhage, extraaxial collection, or mass effect.  No CT evidence of acute infarct. Normal parenchymal attenuation. Normal ventricles and sulci.     VISUALIZED ORBITS/SINUSES/MASTOIDS: No intraorbital abnormality. No paranasal sinus mucosal disease. No middle ear or mastoid effusion.    BONES/SOFT TISSUES: No acute abnormality.      Impression    IMPRESSION:  1.  No acute intracranial process.   XR Chest Port 1 View    Narrative    CHEST ONE VIEW PORTABLE    2/3/2021 6:49 PM     HISTORY: PICC insertion    COMPARISON: 1/29/2021.      Impression    IMPRESSION: Right PICC line identified, its tip at the low SVC  appearing appropriate in position. No new airspace disease. Stable  cardiac silhouette.    FLAKO BENITEZ MD     Procedures    Lumbar puncture    Discharge Orders      Care Coordination Referral      Reason for your hospital stay    Bacterial meningitis due to Streptococcus pneumoniae. You should  continue to improve and recover, but if you develop recurrent headache or fever, return immediately to the ED.     Follow-up and recommended labs and tests     Follow up with primary care provider, Jon Denson, within 7 days for hospital follow- up.  The following labs/tests are recommended: CBC and BMP next week when seen in clinic.     Activity    Your activity upon discharge: activity as tolerated     Diet    Follow this diet upon discharge: Regular diet     Discharge Medications   Current Discharge Medication List      START taking these medications    Details   cefTRIAXone IN D5W (ROCEPHIN) 40 MG/ML SOLN Inject 2 g into the vein every 12 hours for 6 days Through morning dose 2/10/2021    Associated Diagnoses: Streptococcus pneumoniae meningitis         CONTINUE these medications which have CHANGED    Details   methylPREDNISolone (MEDROL DOSEPAK) 4 MG tablet therapy pack Follow Package Directions. 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         CONTINUE these medications which have NOT CHANGED    Details   allopurinol (ZYLOPRIM) 100 MG tablet Take 1 tablet (100 mg) by mouth daily  Qty: 90 tablet, Refills: 3    Associated Diagnoses: Hyperuricemia      amLODIPine (NORVASC) 5 MG tablet Take 1 tablet (5 mg) by mouth daily  Qty: 90 tablet, Refills: 1    Associated Diagnoses: Essential hypertension      lisinopril (ZESTRIL) 20 MG tablet TAKE 2 TABLETS BY MOUTH ONCE DAILY  Qty: 180 tablet, Refills: 1    Associated Diagnoses: Benign essential hypertension      metoprolol succinate ER (TOPROL-XL) 100 MG 24 hr tablet Take 1 tablet (100 mg) by mouth daily  Qty: 90 tablet, Refills: 1    Associated Diagnoses: Chronic atrial fibrillation (H)      omeprazole (PRILOSEC) 20 MG CR capsule Take 20 mg by mouth daily       ondansetron (ZOFRAN) 4 MG tablet Take 1 tablet (4 mg) by mouth every 8 hours as needed for  nausea  Qty: 30 tablet, Refills: 1    Associated Diagnoses: Nausea      zolpidem (AMBIEN) 10 MG tablet Take 1 tablet (10 mg) by mouth nightly as needed  Qty: 30 tablet, Refills: 0    Comments: Please allow him to fill early this month.  Associated Diagnoses: Primary insomnia      Famotidine (PEPCID PO) Take 10 mg by mouth daily as needed       rivaroxaban ANTICOAGULANT (XARELTO ANTICOAGULANT) 20 MG TABS tablet TAKE 1 TABLET BY MOUTH ONCE DAILY WITH  DINNER  Qty: 90 tablet, Refills: 1    Associated Diagnoses: Chronic atrial fibrillation (H)      sildenafil (REVATIO) 20 MG tablet Take 1 -5 tabs daily prn  Qty: 30 tablet, Refills: 11    Associated Diagnoses: Other male erectile dysfunction           Allergies   Allergies   Allergen Reactions     Erythromycin GI Disturbance     Upset stomach

## 2021-02-04 NOTE — PLAN OF CARE
AOx4, up independent in room. Denies pain, PICC SL. Anticipating discharge today, rested comfortably throughout shift.

## 2021-02-04 NOTE — DISCHARGE INSTRUCTIONS
Rivaroxaban (By mouth)  Rivaroxaban ([id number]-n-ALT-a-ban)    Treats and prevents blood clots, which lowers the risk of stroke, deep vein thrombosis (DVT), pulmonary embolism (PE), heart attack, and similar conditions. Also prevent blood clots in people who are hospitalized for an acute illness. This medicine is a blood thinner.    Brand Name(s):Xarelto,Xarelto Starter Pack  There may be other brand names for this medicine.    When This Medicine Should Not Be Used:  This medicine is not right for everyone. Do not use it if you had an allergic reaction to rivaroxaban, or if you have severe bleeding.    How to Use This Medicine:  Tablet    Your doctor will tell you how much medicine to use. Do not use more than directed. Take this medicine at the same time each day.    2.5 milligram (mg) or 10 mg tablet: Take with or without food.    15 mg or 20 mg tablet: Take with food.    If you cannot swallow the tablets whole, you may crush the tablet and mix it with a small amount of applesauce. Eat some food right after you swallow the mixture.    Tube feeding: You may crush the tablet and mix the medicine in 50 milliliters (mL) of water before giving it via the tube. This must be followed by a feeding.    This medicine should come with a Medication Guide. Ask your pharmacist for a copy if you do not have one.     Missed dose:   o Ask your doctor or pharmacist if you are not sure what to do if you miss a dose.  o Once-daily dose: If you miss a dose or forget to use your medicine, use it as soon as you can on the same day. Do not use extra medicine to make up for a missed dose.  o Twice-daily dose to prevent serious heart or blood vessel problems (2.5 mg tablet): If you miss a dose, skip the missed dose and take your next dose at your usual time.  o Twice-daily dose to treat a blood clot (15 mg tablet): If you miss a dose or forget to use your medicine, use it as soon as you can on the same day. You may take 2 doses at the same time  to make up for the missed dose. This is only for people who take a total of 30 mg per day.    Store the medicine in a closed container at room temperature, away from heat, moisture, and direct light. You may store the crushed tablet with applesauce or water mixture for up to 4 hours.    Drugs and Foods to Avoid:  Ask your doctor or pharmacist before using any other medicine, including over-the-counter medicines, vitamins, and herbal products.    Some medicines can affect how rivaroxaban works. Tell your doctor if you are using any of the following:  o Carbamazepine, conivaptan, erythromycin, indinavir, itraconazole, ketoconazole, lopinavir, phenytoin, rifampin, ritonavir, Janet's wort  o Another blood thinner (including clopidogrel, enoxaparin, heparin, warfarin)  o NSAID pain or arthritis medicine (including aspirin, celecoxib, diclofenac, ibuprofen, naproxen)    Warnings While Using This Medicine:    Tell your doctor if you are pregnant or breastfeeding, or if you have kidney disease, liver disease, bleeding problems, cancer, lung problems, an artificial heart valve, or antiphospholipid syndrome.    This medicine may increase your risk of bleeding. Be careful to avoid injuries that could cause bleeding. Stay away from rough sports or other situations where you could be bruised, cut, or hurt. Brush and floss your teeth gently. Be careful when using sharp objects, including razors and fingernail clippers. Avoid picking your nose. If you need to blow your nose, blow it gently.    This medicine may cause nerve damage if you have a medical procedure done to your back, including anesthesia or a spinal puncture. This is more likely to happen if you have a history of back injury, back surgery, problems with your spine, or procedures or punctures to your back. Tell your doctor if you are also taking another blood thinner, because this also increases the risk.    Do not stop using this medicine suddenly without asking  your doctor. You might have an increased risk for stroke for a short time after you stop using this medicine.    Tell any doctor or dentist who treats you that you are using this medicine.    Your doctor will do lab tests at regular visits to check on the effects of this medicine. Keep all appointments.     Keep all medicine out of the reach of children. Never share your medicine with anyone.     Possible Side Effects While Using This Medicine:  Call your doctor right away if you notice any of these side effects:    Allergic reaction: Itching or hives, swelling in your face or hands, swelling or tingling in your mouth or throat, chest tightness, trouble breathing    Blistering, peeling, red skin rash    Decrease in how much or how often you urinate    Heavy menstrual bleeding or vaginal bleeding    Red or brown urine, bloody or black, tarry stools    Unusual bleeding or bruising, including frequent nosebleeds    Vomiting of blood or material that looks like coffee grounds    If you notice other side effects that you think are caused by this medicine, tell your doctor.  Call your doctor for medical advice about side effects. You may report side effects to FDA at 4-506-FDA-4808

## 2021-02-04 NOTE — PROGRESS NOTES
Care Management Discharge Note    Discharge Date: 02/04/21       Discharge Disposition: Home Infusion    Discharge Services:  RN     Discharge DME: None    Discharge Transportation: family or friend will provide    Private pay costs discussed: insurance costs co-pays    Education Provided on the Discharge Plan: yes   Persons Notified of Discharge Plans: Pt and spouse Gillian  Patient/Family in Agreement with the Plan: yes    Handoff Referral Completed: Yes    Additional Information:  SW updated on discharge plan today during IDT rounds. SW updated [facility] on scheduled discharge today and plan for IV antibiotic needs.     SW met with the Pt and Spouse Gillian today.  Informed that [facility] will call the Pt today to coordinate services today. Pt aware that he will receive a visit through Home Care today and will receive needed teaching/supplies. SW answered Pt/Spouse Gillian's questions.     Pt reported no further concerns/questions for discharge today.     Plan:    Home with [facility]    Spouse to transport    SARAHI Wood

## 2021-02-04 NOTE — PLAN OF CARE
[facility] DISCHARGE NOTE    Patient discharged to home at 11:25 AM via wheel chair. Accompanied by spouse and staff. Discharge instructions reviewed with patient, opportunity offered to ask questions. Prescriptions, Antibiotic prescription sent to home care infusion to fill.. All belongings sent with patient.    Chandler Chavez RN

## 2021-02-05 LAB
BACTERIA SPEC CULT: NO GROWTH
Lab: NORMAL
SPECIMEN SOURCE: NORMAL

## 2021-02-05 NOTE — PROGRESS NOTES
This is a recent snapshot of the patient's [facility] medical record.  For current drug dose and complete information and questions, call [phone] or In Basket pool, [facility] ([id number])  CSN Number:  [id number]

## 2021-02-09 ENCOUNTER — VIRTUAL VISIT (OUTPATIENT)
Dept: FAMILY MEDICINE | Facility: CLINIC | Age: 71
End: 2021-02-09
Payer: COMMERCIAL

## 2021-02-09 ENCOUNTER — HOME INFUSION (PRE-WILLOW HOME INFUSION) (OUTPATIENT)
Dept: PHARMACY | Facility: CLINIC | Age: 71
End: 2021-02-09

## 2021-02-09 ENCOUNTER — MYC MEDICAL ADVICE (OUTPATIENT)
Dept: FAMILY MEDICINE | Facility: CLINIC | Age: 71
End: 2021-02-09

## 2021-02-09 ENCOUNTER — TELEPHONE (OUTPATIENT)
Dept: FAMILY MEDICINE | Facility: CLINIC | Age: 71
End: 2021-02-09

## 2021-02-09 DIAGNOSIS — F51.01 PRIMARY INSOMNIA: ICD-10-CM

## 2021-02-09 DIAGNOSIS — G00.1 MENINGITIS DUE TO STREPTOCOCCUS PNEUMONIAE: ICD-10-CM

## 2021-02-09 DIAGNOSIS — G00.1 MENINGITIS DUE TO STREPTOCOCCUS PNEUMONIAE: Primary | ICD-10-CM

## 2021-02-09 DIAGNOSIS — G00.1 STREPTOCOCCUS PNEUMONIAE MENINGITIS: ICD-10-CM

## 2021-02-09 LAB
ANION GAP SERPL CALCULATED.3IONS-SCNC: 6 MMOL/L (ref 3–14)
BUN SERPL-MCNC: 23 MG/DL (ref 7–30)
CALCIUM SERPL-MCNC: 9.1 MG/DL (ref 8.5–10.1)
CHLORIDE SERPL-SCNC: 103 MMOL/L (ref 94–109)
CO2 SERPL-SCNC: 26 MMOL/L (ref 20–32)
CREAT SERPL-MCNC: 1.71 MG/DL (ref 0.66–1.25)
CRP SERPL-MCNC: 13.5 MG/L (ref 0–8)
ERYTHROCYTE [DISTWIDTH] IN BLOOD BY AUTOMATED COUNT: 12.7 % (ref 10–15)
GFR SERPL CREATININE-BSD FRML MDRD: 40 ML/MIN/{1.73_M2}
GLUCOSE SERPL-MCNC: 80 MG/DL (ref 70–99)
HCT VFR BLD AUTO: 39.1 % (ref 40–53)
HGB BLD-MCNC: 13.1 G/DL (ref 13.3–17.7)
MCH RBC QN AUTO: 30.7 PG (ref 26.5–33)
MCHC RBC AUTO-ENTMCNC: 33.5 G/DL (ref 31.5–36.5)
MCV RBC AUTO: 92 FL (ref 78–100)
PLATELET # BLD AUTO: 458 10E9/L (ref 150–450)
POTASSIUM SERPL-SCNC: 4.4 MMOL/L (ref 3.4–5.3)
RBC # BLD AUTO: 4.27 10E12/L (ref 4.4–5.9)
SODIUM SERPL-SCNC: 135 MMOL/L (ref 133–144)
WBC # BLD AUTO: 12 10E9/L (ref 4–11)

## 2021-02-09 PROCEDURE — 80048 BASIC METABOLIC PNL TOTAL CA: CPT | Performed by: PHYSICIAN ASSISTANT

## 2021-02-09 PROCEDURE — 36415 COLL VENOUS BLD VENIPUNCTURE: CPT | Performed by: PHYSICIAN ASSISTANT

## 2021-02-09 PROCEDURE — 86140 C-REACTIVE PROTEIN: CPT | Performed by: PHYSICIAN ASSISTANT

## 2021-02-09 PROCEDURE — 85027 COMPLETE CBC AUTOMATED: CPT | Performed by: PHYSICIAN ASSISTANT

## 2021-02-09 PROCEDURE — 99214 OFFICE O/P EST MOD 30 MIN: CPT | Mod: 95 | Performed by: PHYSICIAN ASSISTANT

## 2021-02-09 RX ORDER — ZOLPIDEM TARTRATE 12.5 MG/1
12.5 TABLET, FILM COATED, EXTENDED RELEASE ORAL
Qty: 30 TABLET | Refills: 0 | Status: SHIPPED | OUTPATIENT
Start: 2021-02-09 | End: 2021-03-08

## 2021-02-09 NOTE — TELEPHONE ENCOUNTER
Prior Authorization Retail Medication Request    Medication/Dose: zolpidem ER (AMBIEN CR) 12.5 MG CR tablet  ICD code (if different than what is on RX):  F51.01  Previously Tried and Failed:    Rationale:      Insurance Name: [facility]    Insurance ID:  [id number]      Pharmacy Information (if different than what is on RX)  Name:  [facility]  Phone:  [phone]

## 2021-02-09 NOTE — PROGRESS NOTES
Tee is a 70 year old who is being evaluated via a billable video visit.      How would you like to obtain your AVS? MyChart  If the video visit is dropped, the invitation should be resent by: Text to cell phone: [phone]  Will anyone else be joining your video visit? No    Video Start Time: 1:12 PM      Subjective     Tee is a 70 year old who presents to clinic today for the following health issues     HPI       ED/UC Followup:    Facility:  [place]   Date of visit: 1/29/21 And 1/30/21  Reason for visit: Strep, Pneumonia, Meningitis   Current Status: Pt states he is feeling much better, still feels weak and intermittent headaches.      Fever and ha resolved. Still some mild malaise and body aches. Feels 80% recovered . Some insomnia. Still a dec appetite.      Review of Systems   Constitutional, HEENT, cardiovascular, pulmonary, GI, , musculoskeletal, neuro, skin, endocrine and psych systems are negative, except as otherwise noted.      Objective           Vitals:  No vitals were obtained today due to virtual visit.    Physical Exam   GENERAL: Healthy, alert and no distress  EYES: Eyes grossly normal to inspection.  No discharge or erythema, or obvious scleral/conjunctival abnormalities.  RESP: No audible wheeze, cough, or visible cyanosis.  No visible retractions or increased work of breathing.    SKIN: Visible skin clear. No significant rash, abnormal pigmentation or lesions.  NEURO: Cranial nerves grossly intact.  Mentation and speech appropriate for age.  PSYCH: Mentation appears normal, affect normal/bright, judgement and insight intact, normal speech and appearance well-groomed.    Tee was seen today for hospital f/u.    Diagnoses and all orders for this visit:    Meningitis due to Streptococcus pneumoniae  -     Cancel: CBC with platelets and differential  -     Cancel: Basic metabolic panel  (Ca, Cl, CO2, Creat, Gluc, K, Na, BUN)  -     Cancel: CRP inflammation  -     **CBC with platelets FUTURE  anytime; Future  -     **Basic metabolic panel FUTURE 1yr; Future  -     CRP inflammation; Future    Primary insomnia  -     zolpidem ER (AMBIEN CR) 12.5 MG CR tablet; Take 1 tablet (12.5 mg) by mouth nightly as needed for sleep      Advised supportive and symptomatic treatment.  Follow up with Provider - if condition persists or worsens.   Will base whether or not I extend his rocephin infusions based on tomorrow's lab work.            Video-Visit Details    Type of service:  Video Visit    Video End Time:1:45 PM    Originating Location (pt. Location): Home    Distant Location (provider location):  [facility] [place]     Platform used for Video Visit: [employer]

## 2021-02-10 ENCOUNTER — HOME INFUSION (PRE-WILLOW HOME INFUSION) (OUTPATIENT)
Dept: PHARMACY | Facility: CLINIC | Age: 71
End: 2021-02-10

## 2021-02-10 RX ORDER — CEFTRIAXONE SODIUM 2 G/50ML
2 INJECTION, SOLUTION INTRAVENOUS EVERY 12 HOURS
Qty: 400 ML | Refills: 0 | Status: SHIPPED | OUTPATIENT
Start: 2021-02-10 | End: 2021-03-18

## 2021-02-10 NOTE — PROGRESS NOTES
This is a recent snapshot of the patient's [facility] medical record.  For current drug dose and complete information and questions, call [phone] or In Basket pool, fv home infusion ([id number])  CSN Number:  [id number]

## 2021-02-11 ENCOUNTER — MYC MEDICAL ADVICE (OUTPATIENT)
Dept: FAMILY MEDICINE | Facility: CLINIC | Age: 71
End: 2021-02-11

## 2021-02-11 ENCOUNTER — HOME INFUSION (PRE-WILLOW HOME INFUSION) (OUTPATIENT)
Dept: PHARMACY | Facility: CLINIC | Age: 71
End: 2021-02-11

## 2021-02-11 DIAGNOSIS — I10 BENIGN ESSENTIAL HYPERTENSION: ICD-10-CM

## 2021-02-11 DIAGNOSIS — G00.1 MENINGITIS DUE TO STREPTOCOCCUS PNEUMONIAE: Primary | ICD-10-CM

## 2021-02-11 NOTE — TELEPHONE ENCOUNTER
Prior Authorization Not Needed per Insurance    Medication: zolpidem ER (AMBIEN CR) 12.5 MG CR tablet  Insurance Company: Other (see comments)Comment:  [employer] [place] [employer]  Expected CoPay:      Pharmacy Filling the Rx: [facility] [place] - [address] [facility]  Pharmacy Notified: Yes  Patient Notified: Yes

## 2021-02-11 NOTE — PROGRESS NOTES
This is a recent snapshot of the patient's [facility] medical record.  For current drug dose and complete information and questions, call [phone] or In Basket pool, fv home infusion ([id number])  CSN Number:  [id number]

## 2021-02-11 NOTE — TELEPHONE ENCOUNTER
PA Initiation    Medication: zolpidem ER (AMBIEN CR) 12.5 MG CR tablet  Insurance Company: Other (see comments)Comment:  [facility] Commercial  Pharmacy Filling the Rx: [facility] - [place] - [facility]   Filling Pharmacy Phone: [phone]  Filling Pharmacy Fax: [phone]  Start Date: 2/11/2021

## 2021-02-12 NOTE — PROGRESS NOTES
This is a recent snapshot of the patient's [facility] medical record.  For current drug dose and complete information and questions, call [phone] or In Basket pool, fv home infusion ([id number])  CSN Number:  [id number]

## 2021-02-15 ENCOUNTER — TELEPHONE (OUTPATIENT)
Dept: FAMILY MEDICINE | Facility: CLINIC | Age: 71
End: 2021-02-15

## 2021-02-15 DIAGNOSIS — I10 BENIGN ESSENTIAL HYPERTENSION: ICD-10-CM

## 2021-02-15 DIAGNOSIS — G00.1 MENINGITIS DUE TO STREPTOCOCCUS PNEUMONIAE: ICD-10-CM

## 2021-02-15 LAB
ANION GAP SERPL CALCULATED.3IONS-SCNC: 7 MMOL/L (ref 3–14)
BUN SERPL-MCNC: 19 MG/DL (ref 7–30)
CALCIUM SERPL-MCNC: 9.6 MG/DL (ref 8.5–10.1)
CHLORIDE SERPL-SCNC: 100 MMOL/L (ref 94–109)
CO2 SERPL-SCNC: 26 MMOL/L (ref 20–32)
CREAT SERPL-MCNC: 1.63 MG/DL (ref 0.66–1.25)
CRP SERPL-MCNC: 11 MG/L (ref 0–8)
ERYTHROCYTE [DISTWIDTH] IN BLOOD BY AUTOMATED COUNT: 12.7 % (ref 10–15)
GFR SERPL CREATININE-BSD FRML MDRD: 43 ML/MIN/{1.73_M2}
GLUCOSE SERPL-MCNC: 110 MG/DL (ref 70–99)
HCT VFR BLD AUTO: 38.8 % (ref 40–53)
HGB BLD-MCNC: 13 G/DL (ref 13.3–17.7)
MCH RBC QN AUTO: 30.5 PG (ref 26.5–33)
MCHC RBC AUTO-ENTMCNC: 33.5 G/DL (ref 31.5–36.5)
MCV RBC AUTO: 91 FL (ref 78–100)
PLATELET # BLD AUTO: 435 10E9/L (ref 150–450)
POTASSIUM SERPL-SCNC: 4.2 MMOL/L (ref 3.4–5.3)
RBC # BLD AUTO: 4.26 10E12/L (ref 4.4–5.9)
SODIUM SERPL-SCNC: 133 MMOL/L (ref 133–144)
WBC # BLD AUTO: 13.6 10E9/L (ref 4–11)

## 2021-02-15 PROCEDURE — 80048 BASIC METABOLIC PNL TOTAL CA: CPT | Performed by: PHYSICIAN ASSISTANT

## 2021-02-15 PROCEDURE — 36415 COLL VENOUS BLD VENIPUNCTURE: CPT | Performed by: PHYSICIAN ASSISTANT

## 2021-02-15 PROCEDURE — 86140 C-REACTIVE PROTEIN: CPT | Performed by: PHYSICIAN ASSISTANT

## 2021-02-15 PROCEDURE — 85027 COMPLETE CBC AUTOMATED: CPT | Performed by: PHYSICIAN ASSISTANT

## 2021-02-16 ENCOUNTER — HOME INFUSION (PRE-WILLOW HOME INFUSION) (OUTPATIENT)
Dept: PHARMACY | Facility: CLINIC | Age: 71
End: 2021-02-16

## 2021-02-17 ENCOUNTER — TELEPHONE (OUTPATIENT)
Dept: FAMILY MEDICINE | Facility: CLINIC | Age: 71
End: 2021-02-17

## 2021-02-17 NOTE — TELEPHONE ENCOUNTER
Order received to continue IV through 02/14/21,then discharge and remove PICC line. Order to [facility] in basket.

## 2021-02-17 NOTE — PROGRESS NOTES
Tee is a 70 year old who is being evaluated via a billable video visit.      How would you like to obtain your AVS? MyChart  If the video visit is dropped, the invitation should be resent by: Text to cell phone: [phone]  Will anyone else be joining your video visit? No    Video Start Time: 2:31 PM        Subjective   Tee is a 70 year old who presents for the following health issues    HPI       Concern - Lab results     Description: Discuss lab results from 2/15/21 and continuous nausea.    Still asymptomatic. Some occasional nausea.  Still hearing impairment.  No cognitive changes.  No ha/fever.  Blood pressure's a shade a low . Will reduce dose to one lisinopril daily.         Review of Systems   Constitutional, HEENT, cardiovascular, pulmonary, GI, , musculoskeletal, neuro, skin, endocrine and psych systems are negative, except as otherwise noted.      Objective           Vitals:  No vitals were obtained today due to virtual visit.    Physical Exam   GENERAL: Healthy, alert and no distress  EYES: Eyes grossly normal to inspection.  No discharge or erythema, or obvious scleral/conjunctival abnormalities.  RESP: No audible wheeze, cough, or visible cyanosis.  No visible retractions or increased work of breathing.    SKIN: Visible skin clear. No significant rash, abnormal pigmentation or lesions.  NEURO: Cranial nerves grossly intact.  Mentation and speech appropriate for age.  PSYCH: Mentation appears normal, affect normal/bright, judgement and insight intact, normal speech and appearance well-groomed.    Tee was seen today for results.    Diagnoses and all orders for this visit:    Benign essential hypertension  -     lisinopril (ZESTRIL) 20 MG tablet; TAKE 1 TABLETS BY MOUTH ONCE DAILY    Essential hypertension  -     **Basic metabolic panel FUTURE 1yr; Future    Streptococcus pneumoniae meningitis  -     **CBC with platelets FUTURE anytime; Future  -     CRP inflammation; Future    Bilateral change in  hearing  -     AUDIOLOGY ADULT REFERRAL; Future      work on lifestyle modification  Advised supportive and symptomatic treatment.  Follow up with Provider - if condition persists or worsens.           Video-Visit Details    Type of service:  Video Visit    Video End Time:2:57 PM    Originating Location (pt. Location): Home    Distant Location (provider location):  [facility] [place]     Platform used for Video Visit: Kyle

## 2021-02-17 NOTE — PROGRESS NOTES
This is a recent snapshot of the patient's [facility] medical record.  For current drug dose and complete information and questions, call [phone] or In Basket pool, fv home infusion ([id number])  CSN Number:  [id number]

## 2021-02-18 ENCOUNTER — VIRTUAL VISIT (OUTPATIENT)
Dept: FAMILY MEDICINE | Facility: CLINIC | Age: 71
End: 2021-02-18
Payer: COMMERCIAL

## 2021-02-18 DIAGNOSIS — H91.93 BILATERAL CHANGE IN HEARING: ICD-10-CM

## 2021-02-18 DIAGNOSIS — G00.1 STREPTOCOCCUS PNEUMONIAE MENINGITIS: ICD-10-CM

## 2021-02-18 DIAGNOSIS — I10 BENIGN ESSENTIAL HYPERTENSION: Primary | ICD-10-CM

## 2021-02-18 DIAGNOSIS — I10 ESSENTIAL HYPERTENSION: ICD-10-CM

## 2021-02-18 PROCEDURE — 99214 OFFICE O/P EST MOD 30 MIN: CPT | Mod: 95 | Performed by: PHYSICIAN ASSISTANT

## 2021-02-18 RX ORDER — LISINOPRIL 20 MG/1
TABLET ORAL
Qty: 90 TABLET | Refills: 0 | Status: SHIPPED | OUTPATIENT
Start: 2021-02-18 | End: 2021-02-25

## 2021-02-23 ENCOUNTER — HOSPITAL ENCOUNTER (EMERGENCY)
Facility: CLINIC | Age: 71
Discharge: HOME | End: 2021-02-23
Attending: NURSE PRACTITIONER | Admitting: NURSE PRACTITIONER
Payer: COMMERCIAL

## 2021-02-23 VITALS
OXYGEN SATURATION: 100 % | HEART RATE: 94 BPM | DIASTOLIC BLOOD PRESSURE: 116 MMHG | BODY MASS INDEX: 32.93 KG/M2 | HEIGHT: 70 IN | RESPIRATION RATE: 16 BRPM | SYSTOLIC BLOOD PRESSURE: 150 MMHG | WEIGHT: 230 LBS

## 2021-02-23 DIAGNOSIS — R19.7 DIARRHEA: ICD-10-CM

## 2021-02-23 DIAGNOSIS — F41.9 ANXIETY: ICD-10-CM

## 2021-02-23 LAB
ALBUMIN SERPL-MCNC: 3.8 G/DL (ref 3.4–5)
ALBUMIN UR-MCNC: NEGATIVE MG/DL
ALP SERPL-CCNC: 70 U/L (ref 40–150)
ALT SERPL W P-5'-P-CCNC: 29 U/L (ref 0–70)
ANION GAP SERPL CALCULATED.3IONS-SCNC: 6 MMOL/L (ref 3–14)
APPEARANCE UR: CLEAR
AST SERPL W P-5'-P-CCNC: 17 U/L (ref 0–45)
BASOPHILS # BLD AUTO: 0 10E9/L (ref 0–0.2)
BASOPHILS NFR BLD AUTO: 0.2 %
BILIRUB SERPL-MCNC: 0.8 MG/DL (ref 0.2–1.3)
BILIRUB UR QL STRIP: NEGATIVE
BUN SERPL-MCNC: 24 MG/DL (ref 7–30)
C COLI+JEJUNI+LARI FUSA STL QL NAA+PROBE: NOT DETECTED
C DIFF TOX B STL QL: POSITIVE
CALCIUM SERPL-MCNC: 9.2 MG/DL (ref 8.5–10.1)
CHLORIDE SERPL-SCNC: 101 MMOL/L (ref 94–109)
CO2 SERPL-SCNC: 26 MMOL/L (ref 20–32)
COLOR UR AUTO: YELLOW
CREAT SERPL-MCNC: 1.7 MG/DL (ref 0.66–1.25)
CRP SERPL-MCNC: 4.4 MG/L (ref 0–8)
DIFFERENTIAL METHOD BLD: ABNORMAL
EC STX1 GENE STL QL NAA+PROBE: NOT DETECTED
EC STX2 GENE STL QL NAA+PROBE: NOT DETECTED
ENTERIC PATHOGEN COMMENT: NORMAL
EOSINOPHIL # BLD AUTO: 0.1 10E9/L (ref 0–0.7)
EOSINOPHIL NFR BLD AUTO: 0.5 %
ERYTHROCYTE [DISTWIDTH] IN BLOOD BY AUTOMATED COUNT: 12.9 % (ref 10–15)
GFR SERPL CREATININE-BSD FRML MDRD: 40 ML/MIN/{1.73_M2}
GLUCOSE SERPL-MCNC: 101 MG/DL (ref 70–99)
GLUCOSE UR STRIP-MCNC: NEGATIVE MG/DL
HCT VFR BLD AUTO: 37.5 % (ref 40–53)
HGB BLD-MCNC: 12.9 G/DL (ref 13.3–17.7)
HGB UR QL STRIP: NEGATIVE
HYALINE CASTS #/AREA URNS LPF: 7 /LPF (ref 0–2)
IMM GRANULOCYTES # BLD: 0 10E9/L (ref 0–0.4)
IMM GRANULOCYTES NFR BLD: 0.3 %
KETONES UR STRIP-MCNC: 5 MG/DL
LEUKOCYTE ESTERASE UR QL STRIP: NEGATIVE
LYMPHOCYTES # BLD AUTO: 1.1 10E9/L (ref 0.8–5.3)
LYMPHOCYTES NFR BLD AUTO: 9.2 %
MCH RBC QN AUTO: 30.7 PG (ref 26.5–33)
MCHC RBC AUTO-ENTMCNC: 34.4 G/DL (ref 31.5–36.5)
MCV RBC AUTO: 89 FL (ref 78–100)
MONOCYTES # BLD AUTO: 1.3 10E9/L (ref 0–1.3)
MONOCYTES NFR BLD AUTO: 11.1 %
MUCOUS THREADS #/AREA URNS LPF: PRESENT /LPF
NEUTROPHILS # BLD AUTO: 9.4 10E9/L (ref 1.6–8.3)
NEUTROPHILS NFR BLD AUTO: 78.7 %
NITRATE UR QL: NEGATIVE
NOROV GI+II ORF1-ORF2 JNC STL QL NAA+PR: NOT DETECTED
NRBC # BLD AUTO: 0 10*3/UL
NRBC BLD AUTO-RTO: 0 /100
PH UR STRIP: 6 PH (ref 5–7)
PLATELET # BLD AUTO: 421 10E9/L (ref 150–450)
POTASSIUM SERPL-SCNC: 4.1 MMOL/L (ref 3.4–5.3)
PROT SERPL-MCNC: 7.1 G/DL (ref 6.8–8.8)
RBC # BLD AUTO: 4.2 10E12/L (ref 4.4–5.9)
RBC #/AREA URNS AUTO: <1 /HPF (ref 0–2)
RVA NSP5 STL QL NAA+PROBE: NOT DETECTED
SALMONELLA SP RPOD STL QL NAA+PROBE: NOT DETECTED
SHIGELLA SP+EIEC IPAH STL QL NAA+PROBE: NOT DETECTED
SODIUM SERPL-SCNC: 133 MMOL/L (ref 133–144)
SOURCE: ABNORMAL
SP GR UR STRIP: 1.02 (ref 1–1.03)
SPECIMEN SOURCE: ABNORMAL
SQUAMOUS #/AREA URNS AUTO: <1 /HPF (ref 0–1)
URATE SERPL-MCNC: 5.2 MG/DL (ref 3.5–7.2)
UROBILINOGEN UR STRIP-MCNC: 0 MG/DL (ref 0–2)
V CHOL+PARA RFBL+TRKH+TNAA STL QL NAA+PR: NOT DETECTED
WBC # BLD AUTO: 11.9 10E9/L (ref 4–11)
WBC #/AREA URNS AUTO: 1 /HPF (ref 0–5)
Y ENTERO RECN STL QL NAA+PROBE: NOT DETECTED

## 2021-02-23 PROCEDURE — 258N000003 HC RX IP 258 OP 636: Performed by: NURSE PRACTITIONER

## 2021-02-23 PROCEDURE — 99284 EMERGENCY DEPT VISIT MOD MDM: CPT | Mod: 25 | Performed by: NURSE PRACTITIONER

## 2021-02-23 PROCEDURE — 85025 COMPLETE CBC W/AUTO DIFF WBC: CPT | Performed by: NURSE PRACTITIONER

## 2021-02-23 PROCEDURE — 99284 EMERGENCY DEPT VISIT MOD MDM: CPT | Performed by: NURSE PRACTITIONER

## 2021-02-23 PROCEDURE — 84550 ASSAY OF BLOOD/URIC ACID: CPT | Performed by: NURSE PRACTITIONER

## 2021-02-23 PROCEDURE — 80053 COMPREHEN METABOLIC PANEL: CPT | Performed by: NURSE PRACTITIONER

## 2021-02-23 PROCEDURE — 86140 C-REACTIVE PROTEIN: CPT | Performed by: NURSE PRACTITIONER

## 2021-02-23 PROCEDURE — 96374 THER/PROPH/DIAG INJ IV PUSH: CPT | Performed by: NURSE PRACTITIONER

## 2021-02-23 PROCEDURE — 87506 IADNA-DNA/RNA PROBE TQ 6-11: CPT | Performed by: NURSE PRACTITIONER

## 2021-02-23 PROCEDURE — 96361 HYDRATE IV INFUSION ADD-ON: CPT | Performed by: NURSE PRACTITIONER

## 2021-02-23 PROCEDURE — 81001 URINALYSIS AUTO W/SCOPE: CPT | Performed by: NURSE PRACTITIONER

## 2021-02-23 PROCEDURE — 250N000011 HC RX IP 250 OP 636: Performed by: NURSE PRACTITIONER

## 2021-02-23 PROCEDURE — 87493 C DIFF AMPLIFIED PROBE: CPT | Performed by: NURSE PRACTITIONER

## 2021-02-23 RX ORDER — ONDANSETRON 2 MG/ML
4 INJECTION INTRAMUSCULAR; INTRAVENOUS ONCE
Status: COMPLETED | OUTPATIENT
Start: 2021-02-23 | End: 2021-02-23

## 2021-02-23 RX ORDER — LORAZEPAM 0.5 MG/1
0.5 TABLET ORAL EVERY 8 HOURS PRN
Qty: 4 TABLET | Refills: 0 | Status: SHIPPED | OUTPATIENT
Start: 2021-02-23 | End: 2021-02-25

## 2021-02-23 RX ADMIN — ONDANSETRON 4 MG: 2 INJECTION INTRAMUSCULAR; INTRAVENOUS at 13:48

## 2021-02-23 RX ADMIN — SODIUM CHLORIDE 1000 ML: 9 INJECTION, SOLUTION INTRAVENOUS at 13:47

## 2021-02-23 ASSESSMENT — ENCOUNTER SYMPTOMS
NUMBNESS: 0
COUGH: 0
VOMITING: 0
FATIGUE: 0
SHORTNESS OF BREATH: 0
NERVOUS/ANXIOUS: 1
FEVER: 0
SINUS PRESSURE: 0
DIFFICULTY URINATING: 0
JOINT SWELLING: 0
EYE DISCHARGE: 0
TROUBLE SWALLOWING: 0
ABDOMINAL PAIN: 0
DIARRHEA: 1
CONFUSION: 1
LIGHT-HEADEDNESS: 0
CONSTIPATION: 1
DIZZINESS: 0
HEMATURIA: 0
ARTHRALGIAS: 0
MYALGIAS: 0
SLEEP DISTURBANCE: 1
WEAKNESS: 0
SORE THROAT: 0
APPETITE CHANGE: 1
FLANK PAIN: 0
NAUSEA: 1
SINUS PAIN: 0
EYE REDNESS: 0
HEADACHES: 0
CHILLS: 0
FREQUENCY: 1
DYSURIA: 0
RHINORRHEA: 0

## 2021-02-23 ASSESSMENT — MIFFLIN-ST. JEOR: SCORE: 1819.52

## 2021-02-23 NOTE — ED NOTES
"Pt states that today he is more confused, paranoid, and feels that he is unable to relax. Feels \"weird\" I don't feel right\" also having issues with diarrhea and constipation. Nauseated,  no appetite, no vomiting. Intermittent abd pain. LBM today. Small loose stools. Has been taking miralax today. Now feels gas no abd pain.  No urinary problems. No fevers. No cough. Alert,  Oriented x4. Having intermittent problems with memory. Having intermittent \"tingling in hands\"  "

## 2021-02-23 NOTE — ED PROVIDER NOTES
History     Chief Complaint   Patient presents with     Anxiety     Medication change - increased dose of Ambien     Altered Mental Status     states he feels confused although thought and actions are intentional - was able to complete grocery shopping today and drove himself home      Diarrhea     HPI  Tee Hilton is a 70 year old male with a history of chronic kidney disease, hypertension, CHF, and a-fib anticoagulated with Xarelto who presents to the emergency department for various complaints. Tee was discharged from the hospital 19 days ago for strep pneumoniae meningitis with encephalopathy and AMINA on CKD. Sinec discharge he has had a significant decrease in appetite. He has moments of heart racing, feeling jittery, and tingling to his fingers and legs. Unsure what prompts these events but they self resolve with time, discussed possibility of being an anxiety response. He reports that he 'just feels weird and doesn't feel right' and is sometimes confused with slower though process. Alternating between bouts of constipation vs diarrhea, has been treating with Miralax and having small, loose stools with some suprapubic pain. No melena. Reports urinary frequency however not differing from baseline. Continues to suffer from some insomnia, started on Ambien. He is concerned Ambien may be the cause of symptoms as most symptoms are worse at night. Denies fever, headache, dizziness, cough, shortness of breath, vomiting, dysuria, hematuria and rash. No unilateral weakness, speech difficulty or balance disturbance. Recently completed IV infusions of ceftriaxone.    Allergies:  Allergies   Allergen Reactions     Erythromycin GI Disturbance     Upset stomach     Problem List:    Patient Active Problem List    Diagnosis Date Noted     AMINA (acute kidney injury) (H) 01/30/2021     Priority: Medium     Fever and chills 01/30/2021     Priority: Medium     Leukocytosis 01/30/2021     Priority: Medium     Hyponatremia  01/30/2021     Priority: Medium     Dehydration 01/30/2021     Priority: Medium     Essential hypertension 01/30/2021     Priority: Medium     Chronic atrial fibrillation (H) 01/30/2021     Priority: Medium     Myalgia 01/30/2021     Priority: Medium     Acute kidney injury (H) 01/30/2021     Priority: Medium     Acute intractable headache, unspecified headache type 01/30/2021     Priority: Medium     Morbid obesity (H) 08/26/2020     Priority: Medium     Atrial fibrillation (H) 04/07/2020     Priority: Medium     Gastroesophageal reflux disease 04/07/2020     Priority: Medium     Other sleep apnea 09/24/2018     Priority: Medium     Study Date: 9/22/2018- (241.0 lbs) Polysomnogram revealed 41 obstructive, 14 central, and 38 mixed apneas resulting in an apnea index of 44.5 events per hour. There were 7 obstructive hypopneas and 0 central hypopneas resulting in an obstructive hypopnea index of 3.3 and central hypopnea index of 0 events per hour. The combined apnea/hypopnea index was 47.8 events per hour (central apnea/hypopnea index was 6.7 events per hour).  REM AHI was 0. The supine AHI was 69.0 events per hour.  RDI was 54.0 events per hour. Respiratory rate and pattern was notable for improvement of events during REM sleep,  periods where periodic breathing is suspected and circulation times that are prolonged. Most of the events however appear to be terminated by a gasp arousal.  Lowest oxygen saturation was 83.9%. Time spent less than or equal to 88% was 0.4 minutes. Time spent less than or equal to 89% was 0.9 minutes. No optimal pressure was determined due to poor sleep and poor tolerance of low-level PAP and bilevel PAP. Severe sleep disordered breathing, many findings suggested of periodic breathing though given presence of obstruction and poorly consolidated sleep this study was not definitive       Gastroesophageal reflux disease without esophagitis      Priority: Medium     Persistent atrial fibrillation  (H) 09/18/2018     Priority: Medium     Non morbid obesity due to excess calories 09/18/2018     Priority: Medium     Chronic systolic congestive heart failure (H) 09/18/2018     Priority: Medium     Alcohol use 09/18/2018     Priority: Medium     Chronic midline low back pain without sciatica 10/31/2017     Priority: Medium     Other male erectile dysfunction 11/11/2016     Priority: Medium      Past Medical History:    Past Medical History:   Diagnosis Date     Gastroesophageal reflux disease      Hypertension      Irregular heart beat      Sleep apnea      Past Surgical History:    Past Surgical History:   Procedure Laterality Date     ANESTHESIA CARDIOVERSION N/A 9/24/2018    Procedure: ANESTHESIA CARDIOVERSION;  Cardioversion ;  Surgeon: GENERIC ANESTHESIA PROVIDER;  Location: [facility] OR     COLONOSCOPY N/A 5/25/2017    Procedure: COMBINED COLONOSCOPY, SINGLE OR MULTIPLE BIOPSY/POLYPECTOMY BY BIOPSY;;  Surgeon: Aquilino Garcia MD;  Location: [facility] OR     COLONOSCOPY WITH CO2 INSUFFLATION N/A 5/25/2017    Procedure: COLONOSCOPY WITH CO2 INSUFFLATION;  COLONOSCOPY SCREEN/ ORDAZ;  Surgeon: Aquilino Garcia MD;  Location: [facility] OR     OPEN REDUCTION INTERNAL FIXATION TIBIA       SEPTOPLASTY, TURBINOPLASTY, COMBINED Bilateral 7/9/2019    Procedure: ENDOSCOPIC SEPTOPLASTY, BILATERAL TURBINATE REDUCTION;  Surgeon: Alexander Avila MD;  Location: [facility] OR     TONSILLECTOMY       Family History:    Family History   Problem Relation Age of Onset     Gout Father      Lung Cancer Father      Social History:  Marital Status:   [2]  Social History     Tobacco Use     Smoking status: Never Smoker     Smokeless tobacco: Never Used     Tobacco comment: never smoked   Substance Use Topics     Alcohol use: Yes     Alcohol/week: 0.0 standard drinks     Comment: social use     Drug use: No      Medications:    allopurinol (ZYLOPRIM) 100 MG tablet  amLODIPine (NORVASC) 5 MG tablet  Famotidine (PEPCID PO)  lisinopril  "(ZESTRIL) 20 MG tablet  LORazepam (ATIVAN) 0.5 MG tablet  metoprolol succinate ER (TOPROL-XL) 100 MG 24 hr tablet  omeprazole (PRILOSEC) 20 MG CR capsule  rivaroxaban ANTICOAGULANT (XARELTO ANTICOAGULANT) 20 MG TABS tablet  zolpidem (AMBIEN) 10 MG tablet  zolpidem ER (AMBIEN CR) 12.5 MG CR tablet  cefTRIAXone IN D5W (ROCEPHIN) 40 MG/ML SOLN  methylPREDNISolone (MEDROL DOSEPAK) 4 MG tablet therapy pack  ondansetron (ZOFRAN) 4 MG tablet  sildenafil (REVATIO) 20 MG tablet      Review of Systems   Constitutional: Positive for appetite change. Negative for chills, fatigue and fever.   HENT: Negative for congestion, ear discharge, ear pain, rhinorrhea, sinus pressure, sinus pain, sore throat and trouble swallowing.    Eyes: Negative for discharge and redness.   Respiratory: Negative for cough and shortness of breath.    Cardiovascular: Negative for chest pain.   Gastrointestinal: Positive for constipation, diarrhea and nausea. Negative for abdominal pain and vomiting.   Genitourinary: Positive for frequency. Negative for difficulty urinating, dysuria, flank pain and hematuria.   Musculoskeletal: Negative for arthralgias, joint swelling and myalgias.   Skin: Negative for rash.   Neurological: Negative for dizziness, weakness, light-headedness, numbness and headaches.   Psychiatric/Behavioral: Positive for confusion and sleep disturbance. The patient is nervous/anxious.    All other systems reviewed and are negative.      Physical Exam   BP: (!) 143/89  Pulse: 79  Resp: 16  Height: 177.8 cm (5' 10\")  Weight: 104.3 kg (230 lb)  SpO2: 100 %      Physical Exam  Constitutional:       General: He is not in acute distress.     Appearance: He is well-developed. He is not diaphoretic.   HENT:      Head: Normocephalic.   Eyes:      Conjunctiva/sclera: Conjunctivae normal.      Pupils: Pupils are equal, round, and reactive to light.   Neck:      Musculoskeletal: Normal range of motion and neck supple.   Cardiovascular:      Rate and " Rhythm: Normal rate and regular rhythm.      Pulses: Normal pulses.   Pulmonary:      Effort: Pulmonary effort is normal. No respiratory distress.      Breath sounds: Normal breath sounds and air entry. No decreased air movement. No decreased breath sounds, wheezing or rhonchi.   Abdominal:      General: There is no distension.      Palpations: Abdomen is soft.      Tenderness: There is no abdominal tenderness. There is no right CVA tenderness, left CVA tenderness, guarding or rebound.   Musculoskeletal: Normal range of motion.   Skin:     General: Skin is warm.      Capillary Refill: Capillary refill takes less than 2 seconds.   Neurological:      General: No focal deficit present.      Mental Status: He is alert and oriented to person, place, and time.      GCS: GCS eye subscore is 4. GCS verbal subscore is 5. GCS motor subscore is 6.      Cranial Nerves: Cranial nerves are intact.      Sensory: Sensation is intact.      Coordination: Coordination is intact.      Gait: Gait is intact.   Psychiatric:         Mood and Affect: Mood normal.       ED Course        Procedures    Results for orders placed or performed during the hospital encounter of 02/23/21 (from the past 24 hour(s))   CBC with platelets, differential   Result Value Ref Range    WBC 11.9 (H) 4.0 - 11.0 10e9/L    RBC Count 4.20 (L) 4.4 - 5.9 10e12/L    Hemoglobin 12.9 (L) 13.3 - 17.7 g/dL    Hematocrit 37.5 (L) 40.0 - 53.0 %    MCV 89 78 - 100 fl    MCH 30.7 26.5 - 33.0 pg    MCHC 34.4 31.5 - 36.5 g/dL    RDW 12.9 10.0 - 15.0 %    Platelet Count 421 150 - 450 10e9/L    Diff Method Automated Method     % Neutrophils 78.7 %    % Lymphocytes 9.2 %    % Monocytes 11.1 %    % Eosinophils 0.5 %    % Basophils 0.2 %    % Immature Granulocytes 0.3 %    Nucleated RBCs 0 0 /100    Absolute Neutrophil 9.4 (H) 1.6 - 8.3 10e9/L    Absolute Lymphocytes 1.1 0.8 - 5.3 10e9/L    Absolute Monocytes 1.3 0.0 - 1.3 10e9/L    Absolute Eosinophils 0.1 0.0 - 0.7 10e9/L     Absolute Basophils 0.0 0.0 - 0.2 10e9/L    Abs Immature Granulocytes 0.0 0 - 0.4 10e9/L    Absolute Nucleated RBC 0.0    Comprehensive metabolic panel   Result Value Ref Range    Sodium 133 133 - 144 mmol/L    Potassium 4.1 3.4 - 5.3 mmol/L    Chloride 101 94 - 109 mmol/L    Carbon Dioxide 26 20 - 32 mmol/L    Anion Gap 6 3 - 14 mmol/L    Glucose 101 (H) 70 - 99 mg/dL    Urea Nitrogen 24 7 - 30 mg/dL    Creatinine 1.70 (H) 0.66 - 1.25 mg/dL    GFR Estimate 40 (L) >60 mL/min/[1.73_m2]    GFR Estimate If Black 47 (L) >60 mL/min/[1.73_m2]    Calcium 9.2 8.5 - 10.1 mg/dL    Bilirubin Total 0.8 0.2 - 1.3 mg/dL    Albumin 3.8 3.4 - 5.0 g/dL    Protein Total 7.1 6.8 - 8.8 g/dL    Alkaline Phosphatase 70 40 - 150 U/L    ALT 29 0 - 70 U/L    AST 17 0 - 45 U/L   CRP Inflammation   Result Value Ref Range    CRP Inflammation 4.4 0.0 - 8.0 mg/L   UA with Microscopic reflex to Culture    Specimen: Midstream Urine   Result Value Ref Range    Color Urine Yellow     Appearance Urine Clear     Glucose Urine Negative NEG^Negative mg/dL    Bilirubin Urine Negative NEG^Negative    Ketones Urine 5 (A) NEG^Negative mg/dL    Specific Gravity Urine 1.016 1.003 - 1.035    Blood Urine Negative NEG^Negative    pH Urine 6.0 5.0 - 7.0 pH    Protein Albumin Urine Negative NEG^Negative mg/dL    Urobilinogen mg/dL 0.0 0.0 - 2.0 mg/dL    Nitrite Urine Negative NEG^Negative    Leukocyte Esterase Urine Negative NEG^Negative    Source Midstream Urine     WBC Urine 1 0 - 5 /HPF    RBC Urine <1 0 - 2 /HPF    Squamous Epithelial /HPF Urine <1 0 - 1 /HPF    Mucous Urine Present (A) NEG^Negative /LPF    Hyaline Casts 7 (H) 0 - 2 /LPF   Uric acid   Result Value Ref Range    Uric Acid 5.2 3.5 - 7.2 mg/dL       Medications   ondansetron (ZOFRAN) injection 4 mg (4 mg Intravenous Given 2/23/21 1348)   0.9% sodium chloride BOLUS (0 mLs Intravenous Stopped 2/23/21 0279)       Assessments & Plan (with Medical Decision Making)   Tee Hilton is a 70 year old  male with a history of chronic kidney disease, hypertension, CHF, and a-fib anticoagulated with Xarelto who presents to the emergency department for various complaints. Tee was discharged from the hospital 19 days ago for strep pneumoniae meningitis with encephalopathy and AMINA on CKD. Sinec discharge he has had a significant decrease in appetite. He has moments of heart racing, feeling jittery, and tingling to his fingers and legs. Unsure what prompts these events but they self resolve with time, discussed possibility of being an anxiety response. He reports that he 'just feels weird and doesn't feel right' and is sometimes confused with slower though process. Alternating between bouts of constipation vs diarrhea, has been treating with Miralax and having small, loose stools with some suprapubic pain. No melena. Reports urinary frequency however not differing from baseline. Continues to suffer from some insomnia, started on Ambien. He is concerned Ambien may be the cause of symptoms as most symptoms are worse at night.    Patient remains pleasant throughout visit and slightly anxious. Slightly hypertensive, otherwise normal vital signs. Lab work is reassuring with continued improvement in values including decreasing WBC and normalized CRP. Creatinine remains elevated but appears close to patient's baseline. Urinalysis without sign of infection. Patient requesting uric acid level be added on, this is pending. Plan to discharge home and continue to monitor symptoms. May try dose of ativan to assess symptomatic improvement. Stool culture/C. Diff pending. Patient will follow up with PCP in two days. Return to the ED with new or worsening symptoms. Patient and spouse feel comfortable with plan of care and discharge plan. Discharged in good condition.     I have reviewed the nursing notes.    I have reviewed the findings, diagnosis, plan and need for follow up with the patient.  Discharge Medication List as of 2/23/2021   3:13 PM      START taking these medications    Details   LORazepam (ATIVAN) 0.5 MG tablet Take 1 tablet (0.5 mg) by mouth every 8 hours as needed for anxiety, Disp-4 tablet, R-0, E-Prescribe           Final diagnoses:   Anxiety   Diarrhea     2/23/2021   [facility] EMERGENCY DEPT     Romina Velasquez, APRN CNP  02/23/21 6342

## 2021-02-24 ENCOUNTER — TELEPHONE (OUTPATIENT)
Dept: EMERGENCY MEDICINE | Facility: CLINIC | Age: 71
End: 2021-02-24

## 2021-02-24 DIAGNOSIS — A04.72 COLITIS DUE TO CLOSTRIDIUM DIFFICILE: ICD-10-CM

## 2021-02-24 RX ORDER — VANCOMYCIN HYDROCHLORIDE 125 MG/1
125 CAPSULE ORAL 4 TIMES DAILY
Qty: 56 CAPSULE | Refills: 0 | Status: SHIPPED | OUTPATIENT
Start: 2021-02-24 | End: 2021-03-10

## 2021-02-24 NOTE — RESULT ENCOUNTER NOTE
Final Enteric Bacteria and Virus Panel by [facility] Stool is NEGATIVE for Campylobacter group, Salmonella species, Shigella species, Shiga toxin 1 gene, Shiga toxin 2 gene, Vibrio group,  Yersinia enterocolitica,  Rotavirus A,  and Norovirus I and II.   No treatment or change in treatment per [facility] Lab Result protocol.

## 2021-02-24 NOTE — TELEPHONE ENCOUNTER
[facility] Emergency Department Lab result notification [Adult-Male]    [place] ED lab result protocol used  Clostridium difficile    Reason for call  Notify of lab results, assess symptoms,  review ED providers recommendations/discharge instructions (if necessary) and advise per ED lab result f/u protocol    Lab Result (including Rx patient on, if applicable)  Final Clostridium difficile toxin B PCR is POSITIVE.  Toxin producing Clostridium difficile target DNA sequences detected, presumed positive for Clostridium difficile toxin B.   Rx (Flagyl or Vancomycin) prescribed upon discharge from the [place] ED/UC:  No  If No Rx, advise and/or treat per [place] ED Lab Result protocol.    Information table from ED Provider visit on 2/23/21  Symptoms reported at ED visit (Chief complaint, HPI)  Anxiety       Medication change - increased dose of Ambien     Altered Mental Status       states he feels confused although thought and actions are intentional - was able to complete grocery shopping today and drove himself home      Diarrhea      HPI  Tee Hilton is a 70 year old male with a history of chronic kidney disease, hypertension, CHF, and a-fib anticoagulated with Xarelto who presents to the emergency department for various complaints. Tee was discharged from the hospital 19 days ago for strep pneumoniae meningitis with encephalopathy and AMINA on CKD. Sinec discharge he has had a significant decrease in appetite. He has moments of heart racing, feeling jittery, and tingling to his fingers and legs. Unsure what prompts these events but they self resolve with time, discussed possibility of being an anxiety response. He reports that he 'just feels weird and doesn't feel right' and is sometimes confused with slower though process. Alternating between bouts of constipation vs diarrhea, has been treating with Miralax and having small, loose stools with some suprapubic pain. No melena. Reports urinary  frequency however not differing from baseline. Continues to suffer from some insomnia, started on Ambien. He is concerned Ambien may be the cause of symptoms as most symptoms are worse at night. Denies fever, headache, dizziness, cough, shortness of breath, vomiting, dysuria, hematuria and rash. No unilateral weakness, speech difficulty or balance disturbance. Recently completed IV infusions of ceftriaxone.     Significant Medical hx, if applicable (i.e. CKD, diabetes) Reviewed   Allergies Allergies   Allergen Reactions     Erythromycin GI Disturbance     Upset stomach      Weight, if applicable Wt Readings from Last 2 Encounters:   02/23/21 104.3 kg (230 lb)   02/02/21 110 kg (242 lb 8.1 oz)      Coumadin/Warfarin [Yes /No] No   Creatinine Level (mg/dl) Creatinine   Date Value Ref Range Status   02/23/2021 1.70 (H) 0.66 - 1.25 mg/dL Final      Creatinine clearance (ml/min), if applicable Serum creatinine: 1.7 mg/dL (H) 02/23/21 1346  Estimated creatinine clearance: 50.3 mL/min (A)   ED providers Impression and Plan (applicable information) Tee Hilton is a 70 year old male with a history of chronic kidney disease, hypertension, CHF, and a-fib anticoagulated with Xarelto who presents to the emergency department for various complaints. Tee was discharged from the hospital 19 days ago for strep pneumoniae meningitis with encephalopathy and AMINA on CKD. Sinec discharge he has had a significant decrease in appetite. He has moments of heart racing, feeling jittery, and tingling to his fingers and legs. Unsure what prompts these events but they self resolve with time, discussed possibility of being an anxiety response. He reports that he 'just feels weird and doesn't feel right' and is sometimes confused with slower though process. Alternating between bouts of constipation vs diarrhea, has been treating with Miralax and having small, loose stools with some suprapubic pain. No melena. Reports urinary frequency  however not differing from baseline. Continues to suffer from some insomnia, started on Ambien. He is concerned Ambien may be the cause of symptoms as most symptoms are worse at night.     Patient remains pleasant throughout visit and slightly anxious. Slightly hypertensive, otherwise normal vital signs. Lab work is reassuring with continued improvement in values including decreasing WBC and normalized CRP. Creatinine remains elevated but appears close to patient's baseline. Urinalysis without sign of infection. Patient requesting uric acid level be added on, this is pending. Plan to discharge home and continue to monitor symptoms. May try dose of ativan to assess symptomatic improvement. Stool culture/C. Diff pending. Patient will follow up with PCP in two days. Return to the ED with new or worsening symptoms. Patient and spouse feel comfortable with plan of care and discharge plan. Discharged in good condition.    ED diagnosis Anxiety   Diarrhea      ED provider Romina Velasquez, APRN CNP      RN Assessment (Patient s current Symptoms), include time called.  [Insert Left message here if message left]  10:37AM: Spoke with patient. He states he is the same as when he was in the ED. Continues to have some soft stool with diarrhea coming around the soft. Denies any blood in the stool. No jitteriness or heart racing today.     RN Recommendations/Instructions per [place] ED lab result protocol  Patient notified of lab result and treatment recommendations.  Rx for Vancomycin (VANCOCIN HCL) 125 MG capsule, 1 capsule (125 mg) by mouth 4 times daily for 14 days sent to [[facility]arm[facility]  RN reviewed information about Clostridium difficile from protocol patient education and Epic reference.   Advised to keep his PCP appointment for tomorrow.   Advised to increase fluids.  Return to ED with any worsening symptoms: worse diarrhea, worse abdominal pain, no urine in 8 hours, very weak/tired, pale skin.    Advised to wash hands well and often with soap and water, alcohol based hand sanitizers do not work with c-diff. Wash commonly touched surfaces with a chlorine bleach product. Wash clothes, sheets, bedding, towels separately with detergent and chlorine bleach.  These instructions were also given to the patient's wife and she was advised to wear gloves when doing any of the home care and to wash her hands well and often.   Both state understanding of the information given and have no further questions.      Please Contact your PCP clinic or return to the Emergency department if your:    Symptoms do not improve after 3 days on antibiotic.    Symptoms worsen or other concerning symptom's.    PCP follow-up Questions asked: YES       [RN Name]  Pili Washington RN  [facility] RN  Lung Nodule and ED Lab Result RN  Epic pool (ED late result f/u RN): [id number]  [facility] INCIDENTAL RADIOLOGY F/U NURSES: [id number]  Ph# [phone]      Copy of Lab result  Contains abnormal data Clostridium difficile toxin B PCR  Order: [id number]  Status:  Final result   Visible to patient:  Yes (MyChart) Dx:  Anxiety  Specimen Information: Feces          Ref Range & Units 1d ago Resulting Agency    Specimen Description  Feces  [facility]    C Diff Toxin B PCR NEG^Negative PositiveAbnormal   [facility] [place]   Comment: Positive: Toxin producing C. difficile target DNA sequences detected, presumed    positive for C. difficile toxin B. C. difficile (Requires Enteric Isolation).       C. difficile (Requires Enteric Isolation)   FDA approved assay performed using [employer] GeneXpert real-time PCR.          Specimen Collected: 02/23/21  3:35 PM Last Resulted: 02/23/21  8:10 PM

## 2021-02-25 ENCOUNTER — OFFICE VISIT (OUTPATIENT)
Dept: FAMILY MEDICINE | Facility: CLINIC | Age: 71
End: 2021-02-25
Payer: COMMERCIAL

## 2021-02-25 VITALS
SYSTOLIC BLOOD PRESSURE: 108 MMHG | OXYGEN SATURATION: 98 % | BODY MASS INDEX: 32 KG/M2 | DIASTOLIC BLOOD PRESSURE: 75 MMHG | WEIGHT: 223 LBS | RESPIRATION RATE: 20 BRPM | TEMPERATURE: 98.8 F | HEART RATE: 90 BPM

## 2021-02-25 DIAGNOSIS — I48.20 CHRONIC ATRIAL FIBRILLATION (H): ICD-10-CM

## 2021-02-25 DIAGNOSIS — F41.9 ANXIETY: Primary | ICD-10-CM

## 2021-02-25 DIAGNOSIS — I10 BENIGN ESSENTIAL HYPERTENSION: ICD-10-CM

## 2021-02-25 PROCEDURE — 99214 OFFICE O/P EST MOD 30 MIN: CPT | Performed by: PHYSICIAN ASSISTANT

## 2021-02-25 RX ORDER — METOPROLOL SUCCINATE 100 MG/1
150 TABLET, EXTENDED RELEASE ORAL DAILY
Qty: 90 TABLET | Refills: 1 | Status: SHIPPED | OUTPATIENT
Start: 2021-02-25 | End: 2021-07-01

## 2021-02-25 RX ORDER — LISINOPRIL 20 MG/1
10 TABLET ORAL DAILY
Qty: 90 TABLET | Refills: 1 | Status: SHIPPED | OUTPATIENT
Start: 2021-02-25 | End: 2021-12-14

## 2021-02-25 RX ORDER — LORAZEPAM 0.5 MG/1
0.5 TABLET ORAL EVERY 8 HOURS PRN
Qty: 25 TABLET | Refills: 0 | Status: SHIPPED | OUTPATIENT
Start: 2021-02-25 | End: 2021-04-15

## 2021-02-25 NOTE — PROGRESS NOTES
Subjective   Tee is a 70 year old who presents for the following health issues    HPI       Recheck  - last seen by Jon Denson 2/9/21 (virtual visit)  - Meningitis, C-Diff, Hypertension, Anxiety    Had been having abdominal cramping and also started to note diarrhea and yellowish stools.   Started vancomycin yesterday. Feeling some better. Episode of diarrhea last noc. No bm today yet.   Less abd cramping.     Reviewed recent lab work.     Review of Systems   Constitutional, HEENT, cardiovascular, pulmonary, GI, , musculoskeletal, neuro, skin, endocrine and psych systems are negative, except as otherwise noted.      Objective    /75   Pulse 90   Temp 98.8  F (37.1  C) (Tympanic)   Resp 20   Wt 101.2 kg (223 lb)   SpO2 98%   BMI 32.00 kg/m    Body mass index is 32 kg/m .  Physical Exam   Eye exam - right eye normal lid, conjunctiva, cornea, pupil and fundus, left eye normal lid, conjunctiva, cornea, pupil and fundus.  Thyroid not palpable, not enlarged, no nodules detected.  CHEST:chest clear to IPPA, no tachypnea, retractions or cyanosis and Heart exam detail:irregularly irregular rhythm, chest is clear without rales or wheezing, no pedal edema, no JVD, no hepatosplenomegaly.  The abdomen is soft without tenderness, guarding, mass, rebound or organomegaly. Bowel sounds are normal. No CVA tenderness or inguinal adenopathy noted.    Tee was seen today for recheck.    Diagnoses and all orders for this visit:    Anxiety  -     LORazepam (ATIVAN) 0.5 MG tablet; Take 1 tablet (0.5 mg) by mouth every 8 hours as needed for anxiety    Chronic atrial fibrillation (H)  -     metoprolol succinate ER (TOPROL-XL) 100 MG 24 hr tablet; Take 1.5 tablets (150 mg) by mouth daily    Benign essential hypertension  -     lisinopril (ZESTRIL) 20 MG tablet; Take 0.5 tablets (10 mg) by mouth daily TAKE 1 TABLETS BY MOUTH ONCE DAILY      work on lifestyle modification  Advised supportive and symptomatic  treatment.  Follow up with Provider - if condition persists or worsens.

## 2021-03-04 ENCOUNTER — TELEPHONE (OUTPATIENT)
Dept: FAMILY MEDICINE | Facility: CLINIC | Age: 71
End: 2021-03-04

## 2021-03-05 ENCOUNTER — TELEPHONE (OUTPATIENT)
Dept: FAMILY MEDICINE | Facility: CLINIC | Age: 71
End: 2021-03-05

## 2021-03-05 NOTE — TELEPHONE ENCOUNTER
Spoke with patient he was worried about his BP today at 1 pm, was 144/75. Is that too high?  Otherwise his BP is running 107-120/70-88.  Told that was not too high and 5 minutes later UH=954/88, then 125/80.  He then said that when he gets up in the mornings recently since increase in metoprolol he feels a little dizzy when he gets up from bed, but does not check his BP until after he takes his meds, eats and does some activities and BP in good range.  Instructed patient to change positions slower, go from lying to sitting then standing.  When feeling dizzy take BP at that time.  Patient requests a work in telephone visit on Monday 3/8/21.  Please advise.  (also see my chart question regarding if needs stool retested)  Ely Irene RN  [facility]

## 2021-03-05 NOTE — TELEPHONE ENCOUNTER
Please call patient to discuss elevated blood pressure this morning, he wants to see Jon next week but needs reassurance ok to wait.

## 2021-03-08 ENCOUNTER — VIRTUAL VISIT (OUTPATIENT)
Dept: FAMILY MEDICINE | Facility: CLINIC | Age: 71
End: 2021-03-08
Payer: COMMERCIAL

## 2021-03-08 ENCOUNTER — MYC MEDICAL ADVICE (OUTPATIENT)
Dept: NURSING | Facility: CLINIC | Age: 71
End: 2021-03-08

## 2021-03-08 DIAGNOSIS — I95.1 ORTHOSTATIC HYPOTENSION: ICD-10-CM

## 2021-03-08 DIAGNOSIS — F51.01 PRIMARY INSOMNIA: ICD-10-CM

## 2021-03-08 DIAGNOSIS — I10 ESSENTIAL HYPERTENSION: Primary | ICD-10-CM

## 2021-03-08 PROCEDURE — 99213 OFFICE O/P EST LOW 20 MIN: CPT | Mod: 95 | Performed by: PHYSICIAN ASSISTANT

## 2021-03-08 RX ORDER — ZOLPIDEM TARTRATE 12.5 MG/1
12.5 TABLET, FILM COATED, EXTENDED RELEASE ORAL
Qty: 30 TABLET | Refills: 2 | Status: SHIPPED | OUTPATIENT
Start: 2021-03-08 | End: 2021-05-25

## 2021-03-08 RX ORDER — ZOLPIDEM TARTRATE 12.5 MG/1
TABLET, FILM COATED, EXTENDED RELEASE ORAL
Qty: 30 TABLET | Refills: 0 | OUTPATIENT
Start: 2021-03-08

## 2021-03-08 NOTE — TELEPHONE ENCOUNTER
Requested Prescriptions   Pending Prescriptions Disp Refills    zolpidem ER (AMBIEN CR) 12.5 MG CR tablet [Pharmacy Med Name: Zolpidem Tartrate ER 12.5 MG Oral Tablet Extended Release] 30 tablet 0     Sig: TAKE 1 TABLET BY MOUTH NIGHTLY AS NEEDED FOR SLEEP       There is no refill protocol information for this order        Silva GARNETT-RN  Triage Nurse  [facility]: [facility]

## 2021-03-08 NOTE — TELEPHONE ENCOUNTER
I consulted with Jon, he says around noon is okay to work in a phone visit.    I called patient, scheduled for 12:20 today.   He says he is not having symptoms with his BP now but does also want to talk about his sleep apnea so will touch on that as well.    Brandee Torres RN  [facility]

## 2021-03-08 NOTE — PROGRESS NOTES
Tee is a 70 year old who is being evaluated via a billable telephone visit.      What phone number would you like to be contacted at? [phone]  How would you like to obtain your AVS? Miranda Brewer   Tee is a 70 year old who presents for the following health issues    HPI       Elevated BP reading       Do you check your blood pressure regularly outside of the clinic? Yes     Are you following a low salt diet? No    Are your blood pressures ever more than 140 on the top number (systolic) OR more   than 90 on the bottom number (diastolic), for example 140/90? Yes  Suspect some diurnal variation in his blood pressure and some AM orthostatic hypotension.  Insomnia.   Needs refill of zolpidem  Longer acting form helps  Light headed in the mornings  X 2 weeks    Diarrhea has resolved.   No chest pain/sob/palpitations/ha's    Some mild vertigo with head movements and when rolling over in bed. Was quite a bit better this am.   Still some mild tinnitus and hearing loss since his meningitis.   Overall he feels well.        Review of Systems   Constitutional, HEENT, cardiovascular, pulmonary, GI, , musculoskeletal, neuro, skin, endocrine and psych systems are negative, except as otherwise noted.      Objective           Vitals:  No vitals were obtained today due to virtual visit.    Physical Exam   healthy, alert and no distress  PSYCH: Alert and oriented times 3; coherent speech, normal   rate and volume, able to articulate logical thoughts, able   to abstract reason, no tangential thoughts, no hallucinations   or delusions  His affect is normal  RESP: No cough, no audible wheezing, able to talk in full sentences  Remainder of exam unable to be completed due to telephone visits    Tee was seen today for sleep apnea, hypertension and dizziness.    Diagnoses and all orders for this visit:    Essential hypertension    Primary insomnia  -     zolpidem ER (AMBIEN CR) 12.5 MG CR tablet; Take 1 tablet (12.5 mg)  by mouth nightly as needed for sleep    Orthostatic hypotension      Slower movements.  Advised supportive and symptomatic treatment.  Follow up with Provider - if condition persists or worsens.       Phone call duration: 14 minutes

## 2021-03-08 NOTE — TELEPHONE ENCOUNTER
Charmaine message sent to patient.    Pat BSN-RN  Triage Nurse  [facility]: [facility]

## 2021-03-13 ENCOUNTER — MYC MEDICAL ADVICE (OUTPATIENT)
Dept: FAMILY MEDICINE | Facility: CLINIC | Age: 71
End: 2021-03-13

## 2021-03-13 DIAGNOSIS — R19.7 DIARRHEA, UNSPECIFIED TYPE: Primary | ICD-10-CM

## 2021-03-15 PROCEDURE — 87493 C DIFF AMPLIFIED PROBE: CPT | Performed by: PHYSICIAN ASSISTANT

## 2021-03-15 PROCEDURE — 87506 IADNA-DNA/RNA PROBE TQ 6-11: CPT | Performed by: NURSE PRACTITIONER

## 2021-03-16 DIAGNOSIS — R19.7 DIARRHEA, UNSPECIFIED TYPE: ICD-10-CM

## 2021-03-16 DIAGNOSIS — R19.7 DIARRHEA: ICD-10-CM

## 2021-03-16 LAB
C COLI+JEJUNI+LARI FUSA STL QL NAA+PROBE: NOT DETECTED
C DIFF TOX B STL QL: NEGATIVE
EC STX1 GENE STL QL NAA+PROBE: NOT DETECTED
EC STX2 GENE STL QL NAA+PROBE: NOT DETECTED
ENTERIC PATHOGEN COMMENT: NORMAL
NOROV GI+II ORF1-ORF2 JNC STL QL NAA+PR: NOT DETECTED
RVA NSP5 STL QL NAA+PROBE: NOT DETECTED
SALMONELLA SP RPOD STL QL NAA+PROBE: NOT DETECTED
SHIGELLA SP+EIEC IPAH STL QL NAA+PROBE: NOT DETECTED
SPECIMEN SOURCE: NORMAL
V CHOL+PARA RFBL+TRKH+TNAA STL QL NAA+PR: NOT DETECTED
Y ENTERO RECN STL QL NAA+PROBE: NOT DETECTED

## 2021-03-16 NOTE — TELEPHONE ENCOUNTER
Message routed to patient via Marqui regarding picking up kit and returning.    Brandee Torres RN  [facility]

## 2021-03-17 ENCOUNTER — OFFICE VISIT (OUTPATIENT)
Dept: AUDIOLOGY | Facility: CLINIC | Age: 71
End: 2021-03-17
Payer: COMMERCIAL

## 2021-03-17 DIAGNOSIS — H93.13 TINNITUS, BILATERAL: ICD-10-CM

## 2021-03-17 DIAGNOSIS — H90.A32 MIXED CONDUCTIVE AND SENSORINEURAL HEARING LOSS OF LEFT EAR WITH RESTRICTED HEARING OF RIGHT EAR: Primary | ICD-10-CM

## 2021-03-17 DIAGNOSIS — H90.A21 SENSORINEURAL HEARING LOSS (SNHL) OF RIGHT EAR WITH RESTRICTED HEARING OF LEFT EAR: ICD-10-CM

## 2021-03-17 LAB
C DIFF TOX B STL QL: NORMAL
SPECIMEN SOURCE: NORMAL

## 2021-03-17 PROCEDURE — 99207 PR NO CHARGE LOS: CPT | Performed by: AUDIOLOGIST

## 2021-03-17 PROCEDURE — 92550 TYMPANOMETRY & REFLEX THRESH: CPT | Performed by: AUDIOLOGIST

## 2021-03-17 PROCEDURE — 92557 COMPREHENSIVE HEARING TEST: CPT | Performed by: AUDIOLOGIST

## 2021-03-17 NOTE — RESULT ENCOUNTER NOTE
Final Enteric Bacteria and Virus Panel by [facility] Stool is NEGATIVE for Campylobacter group, Salmonella species, Shigella species, Shiga toxin 1 gene, Shiga toxin 2 gene, Vibrio group,  Yersinia enterocolitica,  Rotavirus A,  and Norovirus I and II.    No treatment or change in treatment per [facility] Lab Result protocol.

## 2021-03-17 NOTE — PROGRESS NOTES
AUDIOLOGY REPORT    SUBJECTIVE:  Tee Hilton is a 70 year old male who was seen in the Audiology Clinic [facility] on 3/17/21 for audiologic evaluation, referred by Jon Denson PA-C.  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 The patient denies  bilateral otalgia, bilateral drainage, bilateral aural fullness, family history of hearing loss, and dizziness. The patient notes difficulty with communication in a variety of listening situations. Patient was unaccompanied to today's visit.     Abuse Screening:  Do you feel unsafe at home or work/school? No  Do you feel threatened by someone? No  Does anyone try to keep you from having contact with others, or doing things outside of your home? No  Physical signs of abuse present? No     OBJECTIVE:    Otoscopic exam indicates ears are clear of cerumen bilaterally     Pure Tone Thresholds assessed using standard techniques  audiometry with good  reliability from 250-8000 Hz bilaterally using insert earphones and circumaural headphones     RIGHT:  mild and moderate-severe sensorineural hearing loss    LEFT:    mild and moderate-severe mixed hearing loss    NOTE: Change in transducers did not merit a change in thresholds.     Tympanogram:    RIGHT: normal eardrum mobility    LEFT:   normal eardrum mobility    Reflexes (reported by stimulus ear): 1000 Hz  RIGHT: Ipsilateral is present at normal levels  RIGHT: Contralateral is elevated  LEFT:   Ipsilateral is present at normal levels  LEFT:   Contralateral is elevated    Speech Reception Threshold:    RIGHT: 25 dB HL    LEFT:   25 dB HL    Word Recognition Score:     RIGHT: 92% at 65 dB HL using NU-6 recorded word list.    LEFT:   92% at 70 dB HL using NU-6 recorded word list.    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     PLAN:  Patient was counseled regarding hearing loss and impact on communication.  Patient is a good candidate for amplification at this time. Handout on good communication strategies, and hearing aid use was given to patient. It is recommended that the patient see ENT for the mixed hearing loss of the left ear. Following appointment with ENT patient should return to audiology for a hearing aid consultation. Please call this clinic with questions regarding these results or recommendations.    Israel Avalos CCC-A  Licensed Audiologist #[id number]  3/17/2021    CC: Jon Dneson PA-C

## 2021-03-18 NOTE — PROGRESS NOTES
Tee is a 70 year old who is being evaluated via a billable telephone visit.      What phone number would you like to be contacted at? [phone]  How would you like to obtain your AVS? Miranda        Subjective   Tee is a 70 year old who presents for the following health issues     HPI     Discuss recent history of C-Diff - general questions  Hasn;t had diarrhea since his antibiotics.  Rare cramps . Overall feeling well. No fevers. No blood in stools.   Scheduled for COVID vaccine 3/19/21 - would like Michelle opinion  Recommend he get it.    Still occasional orthostatic hypotension. But some better. Doesn't effect his qol.        Review of Systems   Constitutional, HEENT, cardiovascular, pulmonary, GI, , musculoskeletal, neuro, skin, endocrine and psych systems are negative, except as otherwise noted.      Objective           Vitals:  No vitals were obtained today due to virtual visit.    Physical Exam   healthy, alert and no distress  PSYCH: Alert and oriented times 3; coherent speech, normal   rate and volume, able to articulate logical thoughts, able   to abstract reason, no tangential thoughts, no hallucinations   or delusions  His affect is normal  RESP: No cough, no audible wheezing, able to talk in full sentences  Remainder of exam unable to be completed due to telephone visits    Tee was seen today for results and covid concern.    Diagnoses and all orders for this visit:    AU[place]USTO (obstructive sleep apnea)  -     SLEEP EVALUATION & MANAGEMENT REFERRAL - [facility] [place]  [phone] (Age 15 and up); Future    Diarrhea, unspecified type      Advised supportive and symptomatic treatment.  Follow up with Provider - if condition persists or worsens.   work on lifestyle modification            Phone call duration: 20 minutes

## 2021-03-19 ENCOUNTER — IMMUNIZATION (OUTPATIENT)
Dept: NURSING | Facility: CLINIC | Age: 71
End: 2021-03-19
Payer: COMMERCIAL

## 2021-03-19 ENCOUNTER — VIRTUAL VISIT (OUTPATIENT)
Dept: FAMILY MEDICINE | Facility: CLINIC | Age: 71
End: 2021-03-19
Payer: COMMERCIAL

## 2021-03-19 DIAGNOSIS — G47.33 OSA (OBSTRUCTIVE SLEEP APNEA): Primary | ICD-10-CM

## 2021-03-19 DIAGNOSIS — R19.7 DIARRHEA, UNSPECIFIED TYPE: ICD-10-CM

## 2021-03-19 PROCEDURE — 0001A PR COVID VAC PFIZER DIL RECON 30 MCG/0.3 ML IM: CPT

## 2021-03-19 PROCEDURE — 99213 OFFICE O/P EST LOW 20 MIN: CPT | Mod: 95 | Performed by: PHYSICIAN ASSISTANT

## 2021-03-19 PROCEDURE — 91300 PR COVID VAC PFIZER DIL RECON 30 MCG/0.3 ML IM: CPT

## 2021-04-06 ENCOUNTER — OFFICE VISIT (OUTPATIENT)
Dept: FAMILY MEDICINE | Facility: CLINIC | Age: 71
End: 2021-04-06
Payer: COMMERCIAL

## 2021-04-06 ENCOUNTER — TELEPHONE (OUTPATIENT)
Dept: FAMILY MEDICINE | Facility: CLINIC | Age: 71
End: 2021-04-06

## 2021-04-06 VITALS
SYSTOLIC BLOOD PRESSURE: 112 MMHG | DIASTOLIC BLOOD PRESSURE: 70 MMHG | TEMPERATURE: 98.9 F | HEIGHT: 70 IN | OXYGEN SATURATION: 99 % | HEART RATE: 98 BPM | RESPIRATION RATE: 16 BRPM | BODY MASS INDEX: 31.98 KG/M2 | WEIGHT: 223.38 LBS

## 2021-04-06 DIAGNOSIS — G89.29 CHRONIC BILATERAL LOW BACK PAIN WITH RIGHT-SIDED SCIATICA: Primary | ICD-10-CM

## 2021-04-06 DIAGNOSIS — M54.41 CHRONIC BILATERAL LOW BACK PAIN WITH RIGHT-SIDED SCIATICA: Primary | ICD-10-CM

## 2021-04-06 PROCEDURE — 99213 OFFICE O/P EST LOW 20 MIN: CPT | Performed by: PHYSICIAN ASSISTANT

## 2021-04-06 RX ORDER — PREDNISONE 20 MG/1
20 TABLET ORAL DAILY
Qty: 4 TABLET | Refills: 0 | Status: SHIPPED | OUTPATIENT
Start: 2021-04-06 | End: 2021-04-10

## 2021-04-06 ASSESSMENT — PAIN SCALES - GENERAL: PAINLEVEL: SEVERE PAIN (6)

## 2021-04-06 ASSESSMENT — ENCOUNTER SYMPTOMS
NERVOUS/ANXIOUS: 0
FEVER: 0
ARTHRALGIAS: 1
DYSURIA: 0
SHORTNESS OF BREATH: 0
LIGHT-HEADEDNESS: 0
ABDOMINAL PAIN: 0
COUGH: 0

## 2021-04-06 ASSESSMENT — MIFFLIN-ST. JEOR: SCORE: 1789.47

## 2021-04-06 NOTE — PATIENT INSTRUCTIONS
Your exam is very reassuring.  Based on your exam, I do not feel your symptoms are related to your knee or due to a blood clot.    Your symptoms are likely a return of low back pain and sciatica.  For treatment I would like you to use Tylenol, heat/ice, gentle exercise.  Additionally, a referral has been placed to physical therapy.  Please call the number on your paperwork to schedule your appointment.  You have been prescribed a burst of steroids which can help with this low back pain.  Do not start this medication until least 3-4 days after your COVID-19 vaccine.    Please reach out questions or concerns.  Return to clinic if your symptoms are worsening, or not improving over the next 2-3 weeks.      Patient Education     Back Care Tips     Caring for your back  These are things you can do to prevent a recurrence of acute back pain and to reduce symptoms from chronic back pain:    Stay at a healthy weight. If you are overweight, losing weight will help most types of back pain.    Exercise is an important part of recovery from most types of back pain. The muscles behind and in front of the spine support the back. This means strengthening both the back muscles and the abdominal muscles will provide better support for your spine.     Swimming and brisk walking are good overall exercises to improve your fitness level.    Practice safe lifting methods (see below).    Practice good posture when sitting, standing, and walking. Don't sit for a long time. This puts more stress on the lower back than standing or walking.    Wear quality shoes with good arch support. Foot and ankle alignment can affect back symptoms. Don't wear high heels.    Therapeutic massage can help relax the back muscles without stretching them.    During the first 24 to 72 hours after an acute injury or flare-up of chronic back pain, put an ice pack on the painful area for 20 minutes and then remove it for 20 minutes. Do thisover a period of 60 to 90  minutes, or several times a day. As a safety precaution, don't use a heating pad at bedtime. Sleeping on a heating pad can lead to skin burns or tissue damage.    You can alternate using ice and heat.  Medicines  Talk with your healthcare provider before using medicines, especially if you have other health problems or are taking other medicines.    You may use over-the-counter medicines, such as acetaminophen, ibuprofen, or naprosyn to control pain, unless your healthcare provider prescribed other pain medicine. Talk with your healthcare provider before taking any medicines if you have a chronic condition such as diabetes, liver or kidney disease, stomach ulcers, or digestive bleeding, or are taking blood thinners.    Be careful if you are given prescription pain medicines, opioids, or medicine for muscle spasm. They can cause drowsiness, and affect your coordination, reflexes, and judgment. Don't drive or operate heavy machinery while taking these types of medicines. Take prescription pain medicine only as prescribed by your healthcare provider.  Lumbar stretch  This simple stretch will help relax muscle spasm and keep your back more limber. If exercise makes your back pain worse, don t do it.    Lie on your back with your knees bent and both feet on the ground.    Slowly raise your left knee to your chest as you flatten your lower back against the floor. Hold for 5 seconds.    Relax and repeat the exercise with your right knee.    Do 10 of these exercises for each leg.  Safe lifting method    Don t bend over at the waist to lift an object off the floor.  Instead, bend your knees and hips in a squat.     Keep your back and head upright    Hold the object close to your body, directly in front of you.    Straighten your legs to lift the object.     Lower the object to the floor in the reverse fashion.    If you must slide something across the floor, push it.    Posture tips  Sitting  Sit in chairs with straight backs  or low-back support. Keep your knees lower than your hips, with your feet flat on the floor.  When driving, sit up straight. Adjust the seat forward so you are not leaning toward the steering wheel.  A small pillow or rolled towel behind your lower back may help if you are driving long distances.   Standing  When standing for long periods, shift most of your weight to one leg at a time. Switch legs every few minutes.   Sleeping  The best way to sleep is on your side with your knees bent. Put a low pillow under your head to support your neck in a neutral spine position. Don't use thick pillows that bend your neck to one side. Put a pillow between your legs to further relax your lower back. If you sleep on your back, put pillows under your knees to support your legs in a slightly flexed position. Use a firm mattress. If your mattress sags, replace it, or use a 1/2-inch plywood board under the mattress to add support.  Follow-up care  Follow up with your healthcare provider, or as advised.  If X-rays, a CT scan or an MRI scan were taken, they may be reviewed by a radiologist. You will be told of any new findings that may affect your care.  Call 911  Call 911 if any of the following occur:    Trouble breathing    Confusion    Very drowsy    Fainting or loss of consciousness    Rapid or very slow heart rate    Loss of  bowel or bladder control  When to seek medical advice  Call your healthcare provider right away if any of the following occur:    Pain becomes worse or spreads to your arms or legs    Weakness or numbness in one or both arms or legs    Numbness in the groin area  [employer] last reviewed this educational content on 11/1/2019 2000-2020 [employer]. All rights reserved. This information is not intended as a substitute for professional medical care. Always follow your healthcare professional's instructions.           Patient Education     Understanding Lumbar Radiculopathy    Lumbar  radiculopathy is irritation or inflammation of a nerve root in the low back. It causes symptoms that spread out from the back down one or both legs. To understand this condition, it helps to understand the parts of the spine:    Vertebrae. These are bones that stack to form the spine. The lumbar spine contains 5 vertebrae near the bottom of your spine.    Disks. These are soft pads of tissue between the vertebrae. They act as shock absorbers for the spine.    Spinal canal. This is a tunnel formed within the stacked vertebrae. In the lumbar spine, nerves run through this canal.    Nerves. These branch off and leave the spinal canal, traveling out to parts of the body. As they leave the spinal canal, nerves pass through openings between the vertebrae. The nerve root is the part of the nerve that is closest to the spinal canal.    Sciatic nerve. This is a large nerve formed from several nerve roots in the low back. This nerve extends down the back of the leg to the foot.  With lumbar radiculopathy, nerve roots in the low back become irritated. This leads to pain and symptoms. The sciatic nerve is commonly involved, so the condition is often called sciatica.  What causes lumbar radiculopathy?  Aging, injury, poor posture, extra body weight, and other issues can lead to problems in the low back. These problems may then irritate nerve roots. They include:    Damage to a disk in the lumbar spine. The damaged disk may then press on nearby nerve roots.    Degeneration from wear and tear, and aging. This can lead to narrowing (stenosis) of the openings between the vertebrae. The narrowed openings press on nerve roots as they leave the spinal canal.    Unstable spine. This is when a vertebra slips forward. It can then press on a nerve root.  Other, less common things can put pressure on nerves in the low back. These include diabetes, infection, or a tumor.  Symptoms of lumbar radiculopathy  These include:    Pain in the low  back    Pain, numbness, tingling, or weakness that travels into the buttocks, hip, groin, or leg    Muscle spasms in the low back, or leg  Treatment for lumbar radiculopathy  In most cases, your healthcare provider will first try treatments that help relieve symptoms. These may include:    Prescription and over-the-counter pain medicines. These help relieve pain, swelling, and irritation.    Limits on positions and activities that increase pain. But lying in bed or avoiding all movement is only recommended for a short period of time.    Physical therapy, including exercises and stretches. This helps decrease pain and increase movement and function.    Steroid shots into the lower back. This may help relieve symptoms for a time.    Weight-loss program. If you are overweight, losing extra pounds (kilograms) may help relieve symptoms.  In some cases, you may need surgery to fix the underlying problem. This depends on the cause, the symptoms, and how long the pain has lasted.  Possible complications  Over time, an irritated and inflamed nerve may become damaged. This may lead to long-lasting (permanent) numbness or weakness in your legs and feet. If symptoms change suddenly or get worse, be sure to let your healthcare provider know.  When to call your healthcare provider  Call your healthcare provider right away if you have any of these:    New pain or pain that gets worse    New or increasing weakness, tingling, or numbness in your leg or foot    Problems controlling your bladder or bowel  [employer] last reviewed this educational content on 2/1/2020 2000-2020 [employer]. All rights reserved. This information is not intended as a substitute for professional medical care. Always follow your healthcare professional's instructions.

## 2021-04-06 NOTE — TELEPHONE ENCOUNTER
Patient states he is having a gout flare up or if it is his sciatica?. Patient is asking if Jon would like him to come in for blood draw or if he can start prednisone that was prescribed, or does he need xray for sciatica? Please call patient to advise.

## 2021-04-06 NOTE — PROGRESS NOTES
Assessment & Plan     Chronic bilateral low back pain with right-sided sciatica  Patient is a 70-year-old male who presents to clinic due to increasing low back pain as well as right-sided sciatica with pain radiating to posterior aspect of right knee.  Vital signs normal.  Physical exam without acute abnormalities.    Patient on patient history, as well as history of sciatica with similar symptoms, symptoms are most consistent with sciatica.  Discussed treatment options.  We will proceed with physical therapy, short burst of steroids to be taken after completing COVID-19 vaccine, Tylenol, and heat/ice.  Patient to follow-up for any new or worsening symptoms.  - [facility]; Future  - predniSONE (DELTASONE) 20 MG tablet; Take 1 tablet (20 mg) by mouth daily for 4 days      See Patient Instructions    Return in about 3 weeks (around 4/27/2021), or if symptoms worsen or fail to improve.    BINTA [facility] ESPINOZA Oliveira is a 70 year old who presents for the following health issues:     HPI   Patient notes history of sciatica. Patient notes that he has been sitting more frequently and the pain in his low back has increased related to this. history of spine injections which helped with pain.  Pain at posterior aspect of right knee is similar to pain that he experienced in past with sciatica.    Patient is anticoagulated, on Xarelto. No history of COVID-19.     Musculoskeletal problem/pain  Onset/Duration: 1 week of right knee pain at posterior aspect that improves with rest and is worse with ambulation. PMHx gout and sciatica   Description  Location: backside of knee - right  Joint Swelling: no  Redness: no  Pain: YES- dull ache - sharper pain when up walking  Warmth: no  Intensity:  moderate  Progression of Symptoms:  worsening  Accompanying signs and symptoms:   Fevers: no  Numbness/tingling/weakness: YES- weakness when going up stairs  History  Trauma to the  "area: no  Recent illness:  no  Previous similar problem: YES- similar pain with sciatica  Previous evaluation:  no  Precipitating or alleviating factors:  Aggravating factors include: walking and climbing stairs  Therapies tried and outcome: acetaminophen        Review of Systems   Constitutional: Negative for fever.   HENT: Negative for congestion.    Respiratory: Negative for cough and shortness of breath.    Cardiovascular: Negative for chest pain.   Gastrointestinal: Negative for abdominal pain.   Genitourinary: Negative for dysuria and urgency.   Musculoskeletal: Positive for arthralgias (pain located behind right knee).        No calf pain   Skin: Negative for rash.   Neurological: Negative for light-headedness.   Psychiatric/Behavioral: The patient is not nervous/anxious.             Objective    /70   Pulse 98   Temp 98.9  F (37.2  C) (Tympanic)   Resp 16   Ht 1.778 m (5' 10\")   Wt 101.3 kg (223 lb 6 oz)   SpO2 99%   BMI 32.05 kg/m    Body mass index is 32.05 kg/m .  Physical Exam  Vitals signs and nursing note reviewed.   Constitutional:       General: He is not in acute distress.     Appearance: Normal appearance.   HENT:      Head: Normocephalic and atraumatic.      Mouth/Throat:      Mouth: Mucous membranes are moist.      Pharynx: Oropharynx is clear.   Eyes:      Extraocular Movements: Extraocular movements intact.      Pupils: Pupils are equal, round, and reactive to light.   Neck:      Musculoskeletal: Normal range of motion.   Cardiovascular:      Rate and Rhythm: Normal rate and regular rhythm.      Heart sounds: Normal heart sounds.   Pulmonary:      Effort: Pulmonary effort is normal.      Breath sounds: Normal breath sounds.   Musculoskeletal: Normal range of motion.      Comments: Able to ambulate  No tenderness palpation of lumbar spine  No tenderness palpation of lumbar paraspinal musculature  Negative straight leg raise bilaterally     right knee: No effusion, no ecchymosis,  No " swelling.  Extension 0, flexion 120.  Negative varus/valgus force.  Negative Rashaad's.  No tenderness palpation of knee/posterior aspect of knee    No tenderness palpation of the right calf.  No edema, no swelling.     Skin:     General: Skin is warm and dry.   Neurological:      General: No focal deficit present.      Mental Status: He is alert.   Psychiatric:         Mood and Affect: Mood normal.         Behavior: Behavior normal.

## 2021-04-09 ENCOUNTER — IMMUNIZATION (OUTPATIENT)
Dept: NURSING | Facility: CLINIC | Age: 71
End: 2021-04-09
Attending: FAMILY MEDICINE
Payer: COMMERCIAL

## 2021-04-09 PROCEDURE — 91300 PR COVID VAC PFIZER DIL RECON 30 MCG/0.3 ML IM: CPT

## 2021-04-09 PROCEDURE — 0002A PR COVID VAC PFIZER DIL RECON 30 MCG/0.3 ML IM: CPT

## 2021-04-12 ENCOUNTER — THERAPY VISIT (OUTPATIENT)
Dept: PHYSICAL THERAPY | Facility: CLINIC | Age: 71
End: 2021-04-12
Attending: PHYSICIAN ASSISTANT
Payer: COMMERCIAL

## 2021-04-12 DIAGNOSIS — G89.29 CHRONIC BILATERAL LOW BACK PAIN WITH RIGHT-SIDED SCIATICA: ICD-10-CM

## 2021-04-12 DIAGNOSIS — M54.41 CHRONIC BILATERAL LOW BACK PAIN WITH RIGHT-SIDED SCIATICA: ICD-10-CM

## 2021-04-12 PROCEDURE — 97110 THERAPEUTIC EXERCISES: CPT | Mod: GP | Performed by: PHYSICAL THERAPIST

## 2021-04-12 PROCEDURE — 97530 THERAPEUTIC ACTIVITIES: CPT | Mod: GP | Performed by: PHYSICAL THERAPIST

## 2021-04-12 PROCEDURE — 97162 PT EVAL MOD COMPLEX 30 MIN: CPT | Mod: GP | Performed by: PHYSICAL THERAPIST

## 2021-04-12 NOTE — PROGRESS NOTES
[facility] Physical Therapy Initial Evaluation  4/12/2021     Precautions/Restrictions/MD instructions: evaluate and treat chronic LBP and R sciatica    Therapist Assessment: Tee Hilton is a 70 year old male patient presenting to Physical Therapy with chronic LBP and R leg pain. Patient demonstrates decreased ROM, decreased strength, impaired posture, impaired gait quality. Signs and symptoms are consistent with chronic mechanical LBP. These impairments limit their ability to stand, walk, exercise, lift. Skilled PT services are necessary in order to reduce impairments and improve independent function.    SUBJECTIVE    Chief Complaint: low back and R leg pain  Associated symptoms: lost a lot of strength after a pneumonia episode January 2021  Paresthesia (yes/no):  In previous episodes, not this time  Onset date: 5-6 years ago sciatic issues, treated and pretty much resolved; onset of this episode of pain about 3 weeks ago; date of order 4/6/21  Symptoms commenced as a result of: perhaps related to long period of immobilization after pneumonia episode and sequelae   Condition occurred in the following environment:  Community    Pain severity: 4/10 at rest; 4/10 current, 7/10 worst  Location of pain: bilateral low back, right posterior hip, posterior R knee  Quality of Pain: intermittent - stiffness, ache  Better: sitting, avoiding walking, tylenol   Worse:  wearing a belt, walking, bending, weeding yard  Time of day: no effect  Progression of Symptoms since onset:  Since onset, these symptoms are improving  Previous treatment: has included injections 2018, PT; effect was good  Current Functional Status: impaired  Previous Functional Status:  fully functional prior to pain onset/injury.  Functional disability score (YAHAIRA/STarT Back):  YAHAIRA 28%, Matthew Medium    General health as reported by patient: good.    PMH: HTN, heart problems, history of fractures, overweight, sleep disorder, gout  Surgical  history/trauma: left leg. He denies any significant current illness or recent hospital admissions. He denies any regional implanted devices.  Imaging: MRI 2018 - IMPRESSION:    1. Multilevel degenerative disc disease. No significant change  compared to 2/6/2018.  2. Again noted is a small left L3-L4 foraminal disc herniation  extending up to the exiting left L3 nerve root.   3. There is also moderate left L4-L5 foraminal stenosis. Due to loss  of disc space height and an annular disc bulge.  Medications: HTN, pain, gout    Occupation: retired Job duties: taking care of home and yard  Current exercise regimen/Recreational activities: [employer] 3-4 times per week (treadmill, seated elliptical, some weight machines), some stretching daily  Barriers to treatment: none    Red Flags: (Bold when present) - reviewed the following and denies  Cauda equina: progressive motor or sensory loss, urinary retention or overflow incontinence, new fecal incontinence, saddle anesthesia, significant motor deficits encompassing multiple nerve roots  Fracture: Significant trauma, prolonged corticosteroid use, osteoporosis, age >70  Infection: spinal procedure in the last 12 months, IV drug use, immunosuppression, fever, wound in spinal region, generally unwell  Malignancy: history of metastatic cancer, unexplained weight loss      Patient's Goal(s): find exercises to manage this problem, keep it from coming back    OBJECTIVE    Posture:   Sitting (good/fair/poor): poor - slouched posture  Standing (good/fair/poor): fair  Lordosis (red/acc/normal): red  Correction of posture (better/worse/no effect): worse low back    Lateral Shift (right/left/nil): nil  Relevant (yes/no):  NA  Other Observations: NA    Neurological:    Motor deficit:  none  Reflexes:  2+ bilat patellar, 1+ R achilles, absent L achilles  Sensory deficit:  none Dural signs:  Slump negative L, + for R posterior leg tightness only on R    Movement Loss:   Goyo Mod Min Nil Pain  "  Flexion   X  End range back and R leg   Extension  X   End range back   Side Gliding R  X   End range L hip   Side Gliding L   X  End range L hip     Test Movements:   During: produces, abolishes, increases, decreases, no effect, centralizing, peripheralizing   After: better, worse, no better, no worse, no effect, centralized, peripheralized    Pretest symptoms standing: bilateral LBP   Symptoms During Symptoms After ROM increased ROM decreased No Effect   FIS Increases No Worse      Rep FIS Increases No Worse   X   EIS          Rep EIS          Sitting slouched:  NT  Sitting erect:  NT  Standing slouched NT  Standing erect:  NT  Lying prone in extension:  X 3 min - \"pain is definitely better\" Long sitting:  NT    Other Tests:     Hip screen: WNL hip PROM with reproduction of central LBP with R hip flex and ER  Hip MMT abd 4/5 bilat    Decreased hip flexor and quadriceps flexibility    SIJ provocative tests negative    Hypomobile throughout lumbar spine, Spring testing provocative of pain at L3 and L4    Provisional Classification:  Inconclusive/Other - Mechanically Inconclusive    Principle of Management:  Education:  General exercise guidelines, peripheralization concept  Equipment provided:  none  Mechanical therapy (Y/N):  Y   Extension principle:  Trial of sustained ext in lying  Lateral Principle:  no  Flexion principle:  no  Other:  Progress to general strengthening    ASSESSMENT/PLAN  Patient is a 70 year old male with lumbar complaints.    Patient has the following significant findings with corresponding treatment plan.                Diagnosis 1:  chronic mechanical LBP    Pain -  manual therapy, education, directional preference exercise and home program  Decreased ROM/flexibility - manual therapy, therapeutic exercise and home program  Decreased joint mobility - manual therapy, therapeutic exercise and home program  Decreased strength - therapeutic exercise, therapeutic activities and home " program  Impaired gait - gait training and home program  Impaired muscle performance - neuro re-education and home program  Impaired posture - neuro re-education and home program    Therapy Evaluation Codes:   1) History comprised of:   Personal factors that impact the plan of care:      Overall behavior pattern, Past/current experiences and Time since onset of symptoms.    Comorbidity factors that impact the plan of care are:      see above.     Medications impacting care: see above.  2) Examination of Body Systems comprised of:   Body structures and functions that impact the plan of care:      Lumbar spine.   Activity limitations that impact the plan of care are:      Bending, Lifting, Standing and Walking.  3) Clinical presentation characteristics are:   Evolving/Changing.  4) Decision-Making    Moderate complexity using standardized patient assessment instrument and/or measureable assessment of functional outcome.  Cumulative Therapy Evaluation is: Moderate complexity.    Previous and current functional limitations:  (See Goal Flow Sheet for this information)    Short term and Long term goals: (See Goal Flow Sheet for this information)     Communication ability:  Patient appears to be able to clearly communicate and understand verbal and written communication and follow directions correctly.  Treatment Explanation - The following has been discussed with the patient:   RX ordered/plan of care  Anticipated outcomes  Possible risks and side effects  This patient would benefit from PT intervention to resume normal activities.   Rehab potential is good.    Frequency:  1 X week, once daily  Duration:  for 4-6 weeks  Discharge Plan:  Achieve all LTG.  Independent in home treatment program.  Reach maximal therapeutic benefit.    Please refer to the daily flowsheet for treatment today, total treatment time and time spent performing 1:1 timed codes.

## 2021-04-13 ENCOUNTER — MYC REFILL (OUTPATIENT)
Dept: FAMILY MEDICINE | Facility: CLINIC | Age: 71
End: 2021-04-13

## 2021-04-13 DIAGNOSIS — F41.9 ANXIETY: ICD-10-CM

## 2021-04-15 RX ORDER — LORAZEPAM 0.5 MG/1
0.5 TABLET ORAL EVERY 8 HOURS PRN
Qty: 25 TABLET | Refills: 0 | Status: SHIPPED | OUTPATIENT
Start: 2021-04-15 | End: 2021-12-10

## 2021-04-15 RX ORDER — LORAZEPAM 0.5 MG/1
TABLET ORAL
Qty: 25 TABLET | Refills: 0 | Status: SHIPPED | OUTPATIENT
Start: 2021-04-15 | End: 2021-05-14

## 2021-04-15 NOTE — TELEPHONE ENCOUNTER
Routing refill request to provider for review/approval because:  Drug not on the [facility] refill protocol       Kylie Hummel RN

## 2021-04-15 NOTE — TELEPHONE ENCOUNTER
Routing refill request to provider for review/approval because:  Drug not on the [facility] refill protocol.  Last Written Prescription Date:  2/25/21  Last Fill Quantity: 25,  # refills: 0   Last office visit: 4/6/2021 with prescribing provider:    Future Office Visit:

## 2021-04-19 ENCOUNTER — THERAPY VISIT (OUTPATIENT)
Dept: PHYSICAL THERAPY | Facility: CLINIC | Age: 71
End: 2021-04-19
Payer: COMMERCIAL

## 2021-04-19 DIAGNOSIS — G89.29 CHRONIC BILATERAL LOW BACK PAIN WITH RIGHT-SIDED SCIATICA: ICD-10-CM

## 2021-04-19 DIAGNOSIS — M54.41 CHRONIC BILATERAL LOW BACK PAIN WITH RIGHT-SIDED SCIATICA: ICD-10-CM

## 2021-04-19 PROCEDURE — 97112 NEUROMUSCULAR REEDUCATION: CPT | Mod: GP | Performed by: PHYSICAL THERAPIST

## 2021-04-19 PROCEDURE — 97140 MANUAL THERAPY 1/> REGIONS: CPT | Mod: GP | Performed by: PHYSICAL THERAPIST

## 2021-04-19 PROCEDURE — 97110 THERAPEUTIC EXERCISES: CPT | Mod: GP | Performed by: PHYSICAL THERAPIST

## 2021-04-28 NOTE — PROGRESS NOTES
History of Present Illness - Tee Hilton is a very pleasant 70 year old male status post septoplasty on 7/9/19.  Last seen at the post op on 8/1/2019, where things looked and felt great.    He is here for a new issue, his hearing.  An auidogram was done on 3/17/21 and personally reviewed for today's encounter.  It shows normal tympanograms, and a mild to severe symmetric sensorineural hearing loss above 1000Hz.  There is no CHL, and only a very small LT threshold shift above 6000Hz.    In the middle of January, he had pneumonia and meningitis and had to be on a PICC line.  He notes that [place] and had a lot of exposure to noise, butt what concerned him was that since that pneumonia and meningitis, the hearing has been notably worse.  No dizziness or vertigo.    Past Medical History -   Patient Active Problem List   Diagnosis     Other male erectile dysfunction     Chronic midline low back pain without sciatica     Persistent atrial fibrillation (H)     Non morbid obesity due to excess calories     Chronic systolic congestive heart failure (H)     Alcohol use     Gastroesophageal reflux disease without esophagitis     Other sleep apnea     Atrial fibrillation (H)     Gastroesophageal reflux disease     Morbid obesity (H)     AMINA (acute kidney injury) (H)     Fever and chills     Leukocytosis     Hyponatremia     Dehydration     Essential hypertension     Chronic atrial fibrillation (H)     Myalgia     Acute kidney injury (H)     Acute intractable headache, unspecified headache type     Bilateral low back pain with right-sided sciatica       Current Medications -   Current Outpatient Medications:      allopurinol (ZYLOPRIM) 100 MG tablet, Take 1 tablet (100 mg) by mouth daily, Disp: 90 tablet, Rfl: 3     amLODIPine (NORVASC) 5 MG tablet, Take 1 tablet (5 mg) by mouth daily, Disp: 90 tablet, Rfl: 1     Famotidine (PEPCID PO), Take 10 mg by mouth daily as needed , Disp: , Rfl:      lisinopril  (ZESTRIL) 20 MG tablet, Take 0.5 tablets (10 mg) by mouth daily TAKE 1 TABLETS BY MOUTH ONCE DAILY, Disp: 90 tablet, Rfl: 1     LORazepam (ATIVAN) 0.5 MG tablet, TAKE 1 TABLET BY MOUTH EVERY 8 HOURS AS NEEDED FOR ANXIETY, Disp: 25 tablet, Rfl: 0     LORazepam (ATIVAN) 0.5 MG tablet, Take 1 tablet (0.5 mg) by mouth every 8 hours as needed for anxiety, Disp: 25 tablet, Rfl: 0     metoprolol succinate ER (TOPROL-XL) 100 MG 24 hr tablet, Take 1.5 tablets (150 mg) by mouth daily, Disp: 90 tablet, Rfl: 1     omeprazole (PRILOSEC) 20 MG CR capsule, Take 20 mg by mouth daily , Disp: , Rfl:      ondansetron (ZOFRAN) 4 MG tablet, Take 1 tablet (4 mg) by mouth every 8 hours as needed for nausea, Disp: 30 tablet, Rfl: 1     rivaroxaban ANTICOAGULANT (XARELTO ANTICOAGULANT) 20 MG TABS tablet, TAKE 1 TABLET BY MOUTH ONCE DAILY WITH  DINNER, Disp: 90 tablet, Rfl: 1     sildenafil (REVATIO) 20 MG tablet, Take 1 -5 tabs daily prn, Disp: 30 tablet, Rfl: 11     zolpidem ER (AMBIEN CR) 12.5 MG CR tablet, Take 1 tablet (12.5 mg) by mouth nightly as needed for sleep, Disp: 30 tablet, Rfl: 2  No current facility-administered medications for this visit.     Facility-Administered Medications Ordered in Other Visits:      sodium chloride (PF) 0.9% PF flush 10 mL, 10 mL, Intracatheter, Once, CLAY Erwin MD    Allergies -   Allergies   Allergen Reactions     Erythromycin GI Disturbance     Upset stomach       Social History -   Social History     Socioeconomic History     Marital status:      Spouse name: Not on file     Number of children: 3     Years of education: Not on file     Highest education level: Not on file   Occupational History     Occupation: sales and marketing   Social Needs     Financial resource strain: Not on file     Food insecurity     Worry: Not on file     Inability: Not on file     Transportation needs     Medical: Not on file     Non-medical: Not on file   Tobacco Use     Smoking status: Never Smoker      "Smokeless tobacco: Never Used     Tobacco comment: never smoked   Substance and Sexual Activity     Alcohol use: Yes     Alcohol/week: 0.0 standard drinks     Comment: rare     Drug use: No     Sexual activity: Yes     Partners: Female   Lifestyle     Physical activity     Days per week: Not on file     Minutes per session: Not on file     Stress: Not on file   Relationships     Social connections     Talks on phone: Not on file     Gets together: Not on file     Attends Religion service: Not on file     Active member of club or organization: Not on file     Attends meetings of clubs or organizations: Not on file     Relationship status: Not on file     Intimate partner violence     Fear of current or ex partner: Not on file     Emotionally abused: Not on file     Physically abused: Not on file     Forced sexual activity: Not on file   Other Topics Concern     Parent/sibling w/ CABG, MI or angioplasty before 65F 55M? Yes   Social History Narrative     Not on file       Family History -   Family History   Problem Relation Age of Onset     Gout Father      Lung Cancer Father        Review of Systems - As per HPI and PMHx, otherwise 10+ system review of the head and neck, and general constitution is negative.    Physical Exam  /78   Pulse 86   Resp 16   Ht 1.778 m (5' 10\")   Wt 105.2 kg (232 lb)   SpO2 98%   BMI 33.29 kg/m      General - The patient is well nourished and well developed, and appears to have good nutritional status.  Alert and oriented to person and place, answers questions and cooperates with examination appropriately.   Head and Face - Normocephalic and atraumatic, with no gross asymmetry noted of the contour of the facial features.  The facial nerve is intact, with strong symmetric movements.  Voice and Breathing - The patient was breathing comfortably without the use of accessory muscles. There was no wheezing, stridor, or stertor.  The patients voice was clear and strong, and had " appropriate pitch and quality.  Ears - The tympanic membranes are normal in appearance, bony landmarks are intact.  No retraction, perforation, or masses.  No fluid or purulence was seen in the external canal or the middle ear. No evidence of infection of the middle ear or external canal, cerumen was normal in appearance.  Eyes - Extraocular movements intact, and the pupils were reactive to light.  Sclera were not icteric or injected, conjunctiva were pink and moist.  Mouth - Examination of the oral cavity showed pink, healthy oral mucosa. No lesions or ulcerations noted.  The tongue was mobile and midline, and the dentition were in good condition.    Throat - The walls of the oropharynx were smooth, pink, moist, symmetric, and had no lesions or ulcerations.  The tonsillar pillars and soft palate were symmetric.  The uvula was midline on elevation.    Neck - Normal midline excursion of the laryngotracheal complex during swallowing.  Full range of motion on passive movement.  Palpation of the occipital, submental, submandibular, internal jugular chain, and supraclavicular nodes did not demonstrate any abnormal lymph nodes or masses.  The carotid pulse was palpable bilaterally.  Palpation of the thyroid was soft and smooth, with no nodules or goiter appreciated.  The trachea was mobile and midline.  Nose - External contour is symmetric, no gross deflection or scars.  Nasal mucosa is pink and moist with no abnormal mucus.  The septum was midline and non-obstructive, turbinates of normal size and position.  No polyps, masses, or purulence noted on examination.      A/P - Tee Hilton is a 70 year old male  (H90.3) Sensorineural hearing loss (SNHL) of both ears  (primary encounter diagnosis)  (H93.13) Tinnitus, bilateral    Based on the history, audiogram, and ear exam, I don't find any evidence of medical or surgical disease that would have caused the hearing loss.  Although noise exposure could be a factor, I  suspect that this is primarily genetic/presbycusis but very likely the meningitis made things worse.    We discussed the natural history of the steady loss of human hearing, and things to help.  I certainly recommend considering hearing aids, and they have my medical clearance to look into being fitted, and information has been provided.    Finally, safety considerations such as loud smoke detectors, alarm clocks, and special aids for the hearing impaired using phones and television were also discussed.

## 2021-04-30 ENCOUNTER — OFFICE VISIT (OUTPATIENT)
Dept: AUDIOLOGY | Facility: CLINIC | Age: 71
End: 2021-04-30
Payer: COMMERCIAL

## 2021-04-30 ENCOUNTER — OFFICE VISIT (OUTPATIENT)
Dept: OTOLARYNGOLOGY | Facility: CLINIC | Age: 71
End: 2021-04-30
Payer: COMMERCIAL

## 2021-04-30 VITALS
DIASTOLIC BLOOD PRESSURE: 78 MMHG | OXYGEN SATURATION: 98 % | SYSTOLIC BLOOD PRESSURE: 121 MMHG | BODY MASS INDEX: 33.21 KG/M2 | HEART RATE: 86 BPM | HEIGHT: 70 IN | RESPIRATION RATE: 16 BRPM | WEIGHT: 232 LBS

## 2021-04-30 DIAGNOSIS — H93.13 TINNITUS, BILATERAL: ICD-10-CM

## 2021-04-30 DIAGNOSIS — H90.3 SENSORINEURAL HEARING LOSS (SNHL) OF BOTH EARS: Primary | ICD-10-CM

## 2021-04-30 DIAGNOSIS — Z53.9 ERRONEOUS ENCOUNTER--DISREGARD: Primary | ICD-10-CM

## 2021-04-30 PROCEDURE — 99213 OFFICE O/P EST LOW 20 MIN: CPT | Performed by: OTOLARYNGOLOGY

## 2021-04-30 ASSESSMENT — PAIN SCALES - GENERAL: PAINLEVEL: NO PAIN (0)

## 2021-04-30 ASSESSMENT — MIFFLIN-ST. JEOR: SCORE: 1828.6

## 2021-04-30 NOTE — LETTER
4/30/2021         RE: Tee Hilton  [address] [facility] [address]        Dear Colleague,    Thank you for referring your patient, Tee Hilton, to the [facility]. Please see a copy of my visit note below.    History of Present Illness - Tee Hilton is a very pleasant 70 year old male status post septoplasty on 7/9/19.  Last seen at the post op on 8/1/2019, where things looked and felt great.    He is here for a new issue, his hearing.  An auidogram was done on 3/17/21 and personally reviewed for today's encounter.  It shows normal tympanograms, and a mild to severe symmetric sensorineural hearing loss above 1000Hz.  There is no CHL, and only a very small LT threshold shift above 6000Hz.    In the middle of January, he had pneumonia and meningitis and had to be on a PICC line.  He notes that [place] and had a lot of exposure to noise, butt what concerned him was that since that pneumonia and meningitis, the hearing has been notably worse.  No dizziness or vertigo.    Past Medical History -   Patient Active Problem List   Diagnosis     Other male erectile dysfunction     Chronic midline low back pain without sciatica     Persistent atrial fibrillation (H)     Non morbid obesity due to excess calories     Chronic systolic congestive heart failure (H)     Alcohol use     Gastroesophageal reflux disease without esophagitis     Other sleep apnea     Atrial fibrillation (H)     Gastroesophageal reflux disease     Morbid obesity (H)     AMINA (acute kidney injury) (H)     Fever and chills     Leukocytosis     Hyponatremia     Dehydration     Essential hypertension     Chronic atrial fibrillation (H)     Myalgia     Acute kidney injury (H)     Acute intractable headache, unspecified headache type     Bilateral low back pain with right-sided sciatica       Current Medications -   Current Outpatient Medications:      allopurinol (ZYLOPRIM) 100 MG tablet, Take 1  tablet (100 mg) by mouth daily, Disp: 90 tablet, Rfl: 3     amLODIPine (NORVASC) 5 MG tablet, Take 1 tablet (5 mg) by mouth daily, Disp: 90 tablet, Rfl: 1     Famotidine (PEPCID PO), Take 10 mg by mouth daily as needed , Disp: , Rfl:      lisinopril (ZESTRIL) 20 MG tablet, Take 0.5 tablets (10 mg) by mouth daily TAKE 1 TABLETS BY MOUTH ONCE DAILY, Disp: 90 tablet, Rfl: 1     LORazepam (ATIVAN) 0.5 MG tablet, TAKE 1 TABLET BY MOUTH EVERY 8 HOURS AS NEEDED FOR ANXIETY, Disp: 25 tablet, Rfl: 0     LORazepam (ATIVAN) 0.5 MG tablet, Take 1 tablet (0.5 mg) by mouth every 8 hours as needed for anxiety, Disp: 25 tablet, Rfl: 0     metoprolol succinate ER (TOPROL-XL) 100 MG 24 hr tablet, Take 1.5 tablets (150 mg) by mouth daily, Disp: 90 tablet, Rfl: 1     omeprazole (PRILOSEC) 20 MG CR capsule, Take 20 mg by mouth daily , Disp: , Rfl:      ondansetron (ZOFRAN) 4 MG tablet, Take 1 tablet (4 mg) by mouth every 8 hours as needed for nausea, Disp: 30 tablet, Rfl: 1     rivaroxaban ANTICOAGULANT (XARELTO ANTICOAGULANT) 20 MG TABS tablet, TAKE 1 TABLET BY MOUTH ONCE DAILY WITH  DINNER, Disp: 90 tablet, Rfl: 1     sildenafil (REVATIO) 20 MG tablet, Take 1 -5 tabs daily prn, Disp: 30 tablet, Rfl: 11     zolpidem ER (AMBIEN CR) 12.5 MG CR tablet, Take 1 tablet (12.5 mg) by mouth nightly as needed for sleep, Disp: 30 tablet, Rfl: 2  No current facility-administered medications for this visit.     Facility-Administered Medications Ordered in Other Visits:      sodium chloride (PF) 0.9% PF flush 10 mL, 10 mL, Intracatheter, Once, CLAY Erwin MD    Allergies -   Allergies   Allergen Reactions     Erythromycin GI Disturbance     Upset stomach       Social History -   Social History     Socioeconomic History     Marital status:      Spouse name: Not on file     Number of children: 3     Years of education: Not on file     Highest education level: Not on file   Occupational History     Occupation: sales and marketing  "  Social Needs     Financial resource strain: Not on file     Food insecurity     Worry: Not on file     Inability: Not on file     Transportation needs     Medical: Not on file     Non-medical: Not on file   Tobacco Use     Smoking status: Never Smoker     Smokeless tobacco: Never Used     Tobacco comment: never smoked   Substance and Sexual Activity     Alcohol use: Yes     Alcohol/week: 0.0 standard drinks     Comment: rare     Drug use: No     Sexual activity: Yes     Partners: Female   Lifestyle     Physical activity     Days per week: Not on file     Minutes per session: Not on file     Stress: Not on file   Relationships     Social connections     Talks on phone: Not on file     Gets together: Not on file     Attends Sabianism service: Not on file     Active member of club or organization: Not on file     Attends meetings of clubs or organizations: Not on file     Relationship status: Not on file     Intimate partner violence     Fear of current or ex partner: Not on file     Emotionally abused: Not on file     Physically abused: Not on file     Forced sexual activity: Not on file   Other Topics Concern     Parent/sibling w/ CABG, MI or angioplasty before 65F 55M? Yes   Social History Narrative     Not on file       Family History -   Family History   Problem Relation Age of Onset     Gout Father      Lung Cancer Father        Review of Systems - As per HPI and PMHx, otherwise 10+ system review of the head and neck, and general constitution is negative.    Physical Exam  /78   Pulse 86   Resp 16   Ht 1.778 m (5' 10\")   Wt 105.2 kg (232 lb)   SpO2 98%   BMI 33.29 kg/m      General - The patient is well nourished and well developed, and appears to have good nutritional status.  Alert and oriented to person and place, answers questions and cooperates with examination appropriately.   Head and Face - Normocephalic and atraumatic, with no gross asymmetry noted of the contour of the facial features.  " The facial nerve is intact, with strong symmetric movements.  Voice and Breathing - The patient was breathing comfortably without the use of accessory muscles. There was no wheezing, stridor, or stertor.  The patients voice was clear and strong, and had appropriate pitch and quality.  Ears - The tympanic membranes are normal in appearance, bony landmarks are intact.  No retraction, perforation, or masses.  No fluid or purulence was seen in the external canal or the middle ear. No evidence of infection of the middle ear or external canal, cerumen was normal in appearance.  Eyes - Extraocular movements intact, and the pupils were reactive to light.  Sclera were not icteric or injected, conjunctiva were pink and moist.  Mouth - Examination of the oral cavity showed pink, healthy oral mucosa. No lesions or ulcerations noted.  The tongue was mobile and midline, and the dentition were in good condition.    Throat - The walls of the oropharynx were smooth, pink, moist, symmetric, and had no lesions or ulcerations.  The tonsillar pillars and soft palate were symmetric.  The uvula was midline on elevation.    Neck - Normal midline excursion of the laryngotracheal complex during swallowing.  Full range of motion on passive movement.  Palpation of the occipital, submental, submandibular, internal jugular chain, and supraclavicular nodes did not demonstrate any abnormal lymph nodes or masses.  The carotid pulse was palpable bilaterally.  Palpation of the thyroid was soft and smooth, with no nodules or goiter appreciated.  The trachea was mobile and midline.  Nose - External contour is symmetric, no gross deflection or scars.  Nasal mucosa is pink and moist with no abnormal mucus.  The septum was midline and non-obstructive, turbinates of normal size and position.  No polyps, masses, or purulence noted on examination.      A/P - Tee Hilton is a 70 year old male  (H90.3) Sensorineural hearing loss (SNHL) of both ears   (primary encounter diagnosis)  (H93.13) Tinnitus, bilateral    Based on the history, audiogram, and ear exam, I don't find any evidence of medical or surgical disease that would have caused the hearing loss.  Although noise exposure could be a factor, I suspect that this is primarily genetic/presbycusis but very likely the meningitis made things worse.    We discussed the natural history of the steady loss of human hearing, and things to help.  I certainly recommend considering hearing aids, and they have my medical clearance to look into being fitted, and information has been provided.    Finally, safety considerations such as loud smoke detectors, alarm clocks, and special aids for the hearing impaired using phones and television were also discussed.          Again, thank you for allowing me to participate in the care of your patient.        Sincerely,        Alexander Avila MD

## 2021-04-30 NOTE — PATIENT INSTRUCTIONS
Scheduling Information  To schedule your CT/MRI scan, please contact [facility] at [phone] OR [facility] at [phone]    To schedule your Surgery, please contact our Specialty Schedulers at [phone]      ENT Clinic Locations Clinic Hours Telephone Number     [facility]  [address] [place] [address]   Monday:           1:00pm -- 5:00pm    Friday:              8:00am - 12:00pm   To schedule/reschedule an appointment with   Dr. Avila,   please contact our   Specialty Scheduling Department at:     [phone]       [facility]  [address] [place] [address] Tuesday:          8:00am -- 2:00pm         Urgent Care Locations Clinic Hours Telephone Numbers     [facility]  [address] [place] [address]     Monday-Friday:     11:00am - 9:00pm    Saturday-Sunday:  9:00am - 5:00pm   [phone]     [facility]  [address] [place] [address]     Monday-Friday:      5:00pm - 9:00pm     Saturday-Sunday:  9:00am - 5:00pm   [phone]

## 2021-05-14 ENCOUNTER — MYC REFILL (OUTPATIENT)
Dept: FAMILY MEDICINE | Facility: CLINIC | Age: 71
End: 2021-05-14

## 2021-05-14 DIAGNOSIS — F41.9 ANXIETY: ICD-10-CM

## 2021-05-14 RX ORDER — LORAZEPAM 0.5 MG/1
0.5 TABLET ORAL EVERY 8 HOURS PRN
Qty: 25 TABLET | Refills: 0 | Status: SHIPPED | OUTPATIENT
Start: 2021-05-14 | End: 2021-06-21

## 2021-05-14 NOTE — TELEPHONE ENCOUNTER
Routing refill request to provider for review/approval because:  Drug not on the [facility] refill protocol     Last Written Prescription Date:  4-15-21  Last Fill Quantity: 25,  # refills: 0   Last office visit: virtual 3-19-21 with prescribing provider:  Jon Denson   Future Office Visit:

## 2021-05-20 DIAGNOSIS — R11.0 NAUSEA: ICD-10-CM

## 2021-05-21 RX ORDER — ONDANSETRON 4 MG/1
TABLET, FILM COATED ORAL
Qty: 30 TABLET | Refills: 0 | Status: SHIPPED | OUTPATIENT
Start: 2021-05-21 | End: 2021-06-18

## 2021-05-21 NOTE — TELEPHONE ENCOUNTER
"Prescription approved per [facility] Refill Protocol.  Requested Prescriptions   Pending Prescriptions Disp Refills     ondansetron (ZOFRAN) 4 MG tablet [Pharmacy Med Name: Ondansetron HCl 4 MG Oral Tablet] 30 tablet 0     Sig: TAKE 1 TABLET BY MOUTH EVERY 8 HOURS AS NEEDED FOR NAUSEA        Antivertigo/Antiemetic Agents Passed - 5/20/2021  5:42 AM        Passed - Recent (12 mo) or future (30 days) visit within the authorizing provider's specialty     Patient has had an office visit with the authorizing provider or a provider within the authorizing providers department within the previous 12 mos or has a future within next 30 days. See \"Patient Info\" tab in inbasket, or \"Choose Columns\" in Meds & Orders section of the refill encounter.              Passed - Medication is active on med list        Passed - Patient is 18 years of age or older             "

## 2021-05-24 DIAGNOSIS — F51.01 PRIMARY INSOMNIA: ICD-10-CM

## 2021-05-24 NOTE — TELEPHONE ENCOUNTER
Routing refill request to provider for review/approval because:  Drug not on the [facility] refill protocol

## 2021-05-25 RX ORDER — ZOLPIDEM TARTRATE 12.5 MG/1
TABLET, FILM COATED, EXTENDED RELEASE ORAL
Qty: 30 TABLET | Refills: 0 | Status: SHIPPED | OUTPATIENT
Start: 2021-05-25 | End: 2021-06-21

## 2021-05-26 ENCOUNTER — TELEPHONE (OUTPATIENT)
Dept: FAMILY MEDICINE | Facility: CLINIC | Age: 71
End: 2021-05-26

## 2021-05-26 DIAGNOSIS — F51.01 PRIMARY INSOMNIA: ICD-10-CM

## 2021-05-26 RX ORDER — ZOLPIDEM TARTRATE 12.5 MG/1
12.5 TABLET, FILM COATED, EXTENDED RELEASE ORAL
Qty: 30 TABLET | Refills: 0 | Status: CANCELLED | OUTPATIENT
Start: 2021-05-26

## 2021-05-26 NOTE — TELEPHONE ENCOUNTER
Called patient and advised this was sent yesterday to pharmacy.    They stated that pharmacy advised them they do not have the refill for this.  Called [facility], and they stated that they have the script and will get it ready for him.    Called and advised patient.    Patient stated understanding and agreeable with the plan of care.   Pat GARNETT-RN.  [facility]

## 2021-06-01 DIAGNOSIS — I10 ESSENTIAL HYPERTENSION: ICD-10-CM

## 2021-06-01 DIAGNOSIS — I48.20 CHRONIC ATRIAL FIBRILLATION (H): ICD-10-CM

## 2021-06-02 NOTE — TELEPHONE ENCOUNTER
Routing refill request to provider for review/approval because:  Labs out of range:  Creatinine       Kylie Hummel RN

## 2021-06-04 RX ORDER — RIVAROXABAN 20 MG/1
TABLET, FILM COATED ORAL
Qty: 90 TABLET | Refills: 0 | Status: SHIPPED | OUTPATIENT
Start: 2021-06-04 | End: 2021-09-06

## 2021-06-04 RX ORDER — AMLODIPINE BESYLATE 5 MG/1
TABLET ORAL
Qty: 90 TABLET | Refills: 0 | Status: SHIPPED | OUTPATIENT
Start: 2021-06-04 | End: 2021-09-06

## 2021-06-16 DIAGNOSIS — R11.0 NAUSEA: ICD-10-CM

## 2021-06-17 ENCOUNTER — MYC REFILL (OUTPATIENT)
Dept: FAMILY MEDICINE | Facility: CLINIC | Age: 71
End: 2021-06-17

## 2021-06-17 DIAGNOSIS — F41.9 ANXIETY: ICD-10-CM

## 2021-06-17 DIAGNOSIS — F51.01 PRIMARY INSOMNIA: ICD-10-CM

## 2021-06-17 DIAGNOSIS — R11.0 NAUSEA: ICD-10-CM

## 2021-06-17 RX ORDER — ONDANSETRON 4 MG/1
TABLET, FILM COATED ORAL
Qty: 30 TABLET | Refills: 0 | Status: CANCELLED | OUTPATIENT
Start: 2021-06-17

## 2021-06-17 RX ORDER — LORAZEPAM 0.5 MG/1
0.5 TABLET ORAL EVERY 8 HOURS PRN
Qty: 25 TABLET | Refills: 0 | Status: CANCELLED | OUTPATIENT
Start: 2021-06-17

## 2021-06-17 RX ORDER — ZOLPIDEM TARTRATE 12.5 MG/1
12.5 TABLET, FILM COATED, EXTENDED RELEASE ORAL
Qty: 30 TABLET | Refills: 0 | Status: CANCELLED | OUTPATIENT
Start: 2021-06-17

## 2021-06-17 NOTE — TELEPHONE ENCOUNTER
Routing refill request to provider for review/approval because:  Medication is reported/historical      Kylie Hummel RN

## 2021-06-18 RX ORDER — ONDANSETRON 4 MG/1
TABLET, FILM COATED ORAL
Qty: 30 TABLET | Refills: 0 | Status: SHIPPED | OUTPATIENT
Start: 2021-06-18 | End: 2021-07-13

## 2021-06-20 NOTE — TELEPHONE ENCOUNTER
Routing refill request to provider for review/approval because:  Drug not on the [facility] refill protocol   Rupal Jones RN

## 2021-06-21 RX ORDER — ZOLPIDEM TARTRATE 12.5 MG/1
TABLET, FILM COATED, EXTENDED RELEASE ORAL
Qty: 30 TABLET | Refills: 0 | Status: SHIPPED | OUTPATIENT
Start: 2021-06-21 | End: 2021-07-17

## 2021-06-21 RX ORDER — LORAZEPAM 0.5 MG/1
TABLET ORAL
Qty: 25 TABLET | Refills: 0 | Status: SHIPPED | OUTPATIENT
Start: 2021-06-21 | End: 2021-07-10

## 2021-06-30 DIAGNOSIS — I48.20 CHRONIC ATRIAL FIBRILLATION (H): ICD-10-CM

## 2021-07-01 RX ORDER — METOPROLOL SUCCINATE 100 MG/1
150 TABLET, EXTENDED RELEASE ORAL DAILY
Qty: 135 TABLET | Refills: 0 | Status: SHIPPED | OUTPATIENT
Start: 2021-07-01 | End: 2021-09-26

## 2021-07-02 NOTE — TELEPHONE ENCOUNTER
"Prescription approved per [facility] Refill Protocol.  Requested Prescriptions   Pending Prescriptions Disp Refills     metoprolol succinate ER (TOPROL-XL) 100 MG 24 hr tablet [Pharmacy Med Name: Metoprolol Succinate  MG Oral Tablet Extended Release 24 Hour] 90 tablet 0     Sig: TAKE 1 & 1/2 (ONE & ONE-HALF) TABLETS BY MOUTH ONCE DAILY       Beta-Blockers Protocol Passed - 6/30/2021  5:58 AM        Passed - Blood pressure under 140/90 in past 12 months     BP Readings from Last 3 Encounters:   04/30/21 121/78   04/06/21 112/70   02/25/21 108/75                 Passed - Patient is age 6 or older        Passed - Recent (12 mo) or future (30 days) visit within the authorizing provider's specialty     Patient has had an office visit with the authorizing provider or a provider within the authorizing providers department within the previous 12 mos or has a future within next 30 days. See \"Patient Info\" tab in inbasket, or \"Choose Columns\" in Meds & Orders section of the refill encounter.              Passed - Medication is active on med list             "

## 2021-07-08 DIAGNOSIS — F41.9 ANXIETY: ICD-10-CM

## 2021-07-09 ENCOUNTER — HOSPITAL ENCOUNTER (EMERGENCY)
Facility: CLINIC | Age: 71
Discharge: HOME | End: 2021-07-09
Attending: FAMILY MEDICINE | Admitting: FAMILY MEDICINE
Payer: COMMERCIAL

## 2021-07-09 ENCOUNTER — OFFICE VISIT (OUTPATIENT)
Dept: URGENT CARE | Facility: URGENT CARE | Age: 71
End: 2021-07-09
Payer: COMMERCIAL

## 2021-07-09 ENCOUNTER — TELEPHONE (OUTPATIENT)
Dept: FAMILY MEDICINE | Facility: CLINIC | Age: 71
End: 2021-07-09

## 2021-07-09 ENCOUNTER — APPOINTMENT (OUTPATIENT)
Dept: GENERAL RADIOLOGY | Facility: CLINIC | Age: 71
End: 2021-07-09
Attending: FAMILY MEDICINE
Payer: COMMERCIAL

## 2021-07-09 VITALS
BODY MASS INDEX: 33 KG/M2 | WEIGHT: 230 LBS | HEART RATE: 67 BPM | SYSTOLIC BLOOD PRESSURE: 154 MMHG | RESPIRATION RATE: 15 BRPM | OXYGEN SATURATION: 99 % | TEMPERATURE: 98 F | DIASTOLIC BLOOD PRESSURE: 83 MMHG

## 2021-07-09 VITALS
DIASTOLIC BLOOD PRESSURE: 110 MMHG | HEART RATE: 92 BPM | SYSTOLIC BLOOD PRESSURE: 170 MMHG | OXYGEN SATURATION: 99 % | TEMPERATURE: 98.1 F

## 2021-07-09 DIAGNOSIS — R11.0 NAUSEA: ICD-10-CM

## 2021-07-09 DIAGNOSIS — K21.00 GASTROESOPHAGEAL REFLUX DISEASE WITH ESOPHAGITIS WITHOUT HEMORRHAGE: ICD-10-CM

## 2021-07-09 DIAGNOSIS — I10 ESSENTIAL HYPERTENSION: ICD-10-CM

## 2021-07-09 DIAGNOSIS — I16.0 HYPERTENSIVE URGENCY: Primary | ICD-10-CM

## 2021-07-09 DIAGNOSIS — R10.13 ABDOMINAL PAIN, EPIGASTRIC: ICD-10-CM

## 2021-07-09 LAB
ALBUMIN SERPL-MCNC: 4 G/DL (ref 3.4–5)
ALP SERPL-CCNC: 58 U/L (ref 40–150)
ALT SERPL W P-5'-P-CCNC: 21 U/L (ref 0–70)
ANION GAP SERPL CALCULATED.3IONS-SCNC: 6 MMOL/L (ref 3–14)
AST SERPL W P-5'-P-CCNC: 12 U/L (ref 0–45)
BASOPHILS # BLD AUTO: 0 10E9/L (ref 0–0.2)
BASOPHILS NFR BLD AUTO: 0.2 %
BILIRUB SERPL-MCNC: 1 MG/DL (ref 0.2–1.3)
BUN SERPL-MCNC: 27 MG/DL (ref 7–30)
CALCIUM SERPL-MCNC: 9.4 MG/DL (ref 8.5–10.1)
CHLORIDE SERPL-SCNC: 106 MMOL/L (ref 94–109)
CO2 SERPL-SCNC: 26 MMOL/L (ref 20–32)
CREAT SERPL-MCNC: 1.61 MG/DL (ref 0.66–1.25)
DIFFERENTIAL METHOD BLD: NORMAL
EOSINOPHIL # BLD AUTO: 0.1 10E9/L (ref 0–0.7)
EOSINOPHIL NFR BLD AUTO: 0.7 %
ERYTHROCYTE [DISTWIDTH] IN BLOOD BY AUTOMATED COUNT: 12.7 % (ref 10–15)
ETHANOL SERPL-MCNC: <0.01 G/DL
GFR SERPL CREATININE-BSD FRML MDRD: 43 ML/MIN/{1.73_M2}
GLUCOSE SERPL-MCNC: 94 MG/DL (ref 70–99)
HCT VFR BLD AUTO: 41.5 % (ref 40–53)
HGB BLD-MCNC: 13.8 G/DL (ref 13.3–17.7)
IMM GRANULOCYTES # BLD: 0 10E9/L (ref 0–0.4)
IMM GRANULOCYTES NFR BLD: 0.2 %
LIPASE SERPL-CCNC: 79 U/L (ref 73–393)
LYMPHOCYTES # BLD AUTO: 1.5 10E9/L (ref 0.8–5.3)
LYMPHOCYTES NFR BLD AUTO: 16.4 %
MCH RBC QN AUTO: 30.3 PG (ref 26.5–33)
MCHC RBC AUTO-ENTMCNC: 33.3 G/DL (ref 31.5–36.5)
MCV RBC AUTO: 91 FL (ref 78–100)
MONOCYTES # BLD AUTO: 1 10E9/L (ref 0–1.3)
MONOCYTES NFR BLD AUTO: 10.6 %
NEUTROPHILS # BLD AUTO: 6.7 10E9/L (ref 1.6–8.3)
NEUTROPHILS NFR BLD AUTO: 71.9 %
NRBC # BLD AUTO: 0 10*3/UL
NRBC BLD AUTO-RTO: 0 /100
PLATELET # BLD AUTO: 332 10E9/L (ref 150–450)
POTASSIUM SERPL-SCNC: 4.2 MMOL/L (ref 3.4–5.3)
PROT SERPL-MCNC: 7 G/DL (ref 6.8–8.8)
RBC # BLD AUTO: 4.55 10E12/L (ref 4.4–5.9)
SODIUM SERPL-SCNC: 138 MMOL/L (ref 133–144)
TROPONIN I SERPL-MCNC: <0.015 UG/L (ref 0–0.04)
WBC # BLD AUTO: 9.4 10E9/L (ref 4–11)

## 2021-07-09 PROCEDURE — 96361 HYDRATE IV INFUSION ADD-ON: CPT | Performed by: FAMILY MEDICINE

## 2021-07-09 PROCEDURE — 96375 TX/PRO/DX INJ NEW DRUG ADDON: CPT | Performed by: FAMILY MEDICINE

## 2021-07-09 PROCEDURE — C9113 INJ PANTOPRAZOLE SODIUM, VIA: HCPCS | Performed by: FAMILY MEDICINE

## 2021-07-09 PROCEDURE — 99214 OFFICE O/P EST MOD 30 MIN: CPT | Performed by: NURSE PRACTITIONER

## 2021-07-09 PROCEDURE — 96374 THER/PROPH/DIAG INJ IV PUSH: CPT | Performed by: FAMILY MEDICINE

## 2021-07-09 PROCEDURE — 93005 ELECTROCARDIOGRAM TRACING: CPT | Performed by: FAMILY MEDICINE

## 2021-07-09 PROCEDURE — 99285 EMERGENCY DEPT VISIT HI MDM: CPT | Performed by: FAMILY MEDICINE

## 2021-07-09 PROCEDURE — 258N000003 HC RX IP 258 OP 636: Performed by: FAMILY MEDICINE

## 2021-07-09 PROCEDURE — 84484 ASSAY OF TROPONIN QUANT: CPT | Performed by: FAMILY MEDICINE

## 2021-07-09 PROCEDURE — 85025 COMPLETE CBC W/AUTO DIFF WBC: CPT | Performed by: FAMILY MEDICINE

## 2021-07-09 PROCEDURE — 82077 ASSAY SPEC XCP UR&BREATH IA: CPT | Performed by: FAMILY MEDICINE

## 2021-07-09 PROCEDURE — 250N000011 HC RX IP 250 OP 636: Performed by: FAMILY MEDICINE

## 2021-07-09 PROCEDURE — 99285 EMERGENCY DEPT VISIT HI MDM: CPT | Mod: 25 | Performed by: FAMILY MEDICINE

## 2021-07-09 PROCEDURE — 71046 X-RAY EXAM CHEST 2 VIEWS: CPT

## 2021-07-09 PROCEDURE — 80053 COMPREHEN METABOLIC PANEL: CPT | Performed by: FAMILY MEDICINE

## 2021-07-09 PROCEDURE — 83690 ASSAY OF LIPASE: CPT | Performed by: FAMILY MEDICINE

## 2021-07-09 RX ORDER — PANTOPRAZOLE SODIUM 40 MG/1
40 TABLET, DELAYED RELEASE ORAL DAILY
Qty: 30 TABLET | Refills: 0 | Status: SHIPPED | OUTPATIENT
Start: 2021-07-09 | End: 2021-07-27

## 2021-07-09 RX ORDER — ONDANSETRON 2 MG/ML
4 INJECTION INTRAMUSCULAR; INTRAVENOUS ONCE
Status: COMPLETED | OUTPATIENT
Start: 2021-07-09 | End: 2021-07-09

## 2021-07-09 RX ADMIN — PANTOPRAZOLE SODIUM 40 MG: 40 INJECTION, POWDER, FOR SOLUTION INTRAVENOUS at 21:00

## 2021-07-09 RX ADMIN — ONDANSETRON 4 MG: 2 INJECTION INTRAMUSCULAR; INTRAVENOUS at 20:13

## 2021-07-09 RX ADMIN — SODIUM CHLORIDE 500 ML: 9 INJECTION, SOLUTION INTRAVENOUS at 20:13

## 2021-07-09 NOTE — TELEPHONE ENCOUNTER
Reason for call:  Patient reporting a symptom    Symptom or request: abdominal pain    Duration (how long have symptoms been present): one week    Have you been treated for this before? Yes    Additional comments: patient called to see if he can be seen today, please call to discuss    Phone Number patient can be reached at:  Home number on file [phone]     Best Time:  ASAP    Can we leave a detailed message on this number:  NO    Call taken on 7/9/2021 at 3:49 PM by CRISPIN BARAJAS

## 2021-07-09 NOTE — TELEPHONE ENCOUNTER
Spoke with patient and he has been having some upper abdominal pain all week.  Patient thought it was getting better but now it has returned again.  Patient asking if he could be seen today.  No appointments available and clinic is closing in less than an hour.  Patient has had ulcers in the past.  Patient denied any blood in stools.  Patient was having some constipation a while back but no problems recently with BM's.  Advised patient to go to urgent care to be evaluated.  Patient stated he will go to [facility] urgent care

## 2021-07-09 NOTE — PATIENT INSTRUCTIONS
Go to emergency room now for further evaluation of upper abdominal pain, hypertensive urgency, nausea not relieved by zofran and GI cocktail

## 2021-07-09 NOTE — TELEPHONE ENCOUNTER
Requested Prescriptions   Pending Prescriptions Disp Refills     LORazepam (ATIVAN) 0.5 MG tablet [Pharmacy Med Name: LORazepam 0.5 MG Oral Tablet] 25 tablet 0     Sig: TAKE 1 TABLET BY MOUTH EVERY 8 HOURS AS NEEDED FOR ANXIETY       There is no refill protocol information for this order        Routing refill request to provider for review/approval because:  Drug not on the [facility] refill protocol

## 2021-07-09 NOTE — PROGRESS NOTES
Assessment & Plan     Abdominal pain, epigastric    - lidocaine (XYLOCAINE) 2 % 15 mL, alum & mag hydroxide-simethicone (MAALOX) 15 mL GI Cocktail    Hypertensive urgency    Nausea       Upper abdominal pain still present after GI cocktail. Recommended further evaluation now in emergency room for upper abdominal pain and nausea with hypertensive urgency and patient is agreeable, as may need stat labs. He declines ambulance and is discharged in stable condition.       Mayela Powell NP  [facility]    Daniella Oliveira is a 70 year old male who presents to clinic today for the following health issues:  Chief Complaint   Patient presents with     Abdominal Pain     upper abdominal pain for the past week . rating 7 on pain scale     Abdominal Pain    Location: upper abdomen  Radiation: None.    Pain character: burning  Severity: 7 on a scale of 1-10.    Duration: 1 week(s)   Course of Illness: stable.  Exacerbated by: nothing  Relieved by: nothing.  Associated Symptoms: nausea   He has a history of gastric ulcers, atrial fibrillation, GERD, HTN. Denies any blood in stools. He takes prilosec daily. He took zofran today which didn't help. He was hospitalized in February for strep and meningitis. Since he was hospitalized his prilosec hasn't helped his reflux. Denies emesis, fever, chills, swelling of legs, chest palpations, dysuria, chest pain. He does get some shortness of breath occasionally. No history of stroke, heart attack. He does have a history of atrial fibrillation and is anticoagulated. He has a history of low back pain.     Problem list, Medication list, Allergies, and Medical history reviewed in EPIC.    ROS:  Review of systems negative except for noted above        Objective    BP (!) 170/110   Pulse 92   Temp 98.1  F (36.7  C) (Tympanic)   SpO2 99%   Physical Exam  Constitutional:       General: He is not in acute distress.     Appearance: He is not toxic-appearing or  diaphoretic.   HENT:      Head: Normocephalic and atraumatic.   Cardiovascular:      Rate and Rhythm: Normal rate and regular rhythm.      Heart sounds: Normal heart sounds.   Pulmonary:      Effort: Pulmonary effort is normal. No respiratory distress.      Breath sounds: Normal breath sounds. No wheezing, rhonchi or rales.   Abdominal:      General: Abdomen is protuberant. Bowel sounds are normal. There is no distension.      Palpations: Abdomen is soft.      Tenderness: There is right CVA tenderness and left CVA tenderness. There is no guarding or rebound.      Comments: Pain with palpation upper abdomen, worse LUQ   Musculoskeletal:      Right lower leg: No edema.      Left lower leg: No edema.   Lymphadenopathy:      Cervical: No cervical adenopathy.   Neurological:      Mental Status: He is alert.

## 2021-07-10 RX ORDER — LORAZEPAM 0.5 MG/1
TABLET ORAL
Qty: 25 TABLET | Refills: 0 | Status: SHIPPED | OUTPATIENT
Start: 2021-07-10 | End: 2021-07-27

## 2021-07-10 NOTE — ED TRIAGE NOTES
Sent from [facility] for further eval of upper abdomen pain, given GI cocktail with some relief, BP was also noted to be elevated

## 2021-07-10 NOTE — ED NOTES
Pt presents to the ED with complaints of upper abdominal pain for 1 week. Pt has had pain sorta like this before, it was an ulcer many years ago. Pt notes the pain was initially associated with constipation, that has resolved, but the pain has not. Pain is worse when sitting up and with no movement. Pt has been nauseated and has not vomited. Pt tried metamucil and zofran with some relief. Last BM, this am, somewhat normal. No change in bladder. denies blood in urine or stool.

## 2021-07-10 NOTE — ED PROVIDER NOTES
History     Chief Complaint   Patient presents with     Abdominal Pain     upper abdomen     Hypertension     170/110 in clinic     HPI  Tee Hilton is a 70 year old male, past medical history is significant for bilateral low back pain, hyponatremia, hypertension, chronic atrial fibrillation, myalgias, hypertension, morbid obesity, GERD, sleep apnea, alcohol use, chronic systolic heart failure, erectile dysfunction, presents to the emergency department with concerns of abdominal pain and hypertension.  The patient went to [facility] previously and was referred to the emergency department after treating with a GI cocktail and expressing concern about his pain and blood pressure of 170/110 concerns for hypertensive urgency.  The patient states he feels much calmer now and his blood pressures come down accordingly.  He has less pain after the GI cocktail.  He states the epigastric area abdominal pain is kind of cramping in nature and is very reminiscent of when he had an ulcer in his stomach some 35 years ago.  Remittent discomfort but pretty much constant over the week, not associated with exertion, gets better actually with eating and drinking a bit.  He has been on Prilosec OTC for many many years.  He reports that the GI cocktail helped, actually more so after he followed it down with some water when he got home.      Allergies:  Allergies   Allergen Reactions     Erythromycin GI Disturbance     Upset stomach       Problem List:    Patient Active Problem List    Diagnosis Date Noted     Sensorineural hearing loss (SNHL) of both ears 04/30/2021     Priority: Medium     Tinnitus, bilateral 04/30/2021     Priority: Medium     Bilateral low back pain with right-sided sciatica 04/12/2021     Priority: Medium     AMINA (acute kidney injury) (H) 01/30/2021     Priority: Medium     Fever and chills 01/30/2021     Priority: Medium     Leukocytosis 01/30/2021     Priority: Medium     Hyponatremia 01/30/2021      Priority: Medium     Dehydration 01/30/2021     Priority: Medium     Essential hypertension 01/30/2021     Priority: Medium     Chronic atrial fibrillation (H) 01/30/2021     Priority: Medium     Myalgia 01/30/2021     Priority: Medium     Acute kidney injury (H) 01/30/2021     Priority: Medium     Acute intractable headache, unspecified headache type 01/30/2021     Priority: Medium     Morbid obesity (H) 08/26/2020     Priority: Medium     Atrial fibrillation (H) 04/07/2020     Priority: Medium     Gastroesophageal reflux disease 04/07/2020     Priority: Medium     Other sleep apnea 09/24/2018     Priority: Medium     Study Date: 9/22/2018- (241.0 lbs) Polysomnogram revealed 41 obstructive, 14 central, and 38 mixed apneas resulting in an apnea index of 44.5 events per hour. There were 7 obstructive hypopneas and 0 central hypopneas resulting in an obstructive hypopnea index of 3.3 and central hypopnea index of 0 events per hour. The combined apnea/hypopnea index was 47.8 events per hour (central apnea/hypopnea index was 6.7 events per hour).  REM AHI was 0. The supine AHI was 69.0 events per hour.  RDI was 54.0 events per hour. Respiratory rate and pattern was notable for improvement of events during REM sleep,  periods where periodic breathing is suspected and circulation times that are prolonged. Most of the events however appear to be terminated by a gasp arousal.  Lowest oxygen saturation was 83.9%. Time spent less than or equal to 88% was 0.4 minutes. Time spent less than or equal to 89% was 0.9 minutes. No optimal pressure was determined due to poor sleep and poor tolerance of low-level PAP and bilevel PAP. Severe sleep disordered breathing, many findings suggested of periodic breathing though given presence of obstruction and poorly consolidated sleep this study was not definitive       Gastroesophageal reflux disease without esophagitis      Priority: Medium     Persistent atrial fibrillation (H) 09/18/2018      Priority: Medium     Non morbid obesity due to excess calories 09/18/2018     Priority: Medium     Chronic systolic congestive heart failure (H) 09/18/2018     Priority: Medium     Alcohol use 09/18/2018     Priority: Medium     Chronic midline low back pain without sciatica 10/31/2017     Priority: Medium     Other male erectile dysfunction 11/11/2016     Priority: Medium        Past Medical History:    Past Medical History:   Diagnosis Date     Gastroesophageal reflux disease      Hypertension      Irregular heart beat      Sleep apnea        Past Surgical History:    Past Surgical History:   Procedure Laterality Date     ANESTHESIA CARDIOVERSION N/A 9/24/2018    Procedure: ANESTHESIA CARDIOVERSION;  Cardioversion ;  Surgeon: GENERIC ANESTHESIA PROVIDER;  Location: [facility] OR     COLONOSCOPY N/A 5/25/2017    Procedure: COMBINED COLONOSCOPY, SINGLE OR MULTIPLE BIOPSY/POLYPECTOMY BY BIOPSY;;  Surgeon: Aquilino Garcia MD;  Location: [facility] OR     COLONOSCOPY WITH CO2 INSUFFLATION N/A 5/25/2017    Procedure: COLONOSCOPY WITH CO2 INSUFFLATION;  COLONOSCOPY SCREEN/ ORDAZ;  Surgeon: Aquilino Garcia MD;  Location: [facility] OR     OPEN REDUCTION INTERNAL FIXATION TIBIA       SEPTOPLASTY, TURBINOPLASTY, COMBINED Bilateral 7/9/2019    Procedure: ENDOSCOPIC SEPTOPLASTY, BILATERAL TURBINATE REDUCTION;  Surgeon: Alexander Avila MD;  Location: [facility] OR     TONSILLECTOMY         Family History:    Family History   Problem Relation Age of Onset     Gout Father      Lung Cancer Father        Social History:  Marital Status:   [2]  Social History     Tobacco Use     Smoking status: Never Smoker     Smokeless tobacco: Never Used     Tobacco comment: never smoked   Substance Use Topics     Alcohol use: Yes     Alcohol/week: 0.0 standard drinks     Comment: rare     Drug use: No        Medications:    pantoprazole (PROTONIX) 40 MG EC tablet  allopurinol (ZYLOPRIM) 100 MG tablet  amLODIPine (NORVASC) 5 MG  tablet  Famotidine (PEPCID PO)  lisinopril (ZESTRIL) 20 MG tablet  LORazepam (ATIVAN) 0.5 MG tablet  LORazepam (ATIVAN) 0.5 MG tablet  metoprolol succinate ER (TOPROL-XL) 100 MG 24 hr tablet  omeprazole (PRILOSEC) 20 MG CR capsule  ondansetron (ZOFRAN) 4 MG tablet  sildenafil (REVATIO) 20 MG tablet  XARELTO ANTICOAGULANT 20 MG TABS tablet  zolpidem ER (AMBIEN CR) 12.5 MG CR tablet          Review of Systems   All other systems reviewed and are negative.      Physical Exam   BP: (!) 152/98  Pulse: 83  Temp: 98  F (36.7  C)  Resp: 15  Weight: 104.3 kg (230 lb)  SpO2: 98 %      Physical Exam  Vitals signs and nursing note reviewed.   Constitutional:       General: He is not in acute distress.     Appearance: He is well-developed. He is obese. He is not ill-appearing.   HENT:      Head: Normocephalic and atraumatic.      Mouth/Throat:      Mouth: Mucous membranes are moist.      Pharynx: Oropharynx is clear.   Eyes:      Extraocular Movements: Extraocular movements intact.      Pupils: Pupils are equal, round, and reactive to light.   Cardiovascular:      Rate and Rhythm: Normal rate and regular rhythm.      Heart sounds: Normal heart sounds.   Pulmonary:      Effort: Pulmonary effort is normal.      Breath sounds: Normal breath sounds.   Abdominal:      Comments: Soft, bowel sounds present, minimal tenderness with epigastric area palpation without rebound or guarding and no CVA tenderness.   Skin:     General: Skin is warm and dry.      Capillary Refill: Capillary refill takes less than 2 seconds.   Neurological:      General: No focal deficit present.      Mental Status: He is alert.         ED Course        Procedures               EKG Interpretation:      Interpreted by Gagan Barker MD  Time reviewed:       Critical Care time:  none               Results for orders placed or performed during the hospital encounter of 07/09/21 (from the past 24 hour(s))   CBC with platelets differential   Result Value Ref  Range    WBC 9.4 4.0 - 11.0 10e9/L    RBC Count 4.55 4.4 - 5.9 10e12/L    Hemoglobin 13.8 13.3 - 17.7 g/dL    Hematocrit 41.5 40.0 - 53.0 %    MCV 91 78 - 100 fl    MCH 30.3 26.5 - 33.0 pg    MCHC 33.3 31.5 - 36.5 g/dL    RDW 12.7 10.0 - 15.0 %    Platelet Count 332 150 - 450 10e9/L    Diff Method Automated Method     % Neutrophils 71.9 %    % Lymphocytes 16.4 %    % Monocytes 10.6 %    % Eosinophils 0.7 %    % Basophils 0.2 %    % Immature Granulocytes 0.2 %    Nucleated RBCs 0 0 /100    Absolute Neutrophil 6.7 1.6 - 8.3 10e9/L    Absolute Lymphocytes 1.5 0.8 - 5.3 10e9/L    Absolute Monocytes 1.0 0.0 - 1.3 10e9/L    Absolute Eosinophils 0.1 0.0 - 0.7 10e9/L    Absolute Basophils 0.0 0.0 - 0.2 10e9/L    Abs Immature Granulocytes 0.0 0 - 0.4 10e9/L    Absolute Nucleated RBC 0.0    Comprehensive metabolic panel   Result Value Ref Range    Sodium 138 133 - 144 mmol/L    Potassium 4.2 3.4 - 5.3 mmol/L    Chloride 106 94 - 109 mmol/L    Carbon Dioxide 26 20 - 32 mmol/L    Anion Gap 6 3 - 14 mmol/L    Glucose 94 70 - 99 mg/dL    Urea Nitrogen 27 7 - 30 mg/dL    Creatinine 1.61 (H) 0.66 - 1.25 mg/dL    GFR Estimate 43 (L) >60 mL/min/[1.73_m2]    GFR Estimate If Black 50 (L) >60 mL/min/[1.73_m2]    Calcium 9.4 8.5 - 10.1 mg/dL    Bilirubin Total 1.0 0.2 - 1.3 mg/dL    Albumin 4.0 3.4 - 5.0 g/dL    Protein Total 7.0 6.8 - 8.8 g/dL    Alkaline Phosphatase 58 40 - 150 U/L    ALT 21 0 - 70 U/L    AST 12 0 - 45 U/L   Lipase   Result Value Ref Range    Lipase 79 73 - 393 U/L   Troponin I   Result Value Ref Range    Troponin I ES <0.015 0.000 - 0.045 ug/L   Alcohol ethyl   Result Value Ref Range    Ethanol g/dL <0.01 <0.01 g/dL   XR Chest 2 Views    Narrative    XR CHEST 2 VW 7/9/2021 9:39 PM    HISTORY: Epigastric pain, hypertension    COMPARISON: 2/3/2021      Impression    IMPRESSION: No focal infiltrate, pleural effusion or pneumothorax.  Normal heart size. Atherosclerotic aortic calcifications.    MAIDA ROSADO MD          SYSTEM ID:  [id number]     10:47 PM  Intermittent heartburn type symptoms.  His blood pressure has remained in a reasonable range since being here and has not required intervention.  We discussed ongoing blood pressure medication and a recheck in clinic in 2 weeks with respect to his blood pressure.  With respect to his abdominal discomfort described I suspect that this is related to either gastritis/GERD/peptic ulcer disease.  Low suspicion for alternate more serious pathology at this point.  Lab diagnostics are reassuring and the patient is hemodynamically stable.  I like him to discontinue his Prilosec OTC and put him on Protonix 40 mg p.o. daily 2-week trial, if not improving then to follow-up in clinic with primary care provider and discuss EGD.  If worse changes or new or concerning symptoms to return to the emergency department.        Medications   ondansetron (ZOFRAN) injection 4 mg (4 mg Intravenous Given 7/9/21 2013)   0.9% sodium chloride BOLUS (0 mLs Intravenous Stopped 7/9/21 2102)   pantoprazole (PROTONIX) IV push injection 40 mg (40 mg Intravenous Given 7/9/21 2100)       Assessments & Plan (with Medical Decision Making)   Assessments and plan with medical decision making at the time stamp above.    Disclaimer: This note consists of symbols derived from keyboarding, dictation and/or voice recognition software. As a result, there may be errors in the script that have gone undetected. Please consider this when interpreting information found in this chart.      I have reviewed the nursing notes.    I have reviewed the findings, diagnosis, plan and need for follow up with the patient.       New Prescriptions    PANTOPRAZOLE (PROTONIX) 40 MG EC TABLET    Take 1 tablet (40 mg) by mouth daily for 30 doses       Final diagnoses:   Gastroesophageal reflux disease with esophagitis without hemorrhage   Essential hypertension       7/9/2021   [facility] EMERGENCY DEPT     Gagan Barker,  MD  07/09/21 [id number]

## 2021-07-10 NOTE — DISCHARGE INSTRUCTIONS
Discontinue Prilosec OTC.  Start Protonix 40 mg p.o. daily.  2-week trial, if not improving significantly please follow-up in clinic with your primary care provider to discuss EGD.  Return to the emergency department if worse or changes.  Please continue your current antihypertensives and recheck in a couple of weeks with your primary care provider with respect to blood pressure.

## 2021-07-11 DIAGNOSIS — R11.0 NAUSEA: ICD-10-CM

## 2021-07-13 RX ORDER — ONDANSETRON 4 MG/1
TABLET, FILM COATED ORAL
Qty: 30 TABLET | Refills: 0 | Status: SHIPPED | OUTPATIENT
Start: 2021-07-13 | End: 2021-11-02

## 2021-07-15 ENCOUNTER — VIRTUAL VISIT (OUTPATIENT)
Dept: FAMILY MEDICINE | Facility: CLINIC | Age: 71
End: 2021-07-15
Payer: COMMERCIAL

## 2021-07-15 DIAGNOSIS — R10.13 ABDOMINAL PAIN, EPIGASTRIC: Primary | ICD-10-CM

## 2021-07-15 PROCEDURE — 99213 OFFICE O/P EST LOW 20 MIN: CPT | Mod: 95 | Performed by: PHYSICIAN ASSISTANT

## 2021-07-15 RX ORDER — FAMOTIDINE 20 MG/1
20 TABLET, FILM COATED ORAL
Qty: 60 TABLET | Refills: 1 | Status: SHIPPED | OUTPATIENT
Start: 2021-07-15

## 2021-07-15 RX ORDER — SUCRALFATE 1 G/1
1 TABLET ORAL 4 TIMES DAILY
Qty: 120 TABLET | Refills: 0 | Status: SHIPPED | OUTPATIENT
Start: 2021-07-15 | End: 2021-08-14

## 2021-07-15 NOTE — PROGRESS NOTES
Tee is a 70 year old who is being evaluated via a billable telephone visit.      What phone number would you like to be contacted at? [phone]  How would you like to obtain your AVS? Miranda Brewer   Tee is a 70 year old who presents for the following health issues    HPI     ED/UC Followup:    Facility:  [facility] and sent to [facility]   Date of visit: 7/9/21  Reason for visit: High blood pressure and ABD pain   Current Status: Pt reports ABD pain has improved but still occurring. Pt is checking BP at home and states BP is ok in the morning but, when ABD pain occures BP elevates.       No diarrhea. Some occasional constipation.  JORDY abd pain.   Symptoms evolve later in the day. Eating food helps.   No blood in stools.     Review of Systems   Constitutional, HEENT, cardiovascular, pulmonary, GI, , musculoskeletal, neuro, skin, endocrine and psych systems are negative, except as otherwise noted.      Objective           Vitals:  No vitals were obtained today due to virtual visit.    Physical Exam   healthy, alert and no distress  PSYCH: Alert and oriented times 3; coherent speech, normal   rate and volume, able to articulate logical thoughts, able   to abstract reason, no tangential thoughts, no hallucinations   or delusions  His affect is normal  RESP: No cough, no audible wheezing, able to talk in full sentences  Remainder of exam unable to be completed due to telephone visits    Tee was seen today for hospital f/u.    Diagnoses and all orders for this visit:    Abdominal pain, epigastric  -     Helicobacter pylori Antigen Stool; Future  -     famotidine (PEPCID) 20 MG tablet; Take 1 tablet (20 mg) by mouth nightly as needed  -     sucralfate (CARAFATE) 1 GM tablet; Take 1 tablet (1 g) by mouth 4 times daily  -     Adult Gastro Ref - Procedure Only; Future      Continue protonix .    Advised supportive and symptomatic treatment.  Follow up with Provider - if condition persists or worsens.            Phone call duration: 23 minutes

## 2021-07-16 ENCOUNTER — LAB (OUTPATIENT)
Dept: LAB | Facility: CLINIC | Age: 71
End: 2021-07-16
Payer: COMMERCIAL

## 2021-07-16 DIAGNOSIS — R10.13 ABDOMINAL PAIN, EPIGASTRIC: ICD-10-CM

## 2021-07-16 DIAGNOSIS — F51.01 PRIMARY INSOMNIA: ICD-10-CM

## 2021-07-16 PROCEDURE — 87338 HPYLORI STOOL AG IA: CPT

## 2021-07-16 NOTE — TELEPHONE ENCOUNTER
Routing refill request to provider for review/approval because:  Drug not on the [facility] refill protocol.  Last Written Prescription Date:  6/21/21  Last Fill Quantity: 30,  # refills: 0   Last office visit: 4/6/2021 and virtual 7/15/21 with prescribing provider:   Future Office Visit:

## 2021-07-17 RX ORDER — ZOLPIDEM TARTRATE 12.5 MG/1
TABLET, FILM COATED, EXTENDED RELEASE ORAL
Qty: 30 TABLET | Refills: 0 | Status: SHIPPED | OUTPATIENT
Start: 2021-07-17 | End: 2021-08-15

## 2021-07-19 LAB — H PYLORI AG STL QL IA: NEGATIVE

## 2021-07-20 ENCOUNTER — ANESTHESIA EVENT (OUTPATIENT)
Dept: GASTROENTEROLOGY | Facility: CLINIC | Age: 71
End: 2021-07-20
Payer: COMMERCIAL

## 2021-07-20 ENCOUNTER — LAB (OUTPATIENT)
Dept: LAB | Facility: CLINIC | Age: 71
End: 2021-07-20
Payer: COMMERCIAL

## 2021-07-20 DIAGNOSIS — Z11.59 ENCOUNTER FOR SCREENING FOR OTHER VIRAL DISEASES: ICD-10-CM

## 2021-07-20 PROCEDURE — U0005 INFEC AGEN DETEC AMPLI PROBE: HCPCS

## 2021-07-20 PROCEDURE — U0003 INFECTIOUS AGENT DETECTION BY NUCLEIC ACID (DNA OR RNA); SEVERE ACUTE RESPIRATORY SYNDROME CORONAVIRUS 2 (SARS-COV-2) (CORONAVIRUS DISEASE [COVID-19]), AMPLIFIED PROBE TECHNIQUE, MAKING USE OF HIGH THROUGHPUT TECHNOLOGIES AS DESCRIBED BY CMS-2020-01-R: HCPCS

## 2021-07-20 ASSESSMENT — ENCOUNTER SYMPTOMS: DYSRHYTHMIAS: 1

## 2021-07-20 NOTE — ANESTHESIA PREPROCEDURE EVALUATION
Anesthesia Pre-Procedure Evaluation    Patient: Tee Hilton   MRN: [id number] : 1950        Preoperative Diagnosis: Abdominal pain, epigastric [R10.13]   Procedure : Procedure(s):  ESOPHAGOGASTRODUODENOSCOPY (EGD)     Past Medical History:   Diagnosis Date     Gastroesophageal reflux disease      Hypertension      Irregular heart beat      Sleep apnea       Past Surgical History:   Procedure Laterality Date     ANESTHESIA CARDIOVERSION N/A 2018    Procedure: ANESTHESIA CARDIOVERSION;  Cardioversion ;  Surgeon: GENERIC ANESTHESIA PROVIDER;  Location: [facility] OR     COLONOSCOPY N/A 2017    Procedure: COMBINED COLONOSCOPY, SINGLE OR MULTIPLE BIOPSY/POLYPECTOMY BY BIOPSY;;  Surgeon: Aquilino Garcia MD;  Location: [facility] OR     COLONOSCOPY WITH CO2 INSUFFLATION N/A 2017    Procedure: COLONOSCOPY WITH CO2 INSUFFLATION;  COLONOSCOPY SCREEN/ ORDAZ;  Surgeon: Aquilino Garcia MD;  Location: [facility] OR     OPEN REDUCTION INTERNAL FIXATION TIBIA       SEPTOPLASTY, TURBINOPLASTY, COMBINED Bilateral 2019    Procedure: ENDOSCOPIC SEPTOPLASTY, BILATERAL TURBINATE REDUCTION;  Surgeon: Alexander Avila MD;  Location: [facility] OR     TONSILLECTOMY        Allergies   Allergen Reactions     Erythromycin GI Disturbance     Upset stomach      Social History     Tobacco Use     Smoking status: Never Smoker     Smokeless tobacco: Never Used     Tobacco comment: never smoked   Substance Use Topics     Alcohol use: Yes     Alcohol/week: 0.0 standard drinks     Comment: rare      Wt Readings from Last 1 Encounters:   21 104.3 kg (230 lb)        Anesthesia Evaluation   Pt has had prior anesthetic. Type: MAC and General.        ROS/MED HX  ENT/Pulmonary: Comment: [place]    (+) sleep apnea, doesn't use CPAP,     Neurologic:  - neg neurologic ROS     Cardiovascular:     (+) hypertension-----Taking blood thinners Instructions Given to patient: Stopped . CHF etiology: systolic dysrhythmias, a-fib, Irregular  Heartbeat/Palpitations, Previous cardiac testing   Echo: Date:  Results:  Interpretation Summary     Borderline (EF 50-55%) reduced left ventricular function is present.  Moderate right ventricular dilation is present. Global right ventricular  function is normal.  Mild dilatation of the aorta is present. Ascending aorta 4.0 cm.  IVC diameter and respiratory changes fall into an intermediate range  suggesting an RA pressure of 8 mmHg.  No pericardial effusion is present.     This study was compared with the study from 3/15/2019. Ascending aorta  dimensions are stable. LV systolic function is minimally improved.  Stress Test: Date: Results:    ECG Reviewed: Date: 7-2021 Results:  Atrial fibrillation   ABNORMAL RHYTHM    Cath: Date: Results:      METS/Exercise Tolerance:     Hematologic:       Musculoskeletal:       GI/Hepatic: Comment: epigastric pain    (+) GERD,     Renal/Genitourinary:     (+) renal disease, type: CRI,     Endo:     (+) Obesity,     Psychiatric/Substance Use:     (+) alcohol abuse     Infectious Disease:       Malignancy:  - neg malignancy ROS     Other:  - neg other ROS    (+) , H/O Chronic Pain,        Physical Exam    Airway        Mallampati: II   TM distance: > 3 FB   Neck ROM: full   Mouth opening: > 3 cm    Respiratory Devices and Support         Dental  no notable dental history         Cardiovascular          Rhythm and rate: irregular     Pulmonary   pulmonary exam normal                OUTSIDE LABS:  CBC:   Lab Results   Component Value Date    WBC 9.4 07/09/2021    WBC 11.9 (H) 02/23/2021    HGB 13.8 07/09/2021    HGB 12.9 (L) 02/23/2021    HCT 41.5 07/09/2021    HCT 37.5 (L) 02/23/2021     07/09/2021     02/23/2021     BMP:   Lab Results   Component Value Date     07/09/2021     02/23/2021    POTASSIUM 4.2 07/09/2021    POTASSIUM 4.1 02/23/2021    CHLORIDE 106 07/09/2021    CHLORIDE 101 02/23/2021    CO2 26 07/09/2021    CO2 26 02/23/2021    BUN 27  07/09/2021    BUN 24 02/23/2021    CR 1.61 (H) 07/09/2021    CR 1.70 (H) 02/23/2021    GLC 94 07/09/2021     (H) 02/23/2021     COAGS:   Lab Results   Component Value Date    PTT 26 06/04/2007    INR 1.02 06/04/2007     POC:   Lab Results   Component Value Date     (H) 01/30/2021     HEPATIC:   Lab Results   Component Value Date    ALBUMIN 4.0 07/09/2021    PROTTOTAL 7.0 07/09/2021    ALT 21 07/09/2021    AST 12 07/09/2021    ALKPHOS 58 07/09/2021    BILITOTAL 1.0 07/09/2021     OTHER:   Lab Results   Component Value Date    LACT 1.1 01/30/2021    GRISELDA 9.4 07/09/2021    MAG 2.1 09/24/2018    LIPASE 79 07/09/2021    TSH 2.41 08/23/2018    CRP 4.4 02/23/2021       Anesthesia Plan    ASA Status:  3   NPO Status:  NPO Appropriate    Anesthesia Type: General.     - Airway: Native airway   Induction: Intravenous, Propofol.   Maintenance: TIVA.        Consents    Anesthesia Plan(s) and associated risks, benefits, and realistic alternatives discussed. Questions answered and patient/representative(s) expressed understanding.     - Discussed with:  Patient         Postoperative Care            Comments:                ANDRE Alvarez CRNA

## 2021-07-21 LAB — SARS-COV-2 RNA RESP QL NAA+PROBE: NEGATIVE

## 2021-07-23 ENCOUNTER — ANESTHESIA (OUTPATIENT)
Dept: GASTROENTEROLOGY | Facility: CLINIC | Age: 71
End: 2021-07-23
Payer: COMMERCIAL

## 2021-07-23 ENCOUNTER — HOSPITAL ENCOUNTER (OUTPATIENT)
Facility: CLINIC | Age: 71
Discharge: HOME | End: 2021-07-23
Attending: SURGERY | Admitting: SURGERY
Payer: COMMERCIAL

## 2021-07-23 VITALS
HEART RATE: 61 BPM | OXYGEN SATURATION: 99 % | BODY MASS INDEX: 31.21 KG/M2 | RESPIRATION RATE: 16 BRPM | HEIGHT: 70 IN | SYSTOLIC BLOOD PRESSURE: 99 MMHG | DIASTOLIC BLOOD PRESSURE: 84 MMHG | WEIGHT: 218 LBS | TEMPERATURE: 98 F

## 2021-07-23 LAB — UPPER GI ENDOSCOPY: NORMAL

## 2021-07-23 PROCEDURE — 250N000011 HC RX IP 250 OP 636: Performed by: NURSE ANESTHETIST, CERTIFIED REGISTERED

## 2021-07-23 PROCEDURE — 370N000017 HC ANESTHESIA TECHNICAL FEE, PER MIN: Performed by: SURGERY

## 2021-07-23 PROCEDURE — 258N000003 HC RX IP 258 OP 636: Performed by: NURSE ANESTHETIST, CERTIFIED REGISTERED

## 2021-07-23 PROCEDURE — 250N000009 HC RX 250: Performed by: NURSE ANESTHETIST, CERTIFIED REGISTERED

## 2021-07-23 PROCEDURE — 88305 TISSUE EXAM BY PATHOLOGIST: CPT | Mod: TC | Performed by: SURGERY

## 2021-07-23 PROCEDURE — 250N000009 HC RX 250: Performed by: SURGERY

## 2021-07-23 PROCEDURE — 43239 EGD BIOPSY SINGLE/MULTIPLE: CPT | Performed by: SURGERY

## 2021-07-23 PROCEDURE — 45380 COLONOSCOPY AND BIOPSY: CPT | Performed by: SURGERY

## 2021-07-23 RX ORDER — NALOXONE HYDROCHLORIDE 0.4 MG/ML
0.4 INJECTION, SOLUTION INTRAMUSCULAR; INTRAVENOUS; SUBCUTANEOUS
Status: DISCONTINUED | OUTPATIENT
Start: 2021-07-23 | End: 2021-07-23 | Stop reason: HOSPADM

## 2021-07-23 RX ORDER — ONDANSETRON 2 MG/ML
4 INJECTION INTRAMUSCULAR; INTRAVENOUS
Status: DISCONTINUED | OUTPATIENT
Start: 2021-07-23 | End: 2021-07-23 | Stop reason: HOSPADM

## 2021-07-23 RX ORDER — LIDOCAINE HYDROCHLORIDE 10 MG/ML
INJECTION, SOLUTION EPIDURAL; INFILTRATION; INTRACAUDAL; PERINEURAL PRN
Status: DISCONTINUED | OUTPATIENT
Start: 2021-07-23 | End: 2021-07-23

## 2021-07-23 RX ORDER — NALOXONE HYDROCHLORIDE 0.4 MG/ML
0.2 INJECTION, SOLUTION INTRAMUSCULAR; INTRAVENOUS; SUBCUTANEOUS
Status: DISCONTINUED | OUTPATIENT
Start: 2021-07-23 | End: 2021-07-23 | Stop reason: HOSPADM

## 2021-07-23 RX ORDER — PROPOFOL 10 MG/ML
INJECTION, EMULSION INTRAVENOUS CONTINUOUS PRN
Status: DISCONTINUED | OUTPATIENT
Start: 2021-07-23 | End: 2021-07-23

## 2021-07-23 RX ORDER — PROPOFOL 10 MG/ML
INJECTION, EMULSION INTRAVENOUS PRN
Status: DISCONTINUED | OUTPATIENT
Start: 2021-07-23 | End: 2021-07-23

## 2021-07-23 RX ORDER — SODIUM CHLORIDE, SODIUM LACTATE, POTASSIUM CHLORIDE, CALCIUM CHLORIDE 600; 310; 30; 20 MG/100ML; MG/100ML; MG/100ML; MG/100ML
INJECTION, SOLUTION INTRAVENOUS CONTINUOUS PRN
Status: DISCONTINUED | OUTPATIENT
Start: 2021-07-23 | End: 2021-07-23

## 2021-07-23 RX ORDER — FLUMAZENIL 0.1 MG/ML
0.2 INJECTION, SOLUTION INTRAVENOUS
Status: DISCONTINUED | OUTPATIENT
Start: 2021-07-23 | End: 2021-07-23 | Stop reason: HOSPADM

## 2021-07-23 RX ORDER — LIDOCAINE 40 MG/G
CREAM TOPICAL
Status: DISCONTINUED | OUTPATIENT
Start: 2021-07-23 | End: 2021-07-23 | Stop reason: HOSPADM

## 2021-07-23 RX ADMIN — PROPOFOL 100 MG: 10 INJECTION, EMULSION INTRAVENOUS at 11:23

## 2021-07-23 RX ADMIN — PROPOFOL 200 MCG/KG/MIN: 10 INJECTION, EMULSION INTRAVENOUS at 11:23

## 2021-07-23 RX ADMIN — LIDOCAINE HYDROCHLORIDE 1 ML: 10 INJECTION, SOLUTION EPIDURAL; INFILTRATION; INTRACAUDAL; PERINEURAL at 10:33

## 2021-07-23 RX ADMIN — LIDOCAINE HYDROCHLORIDE 100 MG: 10 INJECTION, SOLUTION EPIDURAL; INFILTRATION; INTRACAUDAL; PERINEURAL at 11:23

## 2021-07-23 RX ADMIN — SODIUM CHLORIDE, POTASSIUM CHLORIDE, SODIUM LACTATE AND CALCIUM CHLORIDE: 600; 310; 30; 20 INJECTION, SOLUTION INTRAVENOUS at 11:21

## 2021-07-23 ASSESSMENT — MIFFLIN-ST. JEOR: SCORE: 1765.09

## 2021-07-23 NOTE — ANESTHESIA POSTPROCEDURE EVALUATION
Patient: Tee Hilton    Procedure(s):  COLONOSCOPY, WITH POLYPECTOMY AND BIOPSY    Diagnosis:Abdominal pain, epigastric [R10.13]  Diagnosis Additional Information: No value filed.    Anesthesia Type:  General    Note:  Disposition: Outpatient   Postop Pain Control: Uneventful            Sign Out: Well controlled pain   PONV: No   Neuro/Psych: Uneventful            Sign Out: Acceptable/Baseline neuro status   Airway/Respiratory: Uneventful            Sign Out: Acceptable/Baseline resp. status   CV/Hemodynamics: Uneventful            Sign Out: Acceptable CV status; No obvious hypovolemia; No obvious fluid overload   Other NRE: NONE   DID A NON-ROUTINE EVENT OCCUR?            Last vitals:  Vitals Value Taken Time   BP     Temp     Pulse     Resp     SpO2 99 % 07/23/21 1150   Vitals shown include unvalidated device data.    Electronically Signed By: ANDRE Mayes CRNA  July 23, 2021  11:52 AM

## 2021-07-23 NOTE — ANESTHESIA CARE TRANSFER NOTE
Patient: Tee Hilton    Procedure(s):  COLONOSCOPY, WITH POLYPECTOMY AND BIOPSY    Diagnosis: Abdominal pain, epigastric [R10.13]  Diagnosis Additional Information: No value filed.    Anesthesia Type:   General     Note:    Oropharynx: oropharynx clear of all foreign objects  Level of Consciousness: awake  Oxygen Supplementation: room air    Independent Airway: airway patency satisfactory and stable  Dentition: dentition unchanged  Vital Signs Stable: post-procedure vital signs reviewed and stable  Report to RN Given: handoff report given  Patient transferred to: Phase II    Handoff Report: Identifed the Patient, Identified the Reponsible Provider, Reviewed the pertinent medical history, Discussed the surgical course, Reviewed Intra-OP anesthesia mangement and issues during anesthesia, Set expectations for post-procedure period and Allowed opportunity for questions and acknowledgement of understanding      Vitals:  Vitals Value Taken Time   BP     Temp     Pulse     Resp     SpO2 99 % 07/23/21 1150   Vitals shown include unvalidated device data.    Electronically Signed By: ANDRE Mayes CRNA  July 23, 2021  11:51 AM

## 2021-07-23 NOTE — H&P
70 year old year old male here for upper endoscopy for abdominal pain.  Pain is epigastric.  Associated with foods at times.  He has been taking prilosec, carafate.  He is on Xarelto (off 2 days).  Denies NSAID use.  He has long standing reflux.  Last EGD was 20 years ago.        Patient Active Problem List   Diagnosis     Other male erectile dysfunction     Chronic midline low back pain without sciatica     Persistent atrial fibrillation (H)     Non morbid obesity due to excess calories     Chronic systolic congestive heart failure (H)     Alcohol use     Gastroesophageal reflux disease without esophagitis     Other sleep apnea     Atrial fibrillation (H)     Gastroesophageal reflux disease     Morbid obesity (H)     AMINA (acute kidney injury) (H)     Fever and chills     Leukocytosis     Hyponatremia     Dehydration     Essential hypertension     Chronic atrial fibrillation (H)     Myalgia     Acute kidney injury (H)     Acute intractable headache, unspecified headache type     Bilateral low back pain with right-sided sciatica     Sensorineural hearing loss (SNHL) of both ears     Tinnitus, bilateral       Past Medical History:   Diagnosis Date     Gastroesophageal reflux disease      Hypertension      Irregular heart beat      Sleep apnea        Past Surgical History:   Procedure Laterality Date     ANESTHESIA CARDIOVERSION N/A 9/24/2018    Procedure: ANESTHESIA CARDIOVERSION;  Cardioversion ;  Surgeon: GENERIC ANESTHESIA PROVIDER;  Location: [facility]     COLONOSCOPY N/A 5/25/2017    Procedure: COMBINED COLONOSCOPY, SINGLE OR MULTIPLE BIOPSY/POLYPECTOMY BY BIOPSY;;  Surgeon: Aquilino Garcia MD;  Location: [facility]     COLONOSCOPY WITH CO2 INSUFFLATION N/A 5/25/2017    Procedure: COLONOSCOPY WITH CO2 INSUFFLATION;  COLONOSCOPY SCREEN/ ORDAZ;  Surgeon: Aquilino Garcia MD;  Location: [facility]     OPEN REDUCTION INTERNAL FIXATION TIBIA       SEPTOPLASTY, TURBINOPLASTY, COMBINED Bilateral 7/9/2019    Procedure:  ENDOSCOPIC SEPTOPLASTY, BILATERAL TURBINATE REDUCTION;  Surgeon: Alexander Avila MD;  Location: [facility]     TONSILLECTOMY         Family History   Problem Relation Age of Onset     Gout Father      Lung Cancer Father        No current outpatient medications on file.       Allergies   Allergen Reactions     Erythromycin GI Disturbance     Upset stomach       Pt reports that he has never smoked. He has never used smokeless tobacco. He reports current alcohol use. He reports that he does not use drugs.    Exam:    Awake, Alert OX3  Lungs - CTA bilaterally  CV - RRR, no murmurs, distal pulses intact  Abd - soft, non-distended, non-tender, +BS  Extr - No cyanosis or edema    A/P 70 year old year old male in need of upper endoscopy for abdominal pain. Risks, benefits, alternatives, and complications were discussed including the possibility of perforation and the patient agreed to proceed.    Israel Bey, DO on 7/23/2021 at 10:44 AM

## 2021-07-26 DIAGNOSIS — F41.9 ANXIETY: ICD-10-CM

## 2021-07-27 ENCOUNTER — VIRTUAL VISIT (OUTPATIENT)
Dept: FAMILY MEDICINE | Facility: CLINIC | Age: 71
End: 2021-07-27
Payer: COMMERCIAL

## 2021-07-27 DIAGNOSIS — K22.70 BARRETT'S ESOPHAGUS DETERMINED BY BIOPSY: Primary | ICD-10-CM

## 2021-07-27 DIAGNOSIS — N18.32 STAGE 3B CHRONIC KIDNEY DISEASE (H): ICD-10-CM

## 2021-07-27 DIAGNOSIS — R19.06 ABDOMINAL WALL MASS OF EPIGASTRIC REGION: ICD-10-CM

## 2021-07-27 DIAGNOSIS — R10.13 ABDOMINAL PAIN, EPIGASTRIC: ICD-10-CM

## 2021-07-27 PROBLEM — N18.30 CHRONIC KIDNEY DISEASE, STAGE 3 (H): Status: ACTIVE | Noted: 2021-07-27

## 2021-07-27 LAB
PATH REPORT.COMMENTS IMP SPEC: NORMAL
PATH REPORT.FINAL DX SPEC: NORMAL
PATH REPORT.GROSS SPEC: NORMAL
PATH REPORT.MICROSCOPIC SPEC OTHER STN: NORMAL
PATH REPORT.RELEVANT HX SPEC: NORMAL
PHOTO IMAGE: NORMAL

## 2021-07-27 PROCEDURE — 99214 OFFICE O/P EST MOD 30 MIN: CPT | Mod: 95 | Performed by: PHYSICIAN ASSISTANT

## 2021-07-27 PROCEDURE — 88305 TISSUE EXAM BY PATHOLOGIST: CPT | Mod: 26 | Performed by: PATHOLOGY

## 2021-07-27 RX ORDER — PANTOPRAZOLE SODIUM 40 MG/1
40 TABLET, DELAYED RELEASE ORAL 2 TIMES DAILY
Qty: 180 TABLET | Refills: 1 | Status: SHIPPED | OUTPATIENT
Start: 2021-07-27 | End: 2021-12-14

## 2021-07-27 RX ORDER — LORAZEPAM 0.5 MG/1
TABLET ORAL
Qty: 25 TABLET | Refills: 0 | Status: SHIPPED | OUTPATIENT
Start: 2021-07-27 | End: 2021-08-17

## 2021-07-27 NOTE — PROGRESS NOTES
Tee is a 70 year old who is being evaluated via a billable video visit.      How would you like to obtain your AVS? MyChart  If the video visit is dropped, the invitation should be resent by: Text to cell phone: [phone]  Will anyone else be joining your video visit? No    Video Start Time: 1:29 PM        Subjective   Tee is a 70 year old who presents for the following health issues  HPI     Results  - upper endoscopy 7/23/21    Continues to note abd pain (JORDY).   Some nausea.    No diarrhea.   Questionable lower chest upper abd mass. (I suspect its his xiphoid process).     Review of Systems   Constitutional, HEENT, cardiovascular, pulmonary, GI, , musculoskeletal, neuro, skin, endocrine and psych systems are negative, except as otherwise noted.      Objective           Vitals:  No vitals were obtained today due to virtual visit.    Physical Exam   GENERAL: Healthy, alert and no distress  EYES: Eyes grossly normal to inspection.  No discharge or erythema, or obvious scleral/conjunctival abnormalities.  RESP: No audible wheeze, cough, or visible cyanosis.  No visible retractions or increased work of breathing.    SKIN: Visible skin clear. No significant rash, abnormal pigmentation or lesions.  NEURO: Cranial nerves grossly intact.  Mentation and speech appropriate for age.  PSYCH: Mentation appears normal, affect normal/bright, judgement and insight intact, normal speech and appearance well-groomed.    Tee was seen today for results.    Diagnoses and all orders for this visit:    Dunbar's esophagus determined by biopsy  -     pantoprazole (PROTONIX) 40 MG EC tablet; Take 1 tablet (40 mg) by mouth 2 times daily    Stage 3b chronic kidney disease    Abdominal pain, epigastric  -     US Abdomen Limited; Future  -     Adult Gastro Ref - Consult Only; Future    Abdominal wall mass of epigastric region  -     CT Chest Abdomen w/o Contrast; Future      Continue famotidine at bedtime.      work on lifestyle  modification      Video-Visit Details    Type of service:  Video Visit    Video End Time:1:53 PM    Originating Location (pt. Location): Home    Distant Location (provider location):  [facility]     Platform used for Video Visit: Kyle

## 2021-07-28 ENCOUNTER — ANCILLARY PROCEDURE (OUTPATIENT)
Dept: CT IMAGING | Facility: CLINIC | Age: 71
End: 2021-07-28
Attending: PHYSICIAN ASSISTANT
Payer: COMMERCIAL

## 2021-07-28 ENCOUNTER — MYC MEDICAL ADVICE (OUTPATIENT)
Dept: FAMILY MEDICINE | Facility: CLINIC | Age: 71
End: 2021-07-28

## 2021-07-28 ENCOUNTER — ANCILLARY PROCEDURE (OUTPATIENT)
Dept: ULTRASOUND IMAGING | Facility: CLINIC | Age: 71
End: 2021-07-28
Attending: PHYSICIAN ASSISTANT
Payer: COMMERCIAL

## 2021-07-28 DIAGNOSIS — R19.06 ABDOMINAL WALL MASS OF EPIGASTRIC REGION: ICD-10-CM

## 2021-07-28 DIAGNOSIS — R10.13 ABDOMINAL PAIN, EPIGASTRIC: ICD-10-CM

## 2021-07-28 DIAGNOSIS — K22.70 BARRETT'S ESOPHAGUS DETERMINED BY BIOPSY: Primary | ICD-10-CM

## 2021-07-28 DIAGNOSIS — E27.9 ADRENAL NODULE (H): Primary | ICD-10-CM

## 2021-07-28 DIAGNOSIS — D35.02 ADRENAL ADENOMA, LEFT: ICD-10-CM

## 2021-07-28 PROCEDURE — 74150 CT ABDOMEN W/O CONTRAST: CPT | Mod: TC | Performed by: RADIOLOGY

## 2021-07-28 PROCEDURE — 76705 ECHO EXAM OF ABDOMEN: CPT | Performed by: RADIOLOGY

## 2021-07-28 PROCEDURE — 71250 CT THORAX DX C-: CPT | Mod: TC | Performed by: RADIOLOGY

## 2021-07-29 ENCOUNTER — ANCILLARY PROCEDURE (OUTPATIENT)
Dept: CT IMAGING | Facility: CLINIC | Age: 71
End: 2021-07-29
Attending: PHYSICIAN ASSISTANT
Payer: COMMERCIAL

## 2021-07-29 DIAGNOSIS — E27.9 ADRENAL NODULE (H): ICD-10-CM

## 2021-07-29 DIAGNOSIS — D35.02 ADRENAL ADENOMA, LEFT: ICD-10-CM

## 2021-07-29 PROCEDURE — 74170 CT ABD WO CNTRST FLWD CNTRST: CPT | Mod: TC | Performed by: RADIOLOGY

## 2021-07-29 RX ORDER — IOPAMIDOL 755 MG/ML
100 INJECTION, SOLUTION INTRAVASCULAR ONCE
Status: COMPLETED | OUTPATIENT
Start: 2021-07-29 | End: 2021-07-29

## 2021-07-29 RX ADMIN — IOPAMIDOL 100 ML: 755 INJECTION, SOLUTION INTRAVASCULAR at 11:45

## 2021-08-04 ENCOUNTER — TRANSFERRED RECORDS (OUTPATIENT)
Dept: HEALTH INFORMATION MANAGEMENT | Facility: CLINIC | Age: 71
End: 2021-08-04

## 2021-08-12 DIAGNOSIS — F51.01 PRIMARY INSOMNIA: ICD-10-CM

## 2021-08-15 DIAGNOSIS — F41.9 ANXIETY: ICD-10-CM

## 2021-08-15 RX ORDER — ZOLPIDEM TARTRATE 12.5 MG/1
TABLET, FILM COATED, EXTENDED RELEASE ORAL
Qty: 30 TABLET | Refills: 0 | Status: SHIPPED | OUTPATIENT
Start: 2021-08-15 | End: 2021-08-27

## 2021-08-17 RX ORDER — LORAZEPAM 0.5 MG/1
TABLET ORAL
Qty: 25 TABLET | Refills: 0 | Status: SHIPPED | OUTPATIENT
Start: 2021-08-17 | End: 2021-09-10

## 2021-08-18 DIAGNOSIS — F41.9 ANXIETY: ICD-10-CM

## 2021-08-20 RX ORDER — LORAZEPAM 0.5 MG/1
TABLET ORAL
Qty: 25 TABLET | Refills: 0 | OUTPATIENT
Start: 2021-08-20

## 2021-08-27 ENCOUNTER — MYC MEDICAL ADVICE (OUTPATIENT)
Dept: FAMILY MEDICINE | Facility: CLINIC | Age: 71
End: 2021-08-27

## 2021-08-27 DIAGNOSIS — F51.01 PRIMARY INSOMNIA: ICD-10-CM

## 2021-08-27 RX ORDER — ZOLPIDEM TARTRATE 12.5 MG/1
TABLET, FILM COATED, EXTENDED RELEASE ORAL
Qty: 30 TABLET | Refills: 0 | Status: CANCELLED | OUTPATIENT
Start: 2021-08-27

## 2021-08-27 NOTE — TELEPHONE ENCOUNTER
I called patient, he says it will be at least 3 weeks before [place].  He picked up a one month supply on 8/15.    He thinks sending another 30 day supply with authorization to fill early would be the easiest route.    Tc'd Rx, routed to PCP Jon Denson to address.    Brandee Torres RN  [facility]

## 2021-08-27 NOTE — TELEPHONE ENCOUNTER
Patient was at the cabin and left his medication there and will be unable to get it for a while. Wondering if PCP will send in a new RX for it.     zolpidem ER (AMBIEN CR) 12.5 MG CR tablet    [facility]Gurpreet Ryan to call patient     Anna Talley/  [place]

## 2021-08-28 ENCOUNTER — MYC MEDICAL ADVICE (OUTPATIENT)
Dept: FAMILY MEDICINE | Facility: CLINIC | Age: 71
End: 2021-08-28

## 2021-08-28 RX ORDER — ZOLPIDEM TARTRATE 12.5 MG/1
12.5 TABLET, FILM COATED, EXTENDED RELEASE ORAL
Qty: 30 TABLET | Refills: 0 | Status: SHIPPED | OUTPATIENT
Start: 2021-08-28 | End: 2021-10-05

## 2021-08-30 RX ORDER — ZOLPIDEM TARTRATE 12.5 MG/1
TABLET, FILM COATED, EXTENDED RELEASE ORAL
Qty: 30 TABLET | Refills: 0 | OUTPATIENT
Start: 2021-08-30

## 2021-09-04 DIAGNOSIS — I10 ESSENTIAL HYPERTENSION: ICD-10-CM

## 2021-09-04 DIAGNOSIS — I48.20 CHRONIC ATRIAL FIBRILLATION (H): ICD-10-CM

## 2021-09-06 ENCOUNTER — HOSPITAL ENCOUNTER (EMERGENCY)
Facility: CLINIC | Age: 71
Discharge: HOME | End: 2021-09-06
Attending: EMERGENCY MEDICINE | Admitting: EMERGENCY MEDICINE
Payer: COMMERCIAL

## 2021-09-06 VITALS
OXYGEN SATURATION: 99 % | BODY MASS INDEX: 30.85 KG/M2 | RESPIRATION RATE: 16 BRPM | TEMPERATURE: 97 F | WEIGHT: 215 LBS | DIASTOLIC BLOOD PRESSURE: 101 MMHG | HEART RATE: 65 BPM | SYSTOLIC BLOOD PRESSURE: 154 MMHG

## 2021-09-06 DIAGNOSIS — M25.521 RIGHT ELBOW PAIN: ICD-10-CM

## 2021-09-06 DIAGNOSIS — Z79.01 CHRONIC ANTICOAGULATION: ICD-10-CM

## 2021-09-06 PROCEDURE — 10160 PNXR ASPIR ABSC HMTMA BULLA: CPT | Performed by: EMERGENCY MEDICINE

## 2021-09-06 PROCEDURE — 250N000013 HC RX MED GY IP 250 OP 250 PS 637: Performed by: EMERGENCY MEDICINE

## 2021-09-06 PROCEDURE — 99284 EMERGENCY DEPT VISIT MOD MDM: CPT | Mod: 25 | Performed by: EMERGENCY MEDICINE

## 2021-09-06 PROCEDURE — 99283 EMERGENCY DEPT VISIT LOW MDM: CPT | Mod: 25 | Performed by: EMERGENCY MEDICINE

## 2021-09-06 RX ORDER — ACETAMINOPHEN 500 MG
1000 TABLET ORAL
Status: COMPLETED | OUTPATIENT
Start: 2021-09-06 | End: 2021-09-06

## 2021-09-06 RX ORDER — RIVAROXABAN 20 MG/1
TABLET, FILM COATED ORAL
Qty: 90 TABLET | Refills: 0 | Status: SHIPPED | OUTPATIENT
Start: 2021-09-06 | End: 2021-11-30

## 2021-09-06 RX ORDER — AMLODIPINE BESYLATE 5 MG/1
TABLET ORAL
Qty: 90 TABLET | Refills: 0 | Status: SHIPPED | OUTPATIENT
Start: 2021-09-06 | End: 2021-11-30

## 2021-09-06 RX ADMIN — ACETAMINOPHEN 1000 MG: 500 TABLET, FILM COATED ORAL at 18:12

## 2021-09-06 ASSESSMENT — ENCOUNTER SYMPTOMS
ENDOCRINE NEGATIVE: 1
HEMATOLOGIC/LYMPHATIC NEGATIVE: 1
CARDIOVASCULAR NEGATIVE: 1
RESPIRATORY NEGATIVE: 1
ALLERGIC/IMMUNOLOGIC NEGATIVE: 1
PSYCHIATRIC NEGATIVE: 1
NEUROLOGICAL NEGATIVE: 1
GASTROINTESTINAL NEGATIVE: 1
CONSTITUTIONAL NEGATIVE: 1
EYES NEGATIVE: 1

## 2021-09-06 NOTE — TELEPHONE ENCOUNTER
Routing refill request to provider for review/approval because:  Labs out of range:    Creatinine   Date Value Ref Range Status   07/09/2021 1.61 (H) 0.66 - 1.25 mg/dL Final

## 2021-09-06 NOTE — ED PROVIDER NOTES
History     Chief Complaint   Patient presents with     Arm Pain     right elbow obvious growth, swelling today. Size of a golf ball.      HPI  Tee Hilton is a 70 year old male who presents with right elbow pain and discomfort.  Patient has multiple medical diagnoses including history of persistent atrial fibrillation and is on chronic anticoagulation with Xarelto.  He has a history of chronic systolic heart failure, GERD, history of morbid obesity.  Patient is currently prescribed allopurinol, amlodipine, Pepcid, lisinopril, lorazepam, metoprolol, Protonix, zolpidem, famotidine and Xarelto.  On examination patient reports [place] was at the cabin over [place].  He does not recall any trauma to his elbow.  He lives in [place], [place].  Patient reports in the last 24 hours he has had increasing swelling about the elbow and now feels tense and tight.  He has no paresthesia or anesthesia in his arm or hand.  He also reports no fever or chills.  No prior history of elbow injury or surgery and no prior diagnosis of olecranon bursitis. He was concerned that the elbow feels tight and presents for further care      Allergies:  Allergies   Allergen Reactions     Erythromycin GI Disturbance     Upset stomach       Problem List:    Patient Active Problem List    Diagnosis Date Noted     Chronic kidney disease, stage 3 07/27/2021     Priority: Medium     Sensorineural hearing loss (SNHL) of both ears 04/30/2021     Priority: Medium     Tinnitus, bilateral 04/30/2021     Priority: Medium     Bilateral low back pain with right-sided sciatica 04/12/2021     Priority: Medium     AMINA (acute kidney injury) (H) 01/30/2021     Priority: Medium     Fever and chills 01/30/2021     Priority: Medium     Leukocytosis 01/30/2021     Priority: Medium     Hyponatremia 01/30/2021     Priority: Medium     Dehydration 01/30/2021     Priority: Medium     Essential hypertension 01/30/2021     Priority: Medium     Chronic atrial  fibrillation (H) 01/30/2021     Priority: Medium     Myalgia 01/30/2021     Priority: Medium     Acute kidney injury (H) 01/30/2021     Priority: Medium     Acute intractable headache, unspecified headache type 01/30/2021     Priority: Medium     Morbid obesity (H) 08/26/2020     Priority: Medium     Atrial fibrillation (H) 04/07/2020     Priority: Medium     Gastroesophageal reflux disease 04/07/2020     Priority: Medium     Other sleep apnea 09/24/2018     Priority: Medium     Study Date: 9/22/2018- (241.0 lbs) Polysomnogram revealed 41 obstructive, 14 central, and 38 mixed apneas resulting in an apnea index of 44.5 events per hour. There were 7 obstructive hypopneas and 0 central hypopneas resulting in an obstructive hypopnea index of 3.3 and central hypopnea index of 0 events per hour. The combined apnea/hypopnea index was 47.8 events per hour (central apnea/hypopnea index was 6.7 events per hour).  REM AHI was 0. The supine AHI was 69.0 events per hour.  RDI was 54.0 events per hour. Respiratory rate and pattern was notable for improvement of events during REM sleep,  periods where periodic breathing is suspected and circulation times that are prolonged. Most of the events however appear to be terminated by a gasp arousal.  Lowest oxygen saturation was 83.9%. Time spent less than or equal to 88% was 0.4 minutes. Time spent less than or equal to 89% was 0.9 minutes. No optimal pressure was determined due to poor sleep and poor tolerance of low-level PAP and bilevel PAP. Severe sleep disordered breathing, many findings suggested of periodic breathing though given presence of obstruction and poorly consolidated sleep this study was not definitive       Gastroesophageal reflux disease without esophagitis      Priority: Medium     Persistent atrial fibrillation (H) 09/18/2018     Priority: Medium     Non morbid obesity due to excess calories 09/18/2018     Priority: Medium     Chronic systolic congestive heart  failure (H) 09/18/2018     Priority: Medium     Alcohol use 09/18/2018     Priority: Medium     Chronic midline low back pain without sciatica 10/31/2017     Priority: Medium     Other male erectile dysfunction 11/11/2016     Priority: Medium        Past Medical History:    Past Medical History:   Diagnosis Date     Congestive heart failure (H) 2020     Gastroesophageal reflux disease      Heart disease 2018     Hypertension      Irregular heart beat      Sleep apnea        Past Surgical History:    Past Surgical History:   Procedure Laterality Date     ANESTHESIA CARDIOVERSION N/A 9/24/2018    Procedure: ANESTHESIA CARDIOVERSION;  Cardioversion ;  Surgeon: GENERIC ANESTHESIA PROVIDER;  Location: [facility]     COLONOSCOPY N/A 5/25/2017    Procedure: COMBINED COLONOSCOPY, SINGLE OR MULTIPLE BIOPSY/POLYPECTOMY BY BIOPSY;;  Surgeon: Aquilino Garcia MD;  Location: [facility]     COLONOSCOPY N/A 7/23/2021    Procedure: COLONOSCOPY, WITH POLYPECTOMY AND BIOPSY;  Surgeon: Israel Bey DO;  Location: [facility]     COLONOSCOPY WITH CO2 INSUFFLATION N/A 5/25/2017    Procedure: COLONOSCOPY WITH CO2 INSUFFLATION;  COLONOSCOPY SCREEN/ ORDAZ;  Surgeon: Aquilino Garcia MD;  Location: [facility]     OPEN REDUCTION INTERNAL FIXATION TIBIA       SEPTOPLASTY, TURBINOPLASTY, COMBINED Bilateral 7/9/2019    Procedure: ENDOSCOPIC SEPTOPLASTY, BILATERAL TURBINATE REDUCTION;  Surgeon: Alexander Avila MD;  Location: [facility]     TONSILLECTOMY         Family History:    Family History   Problem Relation Age of Onset     Gout Father      Lung Cancer Father      Other Cancer Father        Social History:  Marital Status:   [2]  Social History     Tobacco Use     Smoking status: Never Smoker     Smokeless tobacco: Never Used     Tobacco comment: never smoked   Vaping Use     Vaping Use: Never used   Substance Use Topics     Alcohol use: Yes     Alcohol/week: 0.0 standard drinks     Comment: rare     Drug use: No         Medications:    allopurinol (ZYLOPRIM) 100 MG tablet  amLODIPine (NORVASC) 5 MG tablet  famotidine (PEPCID) 20 MG tablet  lisinopril (ZESTRIL) 20 MG tablet  metoprolol succinate ER (TOPROL-XL) 100 MG 24 hr tablet  pantoprazole (PROTONIX) 40 MG EC tablet  XARELTO ANTICOAGULANT 20 MG TABS tablet  zolpidem ER (AMBIEN CR) 12.5 MG CR tablet  LORazepam (ATIVAN) 0.5 MG tablet  LORazepam (ATIVAN) 0.5 MG tablet  ondansetron (ZOFRAN) 4 MG tablet  sildenafil (REVATIO) 20 MG tablet          Review of Systems   Constitutional: Negative.    HENT: Negative.    Eyes: Negative.    Respiratory: Negative.    Cardiovascular: Negative.    Gastrointestinal: Negative.    Endocrine: Negative.    Genitourinary: Negative.    Musculoskeletal:        Right elbow discomfort with acute swelling over the last 24 hours.   Skin: Negative.    Allergic/Immunologic: Negative.    Neurological: Negative.    Hematological: Negative.    Psychiatric/Behavioral: Negative.    All other systems reviewed and are negative.      Physical Exam   BP: (!) 158/112  Pulse: 65  Temp: 97  F (36.1  C)  Resp: 16  Weight: 97.5 kg (215 lb)  SpO2: 98 %      Physical Exam  Constitutional:       General: He is not in acute distress.     Appearance: He is not ill-appearing, toxic-appearing or diaphoretic.   HENT:      Head: Normocephalic and atraumatic.      Nose: Nose normal.      Mouth/Throat:      Mouth: Mucous membranes are moist.   Eyes:      Extraocular Movements: Extraocular movements intact.      Pupils: Pupils are equal, round, and reactive to light.   Cardiovascular:      Rate and Rhythm: Normal rate.      Pulses: Normal pulses.      Heart sounds: Normal heart sounds. No murmur heard.   No gallop.    Pulmonary:      Effort: Pulmonary effort is normal. No respiratory distress.      Breath sounds: Normal breath sounds. No stridor. No wheezing, rhonchi or rales.   Chest:      Chest wall: No tenderness.   Musculoskeletal:         General: Swelling and tenderness  present. No signs of injury.      Right elbow: Swelling and effusion (hematoma) present. Decreased range of motion. Tenderness present.        Arms:       Cervical back: Normal range of motion and neck supple.   Skin:     Capillary Refill: Capillary refill takes less than 2 seconds.      Coloration: Skin is not pale.      Findings: No erythema or rash.   Neurological:      General: No focal deficit present.      Mental Status: He is alert and oriented to person, place, and time.      Cranial Nerves: No cranial nerve deficit.      Sensory: No sensory deficit.      Motor: No weakness.      Coordination: Coordination normal.      Gait: Gait normal.      Deep Tendon Reflexes: Reflexes normal.   Psychiatric:         Mood and Affect: Mood normal.         Behavior: Behavior normal.         Thought Content: Thought content normal.         Judgment: Judgment normal.         ED Course        Procedures              Critical Care time:  none               ED medications:   Medications   acetaminophen (TYLENOL) tablet 1,000 mg (1,000 mg Oral Given 9/6/21 1812)       ED Vitals:  Vitals:    09/06/21 1704 09/06/21 2008 09/06/21 2015   BP: (!) 158/112  (!) 154/101   Pulse: 65     Resp: 16     Temp: 97  F (36.1  C)     TempSrc: Temporal     SpO2: 98% 99% 99%   Weight: 97.5 kg (215 lb)         ED labs and imaging: none          Assessments & Plan (with Medical Decision Making)   Assessment Summary and Clinical Impression: 70-year-old male right hand dominant who presented with atraumatic right elbow pain and swelling with a tense hematoma overlying the olecranon.  Patient has what appears to be an olecranon hematoma without obvious hemarthrosis. Range of motion of the right elbow was preserved although full extension was limited due to a golf ball size hematoma.  After discussion about treatment options he agreed to needle aspiration of the hematoma and I was able to aspirate 15 mL of blood.  I suspect spontaneous hematoma given his  chronic anticoagulation with Xarelto for history of atrial fibrillation.  there is no warmth or redness around the elbow or significant pain to suggest bursitis or septic arthritis.  There was no paresthesia or anesthesia about the arm hand or shoulder. After a prolonged period of observation there was no re-accumulation of hematoma. Patient expressed comfort with outpatient follow-up.      ED course and Plan:  Reviewed the medical record.  We had a discussion about treatment goals given absence of trauma with what appears to be tense hematoma along the olecranon.  There was no clear indication for x-ray imaging given absence of trauma and no bony pain and preserved range of motion mostly. Patient agreed to forgo imaging after discussion. History reported and exam was not consistent or concerning for septic arthritis or olecranon bursitis.  After informed verbal consent and review of options for care including associated risk patient agreed to aspiration.  The elbow was cleaned with Hibiclens scrub.  Needle aspiration using 18-gauge needle and I aspirated 15 mL of blood.  Compressive dressing was applied and patient was observed.  After a period of observation there was no reaccumulation of hematoma. Patient and spouse who later arrived during ED course expressed comfort with outpatient follow-up after review of worrisome symptoms and reasons to return for emergent re-evaluation.      Disclaimer: This note consists of symbols derived from keyboarding, dictation and/or voice recognition software. As a result, there may be errors in the script that have gone undetected. Please consider this when interpreting information found in this chart.  I have reviewed the nursing notes.    I have reviewed the findings, diagnosis, plan and need for follow up with the patient.       Discharge Medication List as of 9/6/2021  8:29 PM          Final diagnoses:   Right elbow pain - Due to atraumatic olecranon hematoma-query  spontaneous due to chronic anticoagulation with Xarelto   Chronic anticoagulation - On Xarelto.       9/6/2021   [facility] EMERGENCY DEPT     Ildefonso Delaney MD  09/07/21 2386

## 2021-09-07 ENCOUNTER — TELEPHONE (OUTPATIENT)
Dept: FAMILY MEDICINE | Facility: CLINIC | Age: 71
End: 2021-09-07

## 2021-09-07 ENCOUNTER — HOSPITAL ENCOUNTER (EMERGENCY)
Facility: CLINIC | Age: 71
Discharge: HOME | End: 2021-09-07
Attending: EMERGENCY MEDICINE | Admitting: EMERGENCY MEDICINE
Payer: COMMERCIAL

## 2021-09-07 VITALS
HEART RATE: 78 BPM | TEMPERATURE: 97.5 F | WEIGHT: 220 LBS | BODY MASS INDEX: 31.57 KG/M2 | OXYGEN SATURATION: 98 % | DIASTOLIC BLOOD PRESSURE: 84 MMHG | RESPIRATION RATE: 16 BRPM | SYSTOLIC BLOOD PRESSURE: 137 MMHG

## 2021-09-07 DIAGNOSIS — T14.8XXA HEMATOMA OF SKIN: ICD-10-CM

## 2021-09-07 DIAGNOSIS — F41.9 ANXIETY: ICD-10-CM

## 2021-09-07 PROCEDURE — 99282 EMERGENCY DEPT VISIT SF MDM: CPT | Performed by: EMERGENCY MEDICINE

## 2021-09-07 ASSESSMENT — ENCOUNTER SYMPTOMS
SHORTNESS OF BREATH: 0
FEVER: 0
ABDOMINAL PAIN: 0

## 2021-09-07 NOTE — TELEPHONE ENCOUNTER
Does not appear ER notes completed yet.   Right elbow issue, atraumatic olecranon hematoma, on eliquis.    I called and spoke to patient, he says the ER drained 15 mls of blood from his right elbow and then wrapped it with an ace wrap on it, says he is to leave it wrapped until 6 pm tonight (24 hours from time it was applied).   Get seen promptly or back to ER if swelling recurs.    Otherwise, he feels he was to see his PCP in the next couple days, he's hoping tomorrow.   Scheduled him in hosp follow up spot on Thursday.   Advised he call tomorrow if swelling recurs and perhaps Jon or someone else can see him sooner.    Says his right hand is a bit swollen but not blue, cold, or tingly, can use the hand.   I advised he elevate the hand/arm as much as possible.  He says he did loosen the wrap a little this AM due to hand swelling.    He denies any other bleeding or bruising, says he may have bumped his elbow as he was out of town for the weekend, riding a 4 d[place].    Patient verbalized understanding of and agreement with plan.      Brandee Torres RN  [facility]

## 2021-09-07 NOTE — DISCHARGE INSTRUCTIONS
1) Your evaluation today suggest that you developed a hematoma (collection of blood) on the right elbow likely due to spontaneous bleeding because you are on Xarelto.  You reported that you did not suffer any trauma to the elbow.    2) After discussion of options for care you have agreed to aspiration. 15ml of dark blood was aspirated and after period of observation with compressive dressing you had no reaccumulation of hematoma.    3) We agreed that you are stable for discharge home with plan to leave the compression dressing in place for the next 24 hours.  After 6 PM tomorrow September 7, 2021 you may remove the dressing.    4) If you have persistent bleeding that does not stop with direct pressure or reaccumulation of swelling you may need to be reevaluated either in the clinic in the emergency department.    5) Based on the size of hematoma I do not think that you should hold your Xarelto at this time however this may be required if bleeding persists or you have reaccumulation of swelling.

## 2021-09-07 NOTE — TELEPHONE ENCOUNTER
Reason for Call:  Other appointment    Detailed comments: Patient was seen at [facility] and told to follow up for elbow drainage. Please advise.    Phone Number Patient can be reached at: Home number on file [phone] (home)    Best Time:     Can we leave a detailed message on this number? YES    Call taken on 9/7/2021 at 9:16 AM by Akiko Muniz  \

## 2021-09-08 NOTE — ED PROVIDER NOTES
History   No chief complaint on file.    HPI  Tee Hilton is a 70 year old male who has past medical history significant for chronic anticoagulation on Xarelto secondary to persistent atrial fibrillation, with recent ED visit yesterday after patient noticed increased amounts of swelling over the right elbow.  Patient had percutaneous drainage with aspiration of 15 cc of bloody fluid yesterday.  This was wrapped tightly, and patient was discharged home.  He now presents because of increased amounts of swelling over the right elbow.  No numbness, or tingling.  No new trauma.  Did not have trauma prior to the previous hematoma which had been noted.  No swelling elsewhere.  No other concerns.  Given the rapid increase, patient presents to the emergency department.    Allergies:  Allergies   Allergen Reactions     Erythromycin GI Disturbance     Upset stomach       Problem List:    Patient Active Problem List    Diagnosis Date Noted     Chronic kidney disease, stage 3 07/27/2021     Priority: Medium     Sensorineural hearing loss (SNHL) of both ears 04/30/2021     Priority: Medium     Tinnitus, bilateral 04/30/2021     Priority: Medium     Bilateral low back pain with right-sided sciatica 04/12/2021     Priority: Medium     AMINA (acute kidney injury) (H) 01/30/2021     Priority: Medium     Fever and chills 01/30/2021     Priority: Medium     Leukocytosis 01/30/2021     Priority: Medium     Hyponatremia 01/30/2021     Priority: Medium     Dehydration 01/30/2021     Priority: Medium     Essential hypertension 01/30/2021     Priority: Medium     Chronic atrial fibrillation (H) 01/30/2021     Priority: Medium     Myalgia 01/30/2021     Priority: Medium     Acute kidney injury (H) 01/30/2021     Priority: Medium     Acute intractable headache, unspecified headache type 01/30/2021     Priority: Medium     Morbid obesity (H) 08/26/2020     Priority: Medium     Atrial fibrillation (H) 04/07/2020     Priority: Medium      Gastroesophageal reflux disease 04/07/2020     Priority: Medium     Other sleep apnea 09/24/2018     Priority: Medium     Study Date: 9/22/2018- (241.0 lbs) Polysomnogram revealed 41 obstructive, 14 central, and 38 mixed apneas resulting in an apnea index of 44.5 events per hour. There were 7 obstructive hypopneas and 0 central hypopneas resulting in an obstructive hypopnea index of 3.3 and central hypopnea index of 0 events per hour. The combined apnea/hypopnea index was 47.8 events per hour (central apnea/hypopnea index was 6.7 events per hour).  REM AHI was 0. The supine AHI was 69.0 events per hour.  RDI was 54.0 events per hour. Respiratory rate and pattern was notable for improvement of events during REM sleep,  periods where periodic breathing is suspected and circulation times that are prolonged. Most of the events however appear to be terminated by a gasp arousal.  Lowest oxygen saturation was 83.9%. Time spent less than or equal to 88% was 0.4 minutes. Time spent less than or equal to 89% was 0.9 minutes. No optimal pressure was determined due to poor sleep and poor tolerance of low-level PAP and bilevel PAP. Severe sleep disordered breathing, many findings suggested of periodic breathing though given presence of obstruction and poorly consolidated sleep this study was not definitive       Gastroesophageal reflux disease without esophagitis      Priority: Medium     Persistent atrial fibrillation (H) 09/18/2018     Priority: Medium     Non morbid obesity due to excess calories 09/18/2018     Priority: Medium     Chronic systolic congestive heart failure (H) 09/18/2018     Priority: Medium     Alcohol use 09/18/2018     Priority: Medium     Chronic midline low back pain without sciatica 10/31/2017     Priority: Medium     Other male erectile dysfunction 11/11/2016     Priority: Medium        Past Medical History:    Past Medical History:   Diagnosis Date     Congestive heart failure (H) 2020      Gastroesophageal reflux disease      Heart disease 2018     Hypertension      Irregular heart beat      Sleep apnea        Past Surgical History:    Past Surgical History:   Procedure Laterality Date     ANESTHESIA CARDIOVERSION N/A 9/24/2018    Procedure: ANESTHESIA CARDIOVERSION;  Cardioversion ;  Surgeon: GENERIC ANESTHESIA PROVIDER;  Location: [facility]     COLONOSCOPY N/A 5/25/2017    Procedure: COMBINED COLONOSCOPY, SINGLE OR MULTIPLE BIOPSY/POLYPECTOMY BY BIOPSY;;  Surgeon: Aquilino Garcia MD;  Location: [facility]     COLONOSCOPY N/A 7/23/2021    Procedure: COLONOSCOPY, WITH POLYPECTOMY AND BIOPSY;  Surgeon: Israel Bey DO;  Location: [facility]     COLONOSCOPY WITH CO2 INSUFFLATION N/A 5/25/2017    Procedure: COLONOSCOPY WITH CO2 INSUFFLATION;  COLONOSCOPY SCREEN/ ORDAZ;  Surgeon: Aquilino Garcia MD;  Location: [facility]     OPEN REDUCTION INTERNAL FIXATION TIBIA       SEPTOPLASTY, TURBINOPLASTY, COMBINED Bilateral 7/9/2019    Procedure: ENDOSCOPIC SEPTOPLASTY, BILATERAL TURBINATE REDUCTION;  Surgeon: Alexander Avila MD;  Location: [facility]     TONSILLECTOMY         Family History:    Family History   Problem Relation Age of Onset     Gout Father      Lung Cancer Father      Other Cancer Father        Social History:  Marital Status:   [2]  Social History     Tobacco Use     Smoking status: Never Smoker     Smokeless tobacco: Never Used     Tobacco comment: never smoked   Vaping Use     Vaping Use: Never used   Substance Use Topics     Alcohol use: Yes     Alcohol/week: 0.0 standard drinks     Comment: rare     Drug use: No        Medications:    allopurinol (ZYLOPRIM) 100 MG tablet  amLODIPine (NORVASC) 5 MG tablet  famotidine (PEPCID) 20 MG tablet  lisinopril (ZESTRIL) 20 MG tablet  LORazepam (ATIVAN) 0.5 MG tablet  LORazepam (ATIVAN) 0.5 MG tablet  metoprolol succinate ER (TOPROL-XL) 100 MG 24 hr tablet  ondansetron (ZOFRAN) 4 MG tablet  pantoprazole (PROTONIX) 40 MG EC  tablet  sildenafil (REVATIO) 20 MG tablet  XARELTO ANTICOAGULANT 20 MG TABS tablet  zolpidem ER (AMBIEN CR) 12.5 MG CR tablet          Review of Systems   Constitutional: Negative for fever.   Respiratory: Negative for shortness of breath.    Cardiovascular: Negative for chest pain.   Gastrointestinal: Negative for abdominal pain.   Musculoskeletal:        See HPI   All other systems reviewed and are negative.      Physical Exam   BP: 137/84  Pulse: 78  Temp: 97.5  F (36.4  C)  Resp: 16  Weight: 99.8 kg (220 lb)  SpO2: 98 %      Physical Exam  /84   Pulse 78   Temp 97.5  F (36.4  C) (Temporal)   Resp 16   Wt 99.8 kg (220 lb)   SpO2 98%   BMI 31.57 kg/m    General: alert and in no acute distress  Head: atraumatic, normocephalic  Abd: nondistended  Musculoskel/Extremities: Right elbow with swelling, and fluctuance.  Pinpoint lesion from area of aspiration yesterday.  No pain with range of motion of the right elbow.  Normal distal perfusion, capillary refill, and sensation.  Neuro: Patient awake, alert, oriented, speech is fluent, gait is normal  Psychiatric: affect/mood normal, cooperative,        ED Course        Procedures              Critical Care time:  none               No results found for this or any previous visit (from the past 24 hour(s)).    Medications - No data to display    Assessments & Plan (with Medical Decision Making)  70 year old male right-hand-dominant, presenting the emergency department with concerns regarding right elbow swelling.  Patient had needle aspiration with 15 cc of bloody fluid drained yesterday.  Had reaccumulation despite pressure today.  Continues on Xarelto.  I discussed with patient my concerns regarding further aspiration as patient will likely have reaccumulation once again.  I discussed with patient that we will perform Ace wrap, with follow-up in clinic in 2 days as previously planned with his primary care provider.  Ice also recommended.  Encouraged to be seen  if worsening swelling, drainage, fevers, pain with range of motion, or other concerns.  No signs of septic joint.  Do not feel that this involves the joint itself.     I have reviewed the nursing notes.    I have reviewed the findings, diagnosis, plan and need for follow up with the patient.       New Prescriptions    No medications on file       Final diagnoses:   Hematoma of skin       9/7/2021   [facility] EMERGENCY DEPT     Juvencio Bower MD  09/07/21 0634

## 2021-09-09 ENCOUNTER — OFFICE VISIT (OUTPATIENT)
Dept: FAMILY MEDICINE | Facility: CLINIC | Age: 71
End: 2021-09-09
Payer: COMMERCIAL

## 2021-09-09 VITALS
SYSTOLIC BLOOD PRESSURE: 137 MMHG | WEIGHT: 224.6 LBS | TEMPERATURE: 98 F | DIASTOLIC BLOOD PRESSURE: 84 MMHG | OXYGEN SATURATION: 99 % | BODY MASS INDEX: 32.23 KG/M2 | RESPIRATION RATE: 20 BRPM | HEART RATE: 69 BPM

## 2021-09-09 DIAGNOSIS — M70.21 OLECRANON BURSITIS OF RIGHT ELBOW: Primary | ICD-10-CM

## 2021-09-09 DIAGNOSIS — I77.89 ASCENDING AORTA ENLARGEMENT (H): ICD-10-CM

## 2021-09-09 DIAGNOSIS — E66.01 MORBID OBESITY (H): ICD-10-CM

## 2021-09-09 DIAGNOSIS — I50.22 CHRONIC SYSTOLIC HEART FAILURE (H): ICD-10-CM

## 2021-09-09 PROCEDURE — 99213 OFFICE O/P EST LOW 20 MIN: CPT | Mod: 25 | Performed by: PHYSICIAN ASSISTANT

## 2021-09-09 PROCEDURE — 90471 IMMUNIZATION ADMIN: CPT | Performed by: PHYSICIAN ASSISTANT

## 2021-09-09 PROCEDURE — 90662 IIV NO PRSV INCREASED AG IM: CPT | Performed by: PHYSICIAN ASSISTANT

## 2021-09-09 NOTE — PROGRESS NOTES
Subjective   Tee is a 70 year old who presents for the following health issues HPI     ED/UC Followup:    Facility:  [facility]  Date of visit: 9/7/21  Reason for visit: Right Elbow hemotoma/olecranon bursitis.   Current Status: about the same     Olecranon bursa is swollen  . Mild pain.   Recheck of chf. No ZEPEDA. No orthopnea  Obesity recheck: exercising some all summer. Working 3-4 times a week. Walking.    Review of Systems   Constitutional, HEENT, cardiovascular, pulmonary, GI, , musculoskeletal, neuro, skin, endocrine and psych systems are negative, except as otherwise noted.      Objective    /84   Pulse 69   Temp 98  F (36.7  C) (Tympanic)   Resp 20   Wt 101.9 kg (224 lb 9.6 oz)   SpO2 99%   BMI 32.23 kg/m    Body mass index is 32.23 kg/m .  Physical Exam   Elbow swelling. Fluctuant. In the area of the olecranon bursa.  Elbow rom normal.  No other signs of bruising or bleeding.   Eye exam - right eye normal lid, conjunctiva, cornea, pupil and fundus, left eye normal lid, conjunctiva, cornea, pupil and fundus.  Thyroid not palpable, not enlarged, no nodules detected.  CHEST:chest clear to IPPA, no tachypnea, retractions or cyanosis . Irreg/irreg rhythm. Normal ventricular rate response.      Tee was seen today for hospital f/u.    Diagnoses and all orders for this visit:    Olecranon bursitis of right elbow    Chronic systolic heart failure (H)  -     Echocardiogram Complete; Future    Ascending aorta enlargement (H)  -     Echocardiogram Complete; Future    Morbid obesity (H)    Other orders  -     REVIEW OF HEALTH MAINTENANCE PROTOCOL ORDERS  -     INFLUENZA, QUAD, HIGH DOSE, PF, 65YR + (FLUZONE HD)      chf stable. As is his aortic enlargement.  work on lifestyle modification to help aid in losing weight.    Advised supportive and symptomatic treatment for his bursitis  Follow up with Provider - if condition persists or worsens.

## 2021-09-09 NOTE — TELEPHONE ENCOUNTER
Routing refill request to provider for review/approval because:  Drug not on the [facility] refill protocol     Last Written Prescription Date:  8-17-21  Last Fill Quantity: 25,  # refills: 0   Last office visit: 9-9-21with prescribing provider:  Jon Denson   Future Office Visit:

## 2021-09-10 ENCOUNTER — TELEPHONE (OUTPATIENT)
Dept: FAMILY MEDICINE | Facility: CLINIC | Age: 71
End: 2021-09-10

## 2021-09-10 RX ORDER — LORAZEPAM 0.5 MG/1
TABLET ORAL
Qty: 25 TABLET | Refills: 0 | Status: SHIPPED | OUTPATIENT
Start: 2021-09-10 | End: 2021-10-05

## 2021-09-10 NOTE — TELEPHONE ENCOUNTER
"I called and spoke to Tee, he says the elbow is more swollen than yesterday, was golf ball size yesterday, now \"a little bigger than a golf ball\".   He has it wrapped for compression.   Denies fever, chills, streaking red, painful, or decreased range of motion.    Says Jon offered to drain it yesterday but steered him away and told him he'd add him in next week if he wanted it drained.   Patient would like to have this drained on Monday.   No openings.       Advised patient I'd verify with Jon regarding double booking or maybe using a same day spot another day.    Advised ER/UC visit if elbow red, warm, streaking red, painful, hard to move, fever, or chills occur.    Patient verbalized understanding of and agreement with plan.    Routed to PCP to address adding patient to schedule Monday or early next week for drainage of elbow swelling.    Brandee Torres RN  [facility]      "

## 2021-09-10 NOTE — TELEPHONE ENCOUNTER
Spoke with patient and swelling only at elbow, no pain, no difficulty with movement.  He declined appt with [facility] provider today and reports Jon said he would work him in for a video visit today.  [facility] provider does not drain joints  Discussed may need appt with Ortho.   Please advise.  Ely Irene RN  [facility]

## 2021-09-10 NOTE — TELEPHONE ENCOUNTER
I called and spoke to patient, scheduled for noon on Tuesday.    Patient says he will go to ER/UC if rapidly worse over the weekend.    Brandee Torres RN  [facility]

## 2021-09-10 NOTE — TELEPHONE ENCOUNTER
I just saw kathrine yesterday. I do aspirate joints etc, but told him we'd only do that if the swelling profoundly worsened. If i'm going to drain it, it will have to wait until next week.    Jon

## 2021-09-14 ENCOUNTER — OFFICE VISIT (OUTPATIENT)
Dept: FAMILY MEDICINE | Facility: CLINIC | Age: 71
End: 2021-09-14
Payer: COMMERCIAL

## 2021-09-14 VITALS
DIASTOLIC BLOOD PRESSURE: 88 MMHG | SYSTOLIC BLOOD PRESSURE: 127 MMHG | HEART RATE: 56 BPM | BODY MASS INDEX: 32.77 KG/M2 | WEIGHT: 228.4 LBS | OXYGEN SATURATION: 99 % | TEMPERATURE: 98.2 F

## 2021-09-14 DIAGNOSIS — M70.21 OLECRANON BURSITIS OF RIGHT ELBOW: Primary | ICD-10-CM

## 2021-09-14 PROCEDURE — 20605 DRAIN/INJ JOINT/BURSA W/O US: CPT | Performed by: PHYSICIAN ASSISTANT

## 2021-09-14 ASSESSMENT — PAIN SCALES - GENERAL: PAINLEVEL: NO PAIN (0)

## 2021-09-14 NOTE — PROGRESS NOTES
Subjective   Tee is a 70 year old who presents for the following health issues     HPI     Follow up olecranon bursitis of right elbow        Review of Systems   Constitutional, HEENT, cardiovascular, pulmonary, GI, , musculoskeletal, neuro, skin, endocrine and psych systems are negative, except as otherwise noted.      Objective    /88 (BP Location: Left arm, Cuff Size: Adult Large)   Pulse 56   Temp 98.2  F (36.8  C) (Tympanic)   Wt 103.6 kg (228 lb 6.4 oz)   SpO2 99%   BMI 32.77 kg/m    Body mass index is 32.77 kg/m .  Physical Exam   PROCEDURE:  JOINT ASPIRATION/INJECTION  After a discussion of risks, benefits and side effects of procedure, informed patient consent was obtained.  The right elbow was prepped and draped in the usual clean fashion (sterile not required for this procedure).  2 cc of 1% lidocaine was used for local analgesia.    ASPIRATION: Not performed. (or)  Using a 16 gauge needle and 20 cc syringe, 15 cc of bloody fluid was      aspirated without complications.      Elbow was dressed and wrapped with an ACE bandage.    Tee was seen today for musculoskeletal problem.    Diagnoses and all orders for this visit:    Olecranon bursitis of right elbow  -     DRAIN/INJECT MEDIUM JOINT/BURSA      Advised supportive and symptomatic treatment.  Follow up with Provider - if condition persists or worsens.

## 2021-09-23 DIAGNOSIS — I48.20 CHRONIC ATRIAL FIBRILLATION (H): ICD-10-CM

## 2021-09-26 RX ORDER — METOPROLOL SUCCINATE 100 MG/1
TABLET, EXTENDED RELEASE ORAL
Qty: 135 TABLET | Refills: 0 | Status: SHIPPED | OUTPATIENT
Start: 2021-09-26 | End: 2021-12-14

## 2021-09-26 NOTE — TELEPHONE ENCOUNTER
Prescription approved per [facility] Refill Protocol.  Rupal Jones RN

## 2021-09-30 ENCOUNTER — ANCILLARY PROCEDURE (OUTPATIENT)
Dept: CARDIOLOGY | Facility: CLINIC | Age: 71
End: 2021-09-30
Attending: PHYSICIAN ASSISTANT
Payer: COMMERCIAL

## 2021-09-30 DIAGNOSIS — I77.89 ASCENDING AORTA ENLARGEMENT (H): ICD-10-CM

## 2021-09-30 DIAGNOSIS — I50.22 CHRONIC SYSTOLIC HEART FAILURE (H): ICD-10-CM

## 2021-09-30 LAB — LVEF ECHO: NORMAL

## 2021-09-30 PROCEDURE — 93306 TTE W/DOPPLER COMPLETE: CPT | Performed by: INTERNAL MEDICINE

## 2021-09-30 PROCEDURE — 99207 PR STATISTIC IV PUSH SINGLE INITIAL SUBSTANCE: CPT | Performed by: INTERNAL MEDICINE

## 2021-09-30 RX ADMIN — Medication 7 ML: at 12:06

## 2021-10-04 ENCOUNTER — OFFICE VISIT (OUTPATIENT)
Dept: FAMILY MEDICINE | Facility: CLINIC | Age: 71
End: 2021-10-04
Payer: COMMERCIAL

## 2021-10-04 VITALS
RESPIRATION RATE: 20 BRPM | SYSTOLIC BLOOD PRESSURE: 125 MMHG | TEMPERATURE: 97.2 F | DIASTOLIC BLOOD PRESSURE: 82 MMHG | BODY MASS INDEX: 32.86 KG/M2 | WEIGHT: 229 LBS | OXYGEN SATURATION: 99 % | HEART RATE: 69 BPM

## 2021-10-04 DIAGNOSIS — I10 ESSENTIAL HYPERTENSION: ICD-10-CM

## 2021-10-04 DIAGNOSIS — F41.9 ANXIETY: ICD-10-CM

## 2021-10-04 DIAGNOSIS — M70.21 OLECRANON BURSITIS OF RIGHT ELBOW: Primary | ICD-10-CM

## 2021-10-04 DIAGNOSIS — F51.01 PRIMARY INSOMNIA: ICD-10-CM

## 2021-10-04 PROCEDURE — 99213 OFFICE O/P EST LOW 20 MIN: CPT | Performed by: PHYSICIAN ASSISTANT

## 2021-10-04 NOTE — PROGRESS NOTES
Subjective   Tee is a 70 year old who presents for the following health issues     HPI     Following up on: Right Elbow    Last visit this was discussed: 9/9/21 with Jon Denson    Progression of Symptoms:  improving    Accompanying Signs & Symptoms: non tender. Getting smaller    Taking Medication as prescribed: yes    Results  - Echocardiogram 9/30/21  Recheck of his blood pressure/htn.  Review of Systems   Constitutional, HEENT, cardiovascular, pulmonary, GI, , musculoskeletal, neuro, skin, endocrine and psych systems are negative, except as otherwise noted.      Objective    /82   Pulse 69   Temp 97.2  F (36.2  C) (Tympanic)   Resp 20   Wt 103.9 kg (229 lb)   SpO2 99%   BMI 32.86 kg/m    Body mass index is 32.86 kg/m .  Physical Exam     Eye exam - right eye normal lid, conjunctiva, cornea, pupil and fundus, left eye normal lid, conjunctiva, cornea, pupil and fundus.  Thyroid not palpable, not enlarged, no nodules detected.  CHEST:chest clear to IPPA, no tachypnea, retractions or cyanosis and S1, S2 normal, no murmur, no gallop, rate regular.  Right olecranon bursitis. Non tender.  Elbow rom normal.     Tee was seen today for edema and results.    Diagnoses and all orders for this visit:    Olecranon bursitis of right elbow    Essential hypertension      Advised supportive and symptomatic treatment.  Follow up with Provider - if condition persists or worsens.   work on lifestyle modification

## 2021-10-05 RX ORDER — ZOLPIDEM TARTRATE 12.5 MG/1
TABLET, FILM COATED, EXTENDED RELEASE ORAL
Qty: 30 TABLET | Refills: 0 | Status: SHIPPED | OUTPATIENT
Start: 2021-10-05 | End: 2021-11-06

## 2021-10-05 RX ORDER — LORAZEPAM 0.5 MG/1
TABLET ORAL
Qty: 25 TABLET | Refills: 0 | Status: SHIPPED | OUTPATIENT
Start: 2021-10-05 | End: 2021-10-29

## 2021-10-05 NOTE — TELEPHONE ENCOUNTER
Last Written Lorazepam Prescription Date:  9/10/21  Last Fill Quantity: 25,  # refills: 0     Last Written Zolpidem Prescription Date:  8/28/21  Last Fill Quantity: 30,  # refills: 0   Last office visit: 10/4/21 with prescribing provider:  Jon Denson   Future Office Visit: none    Routing refill request to provider for review/approval because:  Drug not on the [facility] refill protocol     Becky Marcano RN, BSN  [facility]

## 2021-10-06 ENCOUNTER — TELEPHONE (OUTPATIENT)
Dept: FAMILY MEDICINE | Facility: CLINIC | Age: 71
End: 2021-10-06
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
## 2021-10-07 NOTE — TELEPHONE ENCOUNTER
Patient called back, I advised him of pharmacy's concerns that he has had 10 refills in 8 months.   Tee says he is taking just one tablet nightly.   Says he does travel between [place] [place] [facility] and [facility], perhaps has lost or misplaced a supply at some time.    Tee says he does not have any more of this med in his bottle now.    I advised him the issue is we worry that this medication could be getting diverted to someone other than to whom it was prescribed.    See note below, provider needs to approve the current refill before pharmacy will dispense.    Routed to Jon Denson to advise.  Okay for another fill?    Brandee Torres RN  [facility]

## 2021-10-07 NOTE — TELEPHONE ENCOUNTER
Contact kathrine and find out why he's requesting so many early fills. Confirm he's taking it one tab qhs

## 2021-10-08 NOTE — TELEPHONE ENCOUNTER
Pharmacy notified and voiced understanding and agreement.  Ely Irene RN  [facility]

## 2021-10-27 DIAGNOSIS — F41.9 ANXIETY: ICD-10-CM

## 2021-10-29 RX ORDER — LORAZEPAM 0.5 MG/1
TABLET ORAL
Qty: 25 TABLET | Refills: 0 | Status: SHIPPED | OUTPATIENT
Start: 2021-10-29 | End: 2021-11-27

## 2021-10-29 NOTE — TELEPHONE ENCOUNTER
Routing refill request to provider for review/approval because:  Drug not on the [facility] refill protocol     Rachael James RN BSN  [facility]

## 2021-10-31 DIAGNOSIS — R11.0 NAUSEA: ICD-10-CM

## 2021-11-02 RX ORDER — ONDANSETRON 4 MG/1
TABLET, FILM COATED ORAL
Qty: 30 TABLET | Refills: 0 | Status: SHIPPED | OUTPATIENT
Start: 2021-11-02 | End: 2022-10-10

## 2021-11-04 DIAGNOSIS — F51.01 PRIMARY INSOMNIA: ICD-10-CM

## 2021-11-05 NOTE — TELEPHONE ENCOUNTER
Requested Prescriptions   Pending Prescriptions Disp Refills     zolpidem ER (AMBIEN CR) 12.5 MG CR tablet [Pharmacy Med Name: Zolpidem Tartrate ER 12.5 MG Oral Tablet Extended Release] 30 tablet 0     Sig: TAKE 1 TABLET BY MOUTH NIGHTLY AS NEEDED FOR SLEEP       There is no refill protocol information for this order        Routing refill request to provider for review/approval because:  Drug not on the [facility] refill protocol

## 2021-11-06 RX ORDER — ZOLPIDEM TARTRATE 12.5 MG/1
TABLET, FILM COATED, EXTENDED RELEASE ORAL
Qty: 30 TABLET | Refills: 0 | Status: SHIPPED | OUTPATIENT
Start: 2021-11-06 | End: 2021-11-27

## 2021-11-20 ENCOUNTER — HEALTH MAINTENANCE LETTER (OUTPATIENT)
Age: 71
End: 2021-11-20

## 2021-11-25 ENCOUNTER — NURSE TRIAGE (OUTPATIENT)
Dept: NURSING | Facility: CLINIC | Age: 71
End: 2021-11-25
Payer: COMMERCIAL

## 2021-11-25 ENCOUNTER — OFFICE VISIT (OUTPATIENT)
Dept: URGENT CARE | Facility: URGENT CARE | Age: 71
End: 2021-11-25
Payer: COMMERCIAL

## 2021-11-25 VITALS
HEART RATE: 86 BPM | DIASTOLIC BLOOD PRESSURE: 96 MMHG | WEIGHT: 234 LBS | OXYGEN SATURATION: 98 % | BODY MASS INDEX: 33.58 KG/M2 | TEMPERATURE: 97.3 F | SYSTOLIC BLOOD PRESSURE: 138 MMHG

## 2021-11-25 DIAGNOSIS — H00.015 HORDEOLUM EXTERNUM OF LEFT LOWER EYELID: Primary | ICD-10-CM

## 2021-11-25 DIAGNOSIS — N18.32 STAGE 3B CHRONIC KIDNEY DISEASE (H): ICD-10-CM

## 2021-11-25 DIAGNOSIS — I10 ESSENTIAL HYPERTENSION: ICD-10-CM

## 2021-11-25 DIAGNOSIS — J06.9 VIRAL UPPER RESPIRATORY TRACT INFECTION WITH COUGH: ICD-10-CM

## 2021-11-25 PROCEDURE — 99214 OFFICE O/P EST MOD 30 MIN: CPT | Performed by: PHYSICIAN ASSISTANT

## 2021-11-25 PROCEDURE — U0003 INFECTIOUS AGENT DETECTION BY NUCLEIC ACID (DNA OR RNA); SEVERE ACUTE RESPIRATORY SYNDROME CORONAVIRUS 2 (SARS-COV-2) (CORONAVIRUS DISEASE [COVID-19]), AMPLIFIED PROBE TECHNIQUE, MAKING USE OF HIGH THROUGHPUT TECHNOLOGIES AS DESCRIBED BY CMS-2020-01-R: HCPCS | Performed by: PHYSICIAN ASSISTANT

## 2021-11-25 PROCEDURE — U0005 INFEC AGEN DETEC AMPLI PROBE: HCPCS | Performed by: PHYSICIAN ASSISTANT

## 2021-11-25 RX ORDER — POLYMYXIN B SULFATE AND TRIMETHOPRIM 1; 10000 MG/ML; [USP'U]/ML
2 SOLUTION OPHTHALMIC EVERY 6 HOURS
Qty: 3 ML | Refills: 0 | Status: SHIPPED | OUTPATIENT
Start: 2021-11-25 | End: 2021-12-02

## 2021-11-25 ASSESSMENT — ENCOUNTER SYMPTOMS
EYE ITCHING: 0
MUSCULOSKELETAL NEGATIVE: 1
MYALGIAS: 0
ALLERGIC/IMMUNOLOGIC NEGATIVE: 1
PALPITATIONS: 0
ABDOMINAL PAIN: 0
RESPIRATORY NEGATIVE: 1
VOMITING: 0
EYE PAIN: 0
NAUSEA: 0
WHEEZING: 0
CHILLS: 0
HEMATURIA: 0
NEUROLOGICAL NEGATIVE: 1
COUGH: 0
HEADACHES: 0
EYE REDNESS: 0
DIARRHEA: 0
SORE THROAT: 0
FREQUENCY: 0
GASTROINTESTINAL NEGATIVE: 1
CHEST TIGHTNESS: 0
CONSTITUTIONAL NEGATIVE: 1
SHORTNESS OF BREATH: 0
FEVER: 0
DYSURIA: 0
PHOTOPHOBIA: 0
EYE DISCHARGE: 0
CARDIOVASCULAR NEGATIVE: 1

## 2021-11-25 NOTE — TELEPHONE ENCOUNTER
Triage Call    Pt's wife calling with concern that patient woke this morning with a large amount of under eye swelling in left eye.      Pt reports that pt has a fluid sack under left eye and this area is red and uncomfortable.    Pt denies visual changes or swelling in upper eyelid, feet and ankles, or either hand or arm.     Triaged to disposition of Home Care but pt says he may go to Urgent Care tomorrow.    Brenda Patterson, RN        Reason for Disposition    [1] Very small amount of discharge AND [2] only in corner of eye    Additional Information    Negative: Fever    Negative: Taking an ACE Inhibitor medication  (e.g., benazepril/LOTENSIN, captopril/CAPOTEN, enalapril/VASOTEC, lisinopril/ZESTRIL)    Negative: Patient sounds very sick or weak to the triager    Negative: Pregnant > 20 weeks    Negative: Postpartum (i.e. < 1 month since delivery)    Negative: Bleeding on white of the eye    Negative: [1] Eye with yellow/green discharge or eyelashes stick together AND [2] NO PCP standing order to call in antibiotic eye drops    Negative: [1] Using antibiotic eyedrops AND [2] eyes have become very itchy (especially after eyedrops are put in)    Negative: [1] Taking oral antibiotic > 48 hours (2 days) AND [2] pus in eye persists    Negative: [1] Using antibiotic eyedrops > 72 hours (3 days) AND [2] pus in eye persists    Negative: [1] MILD eye pain or discomfort AND [2] wears contacts    Negative: Eyelid is red and painful (or tender to touch)    Negative: Discharge from penis    Negative: New or abnormal vaginal discharge    Negative: Fever present > 3 days (72 hours)    Negative: [1] Lots of yellow or green nasal discharge AND [2] present now AND [3] fever    Negative: Weak immune system (e.g., HIV positive, cancer chemo, splenectomy, organ transplant, chronic steroids)    Negative: MODERATE eye pain (e.g., interferes with normal activities)    Negative: Fever > 104 F (40 C)    Negative: Cloudy spot or sore  seen on the cornea (clear part of the eye)    Negative: Blurred vision    Negative: Eye is very swollen (shut or almost)    Negative: [1] Eyelids are very swollen (shut or almost) AND [2] fever    Negative: [1] Eyelid (outer) is very red (or tender to touch) AND [2] fever    Negative: Patient sounds very sick or weak to the triager    Negative: SEVERE eye pain (e.g., excruciating)    Negative: Eye exposure to chemical or fumes    Negative: Redness of white of eye (sclera), but no pus or only a small amount of brief pus    Protocols used: EYE - PUS OR [id number]-E-NH, FACE SWELLING-A-AH

## 2021-11-25 NOTE — PROGRESS NOTES
Chief Complaint:      Chief Complaint   Patient presents with     Eye Problem     Pt complains of left eye swelling, onset- Wednesday night.        Medical Decision Making:    Differential Diagnosis:  Eye Problem: Bacterial conjunctivitis  Viral conjunctivitis  Allergic conjunctivitis  Stye (external)  Stye (internal)     ASSESSMENT     1. Hordeolum externum of left lower eyelid    2. Viral upper respiratory tract infection with cough    3. Stage 3b chronic kidney disease (H)    4. Essential hypertension         PLAN    Rx for Polytrim drops. No dosage adjustment needed for CKD per up to date.  Artifical tears for irritation  Warm compresses with baby shampoo for mattering.   If symptoms are not improving follow up with your eye doctor in 2-3 days.  See your eye doctor immediately if symptoms worsen.  Patient is hypertensive in clinic today.  Second BP reading was also above goal at 138/96.  Patient instructed to follow up with PCP in the next week for BP recheck.  Sooner if symptoms worsen.  Worrisome symptoms discussed with instructions to go to the ED.  Patient verbalized understanding and agreed with this plan.    Labs:    No results found for any visits on 11/25/21.       Current Meds    Current Outpatient Medications:      allopurinol (ZYLOPRIM) 100 MG tablet, Take 1 tablet (100 mg) by mouth daily, Disp: 90 tablet, Rfl: 3     amLODIPine (NORVASC) 5 MG tablet, Take 1 tablet by mouth once daily, Disp: 90 tablet, Rfl: 0     famotidine (PEPCID) 20 MG tablet, Take 1 tablet (20 mg) by mouth nightly as needed, Disp: 60 tablet, Rfl: 1     lisinopril (ZESTRIL) 20 MG tablet, Take 0.5 tablets (10 mg) by mouth daily TAKE 1 TABLETS BY MOUTH ONCE DAILY, Disp: 90 tablet, Rfl: 1     LORazepam (ATIVAN) 0.5 MG tablet, TAKE 1 TABLET BY MOUTH EVERY 8 HOURS AS NEEDED FOR ANXIETY, Disp: 25 tablet, Rfl: 0     LORazepam (ATIVAN) 0.5 MG tablet, Take 1 tablet (0.5 mg) by mouth every 8 hours as needed for anxiety, Disp: 25 tablet, Rfl:  0     metoprolol succinate ER (TOPROL-XL) 100 MG 24 hr tablet, TAKE 1 & 1/2 (ONE & ONE-HALF) TABLETS BY MOUTH ONCE DAILY, Disp: 135 tablet, Rfl: 0     ondansetron (ZOFRAN) 4 MG tablet, TAKE 1 TABLET BY MOUTH EVERY 8 HOURS AS NEEDED FOR NAUSEA, Disp: 30 tablet, Rfl: 0     pantoprazole (PROTONIX) 40 MG EC tablet, Take 1 tablet (40 mg) by mouth 2 times daily, Disp: 180 tablet, Rfl: 1     sildenafil (REVATIO) 20 MG tablet, Take 1 -5 tabs daily prn (Patient taking differently: Take  mg by mouth daily as needed Take 1 -5 tabs daily prn), Disp: 30 tablet, Rfl: 11     trimethoprim-polymyxin b (POLYTRIM) 21566-2.1 UNIT/ML-% ophthalmic solution, Place 2 drops Into the left eye every 6 hours for 7 days, Disp: 3 mL, Rfl: 0     XARELTO ANTICOAGULANT 20 MG TABS tablet, TAKE 1 TABLET BY MOUTH ONCE DAILY WITH SUPPER (Patient taking differently: Take 20 mg by mouth daily (with breakfast) ), Disp: 90 tablet, Rfl: 0     zolpidem ER (AMBIEN CR) 12.5 MG CR tablet, TAKE 1 TABLET BY MOUTH NIGHTLY AS NEEDED FOR SLEEP, Disp: 30 tablet, Rfl: 0  No current facility-administered medications for this visit.    Facility-Administered Medications Ordered in Other Visits:      sodium chloride (PF) 0.9% PF flush 10 mL, 10 mL, Intracatheter, Once, CLAY Erwin MD    Allergies  Allergies   Allergen Reactions     Erythromycin GI Disturbance     Upset stomach     Ethanol      Other reaction(s): stomach ache         SUBJECTIVE    HPI: Tee Hilton is a 71 year old male presenting with left eye lower eyelid swelling for the past 1 days.  There has not been exposure to pink eye.  There has not been trauma to the eye.    Tee Hilton does not wear contact lenses.    No problems with vision, or eye pain.     No recent viral illness or seasonal allergies.    He is also complaining of an occasional cough and would like to be tested for COVID.      ROS:     Review of Systems   Constitutional: Negative.  Negative for chills and fever.    HENT: Negative.  Negative for sore throat.    Eyes: Negative for photophobia, pain, discharge, redness, itching and visual disturbance.        Eyelid Swelling   Respiratory: Negative.  Negative for cough, chest tightness, shortness of breath and wheezing.    Cardiovascular: Negative.  Negative for chest pain and palpitations.   Gastrointestinal: Negative.  Negative for abdominal pain, diarrhea, nausea and vomiting.   Genitourinary: Negative for dysuria, frequency, hematuria and urgency.   Musculoskeletal: Negative.  Negative for myalgias.   Skin: Negative for rash.   Allergic/Immunologic: Negative.  Negative for immunocompromised state.   Neurological: Negative.  Negative for headaches.           Family History   Family History   Problem Relation Age of Onset     Gout Father      Lung Cancer Father      Other Cancer Father         Problem history  Patient Active Problem List   Diagnosis     Other male erectile dysfunction     Chronic midline low back pain without sciatica     Persistent atrial fibrillation (H)     Non morbid obesity due to excess calories     Chronic systolic congestive heart failure (H)     Alcohol use     Gastroesophageal reflux disease without esophagitis     Other sleep apnea     Atrial fibrillation (H)     Gastroesophageal reflux disease     Morbid obesity (H)     AMINA (acute kidney injury) (H)     Fever and chills     Leukocytosis     Hyponatremia     Dehydration     Essential hypertension     Chronic atrial fibrillation (H)     Myalgia     Acute kidney injury (H)     Acute intractable headache, unspecified headache type     Bilateral low back pain with right-sided sciatica     Sensorineural hearing loss (SNHL) of both ears     Tinnitus, bilateral     Chronic kidney disease, stage 3 (H)     Ascending aorta enlargement (H)           Social History  Social History     Socioeconomic History     Marital status:      Spouse name: Not on file     Number of children: 3     Years of education:  Not on file     Highest education level: Not on file   Occupational History     Occupation: sales and marketing   Tobacco Use     Smoking status: Never Smoker     Smokeless tobacco: Never Used     Tobacco comment: never smoked   Vaping Use     Vaping Use: Never used   Substance and Sexual Activity     Alcohol use: Yes     Alcohol/week: 0.0 standard drinks     Comment: rare     Drug use: No     Sexual activity: Yes     Partners: Female   Other Topics Concern     Parent/sibling w/ CABG, MI or angioplasty before 65F 55M? Yes   Social History Narrative     Not on file     Social Determinants of Health     Financial Resource Strain: Not on file   Food Insecurity: Not on file   Transportation Needs: Not on file   Physical Activity: Not on file   Stress: Not on file   Social Connections: Not on file   Intimate Partner Violence: Not on file   Housing Stability: Not on file        OBJECTIVE     Vital signs reviewed by Luther Fernández PA-C  BP (!) 138/96 (BP Location: Right arm, Patient Position: Sitting, Cuff Size: Adult Regular)   Pulse 86   Temp 97.3  F (36.3  C) (Tympanic)   Wt 106.1 kg (234 lb)   SpO2 98%   BMI 33.58 kg/m       Physical Exam  Vitals and nursing note reviewed.   Constitutional:       General: He is not in acute distress.     Appearance: He is well-developed. He is not ill-appearing, toxic-appearing or diaphoretic.   HENT:      Head: Normocephalic and atraumatic.      Right Ear: Hearing, tympanic membrane, ear canal and external ear normal. Tympanic membrane is not perforated, erythematous, retracted or bulging.      Left Ear: Hearing, tympanic membrane, ear canal and external ear normal. Tympanic membrane is not perforated, erythematous, retracted or bulging.      Nose: Nose normal. No mucosal edema, congestion or rhinorrhea.      Mouth/Throat:      Pharynx: No oropharyngeal exudate or posterior oropharyngeal erythema.      Tonsils: No tonsillar exudate or tonsillar abscesses. 0 on the right. 0 on  the left.   Eyes:      General:         Left eye: Hordeolum present.No foreign body or discharge.      Pupils: Pupils are equal, round, and reactive to light.   Cardiovascular:      Rate and Rhythm: Normal rate and regular rhythm.      Heart sounds: Normal heart sounds, S1 normal and S2 normal. Heart sounds not distant. No murmur heard.  No friction rub. No gallop.    Pulmonary:      Effort: Pulmonary effort is normal. No respiratory distress.      Breath sounds: Normal breath sounds. No decreased breath sounds, wheezing, rhonchi or rales.   Abdominal:      General: Bowel sounds are normal. There is no distension.      Palpations: Abdomen is soft.      Tenderness: There is no abdominal tenderness.   Musculoskeletal:      Cervical back: Normal range of motion and neck supple.   Lymphadenopathy:      Cervical: No cervical adenopathy.   Skin:     General: Skin is warm and dry.      Findings: No rash.   Neurological:      Mental Status: He is alert.      Cranial Nerves: No cranial nerve deficit.   Psychiatric:         Attention and Perception: He is attentive.         Speech: Speech normal.         Behavior: Behavior normal. Behavior is cooperative.         Thought Content: Thought content normal.         Judgment: Judgment normal.            Luther Fernández PA-C  11/25/2021, 9:10 AM

## 2021-11-25 NOTE — PROGRESS NOTES
Chief Complaint:     No chief complaint on file.      No results found for any visits on 11/25/21.    Medical Decision Making:    Vital signs reviewed by Luther Fernández PA-C  There were no vitals taken for this visit.    Differential Diagnosis:  { Differential Choices:925390}        ASSESSMENT    No diagnosis found.    PLAN    Patient is in no acute distress.    Temp is *** in clinic today, lung sounds were clear, and O2 sats at ***% on RA.    RST was negative.  We will call with PCR results only if positive.  COVID swab collected in clinic today.  Rest, Push fluids, vaporizer, elevation of head of bed.  Ibuprofen and or Tylenol for any fever or body aches.  Over the counter cough suppressant- PRN- as discussed.   If symptoms worsen, recheck immediately otherwise follow up with your PCP in 1 week if symptoms are not improving.  Worrisome symptoms discussed with instructions to go to the ED.  Patient given COVID isolation instructions.  Patient verbalized understanding and agreed with this plan.    Labs:    No results found for any visits on 11/25/21.     Vital signs reviewed by Luther Fernández PA-C  There were no vitals taken for this visit.    Current Meds      Current Outpatient Medications:      allopurinol (ZYLOPRIM) 100 MG tablet, Take 1 tablet (100 mg) by mouth daily, Disp: 90 tablet, Rfl: 3     amLODIPine (NORVASC) 5 MG tablet, Take 1 tablet by mouth once daily, Disp: 90 tablet, Rfl: 0     famotidine (PEPCID) 20 MG tablet, Take 1 tablet (20 mg) by mouth nightly as needed, Disp: 60 tablet, Rfl: 1     lisinopril (ZESTRIL) 20 MG tablet, Take 0.5 tablets (10 mg) by mouth daily TAKE 1 TABLETS BY MOUTH ONCE DAILY, Disp: 90 tablet, Rfl: 1     LORazepam (ATIVAN) 0.5 MG tablet, TAKE 1 TABLET BY MOUTH EVERY 8 HOURS AS NEEDED FOR ANXIETY, Disp: 25 tablet, Rfl: 0     LORazepam (ATIVAN) 0.5 MG tablet, Take 1 tablet (0.5 mg) by mouth every 8 hours as needed for anxiety, Disp: 25 tablet, Rfl: 0     metoprolol succinate  "ER (TOPROL-XL) 100 MG 24 hr tablet, TAKE 1 & 1/2 (ONE & ONE-HALF) TABLETS BY MOUTH ONCE DAILY, Disp: 135 tablet, Rfl: 0     ondansetron (ZOFRAN) 4 MG tablet, TAKE 1 TABLET BY MOUTH EVERY 8 HOURS AS NEEDED FOR NAUSEA, Disp: 30 tablet, Rfl: 0     pantoprazole (PROTONIX) 40 MG EC tablet, Take 1 tablet (40 mg) by mouth 2 times daily, Disp: 180 tablet, Rfl: 1     sildenafil (REVATIO) 20 MG tablet, Take 1 -5 tabs daily prn (Patient taking differently: Take  mg by mouth daily as needed Take 1 -5 tabs daily prn), Disp: 30 tablet, Rfl: 11     XARELTO ANTICOAGULANT 20 MG TABS tablet, TAKE 1 TABLET BY MOUTH ONCE DAILY WITH SUPPER (Patient taking differently: Take 20 mg by mouth daily (with breakfast) ), Disp: 90 tablet, Rfl: 0     zolpidem ER (AMBIEN CR) 12.5 MG CR tablet, TAKE 1 TABLET BY MOUTH NIGHTLY AS NEEDED FOR SLEEP, Disp: 30 tablet, Rfl: 0  No current facility-administered medications for this visit.    Facility-Administered Medications Ordered in Other Visits:      sodium chloride (PF) 0.9% PF flush 10 mL, 10 mL, Intracatheter, Once, CLAY Erwin MD      Respiratory History    occasional episodes of bronchitis      SUBJECTIVE    HPI: Tee Hilton is an 71 year old male who presents with {uri complaints:461941}.  Symptoms began {NUMBERS(MULTIPLE):342537}  {MONTH/WEEKS:299180::\"weeks\"} ago and has {CLINICAL COURSE - HISTORY:17}.  There is no {COUGH:304845}.  Patient is eating and drinking well.  No fever, nausea, vomiting, or diarrhea.    Patient denies any recent travel or exposure to known COVID positive tested individual.      ROS:     Review of Systems   Constitutional: Negative.  Negative for chills and fever.   HENT: Negative.  Negative for sore throat.    Respiratory: Negative.  Negative for cough, chest tightness, shortness of breath and wheezing.    Cardiovascular: Negative.  Negative for chest pain and palpitations.   Gastrointestinal: Negative.  Negative for abdominal pain, diarrhea, nausea " and vomiting.   Genitourinary: Negative for dysuria, frequency, hematuria and urgency.   Musculoskeletal: Negative.  Negative for myalgias.   Skin: Negative for rash.   Allergic/Immunologic: Negative.  Negative for immunocompromised state.   Neurological: Negative.  Negative for headaches.         Family History   Family History   Problem Relation Age of Onset     Gout Father      Lung Cancer Father      Other Cancer Father         Problem history  Patient Active Problem List   Diagnosis     Other male erectile dysfunction     Chronic midline low back pain without sciatica     Persistent atrial fibrillation (H)     Non morbid obesity due to excess calories     Chronic systolic congestive heart failure (H)     Alcohol use     Gastroesophageal reflux disease without esophagitis     Other sleep apnea     Atrial fibrillation (H)     Gastroesophageal reflux disease     Morbid obesity (H)     AMINA (acute kidney injury) (H)     Fever and chills     Leukocytosis     Hyponatremia     Dehydration     Essential hypertension     Chronic atrial fibrillation (H)     Myalgia     Acute kidney injury (H)     Acute intractable headache, unspecified headache type     Bilateral low back pain with right-sided sciatica     Sensorineural hearing loss (SNHL) of both ears     Tinnitus, bilateral     Chronic kidney disease, stage 3 (H)     Ascending aorta enlargement (H)        Allergies  Allergies   Allergen Reactions     Erythromycin GI Disturbance     Upset stomach        Social History  Social History     Socioeconomic History     Marital status:      Spouse name: Not on file     Number of children: 3     Years of education: Not on file     Highest education level: Not on file   Occupational History     Occupation: sales and marketing   Tobacco Use     Smoking status: Never Smoker     Smokeless tobacco: Never Used     Tobacco comment: never smoked   Vaping Use     Vaping Use: Never used   Substance and Sexual Activity     Alcohol  use: Yes     Alcohol/week: 0.0 standard drinks     Comment: rare     Drug use: No     Sexual activity: Yes     Partners: Female   Other Topics Concern     Parent/sibling w/ CABG, MI or angioplasty before 65F 55M? Yes   Social History Narrative     Not on file     Social Determinants of Health     Financial Resource Strain: Not on file   Food Insecurity: Not on file   Transportation Needs: Not on file   Physical Activity: Not on file   Stress: Not on file   Social Connections: Not on file   Intimate Partner Violence: Not on file   Housing Stability: Not on file        OBJECTIVE     Vital signs reviewed by Luther Fernández PA-C  There were no vitals taken for this visit.     Physical Exam  Vitals reviewed.   Constitutional:       General: He is not in acute distress.     Appearance: He is well-developed. He is not ill-appearing, toxic-appearing or diaphoretic.   HENT:      Head: Normocephalic and atraumatic.      Right Ear: Hearing, tympanic membrane, ear canal and external ear normal. No drainage, swelling or tenderness. Tympanic membrane is not perforated, erythematous, retracted or bulging.      Left Ear: Hearing, tympanic membrane, ear canal and external ear normal. No drainage, swelling or tenderness. Tympanic membrane is not perforated, erythematous, retracted or bulging.      Nose: Congestion and rhinorrhea present. No nasal tenderness or mucosal edema.      Right Turbinates: Not enlarged or swollen.      Left Turbinates: Not enlarged or swollen.      Right Sinus: No maxillary sinus tenderness or frontal sinus tenderness.      Left Sinus: No maxillary sinus tenderness or frontal sinus tenderness.      Mouth/Throat:      Pharynx: Posterior oropharyngeal erythema present. No pharyngeal swelling, oropharyngeal exudate or uvula swelling.      Tonsils: No tonsillar exudate. 0 on the right. 0 on the left.   Eyes:      General: Lids are normal.         Right eye: No discharge.         Left eye: No discharge.       Conjunctiva/sclera: Conjunctivae normal.      Right eye: Right conjunctiva is not injected. No exudate.     Left eye: Left conjunctiva is not injected. No exudate.     Pupils: Pupils are equal, round, and reactive to light.   Cardiovascular:      Rate and Rhythm: Normal rate and regular rhythm.      Heart sounds: Normal heart sounds. No murmur heard.  No friction rub. No gallop.    Pulmonary:      Effort: Pulmonary effort is normal. No accessory muscle usage, respiratory distress or retractions.      Breath sounds: Normal breath sounds and air entry. No stridor, decreased air movement or transmitted upper airway sounds. No decreased breath sounds, wheezing, rhonchi or rales.   Chest:      Chest wall: No tenderness.   Abdominal:      General: Bowel sounds are normal. There is no distension.      Palpations: Abdomen is soft. Abdomen is not rigid. There is no mass.      Tenderness: There is no abdominal tenderness. There is no guarding or rebound.   Musculoskeletal:         General: Normal range of motion.      Cervical back: Normal range of motion and neck supple.   Lymphadenopathy:      Head:      Right side of head: No submental, submandibular, tonsillar, preauricular or posterior auricular adenopathy.      Left side of head: No submental, submandibular, tonsillar, preauricular or posterior auricular adenopathy.      Cervical:      Right cervical: No superficial or posterior cervical adenopathy.     Left cervical: No superficial or posterior cervical adenopathy.   Skin:     General: Skin is warm.      Capillary Refill: Capillary refill takes less than 2 seconds.   Neurological:      Mental Status: He is alert and oriented to person, place, and time.      Cranial Nerves: No cranial nerve deficit.      Sensory: No sensory deficit.      Motor: No abnormal muscle tone.      Coordination: Coordination normal.      Deep Tendon Reflexes: Reflexes normal.   Psychiatric:         Behavior: Behavior normal. Behavior is  cooperative.         Thought Content: Thought content normal.         Judgment: Judgment normal.           Luther Fernández PA-C  11/25/2021, 9:03 AM

## 2021-11-25 NOTE — PATIENT INSTRUCTIONS
Patient Education     Sty (or Stye)  A sty is when the oil gland of the eyelid becomes inflamed. It may develop into an infection with a small pocket of pus (an abscess). This can cause pain, redness, and swelling. In early stages, a sty is treated with antibiotic cream, eye drops, or a small towel soaked in warm water (a warm compress). More severe cases may need to be opened and drained by a healthcare provider.   Home care    Eye drops or ointment are often prescribed to treat the infection. Use these as directed.     Artificial tears may also be used to lubricate the eye and make it more comfortable. You can buy these over the counter without a prescription. Talk with your healthcare provider before using any over-the-counter treatment for a sty.    Apply a warm, damp towel to the affected area for at least 5 minutes, 3 to 4 times a day for a week. Warm compresses open the pores and speed the healing. Make sure the compresses are not too hot, as they may burn your eyelid.    Sometimes the sty will drain with this treatment alone. If this happens, keep using the antibiotic until all the redness and swelling are gone.    Wash your hands before and after touching the infected eyelid.    Don t squeeze or try to break open the sty.    Follow-up care  Follow up with your healthcare provider, or as advised.   When to seek medical advice  Call your healthcare provider or seek medical care right away if any of these occur:     Increase in swelling or redness around the eyelid after 48 to 72 hours    Increase in eye pain or the eyelid blisters    Increase in warmth--the eyelid feels hot    Drainage of blood or thick pus from the sty    Blister on the eyelid    Inability to open the eyelid due to swelling    Fever of 100.4 F (38 C) or above, or as directed by your provider    Vision changes    Headache or stiff neck    The sty comes back  Sanna last reviewed this educational content on 6/1/2020 2000-2021 [employer]  [employer]. All rights reserved. This information is not intended as a substitute for professional medical care. Always follow your healthcare professional's instructions.

## 2021-11-26 ENCOUNTER — TELEPHONE (OUTPATIENT)
Dept: FAMILY MEDICINE | Facility: CLINIC | Age: 71
End: 2021-11-26
Payer: COMMERCIAL

## 2021-11-26 DIAGNOSIS — F51.01 PRIMARY INSOMNIA: ICD-10-CM

## 2021-11-26 DIAGNOSIS — F41.9 ANXIETY: ICD-10-CM

## 2021-11-26 LAB — SARS-COV-2 RNA RESP QL NAA+PROBE: NEGATIVE

## 2021-11-26 NOTE — TELEPHONE ENCOUNTER
Will send pharmacy concerns to provider.  Also note that patient is also getting ativan routinely.

## 2021-11-26 NOTE — TELEPHONE ENCOUNTER
Patient is consistently requesting early refills for Zolpidem for various reasons. Total #360 in 288 days. Please advise per pharmacy.

## 2021-11-27 RX ORDER — LORAZEPAM 0.5 MG/1
TABLET ORAL
Qty: 25 TABLET | Refills: 0 | Status: SHIPPED | OUTPATIENT
Start: 2021-11-27 | End: 2021-11-30

## 2021-11-27 RX ORDER — ZOLPIDEM TARTRATE 12.5 MG/1
TABLET, FILM COATED, EXTENDED RELEASE ORAL
Qty: 30 TABLET | Refills: 0 | Status: SHIPPED | OUTPATIENT
Start: 2021-11-27 | End: 2022-01-03

## 2021-11-28 DIAGNOSIS — I48.20 CHRONIC ATRIAL FIBRILLATION (H): ICD-10-CM

## 2021-11-28 DIAGNOSIS — I10 ESSENTIAL HYPERTENSION: ICD-10-CM

## 2021-11-30 ENCOUNTER — OFFICE VISIT (OUTPATIENT)
Dept: FAMILY MEDICINE | Facility: CLINIC | Age: 71
End: 2021-11-30
Payer: COMMERCIAL

## 2021-11-30 VITALS
HEART RATE: 70 BPM | OXYGEN SATURATION: 98 % | RESPIRATION RATE: 20 BRPM | WEIGHT: 235.6 LBS | BODY MASS INDEX: 33.81 KG/M2 | DIASTOLIC BLOOD PRESSURE: 93 MMHG | SYSTOLIC BLOOD PRESSURE: 142 MMHG | TEMPERATURE: 97.8 F

## 2021-11-30 DIAGNOSIS — H00.15 CHALAZION LEFT LOWER EYELID: ICD-10-CM

## 2021-11-30 DIAGNOSIS — I48.20 CHRONIC ATRIAL FIBRILLATION (H): ICD-10-CM

## 2021-11-30 DIAGNOSIS — I10 ESSENTIAL HYPERTENSION: Primary | ICD-10-CM

## 2021-11-30 PROCEDURE — 99214 OFFICE O/P EST MOD 30 MIN: CPT | Performed by: PHYSICIAN ASSISTANT

## 2021-11-30 RX ORDER — AMLODIPINE BESYLATE 5 MG/1
TABLET ORAL
Qty: 90 TABLET | Refills: 0 | OUTPATIENT
Start: 2021-11-30

## 2021-11-30 RX ORDER — RIVAROXABAN 20 MG/1
TABLET, FILM COATED ORAL
Qty: 90 TABLET | Refills: 0 | OUTPATIENT
Start: 2021-11-30

## 2021-11-30 RX ORDER — AMLODIPINE BESYLATE 5 MG/1
7.5 TABLET ORAL DAILY
Qty: 45 TABLET | Refills: 0 | Status: SHIPPED | OUTPATIENT
Start: 2021-11-30 | End: 2021-12-14

## 2021-11-30 ASSESSMENT — PAIN SCALES - GENERAL: PAINLEVEL: NO PAIN (0)

## 2021-11-30 ASSESSMENT — ENCOUNTER SYMPTOMS
HYPERTENSION: 1
EYE PAIN: 1

## 2021-11-30 ASSESSMENT — PATIENT HEALTH QUESTIONNAIRE - PHQ9
SUM OF ALL RESPONSES TO PHQ QUESTIONS 1-9: 6
10. IF YOU CHECKED OFF ANY PROBLEMS, HOW DIFFICULT HAVE THESE PROBLEMS MADE IT FOR YOU TO DO YOUR WORK, TAKE CARE OF THINGS AT HOME, OR GET ALONG WITH OTHER PEOPLE: NOT DIFFICULT AT ALL
SUM OF ALL RESPONSES TO PHQ QUESTIONS 1-9: 6

## 2021-11-30 NOTE — PATIENT INSTRUCTIONS
Patient Education     Chalazion    A chalazion is a blocked, swollen oil gland in the eyelid. The eyelids have oil glands that lubricate the inside of the lids. If a gland becomes blocked, the oil builds up and causes the skin to swell.  A chalazion can take several weeks to grow. It can vary in size. It may appear on the inside or outside of the lid. In most cases, it occurs on the upper lid. The skin may be a normal color or may be red. A chalazion is usually not painful. But it can cause mild pain, soreness, sensitivity to light, eye discharge, and increased tearing.  A chalazion often lasts from a few weeks to a month. It often goes away on its own. A chalazion can be mistaken for a sty (infection of an oil gland) because they both appear on the eyelid.  Why a chalazion forms  It s often unclear why a chalazion appears. But a chalazion can develop when you have any of the following conditions:    Chronic blepharitis, when eyelids become irritated    Acne rosacea    Seborrhea    Tuberculosis    Viral infection  Home care  If your healthcare provider finds that a chalazion is infected, he or she may prescribe an antibiotic drop or ointment. Use the medicine as directed.    Wash your hands carefully with soap and warm water before and after caring for your eye. This is to help prevent infection.    Apply a warm, moist towel or compress for 10 to 15 minutes, 3 to 4 times a day. This will reduce the swelling and soften the hardened oils blocking the duct.    Massage the area gently after applying the warm compress to help drain the chalazion. Or follow your healthcare provider s directions.    Don t try to pop or squeeze the chalazion.    Don t wear eye makeup until the chalazion has healed. Or follow your healthcare provider s directions.    Don t wear contact lenses until the chalazion has healed. Or follow your healthcare provider s directions.    Once a day, with eyes closed, clean your eyelids with baby  shampoo or a moist eyelid cleansing wipe. This is to help reduce clogging of the duct, as well as help prevent a chalazion from returning. Ask your healthcare provider about products to clean your eyelids.  Follow-up care  Follow up with your healthcare provider, or as advised. If the chalazion does not heal in 4 weeks, you may be referred to a healthcare provider who specializes in eye care (an optometrist or ophthalmologist) for further evaluation and treatment. You may also be referred to an eye specialist if you have a large chalazion.  When to seek medical advice  Call your healthcare provider right away if any of these occur:    Chalazion returns to the same area repeatedly    Existing symptoms (such as pain, warmth, redness, and drainage) get worse    New symptoms appear, such as eye pain, warmth or redness around the eye, eye drainage, or both the upper and lower lids of the same eye swell    You have visual changes or blurred vision    You have a headache that persists    You have a fever of 100.4 F (38 C) or higher, or as directed by your healthcare provider  Sanna last reviewed this educational content on 3/1/2018    8737-3442 [employer]. All rights reserved. This information is not intended as a substitute for professional medical care. Always follow your healthcare professional's instructions.

## 2021-11-30 NOTE — PROGRESS NOTES
Subjective   Tee is a 71 year old who presents for the following health issues    Hypertension     Eye Problem     History of Present Illness       Hypertension: He presents for follow up of hypertension.  He does check blood pressure  regularly outside of the clinic. Outside blood pressures have been over 140/90. He does not follow a low salt diet.     He eats 2-3 servings of fruits and vegetables daily.He consumes 1 sweetened beverage(s) daily.He exercises with enough effort to increase his heart rate 60 or more minutes per day.  He exercises with enough effort to increase his heart rate 4 days per week.   He is taking medications regularly.     Recheck of insomnia. Reports taking one tab daily only.     ED/ Followup:    Facility:  [facility]  Date of visit: 11/25/21  Reason for visit: Sty in left eye  Current Status: Better but not completely         Review of Systems   Eyes: Positive for pain.      Constitutional, HEENT, cardiovascular, pulmonary, GI, , musculoskeletal, neuro, skin, endocrine and psych systems are negative, except as otherwise noted.      Objective    There were no vitals taken for this visit.  There is no height or weight on file to calculate BMI.  Physical Exam   Thyroid exam reveals thyroid is normal in size without nodules or tenderness.'  CHEST:chest clear to IPPA, no tachypnea, retractions or cyanosis and Heart exam detail:irregularly irregular rhythm, chest is clear without rales or wheezing, no pedal edema, no JVD, no hepatosplenomegaly.    Foot exam - both sides normal; no swelling, tenderness or skin or vascular lesions. Color and temperature is normal. Sensation is intact. Peripheral pulses are palpable. Toenails are normal.  Left lower lid chalazion noted. Eye exam otherwise normal.       Tee was seen today for hypertension and eye problem.    Diagnoses and all orders for this visit:    Essential hypertension  -     amLODIPine (NORVASC) 5 MG tablet; Take 1.5  tablets (7.5 mg) by mouth daily    Chronic atrial fibrillation (H)  -     rivaroxaban ANTICOAGULANT (XARELTO ANTICOAGULANT) 20 MG TABS tablet; Take 1 tablet (20 mg) by mouth daily (with breakfast)    Chalazion left lower eyelid      work on lifestyle modification  Recheck blood pressure in 3-4 wks.   Answers for HPI/ROS submitted by the patient on 11/30/2021  If you checked off any problems, how difficult have these problems made it for you to do your work, take care of things at home, or get along with other people?: Not difficult at all  PHQ9 TOTAL SCORE: 6

## 2021-12-01 ASSESSMENT — PATIENT HEALTH QUESTIONNAIRE - PHQ9: SUM OF ALL RESPONSES TO PHQ QUESTIONS 1-9: 6

## 2021-12-10 DIAGNOSIS — F41.9 ANXIETY: ICD-10-CM

## 2021-12-10 RX ORDER — LORAZEPAM 0.5 MG/1
TABLET ORAL
Qty: 25 TABLET | Refills: 0 | Status: SHIPPED | OUTPATIENT
Start: 2021-12-10 | End: 2022-01-06

## 2021-12-10 NOTE — TELEPHONE ENCOUNTER
Routing refill request to provider for review/approval because:  Drug not on the [facility] refill protocol     Requested Prescriptions   Pending Prescriptions Disp Refills     LORazepam (ATIVAN) 0.5 MG tablet [Pharmacy Med Name: LORazepam 0.5 MG Oral Tablet] 25 tablet 0     Sig: TAKE 1 TABLET BY MOUTH EVERY 8 HOURS AS NEEDED FOR ANXIETY       There is no refill protocol information for this order

## 2021-12-11 DIAGNOSIS — I48.20 CHRONIC ATRIAL FIBRILLATION (H): ICD-10-CM

## 2021-12-13 ASSESSMENT — ENCOUNTER SYMPTOMS
MYALGIAS: 1
FEVER: 0
EYE PAIN: 0
FREQUENCY: 0
CHILLS: 0
HEADACHES: 0
PALPITATIONS: 0
JOINT SWELLING: 0
HEARTBURN: 1
SORE THROAT: 0
COUGH: 0
DIARRHEA: 0
DYSURIA: 0
ARTHRALGIAS: 0
DIZZINESS: 0
NERVOUS/ANXIOUS: 1
ABDOMINAL PAIN: 0
WEAKNESS: 0
NAUSEA: 0
CONSTIPATION: 0
SHORTNESS OF BREATH: 0
HEMATURIA: 0
PARESTHESIAS: 1
HEMATOCHEZIA: 0

## 2021-12-13 ASSESSMENT — ACTIVITIES OF DAILY LIVING (ADL): CURRENT_FUNCTION: NO ASSISTANCE NEEDED

## 2021-12-13 NOTE — TELEPHONE ENCOUNTER
Routing refill request to provider for review/approval because:  Drug not on the [facility] refill protocol   BP Readings from Last 3 Encounters:   11/30/21 (!) 142/93   11/25/21 (!) 138/96   10/04/21 125/82

## 2021-12-14 ENCOUNTER — OFFICE VISIT (OUTPATIENT)
Dept: FAMILY MEDICINE | Facility: CLINIC | Age: 71
End: 2021-12-14
Payer: COMMERCIAL

## 2021-12-14 VITALS
SYSTOLIC BLOOD PRESSURE: 124 MMHG | WEIGHT: 234 LBS | RESPIRATION RATE: 20 BRPM | HEIGHT: 69 IN | DIASTOLIC BLOOD PRESSURE: 80 MMHG | HEART RATE: 83 BPM | BODY MASS INDEX: 34.66 KG/M2 | TEMPERATURE: 98 F | OXYGEN SATURATION: 98 %

## 2021-12-14 DIAGNOSIS — I10 ESSENTIAL HYPERTENSION: ICD-10-CM

## 2021-12-14 DIAGNOSIS — I48.20 CHRONIC ATRIAL FIBRILLATION (H): ICD-10-CM

## 2021-12-14 DIAGNOSIS — Z71.89 ADVANCE CARE PLANNING: ICD-10-CM

## 2021-12-14 DIAGNOSIS — K22.70 BARRETT'S ESOPHAGUS DETERMINED BY BIOPSY: ICD-10-CM

## 2021-12-14 DIAGNOSIS — Z00.00 ENCOUNTER FOR ANNUAL WELLNESS EXAM IN MEDICARE PATIENT: Primary | ICD-10-CM

## 2021-12-14 DIAGNOSIS — Z79.899 HIGH RISK MEDICATION USE: ICD-10-CM

## 2021-12-14 DIAGNOSIS — I10 BENIGN ESSENTIAL HYPERTENSION: ICD-10-CM

## 2021-12-14 DIAGNOSIS — E79.0 HYPERURICEMIA: ICD-10-CM

## 2021-12-14 DIAGNOSIS — N18.32 STAGE 3B CHRONIC KIDNEY DISEASE (H): ICD-10-CM

## 2021-12-14 LAB
AMPHETAMINES UR QL: NOT DETECTED
ANION GAP SERPL CALCULATED.3IONS-SCNC: 6 MMOL/L (ref 3–14)
BARBITURATES UR QL SCN: NOT DETECTED
BENZODIAZ UR QL SCN: DETECTED
BUN SERPL-MCNC: 40 MG/DL (ref 7–30)
BUPRENORPHINE UR QL: NOT DETECTED
CALCIUM SERPL-MCNC: 9.5 MG/DL (ref 8.5–10.1)
CANNABINOIDS UR QL: NOT DETECTED
CHLORIDE BLD-SCNC: 105 MMOL/L (ref 94–109)
CO2 SERPL-SCNC: 25 MMOL/L (ref 20–32)
COCAINE UR QL SCN: NOT DETECTED
CREAT SERPL-MCNC: 1.89 MG/DL (ref 0.66–1.25)
D-METHAMPHET UR QL: NOT DETECTED
GFR SERPL CREATININE-BSD FRML MDRD: 35 ML/MIN/1.73M2
GLUCOSE BLD-MCNC: 106 MG/DL (ref 70–99)
METHADONE UR QL SCN: NOT DETECTED
OPIATES UR QL SCN: NOT DETECTED
OXYCODONE UR QL SCN: NOT DETECTED
PCP UR QL SCN: NOT DETECTED
POTASSIUM BLD-SCNC: 5 MMOL/L (ref 3.4–5.3)
PROPOXYPH UR QL: NOT DETECTED
SODIUM SERPL-SCNC: 136 MMOL/L (ref 133–144)
TRICYCLICS UR QL SCN: NOT DETECTED

## 2021-12-14 PROCEDURE — 82043 UR ALBUMIN QUANTITATIVE: CPT | Performed by: PHYSICIAN ASSISTANT

## 2021-12-14 PROCEDURE — 36415 COLL VENOUS BLD VENIPUNCTURE: CPT | Performed by: PHYSICIAN ASSISTANT

## 2021-12-14 PROCEDURE — 80048 BASIC METABOLIC PNL TOTAL CA: CPT | Performed by: PHYSICIAN ASSISTANT

## 2021-12-14 PROCEDURE — 80306 DRUG TEST PRSMV INSTRMNT: CPT | Performed by: PHYSICIAN ASSISTANT

## 2021-12-14 PROCEDURE — 99397 PER PM REEVAL EST PAT 65+ YR: CPT | Performed by: PHYSICIAN ASSISTANT

## 2021-12-14 RX ORDER — LISINOPRIL 20 MG/1
10 TABLET ORAL DAILY
Qty: 90 TABLET | Refills: 1 | Status: SHIPPED | OUTPATIENT
Start: 2021-12-14 | End: 2021-12-19

## 2021-12-14 RX ORDER — AMLODIPINE BESYLATE 5 MG/1
7.5 TABLET ORAL DAILY
Qty: 135 TABLET | Refills: 1 | Status: SHIPPED | OUTPATIENT
Start: 2021-12-14 | End: 2022-06-27

## 2021-12-14 RX ORDER — METOPROLOL SUCCINATE 100 MG/1
TABLET, EXTENDED RELEASE ORAL
Qty: 135 TABLET | Refills: 1 | Status: SHIPPED | OUTPATIENT
Start: 2021-12-14 | End: 2022-09-27

## 2021-12-14 RX ORDER — PANTOPRAZOLE SODIUM 40 MG/1
40 TABLET, DELAYED RELEASE ORAL 2 TIMES DAILY
Qty: 180 TABLET | Refills: 1 | Status: SHIPPED | OUTPATIENT
Start: 2021-12-14 | End: 2022-07-18

## 2021-12-14 RX ORDER — ALLOPURINOL 100 MG/1
100 TABLET ORAL DAILY
Qty: 90 TABLET | Refills: 3 | Status: SHIPPED | OUTPATIENT
Start: 2021-12-14 | End: 2022-12-27

## 2021-12-14 RX ORDER — METOPROLOL SUCCINATE 100 MG/1
TABLET, EXTENDED RELEASE ORAL
Qty: 135 TABLET | Refills: 0 | Status: SHIPPED | OUTPATIENT
Start: 2021-12-14 | End: 2021-12-14

## 2021-12-14 ASSESSMENT — ENCOUNTER SYMPTOMS
FREQUENCY: 0
NAUSEA: 0
NERVOUS/ANXIOUS: 1
WEAKNESS: 0
MYALGIAS: 1
JOINT SWELLING: 0
CONSTIPATION: 0
SHORTNESS OF BREATH: 0
HEMATOCHEZIA: 0
DYSURIA: 0
PARESTHESIAS: 1
HEMATURIA: 0
HEARTBURN: 1
COUGH: 0
FEVER: 0
SORE THROAT: 0
EYE PAIN: 0
ARTHRALGIAS: 0
DIARRHEA: 0
DIZZINESS: 0
HEADACHES: 0
PALPITATIONS: 0
CHILLS: 0
ABDOMINAL PAIN: 0

## 2021-12-14 ASSESSMENT — MIFFLIN-ST. JEOR: SCORE: 1810.18

## 2021-12-14 ASSESSMENT — ACTIVITIES OF DAILY LIVING (ADL): CURRENT_FUNCTION: NO ASSISTANCE NEEDED

## 2021-12-14 ASSESSMENT — PAIN SCALES - GENERAL: PAINLEVEL: NO PAIN (0)

## 2021-12-14 NOTE — PROGRESS NOTES
"SUBJECTIVE:   Tee Hilton is a 71 year old male who presents for Preventive Visit.      Patient has been advised of split billing requirements and indicates understanding: Yes   Patient would still like to discuss the following concern(s):  1. Medication check  2. Rash on back    Recheck of his htn  Are you in the first 12 months of your Medicare coverage?  No    Healthy Habits:     In general, how would you rate your overall health?  Good    Frequency of exercise:  4-5 days/week    Duration of exercise:  Greater than 60 minutes    Do you usually eat at least 4 servings of fruit and vegetables a day, include whole grains    & fiber and avoid regularly eating high fat or \"junk\" foods?  No    Taking medications regularly:  Yes    Medication side effects:  Not applicable    Ability to successfully perform activities of daily living:  No assistance needed    Home Safety:  Lack of grab bars in the bathroom    Hearing Impairment:  Difficulty following a conversation in a noisy restaurant or crowded room and need to ask people to speak up or repeat themselves    In the past 6 months, have you been bothered by leaking of urine?  No    In general, how would you rate your overall mental or emotional health?  Good      PHQ-2 Total Score: 1    Additional concerns today:  Yes    Do you feel safe in your environment? Yes    Have you ever done Advance Care Planning? (For example, a Health Directive, POLST, or a discussion with a medical provider or your loved ones about your wishes): No, advance care planning information given to patient to review.  Patient plans to discuss their wishes with loved ones or provider.        Fall risk  Fallen 2 or more times in the past year?: No  Any fall with injury in the past year?: No    Cognitive Screening   1) Repeat 3 items (Leader, Season, Table)    2) Clock draw: normal  3) 3 item recall: Recalls 2 objects   Results: NORMAL clock, 1-2 items recalled: COGNITIVE IMPAIRMENT LESS " LIKELY    Mini-CogTM Copyright MIKY Toth. Licensed by the author for use in [facility]; reprinted with permission (keyla@[facility]). All rights reserved.      Do you have sleep apnea, excessive snoring or daytime drowsiness?: yes    Reviewed and updated as needed this visit by clinical staff  Tobacco  Allergies  Meds   Med Hx  Surg Hx  Fam Hx  Soc Hx       Reviewed and updated as needed this visit by Provider               Social History     Tobacco Use     Smoking status: Never Smoker     Smokeless tobacco: Never Used     Tobacco comment: never smoked   Substance Use Topics     Alcohol use: Yes     Alcohol/week: 0.0 standard drinks     Comment: rare         Alcohol Use 12/13/2021   Prescreen: >3 drinks/day or >7 drinks/week? No   Prescreen: >3 drinks/day or >7 drinks/week? -       Current providers sharing in care for this patient include:   Patient Care Team:  Jon Denson PA-C as PCP - General (Physician Assistant)  Jon Denson PA-C as Assigned PCP  Alexander Avila MD as Assigned Surgical Provider  Olena Beltran DO as MD (Gastroenterology)    The following health maintenance items are reviewed in Epic and correct as of today:  Health Maintenance Due   Topic Date Due     MICROALBUMIN  Never done     URINE DRUG SCREEN  Never done     LIPID  11/20/2020     FALL RISK ASSESSMENT  08/26/2021     BMP  01/09/2022     Lab work is in process  Labs reviewed in EPIC  BP Readings from Last 3 Encounters:   12/14/21 124/80   11/30/21 (!) 142/93   11/25/21 (!) 138/96    Wt Readings from Last 3 Encounters:   12/14/21 106.1 kg (234 lb)   11/30/21 106.9 kg (235 lb 9.6 oz)   11/25/21 106.1 kg (234 lb)                  Patient Active Problem List   Diagnosis     Other male erectile dysfunction     Chronic midline low back pain without sciatica     Persistent atrial fibrillation (H)     Non morbid obesity due to excess calories     Chronic systolic congestive heart failure (H)     Alcohol use      Gastroesophageal reflux disease without esophagitis     Other sleep apnea     Atrial fibrillation (H)     Gastroesophageal reflux disease     Morbid obesity (H)     AMINA (acute kidney injury) (H)     Fever and chills     Leukocytosis     Hyponatremia     Dehydration     Essential hypertension     Chronic atrial fibrillation (H)     Myalgia     Acute kidney injury (H)     Acute intractable headache, unspecified headache type     Bilateral low back pain with right-sided sciatica     Sensorineural hearing loss (SNHL) of both ears     Tinnitus, bilateral     Chronic kidney disease, stage 3 (H)     Ascending aorta enlargement (H)     Past Surgical History:   Procedure Laterality Date     ANESTHESIA CARDIOVERSION N/A 9/24/2018    Procedure: ANESTHESIA CARDIOVERSION;  Cardioversion ;  Surgeon: GENERIC ANESTHESIA PROVIDER;  Location: [facility] OR     BIOPSY  Summer 2021     COLONOSCOPY N/A 5/25/2017    Procedure: COMBINED COLONOSCOPY, SINGLE OR MULTIPLE BIOPSY/POLYPECTOMY BY BIOPSY;;  Surgeon: Aquilino Garcia MD;  Location: [facility]     COLONOSCOPY N/A 7/23/2021    Procedure: COLONOSCOPY, WITH POLYPECTOMY AND BIOPSY;  Surgeon: Israel Bey DO;  Location: [facility]     COLONOSCOPY WITH CO2 INSUFFLATION N/A 5/25/2017    Procedure: COLONOSCOPY WITH CO2 INSUFFLATION;  COLONOSCOPY SCREEN/ ORDAZ;  Surgeon: Aquilino Garcia MD;  Location: [facility]     OPEN REDUCTION INTERNAL FIXATION TIBIA       SEPTOPLASTY, TURBINOPLASTY, COMBINED Bilateral 7/9/2019    Procedure: ENDOSCOPIC SEPTOPLASTY, BILATERAL TURBINATE REDUCTION;  Surgeon: Alexander Avila MD;  Location: [facility] OR     TONSILLECTOMY         Social History     Tobacco Use     Smoking status: Never Smoker     Smokeless tobacco: Never Used     Tobacco comment: never smoked   Substance Use Topics     Alcohol use: Yes     Alcohol/week: 0.0 standard drinks     Comment: rare     Family History   Problem Relation Age of Onset     Anxiety Disorder Mother      Gout Father       Lung Cancer Father      Other Cancer Father          Current Outpatient Medications   Medication Sig Dispense Refill     allopurinol (ZYLOPRIM) 100 MG tablet Take 1 tablet (100 mg) by mouth daily 90 tablet 3     amLODIPine (NORVASC) 5 MG tablet Take 1.5 tablets (7.5 mg) by mouth daily 135 tablet 1     famotidine (PEPCID) 20 MG tablet Take 1 tablet (20 mg) by mouth nightly as needed 60 tablet 1     lisinopril (ZESTRIL) 20 MG tablet Take 0.5 tablets (10 mg) by mouth daily TAKE 1 TABLETS BY MOUTH ONCE DAILY 90 tablet 1     LORazepam (ATIVAN) 0.5 MG tablet TAKE 1 TABLET BY MOUTH EVERY 8 HOURS AS NEEDED FOR ANXIETY 25 tablet 0     metoprolol succinate ER (TOPROL-XL) 100 MG 24 hr tablet TAKE 1 & 1/2 (ONE & ONE-HALF) TABLETS BY MOUTH ONCE DAILY 135 tablet 1     ondansetron (ZOFRAN) 4 MG tablet TAKE 1 TABLET BY MOUTH EVERY 8 HOURS AS NEEDED FOR NAUSEA 30 tablet 0     pantoprazole (PROTONIX) 40 MG EC tablet Take 1 tablet (40 mg) by mouth 2 times daily 180 tablet 1     rivaroxaban ANTICOAGULANT (XARELTO ANTICOAGULANT) 20 MG TABS tablet Take 1 tablet (20 mg) by mouth daily (with breakfast) 90 tablet 3     sildenafil (REVATIO) 20 MG tablet Take 1 -5 tabs daily prn (Patient taking differently: Take  mg by mouth daily as needed Take 1 -5 tabs daily prn) 30 tablet 11     zolpidem ER (AMBIEN CR) 12.5 MG CR tablet TAKE 1 TABLET BY MOUTH NIGHTLY AS NEEDED FOR SLEEP 30 tablet 0     Allergies   Allergen Reactions     Erythromycin GI Disturbance     Upset stomach     Ethanol      Other reaction(s): stomach ache     Recent Labs   Lab Test 07/09/21 2012 02/23/21  1346 02/02/21  0440 02/01/21  0438 08/26/20  1253 11/20/19  1012 09/24/18  0734 08/23/18  1059 01/18/18  0932 11/11/16  0744   LDL  --   --   --   --   --  151*  --   --  129* 151*   HDL  --   --   --   --   --  76  --   --  77 71   TRIG  --   --   --   --   --  82  --   --  68 73   ALT 21 29  --  30   < >  --    < >  --   --   --    CR 1.61* 1.70*   < > 1.44*   < > 1.35*   " < > 1.12 0.79 0.98   GFRESTIMATED 43* 40*   < > 49*   < > 54*   < > 66 >90 77   GFRESTBLACK 50* 47*   < > 57*   < > 63   < > 79 >90 >90   GFR Calc     POTASSIUM 4.2 4.1   < > 4.8   < > 5.1   < > 4.9 4.0 5.1   TSH  --   --   --   --   --   --   --  2.41 1.84  --     < > = values in this interval not displayed.              Review of Systems   Constitutional: Negative for chills and fever.   HENT: Positive for hearing loss. Negative for congestion, ear pain and sore throat.    Eyes: Negative for pain and visual disturbance.   Respiratory: Negative for cough and shortness of breath.    Cardiovascular: Negative for chest pain, palpitations and peripheral edema.   Gastrointestinal: Positive for heartburn. Negative for abdominal pain, constipation, diarrhea, hematochezia and nausea.   Genitourinary: Positive for impotence. Negative for dysuria, frequency, genital sores, hematuria, penile discharge and urgency.   Musculoskeletal: Positive for myalgias. Negative for arthralgias and joint swelling.   Skin: Positive for rash.   Neurological: Positive for paresthesias. Negative for dizziness, weakness and headaches.   Psychiatric/Behavioral: Negative for mood changes. The patient is nervous/anxious.      Constitutional, HEENT, cardiovascular, pulmonary, GI, , musculoskeletal, neuro, skin, endocrine and psych systems are negative, except as otherwise noted.    OBJECTIVE:   /80   Pulse 83   Temp 98  F (36.7  C) (Tympanic)   Resp 20   Ht 1.742 m (5' 8.58\")   Wt 106.1 kg (234 lb)   SpO2 98%   BMI 34.98 kg/m   Estimated body mass index is 34.98 kg/m  as calculated from the following:    Height as of this encounter: 1.742 m (5' 8.58\").    Weight as of this encounter: 106.1 kg (234 lb).  Physical Exam  GENERAL: healthy, alert and no distress  EYES: Eyes grossly normal to inspection, PERRL and conjunctivae and sclerae normal  HENT: ear canals and TM's normal, nose and mouth without ulcers or " "lesions  NECK: no adenopathy, no asymmetry, masses, or scars and thyroid normal to palpation  RESP: lungs clear to auscultation - no rales, rhonchi or wheezes  CV: irreg/irreg rate and rhythm, no murmur, click or rub, no peripheral edema and peripheral pulses strong  ABDOMEN: soft, nontender, no hepatosplenomegaly, no masses and bowel sounds normal  MS: no gross musculoskeletal defects noted, no edema  SKIN: no suspicious lesions or rashes  NEURO: Normal strength and tone, mentation intact and speech normal  PSYCH: mentation appears normal, affect normal/bright    Diagnostic Test Results:  Labs reviewed in Epic    ASSESSMENT / PLAN:       ICD-10-CM    1. Encounter for annual wellness exam in Medicare patient  Z00.00    2. Advance care planning  Z71.89    3. High risk medication use  Z79.899 Urine Drugs of Abuse Screen Panel 13   4. Essential hypertension  I10 Albumin Random Urine Quantitative with Creat Ratio     Basic metabolic panel  (Ca, Cl, CO2, Creat, Gluc, K, Na, BUN)     amLODIPine (NORVASC) 5 MG tablet   5. Stage 3b chronic kidney disease (H)  N18.32    6. Chronic atrial fibrillation (H)  I48.20 metoprolol succinate ER (TOPROL-XL) 100 MG 24 hr tablet   7. Benign essential hypertension  I10 lisinopril (ZESTRIL) 20 MG tablet   8. Hyperuricemia  E79.0 allopurinol (ZYLOPRIM) 100 MG tablet   9. Dunabr's esophagus determined by biopsy  K22.70 pantoprazole (PROTONIX) 40 MG EC tablet       Patient has been advised of split billing requirements and indicates understanding: Yes  COUNSELING:  Reviewed preventive health counseling, as reflected in patient instructions       Regular exercise       Healthy diet/nutrition    Estimated body mass index is 34.98 kg/m  as calculated from the following:    Height as of this encounter: 1.742 m (5' 8.58\").    Weight as of this encounter: 106.1 kg (234 lb).    Weight management plan: Discussed healthy diet and exercise guidelines    He reports that he has never smoked. He has " never used smokeless tobacco.      Appropriate preventive services were discussed with this patient, including applicable screening as appropriate for cardiovascular disease, diabetes, osteopenia/osteoporosis, and glaucoma.  As appropriate for age/gender, discussed screening for colorectal cancer, prostate cancer, breast cancer, and cervical cancer. Checklist reviewing preventive services available has been given to the patient.    Reviewed patients plan of care and provided an AVS. The Basic Care Plan (routine screening as documented in Health Maintenance) for Tee meets the Care Plan requirement. This Care Plan has been established and reviewed with the Patient.    Counseling Resources:  ATP IV Guidelines  Pooled Cohorts Equation Calculator  Breast Cancer Risk Calculator  Breast Cancer: Medication to Reduce Risk  FRAX Risk Assessment  ICSI Preventive Guidelines  Dietary Guidelines for Americans, 2010  USDA's MyPlate  ASA Prophylaxis  Lung CA Screening    [facility]    Identified Health Risks:

## 2021-12-15 LAB
CREAT UR-MCNC: 191 MG/DL
MICROALBUMIN UR-MCNC: 9 MG/L
MICROALBUMIN/CREAT UR: 4.71 MG/G CR (ref 0–17)

## 2021-12-19 RX ORDER — LISINOPRIL 20 MG/1
10 TABLET ORAL DAILY
Qty: 90 TABLET | Refills: 1 | Status: SHIPPED | OUTPATIENT
Start: 2021-12-19 | End: 2022-07-28

## 2021-12-27 ENCOUNTER — LAB (OUTPATIENT)
Dept: LAB | Facility: CLINIC | Age: 71
End: 2021-12-27
Payer: COMMERCIAL

## 2021-12-27 DIAGNOSIS — N18.32 STAGE 3B CHRONIC KIDNEY DISEASE (H): ICD-10-CM

## 2021-12-27 LAB
ANION GAP SERPL CALCULATED.3IONS-SCNC: 4 MMOL/L (ref 3–14)
BUN SERPL-MCNC: 27 MG/DL (ref 7–30)
CALCIUM SERPL-MCNC: 9.7 MG/DL (ref 8.5–10.1)
CHLORIDE BLD-SCNC: 105 MMOL/L (ref 94–109)
CO2 SERPL-SCNC: 30 MMOL/L (ref 20–32)
CREAT SERPL-MCNC: 1.45 MG/DL (ref 0.66–1.25)
GFR SERPL CREATININE-BSD FRML MDRD: 52 ML/MIN/1.73M2
GLUCOSE BLD-MCNC: 111 MG/DL (ref 70–99)
POTASSIUM BLD-SCNC: 5.3 MMOL/L (ref 3.4–5.3)
SODIUM SERPL-SCNC: 139 MMOL/L (ref 133–144)

## 2021-12-27 PROCEDURE — 80048 BASIC METABOLIC PNL TOTAL CA: CPT

## 2021-12-27 PROCEDURE — 36415 COLL VENOUS BLD VENIPUNCTURE: CPT

## 2022-01-02 ENCOUNTER — MYC MEDICAL ADVICE (OUTPATIENT)
Dept: FAMILY MEDICINE | Facility: CLINIC | Age: 72
End: 2022-01-02
Payer: COMMERCIAL

## 2022-01-02 DIAGNOSIS — F51.01 PRIMARY INSOMNIA: ICD-10-CM

## 2022-01-05 ENCOUNTER — MYC MEDICAL ADVICE (OUTPATIENT)
Dept: FAMILY MEDICINE | Facility: CLINIC | Age: 72
End: 2022-01-05
Payer: COMMERCIAL

## 2022-01-05 DIAGNOSIS — F41.9 ANXIETY: ICD-10-CM

## 2022-01-05 RX ORDER — ZOLPIDEM TARTRATE 12.5 MG/1
12.5 TABLET, FILM COATED, EXTENDED RELEASE ORAL
Qty: 30 TABLET | Refills: 0 | Status: SHIPPED | OUTPATIENT
Start: 2022-01-05 | End: 2022-02-02

## 2022-01-06 RX ORDER — LORAZEPAM 0.5 MG/1
TABLET ORAL
Qty: 25 TABLET | Refills: 0 | Status: SHIPPED | OUTPATIENT
Start: 2022-01-06 | End: 2022-02-05

## 2022-02-02 ENCOUNTER — TELEPHONE (OUTPATIENT)
Dept: FAMILY MEDICINE | Facility: CLINIC | Age: 72
End: 2022-02-02
Payer: COMMERCIAL

## 2022-02-02 NOTE — TELEPHONE ENCOUNTER
[facility] pharmacy ([place]) called in to inform the provider that they cannot accept the zolpidem prescription that was sent in. Pharmacist stated they cannot accept prescriptions for controlled substances from a PA out of state. They can only accept controlled substance prescription from out of state MDs.     They are informing the patient of this.     Kylie Hummel RN

## 2022-02-03 DIAGNOSIS — F41.9 ANXIETY: ICD-10-CM

## 2022-02-04 NOTE — TELEPHONE ENCOUNTER
Requested Prescriptions   Pending Prescriptions Disp Refills     LORazepam (ATIVAN) 0.5 MG tablet [Pharmacy Med Name: LORazepam 0.5 MG Oral Tablet] 25 tablet 0     Sig: TAKE 1 TABLET BY MOUTH EVERY 8 HOURS AS NEEDED FOR ANXIETY       There is no refill protocol information for this order        Last Written Prescription Date:  1/6/22  Last Fill Quantity: 25,  # refills: 0   Last office visit: 12/14/2021 with prescribing provider      Routing refill request to provider for review/approval because:  Drug not on the [facility] refill protocol

## 2022-02-05 DIAGNOSIS — F41.9 ANXIETY: ICD-10-CM

## 2022-02-05 DIAGNOSIS — F51.01 PRIMARY INSOMNIA: ICD-10-CM

## 2022-02-05 RX ORDER — LORAZEPAM 0.5 MG/1
TABLET ORAL
Qty: 25 TABLET | Refills: 0 | Status: SHIPPED | OUTPATIENT
Start: 2022-02-05 | End: 2022-03-07

## 2022-02-07 NOTE — TELEPHONE ENCOUNTER
See other mychart and refill encounter regarding this issue.    Brandee Torres RN  [facility]

## 2022-02-07 NOTE — TELEPHONE ENCOUNTER
See other mychart message.   Patient states the [place] pharmacy did not receive the lorazepam and zolpidem Rx's.    I called and spoke to pharmacy who states that in [place], NP or PA cannot fax or e-prescribe controlled meds.  Need MD to send.    Routed to covering provider pool to address re-sending the zolpidem and lorazepam sent on 2/4 and 2/5 by Jon Denson.      Brandee Torres RN  [facility]

## 2022-02-08 RX ORDER — ZOLPIDEM TARTRATE 12.5 MG/1
TABLET, FILM COATED, EXTENDED RELEASE ORAL
Qty: 30 TABLET | Refills: 0 | Status: SHIPPED | OUTPATIENT
Start: 2022-02-08 | End: 2022-03-07

## 2022-02-08 RX ORDER — LORAZEPAM 0.5 MG/1
TABLET ORAL
Qty: 25 TABLET | Refills: 0 | OUTPATIENT
Start: 2022-02-08

## 2022-03-02 ENCOUNTER — MYC REFILL (OUTPATIENT)
Dept: FAMILY MEDICINE | Facility: CLINIC | Age: 72
End: 2022-03-02
Payer: COMMERCIAL

## 2022-03-02 DIAGNOSIS — F41.9 ANXIETY: ICD-10-CM

## 2022-03-02 DIAGNOSIS — F51.01 PRIMARY INSOMNIA: ICD-10-CM

## 2022-03-02 RX ORDER — ZOLPIDEM TARTRATE 12.5 MG/1
TABLET, FILM COATED, EXTENDED RELEASE ORAL
Qty: 30 TABLET | Refills: 0 | Status: CANCELLED | OUTPATIENT
Start: 2022-03-02

## 2022-03-02 RX ORDER — LORAZEPAM 0.5 MG/1
TABLET ORAL
Qty: 25 TABLET | Refills: 0 | Status: CANCELLED | OUTPATIENT
Start: 2022-03-02

## 2022-03-04 DIAGNOSIS — F51.01 PRIMARY INSOMNIA: ICD-10-CM

## 2022-03-04 DIAGNOSIS — F41.9 ANXIETY: ICD-10-CM

## 2022-03-04 NOTE — TELEPHONE ENCOUNTER
Routing refill request to provider for review/approval because:  Drug not on the [facility] refill protocol   LORazepam (ATIVAN) 0.5 MG tablet 25 tablet 0 2/5/2022  No   Sig: TAKE 1 TABLET BY MOUTH EVERY 8 HOURS AS NEEDED FOR ANXIETY     zolpidem ER (AMBIEN CR) 12.5 MG CR tablet 30 tablet 0 2/8/2022  No   Sig: TAKE 1 TABLET BY MOUTH NIGHTLY AS NEEDED   LAST OV-12/14/21

## 2022-03-04 NOTE — TELEPHONE ENCOUNTER
Duplicate request.  Other request from 3-4-22 sent to provider pool today.  Will close once other request is addressed.

## 2022-03-07 RX ORDER — LORAZEPAM 0.5 MG/1
TABLET ORAL
Qty: 25 TABLET | Refills: 0 | Status: SHIPPED | OUTPATIENT
Start: 2022-03-07 | End: 2022-04-08

## 2022-03-07 RX ORDER — ZOLPIDEM TARTRATE 12.5 MG/1
TABLET, FILM COATED, EXTENDED RELEASE ORAL
Qty: 30 TABLET | Refills: 0 | Status: SHIPPED | OUTPATIENT
Start: 2022-03-07 | End: 2022-03-31

## 2022-04-06 DIAGNOSIS — F41.9 ANXIETY: ICD-10-CM

## 2022-04-07 NOTE — TELEPHONE ENCOUNTER
Routing refill request to provider for review/approval because:  Drug not on the [facility] refill protocol   LORazepam (ATIVAN) 0.5 MG tablet 25 tablet 0 3/7/2022  No   Sig: TAKE 1 TABLET BY MOUTH EVERY 8 HOURS AS NEEDED FOR ANXIETY     LAST OV-12/14/2021

## 2022-04-08 RX ORDER — LORAZEPAM 0.5 MG/1
TABLET ORAL
Qty: 25 TABLET | Refills: 0 | Status: SHIPPED | OUTPATIENT
Start: 2022-04-08 | End: 2022-05-11

## 2022-04-27 ENCOUNTER — OFFICE VISIT (OUTPATIENT)
Dept: FAMILY MEDICINE | Facility: CLINIC | Age: 72
End: 2022-04-27
Payer: COMMERCIAL

## 2022-04-27 ENCOUNTER — ANCILLARY PROCEDURE (OUTPATIENT)
Dept: GENERAL RADIOLOGY | Facility: CLINIC | Age: 72
End: 2022-04-27
Attending: PHYSICIAN ASSISTANT
Payer: COMMERCIAL

## 2022-04-27 VITALS
RESPIRATION RATE: 20 BRPM | HEIGHT: 69 IN | SYSTOLIC BLOOD PRESSURE: 135 MMHG | HEART RATE: 100 BPM | TEMPERATURE: 97.4 F | OXYGEN SATURATION: 99 % | DIASTOLIC BLOOD PRESSURE: 78 MMHG | WEIGHT: 244 LBS | BODY MASS INDEX: 36.14 KG/M2

## 2022-04-27 DIAGNOSIS — M25.512 ACUTE PAIN OF LEFT SHOULDER: ICD-10-CM

## 2022-04-27 DIAGNOSIS — N18.32 STAGE 3B CHRONIC KIDNEY DISEASE (H): ICD-10-CM

## 2022-04-27 DIAGNOSIS — M75.82 TENDINITIS OF LEFT ROTATOR CUFF: ICD-10-CM

## 2022-04-27 DIAGNOSIS — I48.20 CHRONIC ATRIAL FIBRILLATION (H): ICD-10-CM

## 2022-04-27 DIAGNOSIS — I50.22 CHRONIC SYSTOLIC HEART FAILURE (H): ICD-10-CM

## 2022-04-27 DIAGNOSIS — I77.89 ASCENDING AORTA ENLARGEMENT (H): ICD-10-CM

## 2022-04-27 DIAGNOSIS — E66.01 MORBID OBESITY (H): ICD-10-CM

## 2022-04-27 DIAGNOSIS — R91.8 PULMONARY NODULES: ICD-10-CM

## 2022-04-27 DIAGNOSIS — I10 BENIGN ESSENTIAL HYPERTENSION: Primary | ICD-10-CM

## 2022-04-27 LAB
ANION GAP SERPL CALCULATED.3IONS-SCNC: 7 MMOL/L (ref 3–14)
BUN SERPL-MCNC: 38 MG/DL (ref 7–30)
CALCIUM SERPL-MCNC: 10 MG/DL (ref 8.5–10.1)
CHLORIDE BLD-SCNC: 106 MMOL/L (ref 94–109)
CO2 SERPL-SCNC: 25 MMOL/L (ref 20–32)
CREAT SERPL-MCNC: 1.8 MG/DL (ref 0.66–1.25)
GFR SERPL CREATININE-BSD FRML MDRD: 40 ML/MIN/1.73M2
GLUCOSE BLD-MCNC: 108 MG/DL (ref 70–99)
HGB BLD-MCNC: 13.9 G/DL (ref 13.3–17.7)
POTASSIUM BLD-SCNC: 5.3 MMOL/L (ref 3.4–5.3)
SODIUM SERPL-SCNC: 138 MMOL/L (ref 133–144)

## 2022-04-27 PROCEDURE — 73030 X-RAY EXAM OF SHOULDER: CPT | Mod: LT | Performed by: RADIOLOGY

## 2022-04-27 PROCEDURE — 20610 DRAIN/INJ JOINT/BURSA W/O US: CPT | Performed by: PHYSICIAN ASSISTANT

## 2022-04-27 PROCEDURE — 80048 BASIC METABOLIC PNL TOTAL CA: CPT | Performed by: PHYSICIAN ASSISTANT

## 2022-04-27 PROCEDURE — 99214 OFFICE O/P EST MOD 30 MIN: CPT | Mod: 25 | Performed by: PHYSICIAN ASSISTANT

## 2022-04-27 PROCEDURE — 36415 COLL VENOUS BLD VENIPUNCTURE: CPT | Performed by: PHYSICIAN ASSISTANT

## 2022-04-27 PROCEDURE — 85018 HEMOGLOBIN: CPT | Performed by: PHYSICIAN ASSISTANT

## 2022-04-27 RX ORDER — METHYLPREDNISOLONE ACETATE 40 MG/ML
40 INJECTION, SUSPENSION INTRA-ARTICULAR; INTRALESIONAL; INTRAMUSCULAR; SOFT TISSUE ONCE
Status: COMPLETED | OUTPATIENT
Start: 2022-04-27 | End: 2022-04-27

## 2022-04-27 RX ADMIN — METHYLPREDNISOLONE ACETATE 40 MG: 40 INJECTION, SUSPENSION INTRA-ARTICULAR; INTRALESIONAL; INTRAMUSCULAR; SOFT TISSUE at 12:20

## 2022-04-27 ASSESSMENT — PAIN SCALES - GENERAL: PAINLEVEL: SEVERE PAIN (6)

## 2022-04-27 NOTE — PROGRESS NOTES
"      Daniella Oliveira is a 71 year old who presents for the following health issues     History of Present Illness       Back Pain:  He presents for follow up of back pain. Patient's back pain is a recurring problem.  Location of back pain:  Right lower back, left lower back and left upper back  Description of back pain: sharp  Back pain spreads: left shoulder    Since patient first noticed back pain, pain is: rapidly worsening  Does back pain interfere with his job:  Not applicable      Hypertension: He presents for follow up of hypertension.  He does check blood pressure  regularly outside of the clinic. Outpatient blood pressures have not been over 140/90. He does not follow a low salt diet. He consumes 1 sweetened beverage(s) daily.He exercises with enough effort to increase his heart rate 30 to 60 minutes per day.  He exercises with enough effort to increase his heart rate 5 days per week.   He is taking medications regularly.     His biggest problem is his left shoulder. Low back pain has improved quite a bit.   Recheck of obesity . Discussed lifestyle changes  Recheck of ckd.   Recheck of chf. No ZEPEDA/orthopnea/edema.  Recheck of thoracic aortic enlargement. No chest pain. Due for a recheck in 6 mos   Recheck of a fib.       Review of Systems   Constitutional, HEENT, cardiovascular, pulmonary, GI, , musculoskeletal, neuro, skin, endocrine and psych systems are negative, except as otherwise noted.      Objective    /78   Pulse 100   Temp 97.4  F (36.3  C) (Tympanic)   Resp 20   Ht 1.747 m (5' 8.78\")   Wt 110.7 kg (244 lb)   SpO2 99%   BMI 36.26 kg/m    Body mass index is 36.26 kg/m .  Physical Exam     Eye exam - right eye normal lid, conjunctiva, cornea, pupil and fundus, left eye normal lid, conjunctiva, cornea, pupil and fundus.  Thyroid not palpable, not enlarged, no nodules detected.  CHEST:chest clear to IPPA, no tachypnea, retractions or cyanosis and Heart exam detail:irregularly " irregular rhythm, chest is clear without rales or wheezing, no pedal edema, no JVD, no hepatosplenomegaly.  Shoulder exam shows positive impingement signs are present with pain at high arc of abduction and forward flexion on left. There is tenderness of the lateral shoulder  .       The risks, benefits and potential complications (including but not limited to, bleeding, infection, pain, scar, damage to adjacent structures, atrophy or necrosis of soft tissue, skin blanching, failure to relieve symptoms) of injection were discussed with the patient. Questions were addressed and answered.The patient elected to proceed. Written informed consent was obtained. The correct procedural site was identified and confirmed. A Left shoulder intraarticular injection was performed using 1mL Depo Medrol 40mg per mL and 2mL (0.25% marcaine) of local anesthetic after sterile prep, to the correct procedural site. Sterile bandaid applied. This was tolerated well by the patient. No apparent complications.Did also discuss that if diabetic, recommend close monitoring of blood sugars over the next week as cortisone injections can temporarily elevate blood sugars.    Tee was seen today for shoulder, covid concern and orders.    Diagnoses and all orders for this visit:    Benign essential hypertension  -     Basic metabolic panel  (Ca, Cl, CO2, Creat, Gluc, K, Na, BUN); Future  -     Basic metabolic panel  (Ca, Cl, CO2, Creat, Gluc, K, Na, BUN)    Chronic systolic heart failure (H)    Ascending aorta enlargement (H)  Will check and echo in 6 mos   Chronic atrial fibrillation (H)    Morbid obesity (H)    Stage 3b chronic kidney disease (H)  -     Hemoglobin; Future  -     Basic metabolic panel  (Ca, Cl, CO2, Creat, Gluc, K, Na, BUN); Future  -     Basic metabolic panel  (Ca, Cl, CO2, Creat, Gluc, K, Na, BUN)  -     Hemoglobin    Pulmonary nodules  -     CT Chest w/o Contrast; Future    Acute pain of left shoulder  -     XR Shoulder Left G/E 3  Views; Future  -     DRAIN/INJECT LARGE JOINT/BURSA  -     methylPREDNISolone (DEPO-MEDROL) injection 40 mg    Physical therapy referral.  Continue all current meds.  work on lifestyle modification  Advised supportive and symptomatic treatment.  Follow up with Provider - if condition persists or worsens.

## 2022-05-02 ENCOUNTER — ANCILLARY PROCEDURE (OUTPATIENT)
Dept: CT IMAGING | Facility: CLINIC | Age: 72
End: 2022-05-02
Attending: PHYSICIAN ASSISTANT
Payer: COMMERCIAL

## 2022-05-02 ENCOUNTER — MYC REFILL (OUTPATIENT)
Dept: FAMILY MEDICINE | Facility: CLINIC | Age: 72
End: 2022-05-02

## 2022-05-02 DIAGNOSIS — R91.8 PULMONARY NODULES: ICD-10-CM

## 2022-05-02 DIAGNOSIS — F51.01 PRIMARY INSOMNIA: ICD-10-CM

## 2022-05-02 PROCEDURE — 71250 CT THORAX DX C-: CPT | Mod: TC | Performed by: RADIOLOGY

## 2022-05-02 RX ORDER — ZOLPIDEM TARTRATE 12.5 MG/1
12.5 TABLET, FILM COATED, EXTENDED RELEASE ORAL
Qty: 30 TABLET | Refills: 0 | Status: CANCELLED | OUTPATIENT
Start: 2022-05-02

## 2022-05-10 ENCOUNTER — THERAPY VISIT (OUTPATIENT)
Dept: PHYSICAL THERAPY | Facility: CLINIC | Age: 72
End: 2022-05-10
Attending: PHYSICIAN ASSISTANT
Payer: COMMERCIAL

## 2022-05-10 DIAGNOSIS — G89.29 CHRONIC LEFT SHOULDER PAIN: ICD-10-CM

## 2022-05-10 DIAGNOSIS — F41.9 ANXIETY: ICD-10-CM

## 2022-05-10 DIAGNOSIS — M25.512 CHRONIC LEFT SHOULDER PAIN: ICD-10-CM

## 2022-05-10 DIAGNOSIS — M75.82 TENDINITIS OF LEFT ROTATOR CUFF: ICD-10-CM

## 2022-05-10 PROCEDURE — 97530 THERAPEUTIC ACTIVITIES: CPT | Mod: GP | Performed by: PHYSICAL THERAPIST

## 2022-05-10 PROCEDURE — 97110 THERAPEUTIC EXERCISES: CPT | Mod: GP | Performed by: PHYSICAL THERAPIST

## 2022-05-10 PROCEDURE — 97161 PT EVAL LOW COMPLEX 20 MIN: CPT | Mod: GP | Performed by: PHYSICAL THERAPIST

## 2022-05-10 NOTE — TELEPHONE ENCOUNTER
Routing refill request to provider for review/approval because:  Drug not on the [facility] refill protocol     LORazepam (ATIVAN) 0.5 MG tablet 25 tablet 0 4/8/2022       Last office visit: 4/27/2022 with prescribing provider:  Jon Denson   Future Office Visit:

## 2022-05-10 NOTE — PROGRESS NOTES
Physical Therapy Initial Evaluation  Subjective:    Patient Health History  Tee Hilton being seen for Left shoulder.     Problem began: 2/1/2022.   Problem occurred: On going   Pain is reported as 7/10 on pain scale.  General health as reported by patient is good.              Current medications:  High blood pressure medication and sleep medication.       Primary job tasks include:  Prolonged sitting.                    Physical Therapy Initial Examination/Evaluation    May 10, 2022    Tee Hilton  is a 71 year old  male referred to physical therapy by SERGEI Baumann for treatment of L shoulder pain.      DOI/onset 2/1/2022-  It has been hurting for the past 3 months.  He also x-rayed it 2 years ago and it is fired up again.   Mechanism of injury unknown, on/off.    DOS none  Prior treatment Cortisone injection. Effect of prior treatment slight improvement    Chief Complaint:   Lifting arm, raising the arm, mobility.  Cannot to do the front or side band anymore.     Pain location: L anterior shoulder, posterior amr  Quality: aching  Constant/Intermittent: intermittent, with movement  Time of day: evening  Symptoms have worsened since onset.    Current pain 7/10.  Pain at best 3/10.  Pain at worst 9/10.    Symptoms aggravated by reaching, lifitng.    Symptoms improved with rest.     Social history:  Pt is , 3 kids and 3 grandchildren.  Pt enjoys enjoys fishing.    Occupation: Retired, feels he is sitting too much.  Job duties:  Home and self duties.    Patient having difficulty with ADLs: reaching, lfiting.    Patient's goals are improve pain, return to fishing activities with less pain.    Patient reports general health as good.  Pt reports he used to go to the gym 4x/week; [employer] in [place].  Still goes to the gym and does knee push ups and strengthening.    Related medical history sleep apnea, HA ,.    Surgical History:  See chart.    Imaging: x-ray.    Medications:  As needed .        Outcome measure:   SPADI   Return to MD:  As needed.      Clinical Impression: Tee presents to [facility] with primary complaint of shoulder pain.  Per clinical examination, pt demonstrates special testing consistent with RTC pathology.  Pt with + impingement testing, decreased ROM and pain.   Pt will benefit from skilled physical therapy to improve pain and overall function.  See plan of care outlined below.                       Objective:  Posture:   Sitting: incr thoracic kyphosis.  Slight forward shoulders    Screen:   (-) cervical       System                   Shoulder Evaluation:  ROM:  AROM:    Flexion:  Left:  104    Right:  154    Abduction:  Left: 57   Right:  120            Elbow Flexion:  Left:  WNL    Right:  WNL  Elbow Extension:  Left:  WNL   Right:  WNL    Extension/Internal Rotation:  Left:  L5    Right:  T12    PROM:    Flexion:  Left:  130          Abduction:  Left:  152        Internal Rotation:  Left:  45    Right:  58  External Rotation:  Left:  71    Right:  85                    Strength:    Flexion: Left:3-/5   Pain:    Right: 4-/5     Pain:     Abduction:  Left: 3-/5  Pain:    Right: 4+/5     Pain:    Internal Rotation:  Left:5/5     Pain:    Right: 5/5     Pain:  External Rotation:   Left:3+/5    Weak/painful    Pain:++   Right:5/5     Pain:        Elbow Flexion:  Left:4+/5     Pain:    Right:5/5     Pain:  Elbow Extension:  Left:4/5     Pain:    Right:5/5     Pain:  Stability Testing:  not assessed      Special Tests:    Left shoulder positive for the following special tests:  Impingement  Left shoulder negative for the following special tests:  Rotator cuff tear and Acromioclavicular    Palpation:    Left shoulder tenderness present at:  Supraspinatus and Infraspinatus                                       General     ROS    Assessment/Plan:    Patient is a 71 year old male with left side shoulder complaints.    Patient has the following significant findings with corresponding  treatment plan.                Diagnosis 1:  L shoulder pain    Pain -  hot/cold therapy, US, manual therapy, self management, education and home program  Decreased ROM/flexibility - manual therapy and therapeutic exercise  Decreased joint mobility - manual therapy and therapeutic exercise  Decreased strength - therapeutic exercise and therapeutic activities  Impaired muscle performance - neuro re-education  Decreased function - therapeutic activities  Impaired posture - neuro re-education    Therapy Evaluation Codes:   1) History comprised of:   Personal factors that impact the plan of care:      none, active lifestyle.    Comorbidity factors that impact the plan of care are:      Sleep disorder/apnea and Headaches, cardiac   Medications impacting care: see chart, cardiac.  2) Examination of Body Systems comprised of:   Body structures and functions that impact the plan of care:      Shoulder.   Activity limitations that impact the plan of care are:      Bathing, Grasping, Lifting, Working and Sleeping.  3) Clinical presentation characteristics are:   Evolving/Changing.  4) Decision-Making    Low complexity using standardized patient assessment instrument and/or measureable assessment of functional outcome.  Cumulative Therapy Evaluation is: Low complexity.    Previous and current functional limitations:  (See Goal Flow Sheet for this information)    Short term and Long term goals: (See Goal Flow Sheet for this information)     Communication ability:  Patient appears to be able to clearly communicate and understand verbal and written communication and follow directions correctly.  Treatment Explanation - The following has been discussed with the patient:   RX ordered/plan of care  Anticipated outcomes  Possible risks and side effects  This patient would benefit from PT intervention to resume normal activities.   Rehab potential is good.    Frequency:  1 X week, once daily  Duration:  for 1 months tapering to 2 X a  month over 2 months  Discharge Plan:  Achieve all LTG.  Independent in home treatment program.  Reach maximal therapeutic benefit.    Please refer to the daily flowsheet for treatment today, total treatment time and time spent performing 1:1 timed codes.

## 2022-05-11 RX ORDER — LORAZEPAM 0.5 MG/1
TABLET ORAL
Qty: 25 TABLET | Refills: 0 | Status: SHIPPED | OUTPATIENT
Start: 2022-05-11 | End: 2022-06-23

## 2022-05-13 PROBLEM — M75.82 TENDINITIS OF LEFT ROTATOR CUFF: Status: ACTIVE | Noted: 2022-05-13

## 2022-05-17 ENCOUNTER — THERAPY VISIT (OUTPATIENT)
Dept: PHYSICAL THERAPY | Facility: CLINIC | Age: 72
End: 2022-05-17
Attending: PHYSICIAN ASSISTANT
Payer: COMMERCIAL

## 2022-05-17 DIAGNOSIS — G89.29 CHRONIC LEFT SHOULDER PAIN: Primary | ICD-10-CM

## 2022-05-17 DIAGNOSIS — M75.82 TENDINITIS OF LEFT ROTATOR CUFF: ICD-10-CM

## 2022-05-17 DIAGNOSIS — M25.512 CHRONIC LEFT SHOULDER PAIN: Primary | ICD-10-CM

## 2022-05-17 PROCEDURE — 97035 APP MDLTY 1+ULTRASOUND EA 15: CPT | Mod: GP | Performed by: PHYSICAL THERAPIST

## 2022-05-17 PROCEDURE — 97110 THERAPEUTIC EXERCISES: CPT | Mod: GP | Performed by: PHYSICAL THERAPIST

## 2022-05-17 PROCEDURE — 97140 MANUAL THERAPY 1/> REGIONS: CPT | Mod: GP | Performed by: PHYSICAL THERAPIST

## 2022-05-23 ENCOUNTER — TELEPHONE (OUTPATIENT)
Dept: FAMILY MEDICINE | Facility: CLINIC | Age: 72
End: 2022-05-23

## 2022-05-23 NOTE — TELEPHONE ENCOUNTER
Routed to PCP to advise.    Please see request for recent creatine level. Results on 4/27/22 not resulted in epic yet.    Creatinine   Date Value Ref Range Status   04/27/2022 1.80 (H) 0.66 - 1.25 mg/dL Final     Comment:     This result was previously suppressed from the chart.   07/09/2021 1.61 (H) 0.66 - 1.25 mg/dL Final           Hold orders for xaralto for 3 days prior to colonoscopy on 6/15/22      Pta RN,BSN  Triage Nurse  [facility]: [facility]  Ph: [phone]

## 2022-05-23 NOTE — TELEPHONE ENCOUNTER
Please advise on creatinine per note below.    Pat RN,BSN  Triage Nurse  [facility]: [facility]  Ph: [phone]

## 2022-05-23 NOTE — TELEPHONE ENCOUNTER
Reason for Call:  Other     Detailed comments: Nurse is requesting bridge and hold orders for xarelto medication for three days and if requesting less then three days patient will need a recent creatinine level . Scheduled for colonoscopy 06/15/2022    Phone Number   [facility] [phone]         Best Time:     Can we leave a detailed message on this number? Not Applicable    Call taken on 5/23/2022 at 11:50 AM by Akiko Muniz

## 2022-05-24 ENCOUNTER — THERAPY VISIT (OUTPATIENT)
Dept: PHYSICAL THERAPY | Facility: CLINIC | Age: 72
End: 2022-05-24
Attending: PHYSICIAN ASSISTANT
Payer: COMMERCIAL

## 2022-05-24 DIAGNOSIS — G89.29 CHRONIC LEFT SHOULDER PAIN: Primary | ICD-10-CM

## 2022-05-24 DIAGNOSIS — M75.82 TENDINITIS OF LEFT ROTATOR CUFF: ICD-10-CM

## 2022-05-24 DIAGNOSIS — M25.512 CHRONIC LEFT SHOULDER PAIN: Primary | ICD-10-CM

## 2022-05-24 PROCEDURE — 97140 MANUAL THERAPY 1/> REGIONS: CPT | Mod: GP | Performed by: PHYSICAL THERAPIST

## 2022-05-24 PROCEDURE — 97110 THERAPEUTIC EXERCISES: CPT | Mod: GP | Performed by: PHYSICAL THERAPIST

## 2022-05-25 NOTE — TELEPHONE ENCOUNTER
Called [facility] and relayed the providers message below. She verbalized understanding.     Called patient in regards to needing a follow up lab appointment next month. Left a message to return our call to the clinic.     When patient calls back please assist with scheduling a lab only visit in one month per provider's message below.     Kylie Hummel RN

## 2022-05-25 NOTE — TELEPHONE ENCOUNTER
His creatinine level mulu a little as compared to the last time we checked it , but has been up and down over the past several years. I'll have him recheck it again via a lab only visit next month (an order has been placed).

## 2022-05-30 DIAGNOSIS — F51.01 PRIMARY INSOMNIA: ICD-10-CM

## 2022-05-30 NOTE — TELEPHONE ENCOUNTER
Routing refill request to provider for review/approval because:  Drug not on the [facility] refill protocol   zolpidem ER (AMBIEN CR) 12.5 MG CR tablet 30 tablet 0 5/6/2022  No   Sig - Route: Take 1 tablet (12.5 mg) by mouth nightly as needed - Oral   LAST OV-4/27/2022

## 2022-06-03 RX ORDER — ZOLPIDEM TARTRATE 12.5 MG/1
TABLET, FILM COATED, EXTENDED RELEASE ORAL
Qty: 30 TABLET | Refills: 3 | Status: SHIPPED | OUTPATIENT
Start: 2022-06-03 | End: 2022-07-01

## 2022-06-08 ENCOUNTER — THERAPY VISIT (OUTPATIENT)
Dept: PHYSICAL THERAPY | Facility: CLINIC | Age: 72
End: 2022-06-08
Payer: COMMERCIAL

## 2022-06-08 DIAGNOSIS — G89.29 CHRONIC LEFT SHOULDER PAIN: Primary | ICD-10-CM

## 2022-06-08 DIAGNOSIS — M75.82 TENDINITIS OF LEFT ROTATOR CUFF: ICD-10-CM

## 2022-06-08 DIAGNOSIS — M25.512 CHRONIC LEFT SHOULDER PAIN: Primary | ICD-10-CM

## 2022-06-08 PROCEDURE — 97110 THERAPEUTIC EXERCISES: CPT | Mod: GP | Performed by: PHYSICAL THERAPIST

## 2022-06-08 PROCEDURE — 97112 NEUROMUSCULAR REEDUCATION: CPT | Mod: GP | Performed by: PHYSICAL THERAPIST

## 2022-06-15 ENCOUNTER — TRANSFERRED RECORDS (OUTPATIENT)
Dept: HEALTH INFORMATION MANAGEMENT | Facility: CLINIC | Age: 72
End: 2022-06-15

## 2022-06-16 ENCOUNTER — MYC MEDICAL ADVICE (OUTPATIENT)
Dept: FAMILY MEDICINE | Facility: CLINIC | Age: 72
End: 2022-06-16

## 2022-06-16 ENCOUNTER — TELEPHONE (OUTPATIENT)
Dept: FAMILY MEDICINE | Facility: CLINIC | Age: 72
End: 2022-06-16

## 2022-06-16 ENCOUNTER — VIRTUAL VISIT (OUTPATIENT)
Dept: FAMILY MEDICINE | Facility: CLINIC | Age: 72
End: 2022-06-16

## 2022-06-16 DIAGNOSIS — U07.1 COVID-19 VIRUS INFECTION: Primary | ICD-10-CM

## 2022-06-16 DIAGNOSIS — I48.20 CHRONIC ATRIAL FIBRILLATION (H): ICD-10-CM

## 2022-06-16 PROCEDURE — 99214 OFFICE O/P EST MOD 30 MIN: CPT | Mod: 95 | Performed by: PHYSICIAN ASSISTANT

## 2022-06-16 NOTE — TELEPHONE ENCOUNTER
Pharmacist Jasmine has questions about patient's prescriptions.     Please call her to discuss.     Patient's wife is waiting at the pharmacy.     Rachael James RN BSN  [facility]

## 2022-06-16 NOTE — PROGRESS NOTES
"Tee is a 71 year old who is being evaluated via a billable telephone visit.      What phone number would you like to be contacted at? [phone]  How would you like to obtain your AVS? [facility]    Assessment & Plan   Problem List Items Addressed This Visit        Circulatory    Chronic atrial fibrillation (H)    Relevant Medications    apixaban ANTICOAGULANT (ELIQUIS) 2.5 MG tablet (Start on 6/17/2022)      Other Visit Diagnoses     COVID-19 virus infection    -  Primary    Relevant Medications    nirmatrelvir and ritonavir (PAXLOVID) therapy pack (Start on 6/17/2022)    apixaban ANTICOAGULANT (ELIQUIS) 2.5 MG tablet (Start on 6/17/2022)         Impression is COVID-19. Sounds well and non-toxic and I have low suspicion for impending airway obstruction or respiratory distress.  He will push p.o. fluids, use over-the-counter meds for symptoms, complete a course of Paxlovid after discussing risks/benefits, make the below med adjustments and follow up in next 2 weeks if not improving. Discussed med changes with Olivia York, PharmD, who agrees with them.     DDx and Dx discussed with and explained to the pt to their satisfaction.  All questions were answered at this time. Pt expressed understanding of and agreement with this dx, tx, and plan. No further workup warranted and standard medication warnings given. I have given the patient a list of pertinent indications for re-evaluation. Will go to the Emergency Department if symptoms worsen or new concerning symptoms arise. Patient left the call in no apparent distress.     40 minutes spent on the date of the encounter doing chart review, history and exam, documentation and further activities per the note     BMI:   Estimated body mass index is 36.26 kg/m  as calculated from the following:    Height as of 4/27/22: 1.747 m (5' 8.78\").    Weight as of 4/27/22: 110.7 kg (244 lb).     See Patient Instructions  COVID-19 positive patient.  Encounter for consideration of " "medication intervention. Patient does qualify for a prescription. Full discussion with patient including medication options, risks and benefits. Potential drug interactions reviewed with patient.     Treatment Planned Paxlovid at decreased dosing due to renal insufficiency  Temporary change to home medications: Hold Xarelto, sildenafil, lorazepam and take a half dose 2.5mg of amlodipine daily for 8 days. Substitute Eliquis while off the Xarelto    Estimated body mass index is 36.26 kg/m  as calculated from the following:    Height as of 4/27/22: 1.747 m (5' 8.78\").    Weight as of 4/27/22: 110.7 kg (244 lb).  GFR Estimate   Date Value Ref Range Status   04/27/2022 40 (L) >60 mL/min/1.73m2 Final     Comment:     Effective December 21, 2021 eGFRcr in adults is calculated using the 2021 CKD-EPI creatinine equation which includes age and gender (Andriy hodges al., [place], DOI: 10.1056/JLHMmg3272134)  This result was previously suppressed from the chart.   07/09/2021 43 (L) >60 mL/min/[1.73_m2] Final     Comment:     Non  GFR Calc  Starting 12/18/2018, serum creatinine based estimated GFR (eGFR) will be   calculated using the Chronic Kidney Disease Epidemiology Collaboration   (CKD-EPI) equation.       Lab Results   Component Value Date    [id number] Negative 11/25/2021       Return in about 2 weeks (around 6/30/2022) for a recheck of your symptoms if not improving, or call 911/go to an ER anytime if worsening.    LINDA Dennis  [facility] ESPINOZA Oliveira is a 71 year old presenting for the following health issues:  Suspected Covid      History of Present Illness       Reason for visit:  Covid  Symptom onset:  1-3 days ago  Symptoms include:  Cough chills, fever  Symptom intensity:  Moderate  Symptom progression:  Staying the same  Had these symptoms before:  No  What makes it worse:  No  What makes it better:  Jaime consumes 2 sweetened beverage(s) daily. He exercises with " enough effort to increase his heart rate 5 days per week.   He is taking medications regularly.         COVID-19 Symptom Review  How many days ago did these symptoms start? yesterday    Are any of the following symptoms significant for you?    New or worsening difficulty breathing? No    Worsening cough? No    Fever or chills? Yes, the highest temperature was 100.5-101    Headache: no    Sore throat: no    Chest pain: YES    Diarrhea: No    Body aches? no    What treatments has patient tried? Tylenol   Does patient live in a nursing home, group home, or shelter? no  Does patient have a way to get food/medications during quarantined? Yes     Review of Systems   Constitutional, HEENT, cardiovascular, pulmonary, gi and gu systems are negative, except as otherwise noted.      Objective           Vitals:  No vitals were obtained today due to virtual visit.    Physical Exam   healthy, alert and no distress  PSYCH: Alert and oriented t6/15/22imes 3; coherent speech, normal   rate and volume, able to articulate logical thoughts, able   to abstract reason, no tangential thoughts, no hallucinations   or delusions  His affect is normal and pleasant  RESP: No cough, no audible wheezing, able to talk in full sentences  Remainder of exam unable to be completed due to telephone visits        Phone call duration: 16 minutes    .  ..

## 2022-06-16 NOTE — TELEPHONE ENCOUNTER
Spoke with pharmacist and clarified dosage of Paxlovid. All questions and concerns addressed.    LINDA Dennis

## 2022-06-16 NOTE — TELEPHONE ENCOUNTER
Patient needs to be scheduled virtually for covid treatment.  Sending to scheduling to call patient to schedule patient virtually for covid treatment.

## 2022-06-16 NOTE — PATIENT INSTRUCTIONS
Amaury Oliveira,     Thank you for allowing [facility] to manage your care.     Please hold Xarelto, sildenafil, lorazepam and take a half dose 2.5mg of amlodipine daily for 8 days. Begin the Paxlovid and Eliquis/apixaban tomorrow.     If you develop worsening/changing symptoms at any time such as chest pain, shortness of breath, yellow/itchy skin, color changes to urine/stool, etc., please call 911 or go to the emergency department for evaluation.     I sent your prescriptions to your pharmacy.      For your pain, please use Tylenol 650mg every 6 hours as needed.      Max acetaminophen (Tylenol) 3,000mg/24 hours     Drink 8-10 glasses of fluid daily to stay well-hydrated.     If you have any questions or concerns, please feel free to call us at [phone]     Sincerely,     Juan Olivera PA-C     Did you know?       You can schedule a video visit for follow-up appointments as well as future appointments for certain conditions.  Please see the below link.      [email or web address]     If you have not already done so,  I encourage you to sign up for [email or web address] ([email or web address]).  This will allow you to review your results, securely communicate with a provider, and schedule virtual visits as well.

## 2022-06-16 NOTE — TELEPHONE ENCOUNTER
Patient tested positive for covid last night would like medication to get releif.  Milena Mccarthy  Team [facility],

## 2022-06-20 ENCOUNTER — TELEPHONE (OUTPATIENT)
Dept: FAMILY MEDICINE | Facility: CLINIC | Age: 72
End: 2022-06-20

## 2022-06-20 NOTE — TELEPHONE ENCOUNTER
Routing to PCP- *Route back to [place] team*    Pt saw LINDA Fernandes on 6/16/22 and was dx'd with COVID and prescribed Paxlovid.     Pt has been taking the Paxlovid for 3 days now and since taking he has had constant stomach pains, gas diarrhea and nausea.   He also had a colonoscopy last week and never really got over the cleansing-diarrhea from that.   He has been taking imodium, pepcid and pantoprazole. Diarrhea has pretty much stopped with the imodium but stomach pains continue.     Pt not sure if the Paxlovid is causing the symptoms. He was wondering if he should try stopping the Paxlovid? He was instructed to call his physician before stopping.     Audrey Garcia RN

## 2022-06-22 DIAGNOSIS — F41.9 ANXIETY: ICD-10-CM

## 2022-06-23 RX ORDER — LORAZEPAM 0.5 MG/1
TABLET ORAL
Qty: 25 TABLET | Refills: 0 | Status: SHIPPED | OUTPATIENT
Start: 2022-06-23 | End: 2022-08-01

## 2022-06-23 NOTE — TELEPHONE ENCOUNTER
Routing refill request to provider for review/approval because:  Drug not on the [facility] refill protocol           Pending Prescriptions:                       Disp   Refills    LORazepam (ATIVAN) 0.5 MG tablet [Pharmacy*25 tab*0        Sig: TAKE 1 TABLET BY MOUTH EVERY 8 HOURS AS NEEDED FOR           ANXIETY        Kit ESTELITA Dunlap

## 2022-06-25 DIAGNOSIS — I10 ESSENTIAL HYPERTENSION: ICD-10-CM

## 2022-06-26 NOTE — TELEPHONE ENCOUNTER
Routing refill request to provider for review/approval because:  Labs out of range:    Creatinine   Date Value Ref Range Status   04/27/2022 1.80 (H) 0.66 - 1.25 mg/dL Final     Comment:     This result was previously suppressed from the chart.   07/09/2021 1.61 (H) 0.66 - 1.25 mg/dL Final

## 2022-06-27 RX ORDER — AMLODIPINE BESYLATE 5 MG/1
TABLET ORAL
Qty: 135 TABLET | Refills: 0 | Status: SHIPPED | OUTPATIENT
Start: 2022-06-27 | End: 2022-09-27

## 2022-06-29 ENCOUNTER — THERAPY VISIT (OUTPATIENT)
Dept: PHYSICAL THERAPY | Facility: CLINIC | Age: 72
End: 2022-06-29
Payer: COMMERCIAL

## 2022-06-29 DIAGNOSIS — G89.29 CHRONIC LEFT SHOULDER PAIN: Primary | ICD-10-CM

## 2022-06-29 DIAGNOSIS — F51.01 PRIMARY INSOMNIA: ICD-10-CM

## 2022-06-29 DIAGNOSIS — M75.82 TENDINITIS OF LEFT ROTATOR CUFF: ICD-10-CM

## 2022-06-29 DIAGNOSIS — M25.512 CHRONIC LEFT SHOULDER PAIN: Primary | ICD-10-CM

## 2022-06-29 PROCEDURE — 97140 MANUAL THERAPY 1/> REGIONS: CPT | Mod: GP | Performed by: PHYSICAL THERAPIST

## 2022-06-29 PROCEDURE — 97110 THERAPEUTIC EXERCISES: CPT | Mod: GP | Performed by: PHYSICAL THERAPIST

## 2022-06-30 ENCOUNTER — LAB (OUTPATIENT)
Dept: LAB | Facility: CLINIC | Age: 72
End: 2022-06-30
Payer: COMMERCIAL

## 2022-06-30 DIAGNOSIS — I50.22 CHRONIC SYSTOLIC CONGESTIVE HEART FAILURE (H): ICD-10-CM

## 2022-06-30 DIAGNOSIS — Z13.220 SCREENING FOR HYPERLIPIDEMIA: ICD-10-CM

## 2022-06-30 LAB
ALT SERPL W P-5'-P-CCNC: 24 U/L (ref 0–70)
CHOLEST SERPL-MCNC: 185 MG/DL
ERYTHROCYTE [DISTWIDTH] IN BLOOD BY AUTOMATED COUNT: 12.4 % (ref 10–15)
FASTING STATUS PATIENT QL REPORTED: YES
HCT VFR BLD AUTO: 39.5 % (ref 40–53)
HDLC SERPL-MCNC: 71 MG/DL
HGB BLD-MCNC: 13.8 G/DL (ref 13.3–17.7)
LDLC SERPL CALC-MCNC: 103 MG/DL
MCH RBC QN AUTO: 31.2 PG (ref 26.5–33)
MCHC RBC AUTO-ENTMCNC: 34.9 G/DL (ref 31.5–36.5)
MCV RBC AUTO: 89 FL (ref 78–100)
NONHDLC SERPL-MCNC: 114 MG/DL
PLATELET # BLD AUTO: 445 10E3/UL (ref 150–450)
RBC # BLD AUTO: 4.42 10E6/UL (ref 4.4–5.9)
TRIGL SERPL-MCNC: 54 MG/DL
WBC # BLD AUTO: 8.5 10E3/UL (ref 4–11)

## 2022-06-30 PROCEDURE — 85027 COMPLETE CBC AUTOMATED: CPT

## 2022-06-30 PROCEDURE — 80061 LIPID PANEL: CPT

## 2022-06-30 PROCEDURE — 36415 COLL VENOUS BLD VENIPUNCTURE: CPT

## 2022-06-30 PROCEDURE — 84460 ALANINE AMINO (ALT) (SGPT): CPT

## 2022-07-01 RX ORDER — ZOLPIDEM TARTRATE 12.5 MG/1
TABLET, FILM COATED, EXTENDED RELEASE ORAL
Qty: 30 TABLET | Refills: 0 | Status: SHIPPED | OUTPATIENT
Start: 2022-07-01 | End: 2022-07-28

## 2022-07-16 DIAGNOSIS — K22.70 BARRETT'S ESOPHAGUS DETERMINED BY BIOPSY: ICD-10-CM

## 2022-07-18 RX ORDER — PANTOPRAZOLE SODIUM 40 MG/1
TABLET, DELAYED RELEASE ORAL
Qty: 180 TABLET | Refills: 1 | Status: SHIPPED | OUTPATIENT
Start: 2022-07-18 | End: 2023-01-13

## 2022-07-21 ENCOUNTER — THERAPY VISIT (OUTPATIENT)
Dept: PHYSICAL THERAPY | Facility: CLINIC | Age: 72
End: 2022-07-21
Payer: COMMERCIAL

## 2022-07-21 DIAGNOSIS — M75.82 TENDINITIS OF LEFT ROTATOR CUFF: ICD-10-CM

## 2022-07-21 DIAGNOSIS — G89.29 CHRONIC LEFT SHOULDER PAIN: Primary | ICD-10-CM

## 2022-07-21 DIAGNOSIS — M25.512 CHRONIC LEFT SHOULDER PAIN: Primary | ICD-10-CM

## 2022-07-21 PROCEDURE — 97110 THERAPEUTIC EXERCISES: CPT | Mod: GP | Performed by: PHYSICAL THERAPIST

## 2022-07-21 PROCEDURE — 97530 THERAPEUTIC ACTIVITIES: CPT | Mod: GP | Performed by: PHYSICAL THERAPIST

## 2022-07-21 PROCEDURE — 97112 NEUROMUSCULAR REEDUCATION: CPT | Mod: GP | Performed by: PHYSICAL THERAPIST

## 2022-07-23 NOTE — PROGRESS NOTES
Subjective:  HPI  Physical Exam                    Objective:  System    Physical Exam    General     ROS    Assessment/Plan:    DISCHARGE REPORT    Progress reporting period is from 6/29/2022 to 7/21/2022.       SUBJECTIVE  The shoudler is about the same.  I continue to go to the gym everyday.  I do the row, push and the PT   Current pain level is 4/10.  It just aches, sharp pain with specific movements.  .     Previous pain level was  7/10.   Changes in function:  Yes (See Goal flowsheet attached for changes in current functional level)  Adverse reaction to treatment or activity: activity - creptitis and pain with movement into shoulder ER at base position.  Poor tolerance for frontal plane motion, abduction/scaption.     OBJECTIVE  Changes noted in objective findings:  The objective findings below are from DOS 7/21/2022.  Objective: Emphasis today on continuing with basic scapular stabilization to ensure proper foundation for movement of the shoulder.  Trial of small motion wall circles and ER perturbations progression not tolerated due to pain.  Discussed plateau in progress and continued functional limitations.  To follow up with MD for additional treatment planning.      AROM flexion 138 deg.  Continued improvement in mobility; initial at 110 deg.  Encouraged HEP compliance to maximize shoulder function prior to any surgical decision making.     ASSESSMENT/PLAN  Updated problem list and treatment plan: Diagnosis 1:  L shoulder pain    Pain -  self management, education and home program  Decreased ROM/flexibility - manual therapy, therapeutic exercise and home program  Decreased joint mobility - manual therapy and therapeutic exercise  Decreased strength - therapeutic exercise and therapeutic activities  Impaired muscle performance - neuro re-education  Decreased function - therapeutic activities  Impaired posture - neuro re-education  STG/LTGs have been met or progress has been made towards goals:  Yes (See  Goal flow sheet completed today.)  Assessment of Progress: The patient's progress has plateaued.  Self Management Plans:  Patient has been instructed in a home treatment program.  Patient  has been instructed in self management of symptoms.  I have re-evaluated this patient and find that the nature, scope, duration and intensity of the therapy is appropriate for the medical condition of the patient.  Tee continues to require the following intervention to meet STG and LTG's:  PT intervention is no longer required to meet STG/LTG.    Recommendations:  This patient is ready to be discharged from therapy and continue their home treatment program.  This patient would benefit from further evaluation.    Please refer to the daily flowsheet for treatment today, total treatment time and time spent performing 1:1 timed codes.

## 2022-07-28 ENCOUNTER — OFFICE VISIT (OUTPATIENT)
Dept: FAMILY MEDICINE | Facility: CLINIC | Age: 72
End: 2022-07-28
Payer: COMMERCIAL

## 2022-07-28 VITALS
HEART RATE: 76 BPM | RESPIRATION RATE: 20 BRPM | BODY MASS INDEX: 35.13 KG/M2 | DIASTOLIC BLOOD PRESSURE: 93 MMHG | WEIGHT: 237.2 LBS | TEMPERATURE: 97.9 F | SYSTOLIC BLOOD PRESSURE: 142 MMHG | HEIGHT: 69 IN | OXYGEN SATURATION: 99 %

## 2022-07-28 DIAGNOSIS — I10 ESSENTIAL HYPERTENSION: Primary | ICD-10-CM

## 2022-07-28 DIAGNOSIS — I10 BENIGN ESSENTIAL HYPERTENSION: ICD-10-CM

## 2022-07-28 DIAGNOSIS — R35.0 URINARY FREQUENCY: ICD-10-CM

## 2022-07-28 DIAGNOSIS — Z12.5 SCREENING FOR PROSTATE CANCER: ICD-10-CM

## 2022-07-28 DIAGNOSIS — N52.8 OTHER MALE ERECTILE DYSFUNCTION: ICD-10-CM

## 2022-07-28 DIAGNOSIS — M25.512 CHRONIC LEFT SHOULDER PAIN: ICD-10-CM

## 2022-07-28 DIAGNOSIS — E66.01 MORBID OBESITY (H): ICD-10-CM

## 2022-07-28 DIAGNOSIS — N40.1 BENIGN PROSTATIC HYPERPLASIA WITH URINARY FREQUENCY: ICD-10-CM

## 2022-07-28 DIAGNOSIS — R35.0 BENIGN PROSTATIC HYPERPLASIA WITH URINARY FREQUENCY: ICD-10-CM

## 2022-07-28 DIAGNOSIS — G89.29 CHRONIC LEFT SHOULDER PAIN: ICD-10-CM

## 2022-07-28 DIAGNOSIS — F51.01 PRIMARY INSOMNIA: ICD-10-CM

## 2022-07-28 DIAGNOSIS — N18.32 STAGE 3B CHRONIC KIDNEY DISEASE (H): ICD-10-CM

## 2022-07-28 LAB
ALBUMIN UR-MCNC: NEGATIVE MG/DL
ANION GAP SERPL CALCULATED.3IONS-SCNC: 6 MMOL/L (ref 3–14)
APPEARANCE UR: CLEAR
BILIRUB UR QL STRIP: NEGATIVE
BUN SERPL-MCNC: 29 MG/DL (ref 7–30)
CALCIUM SERPL-MCNC: 9.4 MG/DL (ref 8.5–10.1)
CHLORIDE BLD-SCNC: 101 MMOL/L (ref 94–109)
CO2 SERPL-SCNC: 27 MMOL/L (ref 20–32)
COLOR UR AUTO: YELLOW
CREAT SERPL-MCNC: 1.48 MG/DL (ref 0.66–1.25)
GFR SERPL CREATININE-BSD FRML MDRD: 51 ML/MIN/1.73M2
GLUCOSE BLD-MCNC: 105 MG/DL (ref 70–99)
GLUCOSE UR STRIP-MCNC: NEGATIVE MG/DL
HGB UR QL STRIP: NEGATIVE
KETONES UR STRIP-MCNC: NEGATIVE MG/DL
LEUKOCYTE ESTERASE UR QL STRIP: NEGATIVE
NITRATE UR QL: NEGATIVE
PH UR STRIP: 7 [PH] (ref 5–7)
POTASSIUM BLD-SCNC: 5.4 MMOL/L (ref 3.4–5.3)
PSA SERPL-MCNC: 3.63 UG/L (ref 0–4)
SODIUM SERPL-SCNC: 134 MMOL/L (ref 133–144)
SP GR UR STRIP: 1.01 (ref 1–1.03)
UROBILINOGEN UR STRIP-ACNC: 0.2 E.U./DL

## 2022-07-28 PROCEDURE — 99214 OFFICE O/P EST MOD 30 MIN: CPT | Performed by: PHYSICIAN ASSISTANT

## 2022-07-28 PROCEDURE — 36415 COLL VENOUS BLD VENIPUNCTURE: CPT | Performed by: PHYSICIAN ASSISTANT

## 2022-07-28 PROCEDURE — 80048 BASIC METABOLIC PNL TOTAL CA: CPT | Performed by: PHYSICIAN ASSISTANT

## 2022-07-28 PROCEDURE — G0103 PSA SCREENING: HCPCS | Performed by: PHYSICIAN ASSISTANT

## 2022-07-28 PROCEDURE — 81003 URINALYSIS AUTO W/O SCOPE: CPT | Performed by: PHYSICIAN ASSISTANT

## 2022-07-28 RX ORDER — LISINOPRIL 20 MG/1
20 TABLET ORAL DAILY
Qty: 90 TABLET | Refills: 1
Start: 2022-07-28 | End: 2022-12-27

## 2022-07-28 RX ORDER — TAMSULOSIN HYDROCHLORIDE 0.4 MG/1
0.4 CAPSULE ORAL EVERY EVENING
Qty: 90 CAPSULE | Refills: 1 | Status: SHIPPED | OUTPATIENT
Start: 2022-07-28 | End: 2023-01-19

## 2022-07-28 RX ORDER — ZOLPIDEM TARTRATE 12.5 MG/1
TABLET, FILM COATED, EXTENDED RELEASE ORAL
Qty: 30 TABLET | Refills: 0 | Status: SHIPPED | OUTPATIENT
Start: 2022-07-28 | End: 2022-09-26

## 2022-07-28 ASSESSMENT — PAIN SCALES - GENERAL: PAINLEVEL: MODERATE PAIN (5)

## 2022-07-28 NOTE — PROGRESS NOTES
Subjective   Tee is a 71 year old, presenting for the following health issues:  RECHECK, Urinary Problem (Recheck Frequent urination for a year), Hypertension (recheck), and Results (Colonoscopy, labs, CT)      History of Present Illness       Back Pain:  He presents for follow up of back pain. Patient's back pain is a recurring problem.  Location of back pain:  Right middle of back, left middle of back and left shoulder  Description of back pain: cramping and shooting  Back pain spreads: left knee    Since patient first noticed back pain, pain is: unchanged  Does back pain interfere with his job:  Not applicable      Heart Failure:  He presents for follow up of heart failure. He is not experiencing shortness of breath at night, with rest or with activity He is not experiencing any lower extremity edema.   He denies orthopenea and coughs at night. Patient is not checking weight daily. He has recently had a weight increase. He has side effects from medications including dizziness. He has had no other medical visits for heart failure since the last visit.    Hypertension: He presents for follow up of hypertension.  He does check blood pressure  regularly outside of the clinic. Outside blood pressures have been over 140/90. He does not follow a low salt diet.     Vascular Disease:  He presents for follow up of vascular disease.  He never takes nitroglycerin. He is not taking daily aspirin.    No chest pain/sob/palpitations/dizziness/ha's  Left shoulder pain and dec rom.  Ear Problem  - Left ear had dried blood in it, please check    Results  - CT Scan, Colonoscopy, Blood    Urinary Problem  - recheck frequent urination. Nocturia. No dysuria.    Recheck of obesity. Discussed lifestyle changes to aid in losing weight         Review of Systems   Constitutional, HEENT, cardiovascular, pulmonary, GI, , musculoskeletal, neuro, skin, endocrine and psych systems are negative, except as otherwise noted.      Objective   "  BP (!) 142/93   Pulse 76   Temp 97.9  F (36.6  C) (Tympanic)   Resp 20   Ht 1.746 m (5' 8.75\")   Wt 107.6 kg (237 lb 3.2 oz)   SpO2 99%   BMI 35.28 kg/m    Body mass index is 35.28 kg/m .  Physical Exam     Eye exam - right eye normal lid, conjunctiva, cornea, pupil and fundus, left eye normal lid, conjunctiva, cornea, pupil and fundus.  Thyroid not palpable, not enlarged, no nodules detected.  CHEST:chest clear to IPPA, no tachypnea, retractions or cyanosis and S1, S2 normal, no murmur, no gallop, rate regular.  Shoulder exam shows positive impingement signs are present with pain at high arc of abduction and forward flexion on left. There is tenderness of the lateral shoulder.  Prostate enlarged. No tenderness or masses/nodules       Tee was seen today for recheck, urinary problem, hypertension and results.    Diagnoses and all orders for this visit:    Essential hypertension  -     Basic metabolic panel  (Ca, Cl, CO2, Creat, Gluc, K, Na, BUN); Future  -     lisinopril (ZESTRIL) 20 MG tablet; Take 1 tablet (20 mg) by mouth daily TAKE 1 TABLETS BY MOUTH ONCE DAILY    Morbid obesity (H)    Screening for prostate cancer  -     PSA, screen; Future    Urinary frequency  -     UA Macro with Reflex to Micro and Culture - lab collect; Future  -     Adult Urology  Referral; Future    Stage 3b chronic kidney disease (H)    Chronic left shoulder pain  -     MR Shoulder Left w/o Contrast; Future    Benign essential hypertension  -     lisinopril (ZESTRIL) 20 MG tablet; Take 1 tablet (20 mg) by mouth daily TAKE 1 TABLETS BY MOUTH ONCE DAILY    Benign prostatic hyperplasia with urinary frequency  -     tamsulosin (FLOMAX) 0.4 MG capsule; Take 1 capsule (0.4 mg) by mouth every evening  -     Adult Urology  Referral; Future    Other male erectile dysfunction  -     Adult Urology  Referral; Future    Primary insomnia  -     zolpidem ER (AMBIEN CR) 12.5 MG CR tablet; TAKE 1 TABLET BY MOUTH " NIGHTLY AS NEEDED      work on lifestyle modification  Inc lisinopril to 20 mg daily. Recheck blood pressure in 6-8 wks.  Start tamsulosin to help with his bph/nocturia.   .  ..

## 2022-08-05 ENCOUNTER — ANCILLARY PROCEDURE (OUTPATIENT)
Dept: MRI IMAGING | Facility: CLINIC | Age: 72
End: 2022-08-05
Attending: PHYSICIAN ASSISTANT
Payer: COMMERCIAL

## 2022-08-05 DIAGNOSIS — M25.512 CHRONIC LEFT SHOULDER PAIN: ICD-10-CM

## 2022-08-05 DIAGNOSIS — G89.29 CHRONIC LEFT SHOULDER PAIN: ICD-10-CM

## 2022-08-05 PROCEDURE — 73221 MRI JOINT UPR EXTREM W/O DYE: CPT | Mod: TC | Performed by: RADIOLOGY

## 2022-08-08 ENCOUNTER — MYC MEDICAL ADVICE (OUTPATIENT)
Dept: FAMILY MEDICINE | Facility: CLINIC | Age: 72
End: 2022-08-08

## 2022-08-08 DIAGNOSIS — M25.512 CHRONIC LEFT SHOULDER PAIN: Primary | ICD-10-CM

## 2022-08-08 DIAGNOSIS — G89.29 CHRONIC LEFT SHOULDER PAIN: Primary | ICD-10-CM

## 2022-08-16 NOTE — PROGRESS NOTES
"CHIEF COMPLAINT:   Chief Complaint   Patient presents with     Left Shoulder - Pain     Last injection: 4/27/22 with Jon Denson. Injection didn't help very well. He has had 3 injections total. Onset a year or longer. NKI. Pain is mostly in the upper arm. He has a constant ache. He has been through physical therapy but didn't help a lot. Pain increases with sideway lifting.      Tee Hilton is seen today in the [facility] for evaluation of left shoulder pain at the request of Jon Denson PA-C      HISTORY:  Tee Hilton is a 71 year old male, right  -hand dominant, who is seen in consultation at the request of Jon Denson PA-C for left shoulder pain that started  many years ago. No specific injury. Locates pain throughout the shoulder and upper arm. Aggravated with reaching, lifting. Pain is a constant \"ache\". Occasional tylenol ES. Has done Physical Therapy. Has had 3 subacromial injections, most recently 4/27/2022, didn't help long. Previous injections helped longer.    Has pain at rest, night but worse with activities.    Right shoulder starting to bother as well.    Has always had some neck problems, history of fractures palying football in highschool. No numbness and tingling.    He's also dealing with low back pain, sciatica.    He's on xarelto for  for atrial fibrillation.    Onset: years ago  Symptoms have been worsening since that time.  Aggrevated by: lifting and reaching, mostly to the side  Relieved by: rest  Pain location: located throughout the shoulder joint/diffuse  Pain severity: 6/10  Pain quality: aching and shooting  Frequency of symptoms: are constant    Treatment up to this point:Tylenol, PT and Corticosteroid injection - subacromial  Has not tried: intra-articular injection    Usual level of work activity: retired.    Other PMH:  has a past medical history of Congestive heart failure (H) (2020), Gastroesophageal reflux disease, Heart " disease (2018), Hypertension, Irregular heart beat, and Sleep apnea.    He has no past medical history of Arthritis, Cancer (H), Cerebral infarction (H), COPD (chronic obstructive pulmonary disease) (H), Depressive disorder, History of blood transfusion, Thyroid disease, or Uncomplicated asthma.  Patient Active Problem List   Diagnosis     Other male erectile dysfunction     Chronic midline low back pain without sciatica     Persistent atrial fibrillation (H)     Non morbid obesity due to excess calories     Chronic systolic congestive heart failure (H)     Alcohol use     Gastroesophageal reflux disease without esophagitis     Other sleep apnea     Atrial fibrillation (H)     Gastroesophageal reflux disease     Morbid obesity (H)     AMINA (acute kidney injury) (H)     Fever and chills     Leukocytosis     Hyponatremia     Dehydration     Essential hypertension     Chronic atrial fibrillation (H)     Myalgia     Acute kidney injury (H)     Acute intractable headache, unspecified headache type     Bilateral low back pain with right-sided sciatica     Sensorineural hearing loss (SNHL) of both ears     Tinnitus, bilateral     Chronic kidney disease, stage 3 (H)     Ascending aorta enlargement (H)     Chronic left shoulder pain     Tendinitis of left rotator cuff       Surgical Hx:  has a past surgical history that includes tonsillectomy; Open reduction internal fixation tibia; Colonoscopy with CO2 insufflation (N/A, 5/25/2017); Colonoscopy (N/A, 5/25/2017); Anesthesia cardioversion (N/A, 9/24/2018); Septoplasty, turbinoplasty, combined (Bilateral, 7/9/2019); Colonoscopy (N/A, 7/23/2021); and biopsy (Summer 2021).    Medications:   Current Outpatient Medications:      allopurinol (ZYLOPRIM) 100 MG tablet, Take 1 tablet (100 mg) by mouth daily, Disp: 90 tablet, Rfl: 3     amLODIPine (NORVASC) 5 MG tablet, TAKE 1 & 1/2 (ONE & ONE-HALF) TABLETS BY MOUTH ONCE DAILY, Disp: 135 tablet, Rfl: 0     famotidine (PEPCID) 20 MG  tablet, Take 1 tablet (20 mg) by mouth nightly as needed, Disp: 60 tablet, Rfl: 1     lisinopril (ZESTRIL) 20 MG tablet, Take 1 tablet (20 mg) by mouth daily TAKE 1 TABLETS BY MOUTH ONCE DAILY, Disp: 90 tablet, Rfl: 1     LORazepam (ATIVAN) 0.5 MG tablet, TAKE 1 TABLET BY MOUTH EVERY 8 HOURS AS NEEDED FOR ANXIETY, Disp: 25 tablet, Rfl: 0     metoprolol succinate ER (TOPROL-XL) 100 MG 24 hr tablet, TAKE 1 & 1/2 (ONE & ONE-HALF) TABLETS BY MOUTH ONCE DAILY, Disp: 135 tablet, Rfl: 1     ondansetron (ZOFRAN) 4 MG tablet, TAKE 1 TABLET BY MOUTH EVERY 8 HOURS AS NEEDED FOR NAUSEA (Patient not taking: Reported on 7/28/2022), Disp: 30 tablet, Rfl: 0     pantoprazole (PROTONIX) 40 MG EC tablet, Take 1 tablet by mouth twice daily, Disp: 180 tablet, Rfl: 1     rivaroxaban ANTICOAGULANT (XARELTO ANTICOAGULANT) 20 MG TABS tablet, Take 1 tablet (20 mg) by mouth daily (with breakfast), Disp: 90 tablet, Rfl: 3     sildenafil (REVATIO) 20 MG tablet, Take 1 -5 tabs daily prn (Patient taking differently: Take  mg by mouth daily as needed Take 1 -5 tabs daily prn), Disp: 30 tablet, Rfl: 11     tamsulosin (FLOMAX) 0.4 MG capsule, Take 1 capsule (0.4 mg) by mouth every evening, Disp: 90 capsule, Rfl: 1     zolpidem ER (AMBIEN CR) 12.5 MG CR tablet, TAKE 1 TABLET BY MOUTH NIGHTLY AS NEEDED, Disp: 30 tablet, Rfl: 0  No current facility-administered medications for this visit.    Facility-Administered Medications Ordered in Other Visits:      sodium chloride (PF) 0.9% PF flush 10 mL, 10 mL, Intracatheter, Once, CLAY Erwin MD    Allergies:   Allergies   Allergen Reactions     Erythromycin GI Disturbance     Upset stomach     Ethanol      Other reaction(s): stomach ache       Social Hx: retired.   reports that he has never smoked. He has never used smokeless tobacco. He reports current alcohol use. He reports that he does not use drugs.    Family Hx: family history includes Anxiety Disorder in his mother; Gout in his father;  "Lung Cancer in his father; Other Cancer in his father..    REVIEW OF SYSTEMS: 10 point ROS neg other than the symptoms noted above in the HPI and PMH. Notables include  CONSTITUTIONAL:NEGATIVE for fever, chills, change in weight  INTEGUMENTARY/SKIN: NEGATIVE for worrisome rashes, moles or lesions  MUSCULOSKELETAL:See HPI above  NEURO: NEGATIVE for weakness, dizziness or paresthesias    PHYSICAL EXAM:  BP (!) 148/80   Pulse 73   Ht 1.746 m (5' 8.75\")   Wt 108.7 kg (239 lb 11.2 oz)   BMI 35.66 kg/m     GENERAL APPEARANCE: healthy, alert, no distress  SKIN: no suspicious lesions or rashes  NEURO: Normal strength and tone, mentation intact and speech normal  PSYCH:  mentation appears normal and affect normal, not anxious  RESPIRATORY: No increased work of breathing.  VASCULAR: Radial pulses 2+ and brisk cappillary refill   LYMPH: no palpable axillary lymphadenopathy or cervical neck lymphadenopathy.      MUSCULOSKELETAL:      RIGHT UPPER EXTREMITY:  Sensation intact to light touch in median, radial, ulnar and axillary nerve distributions  Palpable 2+ radial pulse, brisk capillary refill to all fingers, wwp  Intact epl fpl fdp edc wrist flexion/extension biceps triceps deltoid    RIGHT SHOULDER:  Shoulder Inspection: no swelling, bruising, discoloration, or obvious deformity or asymmetry  Tender: none  Range of Motion:   Active:forward flexion 170 degrees, external rotation  60 degrees, internal rotation  T12  Strength: forward flexion 5-/5, External rotation 4+/5    Impingement: negative.  Special tests: Empty Can: Negative    LEFT UPPER EXTREMITY:  Sensation intact to light touch in median, radial, ulnar and axillary nerve distributions  Palpable 2+ radial pulse, brisk capillary refill to all fingers, wwp  Intact epl fpl fdp edc wrist flexion/extension biceps triceps deltoid    LEFT SHOULDER:  Shoulder Inspection: no swelling, bruising, discoloration, or obvious deformity or asymmetry  Tender: anterior capsule, " proximal bicep tendon and greater tuberosity  Non-tender: AC joint  Range of Motion:   Active:forward flexion 140 degrees, external rotation  45 degrees, internal rotation  SI joint  Strength: forward flexion 4+/5, painful, External rotation 4+/5, painful    Impingement: equivocal  Special tests: Empty Can: equivocal      X-RAY INTERPRETATION: 3 views left  shoulder obtained 4/27/2022 were reviewed personally in clinic today with the patient. On my review, Mild osteoarthrosis of the AC joint. Moderate to severe osteoarthrosis involving the glenohumeral joint. Otherwise negative.     MRI left  shoulder:  8/5/2022  1.  Moderate supraspinatus tendinosis. There may also be a small  amount of bursal surface and/or articular surface fraying, but no tear  is evident, and there is no muscle atrophy.  2.  Osteoarthrosis of the AC joint.  3.  Tiny amount of fluid in the subacromial/subdeltoid and subcoracoid  bursae of questionable significance.  4.  Severe glenohumeral osteoarthrosis.  5.  Degeneration and tear throughout the glenoid labrum.  Moderate-sized paralabral cyst. This extends to the spinoglenoid  notch, but there is no adjacent denervation edema or atrophy in the  muscles.  6.  Moderate glenoid humeral joint effusion. There is fluid distending  the biceps tendon sheath which may be joint fluid decompressing into  the sheath or less likely from tenosynovitis.      ASSESSMENT: Tee Hilton is a 71 year old male, right  -hand dominant with chronic left shoulder pain, gleno-humeral osteoarthritis, effusion, proximal biceps tendinitis.      PLAN:   * reviewed xrays with patient. Exam and xrays consistent with chronic, long-standing arthritis. There has been loss of shoulder joint space from wearing of the cartilage.  * this is most likely reason for shoulder pain and decreased range of motion  * treatment options depend on how symptomatic as well has how aggressive patient wants to be. If not overly symptomatic,  we can try an intra-articular cortisone injection as well as Physical Therapy, pain control with tylenol and NSAIDS.  * surgical options would be a  total shoulder arthroplasty    * since he's not had an intra-articular injection, I think it would be reasonable trying that first prior to total shoulder arthroplasty.  * referral placed to Sports Medicine for image guided gleno-humeral injection  * return to clinic as needed.    * All questions were addressed and answered prior to discharge from clinic today. The patient acknowledges an understanding of and agreement with the plan set forth during today's visit. Patient was advised to call our office or MyChart us if any further questions arise upon leaving our office today.          Jarrell Blanc M.D., M.S.  Dept. of Orthopaedic Surgery  [facility]

## 2022-08-18 ENCOUNTER — OFFICE VISIT (OUTPATIENT)
Dept: ORTHOPEDICS | Facility: CLINIC | Age: 72
End: 2022-08-18
Attending: PHYSICIAN ASSISTANT
Payer: COMMERCIAL

## 2022-08-18 VITALS
BODY MASS INDEX: 35.5 KG/M2 | HEIGHT: 69 IN | WEIGHT: 239.7 LBS | DIASTOLIC BLOOD PRESSURE: 80 MMHG | SYSTOLIC BLOOD PRESSURE: 148 MMHG | HEART RATE: 73 BPM

## 2022-08-18 DIAGNOSIS — M25.512 CHRONIC LEFT SHOULDER PAIN: ICD-10-CM

## 2022-08-18 DIAGNOSIS — G89.29 CHRONIC LEFT SHOULDER PAIN: ICD-10-CM

## 2022-08-18 DIAGNOSIS — M19.012 PRIMARY OSTEOARTHRITIS OF LEFT SHOULDER: Primary | ICD-10-CM

## 2022-08-18 PROCEDURE — 99243 OFF/OP CNSLTJ NEW/EST LOW 30: CPT | Performed by: ORTHOPAEDIC SURGERY

## 2022-08-18 ASSESSMENT — PAIN SCALES - GENERAL: PAINLEVEL: SEVERE PAIN (6)

## 2022-08-18 NOTE — LETTER
"    8/18/2022         RE: Tee Hilton  [address] [facility] [address]        Dear Colleague,    Thank you for referring your patient, Tee Hilton, to the [facility]. Please see a copy of my visit note below.    CHIEF COMPLAINT:   Chief Complaint   Patient presents with     Left Shoulder - Pain     Last injection: 4/27/22 with Jon Denson. Injection didn't help very well. He has had 3 injections total. Onset a year or longer. NKI. Pain is mostly in the upper arm. He has a constant ache. He has been through physical therapy but didn't help a lot. Pain increases with sideway lifting.      Tee Hilton is seen today in the [facility] for evaluation of left shoulder pain at the request of Jon Denson PA-C      HISTORY:  Tee Hilton is a 71 year old male, right  -hand dominant, who is seen in consultation at the request of Jon Denson PA-C for left shoulder pain that started  many years ago. No specific injury. Locates pain throughout the shoulder and upper arm. Aggravated with reaching, lifting. Pain is a constant \"ache\". Occasional tylenol ES. Has done Physical Therapy. Has had 3 subacromial injections, most recently 4/27/2022, didn't help long. Previous injections helped longer.    Has pain at rest, night but worse with activities.    Right shoulder starting to bother as well.    Has always had some neck problems, history of fractures palying football in highschool. No numbness and tingling.    He's also dealing with low back pain, sciatica.    He's on xarelto for  for atrial fibrillation.    Onset: years ago  Symptoms have been worsening since that time.  Aggrevated by: lifting and reaching, mostly to the side  Relieved by: rest  Pain location: located throughout the shoulder joint/diffuse  Pain severity: 6/10  Pain quality: aching and shooting  Frequency of symptoms: are constant    Treatment up to this " point:Tylenol, PT and Corticosteroid injection - subacromial  Has not tried: intra-articular injection    Usual level of work activity: retired.    Other PMH:  has a past medical history of Congestive heart failure (H) (2020), Gastroesophageal reflux disease, Heart disease (2018), Hypertension, Irregular heart beat, and Sleep apnea.    He has no past medical history of Arthritis, Cancer (H), Cerebral infarction (H), COPD (chronic obstructive pulmonary disease) (H), Depressive disorder, History of blood transfusion, Thyroid disease, or Uncomplicated asthma.  Patient Active Problem List   Diagnosis     Other male erectile dysfunction     Chronic midline low back pain without sciatica     Persistent atrial fibrillation (H)     Non morbid obesity due to excess calories     Chronic systolic congestive heart failure (H)     Alcohol use     Gastroesophageal reflux disease without esophagitis     Other sleep apnea     Atrial fibrillation (H)     Gastroesophageal reflux disease     Morbid obesity (H)     AMINA (acute kidney injury) (H)     Fever and chills     Leukocytosis     Hyponatremia     Dehydration     Essential hypertension     Chronic atrial fibrillation (H)     Myalgia     Acute kidney injury (H)     Acute intractable headache, unspecified headache type     Bilateral low back pain with right-sided sciatica     Sensorineural hearing loss (SNHL) of both ears     Tinnitus, bilateral     Chronic kidney disease, stage 3 (H)     Ascending aorta enlargement (H)     Chronic left shoulder pain     Tendinitis of left rotator cuff       Surgical Hx:  has a past surgical history that includes tonsillectomy; Open reduction internal fixation tibia; Colonoscopy with CO2 insufflation (N/A, 5/25/2017); Colonoscopy (N/A, 5/25/2017); Anesthesia cardioversion (N/A, 9/24/2018); Septoplasty, turbinoplasty, combined (Bilateral, 7/9/2019); Colonoscopy (N/A, 7/23/2021); and biopsy (Summer 2021).    Medications:   Current Outpatient  Medications:      allopurinol (ZYLOPRIM) 100 MG tablet, Take 1 tablet (100 mg) by mouth daily, Disp: 90 tablet, Rfl: 3     amLODIPine (NORVASC) 5 MG tablet, TAKE 1 & 1/2 (ONE & ONE-HALF) TABLETS BY MOUTH ONCE DAILY, Disp: 135 tablet, Rfl: 0     famotidine (PEPCID) 20 MG tablet, Take 1 tablet (20 mg) by mouth nightly as needed, Disp: 60 tablet, Rfl: 1     lisinopril (ZESTRIL) 20 MG tablet, Take 1 tablet (20 mg) by mouth daily TAKE 1 TABLETS BY MOUTH ONCE DAILY, Disp: 90 tablet, Rfl: 1     LORazepam (ATIVAN) 0.5 MG tablet, TAKE 1 TABLET BY MOUTH EVERY 8 HOURS AS NEEDED FOR ANXIETY, Disp: 25 tablet, Rfl: 0     metoprolol succinate ER (TOPROL-XL) 100 MG 24 hr tablet, TAKE 1 & 1/2 (ONE & ONE-HALF) TABLETS BY MOUTH ONCE DAILY, Disp: 135 tablet, Rfl: 1     ondansetron (ZOFRAN) 4 MG tablet, TAKE 1 TABLET BY MOUTH EVERY 8 HOURS AS NEEDED FOR NAUSEA (Patient not taking: Reported on 7/28/2022), Disp: 30 tablet, Rfl: 0     pantoprazole (PROTONIX) 40 MG EC tablet, Take 1 tablet by mouth twice daily, Disp: 180 tablet, Rfl: 1     rivaroxaban ANTICOAGULANT (XARELTO ANTICOAGULANT) 20 MG TABS tablet, Take 1 tablet (20 mg) by mouth daily (with breakfast), Disp: 90 tablet, Rfl: 3     sildenafil (REVATIO) 20 MG tablet, Take 1 -5 tabs daily prn (Patient taking differently: Take  mg by mouth daily as needed Take 1 -5 tabs daily prn), Disp: 30 tablet, Rfl: 11     tamsulosin (FLOMAX) 0.4 MG capsule, Take 1 capsule (0.4 mg) by mouth every evening, Disp: 90 capsule, Rfl: 1     zolpidem ER (AMBIEN CR) 12.5 MG CR tablet, TAKE 1 TABLET BY MOUTH NIGHTLY AS NEEDED, Disp: 30 tablet, Rfl: 0  No current facility-administered medications for this visit.    Facility-Administered Medications Ordered in Other Visits:      sodium chloride (PF) 0.9% PF flush 10 mL, 10 mL, Intracatheter, Once, Madhusoodanan, K P, MD    Allergies:   Allergies   Allergen Reactions     Erythromycin GI Disturbance     Upset stomach     Ethanol      Other reaction(s):  "stomach ache       Social Hx: retired.   reports that he has never smoked. He has never used smokeless tobacco. He reports current alcohol use. He reports that he does not use drugs.    Family Hx: family history includes Anxiety Disorder in his mother; Gout in his father; Lung Cancer in his father; Other Cancer in his father..    REVIEW OF SYSTEMS: 10 point ROS neg other than the symptoms noted above in the HPI and PMH. Notables include  CONSTITUTIONAL:NEGATIVE for fever, chills, change in weight  INTEGUMENTARY/SKIN: NEGATIVE for worrisome rashes, moles or lesions  MUSCULOSKELETAL:See HPI above  NEURO: NEGATIVE for weakness, dizziness or paresthesias    PHYSICAL EXAM:  BP (!) 148/80   Pulse 73   Ht 1.746 m (5' 8.75\")   Wt 108.7 kg (239 lb 11.2 oz)   BMI 35.66 kg/m     GENERAL APPEARANCE: healthy, alert, no distress  SKIN: no suspicious lesions or rashes  NEURO: Normal strength and tone, mentation intact and speech normal  PSYCH:  mentation appears normal and affect normal, not anxious  RESPIRATORY: No increased work of breathing.  VASCULAR: Radial pulses 2+ and brisk cappillary refill   LYMPH: no palpable axillary lymphadenopathy or cervical neck lymphadenopathy.      MUSCULOSKELETAL:      RIGHT UPPER EXTREMITY:  Sensation intact to light touch in median, radial, ulnar and axillary nerve distributions  Palpable 2+ radial pulse, brisk capillary refill to all fingers, wwp  Intact epl fpl fdp edc wrist flexion/extension biceps triceps deltoid    RIGHT SHOULDER:  Shoulder Inspection: no swelling, bruising, discoloration, or obvious deformity or asymmetry  Tender: none  Range of Motion:   Active:forward flexion 170 degrees, external rotation  60 degrees, internal rotation  T12  Strength: forward flexion 5-/5, External rotation 4+/5    Impingement: negative.  Special tests: Empty Can: Negative    LEFT UPPER EXTREMITY:  Sensation intact to light touch in median, radial, ulnar and axillary nerve distributions  Palpable " 2+ radial pulse, brisk capillary refill to all fingers, wwp  Intact epl fpl fdp edc wrist flexion/extension biceps triceps deltoid    LEFT SHOULDER:  Shoulder Inspection: no swelling, bruising, discoloration, or obvious deformity or asymmetry  Tender: anterior capsule, proximal bicep tendon and greater tuberosity  Non-tender: AC joint  Range of Motion:   Active:forward flexion 140 degrees, external rotation  45 degrees, internal rotation  SI joint  Strength: forward flexion 4+/5, painful, External rotation 4+/5, painful    Impingement: equivocal  Special tests: Empty Can: equivocal      X-RAY INTERPRETATION: 3 views left  shoulder obtained 4/27/2022 were reviewed personally in clinic today with the patient. On my review, Mild osteoarthrosis of the AC joint. Moderate to severe osteoarthrosis involving the glenohumeral joint. Otherwise negative.     MRI left  shoulder:  8/5/2022  1.  Moderate supraspinatus tendinosis. There may also be a small  amount of bursal surface and/or articular surface fraying, but no tear  is evident, and there is no muscle atrophy.  2.  Osteoarthrosis of the AC joint.  3.  Tiny amount of fluid in the subacromial/subdeltoid and subcoracoid  bursae of questionable significance.  4.  Severe glenohumeral osteoarthrosis.  5.  Degeneration and tear throughout the glenoid labrum.  Moderate-sized paralabral cyst. This extends to the spinoglenoid  notch, but there is no adjacent denervation edema or atrophy in the  muscles.  6.  Moderate glenoid humeral joint effusion. There is fluid distending  the biceps tendon sheath which may be joint fluid decompressing into  the sheath or less likely from tenosynovitis.      ASSESSMENT: Tee Hilton is a 71 year old male, right  -hand dominant with chronic left shoulder pain, gleno-humeral osteoarthritis, effusion, proximal biceps tendinitis.      PLAN:   * reviewed xrays with patient. Exam and xrays consistent with chronic, long-standing arthritis. There  has been loss of shoulder joint space from wearing of the cartilage.  * this is most likely reason for shoulder pain and decreased range of motion  * treatment options depend on how symptomatic as well has how aggressive patient wants to be. If not overly symptomatic, we can try an intra-articular cortisone injection as well as Physical Therapy, pain control with tylenol and NSAIDS.  * surgical options would be a  total shoulder arthroplasty    * since he's not had an intra-articular injection, I think it would be reasonable trying that first prior to total shoulder arthroplasty.  * referral placed to Sports Medicine for image guided gleno-humeral injection  * return to clinic as needed.    * All questions were addressed and answered prior to discharge from clinic today. The patient acknowledges an understanding of and agreement with the plan set forth during today's visit. Patient was advised to call our office or MyChart us if any further questions arise upon leaving our office today.          Jarrell Blanc M.D., M.S.  Dept. of Orthopaedic Surgery  [facility]      Again, thank you for allowing me to participate in the care of your patient.        Sincerely,        Jarrell Blanc MD

## 2022-09-20 DIAGNOSIS — F41.9 ANXIETY: ICD-10-CM

## 2022-09-20 RX ORDER — LORAZEPAM 0.5 MG/1
TABLET ORAL
Qty: 25 TABLET | Refills: 0 | Status: SHIPPED | OUTPATIENT
Start: 2022-09-20 | End: 2022-11-24

## 2022-09-21 PROBLEM — G89.29 CHRONIC LEFT SHOULDER PAIN: Status: RESOLVED | Noted: 2022-05-10 | Resolved: 2022-09-21

## 2022-09-21 PROBLEM — M25.512 CHRONIC LEFT SHOULDER PAIN: Status: RESOLVED | Noted: 2022-05-10 | Resolved: 2022-09-21

## 2022-09-21 PROBLEM — M75.82 TENDINITIS OF LEFT ROTATOR CUFF: Status: RESOLVED | Noted: 2022-05-13 | Resolved: 2022-09-21

## 2022-09-21 NOTE — PROGRESS NOTES
Pt did not return for further appointments, to be discharged from skilled physical therapy at this time with continued HEP compliance and return to MD as needed.

## 2022-09-24 ENCOUNTER — OFFICE VISIT (OUTPATIENT)
Dept: ORTHOPEDICS | Facility: CLINIC | Age: 72
End: 2022-09-24
Payer: COMMERCIAL

## 2022-09-24 VITALS — HEIGHT: 69 IN | BODY MASS INDEX: 35.4 KG/M2 | WEIGHT: 239 LBS

## 2022-09-24 DIAGNOSIS — M19.012 PRIMARY OSTEOARTHRITIS OF LEFT SHOULDER: Primary | ICD-10-CM

## 2022-09-24 DIAGNOSIS — M25.512 PAIN IN JOINT OF LEFT SHOULDER: ICD-10-CM

## 2022-09-24 PROCEDURE — 20611 DRAIN/INJ JOINT/BURSA W/US: CPT | Mod: LT | Performed by: FAMILY MEDICINE

## 2022-09-24 RX ORDER — TRIAMCINOLONE ACETONIDE 40 MG/ML
40 INJECTION, SUSPENSION INTRA-ARTICULAR; INTRAMUSCULAR
Status: DISCONTINUED | OUTPATIENT
Start: 2022-09-24 | End: 2022-11-07

## 2022-09-24 RX ORDER — ROPIVACAINE HYDROCHLORIDE 5 MG/ML
3 INJECTION, SOLUTION EPIDURAL; INFILTRATION; PERINEURAL
Status: DISCONTINUED | OUTPATIENT
Start: 2022-09-24 | End: 2022-11-07

## 2022-09-24 RX ADMIN — ROPIVACAINE HYDROCHLORIDE 3 ML: 5 INJECTION, SOLUTION EPIDURAL; INFILTRATION; PERINEURAL at 08:40

## 2022-09-24 RX ADMIN — TRIAMCINOLONE ACETONIDE 40 MG: 40 INJECTION, SUSPENSION INTRA-ARTICULAR; INTRAMUSCULAR at 08:40

## 2022-09-24 NOTE — LETTER
2022         RE: Tee Hilton  [address] [facility] [address]        Dear Colleague,    Thank you for referring your patient, Tee Hilton, to the [facility]. Please see a copy of my visit note below.    Tee Hilton  :  1950  DOS: 2022  MRN: [id number]    Sports Medicine Clinic Procedure    Ultrasound Guided Left Shoulder Intra-articular Glenohumeral Joint Injection    Clinical History: Gradual onset of chronic left shoulder pain over the past several years.  Patient has completed multiple subacromial steroid injections from PCP, most recently on 22 with limited relief.    Diagnosis:   1. Primary osteoarthritis of left shoulder    2. Pain in joint of left shoulder      Referring Physician: Jarrell Blanc MD  Large Joint Injection/Arthocentesis: L glenohumeral joint    Date/Time: 2022 8:40 AM  Performed by: Enrique Rizvi DO  Authorized by: Enrique Rizvi DO     Indications:  Pain and osteoarthritis  Needle Size:  21 G  Guidance: ultrasound    Approach:  Posterolateral  Location:  Shoulder      Site:  L glenohumeral joint  Medications:  3 mL ropivacaine 5 MG/ML; 40 mg triamcinolone 40 MG/ML  Outcome:  Tolerated well, no immediate complications  Procedure discussed: discussed risks, benefits, and alternatives    Consent Given by:  Patient  Timeout: timeout called immediately prior to procedure    Prep: patient was prepped and draped in usual sterile fashion     Ultrasound images of procedure were permanently stored.           Impression:  Successful Left intra-articular shoulder injection.    Plan:  Follow up with as directed by Dr Blanc   Expectations and goals of CSI reviewed  Often 2-3 days for steroid effect, and can take up to two weeks for maximum effect  We discussed modified progressive pain-free activity as tolerated  Do not overuse in first two weeks if feeling better due to concern for  vulnerability while steroid is working  Supportive care reviewed  All questions were answered today  Contact us with additional questions or concerns  Signs and sx of concern reviewed      Enrique Rizvi DO, CAQ  Primary Care Sports Medicine  [facility]         Again, thank you for allowing me to participate in the care of your patient.        Sincerely,        Enrique Rizvi DO

## 2022-09-26 DIAGNOSIS — I10 ESSENTIAL HYPERTENSION: ICD-10-CM

## 2022-09-26 DIAGNOSIS — F51.01 PRIMARY INSOMNIA: ICD-10-CM

## 2022-09-26 DIAGNOSIS — I48.20 CHRONIC ATRIAL FIBRILLATION (H): ICD-10-CM

## 2022-09-27 RX ORDER — ZOLPIDEM TARTRATE 12.5 MG/1
TABLET, FILM COATED, EXTENDED RELEASE ORAL
Qty: 30 TABLET | Refills: 0 | Status: SHIPPED | OUTPATIENT
Start: 2022-09-27 | End: 2022-10-23

## 2022-09-27 RX ORDER — AMLODIPINE BESYLATE 5 MG/1
TABLET ORAL
Qty: 45 TABLET | Refills: 0 | Status: SHIPPED | OUTPATIENT
Start: 2022-09-27 | End: 2022-10-10

## 2022-09-27 RX ORDER — METOPROLOL SUCCINATE 100 MG/1
TABLET, EXTENDED RELEASE ORAL
Qty: 45 TABLET | Refills: 0 | Status: SHIPPED | OUTPATIENT
Start: 2022-09-27 | End: 2022-10-10

## 2022-10-10 ENCOUNTER — OFFICE VISIT (OUTPATIENT)
Dept: FAMILY MEDICINE | Facility: CLINIC | Age: 72
End: 2022-10-10
Payer: COMMERCIAL

## 2022-10-10 VITALS
SYSTOLIC BLOOD PRESSURE: 132 MMHG | DIASTOLIC BLOOD PRESSURE: 70 MMHG | HEART RATE: 92 BPM | RESPIRATION RATE: 20 BRPM | TEMPERATURE: 98.4 F | BODY MASS INDEX: 36.35 KG/M2 | WEIGHT: 244.4 LBS | OXYGEN SATURATION: 98 %

## 2022-10-10 DIAGNOSIS — I48.20 CHRONIC ATRIAL FIBRILLATION (H): ICD-10-CM

## 2022-10-10 DIAGNOSIS — I10 ESSENTIAL HYPERTENSION: ICD-10-CM

## 2022-10-10 PROCEDURE — 80048 BASIC METABOLIC PNL TOTAL CA: CPT | Performed by: PHYSICIAN ASSISTANT

## 2022-10-10 PROCEDURE — 90471 IMMUNIZATION ADMIN: CPT | Performed by: PHYSICIAN ASSISTANT

## 2022-10-10 PROCEDURE — 36415 COLL VENOUS BLD VENIPUNCTURE: CPT | Performed by: PHYSICIAN ASSISTANT

## 2022-10-10 PROCEDURE — 90662 IIV NO PRSV INCREASED AG IM: CPT | Performed by: PHYSICIAN ASSISTANT

## 2022-10-10 PROCEDURE — 99214 OFFICE O/P EST MOD 30 MIN: CPT | Mod: 25 | Performed by: PHYSICIAN ASSISTANT

## 2022-10-10 RX ORDER — METOPROLOL SUCCINATE 100 MG/1
TABLET, EXTENDED RELEASE ORAL
Qty: 135 TABLET | Refills: 1 | Status: SHIPPED | OUTPATIENT
Start: 2022-10-10 | End: 2023-01-17

## 2022-10-10 RX ORDER — AMLODIPINE BESYLATE 5 MG/1
TABLET ORAL
Qty: 135 TABLET | Refills: 1 | Status: SHIPPED | OUTPATIENT
Start: 2022-10-10 | End: 2023-01-17

## 2022-10-10 ASSESSMENT — PAIN SCALES - GENERAL: PAINLEVEL: MODERATE PAIN (4)

## 2022-10-10 NOTE — PROGRESS NOTES
Subjective   Tee is a 71 year old, presenting for the following health issues:  Hypertension (Recheck)      History of Present Illness       Hypertension: He presents for follow up of hypertension.  He does check blood pressure  regularly outside of the clinic. Outside blood pressures have been over 140/90. He does not follow a low salt diet.         Home numbers have looked good.  No chest pain/sob/palpitations/dizziness/ha's      Review of Systems   Constitutional, HEENT, cardiovascular, pulmonary, GI, , musculoskeletal, neuro, skin, endocrine and psych systems are negative, except as otherwise noted.      Objective    /70   Pulse 92   Temp 98.4  F (36.9  C) (Tympanic)   Resp 20   Wt 110.9 kg (244 lb 6.4 oz)   SpO2 98%   BMI 36.35 kg/m    Body mass index is 36.35 kg/m .  Physical Exam     Eye exam - right eye normal lid, conjunctiva, cornea, pupil and fundus, left eye normal lid, conjunctiva, cornea, pupil and fundus.  Thyroid not palpable, not enlarged, no nodules detected.  CHEST:chest clear to IPPA, no tachypnea, retractions or cyanosis and Heart exam detail:irregularly irregular rhythm, chest is clear without rales or wheezing, no pedal edema, no JVD, no hepatosplenomegaly.    Tee was seen today for hypertension.    Diagnoses and all orders for this visit:    Essential hypertension  -     amLODIPine (NORVASC) 5 MG tablet; TAKE 1 & 1/2 (ONE & ONE-HALF) TABLETS BY MOUTH ONCE DAILY  -     Basic metabolic panel  (Ca, Cl, CO2, Creat, Gluc, K, Na, BUN); Future    Chronic atrial fibrillation (H)  -     metoprolol succinate ER (TOPROL XL) 100 MG 24 hr tablet; TAKE 1 & 1/2 (ONE & ONE-HALF) TABLETS BY MOUTH ONCE DAILY    Other orders  -     REVIEW OF HEALTH MAINTENANCE PROTOCOL ORDERS  -     INFLUENZA, QUAD, HIGH DOSE, PF, 65YR + (FLUZONE HD)      Continue current meds.  work on lifestyle modification  Recheck in 6 mos

## 2022-10-11 LAB
ANION GAP SERPL CALCULATED.3IONS-SCNC: 3 MMOL/L (ref 3–14)
BUN SERPL-MCNC: 27 MG/DL (ref 7–30)
CALCIUM SERPL-MCNC: 9.5 MG/DL (ref 8.5–10.1)
CHLORIDE BLD-SCNC: 103 MMOL/L (ref 94–109)
CO2 SERPL-SCNC: 30 MMOL/L (ref 20–32)
CREAT SERPL-MCNC: 1.45 MG/DL (ref 0.66–1.25)
GFR SERPL CREATININE-BSD FRML MDRD: 52 ML/MIN/1.73M2
GLUCOSE BLD-MCNC: 105 MG/DL (ref 70–99)
POTASSIUM BLD-SCNC: 4.6 MMOL/L (ref 3.4–5.3)
SODIUM SERPL-SCNC: 136 MMOL/L (ref 133–144)

## 2022-11-04 NOTE — PROGRESS NOTES
"CHIEF COMPLAINT:   Chief Complaint   Patient presents with     Left Shoulder - Pain     Last injection: glenohumeral with Dr. Rizvi on 9/24/22. Injection worked fairly well, only for 2-3 weeks. He has a constant dull ache. Somevdays are better than others. He would like to discuss a possible injection again.        HISTORY:  Tee Hilton is a 72 year old male, right  -hand dominant, who is seen in followup for left shoulder pain that started  many years ago. No specific injury. He had an intra-articular injection with Dr Rizvi on 9/24/2022, which worked fairly well but only for 2-3 weeks. Continues with a constant dull aching pain. Locates pain throughout the shoulder and upper arm. Aggravated with reaching, lifting. Pain is a constant \"ache\", some days better than others. Occasional tylenol ES. Has done Physical Therapy. Has had 3 subacromial injections, most recently 4/27/2022, didn't help long. Previous injections helped longer.    Has pain at rest, night but worse with activities.    Right shoulder starting to bother as well.    Has always had some neck problems, history of fractures palying football in high school. No numbness and tingling.    He's also dealing with low back pain, sciatica.    He's on xarelto for atrial fibrillation.    Onset: years ago  Symptoms have been worsening since that time.  Aggrevated by: lifting and reaching, mostly to the side  Relieved by: rest  Pain location: located throughout the shoulder joint/diffuse  Pain severity: 6/10  Pain quality: aching and shooting  Frequency of symptoms: are constant    Treatment up to this point:Tylenol, PT and Corticosteroid injection - subacromial 4/2022 and intra-articular 9/2022.    Unable to take NSAIDS due to anti-coagulation.    Usual level of work activity: retired.    Other PMH:  has a past medical history of Congestive heart failure (H) (2020), Gastroesophageal reflux disease, Heart disease (2018), Hypertension, Irregular heart beat, and " Sleep apnea.    He has no past medical history of Arthritis, Cancer (H), Cerebral infarction (H), COPD (chronic obstructive pulmonary disease) (H), Depressive disorder, History of blood transfusion, Thyroid disease, or Uncomplicated asthma.  Patient Active Problem List   Diagnosis     Other male erectile dysfunction     Chronic midline low back pain without sciatica     Persistent atrial fibrillation (H)     Non morbid obesity due to excess calories     Chronic systolic congestive heart failure (H)     Alcohol use     Gastroesophageal reflux disease without esophagitis     Other sleep apnea     Atrial fibrillation (H)     Gastroesophageal reflux disease     Morbid obesity (H)     AMINA (acute kidney injury) (H)     Fever and chills     Leukocytosis     Hyponatremia     Dehydration     Essential hypertension     Chronic atrial fibrillation (H)     Myalgia     Acute kidney injury (H)     Acute intractable headache, unspecified headache type     Bilateral low back pain with right-sided sciatica     Sensorineural hearing loss (SNHL) of both ears     Tinnitus, bilateral     Chronic kidney disease, stage 3 (H)     Ascending aorta enlargement (H)       Surgical Hx:  has a past surgical history that includes tonsillectomy; Open reduction internal fixation tibia; Colonoscopy with CO2 insufflation (N/A, 5/25/2017); Colonoscopy (N/A, 5/25/2017); Anesthesia cardioversion (N/A, 9/24/2018); Septoplasty, turbinoplasty, combined (Bilateral, 7/9/2019); Colonoscopy (N/A, 7/23/2021); and biopsy (Summer 2021).    Medications:   Current Outpatient Medications:      allopurinol (ZYLOPRIM) 100 MG tablet, Take 1 tablet (100 mg) by mouth daily, Disp: 90 tablet, Rfl: 3     amLODIPine (NORVASC) 5 MG tablet, TAKE 1 & 1/2 (ONE & ONE-HALF) TABLETS BY MOUTH ONCE DAILY, Disp: 135 tablet, Rfl: 1     famotidine (PEPCID) 20 MG tablet, Take 1 tablet (20 mg) by mouth nightly as needed, Disp: 60 tablet, Rfl: 1     lisinopril (ZESTRIL) 20 MG tablet, Take 1  tablet (20 mg) by mouth daily TAKE 1 TABLETS BY MOUTH ONCE DAILY, Disp: 90 tablet, Rfl: 1     LORazepam (ATIVAN) 0.5 MG tablet, TAKE 1 TABLET BY MOUTH EVERY 8 HOURS AS NEEDED FOR ANXIETY, Disp: 25 tablet, Rfl: 0     metoprolol succinate ER (TOPROL XL) 100 MG 24 hr tablet, TAKE 1 & 1/2 (ONE & ONE-HALF) TABLETS BY MOUTH ONCE DAILY, Disp: 135 tablet, Rfl: 1     pantoprazole (PROTONIX) 40 MG EC tablet, Take 1 tablet by mouth twice daily, Disp: 180 tablet, Rfl: 1     rivaroxaban ANTICOAGULANT (XARELTO ANTICOAGULANT) 20 MG TABS tablet, Take 1 tablet (20 mg) by mouth daily (with breakfast), Disp: 90 tablet, Rfl: 3     sildenafil (REVATIO) 20 MG tablet, Take 1 -5 tabs daily prn (Patient taking differently: Take 1-5 tablets ( mg) by mouth daily as needed Take 1 -5 tabs daily prn), Disp: 30 tablet, Rfl: 11     tamsulosin (FLOMAX) 0.4 MG capsule, Take 1 capsule (0.4 mg) by mouth every evening, Disp: 90 capsule, Rfl: 1     zolpidem ER (AMBIEN CR) 12.5 MG CR tablet, Take 1 tablet (12.5 mg) by mouth nightly as needed, Disp: 30 tablet, Rfl: 0    Current Facility-Administered Medications:      ropivacaine (NAROPIN) injection 3 mL, 3 mL, , , Enrique Rizvi, DO, 3 mL at 09/24/22 0840     triamcinolone (KENALOG-40) injection 40 mg, 40 mg, , , Enrique Rizvi DO, 40 mg at 09/24/22 0840    Facility-Administered Medications Ordered in Other Visits:      sodium chloride (PF) 0.9% PF flush 10 mL, 10 mL, Intracatheter, Once, CLAY Erwin MD    Allergies:   Allergies   Allergen Reactions     Erythromycin GI Disturbance     Upset stomach     Ethanol      Other reaction(s): stomach ache       Social Hx: retired.   reports that he has never smoked. He has never used smokeless tobacco. He reports current alcohol use. He reports that he does not use drugs.    Family Hx: family history includes Anxiety Disorder in his mother; Gout in his father; Lung Cancer in his father; Other Cancer in his father..    REVIEW OF  "SYSTEMS: 10 point ROS neg other than the symptoms noted above in the HPI and PMH. Notables include  CONSTITUTIONAL:NEGATIVE for fever, chills, change in weight  INTEGUMENTARY/SKIN: NEGATIVE for worrisome rashes, moles or lesions  MUSCULOSKELETAL:See HPI above  NEURO: NEGATIVE for weakness, dizziness or paresthesias    PHYSICAL EXAM:  BP (!) 165/116   Ht 1.746 m (5' 8.75\")   Wt 113.2 kg (249 lb 8 oz)   BMI 37.11 kg/m     GENERAL APPEARANCE: healthy, alert, no distress  SKIN: no suspicious lesions or rashes  NEURO: Normal strength and tone, mentation intact and speech normal  PSYCH:  mentation appears normal and affect normal, not anxious  RESPIRATORY: No increased work of breathing.      MUSCULOSKELETAL:      RIGHT UPPER EXTREMITY:  Sensation intact to light touch in median, radial, ulnar and axillary nerve distributions  Palpable 2+ radial pulse, brisk capillary refill to all fingers, wwp  Intact epl fpl fdp edc wrist flexion/extension biceps triceps deltoid    RIGHT SHOULDER:  Shoulder Inspection: no swelling, bruising, discoloration, or obvious deformity or asymmetry  Tender: none  Range of Motion:   Active:forward flexion 170 degrees, external rotation  60 degrees, internal rotation  T12  Strength: forward flexion 5-/5, External rotation 4+/5    Impingement: negative.  Special tests: Empty Can: Negative    LEFT UPPER EXTREMITY:  Sensation intact to light touch in median, radial, ulnar and axillary nerve distributions  Palpable 2+ radial pulse, brisk capillary refill to all fingers, wwp  Intact epl fpl fdp edc wrist flexion/extension biceps triceps deltoid    LEFT SHOULDER:  Shoulder Inspection: no swelling, bruising, discoloration, or obvious deformity or asymmetry  Tender: anterior capsule, proximal bicep tendon and greater tuberosity  Non-tender: AC joint  Range of Motion:   Active:forward flexion 140 degrees, external rotation  15 degrees, internal rotation L4   Passive: flexion 150 degrees with pain / " crepitus; external rotation 40 degrees.  Strength: forward flexion 4+/5, painful, External rotation 4+/5, painful    Impingement: equivocal  Special tests: Empty Can: equivocal, pain limited.      X-RAY INTERPRETATION: no new images today.  3 views left  shoulder obtained 4/27/2022   Mild osteoarthrosis of the AC joint. Moderate to severe osteoarthrosis involving the glenohumeral joint.      MRI left  shoulder:  8/5/2022  1.  Moderate supraspinatus tendinosis. There may also be a small  amount of bursal surface and/or articular surface fraying, but no tear  is evident, and there is no muscle atrophy.  2.  Osteoarthrosis of the AC joint.  3.  Tiny amount of fluid in the subacromial/subdeltoid and subcoracoid  bursae of questionable significance.  4.  Severe glenohumeral osteoarthrosis. Grade IV chondrosis.  5.  Degeneration and tear throughout the glenoid labrum.  Moderate-sized paralabral cyst. This extends to the spinoglenoid  notch, but there is no adjacent denervation edema or atrophy in the  muscles.  6.  Moderate glenoid humeral joint effusion. There is fluid distending  the biceps tendon sheath which may be joint fluid decompressing into  the sheath or less likely from tenosynovitis.      ASSESSMENT: Tee Hilton is a 72 year old male, right  -hand dominant with chronic left shoulder pain, advanced gleno-humeral osteoarthritis, effusion, proximal biceps tendinitis.      PLAN:   *  Exam and xrays consistent with chronic, long-standing arthritis. There has been loss of shoulder joint space from wearing of the cartilage.  * this is most likely reason for shoulder pain and decreased range of motion  * treatment options depend on how symptomatic as well has how aggressive patient wants to be. If not overly symptomatic, we can try an intra-articular injections (cortisone versus visco) as well as Physical Therapy, pain control with tylenol (no NSAIDS given anticoagulation).  * surgical options would be a  total  shoulder arthroplasty, primary versus reverse depending on rotator cuff integrity at time of surgery.,    * we could see if we can get insurance approval/coverage for viscosupplementation (hyaluronic acid) injections as an alternative to cortisone given cortisone hasn't lasted long.  * pre-authorization placed for left shoulder visco injection.  * if he gets approved, we'd have him go back and see Dr Diaz for image-guided left shoulder intra-articular visco injection.    * return to clinic as needed.    * All questions were addressed and answered prior to discharge from clinic today. The patient acknowledges an understanding of and agreement with the plan set forth during today's visit. Patient was advised to call our office or MyChart us if any further questions arise upon leaving our office today.          Jarrell Blanc M.D., M.S.  Dept. of Orthopaedic Surgery  [facility]

## 2022-11-07 ENCOUNTER — OFFICE VISIT (OUTPATIENT)
Dept: ORTHOPEDICS | Facility: CLINIC | Age: 72
End: 2022-11-07
Payer: COMMERCIAL

## 2022-11-07 VITALS
HEIGHT: 69 IN | WEIGHT: 249.5 LBS | BODY MASS INDEX: 36.95 KG/M2 | DIASTOLIC BLOOD PRESSURE: 116 MMHG | SYSTOLIC BLOOD PRESSURE: 165 MMHG

## 2022-11-07 DIAGNOSIS — M19.012 PRIMARY OSTEOARTHRITIS OF LEFT SHOULDER: Primary | ICD-10-CM

## 2022-11-07 PROCEDURE — 99213 OFFICE O/P EST LOW 20 MIN: CPT | Performed by: ORTHOPAEDIC SURGERY

## 2022-11-07 ASSESSMENT — PAIN SCALES - GENERAL: PAINLEVEL: SEVERE PAIN (6)

## 2022-11-07 NOTE — LETTER
"    11/7/2022         RE: Tee Hilton  [address] [facility] [address]        Dear Colleague,    Thank you for referring your patient, Tee Hilton, to the [facility]. Please see a copy of my visit note below.    CHIEF COMPLAINT:   Chief Complaint   Patient presents with     Left Shoulder - Pain     Last injection: glenohumeral with Dr. Rizvi on 9/24/22. Injection worked fairly well, only for 2-3 weeks. He has a constant dull ache. Somevdays are better than others. He would like to discuss a possible injection again.        HISTORY:  Tee Hilton is a 72 year old male, right  -hand dominant, who is seen in followup for left shoulder pain that started  many years ago. No specific injury. He had an intra-articular injection with Dr Rizvi on 9/24/2022, which worked fairly well but only for 2-3 weeks. Continues with a constant dull aching pain. Locates pain throughout the shoulder and upper arm. Aggravated with reaching, lifting. Pain is a constant \"ache\", some days better than others. Occasional tylenol ES. Has done Physical Therapy. Has had 3 subacromial injections, most recently 4/27/2022, didn't help long. Previous injections helped longer.    Has pain at rest, night but worse with activities.    Right shoulder starting to bother as well.    Has always had some neck problems, history of fractures palying football in high school. No numbness and tingling.    He's also dealing with low back pain, sciatica.    He's on xarelto for atrial fibrillation.    Onset: years ago  Symptoms have been worsening since that time.  Aggrevated by: lifting and reaching, mostly to the side  Relieved by: rest  Pain location: located throughout the shoulder joint/diffuse  Pain severity: 6/10  Pain quality: aching and shooting  Frequency of symptoms: are constant    Treatment up to this point:Tylenol, PT and Corticosteroid injection - subacromial 4/2022 and intra-articular " 9/2022.    Unable to take NSAIDS due to anti-coagulation.    Usual level of work activity: retired.    Other PMH:  has a past medical history of Congestive heart failure (H) (2020), Gastroesophageal reflux disease, Heart disease (2018), Hypertension, Irregular heart beat, and Sleep apnea.    He has no past medical history of Arthritis, Cancer (H), Cerebral infarction (H), COPD (chronic obstructive pulmonary disease) (H), Depressive disorder, History of blood transfusion, Thyroid disease, or Uncomplicated asthma.  Patient Active Problem List   Diagnosis     Other male erectile dysfunction     Chronic midline low back pain without sciatica     Persistent atrial fibrillation (H)     Non morbid obesity due to excess calories     Chronic systolic congestive heart failure (H)     Alcohol use     Gastroesophageal reflux disease without esophagitis     Other sleep apnea     Atrial fibrillation (H)     Gastroesophageal reflux disease     Morbid obesity (H)     AMINA (acute kidney injury) (H)     Fever and chills     Leukocytosis     Hyponatremia     Dehydration     Essential hypertension     Chronic atrial fibrillation (H)     Myalgia     Acute kidney injury (H)     Acute intractable headache, unspecified headache type     Bilateral low back pain with right-sided sciatica     Sensorineural hearing loss (SNHL) of both ears     Tinnitus, bilateral     Chronic kidney disease, stage 3 (H)     Ascending aorta enlargement (H)       Surgical Hx:  has a past surgical history that includes tonsillectomy; Open reduction internal fixation tibia; Colonoscopy with CO2 insufflation (N/A, 5/25/2017); Colonoscopy (N/A, 5/25/2017); Anesthesia cardioversion (N/A, 9/24/2018); Septoplasty, turbinoplasty, combined (Bilateral, 7/9/2019); Colonoscopy (N/A, 7/23/2021); and biopsy (Summer 2021).    Medications:   Current Outpatient Medications:      allopurinol (ZYLOPRIM) 100 MG tablet, Take 1 tablet (100 mg) by mouth daily, Disp: 90 tablet, Rfl: 3      amLODIPine (NORVASC) 5 MG tablet, TAKE 1 & 1/2 (ONE & ONE-HALF) TABLETS BY MOUTH ONCE DAILY, Disp: 135 tablet, Rfl: 1     famotidine (PEPCID) 20 MG tablet, Take 1 tablet (20 mg) by mouth nightly as needed, Disp: 60 tablet, Rfl: 1     lisinopril (ZESTRIL) 20 MG tablet, Take 1 tablet (20 mg) by mouth daily TAKE 1 TABLETS BY MOUTH ONCE DAILY, Disp: 90 tablet, Rfl: 1     LORazepam (ATIVAN) 0.5 MG tablet, TAKE 1 TABLET BY MOUTH EVERY 8 HOURS AS NEEDED FOR ANXIETY, Disp: 25 tablet, Rfl: 0     metoprolol succinate ER (TOPROL XL) 100 MG 24 hr tablet, TAKE 1 & 1/2 (ONE & ONE-HALF) TABLETS BY MOUTH ONCE DAILY, Disp: 135 tablet, Rfl: 1     pantoprazole (PROTONIX) 40 MG EC tablet, Take 1 tablet by mouth twice daily, Disp: 180 tablet, Rfl: 1     rivaroxaban ANTICOAGULANT (XARELTO ANTICOAGULANT) 20 MG TABS tablet, Take 1 tablet (20 mg) by mouth daily (with breakfast), Disp: 90 tablet, Rfl: 3     sildenafil (REVATIO) 20 MG tablet, Take 1 -5 tabs daily prn (Patient taking differently: Take 1-5 tablets ( mg) by mouth daily as needed Take 1 -5 tabs daily prn), Disp: 30 tablet, Rfl: 11     tamsulosin (FLOMAX) 0.4 MG capsule, Take 1 capsule (0.4 mg) by mouth every evening, Disp: 90 capsule, Rfl: 1     zolpidem ER (AMBIEN CR) 12.5 MG CR tablet, Take 1 tablet (12.5 mg) by mouth nightly as needed, Disp: 30 tablet, Rfl: 0    Current Facility-Administered Medications:      ropivacaine (NAROPIN) injection 3 mL, 3 mL, , , Enrique Rizvi DO, 3 mL at 09/24/22 0840     triamcinolone (KENALOG-40) injection 40 mg, 40 mg, , , Enrique Rizvi DO, 40 mg at 09/24/22 0840    Facility-Administered Medications Ordered in Other Visits:      sodium chloride (PF) 0.9% PF flush 10 mL, 10 mL, Intracatheter, Once, CLAY Erwin MD    Allergies:   Allergies   Allergen Reactions     Erythromycin GI Disturbance     Upset stomach     Ethanol      Other reaction(s): stomach ache       Social Hx: retired.   reports that he has never  "smoked. He has never used smokeless tobacco. He reports current alcohol use. He reports that he does not use drugs.    Family Hx: family history includes Anxiety Disorder in his mother; Gout in his father; Lung Cancer in his father; Other Cancer in his father..    REVIEW OF SYSTEMS: 10 point ROS neg other than the symptoms noted above in the HPI and PMH. Notables include  CONSTITUTIONAL:NEGATIVE for fever, chills, change in weight  INTEGUMENTARY/SKIN: NEGATIVE for worrisome rashes, moles or lesions  MUSCULOSKELETAL:See HPI above  NEURO: NEGATIVE for weakness, dizziness or paresthesias    PHYSICAL EXAM:  BP (!) 165/116   Ht 1.746 m (5' 8.75\")   Wt 113.2 kg (249 lb 8 oz)   BMI 37.11 kg/m     GENERAL APPEARANCE: healthy, alert, no distress  SKIN: no suspicious lesions or rashes  NEURO: Normal strength and tone, mentation intact and speech normal  PSYCH:  mentation appears normal and affect normal, not anxious  RESPIRATORY: No increased work of breathing.      MUSCULOSKELETAL:      RIGHT UPPER EXTREMITY:  Sensation intact to light touch in median, radial, ulnar and axillary nerve distributions  Palpable 2+ radial pulse, brisk capillary refill to all fingers, wwp  Intact epl fpl fdp edc wrist flexion/extension biceps triceps deltoid    RIGHT SHOULDER:  Shoulder Inspection: no swelling, bruising, discoloration, or obvious deformity or asymmetry  Tender: none  Range of Motion:   Active:forward flexion 170 degrees, external rotation  60 degrees, internal rotation  T12  Strength: forward flexion 5-/5, External rotation 4+/5    Impingement: negative.  Special tests: Empty Can: Negative    LEFT UPPER EXTREMITY:  Sensation intact to light touch in median, radial, ulnar and axillary nerve distributions  Palpable 2+ radial pulse, brisk capillary refill to all fingers, wwp  Intact epl fpl fdp edc wrist flexion/extension biceps triceps deltoid    LEFT SHOULDER:  Shoulder Inspection: no swelling, bruising, discoloration, or " obvious deformity or asymmetry  Tender: anterior capsule, proximal bicep tendon and greater tuberosity  Non-tender: AC joint  Range of Motion:   Active:forward flexion 140 degrees, external rotation  15 degrees, internal rotation L4   Passive: flexion 150 degrees with pain / crepitus; external rotation 40 degrees.  Strength: forward flexion 4+/5, painful, External rotation 4+/5, painful    Impingement: equivocal  Special tests: Empty Can: equivocal, pain limited.      X-RAY INTERPRETATION: no new images today.  3 views left  shoulder obtained 4/27/2022   Mild osteoarthrosis of the AC joint. Moderate to severe osteoarthrosis involving the glenohumeral joint.      MRI left  shoulder:  8/5/2022  1.  Moderate supraspinatus tendinosis. There may also be a small  amount of bursal surface and/or articular surface fraying, but no tear  is evident, and there is no muscle atrophy.  2.  Osteoarthrosis of the AC joint.  3.  Tiny amount of fluid in the subacromial/subdeltoid and subcoracoid  bursae of questionable significance.  4.  Severe glenohumeral osteoarthrosis. Grade IV chondrosis.  5.  Degeneration and tear throughout the glenoid labrum.  Moderate-sized paralabral cyst. This extends to the spinoglenoid  notch, but there is no adjacent denervation edema or atrophy in the  muscles.  6.  Moderate glenoid humeral joint effusion. There is fluid distending  the biceps tendon sheath which may be joint fluid decompressing into  the sheath or less likely from tenosynovitis.      ASSESSMENT: Tee Hilton is a 72 year old male, right  -hand dominant with chronic left shoulder pain, advanced gleno-humeral osteoarthritis, effusion, proximal biceps tendinitis.      PLAN:   *  Exam and xrays consistent with chronic, long-standing arthritis. There has been loss of shoulder joint space from wearing of the cartilage.  * this is most likely reason for shoulder pain and decreased range of motion  * treatment options depend on how  symptomatic as well has how aggressive patient wants to be. If not overly symptomatic, we can try an intra-articular injections (cortisone versus visco) as well as Physical Therapy, pain control with tylenol (no NSAIDS given anticoagulation).  * surgical options would be a  total shoulder arthroplasty, primary versus reverse depending on rotator cuff integrity at time of surgery.,    * we could see if we can get insurance approval/coverage for viscosupplementation (hyaluronic acid) injections as an alternative to cortisone given cortisone hasn't lasted long.  * pre-authorization placed for left shoulder visco injection.  * if he gets approved, we'd have him go back and see Dr Diaz for image-guided left shoulder intra-articular visco injection.    * return to clinic as needed.    * All questions were addressed and answered prior to discharge from clinic today. The patient acknowledges an understanding of and agreement with the plan set forth during today's visit. Patient was advised to call our office or MyChart us if any further questions arise upon leaving our office today.          Jarrell Blanc M.D., M.S.  Dept. of Orthopaedic Surgery  [facility]      Again, thank you for allowing me to participate in the care of your patient.        Sincerely,        Jarrell Blanc MD

## 2022-11-10 ENCOUNTER — IMMUNIZATION (OUTPATIENT)
Dept: FAMILY MEDICINE | Facility: CLINIC | Age: 72
End: 2022-11-10
Payer: COMMERCIAL

## 2022-11-10 DIAGNOSIS — Z23 HIGH PRIORITY FOR 2019-NCOV VACCINE: Primary | ICD-10-CM

## 2022-11-10 PROCEDURE — 91312 COVID-19,PF,PFIZER BOOSTER BIVALENT: CPT

## 2022-11-10 PROCEDURE — 0124A COVID-19,PF,PFIZER BOOSTER BIVALENT: CPT

## 2022-11-15 ENCOUNTER — VIRTUAL VISIT (OUTPATIENT)
Dept: FAMILY MEDICINE | Facility: CLINIC | Age: 72
End: 2022-11-15
Payer: COMMERCIAL

## 2022-11-15 DIAGNOSIS — I10 ESSENTIAL HYPERTENSION: Primary | ICD-10-CM

## 2022-11-15 DIAGNOSIS — E66.01 MORBID OBESITY (H): ICD-10-CM

## 2022-11-15 PROCEDURE — 99213 OFFICE O/P EST LOW 20 MIN: CPT | Mod: 95 | Performed by: PHYSICIAN ASSISTANT

## 2022-11-15 NOTE — PROGRESS NOTES
Tee is a 72 year old who is being evaluated via a billable video visit.      How would you like to obtain your AVS? MyChart  If the video visit is dropped, the invitation should be resent by: Text to cell phone: [phone]  Will anyone else be joining your video visit? No            Subjective   Tee is a 72 year old, presenting for the following health issues:  No chief complaint on file.      History of Present Illness       Hypertension: He presents for follow up of hypertension.  He does check blood pressure  regularly outside of the clinic. Outpatient blood pressures have not been over 140/90. He does not follow a low salt diet.       No chest pain/sob/palpitations/dizziness/ha's  Home numbers have been running with in normal range.     History of obesity. Discussed weight loss med options . Specifically the GLP 1 RA.    Review of Systems   Constitutional, HEENT, cardiovascular, pulmonary, GI, , musculoskeletal, neuro, skin, endocrine and psych systems are negative, except as otherwise noted.      Objective           Vitals:  No vitals were obtained today due to virtual visit.    Physical Exam   GENERAL: Healthy, alert and no distress  EYES: Eyes grossly normal to inspection.  No discharge or erythema, or obvious scleral/conjunctival abnormalities.  RESP: No audible wheeze, cough, or visible cyanosis.  No visible retractions or increased work of breathing.    SKIN: Visible skin clear. No significant rash, abnormal pigmentation or lesions.  NEURO: Cranial nerves grossly intact.  Mentation and speech appropriate for age.  PSYCH: Mentation appears normal, affect normal/bright, judgement and insight intact, normal speech and appearance well-groomed.  Tee was seen today for hypertension.    Diagnoses and all orders for this visit:    Essential hypertension    Morbid obesity (H)      Continue all current meds.  work on lifestyle modification    Video-Visit Details    Video Start Time: 3:27 PM    Type of  service:  Video Visit    Video End Time:3:43 PM    Originating Location (pt. Location): Home    Distant Location (provider location):  Off-site    Platform [facility] for Video Visit: [facility]

## 2022-11-17 ENCOUNTER — MYC MEDICAL ADVICE (OUTPATIENT)
Dept: FAMILY MEDICINE | Facility: CLINIC | Age: 72
End: 2022-11-17

## 2022-11-17 DIAGNOSIS — E66.01 MORBID OBESITY (H): Primary | ICD-10-CM

## 2022-11-18 ENCOUNTER — TELEPHONE (OUTPATIENT)
Dept: SURGERY | Facility: CLINIC | Age: 72
End: 2022-11-18

## 2022-11-18 NOTE — TELEPHONE ENCOUNTER
[facility] Call Center    Phone Message    May a detailed message be left on voicemail: yes     Reason for Call: Other: Patient called stated that he was to schedule for a shoulder injection but had to go through prior auth for the injection and he would like to know the status on the prior auth. Patient stated that he will also call his insurance company but would still like a call from the clinic.     Action Taken: Other: [facility]    Travel Screening: Not Applicable

## 2022-11-21 ENCOUNTER — TELEPHONE (OUTPATIENT)
Dept: FAMILY MEDICINE | Facility: CLINIC | Age: 72
End: 2022-11-21

## 2022-11-21 NOTE — TELEPHONE ENCOUNTER
Called pt and informed we have not heard back from insurance and to call back in a couple of days to see if the insurance has accepted the prior auth.  Samina Núñez CMA 11/21/2022 8:34 AM

## 2022-11-21 NOTE — TELEPHONE ENCOUNTER
Called pt and scheduled appt:  Tee Hilton MRN: [id number]    Date: 11/28/2022 Status: Scheduled   Time: 9:30 AM Length: 15   Visit Type: RETURN SHOULDER [[id number]] Copay: $0.00   Provider: Jarrell Blanc MD Department: [facility]     Prior auth team please advise.  Samina Núñez CMA 11/21/2022 12:01 PM

## 2022-11-21 NOTE — TELEPHONE ENCOUNTER
Patient returned phone call to discuss approval process for SynviscOne injection.  Notes that he reached out to insurance company and they had not rec'd PA at this time.  Please reach back out to patient to discuss.    FYI for Dr Blanc's team - process change from [employer] will NOT complete PA until patient is already on the schedule.  Send schedule appointment and send message to [employer] pool.    Catalino Lovett, ATC

## 2022-11-21 NOTE — TELEPHONE ENCOUNTER
Central Prior Authorization Team   Phone: [phone]      EPA REQUEST:  Semaglutide-Weight Management 0.25 MG/0.5ML SOAJ

## 2022-11-21 NOTE — PROGRESS NOTES
"CHIEF COMPLAINT:   Chief Complaint   Patient presents with     Left Shoulder - Pain     Left shoulder. He was notified this morning that Synviscone for the shoulder was denied. He is here to discuss other options. He is willing to pay for the shot OOP.        HISTORY:  Tee Hilton is a 72 year old male, right  -hand dominant, who is seen in followup for left shoulder pain that started  many years ago. No specific injury. He had an intra-articular cortisone injection with Dr Rizvi on 9/24/2022, which worked fairly well but only for 2-3 weeks. Continues with a constant dull aching pain. Locates pain throughout the shoulder and upper arm. Aggravated with reaching, lifting. Pain is a constant \"ache\", some days better than others. Occasional tylenol ES. Has done Physical Therapy. Has had 3 subacromial injections, most recently 4/27/2022, didn't help long. Previous injections helped longer. He was hoping for visco injection, but was not approved by his insurance.    Has pain at rest, night but worse with activities.    Right shoulder starting to bother as well.    Has always had some neck problems, history of fractures playing football in high school. No numbness and tingling.    He's also dealing with low back pain, sciatica.    He's on xarelto for atrial fibrillation.    Onset: years ago  Symptoms have been worsening since that time.  Aggrevated by: lifting and reaching, mostly to the side  Relieved by: rest  Pain location: located throughout the shoulder joint/diffuse  Pain severity: 3/10  Pain quality: aching and shooting  Frequency of symptoms: are constant    Treatment up to this point: Tylenol, PT and Corticosteroid injection - subacromial 4/2022 and intra-articular 9/2022 (he thinks intra-articular helped a little better than subacromial).    Unable to take NSAIDS due to anti-coagulation.    Usual level of work activity: retired.    Other PMH:  has a past medical history of Congestive heart failure (H) " (2020), Gastroesophageal reflux disease, Heart disease (2018), Hypertension, Irregular heart beat, and Sleep apnea.    He has no past medical history of Arthritis, Cancer (H), Cerebral infarction (H), COPD (chronic obstructive pulmonary disease) (H), Depressive disorder, History of blood transfusion, Thyroid disease, or Uncomplicated asthma.  Patient Active Problem List   Diagnosis     Other male erectile dysfunction     Chronic midline low back pain without sciatica     Persistent atrial fibrillation (H)     Non morbid obesity due to excess calories     Chronic systolic congestive heart failure (H)     Alcohol use     Gastroesophageal reflux disease without esophagitis     Other sleep apnea     Atrial fibrillation (H)     Gastroesophageal reflux disease     Morbid obesity (H)     AMINA (acute kidney injury) (H)     Fever and chills     Leukocytosis     Hyponatremia     Dehydration     Essential hypertension     Chronic atrial fibrillation (H)     Myalgia     Acute kidney injury (H)     Acute intractable headache, unspecified headache type     Bilateral low back pain with right-sided sciatica     Sensorineural hearing loss (SNHL) of both ears     Tinnitus, bilateral     Chronic kidney disease, stage 3 (H)     Ascending aorta enlargement (H)       Surgical Hx:  has a past surgical history that includes tonsillectomy; Open reduction internal fixation tibia; Colonoscopy with CO2 insufflation (N/A, 5/25/2017); Colonoscopy (N/A, 5/25/2017); Anesthesia cardioversion (N/A, 9/24/2018); Septoplasty, turbinoplasty, combined (Bilateral, 7/9/2019); Colonoscopy (N/A, 7/23/2021); and biopsy (Summer 2021).    Medications:   Current Outpatient Medications:      allopurinol (ZYLOPRIM) 100 MG tablet, Take 1 tablet (100 mg) by mouth daily, Disp: 90 tablet, Rfl: 3     amLODIPine (NORVASC) 5 MG tablet, TAKE 1 & 1/2 (ONE & ONE-HALF) TABLETS BY MOUTH ONCE DAILY, Disp: 135 tablet, Rfl: 1     famotidine (PEPCID) 20 MG tablet, Take 1 tablet  (20 mg) by mouth nightly as needed, Disp: 60 tablet, Rfl: 1     lisinopril (ZESTRIL) 20 MG tablet, Take 1 tablet (20 mg) by mouth daily TAKE 1 TABLETS BY MOUTH ONCE DAILY, Disp: 90 tablet, Rfl: 1     LORazepam (ATIVAN) 0.5 MG tablet, TAKE 1 TABLET BY MOUTH EVERY 8 HOURS AS NEEDED FOR ANXIETY, Disp: 25 tablet, Rfl: 0     metoprolol succinate ER (TOPROL XL) 100 MG 24 hr tablet, TAKE 1 & 1/2 (ONE & ONE-HALF) TABLETS BY MOUTH ONCE DAILY, Disp: 135 tablet, Rfl: 1     pantoprazole (PROTONIX) 40 MG EC tablet, Take 1 tablet by mouth twice daily, Disp: 180 tablet, Rfl: 1     rivaroxaban ANTICOAGULANT (XARELTO ANTICOAGULANT) 20 MG TABS tablet, Take 1 tablet (20 mg) by mouth daily (with breakfast), Disp: 90 tablet, Rfl: 3     Semaglutide-Weight Management 0.25 MG/0.5ML SOAJ, Inject 0.25 mg Subcutaneous once a week, Disp: 2 mL, Rfl: 0     sildenafil (REVATIO) 20 MG tablet, Take 1 -5 tabs daily prn (Patient taking differently: Take  mg by mouth daily as needed Take 1 -5 tabs daily prn), Disp: 30 tablet, Rfl: 11     tamsulosin (FLOMAX) 0.4 MG capsule, Take 1 capsule (0.4 mg) by mouth every evening, Disp: 90 capsule, Rfl: 1     zolpidem ER (AMBIEN CR) 12.5 MG CR tablet, Take 1 tablet (12.5 mg) by mouth nightly as needed, Disp: 30 tablet, Rfl: 0  No current facility-administered medications for this visit.    Facility-Administered Medications Ordered in Other Visits:      sodium chloride (PF) 0.9% PF flush 10 mL, 10 mL, Intracatheter, Once, CLAY Erwin MD    Allergies:   Allergies   Allergen Reactions     Erythromycin GI Disturbance     Upset stomach     Ethanol      Other reaction(s): stomach ache       Social Hx: retired.   reports that he has never smoked. He has never used smokeless tobacco. He reports current alcohol use. He reports that he does not use drugs.    Family Hx: family history includes Anxiety Disorder in his mother; Gout in his father; Lung Cancer in his father; Other Cancer in his father..    REVIEW  "OF SYSTEMS: 10 point ROS neg other than the symptoms noted above in the HPI and PMH. Notables include  CONSTITUTIONAL:NEGATIVE for fever, chills, change in weight  INTEGUMENTARY/SKIN: NEGATIVE for worrisome rashes, moles or lesions  MUSCULOSKELETAL:See HPI above  NEURO: NEGATIVE for weakness, dizziness or paresthesias    PHYSICAL EXAM:  Ht 1.746 m (5' 8.75\")   Wt 112.9 kg (249 lb)   BMI 37.04 kg/m     GENERAL APPEARANCE: healthy, alert, no distress  SKIN: no suspicious lesions or rashes  NEURO: Normal strength and tone, mentation intact and speech normal  PSYCH:  mentation appears normal and affect normal, not anxious  RESPIRATORY: No increased work of breathing.      MUSCULOSKELETAL:      RIGHT UPPER EXTREMITY:  Sensation intact to light touch in median, radial, ulnar and axillary nerve distributions  Palpable 2+ radial pulse, brisk capillary refill to all fingers, wwp  Intact epl fpl fdp edc wrist flexion/extension biceps triceps deltoid    RIGHT SHOULDER:  Shoulder Inspection: no swelling, bruising, discoloration, or obvious deformity or asymmetry  Tender: none  Range of Motion:   Active:forward flexion 170 degrees, external rotation  60 degrees, internal rotation  T12  Strength: forward flexion 5-/5, External rotation 4+/5    Impingement: negative.  Special tests: Empty Can: Negative    LEFT UPPER EXTREMITY:  Sensation intact to light touch in median, radial, ulnar and axillary nerve distributions  Palpable 2+ radial pulse, brisk capillary refill to all fingers, wwp  Intact epl fpl fdp edc wrist flexion/extension biceps triceps deltoid    LEFT SHOULDER:  Shoulder Inspection: no swelling, bruising, discoloration, or obvious deformity or asymmetry  Tender: anterior capsule, proximal bicep tendon and greater tuberosity  Non-tender: AC joint  Range of Motion:   Active:forward flexion 140 degrees, external rotation  15 degrees, internal rotation L4   Passive: flexion 150 degrees with pain / crepitus; external " rotation 40 degrees.  Strength: forward flexion 4+/5, painful, External rotation 4+/5, painful    Impingement: equivocal  Special tests: Empty Can: equivocal, pain limited.      X-RAY INTERPRETATION: no new images today.  3 views left  shoulder obtained 4/27/2022   Mild osteoarthrosis of the AC joint. Moderate to severe osteoarthrosis involving the glenohumeral joint.      MRI left  shoulder:  8/5/2022  1.  Moderate supraspinatus tendinosis. There may also be a small  amount of bursal surface and/or articular surface fraying, but no tear  is evident, and there is no muscle atrophy.  2.  Osteoarthrosis of the AC joint.  3.  Tiny amount of fluid in the subacromial/subdeltoid and subcoracoid  bursae of questionable significance.  4.  Severe glenohumeral osteoarthrosis. Grade IV chondrosis.  5.  Degeneration and tear throughout the glenoid labrum.  Moderate-sized paralabral cyst. This extends to the spinoglenoid  notch, but there is no adjacent denervation edema or atrophy in the  muscles.  6.  Moderate glenoid humeral joint effusion. There is fluid distending  the biceps tendon sheath which may be joint fluid decompressing into  the sheath or less likely from tenosynovitis.      ASSESSMENT: Tee Hilton is a 72 year old male, right  -hand dominant with chronic left shoulder pain, advanced gleno-humeral osteoarthritis, effusion, proximal biceps tendinitis.      PLAN:   *  Exam and xrays consistent with chronic, long-standing arthritis. There has been loss of shoulder joint space from wearing of the cartilage.  * this is most likely reason for shoulder pain and decreased range of motion  * treatment options depend on how symptomatic as well has how aggressive patient wants to be. If not overly symptomatic, we can try an intra-articular injections (cortisone versus visco) as well as Physical Therapy, pain control with tylenol (no NSAIDS given anticoagulation).  * surgical options would be a  total shoulder  arthroplasty, primary versus reverse depending on rotator cuff integrity at time of surgery.    * at this time, he'd rather not have surgery.    * unfortunately, he was not approved for visco injection from his insurance; however, he'd still like to pay for it out of pocket.    * I advised that he follow up with Dr Diaz for image-guided left shoulder visco injection, knowing that it will be out of pocket expense since insurance has denied it. Otherwise we could see if we could get approved with peer to peer. He will just pay for it he states.      * return to clinic as needed.    * All questions were addressed and answered prior to discharge from clinic today. The patient acknowledges an understanding of and agreement with the plan set forth during today's visit. Patient was advised to call our office or MyChart us if any further questions arise upon leaving our office today.          Jarrell Blanc M.D., M.S.  Dept. of Orthopaedic Surgery  [facility]

## 2022-11-22 NOTE — TELEPHONE ENCOUNTER
Central Prior Authorization Team   Phone: [phone]      PA Initiation    Medication: Semaglutide-Weight Management 0.25 MG/0.5ML SOAJ - INITIATED  Insurance Company: [employer] - Phone [phone] Fax [phone]  Pharmacy Filling the Rx: [facility] - [place], [place] - [address] [facility]  Filling Pharmacy Phone: [phone]  Filling Pharmacy Fax:    Start Date: 11/22/2022

## 2022-11-23 NOTE — TELEPHONE ENCOUNTER
E-Prescribing Status: Receipt confirmed by pharmacy (11/20/2022  7:38 AM CST).    Closing encounter.     Kylie Hummel RN

## 2022-11-23 NOTE — TELEPHONE ENCOUNTER
PRIOR AUTHORIZATION DENIED    Medication: Semaglutide-Weight Management 0.25 MG/0.5ML SOAJ - PA DENIED    Denial Date: 11/22/2022    Denial Rational: EXCLUDED FROM PLAN BENEFIT      Appeal Information: SINCE IT IS A PLAN EXCLUSION THERE IS NO APPEAL INFORMATION AVAILABLE

## 2022-11-28 ENCOUNTER — OFFICE VISIT (OUTPATIENT)
Dept: ORTHOPEDICS | Facility: CLINIC | Age: 72
End: 2022-11-28
Payer: COMMERCIAL

## 2022-11-28 VITALS — WEIGHT: 249 LBS | BODY MASS INDEX: 36.88 KG/M2 | HEIGHT: 69 IN

## 2022-11-28 DIAGNOSIS — M25.512 CHRONIC LEFT SHOULDER PAIN: ICD-10-CM

## 2022-11-28 DIAGNOSIS — G89.29 CHRONIC LEFT SHOULDER PAIN: ICD-10-CM

## 2022-11-28 DIAGNOSIS — M19.012 PRIMARY OSTEOARTHRITIS OF LEFT SHOULDER: Primary | ICD-10-CM

## 2022-11-28 PROCEDURE — 99213 OFFICE O/P EST LOW 20 MIN: CPT | Performed by: ORTHOPAEDIC SURGERY

## 2022-11-28 ASSESSMENT — PAIN SCALES - GENERAL: PAINLEVEL: MILD PAIN (3)

## 2022-11-28 NOTE — LETTER
"    11/28/2022         RE: Tee Hilton  [address] [facility] [address]        Dear Colleague,    Thank you for referring your patient, Tee Hilton, to the [facility]. Please see a copy of my visit note below.    CHIEF COMPLAINT:   Chief Complaint   Patient presents with     Left Shoulder - Pain     Left shoulder. He was notified this morning that Synviscone for the shoulder was denied. He is here to discuss other options. He is willing to pay for the shot OOP.        HISTORY:  Tee Hilton is a 72 year old male, right  -hand dominant, who is seen in followup for left shoulder pain that started  many years ago. No specific injury. He had an intra-articular cortisone injection with Dr Rizvi on 9/24/2022, which worked fairly well but only for 2-3 weeks. Continues with a constant dull aching pain. Locates pain throughout the shoulder and upper arm. Aggravated with reaching, lifting. Pain is a constant \"ache\", some days better than others. Occasional tylenol ES. Has done Physical Therapy. Has had 3 subacromial injections, most recently 4/27/2022, didn't help long. Previous injections helped longer. He was hoping for visco injection, but was not approved by his insurance.    Has pain at rest, night but worse with activities.    Right shoulder starting to bother as well.    Has always had some neck problems, history of fractures playing football in high school. No numbness and tingling.    He's also dealing with low back pain, sciatica.    He's on xarelto for atrial fibrillation.    Onset: years ago  Symptoms have been worsening since that time.  Aggrevated by: lifting and reaching, mostly to the side  Relieved by: rest  Pain location: located throughout the shoulder joint/diffuse  Pain severity: 3/10  Pain quality: aching and shooting  Frequency of symptoms: are constant    Treatment up to this point: Tylenol, PT and Corticosteroid injection - subacromial 4/2022 " and intra-articular 9/2022 (he thinks intra-articular helped a little better than subacromial).    Unable to take NSAIDS due to anti-coagulation.    Usual level of work activity: retired.    Other PMH:  has a past medical history of Congestive heart failure (H) (2020), Gastroesophageal reflux disease, Heart disease (2018), Hypertension, Irregular heart beat, and Sleep apnea.    He has no past medical history of Arthritis, Cancer (H), Cerebral infarction (H), COPD (chronic obstructive pulmonary disease) (H), Depressive disorder, History of blood transfusion, Thyroid disease, or Uncomplicated asthma.  Patient Active Problem List   Diagnosis     Other male erectile dysfunction     Chronic midline low back pain without sciatica     Persistent atrial fibrillation (H)     Non morbid obesity due to excess calories     Chronic systolic congestive heart failure (H)     Alcohol use     Gastroesophageal reflux disease without esophagitis     Other sleep apnea     Atrial fibrillation (H)     Gastroesophageal reflux disease     Morbid obesity (H)     AMINA (acute kidney injury) (H)     Fever and chills     Leukocytosis     Hyponatremia     Dehydration     Essential hypertension     Chronic atrial fibrillation (H)     Myalgia     Acute kidney injury (H)     Acute intractable headache, unspecified headache type     Bilateral low back pain with right-sided sciatica     Sensorineural hearing loss (SNHL) of both ears     Tinnitus, bilateral     Chronic kidney disease, stage 3 (H)     Ascending aorta enlargement (H)       Surgical Hx:  has a past surgical history that includes tonsillectomy; Open reduction internal fixation tibia; Colonoscopy with CO2 insufflation (N/A, 5/25/2017); Colonoscopy (N/A, 5/25/2017); Anesthesia cardioversion (N/A, 9/24/2018); Septoplasty, turbinoplasty, combined (Bilateral, 7/9/2019); Colonoscopy (N/A, 7/23/2021); and biopsy (Summer 2021).    Medications:   Current Outpatient Medications:      allopurinol  (ZYLOPRIM) 100 MG tablet, Take 1 tablet (100 mg) by mouth daily, Disp: 90 tablet, Rfl: 3     amLODIPine (NORVASC) 5 MG tablet, TAKE 1 & 1/2 (ONE & ONE-HALF) TABLETS BY MOUTH ONCE DAILY, Disp: 135 tablet, Rfl: 1     famotidine (PEPCID) 20 MG tablet, Take 1 tablet (20 mg) by mouth nightly as needed, Disp: 60 tablet, Rfl: 1     lisinopril (ZESTRIL) 20 MG tablet, Take 1 tablet (20 mg) by mouth daily TAKE 1 TABLETS BY MOUTH ONCE DAILY, Disp: 90 tablet, Rfl: 1     LORazepam (ATIVAN) 0.5 MG tablet, TAKE 1 TABLET BY MOUTH EVERY 8 HOURS AS NEEDED FOR ANXIETY, Disp: 25 tablet, Rfl: 0     metoprolol succinate ER (TOPROL XL) 100 MG 24 hr tablet, TAKE 1 & 1/2 (ONE & ONE-HALF) TABLETS BY MOUTH ONCE DAILY, Disp: 135 tablet, Rfl: 1     pantoprazole (PROTONIX) 40 MG EC tablet, Take 1 tablet by mouth twice daily, Disp: 180 tablet, Rfl: 1     rivaroxaban ANTICOAGULANT (XARELTO ANTICOAGULANT) 20 MG TABS tablet, Take 1 tablet (20 mg) by mouth daily (with breakfast), Disp: 90 tablet, Rfl: 3     Semaglutide-Weight Management 0.25 MG/0.5ML SOAJ, Inject 0.25 mg Subcutaneous once a week, Disp: 2 mL, Rfl: 0     sildenafil (REVATIO) 20 MG tablet, Take 1 -5 tabs daily prn (Patient taking differently: Take  mg by mouth daily as needed Take 1 -5 tabs daily prn), Disp: 30 tablet, Rfl: 11     tamsulosin (FLOMAX) 0.4 MG capsule, Take 1 capsule (0.4 mg) by mouth every evening, Disp: 90 capsule, Rfl: 1     zolpidem ER (AMBIEN CR) 12.5 MG CR tablet, Take 1 tablet (12.5 mg) by mouth nightly as needed, Disp: 30 tablet, Rfl: 0  No current facility-administered medications for this visit.    Facility-Administered Medications Ordered in Other Visits:      sodium chloride (PF) 0.9% PF flush 10 mL, 10 mL, Intracatheter, Once, CLAY Erwin MD    Allergies:   Allergies   Allergen Reactions     Erythromycin GI Disturbance     Upset stomach     Ethanol      Other reaction(s): stomach ache       Social Hx: retired.   reports that he has never smoked.  "He has never used smokeless tobacco. He reports current alcohol use. He reports that he does not use drugs.    Family Hx: family history includes Anxiety Disorder in his mother; Gout in his father; Lung Cancer in his father; Other Cancer in his father..    REVIEW OF SYSTEMS: 10 point ROS neg other than the symptoms noted above in the HPI and PMH. Notables include  CONSTITUTIONAL:NEGATIVE for fever, chills, change in weight  INTEGUMENTARY/SKIN: NEGATIVE for worrisome rashes, moles or lesions  MUSCULOSKELETAL:See HPI above  NEURO: NEGATIVE for weakness, dizziness or paresthesias    PHYSICAL EXAM:  Ht 1.746 m (5' 8.75\")   Wt 112.9 kg (249 lb)   BMI 37.04 kg/m     GENERAL APPEARANCE: healthy, alert, no distress  SKIN: no suspicious lesions or rashes  NEURO: Normal strength and tone, mentation intact and speech normal  PSYCH:  mentation appears normal and affect normal, not anxious  RESPIRATORY: No increased work of breathing.      MUSCULOSKELETAL:      RIGHT UPPER EXTREMITY:  Sensation intact to light touch in median, radial, ulnar and axillary nerve distributions  Palpable 2+ radial pulse, brisk capillary refill to all fingers, wwp  Intact epl fpl fdp edc wrist flexion/extension biceps triceps deltoid    RIGHT SHOULDER:  Shoulder Inspection: no swelling, bruising, discoloration, or obvious deformity or asymmetry  Tender: none  Range of Motion:   Active:forward flexion 170 degrees, external rotation  60 degrees, internal rotation  T12  Strength: forward flexion 5-/5, External rotation 4+/5    Impingement: negative.  Special tests: Empty Can: Negative    LEFT UPPER EXTREMITY:  Sensation intact to light touch in median, radial, ulnar and axillary nerve distributions  Palpable 2+ radial pulse, brisk capillary refill to all fingers, wwp  Intact epl fpl fdp edc wrist flexion/extension biceps triceps deltoid    LEFT SHOULDER:  Shoulder Inspection: no swelling, bruising, discoloration, or obvious deformity or " asymmetry  Tender: anterior capsule, proximal bicep tendon and greater tuberosity  Non-tender: AC joint  Range of Motion:   Active:forward flexion 140 degrees, external rotation  15 degrees, internal rotation L4   Passive: flexion 150 degrees with pain / crepitus; external rotation 40 degrees.  Strength: forward flexion 4+/5, painful, External rotation 4+/5, painful    Impingement: equivocal  Special tests: Empty Can: equivocal, pain limited.      X-RAY INTERPRETATION: no new images today.  3 views left  shoulder obtained 4/27/2022   Mild osteoarthrosis of the AC joint. Moderate to severe osteoarthrosis involving the glenohumeral joint.      MRI left  shoulder:  8/5/2022  1.  Moderate supraspinatus tendinosis. There may also be a small  amount of bursal surface and/or articular surface fraying, but no tear  is evident, and there is no muscle atrophy.  2.  Osteoarthrosis of the AC joint.  3.  Tiny amount of fluid in the subacromial/subdeltoid and subcoracoid  bursae of questionable significance.  4.  Severe glenohumeral osteoarthrosis. Grade IV chondrosis.  5.  Degeneration and tear throughout the glenoid labrum.  Moderate-sized paralabral cyst. This extends to the spinoglenoid  notch, but there is no adjacent denervation edema or atrophy in the  muscles.  6.  Moderate glenoid humeral joint effusion. There is fluid distending  the biceps tendon sheath which may be joint fluid decompressing into  the sheath or less likely from tenosynovitis.      ASSESSMENT: Tee Hilton is a 72 year old male, right  -hand dominant with chronic left shoulder pain, advanced gleno-humeral osteoarthritis, effusion, proximal biceps tendinitis.      PLAN:   *  Exam and xrays consistent with chronic, long-standing arthritis. There has been loss of shoulder joint space from wearing of the cartilage.  * this is most likely reason for shoulder pain and decreased range of motion  * treatment options depend on how symptomatic as well has  how aggressive patient wants to be. If not overly symptomatic, we can try an intra-articular injections (cortisone versus visco) as well as Physical Therapy, pain control with tylenol (no NSAIDS given anticoagulation).  * surgical options would be a  total shoulder arthroplasty, primary versus reverse depending on rotator cuff integrity at time of surgery.    * at this time, he'd rather not have surgery.    * unfortunately, he was not approved for visco injection from his insurance; however, he'd still like to pay for it out of pocket.    * I advised that he follow up with Dr Diaz for image-guided left shoulder visco injection, knowing that it will be out of pocket expense since insurance has denied it. Otherwise we could see if we could get approved with peer to peer. He will just pay for it he states.      * return to clinic as needed.    * All questions were addressed and answered prior to discharge from clinic today. The patient acknowledges an understanding of and agreement with the plan set forth during today's visit. Patient was advised to call our office or MyChart us if any further questions arise upon leaving our office today.          Jarrell Blanc M.D., M.S.  Dept. of Orthopaedic Surgery  [facility]      Again, thank you for allowing me to participate in the care of your patient.        Sincerely,        Jarrell Blanc MD

## 2022-11-30 ENCOUNTER — TELEPHONE (OUTPATIENT)
Dept: FAMILY MEDICINE | Facility: CLINIC | Age: 72
End: 2022-11-30

## 2022-11-30 RX ORDER — LIRAGLUTIDE 6 MG/ML
0.6 INJECTION SUBCUTANEOUS DAILY
Qty: 6 ML | Refills: 1 | Status: SHIPPED | OUTPATIENT
Start: 2022-11-30 | End: 2023-03-09

## 2022-11-30 NOTE — TELEPHONE ENCOUNTER
.Prior Authorization Retail Medication Request    Medication/Dose: liraglutide (VICTOZA) 18 MG/3ML solution  ICD code (if different than what is on RX):  E66.01  Previously Tried and Failed:  na  Rationale:  na    Insurance Name:  [facility]  Insurance ID:  [id number]      Pharmacy Information (if different than what is on RX)  [facility]  Phone:  No phone number

## 2022-11-30 NOTE — TELEPHONE ENCOUNTER
Central Prior Authorization Team  Phone: [phone]    PA Initiation    Medication: liraglutide (VICTOZA) 18 MG/3ML solution  Insurance Company: [employer] - Phone [phone] Fax [phone]  Pharmacy Filling the Rx: [facility] - [place], [place] - [address] [place] [address]  Filling Pharmacy Phone: [phone]  Filling Pharmacy Fax:    Start Date: 11/30/2022

## 2022-12-01 NOTE — PROGRESS NOTES
Tee Hilton  :  1950  DOS: 2022  MRN: [id number]    Sports Medicine Clinic Procedure    Ultrasound Guided Left Shoulder Intra-articular Glenohumeral Joint SynviscOne Injection    Clinical History: decreasing efficacy of CSI for chronic shoulder pain, trying to exhaust all options before considering TSA, here for trial of viscosupplementation today    Diagnosis:   1. Primary osteoarthritis of left shoulder    2. Pain in joint of left shoulder      Referring Physician: Dr. Jarrell Blanc MD  Large Joint Injection/Arthocentesis: L glenohumeral joint    Date/Time: 2022 8:19 AM  Performed by: Enrique Rizvi DO  Authorized by: Enrique Rizvi DO     Indications:  Pain and osteoarthritis  Needle Size:  21 G  Guidance: ultrasound    Location:  Shoulder      Site:  L glenohumeral joint  Medications:  48 mg hylan 48 MG/6ML  Outcome:  Tolerated well, no immediate complications  Procedure discussed: discussed risks, benefits, and alternatives    Consent Given by:  Patient  Timeout: timeout called immediately prior to procedure    Prep: patient was prepped and draped in usual sterile fashion          Impression:  Successful Left intra-articular shoulder injection with SynviscOne.    Plan:  Follow up with as directed by Dr Blanc  Expectations and limitations of synvisc were reviewed in detail  Often 4-6 weeks before full effect may be noticed  Usually covered up to every 6 months by insurance, but does not need to be repeated unless pain returns, at which point we would re-evaluate  Potential use of CSI in future for flares of pain reviewed in detail  Encouraged modified progressive pain-free activity as tolerated  HEP and Supportive care reviewed  All questions were answered today  Contact us with additional questions or concerns  Signs and sx of concern reviewed      Enrique Rizvi DO, CAQ  Primary Care Sports Medicine  [facility]

## 2022-12-02 ENCOUNTER — OFFICE VISIT (OUTPATIENT)
Dept: ORTHOPEDICS | Facility: CLINIC | Age: 72
End: 2022-12-02

## 2022-12-02 VITALS — BODY MASS INDEX: 36.88 KG/M2 | WEIGHT: 249 LBS | HEIGHT: 69 IN | RESPIRATION RATE: 18 BRPM

## 2022-12-02 DIAGNOSIS — M25.512 PAIN IN JOINT OF LEFT SHOULDER: ICD-10-CM

## 2022-12-02 DIAGNOSIS — M19.012 PRIMARY OSTEOARTHRITIS OF LEFT SHOULDER: Primary | ICD-10-CM

## 2022-12-02 PROCEDURE — 20611 DRAIN/INJ JOINT/BURSA W/US: CPT | Mod: LT | Performed by: FAMILY MEDICINE

## 2022-12-02 ASSESSMENT — PAIN SCALES - GENERAL: PAINLEVEL: MILD PAIN (3)

## 2022-12-02 NOTE — LETTER
2022         RE: Tee Hilton  [address] [facility] [address]        Dear Colleague,    Thank you for referring your patient, Tee Hilton, to the [facility]. Please see a copy of my visit note below.    Tee Hilton  :  1950  DOS: 2022  MRN: [id number]    Sports Medicine Clinic Procedure    Ultrasound Guided Left Shoulder Intra-articular Glenohumeral Joint SynviscOne Injection    Clinical History: decreasing efficacy of CSI for chronic shoulder pain, trying to exhaust all options before considering TSA, here for trial of viscosupplementation today    Diagnosis:   1. Primary osteoarthritis of left shoulder    2. Pain in joint of left shoulder      Referring Physician: Dr. Jarrell Blanc MD  Large Joint Injection/Arthocentesis: L glenohumeral joint    Date/Time: 2022 8:19 AM  Performed by: Enrique Rizvi DO  Authorized by: Enrique Rizvi DO     Indications:  Pain and osteoarthritis  Needle Size:  21 G  Guidance: ultrasound    Location:  Shoulder      Site:  L glenohumeral joint  Medications:  48 mg hylan 48 MG/6ML  Outcome:  Tolerated well, no immediate complications  Procedure discussed: discussed risks, benefits, and alternatives    Consent Given by:  Patient  Timeout: timeout called immediately prior to procedure    Prep: patient was prepped and draped in usual sterile fashion          Impression:  Successful Left intra-articular shoulder injection with SynviscOne.    Plan:  Follow up with as directed by Dr Blanc  Expectations and limitations of synvisc were reviewed in detail  Often 4-6 weeks before full effect may be noticed  Usually covered up to every 6 months by insurance, but does not need to be repeated unless pain returns, at which point we would re-evaluate  Potential use of CSI in future for flares of pain reviewed in detail  Encouraged modified progressive pain-free activity as tolerated  HEP and  Supportive care reviewed  All questions were answered today  Contact us with additional questions or concerns  Signs and sx of concern reviewed      Enrique Rizvi DO, CAQ  Primary Care Sports Medicine  [facility]           Again, thank you for allowing me to participate in the care of your patient.        Sincerely,        Enrique Rizvi DO

## 2022-12-05 NOTE — TELEPHONE ENCOUNTER
PRIOR AUTHORIZATION DENIED    Medication: liraglutide (VICTOZA) 18 MG/3ML solution- DENIED     Denial Date: 12/2/2022    Denial Rational: Medical criteria not met.       Appeal Information: If provider would like to appeal please provide a letter of medical necessity.

## 2022-12-08 NOTE — TELEPHONE ENCOUNTER
Spoke with patient, informed as below. Patient is leaving for vacation this afternoon. Patient is scheduled to see Jon Denson 12/22/22. Will discuss at appt.

## 2023-01-13 ENCOUNTER — MYC REFILL (OUTPATIENT)
Dept: FAMILY MEDICINE | Facility: CLINIC | Age: 73
End: 2023-01-13

## 2023-01-13 DIAGNOSIS — F51.01 PRIMARY INSOMNIA: ICD-10-CM

## 2023-01-13 DIAGNOSIS — F41.9 ANXIETY: ICD-10-CM

## 2023-01-13 RX ORDER — LORAZEPAM 0.5 MG/1
0.5 TABLET ORAL EVERY 8 HOURS PRN
Qty: 25 TABLET | Refills: 0 | Status: CANCELLED | OUTPATIENT
Start: 2023-01-13

## 2023-01-13 RX ORDER — ZOLPIDEM TARTRATE 12.5 MG/1
12.5 TABLET, FILM COATED, EXTENDED RELEASE ORAL
Qty: 30 TABLET | Refills: 0 | Status: CANCELLED | OUTPATIENT
Start: 2023-01-13

## 2023-01-16 DIAGNOSIS — F41.9 ANXIETY: ICD-10-CM

## 2023-01-16 DIAGNOSIS — F51.01 PRIMARY INSOMNIA: ICD-10-CM

## 2023-01-16 NOTE — TELEPHONE ENCOUNTER
Pt wife will  for him and bring down to [place] she leaves on Wednesday night     Please address YOSHI Ruth RN  [facility]

## 2023-01-18 RX ORDER — ZOLPIDEM TARTRATE 12.5 MG/1
12.5 TABLET, FILM COATED, EXTENDED RELEASE ORAL
Qty: 30 TABLET | Refills: 0 | OUTPATIENT
Start: 2023-01-18

## 2023-01-18 RX ORDER — LORAZEPAM 0.5 MG/1
0.5 TABLET ORAL EVERY 8 HOURS PRN
Qty: 25 TABLET | Refills: 0 | OUTPATIENT
Start: 2023-01-18

## 2023-02-07 ENCOUNTER — TELEPHONE (OUTPATIENT)
Dept: FAMILY MEDICINE | Facility: CLINIC | Age: 73
End: 2023-02-07
Payer: COMMERCIAL

## 2023-02-07 NOTE — TELEPHONE ENCOUNTER
Pt calling in regards to his Ambien. Pt is snow birding down in [place] and will be out of his Ambien script before they come home.     Tried to fill it at a pharmacy down there and they would not fill it given the new laws with not being able to send scripts over states lines.    Pt was told to find an in state doctor that would be able to fill the script there for him as [facility] would not be able to send the script over states lines .     Pt plans to go to a doctor down there to get it addressed.    Laura Ruth RN  [facility]

## 2023-03-03 NOTE — PROGRESS NOTES
"CHIEF COMPLAINT:   Chief Complaint   Patient presents with     Left Shoulder - Pain     Discuss surgery  Left shoulder pain gradually getting worse. Had steroid injection 12/2/22 with Dr. Rizvi  and it didn't help at all. Has done Physical Therapy. Has had 3 subacromial injections.        HISTORY:  Tee Hilton is a 72 year old male, right  -hand dominant, who is seen in followup for left shoulder pain that started  many years ago. No specific injury. He had an intra-articular cortisone injection with Dr Rizvi on 9/24/2022, which worked fairly well but only for 2-3 weeks. He had a visco injection 12/2/2022, really didn't help at all.  Continues with a constant dull aching pain, getting worse.    He locates pain throughout the shoulder and upper arm. Aggravated with reaching, lifting. Pain is a constant \"ache\", some days better than others. Occasional tylenol ES. Has done Physical Therapy. Has had 3 subacromial injections, most recently 4/27/2022, didn't help long. Previous injections helped longer.     Has pain at rest, night but worse with activities.    Right shoulder starting to bother as well.    Has always had some neck problems, history of fractures playing football in high school. No numbness and tingling.    He's also dealing with low back pain, sciatica.    He's on xarelto for atrial fibrillation.    Onset: years ago  Symptoms have been worsening since that time.  Aggrevated by: lifting and reaching, mostly to the side  Relieved by: rest  Pain location: located throughout the shoulder joint/diffuse  Pain severity: 2/10  Pain quality: aching and shooting  Frequency of symptoms: are constant    Treatment up to this point: Tylenol, PT and Corticosteroid injection - subacromial 4/2022 and intra-articular 9/2022 (he thinks intra-articular helped a little better than subacromial). Visco injection 12/2/2022 without relief.    Unable to take NSAIDS due to anti-coagulation.    Usual level of work activity: " retired.    Other PMH:  has a past medical history of Congestive heart failure (H) (2020), Gastroesophageal reflux disease, Heart disease (2018), Hypertension, Irregular heart beat, and Sleep apnea.    He has no past medical history of Arthritis, Cancer (H), Cerebral infarction (H), COPD (chronic obstructive pulmonary disease) (H), Depressive disorder, History of blood transfusion, Thyroid disease, or Uncomplicated asthma.  Patient Active Problem List   Diagnosis     Other male erectile dysfunction     Chronic midline low back pain without sciatica     Persistent atrial fibrillation (H)     Non morbid obesity due to excess calories     Chronic systolic congestive heart failure (H)     Alcohol use     Gastroesophageal reflux disease without esophagitis     Other sleep apnea     Atrial fibrillation (H)     Gastroesophageal reflux disease     Morbid obesity (H)     AMINA (acute kidney injury) (H)     Fever and chills     Leukocytosis     Hyponatremia     Dehydration     Essential hypertension     Chronic atrial fibrillation (H)     Myalgia     Acute kidney injury (H)     Acute intractable headache, unspecified headache type     Bilateral low back pain with right-sided sciatica     Sensorineural hearing loss (SNHL) of both ears     Tinnitus, bilateral     Chronic kidney disease, stage 3 (H)     Ascending aorta enlargement (H)       Surgical Hx:  has a past surgical history that includes tonsillectomy; Open reduction internal fixation tibia; Colonoscopy with CO2 insufflation (N/A, 5/25/2017); Colonoscopy (N/A, 5/25/2017); Anesthesia cardioversion (N/A, 9/24/2018); Septoplasty, turbinoplasty, combined (Bilateral, 7/9/2019); Colonoscopy (N/A, 7/23/2021); and biopsy (Summer 2021).    Medications:   Current Outpatient Medications:      allopurinol (ZYLOPRIM) 100 MG tablet, Take 1 tablet by mouth once daily, Disp: 90 tablet, Rfl: 0     amLODIPine (NORVASC) 5 MG tablet, TAKE 1 & 1/2 (ONE & ONE-HALF) TABLETS BY MOUTH ONCE DAILY,  Disp: 135 tablet, Rfl: 0     famotidine (PEPCID) 20 MG tablet, Take 1 tablet (20 mg) by mouth nightly as needed, Disp: 60 tablet, Rfl: 1     liraglutide (VICTOZA) 18 MG/3ML solution, Inject 0.6 mg Subcutaneous daily, Disp: 6 mL, Rfl: 1     lisinopril (ZESTRIL) 20 MG tablet, TAKE 1/2 OR 1 TABLET ONCE DAILY ( FOLLOW DRS CURRENT DIRECTIONS ON DOSING), Disp: 90 tablet, Rfl: 0     LORazepam (ATIVAN) 0.5 MG tablet, TAKE 1 TABLET BY MOUTH EVERY 8 HOURS AS NEEDED FOR ANXIETY, Disp: 25 tablet, Rfl: 0     metoprolol succinate ER (TOPROL XL) 100 MG 24 hr tablet, TAKE 1 & 1/2 (ONE & ONE-HALF) TABLETS BY MOUTH ONCE DAILY, Disp: 135 tablet, Rfl: 0     pantoprazole (PROTONIX) 40 MG EC tablet, Take 1 tablet by mouth twice daily, Disp: 180 tablet, Rfl: 1     Semaglutide-Weight Management 0.25 MG/0.5ML SOAJ, Inject 0.25 mg Subcutaneous once a week, Disp: 2 mL, Rfl: 0     sildenafil (REVATIO) 20 MG tablet, Take 1 -5 tabs daily prn (Patient taking differently: Take  mg by mouth daily as needed Take 1 -5 tabs daily prn), Disp: 30 tablet, Rfl: 11     tamsulosin (FLOMAX) 0.4 MG capsule, Take 1 capsule (0.4 mg) by mouth every evening, Disp: 90 capsule, Rfl: 0     XARELTO ANTICOAGULANT 20 MG TABS tablet, Take 1 tablet by mouth once daily with breakfast, Disp: 90 tablet, Rfl: 0     zolpidem ER (AMBIEN CR) 12.5 MG CR tablet, TAKE 1 TABLET BY MOUTH NIGHTLY AS NEEDED, Disp: 30 tablet, Rfl: 0    Current Facility-Administered Medications:      hylan (SYNVISC ONE) injection 48 mg, 48 mg, , , Enrique Rizvi DO, 48 mg at 12/02/22 0819    Facility-Administered Medications Ordered in Other Visits:      sodium chloride (PF) 0.9% PF flush 10 mL, 10 mL, Intracatheter, Once, CLAY Erwin MD    Allergies:   Allergies   Allergen Reactions     Erythromycin GI Disturbance     Upset stomach     Ethanol      Other reaction(s): stomach ache       Social Hx: retired.   reports that he has never smoked. He has never used smokeless tobacco.  He reports current alcohol use. He reports that he does not use drugs.    Family Hx: family history includes Anxiety Disorder in his mother; Gout in his father; Lung Cancer in his father; Other Cancer in his father..    REVIEW OF SYSTEMS:    CONSTITUTIONAL:NEGATIVE for fever, chills, change in weight  INTEGUMENTARY/SKIN: NEGATIVE for worrisome rashes, moles or lesions  MUSCULOSKELETAL:See HPI above  NEURO: NEGATIVE for weakness, dizziness or paresthesias    PHYSICAL EXAM:  /89   Pulse 95   Resp 16   Wt 112.9 kg (249 lb)   SpO2 99%   BMI 37.04 kg/m     GENERAL APPEARANCE: healthy, alert, no distress  SKIN: no suspicious lesions or rashes  NEURO: Normal strength and tone, mentation intact and speech normal  PSYCH:  mentation appears normal and affect normal, not anxious  RESPIRATORY: No increased work of breathing.      MUSCULOSKELETAL:      RIGHT UPPER EXTREMITY:  Sensation intact to light touch in median, radial, ulnar and axillary nerve distributions  Palpable 2+ radial pulse, brisk capillary refill to all fingers, wwp  Intact epl fpl fdp edc wrist flexion/extension biceps triceps deltoid    RIGHT SHOULDER:  Shoulder Inspection: no swelling, bruising, discoloration, or obvious deformity or asymmetry  Tender: none  Range of Motion:   Active: forward flexion 170 degrees, external rotation  60 degrees, internal rotation  T12  Strength: forward flexion 5-/5, External rotation 4+/5    Impingement: negative.  Special tests: Empty Can: Negative    LEFT UPPER EXTREMITY:  Sensation intact to light touch in median, radial, ulnar and axillary nerve distributions  Palpable 2+ radial pulse, brisk capillary refill to all fingers, wwp  Intact epl fpl fdp edc wrist flexion/extension biceps triceps deltoid    LEFT SHOULDER:  Shoulder Inspection: no swelling, bruising, discoloration, or obvious deformity or asymmetry  Tender: anterior capsule, proximal bicep tendon and greater tuberosity  Non-tender: AC joint  Range of  Motion:   Active:forward flexion 140 degrees, external rotation  15 degrees, internal rotation L4   Passive: flexion 150 degrees with pain / crepitus; external rotation 40 degrees.  Strength: forward flexion 4+/5, painful, External rotation 4+/5, painful    Impingement: equivocal  Special tests: Empty Can: equivocal, pain limited.   Belly Press: positive.      X-RAY INTERPRETATION: no new images today.  3 views left shoulder obtained 4/27/2022   Mild osteoarthrosis of the AC joint. Moderate to severe osteoarthrosis involving the glenohumeral joint.      MRI left  shoulder:  8/5/2022  1.  Moderate supraspinatus tendinosis. There may also be a small  amount of bursal surface and/or articular surface fraying, but no tear  is evident, and there is no muscle atrophy.  2.  Osteoarthrosis of the AC joint.  3.  Tiny amount of fluid in the subacromial/subdeltoid and subcoracoid  bursae of questionable significance.  4.  Severe glenohumeral osteoarthrosis. Grade IV chondrosis.  5.  Degeneration and tear throughout the glenoid labrum.  Moderate-sized paralabral cyst. This extends to the spinoglenoid  notch, but there is no adjacent denervation edema or atrophy in the  muscles.  6.  Moderate glenoid humeral joint effusion. There is fluid distending  the biceps tendon sheath which may be joint fluid decompressing into  the sheath or less likely from tenosynovitis.      ASSESSMENT: Tee Hilton is a 72 year old male, right  -hand dominant with chronic left shoulder pain, advanced gleno-humeral osteoarthritis, effusion, proximal biceps tendinitis.      PLAN:   *  Exam and xrays consistent with chronic, long-standing arthritis. There has been loss of shoulder joint space from wearing of the cartilage.  * this is most likely reason for shoulder pain and decreased range of motion  * treatment options depend on how symptomatic as well has how aggressive patient wants to be. If not overly symptomatic, we can try an intra-articular  injections (cortisone versus visco) as well as Physical Therapy, pain control with tylenol (no NSAIDS given anticoagulation).  * surgical options would be a  total shoulder arthroplasty, primary versus reverse depending on rotator cuff integrity at time of surgery.    * at this time, he'd like to proceed with surgery.    * will place a referral for a CT of the left shoulder, using the [employer] signature glenoid protcol.  * once the CT scan is done, then we'll reach out to our vendor and work to get the custom guides made and schedule surgery  * surgical orders / case request has NOT been placed.  * the plan will be left shoulder total shoulder arthroplasty versus reverse total shoulder arthroplasty, same day surgery (overnight stay in a bed)  * will need H+P per primary care provider.  * return to clinic 2 weeks postoperative for wound check, suture removal, xrays.      * All questions were addressed and answered prior to discharge from clinic today. The patient acknowledges an understanding of and agreement with the plan set forth during today's visit. Patient was advised to call our office or MyChart us if any further questions arise upon leaving our office today.          Jarrell Blanc M.D., M.S.  Dept. of Orthopaedic Surgery  [facility]

## 2023-03-08 ENCOUNTER — TELEPHONE (OUTPATIENT)
Dept: FAMILY MEDICINE | Facility: CLINIC | Age: 73
End: 2023-03-08

## 2023-03-08 ENCOUNTER — OFFICE VISIT (OUTPATIENT)
Dept: URGENT CARE | Facility: URGENT CARE | Age: 73
End: 2023-03-08
Payer: COMMERCIAL

## 2023-03-08 ENCOUNTER — OFFICE VISIT (OUTPATIENT)
Dept: ORTHOPEDICS | Facility: CLINIC | Age: 73
End: 2023-03-08
Payer: COMMERCIAL

## 2023-03-08 VITALS
TEMPERATURE: 97.9 F | DIASTOLIC BLOOD PRESSURE: 93 MMHG | SYSTOLIC BLOOD PRESSURE: 162 MMHG | WEIGHT: 253 LBS | OXYGEN SATURATION: 98 % | BODY MASS INDEX: 37.63 KG/M2 | RESPIRATION RATE: 18 BRPM | HEART RATE: 82 BPM

## 2023-03-08 VITALS
OXYGEN SATURATION: 99 % | DIASTOLIC BLOOD PRESSURE: 89 MMHG | SYSTOLIC BLOOD PRESSURE: 135 MMHG | HEART RATE: 95 BPM | WEIGHT: 249 LBS | BODY MASS INDEX: 37.04 KG/M2 | RESPIRATION RATE: 16 BRPM

## 2023-03-08 DIAGNOSIS — L03.011 PARONYCHIA OF FINGER, RIGHT: Primary | ICD-10-CM

## 2023-03-08 DIAGNOSIS — I48.20 CHRONIC ATRIAL FIBRILLATION (H): ICD-10-CM

## 2023-03-08 DIAGNOSIS — M19.012 PRIMARY OSTEOARTHRITIS OF LEFT SHOULDER: Primary | ICD-10-CM

## 2023-03-08 DIAGNOSIS — N18.32 STAGE 3B CHRONIC KIDNEY DISEASE (H): ICD-10-CM

## 2023-03-08 DIAGNOSIS — M25.512 CHRONIC LEFT SHOULDER PAIN: ICD-10-CM

## 2023-03-08 DIAGNOSIS — G89.29 CHRONIC LEFT SHOULDER PAIN: ICD-10-CM

## 2023-03-08 PROCEDURE — 99213 OFFICE O/P EST LOW 20 MIN: CPT | Performed by: ORTHOPAEDIC SURGERY

## 2023-03-08 PROCEDURE — 99214 OFFICE O/P EST MOD 30 MIN: CPT | Performed by: PHYSICIAN ASSISTANT

## 2023-03-08 RX ORDER — MUPIROCIN 20 MG/G
OINTMENT TOPICAL 3 TIMES DAILY
Qty: 22 G | Refills: 0 | Status: SHIPPED | OUTPATIENT
Start: 2023-03-08 | End: 2023-03-15

## 2023-03-08 RX ORDER — CEPHALEXIN 500 MG/1
500 CAPSULE ORAL 3 TIMES DAILY
Qty: 21 CAPSULE | Refills: 0 | Status: SHIPPED | OUTPATIENT
Start: 2023-03-08 | End: 2023-03-09

## 2023-03-08 ASSESSMENT — PAIN SCALES - GENERAL
PAINLEVEL: EXTREME PAIN (9)
PAINLEVEL: MILD PAIN (2)

## 2023-03-08 NOTE — TELEPHONE ENCOUNTER
Reason for Call:  Other appointment    Detailed comments:  Patient has appointment with you tomorrow but wants to be seen today has ingrown toenail.  Also has appointment upstairs @ 11:00 hoping to get in today    Phone Number Patient can be reached at: Home number on file [phone] (home)    Best Time:  Asap     Can we leave a detailed message on this number? YES    Call taken on 3/8/2023 at 10:56 AM by Rossana Aguila

## 2023-03-08 NOTE — LETTER
"    3/8/2023         RE: Tee Hilton  [address] [place] [facility] [address]        Dear Colleague,    Thank you for referring your patient, Tee Hilton, to the [facility]. Please see a copy of my visit note below.    CHIEF COMPLAINT:   Chief Complaint   Patient presents with     Left Shoulder - Pain     Discuss surgery  Left shoulder pain gradually getting worse. Had steroid injection 12/2/22 with Dr. Rizvi  and it didn't help at all. Has done Physical Therapy. Has had 3 subacromial injections.        HISTORY:  Tee Hilton is a 72 year old male, right  -hand dominant, who is seen in followup for left shoulder pain that started  many years ago. No specific injury. He had an intra-articular cortisone injection with Dr Rizvi on 9/24/2022, which worked fairly well but only for 2-3 weeks. He had a visco injection 12/2/2022, really didn't help at all.  Continues with a constant dull aching pain, getting worse.    He locates pain throughout the shoulder and upper arm. Aggravated with reaching, lifting. Pain is a constant \"ache\", some days better than others. Occasional tylenol ES. Has done Physical Therapy. Has had 3 subacromial injections, most recently 4/27/2022, didn't help long. Previous injections helped longer.     Has pain at rest, night but worse with activities.    Right shoulder starting to bother as well.    Has always had some neck problems, history of fractures playing football in high school. No numbness and tingling.    He's also dealing with low back pain, sciatica.    He's on xarelto for atrial fibrillation.    Onset: years ago  Symptoms have been worsening since that time.  Aggrevated by: lifting and reaching, mostly to the side  Relieved by: rest  Pain location: located throughout the shoulder joint/diffuse  Pain severity: 2/10  Pain quality: aching and shooting  Frequency of symptoms: are constant    Treatment up to this point: Tylenol, PT and " Corticosteroid injection - subacromial 4/2022 and intra-articular 9/2022 (he thinks intra-articular helped a little better than subacromial). Visco injection 12/2/2022 without relief.    Unable to take NSAIDS due to anti-coagulation.    Usual level of work activity: retired.    Other PMH:  has a past medical history of Congestive heart failure (H) (2020), Gastroesophageal reflux disease, Heart disease (2018), Hypertension, Irregular heart beat, and Sleep apnea.    He has no past medical history of Arthritis, Cancer (H), Cerebral infarction (H), COPD (chronic obstructive pulmonary disease) (H), Depressive disorder, History of blood transfusion, Thyroid disease, or Uncomplicated asthma.  Patient Active Problem List   Diagnosis     Other male erectile dysfunction     Chronic midline low back pain without sciatica     Persistent atrial fibrillation (H)     Non morbid obesity due to excess calories     Chronic systolic congestive heart failure (H)     Alcohol use     Gastroesophageal reflux disease without esophagitis     Other sleep apnea     Atrial fibrillation (H)     Gastroesophageal reflux disease     Morbid obesity (H)     AMINA (acute kidney injury) (H)     Fever and chills     Leukocytosis     Hyponatremia     Dehydration     Essential hypertension     Chronic atrial fibrillation (H)     Myalgia     Acute kidney injury (H)     Acute intractable headache, unspecified headache type     Bilateral low back pain with right-sided sciatica     Sensorineural hearing loss (SNHL) of both ears     Tinnitus, bilateral     Chronic kidney disease, stage 3 (H)     Ascending aorta enlargement (H)       Surgical Hx:  has a past surgical history that includes tonsillectomy; Open reduction internal fixation tibia; Colonoscopy with CO2 insufflation (N/A, 5/25/2017); Colonoscopy (N/A, 5/25/2017); Anesthesia cardioversion (N/A, 9/24/2018); Septoplasty, turbinoplasty, combined (Bilateral, 7/9/2019); Colonoscopy (N/A, 7/23/2021); and  biopsy (Summer 2021).    Medications:   Current Outpatient Medications:      allopurinol (ZYLOPRIM) 100 MG tablet, Take 1 tablet by mouth once daily, Disp: 90 tablet, Rfl: 0     amLODIPine (NORVASC) 5 MG tablet, TAKE 1 & 1/2 (ONE & ONE-HALF) TABLETS BY MOUTH ONCE DAILY, Disp: 135 tablet, Rfl: 0     famotidine (PEPCID) 20 MG tablet, Take 1 tablet (20 mg) by mouth nightly as needed, Disp: 60 tablet, Rfl: 1     liraglutide (VICTOZA) 18 MG/3ML solution, Inject 0.6 mg Subcutaneous daily, Disp: 6 mL, Rfl: 1     lisinopril (ZESTRIL) 20 MG tablet, TAKE 1/2 OR 1 TABLET ONCE DAILY ( FOLLOW DRS CURRENT DIRECTIONS ON DOSING), Disp: 90 tablet, Rfl: 0     LORazepam (ATIVAN) 0.5 MG tablet, TAKE 1 TABLET BY MOUTH EVERY 8 HOURS AS NEEDED FOR ANXIETY, Disp: 25 tablet, Rfl: 0     metoprolol succinate ER (TOPROL XL) 100 MG 24 hr tablet, TAKE 1 & 1/2 (ONE & ONE-HALF) TABLETS BY MOUTH ONCE DAILY, Disp: 135 tablet, Rfl: 0     pantoprazole (PROTONIX) 40 MG EC tablet, Take 1 tablet by mouth twice daily, Disp: 180 tablet, Rfl: 1     Semaglutide-Weight Management 0.25 MG/0.5ML SOAJ, Inject 0.25 mg Subcutaneous once a week, Disp: 2 mL, Rfl: 0     sildenafil (REVATIO) 20 MG tablet, Take 1 -5 tabs daily prn (Patient taking differently: Take  mg by mouth daily as needed Take 1 -5 tabs daily prn), Disp: 30 tablet, Rfl: 11     tamsulosin (FLOMAX) 0.4 MG capsule, Take 1 capsule (0.4 mg) by mouth every evening, Disp: 90 capsule, Rfl: 0     XARELTO ANTICOAGULANT 20 MG TABS tablet, Take 1 tablet by mouth once daily with breakfast, Disp: 90 tablet, Rfl: 0     zolpidem ER (AMBIEN CR) 12.5 MG CR tablet, TAKE 1 TABLET BY MOUTH NIGHTLY AS NEEDED, Disp: 30 tablet, Rfl: 0    Current Facility-Administered Medications:      hylan (SYNVISC ONE) injection 48 mg, 48 mg, , , Enrique Rizvi, , 48 mg at 12/02/22 0819    Facility-Administered Medications Ordered in Other Visits:      sodium chloride (PF) 0.9% PF flush 10 mL, 10 mL, Intracatheter,  Once, CLAY Erwin MD    Allergies:   Allergies   Allergen Reactions     Erythromycin GI Disturbance     Upset stomach     Ethanol      Other reaction(s): stomach ache       Social Hx: retired.   reports that he has never smoked. He has never used smokeless tobacco. He reports current alcohol use. He reports that he does not use drugs.    Family Hx: family history includes Anxiety Disorder in his mother; Gout in his father; Lung Cancer in his father; Other Cancer in his father..    REVIEW OF SYSTEMS:    CONSTITUTIONAL:NEGATIVE for fever, chills, change in weight  INTEGUMENTARY/SKIN: NEGATIVE for worrisome rashes, moles or lesions  MUSCULOSKELETAL:See HPI above  NEURO: NEGATIVE for weakness, dizziness or paresthesias    PHYSICAL EXAM:  /89   Pulse 95   Resp 16   Wt 112.9 kg (249 lb)   SpO2 99%   BMI 37.04 kg/m     GENERAL APPEARANCE: healthy, alert, no distress  SKIN: no suspicious lesions or rashes  NEURO: Normal strength and tone, mentation intact and speech normal  PSYCH:  mentation appears normal and affect normal, not anxious  RESPIRATORY: No increased work of breathing.      MUSCULOSKELETAL:      RIGHT UPPER EXTREMITY:  Sensation intact to light touch in median, radial, ulnar and axillary nerve distributions  Palpable 2+ radial pulse, brisk capillary refill to all fingers, wwp  Intact epl fpl fdp edc wrist flexion/extension biceps triceps deltoid    RIGHT SHOULDER:  Shoulder Inspection: no swelling, bruising, discoloration, or obvious deformity or asymmetry  Tender: none  Range of Motion:   Active: forward flexion 170 degrees, external rotation  60 degrees, internal rotation  T12  Strength: forward flexion 5-/5, External rotation 4+/5    Impingement: negative.  Special tests: Empty Can: Negative    LEFT UPPER EXTREMITY:  Sensation intact to light touch in median, radial, ulnar and axillary nerve distributions  Palpable 2+ radial pulse, brisk capillary refill to all fingers, wwp  Intact epl fpl  fdp edc wrist flexion/extension biceps triceps deltoid    LEFT SHOULDER:  Shoulder Inspection: no swelling, bruising, discoloration, or obvious deformity or asymmetry  Tender: anterior capsule, proximal bicep tendon and greater tuberosity  Non-tender: AC joint  Range of Motion:   Active:forward flexion 140 degrees, external rotation  15 degrees, internal rotation L4   Passive: flexion 150 degrees with pain / crepitus; external rotation 40 degrees.  Strength: forward flexion 4+/5, painful, External rotation 4+/5, painful    Impingement: equivocal  Special tests: Empty Can: equivocal, pain limited.   Belly Press: positive.      X-RAY INTERPRETATION: no new images today.  3 views left shoulder obtained 4/27/2022   Mild osteoarthrosis of the AC joint. Moderate to severe osteoarthrosis involving the glenohumeral joint.      MRI left  shoulder:  8/5/2022  1.  Moderate supraspinatus tendinosis. There may also be a small  amount of bursal surface and/or articular surface fraying, but no tear  is evident, and there is no muscle atrophy.  2.  Osteoarthrosis of the AC joint.  3.  Tiny amount of fluid in the subacromial/subdeltoid and subcoracoid  bursae of questionable significance.  4.  Severe glenohumeral osteoarthrosis. Grade IV chondrosis.  5.  Degeneration and tear throughout the glenoid labrum.  Moderate-sized paralabral cyst. This extends to the spinoglenoid  notch, but there is no adjacent denervation edema or atrophy in the  muscles.  6.  Moderate glenoid humeral joint effusion. There is fluid distending  the biceps tendon sheath which may be joint fluid decompressing into  the sheath or less likely from tenosynovitis.      ASSESSMENT: Tee Hilton is a 72 year old male, right  -hand dominant with chronic left shoulder pain, advanced gleno-humeral osteoarthritis, effusion, proximal biceps tendinitis.      PLAN:   *  Exam and xrays consistent with chronic, long-standing arthritis. There has been loss of shoulder  joint space from wearing of the cartilage.  * this is most likely reason for shoulder pain and decreased range of motion  * treatment options depend on how symptomatic as well has how aggressive patient wants to be. If not overly symptomatic, we can try an intra-articular injections (cortisone versus visco) as well as Physical Therapy, pain control with tylenol (no NSAIDS given anticoagulation).  * surgical options would be a  total shoulder arthroplasty, primary versus reverse depending on rotator cuff integrity at time of surgery.    * at this time, he'd like to proceed with surgery.    * will place a referral for a CT of the left shoulder, using the [employer] signature glenoid protcol.  * once the CT scan is done, then we'll reach out to our vendor and work to get the custom guides made and schedule surgery  * surgical orders / case request has NOT been placed.  * the plan will be left shoulder total shoulder arthroplasty versus reverse total shoulder arthroplasty, same day surgery (overnight stay in a bed)  * will need H+P per primary care provider.  * return to clinic 2 weeks postoperative for wound check, suture removal, xrays.      * All questions were addressed and answered prior to discharge from clinic today. The patient acknowledges an understanding of and agreement with the plan set forth during today's visit. Patient was advised to call our office or MyChart us if any further questions arise upon leaving our office today.          Jarrell Blanc M.D., M.S.  Dept. of Orthopaedic Surgery  [facility]      Again, thank you for allowing me to participate in the care of your patient.        Sincerely,        Jarrell Blanc MD

## 2023-03-08 NOTE — PATIENT INSTRUCTIONS
Soak in warm water with epsom salt 2- 3 times a day  Take Keflex three times a day for 7 days  Use mupirocin ointment 2-3 times a day after soaking  Follow up if infection is growing or not improving after 3-4 days, may need to be drained

## 2023-03-08 NOTE — PROGRESS NOTES
Assessment & Plan     Paronychia of finger, right  - cephALEXin (KEFLEX) 500 MG capsule; Take 1 capsule (500 mg) by mouth 3 times daily for 7 days  - mupirocin (BACTROBAN) 2 % external ointment; Apply topically 3 times daily for 7 days    Chronic atrial fibrillation (H)  Stable, on Xarelto.    Stage 3b chronic kidney disease (H)  Stable, most recent GFR 52, creatine 1.45.     Seeing PCP tomorrow for follow up of chronic conditions and med refills.    Perionychia of right middle finger along the lateral nail fold.  We discussed that if there is any purulent pocket, this will need to be drained.  Because Tee is on Xarelto, we opted to treat conservatively first.  I recommend warm water and Epsom salt soaks, mupirocin ointment and Keflex.  If symptoms worsen or do not improve in 2 days, follow-up for further evaluation.  May require I&D at that time.    Return in about 3 days (around 3/11/2023) for return to urgent care for further evaluation, possible I and D.     Micki De Dios PA-C  [facility]    Subjective   Tee Hilton is a 72 year old who presents for the following health issues     Patient presents with:  Urgent Care  Finger: Per patient symptoms started 3-4 days ago right middle finger swollen, very painful and bruised concern of ingrown nail and infection.     HPI    Tee presents clinic today for evaluation of pain and swelling around his right middle finger.  This first began 3 to 4 days ago and is very painful.  Swelling is getting worse.  He does have some numbness at the tip of his finger, secondary to swelling. No fever.    Review of Systems   ROS negative except as stated above.      Objective    BP (!) 162/93   Pulse 82   Temp 97.9  F (36.6  C) (Tympanic)   Resp 18   Wt 114.8 kg (253 lb)   SpO2 98%   BMI 37.63 kg/m    Physical Exam   GENERAL: healthy, alert and no distress  SKIN: right middle finger with swelling, warmth and erythema along the lateral nail  fold. No specific pocket of fluid or purulence noted.         No results found for any visits on 03/08/23.

## 2023-03-09 ENCOUNTER — OFFICE VISIT (OUTPATIENT)
Dept: FAMILY MEDICINE | Facility: CLINIC | Age: 73
End: 2023-03-09
Payer: COMMERCIAL

## 2023-03-09 ENCOUNTER — TELEPHONE (OUTPATIENT)
Dept: FAMILY MEDICINE | Facility: CLINIC | Age: 73
End: 2023-03-09

## 2023-03-09 VITALS
SYSTOLIC BLOOD PRESSURE: 124 MMHG | BODY MASS INDEX: 37.59 KG/M2 | WEIGHT: 253.8 LBS | TEMPERATURE: 98.1 F | RESPIRATION RATE: 24 BRPM | DIASTOLIC BLOOD PRESSURE: 82 MMHG | OXYGEN SATURATION: 98 % | HEART RATE: 83 BPM | HEIGHT: 69 IN

## 2023-03-09 DIAGNOSIS — F51.01 PRIMARY INSOMNIA: ICD-10-CM

## 2023-03-09 DIAGNOSIS — E66.01 MORBID OBESITY (H): ICD-10-CM

## 2023-03-09 DIAGNOSIS — L08.9 FINGER INFECTION: ICD-10-CM

## 2023-03-09 DIAGNOSIS — N18.32 STAGE 3B CHRONIC KIDNEY DISEASE (H): ICD-10-CM

## 2023-03-09 DIAGNOSIS — I48.20 CHRONIC ATRIAL FIBRILLATION (H): ICD-10-CM

## 2023-03-09 DIAGNOSIS — I50.22 CHRONIC SYSTOLIC HEART FAILURE (H): Primary | ICD-10-CM

## 2023-03-09 PROBLEM — I77.89 ASCENDING AORTA ENLARGEMENT (H): Status: RESOLVED | Noted: 2021-09-09 | Resolved: 2023-03-09

## 2023-03-09 LAB
ANION GAP SERPL CALCULATED.3IONS-SCNC: 5 MMOL/L (ref 3–14)
BUN SERPL-MCNC: 26 MG/DL (ref 7–30)
CALCIUM SERPL-MCNC: 9.9 MG/DL (ref 8.5–10.1)
CHLORIDE BLD-SCNC: 104 MMOL/L (ref 94–109)
CO2 SERPL-SCNC: 26 MMOL/L (ref 20–32)
CREAT SERPL-MCNC: 1.54 MG/DL (ref 0.66–1.25)
GFR SERPL CREATININE-BSD FRML MDRD: 48 ML/MIN/1.73M2
GLUCOSE BLD-MCNC: 111 MG/DL (ref 70–99)
POTASSIUM BLD-SCNC: 5 MMOL/L (ref 3.4–5.3)
SODIUM SERPL-SCNC: 135 MMOL/L (ref 133–144)
TSH SERPL DL<=0.005 MIU/L-ACNC: 1.97 MU/L (ref 0.4–4)

## 2023-03-09 PROCEDURE — 82570 ASSAY OF URINE CREATININE: CPT | Performed by: PHYSICIAN ASSISTANT

## 2023-03-09 PROCEDURE — 99214 OFFICE O/P EST MOD 30 MIN: CPT | Performed by: PHYSICIAN ASSISTANT

## 2023-03-09 PROCEDURE — 80048 BASIC METABOLIC PNL TOTAL CA: CPT | Performed by: PHYSICIAN ASSISTANT

## 2023-03-09 PROCEDURE — 84443 ASSAY THYROID STIM HORMONE: CPT | Performed by: PHYSICIAN ASSISTANT

## 2023-03-09 PROCEDURE — 82043 UR ALBUMIN QUANTITATIVE: CPT | Performed by: PHYSICIAN ASSISTANT

## 2023-03-09 PROCEDURE — 36415 COLL VENOUS BLD VENIPUNCTURE: CPT | Performed by: PHYSICIAN ASSISTANT

## 2023-03-09 RX ORDER — AMOXICILLIN 875 MG
875 TABLET ORAL 2 TIMES DAILY
Qty: 20 TABLET | Refills: 0 | Status: SHIPPED | OUTPATIENT
Start: 2023-03-09 | End: 2023-03-19

## 2023-03-09 ASSESSMENT — PAIN SCALES - GENERAL: PAINLEVEL: SEVERE PAIN (7)

## 2023-03-09 NOTE — PROGRESS NOTES
"      Daniella Oliveira is a 72 year old accompanied by his, presenting for the following health issues:  Hypertension (recheck), Weight Problem (Discuss diet, Keto, etc.), Finger (Right 3rd finger - was seen at  yesterday), and Sleep Problem (Recheck/Discuss Belsoma)      History of Present Illness       Hypertension: He presents for follow up of hypertension.  He does check blood pressure  regularly outside of the clinic. Outside blood pressures have been over 140/90. He does not follow a low salt diet.     He eats 2-3 servings of fruits and vegetables daily.He consumes 0 sweetened beverage(s) daily.He exercises with enough effort to increase his heart rate 60 or more minutes per day.  He exercises with enough effort to increase his heart rate 4 days per week.   He is taking medications regularly.       Weight concerns  - discuss diet  Recheck of obesity. Discussed a lower calorie diet and consistent mix of aerobic exercise and strength training. This will help maintain/treat his blood pressure    Right finger 3rd finger infection  - recheck, seen yesterday at   Cephalexin not tolerated   Sleep concerns  - recheck  Discussed considering belsomra or quviviq. Takes and tolerates ambien.   Recheck of chf. Stable. No orthopnea or pnd. No edema.  Recheck of afib. Taking anticoagulant. Occasional palpitations. No chest pain/sob.       Review of Systems   Constitutional, HEENT, cardiovascular, pulmonary, GI, , musculoskeletal, neuro, skin, endocrine and psych systems are negative, except as otherwise noted.      Objective    /82   Pulse 83   Temp 98.1  F (36.7  C) (Temporal)   Resp 24   Ht 1.753 m (5' 9\")   Wt 115.1 kg (253 lb 12.8 oz)   SpO2 98%   BMI 37.48 kg/m    Body mass index is 37.48 kg/m .  Physical Exam   Eye exam - right eye normal lid, conjunctiva, cornea, pupil and fundus, left eye normal lid, conjunctiva, cornea, pupil and fundus.  Thyroid not palpable, not enlarged, no nodules " detected.  CHEST:chest clear to IPPA, no tachypnea, retractions or cyanosis and S1, S2 normal, no murmur, no gallop, rate regular.  No edema. Pulses normal.  Right middle finger lateral paronychial infection. Mild in appearance     Tee was seen today for hypertension, weight problem, finger and sleep problem.    Diagnoses and all orders for this visit:    Chronic systolic heart failure (H)  -     Albumin Random Urine Quantitative with Creat Ratio; Future  -     Basic metabolic panel  (Ca, Cl, CO2, Creat, Gluc, K, Na, BUN); Future    Chronic atrial fibrillation (H)    Morbid obesity (H)  -     Discontinue: tirzepatide (MOUNJARO) 2.5 MG/0.5ML pen; Inject 2.5 mg Subcutaneous every 7 days  -     tirzepatide (MOUNJARO) 2.5 MG/0.5ML pen; Inject 2.5 mg Subcutaneous every 7 days    Stage 3b chronic kidney disease (H)  -     Basic metabolic panel  (Ca, Cl, CO2, Creat, Gluc, K, Na, BUN); Future    Primary insomnia  -     TSH with free T4 reflex; Future  -     Suvorexant (BELSOMRA) 10 MG tablet; Take 1 tablet (10 mg) by mouth nightly as needed for sleep    Finger infection  -     amoxicillin (AMOXIL) 875 MG tablet; Take 1 tablet (875 mg) by mouth 2 times daily for 10 days      work on lifestyle modification  Advised supportive and symptomatic treatment.  Follow up with Provider - if condition persists or worsens.   Exercise

## 2023-03-09 NOTE — TELEPHONE ENCOUNTER
Prior authorization required for : Mounjaro 2.5mg/0.5 ml soln  Insurance plan: [facility]  Patient Id: [id number]  Bin Number:[id number]  Pcn: N/A  Help Desk Number:  [phone]  Diagnosis Code:     Please pursue a prior authorization and contact the pharmacy when approved/denied.Thanks!    Hayley Puente [facility]  Phone: [phone]  Fax: [phone]

## 2023-03-10 ENCOUNTER — OFFICE VISIT (OUTPATIENT)
Dept: URGENT CARE | Facility: URGENT CARE | Age: 73
End: 2023-03-10
Payer: COMMERCIAL

## 2023-03-10 VITALS
OXYGEN SATURATION: 98 % | DIASTOLIC BLOOD PRESSURE: 91 MMHG | BODY MASS INDEX: 37.92 KG/M2 | HEART RATE: 73 BPM | RESPIRATION RATE: 18 BRPM | TEMPERATURE: 98.7 F | WEIGHT: 256.8 LBS | SYSTOLIC BLOOD PRESSURE: 158 MMHG

## 2023-03-10 DIAGNOSIS — L03.011 PARONYCHIA OF FINGER, RIGHT: Primary | ICD-10-CM

## 2023-03-10 LAB
BASOPHILS # BLD AUTO: 0 10E3/UL (ref 0–0.2)
BASOPHILS NFR BLD AUTO: 0 %
CREAT UR-MCNC: 126 MG/DL
EOSINOPHIL # BLD AUTO: 0.2 10E3/UL (ref 0–0.7)
EOSINOPHIL NFR BLD AUTO: 2 %
ERYTHROCYTE [DISTWIDTH] IN BLOOD BY AUTOMATED COUNT: 13 % (ref 10–15)
HCT VFR BLD AUTO: 41.7 % (ref 40–53)
HGB BLD-MCNC: 14.4 G/DL (ref 13.3–17.7)
LYMPHOCYTES # BLD AUTO: 1.4 10E3/UL (ref 0.8–5.3)
LYMPHOCYTES NFR BLD AUTO: 12 %
MCH RBC QN AUTO: 32.1 PG (ref 26.5–33)
MCHC RBC AUTO-ENTMCNC: 34.5 G/DL (ref 31.5–36.5)
MCV RBC AUTO: 93 FL (ref 78–100)
MICROALBUMIN UR-MCNC: 5 MG/L
MICROALBUMIN/CREAT UR: 3.97 MG/G CR (ref 0–17)
MONOCYTES # BLD AUTO: 1.5 10E3/UL (ref 0–1.3)
MONOCYTES NFR BLD AUTO: 13 %
NEUTROPHILS # BLD AUTO: 8.6 10E3/UL (ref 1.6–8.3)
NEUTROPHILS NFR BLD AUTO: 73 %
PLATELET # BLD AUTO: 284 10E3/UL (ref 150–450)
RBC # BLD AUTO: 4.48 10E6/UL (ref 4.4–5.9)
WBC # BLD AUTO: 11.8 10E3/UL (ref 4–11)

## 2023-03-10 PROCEDURE — 36415 COLL VENOUS BLD VENIPUNCTURE: CPT | Performed by: NURSE PRACTITIONER

## 2023-03-10 PROCEDURE — 99214 OFFICE O/P EST MOD 30 MIN: CPT | Mod: 25 | Performed by: NURSE PRACTITIONER

## 2023-03-10 PROCEDURE — 90471 IMMUNIZATION ADMIN: CPT | Performed by: NURSE PRACTITIONER

## 2023-03-10 PROCEDURE — 87186 SC STD MICRODIL/AGAR DIL: CPT | Performed by: NURSE PRACTITIONER

## 2023-03-10 PROCEDURE — 90715 TDAP VACCINE 7 YRS/> IM: CPT | Performed by: NURSE PRACTITIONER

## 2023-03-10 PROCEDURE — 96372 THER/PROPH/DIAG INJ SC/IM: CPT | Performed by: NURSE PRACTITIONER

## 2023-03-10 PROCEDURE — 87070 CULTURE OTHR SPECIMN AEROBIC: CPT | Performed by: NURSE PRACTITIONER

## 2023-03-10 PROCEDURE — 87077 CULTURE AEROBIC IDENTIFY: CPT | Performed by: NURSE PRACTITIONER

## 2023-03-10 PROCEDURE — 85025 COMPLETE CBC W/AUTO DIFF WBC: CPT | Performed by: NURSE PRACTITIONER

## 2023-03-10 RX ORDER — CEFDINIR 300 MG/1
300 CAPSULE ORAL 2 TIMES DAILY
Qty: 20 CAPSULE | Refills: 0 | Status: SHIPPED | OUTPATIENT
Start: 2023-03-10 | End: 2023-03-20

## 2023-03-10 RX ORDER — CEFTRIAXONE SODIUM 1 G
1 VIAL (EA) INJECTION ONCE
Status: COMPLETED | OUTPATIENT
Start: 2023-03-10 | End: 2023-03-10

## 2023-03-10 RX ADMIN — Medication 1 G: at 13:35

## 2023-03-10 NOTE — PROGRESS NOTES
PROCEDURE:  Right hand, 3rd digit with abscess to lateral aspect of nail.  Skin cleaned with betadyne, Utilizing ethyline chloride and #11 blade, small incision made.  Tan material expressed.  Wound culture obtained.  Topical abx and bandaid applied.

## 2023-03-10 NOTE — PROGRESS NOTES
Assessment & Plan     Paronychia of finger, right    - cefTRIAXone (ROCEPHIN) in lidocaine 1% (PF) for IM administration 1 g  - CBC with platelets and differential  - cefdinir (OMNICEF) 300 MG capsule  Dispense: 20 capsule; Refill: 0  - Wound Aerobic Bacterial Culture Routine       Patient Instructions   Antibiotics as directed.  Soak in warm water with epsom salt three times daily for 15 min each time. Pat dry then apply antibiotic ointment and cover with a bandaid.  Discussed close evaluation of symptoms. Advised if increasing redness, any fever, increasing pain, or any decreased motion or sensation, patient must seek medical attention immediately.   Follow up with primary care provider with any problems, questions or concerns or if symptoms worsen or fail to improve.   Patient agreed to plan and verbalized understanding.       Return in about 1 week (around 3/17/2023), or if symptoms worsen or fail to improve.    Ivis Katz NP  [facility]    Daniella Oliveira is a 72 year old male who presents to clinic today for the following health issues:  Chief Complaint   Patient presents with     Finger     Right middle finger swelling/infection- was told to return in a couple days if not getting better   Keflex was bothering his stomach- switched to amoxicillin yesterday     Patient reports that his finger infection is worsening. He was seen in UC 2 days ago, and by his primary dr yesterday. He was originally on Keflex which was changed to Amoxicillin yesterday due to GI upset from Keflex. He has been soaking his finger with epsom salt, and applying topical antibiotic ointment. No fever/chills/sweats that he knows of.         Review of Systems        Objective    BP (!) 158/91   Pulse 73   Temp 98.7  F (37.1  C) (Tympanic)   Resp 18   Wt 116.5 kg (256 lb 12.8 oz)   SpO2 98%   BMI 37.92 kg/m    Physical Exam  Vitals and nursing note reviewed.   Constitutional:       General: He is  not in acute distress.     Appearance: Normal appearance. He is not ill-appearing.   HENT:      Head: Normocephalic and atraumatic.   Cardiovascular:      Rate and Rhythm: Normal rate. Rhythm irregular.      Heart sounds: Normal heart sounds.   Pulmonary:      Effort: Pulmonary effort is normal.      Breath sounds: Normal breath sounds.   Musculoskeletal:      Comments: Right middle finger, distal third of finger swollen around nail reddened with with whitish undertone, small amount of bleeding next to nail, no other drainage. Erythema extends to first distal joint. No epitrochlear lymph node swelling.    Neurological:      General: No focal deficit present.      Mental Status: He is alert and oriented to person, place, and time.   Psychiatric:         Mood and Affect: Mood normal.         Behavior: Behavior normal.         Thought Content: Thought content normal.         Judgment: Judgment normal.

## 2023-03-10 NOTE — PATIENT INSTRUCTIONS
Antibiotics as directed.  Soak in warm water with epsom salt three times daily for 15 min each time. Pat dry then apply antibiotic ointment and cover with a bandaid.  Discussed close evaluation of symptoms. Advised if increasing redness, any fever, increasing pain, or any decreased motion or sensation, patient must seek medical attention immediately.   Follow up with primary care provider with any problems, questions or concerns or if symptoms worsen or fail to improve.   Patient agreed to plan and verbalized understanding.

## 2023-03-11 NOTE — PROGRESS NOTES
Tee Hilton  :  1950  DOS: 2022  MRN: [id number]    Sports Medicine Clinic Procedure    Ultrasound Guided Left Shoulder Intra-articular Glenohumeral Joint Injection    Clinical History: Gradual onset of chronic left shoulder pain over the past several years.  Patient has completed multiple subacromial steroid injections from PCP, most recently on 22 with limited relief.    Diagnosis:   1. Primary osteoarthritis of left shoulder    2. Pain in joint of left shoulder      Referring Physician: Jarrell Blanc MD  Large Joint Injection/Arthocentesis: L glenohumeral joint    Date/Time: 2022 8:40 AM  Performed by: Enrique Rizvi DO  Authorized by: Enrique Rizvi DO     Indications:  Pain and osteoarthritis  Needle Size:  21 G  Guidance: ultrasound    Approach:  Posterolateral  Location:  Shoulder      Site:  L glenohumeral joint  Medications:  3 mL ropivacaine 5 MG/ML; 40 mg triamcinolone 40 MG/ML  Outcome:  Tolerated well, no immediate complications  Procedure discussed: discussed risks, benefits, and alternatives    Consent Given by:  Patient  Timeout: timeout called immediately prior to procedure    Prep: patient was prepped and draped in usual sterile fashion     Ultrasound images of procedure were permanently stored.           Impression:  Successful Left intra-articular shoulder injection.    Plan:  Follow up with as directed by Dr Blanc   Expectations and goals of CSI reviewed  Often 2-3 days for steroid effect, and can take up to two weeks for maximum effect  We discussed modified progressive pain-free activity as tolerated  Do not overuse in first two weeks if feeling better due to concern for vulnerability while steroid is working  Supportive care reviewed  All questions were answered today  Contact us with additional questions or concerns  Signs and sx of concern reviewed      Enrique Rizvi DO, CAQ  Primary Care Sports Medicine  [facility] 
[phone]

## 2023-03-14 ENCOUNTER — TELEPHONE (OUTPATIENT)
Dept: URGENT CARE | Facility: URGENT CARE | Age: 73
End: 2023-03-14
Payer: COMMERCIAL

## 2023-03-14 DIAGNOSIS — L03.011 PARONYCHIA OF FINGER, RIGHT: Primary | ICD-10-CM

## 2023-03-14 LAB
BACTERIA WND CULT: ABNORMAL
BACTERIA WND CULT: ABNORMAL

## 2023-03-14 RX ORDER — DOXYCYCLINE HYCLATE 100 MG
100 TABLET ORAL 2 TIMES DAILY
Qty: 14 TABLET | Refills: 0 | Status: SHIPPED | OUTPATIENT
Start: 2023-03-14 | End: 2023-03-22

## 2023-03-14 NOTE — TELEPHONE ENCOUNTER
Patient reports improvement of his finger since starting cefdinir. Notified patient of wound culture results. Prescription sent to pharmacy for doxycycline twice daily for 7 days in addition to finishing cefdinir. Questions answered.

## 2023-03-14 NOTE — TELEPHONE ENCOUNTER
PA Initiation    Medication: Mounjaro 2.5MG/0.5ML pen-injectors  Insurance Company: Other (see comments)Comment:  [facility]  Pharmacy Filling the Rx: [facility] [place] - [facility]   Filling Pharmacy Phone: [phone]  Filling Pharmacy Fax: [phone]  Start Date: 3/14/2023

## 2023-03-15 ENCOUNTER — ANCILLARY PROCEDURE (OUTPATIENT)
Dept: CT IMAGING | Facility: CLINIC | Age: 73
End: 2023-03-15
Attending: ORTHOPAEDIC SURGERY
Payer: COMMERCIAL

## 2023-03-15 DIAGNOSIS — M19.012 PRIMARY OSTEOARTHRITIS OF LEFT SHOULDER: ICD-10-CM

## 2023-03-15 PROCEDURE — 73200 CT UPPER EXTREMITY W/O DYE: CPT | Mod: LT | Performed by: RADIOLOGY

## 2023-03-16 NOTE — TELEPHONE ENCOUNTER
PRIOR AUTHORIZATION DENIED    Medication: Mounjaro 2.5MG/0.5ML pen-injectors    Denial Date: 3/15/2023    Denial Rationale: Medication is only covered for diagnosis of type 2 diabetes. Patient also has to first try/fail formulary medications- see list below.          Appeal Information: If provider would like to appeal this decision please attach a detailed letter of medical necessity to the patient's chart and route the encounter back to the PA Team.

## 2023-03-22 ENCOUNTER — OFFICE VISIT (OUTPATIENT)
Dept: OPHTHALMOLOGY | Facility: CLINIC | Age: 73
End: 2023-03-22
Payer: COMMERCIAL

## 2023-03-22 ENCOUNTER — TELEPHONE (OUTPATIENT)
Dept: ORTHOPEDICS | Facility: CLINIC | Age: 73
End: 2023-03-22

## 2023-03-22 DIAGNOSIS — M19.011 PRIMARY OSTEOARTHRITIS OF RIGHT SHOULDER: ICD-10-CM

## 2023-03-22 DIAGNOSIS — H52.12 MYOPIA OF LEFT EYE: ICD-10-CM

## 2023-03-22 DIAGNOSIS — H52.223 REGULAR ASTIGMATISM OF BOTH EYES: ICD-10-CM

## 2023-03-22 DIAGNOSIS — H25.812 COMBINED FORMS OF AGE-RELATED CATARACT OF LEFT EYE: Primary | ICD-10-CM

## 2023-03-22 DIAGNOSIS — M19.012 PRIMARY OSTEOARTHRITIS OF LEFT SHOULDER: Primary | ICD-10-CM

## 2023-03-22 DIAGNOSIS — H52.4 PRESBYOPIA OF BOTH EYES: ICD-10-CM

## 2023-03-22 PROCEDURE — 92004 COMPRE OPH EXAM NEW PT 1/>: CPT | Performed by: STUDENT IN AN ORGANIZED HEALTH CARE EDUCATION/TRAINING PROGRAM

## 2023-03-22 PROCEDURE — 92015 DETERMINE REFRACTIVE STATE: CPT | Performed by: STUDENT IN AN ORGANIZED HEALTH CARE EDUCATION/TRAINING PROGRAM

## 2023-03-22 ASSESSMENT — REFRACTION_WEARINGRX
OS_CYLINDER: +0.75
OS_SPHERE: -0.75
OD_AXIS: 025
OD_ADD: +2.50
SPECS_TYPE: PAL
OD_SPHERE: -0.25
OD_CYLINDER: +0.50
OS_ADD: +2.50
OS_AXIS: 113

## 2023-03-22 ASSESSMENT — REFRACTION_MANIFEST
OD_CYLINDER: +0.50
OS_CYLINDER: +2.00
OS_AXIS: 140
OD_AXIS: 030
OS_SPHERE: -2.00
OD_ADD: +2.75
OD_SPHERE: PLANO
OS_ADD: +2.75

## 2023-03-22 ASSESSMENT — SLIT LAMP EXAM - LIDS
COMMENTS: 1+ DERMATOCHALASIS
COMMENTS: 1+ DERMATOCHALASIS

## 2023-03-22 ASSESSMENT — VISUAL ACUITY
OD_BAT_MED: 20/25
OD_BAT_HIGH: 20/25-2
OD_CC: 20/25
CORRECTION_TYPE: GLASSES
OS_CC: 20/40
OS_BAT_HIGH: 20/60
METHOD: SNELLEN - LINEAR
OS_CC+: -1
OS_BAT_MED: 20/40-1

## 2023-03-22 ASSESSMENT — CUP TO DISC RATIO
OD_RATIO: 0.1
OS_RATIO: 0.1

## 2023-03-22 ASSESSMENT — TONOMETRY
OD_IOP_MMHG: 21
IOP_METHOD: APPLANATION
OS_IOP_MMHG: 19

## 2023-03-22 ASSESSMENT — EXTERNAL EXAM - RIGHT EYE: OD_EXAM: NORMAL

## 2023-03-22 ASSESSMENT — EXTERNAL EXAM - LEFT EYE: OS_EXAM: NORMAL

## 2023-03-22 NOTE — PATIENT INSTRUCTIONS
Offered cataract surgery of the left eye at the [facility]     Surgery Date(s): April 17, 2023 for your left eye     Our surgery schedulers will contact you with appointments    Porsche Durán MD  [phone]

## 2023-03-22 NOTE — PROGRESS NOTES
Current Eye Medications:  None.      Subjective:  Cataract evaluation.    Last eye exam:  A couple months ago at [facility].  They recommended he go to [facility] to have cataract surgery, but the patient prefers to keep his medical needs with [place].  Patient complains of decreasing vision, especially in his left eye, distance and near.  Driving and watching TV is the most affected.  Vision in his right eye is adequate.  He avoids night driving due to his vision.      No history of eye injuries or surgery.      Objective:  See Ophthalmology Exam.       Assessment:  Tee Hilton is a 72 year old male who presents with:   Encounter Diagnoses   Name Primary?     Combined forms of age-related cataract of left eye Visually significant left eye. BAT 20/60. Dil 6. Flomax. Myopic shift left eye - anticipate targeting mostly distance left eye.      Myopia of left eye      Regular astigmatism of both eyes      Presbyopia of both eyes      Visually significant cataract that is interfering with daily activities of living. Plan for cataract extraction and intraocular lens implant left eye.  Risks, benefits, complications, and alternatives discussed with patient including possibility of limitations from coexistent eye disease and loss of vision. Target refraction and lens options discussed.  Patient understands and wishes to proceed with surgery.     Plan:  Offered cataract surgery of the left eye at the [facility] [place]     Surgery Date(s): April 17, 2023 for your left eye     Our surgery schedulers will contact you with appointments    Porsche Durán MD  [phone]

## 2023-03-22 NOTE — LETTER
3/22/2023         RE: Tee Hilton  [address] [facility] [address]        Dear Colleague,    Thank you for referring your patient, Tee Hilton, to the [facility]. Please see a copy of my visit note below.     Current Eye Medications:  None.      Subjective:  Cataract evaluation.    Last eye exam:  A couple months ago at [facility].  They recommended he go to [facility] to have cataract surgery, but the patient prefers to keep his medical needs with [place].  Patient complains of decreasing vision, especially in his left eye, distance and near.  Driving and watching TV is the most affected.  Vision in his right eye is adequate.  He avoids night driving due to his vision.      No history of eye injuries or surgery.      Objective:  See Ophthalmology Exam.       Assessment:  Tee Hilton is a 72 year old male who presents with:   Encounter Diagnoses   Name Primary?     Combined forms of age-related cataract of left eye Visually significant left eye. BAT 20/60. Dil 6. Flomax. Myopic shift left eye - anticipate targeting mostly distance left eye.      Myopia of left eye      Regular astigmatism of both eyes      Presbyopia of both eyes      Visually significant cataract that is interfering with daily activities of living. Plan for cataract extraction and intraocular lens implant left eye.  Risks, benefits, complications, and alternatives discussed with patient including possibility of limitations from coexistent eye disease and loss of vision. Target refraction and lens options discussed.  Patient understands and wishes to proceed with surgery.     Plan:  Offered cataract surgery of the left eye at the [facility] [place]     Surgery Date(s): April 17, 2023 for your left eye     Our surgery schedulers will contact you with appointments    Porsche Durán MD  [phone]           Again, thank you for allowing me to  participate in the care of your patient.        Sincerely,        Porsche Durán MD

## 2023-03-22 NOTE — TELEPHONE ENCOUNTER
After reviewing Ct scan with Dr. Jarrell Blanc, it was decided to refer to Dr. Botello, shoulder subspecialist due to advanced glenoid wear.    I spoke to Tee and discussed findings and plan. Dr. Blanc believes his case would be better served in a specialist's hands.  I put in referral for Ketty Botello. I let him know  would be calling soon. If not reach out to me with any questions.    Serge Hurst PA-C, CAQ-OS  Dept. of Orthopedic Surgery  [facility]

## 2023-03-23 ENCOUNTER — OFFICE VISIT (OUTPATIENT)
Dept: OPHTHALMOLOGY | Facility: CLINIC | Age: 73
End: 2023-03-23
Payer: COMMERCIAL

## 2023-03-23 DIAGNOSIS — H25.812 COMBINED FORMS OF AGE-RELATED CATARACT OF LEFT EYE: Primary | ICD-10-CM

## 2023-03-23 PROCEDURE — 92136 OPHTHALMIC BIOMETRY: CPT | Mod: TC | Performed by: STUDENT IN AN ORGANIZED HEALTH CARE EDUCATION/TRAINING PROGRAM

## 2023-03-23 PROCEDURE — 92012 INTRM OPH EXAM EST PATIENT: CPT | Performed by: STUDENT IN AN ORGANIZED HEALTH CARE EDUCATION/TRAINING PROGRAM

## 2023-03-23 RX ORDER — BROMFENAC SODIUM 100 %
1 POWDER (GRAM) MISCELLANEOUS 4 TIMES DAILY
Qty: 1 G | Refills: 0 | Status: ON HOLD | OUTPATIENT
Start: 2023-03-23 | End: 2023-06-01

## 2023-03-23 RX ORDER — MONOCHLOROACETIC ACID
1 CRYSTALS MISCELLANEOUS 4 TIMES DAILY
Qty: 1 G | Refills: 0 | Status: ON HOLD | OUTPATIENT
Start: 2023-03-23 | End: 2023-06-01

## 2023-03-23 RX ORDER — DEXAMETHASONE ACETATE, MICRO 100 %
1 POWDER (GRAM) MISCELLANEOUS 4 TIMES DAILY
Qty: 1 G | Refills: 0 | Status: ON HOLD | OUTPATIENT
Start: 2023-03-23 | End: 2023-06-01

## 2023-03-23 ASSESSMENT — SLIT LAMP EXAM - LIDS
COMMENTS: 1+ DERMATOCHALASIS
COMMENTS: 1+ DERMATOCHALASIS

## 2023-03-23 ASSESSMENT — VISUAL ACUITY
OD_SC: 20/25
OS_PH_SC: 20/40
METHOD: SNELLEN - LINEAR
OS_SC: 20/50
OS_SC+: -2

## 2023-03-23 ASSESSMENT — EXTERNAL EXAM - RIGHT EYE: OD_EXAM: NORMAL

## 2023-03-23 ASSESSMENT — EXTERNAL EXAM - LEFT EYE: OS_EXAM: NORMAL

## 2023-03-23 NOTE — TELEPHONE ENCOUNTER
DIAGNOSIS: Primary osteoarthritis of left shoulder   APPOINTMENT DATE: 03/30/2023   NOTES STATUS DETAILS   OFFICE NOTE from referring provider Internal 03/08/2023 Dr Blanc [facility]    OFFICE NOTE from other specialist Internal 06/29/2022 PT [facility]    DISCHARGE SUMMARY from hospital N/A    DISCHARGE REPORT from the ER N/A    OPERATIVE REPORT N/A    MEDICATION LIST N/A    EMG (for Spine) N/A    IMPLANT RECORD/STICKER N/A    LABS     CBC/DIFF N/A    CULTURES N/A    INJECTIONS DONE IN RADIOLOGY N/A    MRI Internal 08/05/2022 LFT shoulder   CT SCAN Internal 04/27/2022 LFT shoulder   XRAYS (IMAGES & REPORTS) Internal 03/15/2023 LFT shoulder   TUMOR     PATHOLOGY  Slides & report N/A

## 2023-03-23 NOTE — PATIENT INSTRUCTIONS
PRE-OP CATARACT INSTRUCTIONS    ** [facility] will call you to collect your payment info and shipping address.    *Use the following drops in the left eye 4 times on the day before surgery and once the morning of surgery:                                  CatarActive 3     *Please bring all your eyedrops to the surgery center.    *No solid food or drink after midnight on the morning of surgery. Any medications you would normally take in the morning, you can take AFTER surgery.    *Please stop at the [facility] pharmacy in the lobby on your way out to  a bottle of pre-operative soap to use prior to surgery. The [facility] will call you the week prior to surgery and review how and when to use the soap. Please wash your body only - do not get the soap into your eyes.    Porsche Durán MD  [phone]

## 2023-03-23 NOTE — PROGRESS NOTES
Current Eye Medications:  None     Subjective:  Pre-op, KPE/IOL left eye 4/17/23. Vision is doing fine right eye. Vision is blurry left eye for last year. Having trouble reading up close both eyes. No eye pain or discomfort in either eye. Patient would prefer to do the CatarActive 3 drops.     Objective:  See Ophthalmology Exam.       Assessment:  Tee Hilton is a 72 year old male who presents with:   Encounter Diagnosis   Name Primary?     Combined forms of age-related cataract of left eye Cataract pre-op left eye. Dil 6. CA3. Flomax. Target mostly distance left eye.        Plan:  PRE-OP CATARACT INSTRUCTIONS    ** [facility] will call you to collect your payment info and shipping address.    *Use the following drops in the left eye 4 times on the day before surgery and once the morning of surgery:                                  CatarActive 3     *Please bring all your eyedrops to the surgery center.    *No solid food or drink after midnight on the morning of surgery. Any medications you would normally take in the morning, you can take AFTER surgery.    *Please stop at the [facility] pharmacy in the lobby on your way out to  a bottle of pre-operative soap to use prior to surgery. The [facility] will call you the week prior to surgery and review how and when to use the soap. Please wash your body only - do not get the soap into your eyes.    Porsche Durán MD  [phone]

## 2023-03-23 NOTE — LETTER
3/23/2023         RE: Tee Hilton  [address] [place] [address]        Dear Colleague,    Thank you for referring your patient, Tee Hilton, to the [facility]. Please see a copy of my visit note below.     Current Eye Medications:  None     Subjective:  Pre-op, KPE/IOL left eye 4/17/23. Vision is doing fine right eye. Vision is blurry left eye for last year. Having trouble reading up close both eyes. No eye pain or discomfort in either eye. Patient would prefer to do the CatarActive 3 drops.     Objective:  See Ophthalmology Exam.       Assessment:  Tee Hilton is a 72 year old male who presents with:   Encounter Diagnosis   Name Primary?     Combined forms of age-related cataract of left eye Cataract pre-op left eye. Dil 6. CA3. Flomax. Target mostly distance left eye.        Plan:  PRE-OP CATARACT INSTRUCTIONS    ** [facility] will call you to collect your payment info and shipping address.    *Use the following drops in the left eye 4 times on the day before surgery and once the morning of surgery:                                  CatarActive 3     *Please bring all your eyedrops to the surgery center.    *No solid food or drink after midnight on the morning of surgery. Any medications you would normally take in the morning, you can take AFTER surgery.    *Please stop at the [facility] pharmacy in the lobby on your way out to  a bottle of pre-operative soap to use prior to surgery. The [facility] will call you the week prior to surgery and review how and when to use the soap. Please wash your body only - do not get the soap into your eyes.    Porsche Durán MD  [phone]         Again, thank you for allowing me to participate in the care of your patient.        Sincerely,        Porsche Durán MD

## 2023-03-24 DIAGNOSIS — M25.512 CHRONIC LEFT SHOULDER PAIN: Primary | ICD-10-CM

## 2023-03-24 DIAGNOSIS — G89.29 CHRONIC LEFT SHOULDER PAIN: Primary | ICD-10-CM

## 2023-03-28 ENCOUNTER — PREP FOR PROCEDURE (OUTPATIENT)
Dept: OPHTHALMOLOGY | Facility: CLINIC | Age: 73
End: 2023-03-28
Payer: COMMERCIAL

## 2023-03-28 ENCOUNTER — TELEPHONE (OUTPATIENT)
Dept: OPHTHALMOLOGY | Facility: CLINIC | Age: 73
End: 2023-03-28
Payer: COMMERCIAL

## 2023-03-28 DIAGNOSIS — H25.812 COMBINED FORM OF AGE-RELATED CATARACT, LEFT EYE: Primary | ICD-10-CM

## 2023-03-28 NOTE — TELEPHONE ENCOUNTER
Type of surgery: LEFT PHACOEMULSIFICATION, CATARACT, WITH INTRAOCULAR LENS IMPLANT (Left)   CPT 39962   Combined forms of age-related cataract of left eye      Location of surgery: [facility]  Date and time of surgery: 4-17-23  Surgeon: Leeanna  Pre-Op Appt Date:   Post-Op Appt Date:    Packet sent out: Yes  Pre-cert/Authorization completed:  No prior auth required per Jun GARCIA at [facility]  Ref # [id number]  Time 3:25 PM  Date: 3/28/23 Char Bergman  Surgical Scheduler

## 2023-03-29 ENCOUNTER — OFFICE VISIT (OUTPATIENT)
Dept: FAMILY MEDICINE | Facility: CLINIC | Age: 73
End: 2023-03-29
Payer: COMMERCIAL

## 2023-03-29 VITALS
HEIGHT: 69 IN | BODY MASS INDEX: 37.86 KG/M2 | TEMPERATURE: 98.3 F | DIASTOLIC BLOOD PRESSURE: 74 MMHG | HEART RATE: 77 BPM | OXYGEN SATURATION: 96 % | RESPIRATION RATE: 20 BRPM | SYSTOLIC BLOOD PRESSURE: 119 MMHG | WEIGHT: 255.6 LBS

## 2023-03-29 DIAGNOSIS — H25.89 OTHER AGE-RELATED CATARACT OF LEFT EYE: ICD-10-CM

## 2023-03-29 DIAGNOSIS — Z01.818 PREOP GENERAL PHYSICAL EXAM: Primary | ICD-10-CM

## 2023-03-29 DIAGNOSIS — R11.0 NAUSEA: ICD-10-CM

## 2023-03-29 PROCEDURE — 99214 OFFICE O/P EST MOD 30 MIN: CPT | Performed by: PHYSICIAN ASSISTANT

## 2023-03-29 RX ORDER — ONDANSETRON 4 MG/1
4 TABLET, ORALLY DISINTEGRATING ORAL
Qty: 30 TABLET | Refills: 1 | Status: SHIPPED | OUTPATIENT
Start: 2023-03-29 | End: 2023-05-12

## 2023-03-29 ASSESSMENT — PAIN SCALES - GENERAL: PAINLEVEL: SEVERE PAIN (7)

## 2023-03-29 NOTE — PROGRESS NOTES
[facility]  [address] [facility]  [place] [address]  Phone: [phone]  Primary Provider: Thaddeus Ordaz  Pre-op Performing Provider: THADDEUS ORDAZ      PREOPERATIVE EVALUATION:  Today's date: 3/29/2023    Tee Hilton is a 72 year old male who presents for a preoperative evaluation.  No flowsheet data found.  Surgical Information:  Surgery/Procedure: Left cataract  Surgery Location: [facility]  Surgeon: Dr. Durán  Surgery Date: 4/17/23  Time of Surgery: To be determined  Where patient plans to recover: At home with family  Fax number for surgical facility: Note does not need to be faxed, will be available electronically in Epic.        Subjective     HPI related to upcoming procedure: Left Eye Cataract    Preop Questions 3/29/2023   1. Have you ever had a heart attack or stroke? No   2. Have you ever had surgery on your heart or blood vessels, such as a stent placement, a coronary artery bypass, or surgery on an artery in your head, neck, heart, or legs? No   3. Do you have chest pain with activity? No   4. Do you have a history of  heart failure? YES    5. Do you currently have a cold, bronchitis or symptoms of other infection? No   6. Do you have a cough, shortness of breath, or wheezing? No   7. Do you or anyone in your family have previous history of blood clots? NO   8. Do you or does anyone in your family have a serious bleeding problem such as prolonged bleeding following surgeries or cuts? No   9. Have you ever had problems with anemia or been told to take iron pills? No   10. Have you had any abnormal blood loss such as black, tarry or bloody stools? No   11. Have you ever had a blood transfusion? NO   12. Are you willing to have a blood transfusion if it is medically needed before, during, or after your surgery? Yes   13. Have you or any of your relatives ever had problems with anesthesia? NO   14. Do you have sleep apnea, excessive snoring or daytime  drowsiness? YES    14a. Do you have a CPAP machine? No   15. Do you have any artifical heart valves or other implanted medical devices like a pacemaker, defibrillator, or continuous glucose monitor? No   16. Do you have artificial joints? No   17. Are you allergic to latex? No       Health Care Directive:  Patient does not have a Health Care Directive or Living Will: Discussed advance care planning with patient; however, patient declined at this time.    Preoperative Review of :   reviewed - no record of controlled substances prescribed.      Status of Chronic Conditions:  See problem list for active medical problems.  Problems all longstanding and stable, except as noted/documented.  See ROS for pertinent symptoms related to these conditions.      Review of Systems  CONSTITUTIONAL: NEGATIVE for fever, chills, change in weight  ENT/MOUTH: NEGATIVE for ear, mouth and throat problems  RESP: NEGATIVE for significant cough or SOB  CV: NEGATIVE for chest pain, palpitations or peripheral edema    Patient Active Problem List    Diagnosis Date Noted     Combined form of age-related cataract, left eye 03/28/2023     Priority: Medium     Added automatically from request for surgery [id number]       Chronic kidney disease, stage 3 (H) 07/27/2021     Priority: Medium     Sensorineural hearing loss (SNHL) of both ears 04/30/2021     Priority: Medium     Tinnitus, bilateral 04/30/2021     Priority: Medium     Bilateral low back pain with right-sided sciatica 04/12/2021     Priority: Medium     AMINA (acute kidney injury) (H) 01/30/2021     Priority: Medium     Fever and chills 01/30/2021     Priority: Medium     Leukocytosis 01/30/2021     Priority: Medium     Hyponatremia 01/30/2021     Priority: Medium     Dehydration 01/30/2021     Priority: Medium     Essential hypertension 01/30/2021     Priority: Medium     Chronic atrial fibrillation (H) 01/30/2021     Priority: Medium     Myalgia 01/30/2021     Priority: Medium     Acute  kidney injury (H) 01/30/2021     Priority: Medium     Acute intractable headache, unspecified headache type 01/30/2021     Priority: Medium     Morbid obesity (H) 08/26/2020     Priority: Medium     Atrial fibrillation (H) 04/07/2020     Priority: Medium     Gastroesophageal reflux disease 04/07/2020     Priority: Medium     Other sleep apnea 09/24/2018     Priority: Medium     Study Date: 9/22/2018- (241.0 lbs) Polysomnogram revealed 41 obstructive, 14 central, and 38 mixed apneas resulting in an apnea index of 44.5 events per hour. There were 7 obstructive hypopneas and 0 central hypopneas resulting in an obstructive hypopnea index of 3.3 and central hypopnea index of 0 events per hour. The combined apnea/hypopnea index was 47.8 events per hour (central apnea/hypopnea index was 6.7 events per hour).  REM AHI was 0. The supine AHI was 69.0 events per hour.  RDI was 54.0 events per hour. Respiratory rate and pattern was notable for improvement of events during REM sleep,  periods where periodic breathing is suspected and circulation times that are prolonged. Most of the events however appear to be terminated by a gasp arousal.  Lowest oxygen saturation was 83.9%. Time spent less than or equal to 88% was 0.4 minutes. Time spent less than or equal to 89% was 0.9 minutes. No optimal pressure was determined due to poor sleep and poor tolerance of low-level PAP and bilevel PAP. Severe sleep disordered breathing, many findings suggested of periodic breathing though given presence of obstruction and poorly consolidated sleep this study was not definitive       Gastroesophageal reflux disease without esophagitis      Priority: Medium     Persistent atrial fibrillation (H) 09/18/2018     Priority: Medium     Non morbid obesity due to excess calories 09/18/2018     Priority: Medium     Chronic systolic congestive heart failure (H) 09/18/2018     Priority: Medium     Alcohol use 09/18/2018     Priority: Medium     Chronic  midline low back pain without sciatica 10/31/2017     Priority: Medium     Other male erectile dysfunction 11/11/2016     Priority: Medium      Past Medical History:   Diagnosis Date     Congestive heart failure (H) 2020     Gastroesophageal reflux disease      Heart disease 2018     Hypertension      Irregular heart beat      Sleep apnea      Past Surgical History:   Procedure Laterality Date     ANESTHESIA CARDIOVERSION N/A 9/24/2018    Procedure: ANESTHESIA CARDIOVERSION;  Cardioversion ;  Surgeon: GENERIC ANESTHESIA PROVIDER;  Location: [facility]     BIOPSY  Summer 2021     COLONOSCOPY N/A 5/25/2017    Procedure: COMBINED COLONOSCOPY, SINGLE OR MULTIPLE BIOPSY/POLYPECTOMY BY BIOPSY;;  Surgeon: Aquilino Garcia MD;  [facility]     COLONOSCOPY N/A 7/23/2021    Procedure: COLONOSCOPY, WITH POLYPECTOMY AND BIOPSY;  Surgeon: Israel Bey DO;  Location: [facility]     COLONOSCOPY WITH CO2 INSUFFLATION N/A 5/25/2017    Procedure: COLONOSCOPY WITH CO2 INSUFFLATION;  COLONOSCOPY SCREEN/ ORDAZ;  Surgeon: Aquilino Garcia MD;  Location: [facility]     OPEN REDUCTION INTERNAL FIXATION TIBIA       SEPTOPLASTY, TURBINOPLASTY, COMBINED Bilateral 7/9/2019    Procedure: ENDOSCOPIC SEPTOPLASTY, BILATERAL TURBINATE REDUCTION;  Surgeon: Alexander Avila MD;  Location: [facility]     TONSILLECTOMY       Current Outpatient Medications   Medication Sig Dispense Refill     allopurinol (ZYLOPRIM) 100 MG tablet Take 1 tablet by mouth once daily 90 tablet 0     amLODIPine (NORVASC) 5 MG tablet TAKE 1 & 1/2 (ONE & ONE-HALF) TABLETS BY MOUTH ONCE DAILY 135 tablet 0     Bromfenac Sodium POWD 1 Bottle 4 times daily 1 drop four times a day in the operative eye starting 1 day before surgery. Use until the bottle runs out. 1 g 0     Dexamethasone Acetate POWD 1 Bottle 4 times daily 1 drop four times a day in the operative eye starting 1 day before surgery. Use until the bottle runs out. 1 g 0     famotidine (PEPCID) 20 MG tablet  Take 1 tablet (20 mg) by mouth nightly as needed 60 tablet 1     lisinopril (ZESTRIL) 20 MG tablet TAKE 1/2 TO 1 (ONE-HALF TO ONE) TABLET BY MOUTH ONCE DAILY (FOLLOW  DRS  CURRENT  DIRECTIONS  ON  DOSING) 90 tablet 0     LORazepam (ATIVAN) 0.5 MG tablet TAKE 1 TABLET BY MOUTH EVERY 8 HOURS AS NEEDED FOR ANXIETY 25 tablet 0     metoprolol succinate ER (TOPROL XL) 100 MG 24 hr tablet TAKE 1 & 1/2 (ONE & ONE-HALF) TABLETS BY MOUTH ONCE DAILY 135 tablet 0     Moxifloxacin HCl POWD 1 Bottle 4 times daily 1 drop four times a day in the operative eye starting 1 day before surgery. Use until the bottle runs out. 1 g 0     ondansetron (ZOFRAN ODT) 4 MG ODT tab Take 1 tablet (4 mg) by mouth nightly as needed for nausea 30 tablet 1     pantoprazole (PROTONIX) 40 MG EC tablet Take 1 tablet by mouth twice daily 180 tablet 1     sildenafil (REVATIO) 20 MG tablet Take 1 -5 tabs daily prn (Patient taking differently: Take  mg by mouth daily as needed Take 1 -5 tabs daily prn) 30 tablet 11     Suvorexant (BELSOMRA) 10 MG tablet Take 1 tablet (10 mg) by mouth nightly as needed for sleep 30 tablet 0     tamsulosin (FLOMAX) 0.4 MG capsule Take 1 capsule (0.4 mg) by mouth every evening 90 capsule 0     tirzepatide (MOUNJARO) 2.5 MG/0.5ML pen Inject 2.5 mg Subcutaneous every 7 days 2 mL 1     XARELTO ANTICOAGULANT 20 MG TABS tablet Take 1 tablet by mouth once daily with breakfast 90 tablet 0     zolpidem ER (AMBIEN CR) 12.5 MG CR tablet TAKE 1 TABLET BY MOUTH NIGHTLY AS NEEDED 30 tablet 0       Allergies   Allergen Reactions     Erythromycin GI Disturbance     Upset stomach     Ethanol      Other reaction(s): stomach ache        Social History     Tobacco Use     Smoking status: Never     Smokeless tobacco: Never     Tobacco comments:     never smoked   Substance Use Topics     Alcohol use: Yes     Family History   Problem Relation Age of Onset     Anxiety Disorder Mother      Gout Father      Lung Cancer Father      Other Cancer  "Father      Glaucoma No family hx of      Macular Degeneration No family hx of      History   Drug Use No         Objective     /74   Pulse 77   Temp 98.3  F (36.8  C) (Tympanic)   Resp 20   Ht 1.746 m (5' 8.75\")   Wt 115.9 kg (255 lb 9.6 oz)   SpO2 96%   BMI 38.02 kg/m      Physical Exam  GENERAL APPEARANCE: healthy, alert and no distress  HENT: ear canals and TM's normal and nose and mouth without ulcers or lesions  RESP: lungs clear to auscultation - no rales, rhonchi or wheezes  CV: regular rate and rhythm, normal S1 S2, no S3 or S4 and no murmur, click or rub   ABDOMEN: soft, nontender, no HSM or masses and bowel sounds normal  NEURO: Normal strength and tone, sensory exam grossly normal, mentation intact and speech normal    Recent Labs   Lab Test 03/10/23  1359 03/09/23  1159 10/10/22  1147 07/28/22  1121 06/30/22  0730   HGB 14.4  --   --   --  13.8     --   --   --  445   NA  --  135 136   < >  --    POTASSIUM  --  5.0 4.6   < >  --    CR  --  1.54* 1.45*   < >  --     < > = values in this interval not displayed.        Diagnostics:  No labs were ordered during this visit.   No EKG required for low risk surgery (cataract, skin procedure, breast biopsy, etc).    Revised Cardiac Risk Index (RCRI):  The patient has the following serious cardiovascular risks for perioperative complications:   - No serious cardiac risks = 0 points     RCRI Interpretation: 0 points: Class I (very low risk - 0.4% complication rate)           Signed Electronically by: Jon Denson PA-C  Copy of this evaluation report is provided to requesting physician.      "

## 2023-03-30 ENCOUNTER — TELEPHONE (OUTPATIENT)
Dept: ORTHOPEDICS | Facility: CLINIC | Age: 73
End: 2023-03-30

## 2023-03-30 ENCOUNTER — OFFICE VISIT (OUTPATIENT)
Dept: ORTHOPEDICS | Facility: CLINIC | Age: 73
End: 2023-03-30
Payer: COMMERCIAL

## 2023-03-30 ENCOUNTER — ANCILLARY PROCEDURE (OUTPATIENT)
Dept: GENERAL RADIOLOGY | Facility: CLINIC | Age: 73
End: 2023-03-30
Attending: ORTHOPAEDIC SURGERY
Payer: COMMERCIAL

## 2023-03-30 ENCOUNTER — PRE VISIT (OUTPATIENT)
Dept: ORTHOPEDICS | Facility: CLINIC | Age: 73
End: 2023-03-30

## 2023-03-30 ENCOUNTER — ANCILLARY ORDERS (OUTPATIENT)
Dept: ORTHOPEDICS | Facility: CLINIC | Age: 73
End: 2023-03-30

## 2023-03-30 DIAGNOSIS — G89.29 CHRONIC LEFT SHOULDER PAIN: ICD-10-CM

## 2023-03-30 DIAGNOSIS — M25.512 CHRONIC LEFT SHOULDER PAIN: ICD-10-CM

## 2023-03-30 DIAGNOSIS — M19.012 PRIMARY OSTEOARTHRITIS OF LEFT SHOULDER: ICD-10-CM

## 2023-03-30 PROCEDURE — 73020 X-RAY EXAM OF SHOULDER: CPT | Mod: LT | Performed by: RADIOLOGY

## 2023-03-30 PROCEDURE — 99214 OFFICE O/P EST MOD 30 MIN: CPT | Mod: GC | Performed by: ORTHOPAEDIC SURGERY

## 2023-03-30 NOTE — LETTER
3/30/2023         RE: Tee Hilton  [address] [facility] [address]        Dear Colleague,    Thank you for referring your patient, Tee Hilton, to the [facility]. Please see a copy of my visit note below.    [facility], Orthopaedic Surgery Consultation    CHIEF CONCERN:  Left Shoulder pain    HISTORY OF PRESENT ILLNESS:    Tee Hilton is a 72 year old male RHD with pmhx of A. Fib (on Xarelto) who presents with pain at his left shoulder.  His pain has been present for a number of years, approximately 3 to 4 years.  He does not recall any traumatic event that started this pain.  Pain has progressively gotten worse.  At this point the pain is affecting his quality of life and his ability to enjoy his hobbies.  He has had extensive nonoperative intervention which is not provided him significant relief.      Patient denies any radicular symptoms from his neck. Denies numbness/tingling in the affected extremity.    Work Comp Injury: No    Previous Treatments:  Physical therapy: yes, previously.   NSAIDs/tylenol: No  Corticosteroid injections: Yes, his last injection at glenohumeral joint was in 9/24/22.  He has had number of injections previously.  He notes approximately a maximum of 50% relief in his symptoms for about 2 weeks after his steroid injection.  Has had 3 subacromial injections, most recently 4/27/2022.   Visco injection: 12/2/2022 left glenohumeral joint, did not help.  PRP injections: no    Hobbies: He enjoys golfing, fishing, yard work.  Occupation: Retired  He lives independently with his wife.  He does not use an assistive device.    History, PMH, Meds, SH, complete ROS (10 organ systems) and PE reviewed with patient and prior medical records.         Past Medical History:     Past Medical History:   Diagnosis Date     Congestive heart failure (H) 2020     Gastroesophageal reflux disease      Heart disease 2018     Hypertension      Irregular  heart beat      Sleep apnea              Past Surgical History:     Past Surgical History:   Procedure Laterality Date     ANESTHESIA CARDIOVERSION N/A 9/24/2018    Procedure: ANESTHESIA CARDIOVERSION;  Cardioversion ;  Surgeon: GENERIC ANESTHESIA PROVIDER;  Location: [facility]     BIOPSY  Summer 2021     COLONOSCOPY N/A 5/25/2017    Procedure: COMBINED COLONOSCOPY, SINGLE OR MULTIPLE BIOPSY/POLYPECTOMY BY BIOPSY;;  Surgeon: Aquilino Garcia MD;  [facility]     COLONOSCOPY N/A 7/23/2021    Procedure: COLONOSCOPY, WITH POLYPECTOMY AND BIOPSY;  Surgeon: Israel Bey DO;  Location: [facility]     COLONOSCOPY WITH CO2 INSUFFLATION N/A 5/25/2017    Procedure: COLONOSCOPY WITH CO2 INSUFFLATION;  COLONOSCOPY SCREEN/ ORDAZ;  Surgeon: Aquilino Garica MD;  [facility]     OPEN REDUCTION INTERNAL FIXATION TIBIA       SEPTOPLASTY, TURBINOPLASTY, COMBINED Bilateral 7/9/2019    Procedure: ENDOSCOPIC SEPTOPLASTY, BILATERAL TURBINATE REDUCTION;  Surgeon: Alexander Avila MD;  Location: [facility]     TONSILLECTOMY               Social History:     Social History     Socioeconomic History     Marital status:      Number of children: 3   Occupational History     Occupation: sales and marketing   Tobacco Use     Smoking status: Never     Smokeless tobacco: Never     Tobacco comments:     never smoked   Vaping Use     Vaping Use: Never used   Substance and Sexual Activity     Alcohol use: Yes     Drug use: No     Sexual activity: Yes     Partners: Female   Other Topics Concern     Parent/sibling w/ CABG, MI or angioplasty before 65F 55M? Yes             Family History:     Family History   Problem Relation Age of Onset     Anxiety Disorder Mother      Gout Father      Lung Cancer Father      Other Cancer Father      Glaucoma No family hx of      Macular Degeneration No family hx of               Medications:     Current Outpatient Medications   Medication Sig     allopurinol (ZYLOPRIM) 100 MG tablet  Take 1 tablet by mouth once daily     amLODIPine (NORVASC) 5 MG tablet TAKE 1 & 1/2 (ONE & ONE-HALF) TABLETS BY MOUTH ONCE DAILY     Bromfenac Sodium POWD 1 Bottle 4 times daily 1 drop four times a day in the operative eye starting 1 day before surgery. Use until the bottle runs out.     Dexamethasone Acetate POWD 1 Bottle 4 times daily 1 drop four times a day in the operative eye starting 1 day before surgery. Use until the bottle runs out.     famotidine (PEPCID) 20 MG tablet Take 1 tablet (20 mg) by mouth nightly as needed     lisinopril (ZESTRIL) 20 MG tablet TAKE 1/2 TO 1 (ONE-HALF TO ONE) TABLET BY MOUTH ONCE DAILY (FOLLOW  DRS  CURRENT  DIRECTIONS  ON  DOSING)     LORazepam (ATIVAN) 0.5 MG tablet TAKE 1 TABLET BY MOUTH EVERY 8 HOURS AS NEEDED FOR ANXIETY     metoprolol succinate ER (TOPROL XL) 100 MG 24 hr tablet TAKE 1 & 1/2 (ONE & ONE-HALF) TABLETS BY MOUTH ONCE DAILY     Moxifloxacin HCl POWD 1 Bottle 4 times daily 1 drop four times a day in the operative eye starting 1 day before surgery. Use until the bottle runs out.     ondansetron (ZOFRAN ODT) 4 MG ODT tab Take 1 tablet (4 mg) by mouth nightly as needed for nausea     pantoprazole (PROTONIX) 40 MG EC tablet Take 1 tablet by mouth twice daily     sildenafil (REVATIO) 20 MG tablet Take 1 -5 tabs daily prn (Patient taking differently: Take  mg by mouth daily as needed Take 1 -5 tabs daily prn)     Suvorexant (BELSOMRA) 10 MG tablet Take 1 tablet (10 mg) by mouth nightly as needed for sleep     tamsulosin (FLOMAX) 0.4 MG capsule Take 1 capsule (0.4 mg) by mouth every evening     tirzepatide (MOUNJARO) 2.5 MG/0.5ML pen Inject 2.5 mg Subcutaneous every 7 days     XARELTO ANTICOAGULANT 20 MG TABS tablet Take 1 tablet by mouth once daily with breakfast     zolpidem ER (AMBIEN CR) 12.5 MG CR tablet TAKE 1 TABLET BY MOUTH NIGHTLY AS NEEDED     Current Facility-Administered Medications   Medication     hylan (SYNVISC ONE) injection 48 mg      Facility-Administered Medications Ordered in Other Visits   Medication     sodium chloride (PF) 0.9% PF flush 10 mL             Allergies:      Allergies   Allergen Reactions     Erythromycin GI Disturbance     Upset stomach     Ethanol      Other reaction(s): stomach ache            Review of Systems:   REVIEW OF SYSTEMS: Positive for that noted in past medical history and history of present illness and otherwise reviewed in EMR     PHYSICAL EXAM:    General: In no acute distress, sitting comfortably in chair  Chest: breathing on room air comfortably    Musculoskeletal Exam:   LUE: skin intact, no obvious physical deformities, no rashes. No shoulder asymmetry compared with contralateral shoulder. No muscle atrophy.   No pain on palpation: over SC joint, AC joint, posterior glenohumeral space.   Pain with palpation: bicipital groove.  SILT in axillary, median, ulnar musculocutaneous, and radial nerve distributions.   Firing EPL, FPL, intrinsics, wrist flexion/extension, bicep, tricep, deltoid.     L shoulder motion:  Active motion is FE to 150 with discomfort, (w/ pain at extremes of the motion and with crepitus) (contralateral side 170 degrees)  Abduction to 140 with discomfort,  (w/ pain at extremes of the motion and with crepitus) (contralateral side 170 degrees)  ER at side to 50 without discomfort, (contralateral side 65 degrees)  IR to L4-L5 (contralateral side L1-L2)    Passive motion is Abduction to 150 with discomfort    SPECIAL TESTS:  Rotator Cuff:  -Belly Press: Negative  -Lucretia's: Negative, 5- out of 5 in strength  -ER in abduction: Unable to do due to pain and restricted motion  -ER at side: 5-/5      IMAGING:  X-ray of the left shoulder obtained today and compared with x-rays on April 27, 2022 demonstrate degenerative changes at the glenohumeral joint including decreased joint space and formation of osteophyte at the inferior humeral head and inferior glenoid.    CT of the left shoulder  demonstrates a Walch 2B glenoid.  Similar degenerative changes are noted at the glenohumeral joint.    MRI of the left shoulder demonstrates intact rotator cuff muscle with a partial articular sided supraspinatus tear.  With notable atrophy of the supraspinatus muscle,goutallier stage 2-3.  Notable signal is present at the biceps tendon concerning for biceps tendinitis.  AC joint arthrosis is noted.     Assessment:   Tee Hilton is a 72 year old male RHD with pmhx a. Fib (on xarelto) who presents with left shoulder OA.    Plan:  The xray and MRI findings were reviewed as well as physical exam findings with the patient. Non-operative and operative intervention was discussed. Non-operative intervention would be in the form of  (physical therapy exercises, cortisone injections, activity modification). Operative treatment options include LEFT shoulder anatomical TSA vs Reverse TSA.  Patient has an intact rotator cuff on MRI and examination, however the quality of his supraspinatus is fairly questionable on the MRI with extensive fatty infiltration. Additionally, patient also has a Walch 2B glenoid.  Therefore, both anatomical total shoulder arthroplasty and reverse total shoulder arthroplasty were discussed with the patient.  Dr. Botello will preop plan for both and we will select 1 that is the best option for the patient. Risks, benefits and prognosis of each option was discussed. . Patient would like to proceed with surgical intervention, he would like it to be in mid-May.    Patient will be contacted by the surgery scheduler for an optimal time.  We will let him know about which surgical intervention may be best for him, reverse total shoulder arthroplasty versus anatomical TSA.    Follow up with Dr. Botello pending scheduling for surgical intervention.    Assessment and plan discussed with Dr. Botello.    Rolo Lopez, PGY5  8:00 AM  March 30, 2023    I have personally examined this patient and have  reviewed the clinical presentation and progress note with the resident.  I agree with the treatment plan as outlined.  The plan was formulated with the resident on the day of the resident's note.       Again, thank you for allowing me to participate in the care of your patient.        Sincerely,        Ketty Botello MD

## 2023-03-30 NOTE — TELEPHONE ENCOUNTER
Date Scheduled: 6-6-23  Facility: Surgery Locations: [facility]  Surgeon: Dr. Botello  Post-op appointment scheduled:    scheduled?: No  Surgery packet/instructions confirmed received?  No-please mail  Pre op physical/PAC appointment:   Special Considerations:     Judith West  Surgery Scheduling Coordinator  [phone]

## 2023-03-30 NOTE — TELEPHONE ENCOUNTER
Procedure: Left total shoulder arthroplasty vs reverse total shoulder arthroplasty  Facility: [facility]  Length: 150 minutes  Anesthesia: Choice  Post-op appointments needed: 2 weeks provider only, 6 weeks with provider only.  Surgery packet/instructions given to patient?  To be mailed    Edenilson De Dios RN

## 2023-03-30 NOTE — NURSING NOTE
Reason For Visit:   Chief Complaint   Patient presents with     Consult     Left shoulder pain       PCP: Jon Denson  Ref: Dr Blanc    ?  No  Occupation Na.  Currently working? No.  Work status?  Retired.  Date of injury: na  Type of injury: NA.  Date of surgery: na  Type of surgery: nq.  Smoker: No  Request smoking cessation information: No    Right hand dominant    SANE score  Affected shoulder: Left  Right shoulder SANE: 100  Left shoulder SANE: 50    There were no vitals taken for this visit.    Conseulo Pitt, EMT

## 2023-03-30 NOTE — PROGRESS NOTES
[facility], Orthopaedic Surgery Consultation    CHIEF CONCERN:  Left Shoulder pain    HISTORY OF PRESENT ILLNESS:    Tee Hilton is a 72 year old male RHD with pmhx of A. Diane (on Xarelto) who presents with pain at his left shoulder.  His pain has been present for a number of years, approximately 3 to 4 years.  He does not recall any traumatic event that started this pain.  Pain has progressively gotten worse.  At this point the pain is affecting his quality of life and his ability to enjoy his hobbies.  He has had extensive nonoperative intervention which is not provided him significant relief.      Patient denies any radicular symptoms from his neck. Denies numbness/tingling in the affected extremity.    Work Comp Injury: No    Previous Treatments:  Physical therapy: yes, previously.   NSAIDs/tylenol: No  Corticosteroid injections: Yes, his last injection at glenohumeral joint was in 9/24/22.  He has had number of injections previously.  He notes approximately a maximum of 50% relief in his symptoms for about 2 weeks after his steroid injection.  Has had 3 subacromial injections, most recently 4/27/2022.   Visco injection: 12/2/2022 left glenohumeral joint, did not help.  PRP injections: no    Hobbies: He enjoys golfing, fishing, yard work.  Occupation: Retired  He lives independently with his wife.  He does not use an assistive device.    History, PMH, Meds, SH, complete ROS (10 organ systems) and PE reviewed with patient and prior medical records.         Past Medical History:     Past Medical History:   Diagnosis Date     Congestive heart failure (H) 2020     Gastroesophageal reflux disease      Heart disease 2018     Hypertension      Irregular heart beat      Sleep apnea              Past Surgical History:     Past Surgical History:   Procedure Laterality Date     ANESTHESIA CARDIOVERSION N/A 9/24/2018    Procedure: ANESTHESIA CARDIOVERSION;  Cardioversion ;  Surgeon: GENERIC ANESTHESIA PROVIDER;   Location: [facility]     BIOPSY  Summer 2021     COLONOSCOPY N/A 5/25/2017    Procedure: COMBINED COLONOSCOPY, SINGLE OR MULTIPLE BIOPSY/POLYPECTOMY BY BIOPSY;;  Surgeon: Aquilino Garcia MD;  Location: [facility]     COLONOSCOPY N/A 7/23/2021    Procedure: COLONOSCOPY, WITH POLYPECTOMY AND BIOPSY;  Surgeon: Israel Bey DO;  Location: [facility]     COLONOSCOPY WITH CO2 INSUFFLATION N/A 5/25/2017    Procedure: COLONOSCOPY WITH CO2 INSUFFLATION;  COLONOSCOPY SCREEN/ ORDAZ;  Surgeon: Aquilino Garcia MD;  Location: [facility]     OPEN REDUCTION INTERNAL FIXATION TIBIA       SEPTOPLASTY, TURBINOPLASTY, COMBINED Bilateral 7/9/2019    Procedure: ENDOSCOPIC SEPTOPLASTY, BILATERAL TURBINATE REDUCTION;  Surgeon: Alexander Avila MD;  Location: [facility]     TONSILLECTOMY               Social History:     Social History     Socioeconomic History     Marital status:      Number of children: 3   Occupational History     Occupation: sales and marketing   Tobacco Use     Smoking status: Never     Smokeless tobacco: Never     Tobacco comments:     never smoked   Vaping Use     Vaping Use: Never used   Substance and Sexual Activity     Alcohol use: Yes     Drug use: No     Sexual activity: Yes     Partners: Female   Other Topics Concern     Parent/sibling w/ CABG, MI or angioplasty before 65F 55M? Yes             Family History:     Family History   Problem Relation Age of Onset     Anxiety Disorder Mother      Gout Father      Lung Cancer Father      Other Cancer Father      Glaucoma No family hx of      Macular Degeneration No family hx of               Medications:     Current Outpatient Medications   Medication Sig     allopurinol (ZYLOPRIM) 100 MG tablet Take 1 tablet by mouth once daily     amLODIPine (NORVASC) 5 MG tablet TAKE 1 & 1/2 (ONE & ONE-HALF) TABLETS BY MOUTH ONCE DAILY     Bromfenac Sodium POWD 1 Bottle 4 times daily 1 drop four times a day in the operative eye starting 1 day before surgery. Use  until the bottle runs out.     Dexamethasone Acetate POWD 1 Bottle 4 times daily 1 drop four times a day in the operative eye starting 1 day before surgery. Use until the bottle runs out.     famotidine (PEPCID) 20 MG tablet Take 1 tablet (20 mg) by mouth nightly as needed     lisinopril (ZESTRIL) 20 MG tablet TAKE 1/2 TO 1 (ONE-HALF TO ONE) TABLET BY MOUTH ONCE DAILY (FOLLOW  DRS  CURRENT  DIRECTIONS  ON  DOSING)     LORazepam (ATIVAN) 0.5 MG tablet TAKE 1 TABLET BY MOUTH EVERY 8 HOURS AS NEEDED FOR ANXIETY     metoprolol succinate ER (TOPROL XL) 100 MG 24 hr tablet TAKE 1 & 1/2 (ONE & ONE-HALF) TABLETS BY MOUTH ONCE DAILY     Moxifloxacin HCl POWD 1 Bottle 4 times daily 1 drop four times a day in the operative eye starting 1 day before surgery. Use until the bottle runs out.     ondansetron (ZOFRAN ODT) 4 MG ODT tab Take 1 tablet (4 mg) by mouth nightly as needed for nausea     pantoprazole (PROTONIX) 40 MG EC tablet Take 1 tablet by mouth twice daily     sildenafil (REVATIO) 20 MG tablet Take 1 -5 tabs daily prn (Patient taking differently: Take  mg by mouth daily as needed Take 1 -5 tabs daily prn)     Suvorexant (BELSOMRA) 10 MG tablet Take 1 tablet (10 mg) by mouth nightly as needed for sleep     tamsulosin (FLOMAX) 0.4 MG capsule Take 1 capsule (0.4 mg) by mouth every evening     tirzepatide (MOUNJARO) 2.5 MG/0.5ML pen Inject 2.5 mg Subcutaneous every 7 days     XARELTO ANTICOAGULANT 20 MG TABS tablet Take 1 tablet by mouth once daily with breakfast     zolpidem ER (AMBIEN CR) 12.5 MG CR tablet TAKE 1 TABLET BY MOUTH NIGHTLY AS NEEDED     Current Facility-Administered Medications   Medication     hylan (SYNVISC ONE) injection 48 mg     Facility-Administered Medications Ordered in Other Visits   Medication     sodium chloride (PF) 0.9% PF flush 10 mL             Allergies:      Allergies   Allergen Reactions     Erythromycin GI Disturbance     Upset stomach     Ethanol      Other reaction(s): stomach  ache            Review of Systems:   REVIEW OF SYSTEMS: Positive for that noted in past medical history and history of present illness and otherwise reviewed in EMR     PHYSICAL EXAM:    General: In no acute distress, sitting comfortably in chair  Chest: breathing on room air comfortably    Musculoskeletal Exam:   LUE: skin intact, no obvious physical deformities, no rashes. No shoulder asymmetry compared with contralateral shoulder. No muscle atrophy.   No pain on palpation: over SC joint, AC joint, posterior glenohumeral space.   Pain with palpation: bicipital groove.  SILT in axillary, median, ulnar musculocutaneous, and radial nerve distributions.   Firing EPL, FPL, intrinsics, wrist flexion/extension, bicep, tricep, deltoid.     L shoulder motion:  Active motion is FE to 150 with discomfort, (w/ pain at extremes of the motion and with crepitus) (contralateral side 170 degrees)  Abduction to 140 with discomfort,  (w/ pain at extremes of the motion and with crepitus) (contralateral side 170 degrees)  ER at side to 50 without discomfort, (contralateral side 65 degrees)  IR to L4-L5 (contralateral side L1-L2)    Passive motion is Abduction to 150 with discomfort    SPECIAL TESTS:  Rotator Cuff:  -Belly Press: Negative  -Lucretia's: Negative, 5- out of 5 in strength  -ER in abduction: Unable to do due to pain and restricted motion  -ER at side: 5-/5      IMAGING:  X-ray of the left shoulder obtained today and compared with x-rays on April 27, 2022 demonstrate degenerative changes at the glenohumeral joint including decreased joint space and formation of osteophyte at the inferior humeral head and inferior glenoid.    CT of the left shoulder demonstrates a Walch 2B glenoid.  Similar degenerative changes are noted at the glenohumeral joint.    MRI of the left shoulder demonstrates intact rotator cuff muscle with a partial articular sided supraspinatus tear.  With notable atrophy of the supraspinatus muscle,goutallier  stage 2-3.  Notable signal is present at the biceps tendon concerning for biceps tendinitis.  AC joint arthrosis is noted.     Assessment:   Tee Hilton is a 72 year old male RHD with pmhx a. Fib (on xarelto) who presents with left shoulder OA.    Plan:  The xray and MRI findings were reviewed as well as physical exam findings with the patient. Non-operative and operative intervention was discussed. Non-operative intervention would be in the form of  (physical therapy exercises, cortisone injections, activity modification). Operative treatment options include LEFT shoulder anatomical TSA vs Reverse TSA.  Patient has an intact rotator cuff on MRI and examination, however the quality of his supraspinatus is fairly questionable on the MRI with extensive fatty infiltration. Additionally, patient also has a Walch 2B glenoid.  Therefore, both anatomical total shoulder arthroplasty and reverse total shoulder arthroplasty were discussed with the patient.  Dr. Botello will preop plan for both and we will select 1 that is the best option for the patient. Risks, benefits and prognosis of each option was discussed. . Patient would like to proceed with surgical intervention, he would like it to be in mid-May.    Patient will be contacted by the surgery scheduler for an optimal time.  We will let him know about which surgical intervention may be best for him, reverse total shoulder arthroplasty versus anatomical TSA.    Follow up with Dr. Botello pending scheduling for surgical intervention.    Assessment and plan discussed with Dr. Botello.    Rolo Lopez, PGY5  8:00 AM  March 30, 2023    I have personally examined this patient and have reviewed the clinical presentation and progress note with the resident.  I agree with the treatment plan as outlined.  The plan was formulated with the resident on the day of the resident's note.

## 2023-03-31 NOTE — TELEPHONE ENCOUNTER
Packet mailed. Need to clarify if Dr. Botello would like Pt to see PAC as well, h/o a-fib, systolic heart failure, on Xarelto.    Edenilson De Dios RN

## 2023-04-04 NOTE — TELEPHONE ENCOUNTER
Confirmed with Dr. Botello that she would like Pt to get pre-op with PAC. Order placed. Please reach out to help schedule virtual or in-person per Pt preference.     Edenilson De Dios RN

## 2023-04-04 NOTE — TELEPHONE ENCOUNTER
04/04 Called patient. 1st attempt to schedule PAC pre-op visit. Patient requested a call back at a later date this week.       Cynthia young Procedure   Cardiology, Nephrology, Rheumatology, GI, Pulmonology, Infectious Disease Specialties   [facility]

## 2023-04-05 NOTE — TELEPHONE ENCOUNTER
4/5 Called and left voicemail. Provided phone number [phone]  to schedule pac appointment.     Emily young Procedure   Orthopedics, Podiatry, Sports Medicine, Ent ,Eye , Audiology, Adult Endocrine & Diabetes, Nutrition & Medication Therapy Management Specialties   [facility]

## 2023-04-10 ENCOUNTER — DOCUMENTATION ONLY (OUTPATIENT)
Dept: OTHER | Facility: CLINIC | Age: 73
End: 2023-04-10
Payer: COMMERCIAL

## 2023-04-11 NOTE — TELEPHONE ENCOUNTER
4/11 2nd attempt.  Called and left voicemail. Provided phone number [phone]  to schedule pac appointment.      Emily young Procedure   Orthopedics, Podiatry, Sports Medicine, Ent ,Eye , Audiology, Adult Endocrine & Diabetes, Nutrition & Medication Therapy Management Specialties   [facility]

## 2023-04-13 NOTE — TELEPHONE ENCOUNTER
FUTURE VISIT INFORMATION      SURGERY INFORMATION:    Date: 23    [facility]    Surgeon:  Ketty Botello MD    Anesthesia Type:  choice    Procedure: Left total shoulder arthroplasty versus Reverse arthroplasty shoulder    Consult: ov 3/30    RECORDS REQUESTED FROM:       Primary Care Provider[facility]    Pertinent Medical History: Chronic systolic congestive heart failure, hypertension, chronic atrial fibrillation     Most recent EKG+ Tracin21    Most recent ECHO: 21

## 2023-04-14 ENCOUNTER — ANESTHESIA EVENT (OUTPATIENT)
Dept: SURGERY | Facility: AMBULATORY SURGERY CENTER | Age: 73
End: 2023-04-14
Payer: COMMERCIAL

## 2023-04-14 RX ORDER — OXYCODONE HYDROCHLORIDE 5 MG/1
10 TABLET ORAL
Status: CANCELLED | OUTPATIENT
Start: 2023-04-14

## 2023-04-14 RX ORDER — OXYCODONE HYDROCHLORIDE 5 MG/1
5 TABLET ORAL
Status: CANCELLED | OUTPATIENT
Start: 2023-04-14

## 2023-04-17 ENCOUNTER — ANESTHESIA (OUTPATIENT)
Dept: SURGERY | Facility: AMBULATORY SURGERY CENTER | Age: 73
End: 2023-04-17
Payer: COMMERCIAL

## 2023-04-17 ENCOUNTER — HOSPITAL ENCOUNTER (OUTPATIENT)
Facility: AMBULATORY SURGERY CENTER | Age: 73
Discharge: HOME | End: 2023-04-17
Attending: STUDENT IN AN ORGANIZED HEALTH CARE EDUCATION/TRAINING PROGRAM
Payer: COMMERCIAL

## 2023-04-17 VITALS
OXYGEN SATURATION: 95 % | DIASTOLIC BLOOD PRESSURE: 84 MMHG | SYSTOLIC BLOOD PRESSURE: 127 MMHG | BODY MASS INDEX: 37.86 KG/M2 | WEIGHT: 255.6 LBS | RESPIRATION RATE: 16 BRPM | TEMPERATURE: 97.4 F | HEART RATE: 63 BPM | HEIGHT: 69 IN

## 2023-04-17 DIAGNOSIS — H25.812 COMBINED FORM OF AGE-RELATED CATARACT, LEFT EYE: ICD-10-CM

## 2023-04-17 PROCEDURE — 66984 XCAPSL CTRC RMVL W/O ECP: CPT | Mod: LT

## 2023-04-17 PROCEDURE — 66982 XCAPSL CTRC RMVL CPLX WO ECP: CPT | Mod: LT | Performed by: STUDENT IN AN ORGANIZED HEALTH CARE EDUCATION/TRAINING PROGRAM

## 2023-04-17 DEVICE — LENS CC60WF 18.5 CLAREON UV ASPHERIC BICONVEX IOL: Type: IMPLANTABLE DEVICE | Site: EYE | Status: FUNCTIONAL

## 2023-04-17 RX ORDER — TETRACAINE HYDROCHLORIDE 5 MG/ML
SOLUTION OPHTHALMIC PRN
Status: DISCONTINUED | OUTPATIENT
Start: 2023-04-17 | End: 2023-04-17 | Stop reason: HOSPADM

## 2023-04-17 RX ORDER — CYCLOPENTOLAT/TROPIC/PHENYLEPH 1%-1%-2.5%
1 DROPS (EA) OPHTHALMIC (EYE)
Status: COMPLETED | OUTPATIENT
Start: 2023-04-17 | End: 2023-04-17

## 2023-04-17 RX ORDER — BALANCED SALT SOLUTION 6.4; .75; .48; .3; 3.9; 1.7 MG/ML; MG/ML; MG/ML; MG/ML; MG/ML; MG/ML
SOLUTION OPHTHALMIC PRN
Status: DISCONTINUED | OUTPATIENT
Start: 2023-04-17 | End: 2023-04-17 | Stop reason: HOSPADM

## 2023-04-17 RX ORDER — MOXIFLOXACIN IN NACL,ISO-OS/PF 0.3MG/0.3
SYRINGE (ML) INTRAOCULAR PRN
Status: DISCONTINUED | OUTPATIENT
Start: 2023-04-17 | End: 2023-04-17 | Stop reason: HOSPADM

## 2023-04-17 RX ORDER — LIDOCAINE 40 MG/G
CREAM TOPICAL
Status: DISCONTINUED | OUTPATIENT
Start: 2023-04-17 | End: 2023-04-18 | Stop reason: HOSPADM

## 2023-04-17 RX ORDER — FENTANYL CITRATE 50 UG/ML
INJECTION, SOLUTION INTRAMUSCULAR; INTRAVENOUS PRN
Status: DISCONTINUED | OUTPATIENT
Start: 2023-04-17 | End: 2023-04-18

## 2023-04-17 RX ORDER — PROPARACAINE HYDROCHLORIDE 5 MG/ML
1 SOLUTION/ DROPS OPHTHALMIC ONCE
Status: COMPLETED | OUTPATIENT
Start: 2023-04-17 | End: 2023-04-17

## 2023-04-17 RX ORDER — ACETAMINOPHEN 325 MG/1
975 TABLET ORAL ONCE
Status: COMPLETED | OUTPATIENT
Start: 2023-04-17 | End: 2023-04-17

## 2023-04-17 RX ORDER — SODIUM CHLORIDE, SODIUM LACTATE, POTASSIUM CHLORIDE, CALCIUM CHLORIDE 600; 310; 30; 20 MG/100ML; MG/100ML; MG/100ML; MG/100ML
INJECTION, SOLUTION INTRAVENOUS CONTINUOUS
Status: CANCELLED | OUTPATIENT
Start: 2023-04-17

## 2023-04-17 RX ORDER — LIDOCAINE HYDROCHLORIDE 10 MG/ML
INJECTION, SOLUTION EPIDURAL; INFILTRATION; INTRACAUDAL; PERINEURAL PRN
Status: DISCONTINUED | OUTPATIENT
Start: 2023-04-17 | End: 2023-04-17 | Stop reason: HOSPADM

## 2023-04-17 RX ORDER — SODIUM CHLORIDE, SODIUM LACTATE, POTASSIUM CHLORIDE, CALCIUM CHLORIDE 600; 310; 30; 20 MG/100ML; MG/100ML; MG/100ML; MG/100ML
INJECTION, SOLUTION INTRAVENOUS CONTINUOUS
Status: DISCONTINUED | OUTPATIENT
Start: 2023-04-17 | End: 2023-04-18 | Stop reason: HOSPADM

## 2023-04-17 RX ADMIN — FENTANYL CITRATE 50 MCG: 50 INJECTION, SOLUTION INTRAMUSCULAR; INTRAVENOUS at 11:36

## 2023-04-17 RX ADMIN — PROPARACAINE HYDROCHLORIDE 1 DROP: 5 SOLUTION/ DROPS OPHTHALMIC at 09:41

## 2023-04-17 RX ADMIN — SODIUM CHLORIDE, SODIUM LACTATE, POTASSIUM CHLORIDE, CALCIUM CHLORIDE: 600; 310; 30; 20 INJECTION, SOLUTION INTRAVENOUS at 09:57

## 2023-04-17 RX ADMIN — Medication 1 DROP: at 09:41

## 2023-04-17 RX ADMIN — Medication 1 DROP: at 09:49

## 2023-04-17 RX ADMIN — Medication 1 DROP: at 09:46

## 2023-04-17 NOTE — DISCHARGE INSTRUCTIONS
CATARACT SURGERY POST-OP INSTRUCTIONS  Dr. Porsche Durán  [phone]      Continue using CatarActive3 four times a day in the operative eye.  You should get 3 doses in today and 4 doses daily starting tomorrow.     Keep the eye shield taped in place unless putting drops in. We will remove it for you in the office tomorrow.    Light sensitivity may be noticed. Sunglasses may be worn for comfort.    Do not rub the operated eye.    Keep the operated eye dry. You may wash your hair, bathe or shower, but keep the operated eye closed while doing so.     No swimming, hot tub, or sauna for 2 weeks.    No make up around eye for 5 days.    No bending at the waist or lifting more than 10 pounds for one week.    May take Tylenol (per directions on bottle) for mild pain.    Call the office at [phone] and ask to speak to the on-call ophthalmologist  if any of the following should occur:  Any sudden vision changes  Nausea or severe headache  Increase in pain not controlled  Or signs of infection (pus, increasing redness or tenderness)    [facility]  Home Care Following Anesthesia  For 24 hours after surgery:  Get plenty of rest.  A responsible adult must stay with you for at least 24 hours after you leave the surgery center.  Do not drive or use heavy equipment.  If you have weakness or tingling, don't drive or use heavy equipment until this feeling goes away.   Do not drink alcohol.   Avoid strenuous or risky activities.  Ask for help when climbing stairs.  You may feel lightheaded.  IF so, sit for a few minutes before standing.  Have someone help you get up.   If you have nausea (feel sick to your stomach): Drink only clear liquids such as apple juice, ginger ale, broth or 7-Up.  Rest may also help.  Be sure to drink enough fluids.  Move to a regular diet as you feel able.   You may have a slight fever.  Call the doctor if your fever is over 100 F (37.7 C) (taken under the tongue)  or lasts longer than 24 hours.  You may have a dry mouth, a sore throat, muscle aches or trouble sleeping. These should go away after 24 hours.  Do not make important or legal decisions.   It is recommended to avoid smoking.               Tips for taking pain medications  To get the best pain relief possible, remember these points:  Take pain medications as directed, before pain becomes severe.  Pain medication can upset your stomach: taking it with food may help.  Constipation is a common side effect of pain medication. Drink plenty of  fluids.  Eat foods high in fiber. Take a stool softener if recommended by your doctor or pharmacist.  Do not drink alcohol, drive or operate machinery while taking pain medications.  Ask about other ways to control pain, such as with heat, ice or relaxation.    Tylenol/Acetaminophen Consumption  To help encourage the safe use of acetaminophen, the makers of TYLENOL  have lowered the maximum daily dose for single-ingredient Extra Strength TYLENOL  (acetaminophen) products sold in the [place] from 8 pills per day (4,000 mg) to 6 pills per day (3,000 mg). The dosing interval has also changed from 2 pills every 4-6 hours to 2 pills every 6 hours.  If you feel your pain relief is insufficient, you may take Tylenol/Acetaminophen in addition to your narcotic pain medication.   Be careful not to exceed 3,000 mg of Tylenol/Acetaminophen in a 24 hour period from all sources.  If you are taking extra strength Tylenol/acetaminophen (500 mg), the maximum dose is 6 tablets in 24 hours.  If you are taking regular strength acetaminophen (325 mg), the maximum dose is 9 tablets in 24 hours.    Call a doctor for any of the following:  Signs of infection (fever, growing tenderness at the surgery site, a large amount of drainage or bleeding, severe pain, foul-smelling drainage, redness, swelling).  It has been over 8 to 10 hours since surgery and you are still not able to urinate (pass water).  Headache  for over 24 hours.  Numbness, tingling or weakness the day after surgery (if you had spinal anesthesia).  Signs of Covid-19 infection (temperature over 100 degrees, shortness of breath, cough, loss of taste/smell, generalized body aches, persistent headache, chills, sore throat, nausea/vomiting/diarrhea)  Your doctor is:  Dr. Porsche Durán, Ophthalmology: [phone]                    Or dial [phone] and ask for the resident on call for:  Ophthalmology  For emergency care, call the:  [place] [facility]:  [phone] (TTY for hearing impaired: [phone])

## 2023-04-17 NOTE — OP NOTE
PreOp Diagnosis: Visually significant nuclear sclerotic cataract left eye, cortical cataract necessitating Trypan Blue left eye   PostOp Diagnosis: Same  Surgeon: Porsche Durán MD  Implant: Willie Clareon UV [id number] +18.5D   Procedures:   1. Review of intraocular lens calculations, both eyes   2. Phacoemulsification and extraction of lens  left eye   3. Intraocular lens implantation left eye  Anesthesia: MAC/topical  Complications: None  EBL: <1cc    Tee Hilton suffers from a visually significant cataract of the left eye. This has caused problems with distance and reading vision, including glare. After discussing the risks, benefits, and alternatives, the patient wishes to proceed with cataract surgery.    The patient was identified in the pre-op area where the left eye was marked. The patient was then brought to the operating room where a time out was called, identifying the patient, the procedure, and the correct site. Tetracaine drops were applied to the operative eye. The operative eye was then prepped and draped in the usual sterile ophthalmic fashion. An eyelid speculum was placed into the operative eye. A paracentesis was made inferior with a side port blade. 1% preservative-free lidocaine and epinephrine was injected into the anterior chamber. Due to obscured red reflex, trypan blue was irrigated into the anterior chamber to enhance capsular visualization.  This was irrigated from the anterior chamber after 60 seconds with balanced salt solution.   Viscoat was injected to deepen the anterior chamber. A 2.4mm clear corneal wound was created with a keratome blade temporally.  A continuous curvilinear capsulorrhexis was started with a bent cystitome and completed with Utrada forceps. Hydrodissection of the lens nucleus was performed with BSS on a cannula. The lens nucleus was rotated. Phacoemulsification of the lens nucleus was accomplished in a phacoemulsification stop and chop technique. Remaining  cortex was removed with irrigation and aspiration. The lens capsule was noted to be intact. Provisc was used to inflate the capsular bag.  The lens was injected into the capsular bag. Remaining viscoelastic was removed with irrigation and aspiration. The wounds were checked and found to be watertight after hydration. Intracameral moxifloxacin was injected into the anterior chamber. The eyelid speculum was removed. The patient tolerated the procedure well and was in stable condition on the way to the recovery area.     Porsche Durán MD

## 2023-04-17 NOTE — ANESTHESIA CARE TRANSFER NOTE
Patient: Tee Hilton    Procedure: Procedure(s):  LEFT PHACOEMULSIFICATION, CATARACT, WITH INTRAOCULAR LENS IMPLANT       Diagnosis: Combined form of age-related cataract, left eye [H25.812]  Diagnosis Additional Information: No value filed.    Anesthesia Type:   No value filed.     Note:    Oropharynx: oropharynx clear of all foreign objects and spontaneously breathing  Level of Consciousness: awake        Dentition: dentition unchanged  Vital Signs Stable: post-procedure vital signs reviewed and stable  Report to RN Given: handoff report given  Patient transferred to: Phase II    Handoff Report: Identifed the Patient, Identified the Reponsible Provider, Reviewed the pertinent medical history, Discussed the surgical course, Reviewed Intra-OP anesthesia mangement and issues during anesthesia, Set expectations for post-procedure period and Allowed opportunity for questions and acknowledgement of understanding      Vitals:  Vitals Value Taken Time   /92 04/17/23 1209   Temp 36.3  C (97.4  F) 04/17/23 1209   Pulse 59    Resp 16 04/17/23 1209   SpO2 97 % 04/17/23 1209       Electronically Signed By: ANDRE Muro CRNA  April 17, 2023  12:10 PM

## 2023-04-18 ENCOUNTER — OFFICE VISIT (OUTPATIENT)
Dept: OPHTHALMOLOGY | Facility: CLINIC | Age: 73
End: 2023-04-18
Payer: COMMERCIAL

## 2023-04-18 DIAGNOSIS — Z96.1 PSEUDOPHAKIA: Primary | ICD-10-CM

## 2023-04-18 PROCEDURE — 99024 POSTOP FOLLOW-UP VISIT: CPT | Performed by: STUDENT IN AN ORGANIZED HEALTH CARE EDUCATION/TRAINING PROGRAM

## 2023-04-18 RX ORDER — DORZOLAMIDE HYDROCHLORIDE AND TIMOLOL MALEATE 20; 5 MG/ML; MG/ML
1 SOLUTION/ DROPS OPHTHALMIC 2 TIMES DAILY
Qty: 10 ML | Refills: 0 | Status: SHIPPED | OUTPATIENT
Start: 2023-04-18 | End: 2023-07-19

## 2023-04-18 ASSESSMENT — TONOMETRY
OD_IOP_MMHG: 19
OS_IOP_MMHG: 41
IOP_METHOD: APPLANATION

## 2023-04-18 ASSESSMENT — VISUAL ACUITY
OD_CC+: -1
CORRECTION_TYPE: GLASSES
OS_SC: 20/50
OS_PH_SC: 20/25
OS_PH_SC+: -2
METHOD: SNELLEN - LINEAR
OD_CC: 20/25

## 2023-04-18 ASSESSMENT — SLIT LAMP EXAM - LIDS
COMMENTS: 1+ DERMATOCHALASIS
COMMENTS: 1+ DERMATOCHALASIS

## 2023-04-18 ASSESSMENT — EXTERNAL EXAM - RIGHT EYE: OD_EXAM: NORMAL

## 2023-04-18 ASSESSMENT — EXTERNAL EXAM - LEFT EYE: OS_EXAM: NORMAL

## 2023-04-18 NOTE — PATIENT INSTRUCTIONS
POST-OP CATARACT INSTRUCTIONS    *   Use the following drop(s) in the LEFT EYE four times a day until the bottle(s) run out:        CatarActive 3 (dark blue top)    Start Cosopt (dorzolamide-timolol -- dark blue top) twice a day in the left eye and use until the bottle runs out    *   Wear eye shield when sleeping for one week. Do not rub the operated eye.     *   No bending or lifting more than 10 pounds for one week.    *   Keep water out of eye for two weeks.    *   OK to resume aspirin and/or other blood thinners if you stopped.     *   If your vision worsens, eye becomes increasingly red, or becomes painful, call [phone].     Porsche Durán M.D.

## 2023-04-18 NOTE — PROGRESS NOTES
Current Eye Medications:  CatarActive 3 four times a day left eye      Subjective:  Po1, KPE/IOL left eye 4/17/23. Patient slept off and on, shield ended up falling of last night.Took a couple tylenol this morning left eye felt little sore and had a headache. Vision is much improved really noticed in the last couple hours. Feels the fog went away left eye.        Objective:  See Ophthalmology Exam.      Assessment:  Tee Hilton is a 72 year old male who presents with:   Encounter Diagnosis   Name Primary?     Pseudophakia - Left Eye POD1 s/p CE/IOL left eye. Intraocular pressure 41. Start Cosopt left eye. Drop of timolol given in office.        Plan:  POST-OP CATARACT INSTRUCTIONS    *   Use the following drop(s) in the LEFT EYE four times a day until the bottle(s) run out:        CatarActive 3 (dark blue top)      Start Cosopt (dorzolamide-timolol -- dark blue top) twice a day in the left eye and use until the bottle runs out    *   Wear eye shield when sleeping for one week. Do not rub the operated eye.     *   No bending or lifting more than 10 pounds for one week.    *   Keep water out of eye for two weeks.    *   OK to resume aspirin and/or other blood thinners if you stopped.     *   If your vision worsens, eye becomes increasingly red, or becomes painful, call [phone].     Porsche Durán M.D.

## 2023-04-18 NOTE — LETTER
4/18/2023         RE: Tee Hilton  [address] [facility] [address]        Dear Colleague,    Thank you for referring your patient, Tee Hilton, to the [facility]. Please see a copy of my visit note below.     Current Eye Medications:  CatarActive 3 four times a day left eye      Subjective:  Po1, KPE/IOL left eye 4/17/23. Patient slept off and on, shield ended up falling of last night.Took a couple tylenol this morning left eye felt little sore and had a headache. Vision is much improved really noticed in the last couple hours. Feels the fog went away left eye.        Objective:  See Ophthalmology Exam.      Assessment:  Tee Hilton is a 72 year old male who presents with:   Encounter Diagnosis   Name Primary?     Pseudophakia - Left Eye POD1 s/p CE/IOL left eye. Intraocular pressure 41. Start Cosopt left eye. Drop of timolol given in office.        Plan:  POST-OP CATARACT INSTRUCTIONS    *   Use the following drop(s) in the LEFT EYE four times a day until the bottle(s) run out:        CatarActive 3 (dark blue top)      Start Cosopt (dorzolamide-timolol -- dark blue top) twice a day in the left eye and use until the bottle runs out    *   Wear eye shield when sleeping for one week. Do not rub the operated eye.     *   No bending or lifting more than 10 pounds for one week.    *   Keep water out of eye for two weeks.    *   OK to resume aspirin and/or other blood thinners if you stopped.     *   If your vision worsens, eye becomes increasingly red, or becomes painful, call [phone].     Porsche Durán M.D.           Again, thank you for allowing me to participate in the care of your patient.        Sincerely,        Porsche Durán MD

## 2023-04-22 ENCOUNTER — HEALTH MAINTENANCE LETTER (OUTPATIENT)
Age: 73
End: 2023-04-22

## 2023-04-25 ASSESSMENT — ENCOUNTER SYMPTOMS: DYSRHYTHMIAS: 1

## 2023-04-25 NOTE — ANESTHESIA PREPROCEDURE EVALUATION
Anesthesia Pre-Procedure Evaluation    Patient: Tee Hilton   MRN: [id number] : 1950        Procedure : Procedure(s):  LEFT PHACOEMULSIFICATION, CATARACT, WITH INTRAOCULAR LENS IMPLANT          Past Medical History:   Diagnosis Date     Congestive heart failure (H)      Gastroesophageal reflux disease      Heart disease      Hypertension      Irregular heart beat      Sleep apnea       Past Surgical History:   Procedure Laterality Date     ANESTHESIA CARDIOVERSION N/A 2018    Procedure: ANESTHESIA CARDIOVERSION;  Cardioversion ;  Surgeon: GENERIC ANESTHESIA PROVIDER;  Location: [facility]     BIOPSY  Summer 2021     COLONOSCOPY N/A 2017    Procedure: COMBINED COLONOSCOPY, SINGLE OR MULTIPLE BIOPSY/POLYPECTOMY BY BIOPSY;;  Surgeon: Aquilino Garcia MD;  Location: [facility]     COLONOSCOPY N/A 2021    Procedure: COLONOSCOPY, WITH POLYPECTOMY AND BIOPSY;  Surgeon: Israel Bey DO;  Location: [facility]     COLONOSCOPY WITH CO2 INSUFFLATION N/A 2017    Procedure: COLONOSCOPY WITH CO2 INSUFFLATION;  COLONOSCOPY SCREEN/ ORDAZ;  Surgeon: Aquilino Garcia MD;  Location: [facility]     OPEN REDUCTION INTERNAL FIXATION TIBIA       PHACOEMULSIFICATION CLEAR CORNEA WITH STANDARD INTRAOCULAR LENS IMPLANT Left 2023    Procedure: LEFT PHACOEMULSIFICATION, CATARACT, WITH INTRAOCULAR LENS IMPLANT;  Surgeon: Porsche Durán MD;  Location: [facility]     SEPTOPLASTY, TURBINOPLASTY, COMBINED Bilateral 2019    Procedure: ENDOSCOPIC SEPTOPLASTY, BILATERAL TURBINATE REDUCTION;  Surgeon: Alexander Avila MD;  Location: [facility]     TONSILLECTOMY        Allergies   Allergen Reactions     Erythromycin GI Disturbance     Upset stomach     Ethanol      Other reaction(s): stomach ache      Social History     Tobacco Use     Smoking status: Never     Smokeless tobacco: Never     Tobacco comments:     never smoked   Vaping Use     Vaping status: Never Used   Substance Use Topics      Alcohol use: Yes      Wt Readings from Last 1 Encounters:   04/17/23 115.9 kg (255 lb 9.6 oz)        Anesthesia Evaluation   Pt has had prior anesthetic.     No history of anesthetic complications       ROS/MED HX  ENT/Pulmonary:     (+) sleep apnea,     Neurologic:  - neg neurologic ROS     Cardiovascular:     (+) hypertension-----CHF dysrhythmias, a-fib,     METS/Exercise Tolerance:     Hematologic:       Musculoskeletal:       GI/Hepatic:     (+) GERD,     Renal/Genitourinary:     (+) renal disease, type: CRI, Pt does not require dialysis,     Endo:     (+) Obesity,     Psychiatric/Substance Use:       Infectious Disease:       Malignancy:       Other:            Physical Exam    Airway  airway exam normal           Respiratory Devices and Support         Dental       (+) Modest Abnormalities - crowns, retainers, 1 or 2 missing teeth      Cardiovascular   cardiovascular exam normal          Pulmonary   pulmonary exam normal                OUTSIDE LABS:  CBC:   Lab Results   Component Value Date    WBC 11.8 (H) 03/10/2023    WBC 8.5 06/30/2022    HGB 14.4 03/10/2023    HGB 13.8 06/30/2022    HCT 41.7 03/10/2023    HCT 39.5 (L) 06/30/2022     03/10/2023     06/30/2022     BMP:   Lab Results   Component Value Date     03/09/2023     10/10/2022    POTASSIUM 5.0 03/09/2023    POTASSIUM 4.6 10/10/2022    CHLORIDE 104 03/09/2023    CHLORIDE 103 10/10/2022    CO2 26 03/09/2023    CO2 30 10/10/2022    BUN 26 03/09/2023    BUN 27 10/10/2022    CR 1.54 (H) 03/09/2023    CR 1.45 (H) 10/10/2022     (H) 03/09/2023     (H) 10/10/2022     COAGS:   Lab Results   Component Value Date    PTT 26 06/04/2007    INR 1.02 06/04/2007     POC:   Lab Results   Component Value Date     (H) 01/30/2021     HEPATIC:   Lab Results   Component Value Date    ALBUMIN 4.0 07/09/2021    PROTTOTAL 7.0 07/09/2021    ALT 24 06/30/2022    AST 12 07/09/2021    ALKPHOS 58 07/09/2021    BILITOTAL 1.0  07/09/2021     OTHER:   Lab Results   Component Value Date    LACT 1.1 01/30/2021    GRISELDA 9.9 03/09/2023    MAG 2.1 09/24/2018    LIPASE 79 07/09/2021    TSH 1.97 03/09/2023    CRP 4.4 02/23/2021       Anesthesia Plan    ASA Status:  2   NPO Status:  NPO Appropriate    Anesthesia Type: MAC.     - Reason for MAC: straight local not clinically adequate   Induction: Intravenous.   Maintenance: TIVA.        Consents    Anesthesia Plan(s) and associated risks, benefits, and realistic alternatives discussed. Questions answered and patient/representative(s) expressed understanding.    - Discussed:     - Discussed with:  Patient         Postoperative Care    Pain management: Multi-modal analgesia.   PONV prophylaxis: Ondansetron (or other 5HT-3)     Comments:                Viraj Helton MD

## 2023-04-25 NOTE — ANESTHESIA POSTPROCEDURE EVALUATION
Patient: Tee Hilton    Procedure: Procedure(s):  LEFT PHACOEMULSIFICATION, CATARACT, WITH INTRAOCULAR LENS IMPLANT       Anesthesia Type:  MAC    Note:  Disposition: Outpatient   Postop Pain Control: Uneventful            Sign Out: Well controlled pain   PONV: No   Neuro/Psych: Uneventful            Sign Out: Acceptable/Baseline neuro status   Airway/Respiratory: Uneventful            Sign Out: Acceptable/Baseline resp. status   CV/Hemodynamics: Uneventful            Sign Out: Acceptable CV status; No obvious hypovolemia; No obvious fluid overload   Other NRE: NONE   DID A NON-ROUTINE EVENT OCCUR? No           Last vitals:  Vitals Value Taken Time   /84 04/17/23 1248   Temp 36.3  C (97.4  F) 04/17/23 1248   Pulse     Resp 16 04/17/23 1248   SpO2 95 % 04/17/23 1248       Electronically Signed By: Viraj Helton MD  April 25, 2023  5:39 PM

## 2023-05-08 ENCOUNTER — TELEPHONE (OUTPATIENT)
Dept: ORTHOPEDICS | Facility: CLINIC | Age: 73
End: 2023-05-08

## 2023-05-08 NOTE — TELEPHONE ENCOUNTER
Message left for the patient to call back to get surgery rescheduled due to Dr. Botello's schedule changing.   Judith West  Surgery Scheduling Coordinator  Ph: [phone]

## 2023-05-10 ENCOUNTER — OFFICE VISIT (OUTPATIENT)
Dept: OPHTHALMOLOGY | Facility: CLINIC | Age: 73
End: 2023-05-10
Payer: COMMERCIAL

## 2023-05-10 DIAGNOSIS — Z96.1 PSEUDOPHAKIA: Primary | ICD-10-CM

## 2023-05-10 PROCEDURE — 99024 POSTOP FOLLOW-UP VISIT: CPT | Performed by: STUDENT IN AN ORGANIZED HEALTH CARE EDUCATION/TRAINING PROGRAM

## 2023-05-10 ASSESSMENT — VISUAL ACUITY
METHOD: SNELLEN - LINEAR
OD_CC: 20/20
OS_SC: 20/30
OD_CC+: -2

## 2023-05-10 ASSESSMENT — REFRACTION_MANIFEST
OS_CYLINDER: +0.75
OS_SPHERE: -0.25
OS_AXIS: 163
OS_ADD: +2.50

## 2023-05-10 ASSESSMENT — SLIT LAMP EXAM - LIDS
COMMENTS: 1+ DERMATOCHALASIS
COMMENTS: 1+ DERMATOCHALASIS

## 2023-05-10 ASSESSMENT — TONOMETRY
OS_IOP_MMHG: 19
IOP_METHOD: APPLANATION

## 2023-05-10 ASSESSMENT — CUP TO DISC RATIO
OD_RATIO: 0.1
OS_RATIO: 0.1

## 2023-05-10 ASSESSMENT — EXTERNAL EXAM - LEFT EYE: OS_EXAM: NORMAL

## 2023-05-10 ASSESSMENT — EXTERNAL EXAM - RIGHT EYE: OD_EXAM: NORMAL

## 2023-05-10 NOTE — PATIENT INSTRUCTIONS
Continue CatarActive3 four times a day in the left eye until the bottle runs out    Glasses prescription given     Porsche Durán MD  [phone]

## 2023-05-10 NOTE — LETTER
5/10/2023         RE: Tee Hilton  [address] [facility] [address]        Dear Colleague,    Thank you for referring your patient, Tee Hilton, to the [facility]. Please see a copy of my visit note below.     Current Eye Medications:  Cataractive 3 - left eye BID?     Subjective: post-op left eye KPE/IOL 4/17/23 - vision left eye filmy-like x 3 days. Still has Cataractive 3, not using consistently. Finished dorzolamide-timolol. .      Objective:  See Ophthalmology Exam.      Assessment:  Tee Hilton is a 72 year old male who presents with:   Encounter Diagnosis   Name Primary?     Pseudophakia - Left Eye Final MR left eye - well healed.       Plan:  Continue CatarActive3 four times a day in the left eye until the bottle runs out    Glasses prescription given     Porsche Durán MD  [phone]          Again, thank you for allowing me to participate in the care of your patient.        Sincerely,        Porsche Durán MD

## 2023-05-10 NOTE — PROGRESS NOTES
Current Eye Medications:  Cataractive 3 - left eye BID?     Subjective: post-op left eye KPE/IOL 4/17/23 - vision left eye filmy-like x 3 days. Still has Cataractive 3, not using consistently. Finished dorzolamide-timolol. .      Objective:  See Ophthalmology Exam.      Assessment:  Tee Hilton is a 72 year old male who presents with:   Encounter Diagnosis   Name Primary?     Pseudophakia - Left Eye Final MR left eye - well healed.       Plan:  Continue CatarActive3 four times a day in the left eye until the bottle runs out    Glasses prescription given     Porsche Durán MD  [phone]

## 2023-05-11 ENCOUNTER — PRE VISIT (OUTPATIENT)
Dept: SURGERY | Facility: CLINIC | Age: 73
End: 2023-05-11

## 2023-05-15 NOTE — TELEPHONE ENCOUNTER
FUTURE VISIT INFORMATION      SURGERY INFORMATION:    Date: 23    [facility]    Surgeon:  Ketty Botello MD    Anesthesia Type:  choice    Procedure: Left total shoulder arthroplasty versus Reverse arthroplasty shoulder  RECORDS REQUESTED FROM:         Primary Care Provider: Jon Denson PA-C- [facility]     Pertinent Medical History: Chronic systolic congestive heart failure, hypertension, chronic atrial fibrillation      Most recent EKG+ Tracin21     Most recent ECHO: 21

## 2023-05-15 NOTE — PHARMACY - PREOPERATIVE ASSESSMENT CENTER
Anticoagulation Note - Preoperative Assessment Center (PAC) Pharmacist     Patient was interviewed on May 10, 2023 prior to scheduled PAC clinic appointment. The purpose of this note is to document the perioperative anticoagulation plan outlined by the providers caring for Tee Hilton.     Current Regimen  Anticoagulation Regimen as of May 10, 2023: rivaroxaban (XARELTO) 20 mg by mouth once daily in the morning with breakfast.     Indication: afib (no h/o VTE or stroke).   Prescriber:  Dr. Denson  Expected Duration of therapy: indefinite   Current medications that may interact with this include: none  CrCl ~73 mL/min  Creatinine   Date Value Ref Range Status   03/09/2023 1.54 (H) 0.66 - 1.25 mg/dL Final   07/09/2021 1.61 (H) 0.66 - 1.25 mg/dL Final       Perioperative plan  Tee Hilton is scheduled for Left total shoulder arthroplasty versus; Reverse arthroplasty shoulder on 6/1/23 with Dr. Botello and the perioperative anticoagulation plan is to hold x72 hr pre op.  Last dose of xarelto pre op to be on 5/29 early AM (~7 AM).     Resumption of anticoagulation after procedure will be based on surgery team assessment of bleeding risks and complications.  This plan may require re-assessment and modification by his primary team in the perioperative setting depending on patients clinical situation.        Aguila Rockwell RPH  May 10, 2023  10:57 AM    -------------  RE: Xarelto - OR on 6/1  Received: Pablo Muller RN [facility], [facility]; Jasmine Isabel APRN CNP [facility]y     I apologize for the delay. 72 hours is appropriate as this Pt may get an Exparel brachial plexus nerve block with surgery.     Edenilson D eDios RN           Previous Messages       ----- Message -----   From: Aguila Rockwell RPH   Sent: 5/15/2023   1:51 PM CDT   To: ANDRE Starks CNP; *   Subject: FW: Xarelto - OR on 6/1                           HAIDER Diallo sent this awhile back but hadn't heard from  Dr. Botello, is this 72 hr hold usually enough for her?   Thanks!   Aguila   ----- Message -----   From: Aguila Rockwell, [facility]   Sent: 5/10/2023  11:32 AM CDT   To: Ketty Botello MD; *   Subject: Xarelto - OR on 6/1                               Hi Dr. Botello,   We are seeing Tee in PAC clinic tomorrow and I just spoke with him and reviewed his meds.  He is on Xarelto 20 mg - takes first thing in the AM with breakfast.  He's on this for Afib.   Based on our last conversation I was thinking 72 hr hold for him prior to Left total shoulder arthroplasty versus; Reverse arthroplasty shoulder on 6/1/23.     That would put his last dose AM of 5/29.  Would that be acceptable from a surgical standpoint?     Thanks in advance,   Aguila

## 2023-05-16 ENCOUNTER — PRE VISIT (OUTPATIENT)
Dept: SURGERY | Facility: CLINIC | Age: 73
End: 2023-05-16

## 2023-05-16 ENCOUNTER — OFFICE VISIT (OUTPATIENT)
Dept: SURGERY | Facility: CLINIC | Age: 73
End: 2023-05-16
Payer: COMMERCIAL

## 2023-05-16 ENCOUNTER — ANESTHESIA EVENT (OUTPATIENT)
Dept: SURGERY | Facility: CLINIC | Age: 73
End: 2023-05-16

## 2023-05-16 VITALS
BODY MASS INDEX: 35.84 KG/M2 | HEART RATE: 79 BPM | DIASTOLIC BLOOD PRESSURE: 90 MMHG | SYSTOLIC BLOOD PRESSURE: 149 MMHG | HEIGHT: 71 IN | WEIGHT: 256 LBS | OXYGEN SATURATION: 99 % | TEMPERATURE: 97.9 F | RESPIRATION RATE: 16 BRPM

## 2023-05-16 DIAGNOSIS — Z01.818 PRE-OP EVALUATION: Primary | ICD-10-CM

## 2023-05-16 PROCEDURE — 99204 OFFICE O/P NEW MOD 45 MIN: CPT | Mod: 24 | Performed by: NURSE PRACTITIONER

## 2023-05-16 ASSESSMENT — ENCOUNTER SYMPTOMS
DYSRHYTHMIAS: 1
ORTHOPNEA: 0

## 2023-05-16 ASSESSMENT — PAIN SCALES - GENERAL: PAINLEVEL: MODERATE PAIN (4)

## 2023-05-16 ASSESSMENT — LIFESTYLE VARIABLES: TOBACCO_USE: 0

## 2023-05-16 NOTE — PATIENT INSTRUCTIONS
Preparing for Your Surgery      Name:  Tee Hilton   MRN:  [id number]   :  1950   Today's Date:  2023       Arriving for surgery:  Surgery date: 23  Arrival time:  8.30AM    Please come to:     [facility] [address]  [address] [place]  [address]    - parking is available in front of [facility] from 5:15AM to 8:00PM. If you prefer, park your car in the Green Lot.    -Proceed to the 3rd floor, check in at the Adult Surgery Waiting Lounge. [phone]    If an escort is needed stop at the Information Desk in the lobby. Inform the information person that you are here for surgery. An escort to the Adult Surgery Waiting Lounge will be provided.    What can I eat or drink?  -  You may eat and drink normally up to 8 hours prior to arrival time. (Until 12.30AM)  -  You may have clear liquids until 2 hours prior to arrival time. (Until 6.30AM)    Examples of clear liquids:  Water  Clear broth  Juices (apple, white grape, white cranberry  and cider) without pulp  Noncarbonated, powder based beverages  (lemonade and Vijay-Aid)  Sodas (Sprite, 7-Up, ginger ale and seltzer)  Coffee or tea (without milk or cream)  Gatorade    -  No Alcohol for at least 24 hours before surgery.     Which medicines can I take?    Hold Aspirin for 7 days before surgery.   Hold Multivitamins for 7 days before surgery.  Hold Supplements for 7 days before surgery. (Probiotic)  Hold Ibuprofen (Advil, Motrin) for 3 day(s) before surgery--unless otherwise directed by surgeon.  Hold Naproxen (Aleve) for 4 days before surgery.    Hold Xarelto for 72 hours before surgery. Last dose should be morning no later than 7AM 23  Hold Mounjaro injection for 7 days before your surgery.  Hold Sildenafil (Revatio) one day before surgery.  Take your evening medications per usual.    -  DO NOT take these medications the day of surgery:  Lisinopril  (Zestril)    -  PLEASE TAKE these medications the day of surgery:  Tylenol (as needed), Allopurinol (Zyloprim), Amlodipine (Norvasc),   Ativan (as needed), Metoprolol (Toprol), Pantoprazole (Protonix),    How do I prepare myself?  - Please take 2 showers (one the night prior to surgery and one the morning of surgery) using Scrubcare or Hibiclens soap.    Use this soap only from the neck to your toes.     Leave the soap on your skin for one minute--then rinse thoroughly.      You may use your own shampoo and conditioner. No other hair products.   - Please remove all jewelry and body piercings.  - No lotions, deodorants or fragrance.  - No makeup or fingernail polish.   - Bring your ID and insurance card.    -If you have a Deep Brain Stimulator, Spinal Cord Stimulator, or any Neuro Stimulator device---you must bring the remote control to the hospital.      ALL PATIENTS GOING HOME THE SAME DAY OF SURGERY ARE REQUIRED TO HAVE A RESPONSIBLE ADULT TO DRIVE AND BE IN ATTENDANCE WITH THEM FOR 24 HOURS FOLLOWING SURGERY.    Covid testing policy as of 12/06/2022  Your surgeon will notify and schedule you for a COVID test if one is needed before surgery--please direct any questions or COVID symptoms to your surgeon      Questions or Concerns:    - For any questions regarding the day of surgery or your hospital stay, please contact the Pre Admission Nursing Office at [phone].       - If you have health changes between today and your surgery, please call your surgeon.       - For questions after surgery, please call your surgeons office.           Current Visitor Guidelines       Surgeries and procedures: Adult patients can have 2 visitors all through the surgery process.     Visiting hours: 8 a.m. to 8:30 p.m.     Hospital: Adult patients and children under age 18 can have 4 visitor at a time       No visitors under the age of 5 are allowed for hospital patients.       Double occupancy rooms: Patients can have only two  visitors at a time.       Patients confirmed or suspected to have symptoms of COVID 19 or flu:     No visitors allowed for adult patients.   Children (under age 18) can have 1 named visitor.     People who are sick or showing symptoms of COVID 19 or flu:    Are not allowed to visit patients--we can only make exceptions in special situations.       Please follow these guidelines for your visit:          Please maintain social distance          Masking is optional--however at times you may be asked to wear a mask for the safety of yourself and others     Clean your hands with alcohol hand . Do this when you arrive at and leave the building and patient room,    And again after you touch your mask or anything in the room.     Go directly to and from the room you are visiting.     Stay in the patient s room during your visit. Limit going to other places in the hospital as much as possible     Leave bags and jackets at home or in the car.     For everyone s health, please don t come and go during your visit. That includes for smoking   during your visit.

## 2023-05-16 NOTE — H&P
Pre-Operative H & P     CC:  Preoperative exam to assess for increased cardiopulmonary risk while undergoing surgery and anesthesia.    Date of Encounter: 5/16/2023  Primary Care Physician:  Jon Denson     Reason for visit: Pre-operative evaluation    HPI  Tee Hilton is a 72 year old male who presents for pre-operative H & P in preparation for  Procedure Information     Date/Time: 6/1/2023     Procedure: Left total shoulder arthroplasty versus;Reverse arthroplasty shoulder    Anesthesia type: Choice    Pre-op diagnosis: Primary osteoarthritis of left shoulder    Location: [facility]    Providers: Ketty Botello MD          Tee Milner is a 72 year old male with a PMH significant for A. Fib (on Xarelto) CHF, AUGUSTO, obesity, HTN, CKD 3, GOUT, GERD, anxiety, SNHL ( bilateral) and sciatica. He recently consulted with Dr. Botello for pain in his left shoulder, that has progressively gotten worse over the past several years. He describes that the pain is  affecting his quality of life and his ability to enjoy his hobbies.  He has had extensive nonoperative intervention which has included PT, several corticosteroid injections which has not provided him significant relief. He presents today in preparation for the above recommended procedure.      History is obtained from the patient and chart review    Hx of abnormal bleeding or anti-platelet use: no      Past Medical History  Past Medical History:   Diagnosis Date     Congestive heart failure (H) 2020     Gastroesophageal reflux disease      Heart disease 2018     Hypertension      Irregular heart beat      Sleep apnea        Past Surgical History  Past Surgical History:   Procedure Laterality Date     ANESTHESIA CARDIOVERSION N/A 9/24/2018    Procedure: ANESTHESIA CARDIOVERSION;  Cardioversion ;  Surgeon: GENERIC ANESTHESIA PROVIDER;  Location: [facility]     BIOPSY  Summer 2021     COLONOSCOPY N/A  5/25/2017    Procedure: COMBINED COLONOSCOPY, SINGLE OR MULTIPLE BIOPSY/POLYPECTOMY BY BIOPSY;;  Surgeon: Aquilino Garcia MD;  Location: [facility]     COLONOSCOPY N/A 7/23/2021    Procedure: COLONOSCOPY, WITH POLYPECTOMY AND BIOPSY;  Surgeon: Israel Bey DO;  Location: [facility]     COLONOSCOPY WITH CO2 INSUFFLATION N/A 5/25/2017    Procedure: COLONOSCOPY WITH CO2 INSUFFLATION;  COLONOSCOPY SCREEN/ ORDAZ;  Surgeon: Aquilino Garcia MD;  Location: [facility]     OPEN REDUCTION INTERNAL FIXATION TIBIA       PHACOEMULSIFICATION CLEAR CORNEA WITH STANDARD INTRAOCULAR LENS IMPLANT Left 4/17/2023    Procedure: LEFT PHACOEMULSIFICATION, CATARACT, WITH INTRAOCULAR LENS IMPLANT;  Surgeon: Porsche Durán MD;  Location: [facility] OR     SEPTOPLASTY, TURBINOPLASTY, COMBINED Bilateral 7/9/2019    Procedure: ENDOSCOPIC SEPTOPLASTY, BILATERAL TURBINATE REDUCTION;  Surgeon: Alexander Avila MD;  Location: [facility]     TONSILLECTOMY         Prior to Admission Medications  Current Outpatient Medications   Medication Sig Dispense Refill     acetaminophen (TYLENOL) 500 MG tablet Take 500-1,000 mg by mouth every 8 hours as needed for mild pain       allopurinol (ZYLOPRIM) 100 MG tablet Take 1 tablet by mouth once daily 90 tablet 0     amLODIPine (NORVASC) 5 MG tablet TAKE 1 & 1/2 (ONE & ONE-HALF) TABLETS BY MOUTH ONCE DAILY (Patient taking differently: Take 7.5 mg by mouth daily TAKE 1 & 1/2 (ONE & ONE-HALF) TABLETS BY MOUTH ONCE DAILY) 135 tablet 0     BELSOMRA 10 MG tablet TAKE 1 TABLET BY MOUTH NIGHTLY AS NEEDED FOR SLEEP (Patient not taking: Reported on 5/10/2023) 30 tablet 0     Bromfenac Sodium POWD 1 Bottle 4 times daily 1 drop four times a day in the operative eye starting 1 day before surgery. Use until the bottle runs out. (Patient taking differently: 1 Bottle 3 times daily 1 drop four times a day in the operative eye starting 1 day before surgery. Use until the bottle runs out.) 1 g 0     Dexamethasone  Acetate POWD 1 Bottle 4 times daily 1 drop four times a day in the operative eye starting 1 day before surgery. Use until the bottle runs out. (Patient taking differently: 1 Bottle 3 times daily 1 drop four times a day in the operative eye starting 1 day before surgery. Use until the bottle runs out.) 1 g 0     dorzolamide-timolol (COSOPT) 2-0.5 % ophthalmic solution Apply 1 drop to eye 2 times daily (Patient not taking: Reported on 5/10/2023) 10 mL 0     famotidine (PEPCID) 20 MG tablet Take 1 tablet (20 mg) by mouth nightly as needed 60 tablet 1     lisinopril (ZESTRIL) 20 MG tablet TAKE 1/2 TO 1 (ONE-HALF TO ONE) TABLET BY MOUTH ONCE DAILY (FOLLOW  DRS  CURRENT  DIRECTIONS  ON  DOSING) (Patient taking differently: Take 20 mg by mouth daily) 90 tablet 0     LORazepam (ATIVAN) 0.5 MG tablet TAKE 1 TABLET BY MOUTH EVERY 8 HOURS AS NEEDED FOR ANXIETY 25 tablet 0     metoprolol succinate ER (TOPROL XL) 100 MG 24 hr tablet TAKE 1 & 1/2 (ONE & ONE-HALF) TABLETS BY MOUTH ONCE DAILY (Patient taking differently: Take 150 mg by mouth daily TAKE 1 & 1/2 (ONE & ONE-HALF) TABLETS BY MOUTH ONCE DAILY) 135 tablet 0     MOUNJARO 2.5 MG/0.5ML pen INJECT 2.5 MG SUBCUTANEOUSLY EVERY 7 DAYS (Patient taking differently: Inject 2.5 mg Subcutaneous once a week Takes on Mondays) 4 mL 0     Moxifloxacin HCl POWD 1 Bottle 4 times daily 1 drop four times a day in the operative eye starting 1 day before surgery. Use until the bottle runs out. (Patient taking differently: 1 Bottle 3 times daily 1 drop four times a day in the operative eye starting 1 day before surgery. Use until the bottle runs out.) 1 g 0     ondansetron (ZOFRAN ODT) 4 MG ODT tab DISSOLVE 1 TABLET IN MOUTH NIGHTLY AS NEEDED FOR NAUSEA 30 tablet 0     pantoprazole (PROTONIX) 40 MG EC tablet Take 1 tablet by mouth twice daily 180 tablet 1     Probiotic Product (PROBIOTIC PO) Take 1 capsule by mouth daily       sildenafil (REVATIO) 20 MG tablet Take 1 -5 tabs daily prn (Patient  taking differently: Take  mg by mouth daily as needed Take 1 -5 tabs daily prn) 30 tablet 11     tamsulosin (FLOMAX) 0.4 MG capsule Take 1 capsule (0.4 mg) by mouth every evening (Patient not taking: Reported on 5/10/2023) 90 capsule 0     XARELTO ANTICOAGULANT 20 MG TABS tablet Take 1 tablet by mouth once daily with breakfast 90 tablet 0     zolpidem ER (AMBIEN CR) 12.5 MG CR tablet Take 1 tablet (12.5 mg) by mouth nightly as needed 30 tablet 0       Allergies  Allergies   Allergen Reactions     Erythromycin GI Disturbance     Upset stomach     Ethanol      Other reaction(s): stomach ache       Social History  Social History     Socioeconomic History     Marital status:      Spouse name: Not on file     Number of children: 3     Years of education: Not on file     Highest education level: Not on file   Occupational History     Occupation: sales and marketing   Tobacco Use     Smoking status: Never     Smokeless tobacco: Never     Tobacco comments:     never smoked   Vaping Use     Vaping status: Never Used   Substance and Sexual Activity     Alcohol use: Yes     Comment: 2/3 beers a week     Drug use: Never     Sexual activity: Yes     Partners: Female   Other Topics Concern     Parent/sibling w/ CABG, MI or angioplasty before 65F 55M? Yes   Social History Narrative     Not on file     Social Determinants of Health     Financial Resource Strain: Not on file   Food Insecurity: Not on file   Transportation Needs: Not on file   Physical Activity: Not on file   Stress: Not on file   Social Connections: Not on file   Intimate Partner Violence: Not on file   Housing Stability: Not on file       Family History  Family History   Problem Relation Age of Onset     Anxiety Disorder Mother      Gout Father      Lung Cancer Father      Other Cancer Father      Glaucoma No family hx of      Macular Degeneration No family hx of        Review of Systems  The complete review of systems is negative other than noted in  "the HPI or here.   Anesthesia Evaluation   Pt has had prior anesthetic.     History of anesthetic complications   AUGUSTO- untreated didn't tolerate mask.    ROS/MED HX  ENT/Pulmonary:     (+) sleep apnea, doesn't use CPAP,  (-) tobacco use   Neurologic:  - neg neurologic ROS     Cardiovascular:     (+) hypertension-range: 120's/75-80/ ----Taking blood thinners Instructions Given to patient: Hold 72 hours . dysrhythmias, a-fib, Irregular Heartbeat/Palpitations, Previous cardiac testing   Echo: Date: 2021 Results:  The ejection fraction is 50-55%(borderline).Global right ventricular function is normal.  Stress Test: Date: Results:    ECG Reviewed: Date: Results:    Cath: Date: Results:   (-) CHF, ZEPEDA and orthopnea/PND   METS/Exercise Tolerance: >4 METS Comment: 3-4x weekly exercising at [facility]     Hematologic:  - neg hematologic  ROS     Musculoskeletal: Comment: OA - left shoulder  Sciatic  Hx of ORIF ( tibia) left  (+) arthritis,     GI/Hepatic:     (+) GERD, Asymptomatic on medication,     Renal/Genitourinary: Comment: CKD 3    (+) renal disease, type: CRI, Pt does not require dialysis,     Endo: Comment: GOUT    (+) Obesity,     Psychiatric/Substance Use: Comment: SNHL ( bilateral)  - drinks 2-3 drinks 3-4x weekly      Infectious Disease:  - neg infectious disease ROS     Malignancy:  - neg malignancy ROS     Other:      (+) , H/O Chronic Pain,        BP (!) 149/90 (BP Location: Left arm, Patient Position: Sitting, Cuff Size: Adult Regular)   Pulse 79   Temp 97.9  F (36.6  C) (Oral)   Resp 16   Ht 1.791 m (5' 10.5\")   Wt 116.1 kg (256 lb)   SpO2 99%   BMI 36.21 kg/m      Physical Exam   Constitutional: Awake, alert, cooperative, no apparent distress, and appears stated age.  Eyes: Pupils equal, round and reactive to light, extra ocular muscles intact, sclera clear, conjunctiva normal.  HENT: Normocephalic, oral pharynx with moist mucus membranes, good dentition. No goiter appreciated.   Respiratory: Clear to " auscultation bilaterally, no crackles or wheezing.  Cardiovascular: Regular rate and Irregular rhythm, normal S1 and S2, and no murmur noted.  Carotids +2, no bruits. No edema. Palpable pulses to radial  DP and PT arteries.   GI: Normal bowel sounds, soft, non-distended, non-tender, no masses palpated, no hepatosplenomegaly.    Lymph/Hematologic: No cervical lymphadenopathy and no supraclavicular lymphadenopathy.  Genitourinary:  deferred  Skin: Warm and dry.  No rashes at anticipated surgical site.   Musculoskeletal: Full ROM of neck. There is no redness, warmth, or swelling of the joints. Gross motor strength is normal.    Neurologic: Awake, alert, oriented to name, place and time. Cranial nerves II-XII are grossly intact. Gait is normal.   Neuropsychiatric: Calm, cooperative. Normal affect.     Prior Labs/Diagnostic Studies   All labs and imaging personally reviewed     Lab Results   Component Value Date    WBC 11.8 03/10/2023    WBC 9.4 07/09/2021     Lab Results   Component Value Date    RBC 4.48 03/10/2023    RBC 4.55 07/09/2021     Lab Results   Component Value Date    HGB 14.4 03/10/2023    HGB 13.8 07/09/2021     Lab Results   Component Value Date    HCT 41.7 03/10/2023    HCT 41.5 07/09/2021     No components found for: MCT  Lab Results   Component Value Date    MCV 93 03/10/2023    MCV 91 07/09/2021     Lab Results   Component Value Date    MCH 32.1 03/10/2023    MCH 30.3 07/09/2021     Lab Results   Component Value Date    MCHC 34.5 03/10/2023    MCHC 33.3 07/09/2021     Lab Results   Component Value Date    RDW 13.0 03/10/2023    RDW 12.7 07/09/2021     Lab Results   Component Value Date     03/10/2023     07/09/2021     Last Comprehensive Metabolic Panel:  Lab Results   Component Value Date     03/09/2023    POTASSIUM 5.0 03/09/2023    CHLORIDE 104 03/09/2023    CO2 26 03/09/2023    ANIONGAP 5 03/09/2023     (H) 03/09/2023    BUN 26 03/09/2023    CR 1.54 (H) 03/09/2023     GFRESTIMATED 48 (L) 03/09/2023    GRISELDA 9.9 03/09/2023       IMAGING:     X-ray of the left shoulder obtained today and compared with x-rays on April 27, 2022 demonstrate degenerative changes at the glenohumeral joint including decreased joint space and formation of osteophyte at the inferior humeral head and inferior glenoid.   CT of the left shoulder demonstrates a Walch 2B glenoid.  Similar degenerative changes are noted at the glenohumeral joint.     MRI of the left shoulder demonstrates intact rotator cuff muscle with a partial articular sided supraspinatus tear.  With notable atrophy of the supraspinatus muscle,goutallier stage 2-3.  Notable signal is present at the biceps tendon concerning for biceps tendinitis.  AC joint arthrosis is noted.         EKG/ stress test - if available please see in ROS above     9/2021    Echo result w/o MOPS: Interpretation Summary Left ventricular function is decreased. The ejection fraction is 50-55%(borderline).Global right ventricular function is normal.No significant valvular abnormalities present.Pulmonary artery systolic pressure is normal.The inferior vena cava was normal in size with preserved respiratoryvariability.No pericardial effusion is present.       The patient's records and results personally reviewed by this provider.     Outside records reviewed from: Care Everywhere    LAB/DIAGNOSTIC STUDIES TODAY:    none    Assessment      Tee Hilton is a 72 year old male seen as a PAC referral for risk assessment and optimization for anesthesia.    Plan/Recommendations  Pt will be optimized for the proposed procedure.  See below for details on the assessment, risk, and preoperative recommendations    NEUROLOGY  - No history of TIA, CVA or seizure    -Post Op delirium risk factors:  No risk identified    HEENT  - No current airway concerns.  Will need to be reassessed day of surgery.  Mallampati: I  TM: > 3   SNHL- ( bilateral) doesn't wear hearing aids    CARDIAC  -  "No history of CAD   No anginal symptoms, Denies palpitations, PND, dizziness or syncope.   Echo 2021-The ejection fraction is 50-55%(borderline).Global right ventricular function is normal.  - Afib- on Xarelto- will  Hold 72 hours prior to procedure.   Last dose 5/29 early AM (~7 AM).     - Hypertension- elevated in clinic  Well controlled  Monitors at home ( averages 120's/75-80)  - METS (Metabolic Equivalents)  Exercises at [facility] 3-4x weekly for 90 minutes    Patient performs 4 or more METS exercise without symptoms            Total Score: 0      RCRI-Very low risk: Class 1 0.4% complication rate            Total Score: 0        PULMONARY  - Obstructive Sleep Apnea  AUGUSTO without home CPAP use.    - Denies asthma or inhaler use  - Tobacco History      History   Smoking Status     Never   Smokeless Tobacco     Never       GI  - GERD  Controlled on medications: Proton Pump Inhibitor  PONV Medium Risk  Total Score: 2           1 AN PONV: Patient is not a current smoker    1 AN PONV: Intended Post Op Opioids        /RENAL  Hx of CKD 3  - Baseline Creatinine  1.54    ENDOCRINE    - BMI: Estimated body mass index is 36.21 kg/m  as calculated from the following:    Height as of this encounter: 1.791 m (5' 10.5\").    Weight as of this encounter: 116.1 kg (256 lb).  Obesity (BMI >30)  - No history of Diabetes Mellitus   - GOUT- managed with allopurinol     HEME  VTE Low Risk 0.5%            Total Score: 3    VTE: Greater than 59 yrs old    VTE: Male      - Coagulopathy second to Rivaroxaban (Xarelto)  Dr. Botello and the perioperative anticoagulation plan is to hold x72 hr pre op.  Last dose of xarelto pre op to be on 5/29 early AM (~7 AM).       MSK  Patient is NOT Frail            Total Score: 0        OA - left shoulder- Procedure scheduled as above.     Sciatic  Hx of ORIF ( tibia) left    Different anesthesia methods/types have been discussed with the patient, but they are aware that the final plan will be decided " by the assigned anesthesia provider on the date of service.      The patient is optimized for their procedure. AVS with information on surgery time/arrival time, meds and NPO status given by nursing staff. No further diagnostic testing indicated.      On the day of service:     Prep time: 20 minutes  Visit time: 20 minutes  Documentation time: 15 minutes  ------------------------------------------  Total time: 55 minutes      ANDRE Huber CNP  Preoperative Assessment Center  [facility]  Clinic and Surgery Center  Phone: [phone]  Fax: [phone]

## 2023-05-16 NOTE — H&P (VIEW-ONLY)
Pre-Operative H & P     CC:  Preoperative exam to assess for increased cardiopulmonary risk while undergoing surgery and anesthesia.    Date of Encounter: 5/16/2023  Primary Care Physician:  Jon Denson     Reason for visit: Pre-operative evaluation    HPI  Tee Hilton is a 72 year old male who presents for pre-operative H & P in preparation for  Procedure Information     Date/Time: 6/1/2023     Procedure: Left total shoulder arthroplasty versus;Reverse arthroplasty shoulder    Anesthesia type: Choice    Pre-op diagnosis: Primary osteoarthritis of left shoulder    Location: [facility]    Providers: Ketty Botello MD          Tee Milner is a 72 year old male with a PMH significant for A. Fib (on Xarelto) CHF, AUGUSTO, obesity, HTN, CKD 3, GOUT, GERD, anxiety, SNHL ( bilateral) and sciatica. He recently consulted with Dr. Botello for pain in his left shoulder, that has progressively gotten worse over the past several years. He describes that the pain is  affecting his quality of life and his ability to enjoy his hobbies.  He has had extensive nonoperative intervention which has included PT, several corticosteroid injections which has not provided him significant relief. He presents today in preparation for the above recommended procedure.      History is obtained from the patient and chart review    Hx of abnormal bleeding or anti-platelet use: no      Past Medical History  Past Medical History:   Diagnosis Date     Congestive heart failure (H) 2020     Gastroesophageal reflux disease      Heart disease 2018     Hypertension      Irregular heart beat      Sleep apnea        Past Surgical History  Past Surgical History:   Procedure Laterality Date     ANESTHESIA CARDIOVERSION N/A 9/24/2018    Procedure: ANESTHESIA CARDIOVERSION;  Cardioversion ;  Surgeon: GENERIC ANESTHESIA PROVIDER;  Location: [facility]     BIOPSY  Summer 2021     COLONOSCOPY N/A  5/25/2017    Procedure: COMBINED COLONOSCOPY, SINGLE OR MULTIPLE BIOPSY/POLYPECTOMY BY BIOPSY;;  Surgeon: Aquilino Garcia MD;  Location: [facility]     COLONOSCOPY N/A 7/23/2021    Procedure: COLONOSCOPY, WITH POLYPECTOMY AND BIOPSY;  Surgeon: Israel Bey DO;  Location: [facility]     COLONOSCOPY WITH CO2 INSUFFLATION N/A 5/25/2017    Procedure: COLONOSCOPY WITH CO2 INSUFFLATION;  COLONOSCOPY SCREEN/ ORDAZ;  Surgeon: Aquilino Garcia MD;  Location: [facility]     OPEN REDUCTION INTERNAL FIXATION TIBIA       PHACOEMULSIFICATION CLEAR CORNEA WITH STANDARD INTRAOCULAR LENS IMPLANT Left 4/17/2023    Procedure: LEFT PHACOEMULSIFICATION, CATARACT, WITH INTRAOCULAR LENS IMPLANT;  Surgeon: Porsche Durán MD;  Location: [facility] OR     SEPTOPLASTY, TURBINOPLASTY, COMBINED Bilateral 7/9/2019    Procedure: ENDOSCOPIC SEPTOPLASTY, BILATERAL TURBINATE REDUCTION;  Surgeon: Alexander Avila MD;  Location: [facility]     TONSILLECTOMY         Prior to Admission Medications  Current Outpatient Medications   Medication Sig Dispense Refill     acetaminophen (TYLENOL) 500 MG tablet Take 500-1,000 mg by mouth every 8 hours as needed for mild pain       allopurinol (ZYLOPRIM) 100 MG tablet Take 1 tablet by mouth once daily 90 tablet 0     amLODIPine (NORVASC) 5 MG tablet TAKE 1 & 1/2 (ONE & ONE-HALF) TABLETS BY MOUTH ONCE DAILY (Patient taking differently: Take 7.5 mg by mouth daily TAKE 1 & 1/2 (ONE & ONE-HALF) TABLETS BY MOUTH ONCE DAILY) 135 tablet 0     BELSOMRA 10 MG tablet TAKE 1 TABLET BY MOUTH NIGHTLY AS NEEDED FOR SLEEP (Patient not taking: Reported on 5/10/2023) 30 tablet 0     Bromfenac Sodium POWD 1 Bottle 4 times daily 1 drop four times a day in the operative eye starting 1 day before surgery. Use until the bottle runs out. (Patient taking differently: 1 Bottle 3 times daily 1 drop four times a day in the operative eye starting 1 day before surgery. Use until the bottle runs out.) 1 g 0     Dexamethasone  Acetate POWD 1 Bottle 4 times daily 1 drop four times a day in the operative eye starting 1 day before surgery. Use until the bottle runs out. (Patient taking differently: 1 Bottle 3 times daily 1 drop four times a day in the operative eye starting 1 day before surgery. Use until the bottle runs out.) 1 g 0     dorzolamide-timolol (COSOPT) 2-0.5 % ophthalmic solution Apply 1 drop to eye 2 times daily (Patient not taking: Reported on 5/10/2023) 10 mL 0     famotidine (PEPCID) 20 MG tablet Take 1 tablet (20 mg) by mouth nightly as needed 60 tablet 1     lisinopril (ZESTRIL) 20 MG tablet TAKE 1/2 TO 1 (ONE-HALF TO ONE) TABLET BY MOUTH ONCE DAILY (FOLLOW  DRS  CURRENT  DIRECTIONS  ON  DOSING) (Patient taking differently: Take 20 mg by mouth daily) 90 tablet 0     LORazepam (ATIVAN) 0.5 MG tablet TAKE 1 TABLET BY MOUTH EVERY 8 HOURS AS NEEDED FOR ANXIETY 25 tablet 0     metoprolol succinate ER (TOPROL XL) 100 MG 24 hr tablet TAKE 1 & 1/2 (ONE & ONE-HALF) TABLETS BY MOUTH ONCE DAILY (Patient taking differently: Take 150 mg by mouth daily TAKE 1 & 1/2 (ONE & ONE-HALF) TABLETS BY MOUTH ONCE DAILY) 135 tablet 0     MOUNJARO 2.5 MG/0.5ML pen INJECT 2.5 MG SUBCUTANEOUSLY EVERY 7 DAYS (Patient taking differently: Inject 2.5 mg Subcutaneous once a week Takes on Mondays) 4 mL 0     Moxifloxacin HCl POWD 1 Bottle 4 times daily 1 drop four times a day in the operative eye starting 1 day before surgery. Use until the bottle runs out. (Patient taking differently: 1 Bottle 3 times daily 1 drop four times a day in the operative eye starting 1 day before surgery. Use until the bottle runs out.) 1 g 0     ondansetron (ZOFRAN ODT) 4 MG ODT tab DISSOLVE 1 TABLET IN MOUTH NIGHTLY AS NEEDED FOR NAUSEA 30 tablet 0     pantoprazole (PROTONIX) 40 MG EC tablet Take 1 tablet by mouth twice daily 180 tablet 1     Probiotic Product (PROBIOTIC PO) Take 1 capsule by mouth daily       sildenafil (REVATIO) 20 MG tablet Take 1 -5 tabs daily prn (Patient  taking differently: Take  mg by mouth daily as needed Take 1 -5 tabs daily prn) 30 tablet 11     tamsulosin (FLOMAX) 0.4 MG capsule Take 1 capsule (0.4 mg) by mouth every evening (Patient not taking: Reported on 5/10/2023) 90 capsule 0     XARELTO ANTICOAGULANT 20 MG TABS tablet Take 1 tablet by mouth once daily with breakfast 90 tablet 0     zolpidem ER (AMBIEN CR) 12.5 MG CR tablet Take 1 tablet (12.5 mg) by mouth nightly as needed 30 tablet 0       Allergies  Allergies   Allergen Reactions     Erythromycin GI Disturbance     Upset stomach     Ethanol      Other reaction(s): stomach ache       Social History  Social History     Socioeconomic History     Marital status:      Spouse name: Not on file     Number of children: 3     Years of education: Not on file     Highest education level: Not on file   Occupational History     Occupation: sales and marketing   Tobacco Use     Smoking status: Never     Smokeless tobacco: Never     Tobacco comments:     never smoked   Vaping Use     Vaping status: Never Used   Substance and Sexual Activity     Alcohol use: Yes     Comment: 2/3 beers a week     Drug use: Never     Sexual activity: Yes     Partners: Female   Other Topics Concern     Parent/sibling w/ CABG, MI or angioplasty before 65F 55M? Yes   Social History Narrative     Not on file     Social Determinants of Health     Financial Resource Strain: Not on file   Food Insecurity: Not on file   Transportation Needs: Not on file   Physical Activity: Not on file   Stress: Not on file   Social Connections: Not on file   Intimate Partner Violence: Not on file   Housing Stability: Not on file       Family History  Family History   Problem Relation Age of Onset     Anxiety Disorder Mother      Gout Father      Lung Cancer Father      Other Cancer Father      Glaucoma No family hx of      Macular Degeneration No family hx of        Review of Systems  The complete review of systems is negative other than noted in  "the HPI or here.   Anesthesia Evaluation   Pt has had prior anesthetic.     History of anesthetic complications   AUGUSTO- untreated didn't tolerate mask.    ROS/MED HX  ENT/Pulmonary:     (+) sleep apnea, doesn't use CPAP,  (-) tobacco use   Neurologic:  - neg neurologic ROS     Cardiovascular:     (+) hypertension-range: 120's/75-80/ ----Taking blood thinners Instructions Given to patient: Hold 72 hours . dysrhythmias, a-fib, Irregular Heartbeat/Palpitations, Previous cardiac testing   Echo: Date: 2021 Results:  The ejection fraction is 50-55%(borderline).Global right ventricular function is normal.  Stress Test: Date: Results:    ECG Reviewed: Date: Results:    Cath: Date: Results:   (-) CHF, ZEPEDA and orthopnea/PND   METS/Exercise Tolerance: >4 METS Comment: 3-4x weekly exercising at [facility]     Hematologic:  - neg hematologic  ROS     Musculoskeletal: Comment: OA - left shoulder  Sciatic  Hx of ORIF ( tibia) left  (+) arthritis,     GI/Hepatic:     (+) GERD, Asymptomatic on medication,     Renal/Genitourinary: Comment: CKD 3    (+) renal disease, type: CRI, Pt does not require dialysis,     Endo: Comment: GOUT    (+) Obesity,     Psychiatric/Substance Use: Comment: SNHL ( bilateral)  - drinks 2-3 drinks 3-4x weekly      Infectious Disease:  - neg infectious disease ROS     Malignancy:  - neg malignancy ROS     Other:      (+) , H/O Chronic Pain,        BP (!) 149/90 (BP Location: Left arm, Patient Position: Sitting, Cuff Size: Adult Regular)   Pulse 79   Temp 97.9  F (36.6  C) (Oral)   Resp 16   Ht 1.791 m (5' 10.5\")   Wt 116.1 kg (256 lb)   SpO2 99%   BMI 36.21 kg/m      Physical Exam   Constitutional: Awake, alert, cooperative, no apparent distress, and appears stated age.  Eyes: Pupils equal, round and reactive to light, extra ocular muscles intact, sclera clear, conjunctiva normal.  HENT: Normocephalic, oral pharynx with moist mucus membranes, good dentition. No goiter appreciated.   Respiratory: Clear to " auscultation bilaterally, no crackles or wheezing.  Cardiovascular: Regular rate and Irregular rhythm, normal S1 and S2, and no murmur noted.  Carotids +2, no bruits. No edema. Palpable pulses to radial  DP and PT arteries.   GI: Normal bowel sounds, soft, non-distended, non-tender, no masses palpated, no hepatosplenomegaly.    Lymph/Hematologic: No cervical lymphadenopathy and no supraclavicular lymphadenopathy.  Genitourinary:  deferred  Skin: Warm and dry.  No rashes at anticipated surgical site.   Musculoskeletal: Full ROM of neck. There is no redness, warmth, or swelling of the joints. Gross motor strength is normal.    Neurologic: Awake, alert, oriented to name, place and time. Cranial nerves II-XII are grossly intact. Gait is normal.   Neuropsychiatric: Calm, cooperative. Normal affect.     Prior Labs/Diagnostic Studies   All labs and imaging personally reviewed     Lab Results   Component Value Date    WBC 11.8 03/10/2023    WBC 9.4 07/09/2021     Lab Results   Component Value Date    RBC 4.48 03/10/2023    RBC 4.55 07/09/2021     Lab Results   Component Value Date    HGB 14.4 03/10/2023    HGB 13.8 07/09/2021     Lab Results   Component Value Date    HCT 41.7 03/10/2023    HCT 41.5 07/09/2021     No components found for: MCT  Lab Results   Component Value Date    MCV 93 03/10/2023    MCV 91 07/09/2021     Lab Results   Component Value Date    MCH 32.1 03/10/2023    MCH 30.3 07/09/2021     Lab Results   Component Value Date    MCHC 34.5 03/10/2023    MCHC 33.3 07/09/2021     Lab Results   Component Value Date    RDW 13.0 03/10/2023    RDW 12.7 07/09/2021     Lab Results   Component Value Date     03/10/2023     07/09/2021     Last Comprehensive Metabolic Panel:  Lab Results   Component Value Date     03/09/2023    POTASSIUM 5.0 03/09/2023    CHLORIDE 104 03/09/2023    CO2 26 03/09/2023    ANIONGAP 5 03/09/2023     (H) 03/09/2023    BUN 26 03/09/2023    CR 1.54 (H) 03/09/2023     GFRESTIMATED 48 (L) 03/09/2023    GRISELDA 9.9 03/09/2023       IMAGING:     X-ray of the left shoulder obtained today and compared with x-rays on April 27, 2022 demonstrate degenerative changes at the glenohumeral joint including decreased joint space and formation of osteophyte at the inferior humeral head and inferior glenoid.   CT of the left shoulder demonstrates a Walch 2B glenoid.  Similar degenerative changes are noted at the glenohumeral joint.     MRI of the left shoulder demonstrates intact rotator cuff muscle with a partial articular sided supraspinatus tear.  With notable atrophy of the supraspinatus muscle,goutallier stage 2-3.  Notable signal is present at the biceps tendon concerning for biceps tendinitis.  AC joint arthrosis is noted.         EKG/ stress test - if available please see in ROS above     9/2021    Echo result w/o MOPS: Interpretation Summary Left ventricular function is decreased. The ejection fraction is 50-55%(borderline).Global right ventricular function is normal.No significant valvular abnormalities present.Pulmonary artery systolic pressure is normal.The inferior vena cava was normal in size with preserved respiratoryvariability.No pericardial effusion is present.       The patient's records and results personally reviewed by this provider.     Outside records reviewed from: Care Everywhere    LAB/DIAGNOSTIC STUDIES TODAY:    none    Assessment      Tee Hilton is a 72 year old male seen as a PAC referral for risk assessment and optimization for anesthesia.    Plan/Recommendations  Pt will be optimized for the proposed procedure.  See below for details on the assessment, risk, and preoperative recommendations    NEUROLOGY  - No history of TIA, CVA or seizure    -Post Op delirium risk factors:  No risk identified    HEENT  - No current airway concerns.  Will need to be reassessed day of surgery.  Mallampati: I  TM: > 3   SNHL- ( bilateral) doesn't wear hearing aids    CARDIAC  -  "No history of CAD   No anginal symptoms, Denies palpitations, PND, dizziness or syncope.   Echo 2021-The ejection fraction is 50-55%(borderline).Global right ventricular function is normal.  - Afib- on Xarelto- will  Hold 72 hours prior to procedure.   Last dose 5/29 early AM (~7 AM).     - Hypertension- elevated in clinic  Well controlled  Monitors at home ( averages 120's/75-80)  - METS (Metabolic Equivalents)  Exercises at the [facility] 3-4x weekly for 90 minutes    Patient performs 4 or more METS exercise without symptoms            Total Score: 0      RCRI-Very low risk: Class 1 0.4% complication rate            Total Score: 0        PULMONARY  - Obstructive Sleep Apnea  AUGUSTO without home CPAP use.    - Denies asthma or inhaler use  - Tobacco History      History   Smoking Status     Never   Smokeless Tobacco     Never       GI  - GERD  Controlled on medications: Proton Pump Inhibitor  PONV Medium Risk  Total Score: 2           1 AN PONV: Patient is not a current smoker    1 AN PONV: Intended Post Op Opioids        /RENAL  Hx of CKD 3  - Baseline Creatinine  1.54    ENDOCRINE    - BMI: Estimated body mass index is 36.21 kg/m  as calculated from the following:    Height as of this encounter: 1.791 m (5' 10.5\").    Weight as of this encounter: 116.1 kg (256 lb).  Obesity (BMI >30)  - No history of Diabetes Mellitus   - GOUT- managed with allopurinol     HEME  VTE Low Risk 0.5%            Total Score: 3    VTE: Greater than 59 yrs old    VTE: Male      - Coagulopathy second to Rivaroxaban (Xarelto)  Dr. Botello and the perioperative anticoagulation plan is to hold x72 hr pre op.  Last dose of xarelto pre op to be on 5/29 early AM (~7 AM).       MSK  Patient is NOT Frail            Total Score: 0        OA - left shoulder- Procedure scheduled as above.     Sciatic  Hx of ORIF ( tibia) left    Different anesthesia methods/types have been discussed with the patient, but they are aware that the final plan will be decided " by the assigned anesthesia provider on the date of service.      The patient is optimized for their procedure. AVS with information on surgery time/arrival time, meds and NPO status given by nursing staff. No further diagnostic testing indicated.      On the day of service:     Prep time: 20 minutes  Visit time: 20 minutes  Documentation time: 15 minutes  ------------------------------------------  Total time: 55 minutes      ANDRE Huber CNP  Preoperative Assessment Center  [facility]  [facility]  Phone: [phone]  Fax: [phone]

## 2023-05-31 ENCOUNTER — ANESTHESIA EVENT (OUTPATIENT)
Dept: SURGERY | Facility: CLINIC | Age: 73
End: 2023-05-31
Payer: COMMERCIAL

## 2023-05-31 ASSESSMENT — LIFESTYLE VARIABLES: TOBACCO_USE: 0

## 2023-05-31 ASSESSMENT — ENCOUNTER SYMPTOMS
ORTHOPNEA: 0
DYSRHYTHMIAS: 1

## 2023-05-31 NOTE — ANESTHESIA PREPROCEDURE EVALUATION
Pre-Operative H & P     CC:  Preoperative exam to assess for increased cardiopulmonary risk while undergoing surgery and anesthesia.    Date of Encounter: 5/16/2023  Primary Care Physician:  Jon Denson     Reason for visit: Pre-operative evaluation    HPI  Tee Hilton is a 72 year old male who presents for pre-operative H & P in preparation for  Procedure Information     Date/Time: 6/1/2023     Procedure: Left total shoulder arthroplasty versus;Reverse arthroplasty shoulder    Anesthesia type: Choice    Pre-op diagnosis: Primary osteoarthritis of left shoulder    Location: [facility]    Providers: Ketty Botello MD          Tee Milner is a 72 year old male with a PMH significant for A. Fib (on Xarelto) CHF, AUGUSTO, obesity, HTN, CKD 3, GOUT, GERD, anxiety, SNHL ( bilateral) and sciatica. He recently consulted with Dr. Botello for pain in his left shoulder, that has progressively gotten worse over the past several years. He describes that the pain is  affecting his quality of life and his ability to enjoy his hobbies.  He has had extensive nonoperative intervention which has included PT, several corticosteroid injections which has not provided him significant relief. He presents today in preparation for the above recommended procedure.      History is obtained from the patient and chart review    Hx of abnormal bleeding or anti-platelet use: no      Past Medical History  Past Medical History:   Diagnosis Date     Congestive heart failure (H) 2020     Gastroesophageal reflux disease      Heart disease 2018     Hypertension      Irregular heart beat      Sleep apnea        Past Surgical History  Past Surgical History:   Procedure Laterality Date     ANESTHESIA CARDIOVERSION N/A 9/24/2018    Procedure: ANESTHESIA CARDIOVERSION;  Cardioversion ;  Surgeon: GENERIC ANESTHESIA PROVIDER;  Location: [facility]     BIOPSY  Summer 2021     COLONOSCOPY N/A  5/25/2017    Procedure: COMBINED COLONOSCOPY, SINGLE OR MULTIPLE BIOPSY/POLYPECTOMY BY BIOPSY;;  Surgeon: Aquilino Garcia MD;  Location: [facility]     COLONOSCOPY N/A 7/23/2021    Procedure: COLONOSCOPY, WITH POLYPECTOMY AND BIOPSY;  Surgeon: Israel Bey DO;  Location: [facility]     COLONOSCOPY WITH CO2 INSUFFLATION N/A 5/25/2017    Procedure: COLONOSCOPY WITH CO2 INSUFFLATION;  COLONOSCOPY SCREEN/ ORDAZ;  Surgeon: Aquilino Garcia MD;  Location: [facility]     OPEN REDUCTION INTERNAL FIXATION TIBIA       PHACOEMULSIFICATION CLEAR CORNEA WITH STANDARD INTRAOCULAR LENS IMPLANT Left 4/17/2023    Procedure: LEFT PHACOEMULSIFICATION, CATARACT, WITH INTRAOCULAR LENS IMPLANT;  Surgeon: Porsche Durán MD;  Location: [facility] OR     SEPTOPLASTY, TURBINOPLASTY, COMBINED Bilateral 7/9/2019    Procedure: ENDOSCOPIC SEPTOPLASTY, BILATERAL TURBINATE REDUCTION;  Surgeon: Alexander Avila MD;  Location: [facility]     TONSILLECTOMY         Prior to Admission Medications  Current Outpatient Medications   Medication Sig Dispense Refill     acetaminophen (TYLENOL) 500 MG tablet Take 500-1,000 mg by mouth every 8 hours as needed for mild pain       allopurinol (ZYLOPRIM) 100 MG tablet Take 1 tablet by mouth once daily 90 tablet 0     amLODIPine (NORVASC) 5 MG tablet TAKE 1 & 1/2 (ONE & ONE-HALF) TABLETS BY MOUTH ONCE DAILY (Patient taking differently: Take 7.5 mg by mouth daily TAKE 1 & 1/2 (ONE & ONE-HALF) TABLETS BY MOUTH ONCE DAILY) 135 tablet 0     BELSOMRA 10 MG tablet TAKE 1 TABLET BY MOUTH NIGHTLY AS NEEDED FOR SLEEP (Patient not taking: Reported on 5/10/2023) 30 tablet 0     Bromfenac Sodium POWD 1 Bottle 4 times daily 1 drop four times a day in the operative eye starting 1 day before surgery. Use until the bottle runs out. (Patient taking differently: 1 Bottle 3 times daily 1 drop four times a day in the operative eye starting 1 day before surgery. Use until the bottle runs out.) 1 g 0     Dexamethasone  Acetate POWD 1 Bottle 4 times daily 1 drop four times a day in the operative eye starting 1 day before surgery. Use until the bottle runs out. (Patient taking differently: 1 Bottle 3 times daily 1 drop four times a day in the operative eye starting 1 day before surgery. Use until the bottle runs out.) 1 g 0     dorzolamide-timolol (COSOPT) 2-0.5 % ophthalmic solution Apply 1 drop to eye 2 times daily (Patient not taking: Reported on 5/10/2023) 10 mL 0     famotidine (PEPCID) 20 MG tablet Take 1 tablet (20 mg) by mouth nightly as needed 60 tablet 1     lisinopril (ZESTRIL) 20 MG tablet TAKE 1/2 TO 1 (ONE-HALF TO ONE) TABLET BY MOUTH ONCE DAILY (FOLLOW  DRS  CURRENT  DIRECTIONS  ON  DOSING) (Patient taking differently: Take 20 mg by mouth daily) 90 tablet 0     LORazepam (ATIVAN) 0.5 MG tablet TAKE 1 TABLET BY MOUTH EVERY 8 HOURS AS NEEDED FOR ANXIETY 25 tablet 0     metoprolol succinate ER (TOPROL XL) 100 MG 24 hr tablet TAKE 1 & 1/2 (ONE & ONE-HALF) TABLETS BY MOUTH ONCE DAILY (Patient taking differently: Take 150 mg by mouth daily TAKE 1 & 1/2 (ONE & ONE-HALF) TABLETS BY MOUTH ONCE DAILY) 135 tablet 0     MOUNJARO 2.5 MG/0.5ML pen INJECT 2.5 MG SUBCUTANEOUSLY EVERY 7 DAYS (Patient taking differently: Inject 2.5 mg Subcutaneous once a week Takes on Mondays) 4 mL 0     Moxifloxacin HCl POWD 1 Bottle 4 times daily 1 drop four times a day in the operative eye starting 1 day before surgery. Use until the bottle runs out. (Patient taking differently: 1 Bottle 3 times daily 1 drop four times a day in the operative eye starting 1 day before surgery. Use until the bottle runs out.) 1 g 0     ondansetron (ZOFRAN ODT) 4 MG ODT tab DISSOLVE 1 TABLET IN MOUTH NIGHTLY AS NEEDED FOR NAUSEA 30 tablet 0     pantoprazole (PROTONIX) 40 MG EC tablet Take 1 tablet by mouth twice daily 180 tablet 1     Probiotic Product (PROBIOTIC PO) Take 1 capsule by mouth daily       sildenafil (REVATIO) 20 MG tablet Take 1 -5 tabs daily prn (Patient  taking differently: Take  mg by mouth daily as needed Take 1 -5 tabs daily prn) 30 tablet 11     tamsulosin (FLOMAX) 0.4 MG capsule Take 1 capsule (0.4 mg) by mouth every evening (Patient not taking: Reported on 5/10/2023) 90 capsule 0     XARELTO ANTICOAGULANT 20 MG TABS tablet Take 1 tablet by mouth once daily with breakfast 90 tablet 0     zolpidem ER (AMBIEN CR) 12.5 MG CR tablet TAKE 1 TABLET BY MOUTH NIGHTLY AS NEEDED 30 tablet 0       Allergies  Allergies   Allergen Reactions     Erythromycin GI Disturbance     Upset stomach     Ethanol      Other reaction(s): stomach ache       Social History  Social History     Socioeconomic History     Marital status:      Spouse name: Not on file     Number of children: 3     Years of education: Not on file     Highest education level: Not on file   Occupational History     Occupation: sales and marketing   Tobacco Use     Smoking status: Never     Smokeless tobacco: Never     Tobacco comments:     never smoked   Vaping Use     Vaping status: Never Used   Substance and Sexual Activity     Alcohol use: Yes     Comment: 2/3 beers a week     Drug use: Never     Sexual activity: Yes     Partners: Female   Other Topics Concern     Parent/sibling w/ CABG, MI or angioplasty before 65F 55M? Yes   Social History Narrative     Not on file     Social Determinants of Health     Financial Resource Strain: Not on file   Food Insecurity: Not on file   Transportation Needs: Not on file   Physical Activity: Not on file   Stress: Not on file   Social Connections: Not on file   Intimate Partner Violence: Not on file   Housing Stability: Not on file       Family History  Family History   Problem Relation Age of Onset     Anxiety Disorder Mother      Gout Father      Lung Cancer Father      Other Cancer Father      Glaucoma No family hx of      Macular Degeneration No family hx of        Review of Systems  The complete review of systems is negative other than noted in the HPI or  here.   Anesthesia Evaluation   Pt has had prior anesthetic. Type: General.        ROS/MED HX  ENT/Pulmonary: Comment: Didn't tolerate face mask cpap    (+) sleep apnea, doesn't use CPAP,  (-) tobacco use   Neurologic:     (+) peripheral neuropathy (sciatica on the left from Lumbar DD),     Cardiovascular:     (+) hypertension-range: 120's/75-80/ ----Taking blood thinners Instructions Given to patient: Hold 72 hours . dysrhythmias, a-fib, Irregular Heartbeat/Palpitations, Previous cardiac testing   Echo: Date: 2021 Results:  The ejection fraction is 50-55%(borderline).Global right ventricular function is normal.  Stress Test: Date: Results:    ECG Reviewed: Date: Results:    Cath: Date: Results:   (-) CHF, ZEPEDA and orthopnea/PND   METS/Exercise Tolerance: >4 METS Comment: 3-4x weekly exercising at [facility]     Hematologic:  - neg hematologic  ROS     Musculoskeletal: Comment: OA - left shoulder  Sciatic left side. intermittent numbness  Hx of ORIF ( tibia) left  (+) arthritis,     GI/Hepatic:     (+) GERD, Asymptomatic on medication,     Renal/Genitourinary: Comment: CKD 3. CR 1.5. CMP normal    (+) renal disease, type: CRI, Pt does not require dialysis,     Endo: Comment: GOUT    (+) Obesity,     Psychiatric/Substance Use: Comment: SNHL ( bilateral)  - drinks 2-3 drinks 3-4x weekly - neg psychiatric ROS     Infectious Disease:  - neg infectious disease ROS     Malignancy:  - neg malignancy ROS     Other:  - neg other ROS    (+) , H/O Chronic Pain,        There were no vitals taken for this visit.    Physical Exam   Constitutional: Awake, alert, cooperative, no apparent distress, and appears stated age.  Eyes: Pupils equal, round and reactive to light, extra ocular muscles intact, sclera clear, conjunctiva normal.  HENT: Normocephalic, oral pharynx with moist mucus membranes, good dentition. No goiter appreciated.   Respiratory: Clear to auscultation bilaterally, no crackles or wheezing.  Cardiovascular: Regular rate and  Irregular rhythm, normal S1 and S2, and no murmur noted.  Carotids +2, no bruits. No edema. Palpable pulses to radial  DP and PT arteries.   GI: Normal bowel sounds, soft, non-distended, non-tender, no masses palpated, no hepatosplenomegaly.    Lymph/Hematologic: No cervical lymphadenopathy and no supraclavicular lymphadenopathy.  Genitourinary:  deferred  Skin: Warm and dry.  No rashes at anticipated surgical site.   Musculoskeletal: Full ROM of neck. There is no redness, warmth, or swelling of the joints. Gross motor strength is normal.    Neurologic: Awake, alert, oriented to name, place and time. Cranial nerves II-XII are grossly intact. Gait is normal.   Neuropsychiatric: Calm, cooperative. Normal affect.     Prior Labs/Diagnostic Studies   All labs and imaging personally reviewed     Lab Results   Component Value Date    WBC 11.8 03/10/2023    WBC 9.4 07/09/2021     Lab Results   Component Value Date    RBC 4.48 03/10/2023    RBC 4.55 07/09/2021     Lab Results   Component Value Date    HGB 14.4 03/10/2023    HGB 13.8 07/09/2021     Lab Results   Component Value Date    HCT 41.7 03/10/2023    HCT 41.5 07/09/2021     No components found for: MCT  Lab Results   Component Value Date    MCV 93 03/10/2023    MCV 91 07/09/2021     Lab Results   Component Value Date    MCH 32.1 03/10/2023    MCH 30.3 07/09/2021     Lab Results   Component Value Date    MCHC 34.5 03/10/2023    MCHC 33.3 07/09/2021     Lab Results   Component Value Date    RDW 13.0 03/10/2023    RDW 12.7 07/09/2021     Lab Results   Component Value Date     03/10/2023     07/09/2021     Last Comprehensive Metabolic Panel:  Lab Results   Component Value Date     03/09/2023    POTASSIUM 5.0 03/09/2023    CHLORIDE 104 03/09/2023    CO2 26 03/09/2023    ANIONGAP 5 03/09/2023     (H) 03/09/2023    BUN 26 03/09/2023    CR 1.54 (H) 03/09/2023    GFRESTIMATED 48 (L) 03/09/2023    GRISELDA 9.9 03/09/2023       IMAGING:     X-ray of the  left shoulder obtained today and compared with x-rays on April 27, 2022 demonstrate degenerative changes at the glenohumeral joint including decreased joint space and formation of osteophyte at the inferior humeral head and inferior glenoid.   CT of the left shoulder demonstrates a Walch 2B glenoid.  Similar degenerative changes are noted at the glenohumeral joint.     MRI of the left shoulder demonstrates intact rotator cuff muscle with a partial articular sided supraspinatus tear.  With notable atrophy of the supraspinatus muscle,goutallier stage 2-3.  Notable signal is present at the biceps tendon concerning for biceps tendinitis.  AC joint arthrosis is noted.         EKG/ stress test - if available please see in ROS above     9/2021    Echo result w/o MOPS: Interpretation Summary Left ventricular function is decreased. The ejection fraction is 50-55%(borderline).Global right ventricular function is normal.No significant valvular abnormalities present.Pulmonary artery systolic pressure is normal.The inferior vena cava was normal in size with preserved respiratoryvariability.No pericardial effusion is present.       The patient's records and results personally reviewed by this provider.     Outside records reviewed from: Care Everywhere    LAB/DIAGNOSTIC STUDIES TODAY:    none    Assessment      Tee Hilton is a 72 year old male seen as a PAC referral for risk assessment and optimization for anesthesia.    Plan/Recommendations  Pt will be optimized for the proposed procedure.  See below for details on the assessment, risk, and preoperative recommendations    NEUROLOGY  - No history of TIA, CVA or seizure    -Post Op delirium risk factors:  No risk identified    HEENT  - No current airway concerns.  Will need to be reassessed day of surgery.  Mallampati: I  TM: > 3   SNHL- ( bilateral) doesn't wear hearing aids    CARDIAC  - No history of CAD   No anginal symptoms, Denies palpitations, PND, dizziness or  "syncope.   Echo 2021-The ejection fraction is 50-55%(borderline).Global right ventricular function is normal.  - Afib- on Xarelto- will  Hold 72 hours prior to procedure.   Last dose 5/29 early AM (~7 AM).     - Hypertension- elevated in clinic  Well controlled  Monitors at home ( averages 120's/75-80)  - METS (Metabolic Equivalents)  Exercises at the [facility] 3-4x weekly for 90 minutes    Patient performs 4 or more METS exercise without symptoms            Total Score: 0      RCRI-Very low risk: Class 1 0.4% complication rate            Total Score: 0        PULMONARY  - Obstructive Sleep Apnea  AUGUSTO without home CPAP use.    - Denies asthma or inhaler use  - Tobacco History      History   Smoking Status     Never   Smokeless Tobacco     Never       GI  - GERD  Controlled on medications: Proton Pump Inhibitor  PONV Medium Risk  Total Score: 2           1 AN PONV: Patient is not a current smoker    1 AN PONV: Intended Post Op Opioids        /RENAL  Hx of CKD 3  - Baseline Creatinine  1.54    ENDOCRINE    - BMI: Estimated body mass index is 36.21 kg/m  as calculated from the following:    Height as of 5/16/23: 1.791 m (5' 10.5\").    Weight as of 5/16/23: 116.1 kg (256 lb).  Obesity (BMI >30)  - No history of Diabetes Mellitus   - GOUT- managed with allopurinol     HEME  VTE Low Risk 0.5%            Total Score: 3    VTE: Greater than 59 yrs old    VTE: Male      - Coagulopathy second to Rivaroxaban (Xarelto)  Dr. Botello and the perioperative anticoagulation plan is to hold x72 hr pre op.  Last dose of xarelto pre op to be on 5/29 early AM (~7 AM).       MSK  Patient is NOT Frail            Total Score: 0        OA - left shoulder- Procedure scheduled as above.     Sciatic  Hx of ORIF ( tibia) left    Different anesthesia methods/types have been discussed with the patient, but they are aware that the final plan will be decided by the assigned anesthesia provider on the date of service.      The patient is optimized for " their procedure. AVS with information on surgery time/arrival time, meds and NPO status given by nursing staff. No further diagnostic testing indicated.      On the day of service:     Prep time: 20 minutes  Visit time: 20 minutes  Documentation time: 15 minutes  ------------------------------------------  Total time: 55 minutes      ANDRE Huber CNP  Preoperative Assessment Center  [facility]  [facility]  Phone: [phone]  Fax: [phone]    Physical Exam    Airway        Mallampati: III   TM distance: > 3 FB   Neck ROM: full   Mouth opening: > 3 cm    Respiratory Devices and Support         Dental       (+) Minor Abnormalities - some fillings, tiny chips      Cardiovascular          Rhythm and rate: irregular     Pulmonary           (+) wheezes             Anesthesia Plan    ASA Status:  3   NPO Status:  NPO Appropriate    Anesthesia Type: General.     - Airway: ETT   Induction: Intravenous.   Maintenance: Balanced.   Techniques and Equipment:     - Airway: Video-Laryngoscope     Lines/Monitors: clear site.     - Drips/Meds: Phenylephrine     Consents    Anesthesia Plan(s) and associated risks, benefits, and realistic alternatives discussed. Questions answered and patient/representative(s) expressed understanding.     - Discussed: Risks, Benefits and Alternatives for BOTH SEDATION and the PROCEDURE were discussed     - Discussed with:  Patient      - Patient is DNR/DNI Status: No         Postoperative Care    Pain management: IV analgesics, Peripheral nerve block (Continuous), Multi-modal analgesia, Oral pain medications.     - Plan for long acting post-op opioid use   PONV prophylaxis: Ondansetron (or other 5HT-3), Dexamethasone or Solumedrol     Comments:

## 2023-06-01 ENCOUNTER — ANESTHESIA (OUTPATIENT)
Dept: SURGERY | Facility: CLINIC | Age: 73
End: 2023-06-01
Payer: COMMERCIAL

## 2023-06-01 ENCOUNTER — HOSPITAL ENCOUNTER (OUTPATIENT)
Facility: CLINIC | Age: 73
Discharge: HOME | End: 2023-06-02
Attending: ORTHOPAEDIC SURGERY | Admitting: ORTHOPAEDIC SURGERY
Payer: COMMERCIAL

## 2023-06-01 ENCOUNTER — APPOINTMENT (OUTPATIENT)
Dept: GENERAL RADIOLOGY | Facility: CLINIC | Age: 73
End: 2023-06-01
Attending: ORTHOPAEDIC SURGERY
Payer: COMMERCIAL

## 2023-06-01 DIAGNOSIS — M19.012 ARTHROSIS OF LEFT SHOULDER: ICD-10-CM

## 2023-06-01 DIAGNOSIS — N52.8 OTHER MALE ERECTILE DYSFUNCTION: Primary | ICD-10-CM

## 2023-06-01 LAB — GLUCOSE BLDC GLUCOMTR-MCNC: 118 MG/DL (ref 70–99)

## 2023-06-01 PROCEDURE — 250N000011 HC RX IP 250 OP 636: Performed by: ORTHOPAEDIC SURGERY

## 2023-06-01 PROCEDURE — 250N000011 HC RX IP 250 OP 636: Performed by: ANESTHESIOLOGY

## 2023-06-01 PROCEDURE — 999N000141 HC STATISTIC PRE-PROCEDURE NURSING ASSESSMENT: Performed by: ORTHOPAEDIC SURGERY

## 2023-06-01 PROCEDURE — C1713 ANCHOR/SCREW BN/BN,TIS/BN: HCPCS | Performed by: ORTHOPAEDIC SURGERY

## 2023-06-01 PROCEDURE — 99204 OFFICE O/P NEW MOD 45 MIN: CPT | Performed by: INTERNAL MEDICINE

## 2023-06-01 PROCEDURE — 250N000011 HC RX IP 250 OP 636

## 2023-06-01 PROCEDURE — 250N000009 HC RX 250: Performed by: ANESTHESIOLOGY

## 2023-06-01 PROCEDURE — C9290 INJ, BUPIVACAINE LIPOSOME: HCPCS | Performed by: ANESTHESIOLOGY

## 2023-06-01 PROCEDURE — 360N000077 HC SURGERY LEVEL 4, PER MIN: Performed by: ORTHOPAEDIC SURGERY

## 2023-06-01 PROCEDURE — 250N000013 HC RX MED GY IP 250 OP 250 PS 637

## 2023-06-01 PROCEDURE — 271N000001 HC OR GENERAL SUPPLY NON-STERILE: Performed by: ORTHOPAEDIC SURGERY

## 2023-06-01 PROCEDURE — 250N000013 HC RX MED GY IP 250 OP 250 PS 637: Performed by: STUDENT IN AN ORGANIZED HEALTH CARE EDUCATION/TRAINING PROGRAM

## 2023-06-01 PROCEDURE — 258N000003 HC RX IP 258 OP 636

## 2023-06-01 PROCEDURE — 370N000017 HC ANESTHESIA TECHNICAL FEE, PER MIN: Performed by: ORTHOPAEDIC SURGERY

## 2023-06-01 PROCEDURE — 250N000009 HC RX 250

## 2023-06-01 PROCEDURE — 23472 RECONSTRUCT SHOULDER JOINT: CPT | Mod: LT | Performed by: ORTHOPAEDIC SURGERY

## 2023-06-01 PROCEDURE — 999N000065 XR SHOULDER LEFT PORT G/E 2 VIEWS: Mod: LT

## 2023-06-01 PROCEDURE — 710N000009 HC RECOVERY PHASE 1, LEVEL 1, PER MIN: Performed by: ORTHOPAEDIC SURGERY

## 2023-06-01 PROCEDURE — 250N000013 HC RX MED GY IP 250 OP 250 PS 637: Performed by: INTERNAL MEDICINE

## 2023-06-01 PROCEDURE — 250N000025 HC SEVOFLURANE, PER MIN: Performed by: ORTHOPAEDIC SURGERY

## 2023-06-01 PROCEDURE — 272N000002 HC OR SUPPLY OTHER OPNP: Performed by: ORTHOPAEDIC SURGERY

## 2023-06-01 PROCEDURE — C1776 JOINT DEVICE (IMPLANTABLE): HCPCS | Performed by: ORTHOPAEDIC SURGERY

## 2023-06-01 PROCEDURE — 272N000001 HC OR GENERAL SUPPLY STERILE: Performed by: ORTHOPAEDIC SURGERY

## 2023-06-01 PROCEDURE — 250N000009 HC RX 250: Performed by: ORTHOPAEDIC SURGERY

## 2023-06-01 PROCEDURE — 250N000013 HC RX MED GY IP 250 OP 250 PS 637: Performed by: ORTHOPAEDIC SURGERY

## 2023-06-01 PROCEDURE — 258N000001 HC RX 258: Performed by: ORTHOPAEDIC SURGERY

## 2023-06-01 DEVICE — IMPLANTABLE DEVICE
Type: IMPLANTABLE DEVICE | Site: SHOULDER | Status: FUNCTIONAL
Brand: COMPREHENSIVE® REVERSE SHOULDER

## 2023-06-01 DEVICE — IMPLANTABLE DEVICE
Type: IMPLANTABLE DEVICE | Site: SHOULDER | Status: FUNCTIONAL
Brand: COMPREHENSIVE® SHOULDER SYSTEM

## 2023-06-01 DEVICE — IMPLANTABLE DEVICE
Type: IMPLANTABLE DEVICE | Site: SHOULDER | Status: FUNCTIONAL
Brand: COMPREHENSIVE® PROLONG®

## 2023-06-01 DEVICE — IMPLANTABLE DEVICE
Type: IMPLANTABLE DEVICE | Site: SHOULDER | Status: FUNCTIONAL
Brand: COMPREHENSIVE®

## 2023-06-01 DEVICE — IMPLANTABLE DEVICE
Type: IMPLANTABLE DEVICE | Site: SHOULDER | Status: FUNCTIONAL
Brand: COMPREHENSIVE® VERSA-DIAL®

## 2023-06-01 RX ORDER — PROPOFOL 10 MG/ML
INJECTION, EMULSION INTRAVENOUS PRN
Status: DISCONTINUED | OUTPATIENT
Start: 2023-06-01 | End: 2023-06-01

## 2023-06-01 RX ORDER — FENTANYL CITRATE 50 UG/ML
25 INJECTION, SOLUTION INTRAMUSCULAR; INTRAVENOUS EVERY 5 MIN PRN
Status: DISCONTINUED | OUTPATIENT
Start: 2023-06-01 | End: 2023-06-01 | Stop reason: HOSPADM

## 2023-06-01 RX ORDER — NALOXONE HYDROCHLORIDE 0.4 MG/ML
0.2 INJECTION, SOLUTION INTRAMUSCULAR; INTRAVENOUS; SUBCUTANEOUS
Status: DISCONTINUED | OUTPATIENT
Start: 2023-06-01 | End: 2023-06-01 | Stop reason: HOSPADM

## 2023-06-01 RX ORDER — POLYETHYLENE GLYCOL 3350 17 G/17G
17 POWDER, FOR SOLUTION ORAL DAILY
Status: DISCONTINUED | OUTPATIENT
Start: 2023-06-02 | End: 2023-06-02 | Stop reason: HOSPADM

## 2023-06-01 RX ORDER — FLUMAZENIL 0.1 MG/ML
0.2 INJECTION, SOLUTION INTRAVENOUS
Status: DISCONTINUED | OUTPATIENT
Start: 2023-06-01 | End: 2023-06-01 | Stop reason: HOSPADM

## 2023-06-01 RX ORDER — SODIUM CHLORIDE, SODIUM LACTATE, POTASSIUM CHLORIDE, CALCIUM CHLORIDE 600; 310; 30; 20 MG/100ML; MG/100ML; MG/100ML; MG/100ML
INJECTION, SOLUTION INTRAVENOUS CONTINUOUS
Status: DISCONTINUED | OUTPATIENT
Start: 2023-06-01 | End: 2023-06-01 | Stop reason: HOSPADM

## 2023-06-01 RX ORDER — CEFAZOLIN SODIUM 2 G/100ML
2 INJECTION, SOLUTION INTRAVENOUS EVERY 8 HOURS
Status: COMPLETED | OUTPATIENT
Start: 2023-06-01 | End: 2023-06-02

## 2023-06-01 RX ORDER — ONDANSETRON 2 MG/ML
4 INJECTION INTRAMUSCULAR; INTRAVENOUS EVERY 30 MIN PRN
Status: DISCONTINUED | OUTPATIENT
Start: 2023-06-01 | End: 2023-06-01 | Stop reason: HOSPADM

## 2023-06-01 RX ORDER — LIDOCAINE 40 MG/G
CREAM TOPICAL
Status: DISCONTINUED | OUTPATIENT
Start: 2023-06-01 | End: 2023-06-02 | Stop reason: HOSPADM

## 2023-06-01 RX ORDER — CEFAZOLIN SODIUM/WATER 2 G/20 ML
2 SYRINGE (ML) INTRAVENOUS
Status: DISCONTINUED | OUTPATIENT
Start: 2023-06-01 | End: 2023-06-01 | Stop reason: HOSPADM

## 2023-06-01 RX ORDER — LIDOCAINE 40 MG/G
CREAM TOPICAL
Status: DISCONTINUED | OUTPATIENT
Start: 2023-06-01 | End: 2023-06-01 | Stop reason: HOSPADM

## 2023-06-01 RX ORDER — NALOXONE HYDROCHLORIDE 0.4 MG/ML
0.4 INJECTION, SOLUTION INTRAMUSCULAR; INTRAVENOUS; SUBCUTANEOUS
Status: DISCONTINUED | OUTPATIENT
Start: 2023-06-01 | End: 2023-06-01 | Stop reason: HOSPADM

## 2023-06-01 RX ORDER — PROCHLORPERAZINE MALEATE 5 MG
5 TABLET ORAL EVERY 6 HOURS PRN
Status: DISCONTINUED | OUTPATIENT
Start: 2023-06-01 | End: 2023-06-02 | Stop reason: HOSPADM

## 2023-06-01 RX ORDER — FENTANYL CITRATE 50 UG/ML
50 INJECTION, SOLUTION INTRAMUSCULAR; INTRAVENOUS EVERY 5 MIN PRN
Status: DISCONTINUED | OUTPATIENT
Start: 2023-06-01 | End: 2023-06-01 | Stop reason: HOSPADM

## 2023-06-01 RX ORDER — ZOLPIDEM TARTRATE 12.5 MG/1
12.5 TABLET, FILM COATED, EXTENDED RELEASE ORAL
Status: DISCONTINUED | OUTPATIENT
Start: 2023-06-01 | End: 2023-06-02 | Stop reason: HOSPADM

## 2023-06-01 RX ORDER — ONDANSETRON 2 MG/ML
INJECTION INTRAMUSCULAR; INTRAVENOUS PRN
Status: DISCONTINUED | OUTPATIENT
Start: 2023-06-01 | End: 2023-06-01

## 2023-06-01 RX ORDER — HYDROMORPHONE HYDROCHLORIDE 1 MG/ML
0.4 INJECTION, SOLUTION INTRAMUSCULAR; INTRAVENOUS; SUBCUTANEOUS EVERY 5 MIN PRN
Status: DISCONTINUED | OUTPATIENT
Start: 2023-06-01 | End: 2023-06-01 | Stop reason: HOSPADM

## 2023-06-01 RX ORDER — HYDROMORPHONE HCL IN WATER/PF 6 MG/30 ML
0.2 PATIENT CONTROLLED ANALGESIA SYRINGE INTRAVENOUS
Status: DISCONTINUED | OUTPATIENT
Start: 2023-06-01 | End: 2023-06-02 | Stop reason: HOSPADM

## 2023-06-01 RX ORDER — BUPIVACAINE HYDROCHLORIDE 5 MG/ML
INJECTION, SOLUTION EPIDURAL; INTRACAUDAL
Status: COMPLETED | OUTPATIENT
Start: 2023-06-01 | End: 2023-06-01

## 2023-06-01 RX ORDER — TRANEXAMIC ACID 650 MG/1
1950 TABLET ORAL ONCE
Status: COMPLETED | OUTPATIENT
Start: 2023-06-01 | End: 2023-06-01

## 2023-06-01 RX ORDER — ONDANSETRON 4 MG/1
4 TABLET, ORALLY DISINTEGRATING ORAL EVERY 30 MIN PRN
Status: DISCONTINUED | OUTPATIENT
Start: 2023-06-01 | End: 2023-06-01 | Stop reason: HOSPADM

## 2023-06-01 RX ORDER — BISACODYL 10 MG
10 SUPPOSITORY, RECTAL RECTAL DAILY PRN
Status: DISCONTINUED | OUTPATIENT
Start: 2023-06-01 | End: 2023-06-02 | Stop reason: HOSPADM

## 2023-06-01 RX ORDER — CEFAZOLIN SODIUM/WATER 2 G/20 ML
2 SYRINGE (ML) INTRAVENOUS SEE ADMIN INSTRUCTIONS
Status: DISCONTINUED | OUTPATIENT
Start: 2023-06-01 | End: 2023-06-01 | Stop reason: HOSPADM

## 2023-06-01 RX ORDER — LIDOCAINE HYDROCHLORIDE 20 MG/ML
INJECTION, SOLUTION INFILTRATION; PERINEURAL PRN
Status: DISCONTINUED | OUTPATIENT
Start: 2023-06-01 | End: 2023-06-01

## 2023-06-01 RX ORDER — AMOXICILLIN 250 MG
1 CAPSULE ORAL 2 TIMES DAILY
Status: DISCONTINUED | OUTPATIENT
Start: 2023-06-01 | End: 2023-06-02 | Stop reason: HOSPADM

## 2023-06-01 RX ORDER — NALOXONE HYDROCHLORIDE 0.4 MG/ML
0.2 INJECTION, SOLUTION INTRAMUSCULAR; INTRAVENOUS; SUBCUTANEOUS
Status: DISCONTINUED | OUTPATIENT
Start: 2023-06-01 | End: 2023-06-02 | Stop reason: HOSPADM

## 2023-06-01 RX ORDER — FENTANYL CITRATE 50 UG/ML
INJECTION, SOLUTION INTRAMUSCULAR; INTRAVENOUS PRN
Status: DISCONTINUED | OUTPATIENT
Start: 2023-06-01 | End: 2023-06-01

## 2023-06-01 RX ORDER — ACETAMINOPHEN 325 MG/1
975 TABLET ORAL ONCE
Status: COMPLETED | OUTPATIENT
Start: 2023-06-01 | End: 2023-06-01

## 2023-06-01 RX ORDER — OXYCODONE HYDROCHLORIDE 5 MG/1
5 TABLET ORAL
Status: DISCONTINUED | OUTPATIENT
Start: 2023-06-01 | End: 2023-06-01 | Stop reason: HOSPADM

## 2023-06-01 RX ORDER — ACETAMINOPHEN 325 MG/1
975 TABLET ORAL EVERY 8 HOURS
Status: DISCONTINUED | OUTPATIENT
Start: 2023-06-01 | End: 2023-06-02 | Stop reason: HOSPADM

## 2023-06-01 RX ORDER — NALOXONE HYDROCHLORIDE 0.4 MG/ML
0.4 INJECTION, SOLUTION INTRAMUSCULAR; INTRAVENOUS; SUBCUTANEOUS
Status: DISCONTINUED | OUTPATIENT
Start: 2023-06-01 | End: 2023-06-02 | Stop reason: HOSPADM

## 2023-06-01 RX ORDER — HYDROMORPHONE HYDROCHLORIDE 1 MG/ML
0.2 INJECTION, SOLUTION INTRAMUSCULAR; INTRAVENOUS; SUBCUTANEOUS EVERY 5 MIN PRN
Status: DISCONTINUED | OUTPATIENT
Start: 2023-06-01 | End: 2023-06-01 | Stop reason: HOSPADM

## 2023-06-01 RX ORDER — FENTANYL CITRATE 50 UG/ML
25-50 INJECTION, SOLUTION INTRAMUSCULAR; INTRAVENOUS
Status: DISCONTINUED | OUTPATIENT
Start: 2023-06-01 | End: 2023-06-01 | Stop reason: HOSPADM

## 2023-06-01 RX ORDER — HYDROMORPHONE HCL IN WATER/PF 6 MG/30 ML
0.4 PATIENT CONTROLLED ANALGESIA SYRINGE INTRAVENOUS
Status: DISCONTINUED | OUTPATIENT
Start: 2023-06-01 | End: 2023-06-02 | Stop reason: HOSPADM

## 2023-06-01 RX ORDER — SODIUM CHLORIDE, SODIUM LACTATE, POTASSIUM CHLORIDE, CALCIUM CHLORIDE 600; 310; 30; 20 MG/100ML; MG/100ML; MG/100ML; MG/100ML
INJECTION, SOLUTION INTRAVENOUS CONTINUOUS
Status: DISCONTINUED | OUTPATIENT
Start: 2023-06-01 | End: 2023-06-02

## 2023-06-01 RX ORDER — ONDANSETRON 2 MG/ML
4 INJECTION INTRAMUSCULAR; INTRAVENOUS EVERY 6 HOURS PRN
Status: DISCONTINUED | OUTPATIENT
Start: 2023-06-01 | End: 2023-06-02 | Stop reason: HOSPADM

## 2023-06-01 RX ORDER — DEXAMETHASONE SODIUM PHOSPHATE 4 MG/ML
INJECTION, SOLUTION INTRA-ARTICULAR; INTRALESIONAL; INTRAMUSCULAR; INTRAVENOUS; SOFT TISSUE PRN
Status: DISCONTINUED | OUTPATIENT
Start: 2023-06-01 | End: 2023-06-01

## 2023-06-01 RX ORDER — METHOCARBAMOL 500 MG/1
500 TABLET, FILM COATED ORAL EVERY 6 HOURS PRN
Status: DISCONTINUED | OUTPATIENT
Start: 2023-06-01 | End: 2023-06-02 | Stop reason: HOSPADM

## 2023-06-01 RX ORDER — HYDROXYZINE HYDROCHLORIDE 25 MG/1
25 TABLET, FILM COATED ORAL EVERY 6 HOURS PRN
Status: DISCONTINUED | OUTPATIENT
Start: 2023-06-01 | End: 2023-06-02 | Stop reason: HOSPADM

## 2023-06-01 RX ORDER — OXYCODONE HYDROCHLORIDE 5 MG/1
10 TABLET ORAL
Status: DISCONTINUED | OUTPATIENT
Start: 2023-06-01 | End: 2023-06-01 | Stop reason: HOSPADM

## 2023-06-01 RX ORDER — ACETAMINOPHEN 325 MG/1
650 TABLET ORAL EVERY 4 HOURS PRN
Status: DISCONTINUED | OUTPATIENT
Start: 2023-06-04 | End: 2023-06-02 | Stop reason: HOSPADM

## 2023-06-01 RX ORDER — ONDANSETRON 4 MG/1
4 TABLET, ORALLY DISINTEGRATING ORAL EVERY 6 HOURS PRN
Status: DISCONTINUED | OUTPATIENT
Start: 2023-06-01 | End: 2023-06-02 | Stop reason: HOSPADM

## 2023-06-01 RX ORDER — LORAZEPAM 0.5 MG/1
0.5 TABLET ORAL EVERY 8 HOURS PRN
Status: DISCONTINUED | OUTPATIENT
Start: 2023-06-01 | End: 2023-06-02 | Stop reason: HOSPADM

## 2023-06-01 RX ORDER — SODIUM CHLORIDE, SODIUM LACTATE, POTASSIUM CHLORIDE, CALCIUM CHLORIDE 600; 310; 30; 20 MG/100ML; MG/100ML; MG/100ML; MG/100ML
INJECTION, SOLUTION INTRAVENOUS CONTINUOUS PRN
Status: DISCONTINUED | OUTPATIENT
Start: 2023-06-01 | End: 2023-06-01

## 2023-06-01 RX ORDER — VANCOMYCIN HYDROCHLORIDE 1 G/20ML
INJECTION, POWDER, LYOPHILIZED, FOR SOLUTION INTRAVENOUS PRN
Status: DISCONTINUED | OUTPATIENT
Start: 2023-06-01 | End: 2023-06-01 | Stop reason: HOSPADM

## 2023-06-01 RX ORDER — PANTOPRAZOLE SODIUM 40 MG/1
40 TABLET, DELAYED RELEASE ORAL 2 TIMES DAILY
Status: DISCONTINUED | OUTPATIENT
Start: 2023-06-01 | End: 2023-06-02 | Stop reason: HOSPADM

## 2023-06-01 RX ORDER — OXYCODONE HYDROCHLORIDE 5 MG/1
5 TABLET ORAL EVERY 4 HOURS PRN
Status: DISCONTINUED | OUTPATIENT
Start: 2023-06-01 | End: 2023-06-02 | Stop reason: HOSPADM

## 2023-06-01 RX ADMIN — PHENYLEPHRINE HYDROCHLORIDE 0.5 MCG/KG/MIN: 10 INJECTION INTRAVENOUS at 11:55

## 2023-06-01 RX ADMIN — SUCCINYLCHOLINE CHLORIDE 160 MG: 20 INJECTION, SOLUTION INTRAMUSCULAR; INTRAVENOUS; PARENTERAL at 10:37

## 2023-06-01 RX ADMIN — PHENYLEPHRINE HYDROCHLORIDE 100 MCG: 10 INJECTION INTRAVENOUS at 11:16

## 2023-06-01 RX ADMIN — Medication 2 G: at 11:03

## 2023-06-01 RX ADMIN — ACETAMINOPHEN 975 MG: 325 TABLET ORAL at 21:58

## 2023-06-01 RX ADMIN — OXYCODONE HYDROCHLORIDE 5 MG: 5 TABLET ORAL at 15:55

## 2023-06-01 RX ADMIN — BUPIVACAINE 10 ML: 13.3 INJECTION, SUSPENSION, LIPOSOMAL INFILTRATION at 10:50

## 2023-06-01 RX ADMIN — PHENYLEPHRINE HYDROCHLORIDE 100 MCG: 10 INJECTION INTRAVENOUS at 11:38

## 2023-06-01 RX ADMIN — LIDOCAINE HYDROCHLORIDE 100 MG: 20 INJECTION, SOLUTION INFILTRATION; PERINEURAL at 10:37

## 2023-06-01 RX ADMIN — PANTOPRAZOLE SODIUM 40 MG: 40 TABLET, DELAYED RELEASE ORAL at 20:04

## 2023-06-01 RX ADMIN — SODIUM CHLORIDE, POTASSIUM CHLORIDE, SODIUM LACTATE AND CALCIUM CHLORIDE: 600; 310; 30; 20 INJECTION, SOLUTION INTRAVENOUS at 15:02

## 2023-06-01 RX ADMIN — ONDANSETRON 4 MG: 2 INJECTION INTRAMUSCULAR; INTRAVENOUS at 13:23

## 2023-06-01 RX ADMIN — BUPIVACAINE HYDROCHLORIDE 6 ML: 5 INJECTION, SOLUTION EPIDURAL; INTRACAUDAL at 10:50

## 2023-06-01 RX ADMIN — PHENYLEPHRINE HYDROCHLORIDE 100 MCG: 10 INJECTION INTRAVENOUS at 11:27

## 2023-06-01 RX ADMIN — PHENYLEPHRINE HYDROCHLORIDE 150 MCG: 10 INJECTION INTRAVENOUS at 11:30

## 2023-06-01 RX ADMIN — ONDANSETRON 4 MG: 2 INJECTION INTRAMUSCULAR; INTRAVENOUS at 15:58

## 2023-06-01 RX ADMIN — CEFAZOLIN SODIUM 2 G: 2 INJECTION, SOLUTION INTRAVENOUS at 19:05

## 2023-06-01 RX ADMIN — PROPOFOL 50 MG: 10 INJECTION, EMULSION INTRAVENOUS at 11:13

## 2023-06-01 RX ADMIN — PHENYLEPHRINE HYDROCHLORIDE 100 MCG: 10 INJECTION INTRAVENOUS at 11:07

## 2023-06-01 RX ADMIN — HYDROMORPHONE HYDROCHLORIDE 0.2 MG: 1 INJECTION, SOLUTION INTRAMUSCULAR; INTRAVENOUS; SUBCUTANEOUS at 14:53

## 2023-06-01 RX ADMIN — OXYCODONE HYDROCHLORIDE 5 MG: 5 TABLET ORAL at 20:05

## 2023-06-01 RX ADMIN — FENTANYL CITRATE 25 MCG: 50 INJECTION INTRAMUSCULAR; INTRAVENOUS at 13:48

## 2023-06-01 RX ADMIN — ACETAMINOPHEN 975 MG: 325 TABLET ORAL at 08:34

## 2023-06-01 RX ADMIN — PROPOFOL 250 MG: 10 INJECTION, EMULSION INTRAVENOUS at 10:37

## 2023-06-01 RX ADMIN — SODIUM CHLORIDE, POTASSIUM CHLORIDE, SODIUM LACTATE AND CALCIUM CHLORIDE: 600; 310; 30; 20 INJECTION, SOLUTION INTRAVENOUS at 10:37

## 2023-06-01 RX ADMIN — PHENYLEPHRINE HYDROCHLORIDE 150 MCG: 10 INJECTION INTRAVENOUS at 12:44

## 2023-06-01 RX ADMIN — LORAZEPAM 0.5 MG: 0.5 TABLET ORAL at 22:50

## 2023-06-01 RX ADMIN — OXYCODONE HYDROCHLORIDE 5 MG: 5 TABLET ORAL at 14:28

## 2023-06-01 RX ADMIN — PHENYLEPHRINE HYDROCHLORIDE 100 MCG: 10 INJECTION INTRAVENOUS at 11:42

## 2023-06-01 RX ADMIN — HYDROXYZINE HYDROCHLORIDE 25 MG: 25 TABLET, FILM COATED ORAL at 20:05

## 2023-06-01 RX ADMIN — METHOCARBAMOL 500 MG: 500 TABLET ORAL at 18:29

## 2023-06-01 RX ADMIN — PHENYLEPHRINE HYDROCHLORIDE 100 MCG: 10 INJECTION INTRAVENOUS at 11:21

## 2023-06-01 RX ADMIN — ACETAMINOPHEN 975 MG: 325 TABLET ORAL at 15:55

## 2023-06-01 RX ADMIN — FENTANYL CITRATE 50 MCG: 50 INJECTION, SOLUTION INTRAMUSCULAR; INTRAVENOUS at 10:37

## 2023-06-01 RX ADMIN — PHENYLEPHRINE HYDROCHLORIDE 200 MCG: 10 INJECTION INTRAVENOUS at 11:45

## 2023-06-01 RX ADMIN — DEXAMETHASONE SODIUM PHOSPHATE 4 MG: 4 INJECTION, SOLUTION INTRA-ARTICULAR; INTRALESIONAL; INTRAMUSCULAR; INTRAVENOUS; SOFT TISSUE at 11:37

## 2023-06-01 RX ADMIN — HYDROMORPHONE HYDROCHLORIDE 0.2 MG: 1 INJECTION, SOLUTION INTRAMUSCULAR; INTRAVENOUS; SUBCUTANEOUS at 13:58

## 2023-06-01 RX ADMIN — TRANEXAMIC ACID 1950 MG: 650 TABLET ORAL at 08:34

## 2023-06-01 RX ADMIN — SENNOSIDES AND DOCUSATE SODIUM 1 TABLET: 50; 8.6 TABLET ORAL at 20:04

## 2023-06-01 ASSESSMENT — ACTIVITIES OF DAILY LIVING (ADL)
ADLS_ACUITY_SCORE: 23
ADLS_ACUITY_SCORE: 20
ADLS_ACUITY_SCORE: 26
ADLS_ACUITY_SCORE: 20
ADLS_ACUITY_SCORE: 20
ADLS_ACUITY_SCORE: 23

## 2023-06-01 NOTE — ANESTHESIA POSTPROCEDURE EVALUATION
Patient: Tee Hilton    Procedure: Procedure(s):  Reverse arthroplasty shoulder       Anesthesia Type:  General    Note:  Disposition: Outpatient   Postop Pain Control: Uneventful            Sign Out: Well controlled pain   PONV: No   Neuro/Psych: Uneventful            Sign Out: Acceptable/Baseline neuro status   Airway/Respiratory: Uneventful            Sign Out: Acceptable/Baseline resp. status   CV/Hemodynamics: Uneventful            Sign Out: Acceptable CV status; No obvious hypovolemia; No obvious fluid overload   Other NRE: NONE   DID A NON-ROUTINE EVENT OCCUR? No           Last vitals:  Vitals Value Taken Time   /92 06/01/23 1430   Temp 36.5  C (97.7  F) 06/01/23 1430   Pulse 67 06/01/23 1445   Resp 21 06/01/23 1446   SpO2 95 % 06/01/23 1457   Vitals shown include unvalidated device data.    Electronically Signed By: Amada Loco MD  June 1, 2023  2:59 PM

## 2023-06-01 NOTE — INTERVAL H&P NOTE
"I have reviewed the surgical (or preoperative) H&P that is linked to this encounter, and examined the patient. There are no significant changes    Clinical Conditions Present on Arrival:  Clinically Significant Risk Factors Present on Admission                # Drug Induced Coagulation Defect: home medication list includes an anticoagulant medication   # Obesity: Estimated body mass index is 36.01 kg/m  as calculated from the following:    Height as of this encounter: 1.791 m (5' 10.51\").    Weight as of this encounter: 115.5 kg (254 lb 10.1 oz).       "

## 2023-06-01 NOTE — PLAN OF CARE
"Pt arrived to unit at 1620    VS: BP (!) 140/78 (BP Location: Right arm, Patient Position: Semi-Ivory's)   Pulse 76   Temp 97.8  F (36.6  C) (Oral)   Resp 19   Ht 1.791 m (5' 10.51\")   Wt 115.5 kg (254 lb 10.1 oz)   SpO2 93%   BMI 36.01 kg/m      Irregular apical pulse - hx of A fib   O2: O2> 95% ORA, denies feeling SOB, lightheadedness  Lungs clear, equal bilateral  On CAPNO   Output: Voids spontaneously using bedside urinal, needs assistance with palcement   Denies difficulty in voiding   Last BM: 6/1, small bm prior to surgery    Activity: Ax1-2 w/ bedside cares, pt able to turn and repo with assist  Has not been OOB since arriving  L arm in sling     Up for meals? Sits up for meals   Skin: Surgical incision to LUE   Pain: Pain reported to left shoulder, managed w/ scheduled tylenol and pr oxy, atarax, and robaxin    CMS: Aox4, reports numbness to LUE d/t interscalene block    Prn ambien CR available at night   Dressing: CDI   Diet: Reg, reported intermittent nausea at PACU - given prn zofran w/ decrease in symptoms reported    LDA: R hand PIV infusing LR at 100 ml/hr   Plan: Continue POC for pain management and IV antibiotics, plan to work with therapies   Additional Info:           "

## 2023-06-01 NOTE — CONSULTS
HOSPITALIST CONSULTATION     REQUESTING PHYSICIAN: Ketty Botello MD    REASON FOR CONSULTATION: Evaluation, Recommendations and Co-management of Medical Comorbidities.     ASSESSMENT & PLAN :     Tee Hilton is a 72 year old male admitted on 6/1/2023 s/p following procedure: *  Reverse arthroplasty shoulder  By Dr. Botello.   mL.  No complication.    PMH, Pre Op eval reviewed.   Currently doing well.     PMH significant for A. Fib (on Xarelto) CHF, AUGUSTO, obesity, HTN, CKD 3, GOUT, GERD, anxiety, SNHL ( bilateral) and sciatica.    # Orthopedic Surgery:   POD 0    Post Op Management per Primary team.   Hemodynamics: stable currently.   Continue on gentle IV fluids, until adequate PO.    Post op 24 hr capnography per protocol.   Pain control- per Primary team and/or Pain team.    Judicious use of narcotics.  Consider bowel regimen with narcotics.   Encourage Incentive spirometry to prevent atelectasis.  DVT prophylaxis- per Primary team.  Activity, antibiotics, wound and/or drain care - per Primary team.     # Anemia of acute blood loss:  Monitor hgb for anemia of acute blood loss. Transfuse for hgb <7.0. Pre op Hgb 14.4   #Cardiovascular:.  No history of coronary artery disease.  Currently asymptomatic.  Stable vitals.    Echo 2021-The ejection fraction is 50-55%(borderline).Global right ventricular function is normal  - Afib- on Xarelto-held 72 hours prior to the procedure.  Resume postop as soon as deemed safe from surgical standpoint.  -Hypertension: Controlled.  Resume PTA amlodipine, metoprolol with hold parameters.  Hold PTA lisinopril for now.  Resume as appropriate.    #Obstructive sleep apnea: Does not use CPAP.  Monitor.  Oxygen as needed.    #GERD: Continue PPI.    #Hx of CKD 3  - Baseline Creatinine  1.54  Monitor BMP.  Avoid nephrotoxics.    #Insomnia: Continue PTA Ambien.    Rest per primary.     Dw patient, RN.     Thank you for the consultation.  Please page with any question. Hospitalist team to follow.      Anastacio Palomino MD   Hospitalist, Internal Medicine      CHIEF COMPLAINT: Postop, doing well.    HISTORY OF PRESENT ILLNESS: Obtained from the patient and chart review including Pre op evaluation, procedure note.    72 year old year old male  with above discussed medical problems s/p above procedure admitted on 6/1/2023  for post op care and monitoring  (for further details for indication of surgery and operative note, please refer to Ketty Botello MD note).   No documented hypotension, hypoxemia or other significant complications intra or post operative.   Medicine consulted to evaluate, recommend and/or co manage medical co morbidities.  Currently: Hemodynamics stable. Incisional Pain controlled.. Denies any chest pain, cough, shortness of breath or LH or palpitations. Denies any nausea, vomiting or pain abdomen. No fever or chills. Denies any dysuria.  Denies any new rash. No new focal neuro deficit. No other concern.   Medical issues as discussed above.     PAST MEDICAL HISTORY:   Past Medical History:   Diagnosis Date     Congestive heart failure (H) 2020     Gastroesophageal reflux disease      Heart disease 2018     Hypertension      Irregular heart beat      Sleep apnea        PAST SURGICAL HISTORY:   Past Surgical History:   Procedure Laterality Date     ANESTHESIA CARDIOVERSION N/A 9/24/2018    Procedure: ANESTHESIA CARDIOVERSION;  Cardioversion ;  Surgeon: GENERIC ANESTHESIA PROVIDER;  Location: [facility] OR     BIOPSY  Summer 2021     COLONOSCOPY N/A 5/25/2017    Procedure: COMBINED COLONOSCOPY, SINGLE OR MULTIPLE BIOPSY/POLYPECTOMY BY BIOPSY;;  Surgeon: Aquilino Garcia MD;  Location:  OR     COLONOSCOPY N/A 7/23/2021    Procedure: COLONOSCOPY, WITH POLYPECTOMY AND BIOPSY;  Surgeon: Israel Bey DO;  Location: [facility]     COLONOSCOPY WITH CO2 INSUFFLATION N/A 5/25/2017    Procedure: COLONOSCOPY WITH CO2 INSUFFLATION;   COLONOSCOPY SCREEN/ SUSHMA;  Surgeon: Aquilino Garcia MD;  Location: [facility] OR     OPEN REDUCTION INTERNAL FIXATION TIBIA       PHACOEMULSIFICATION CLEAR CORNEA WITH STANDARD INTRAOCULAR LENS IMPLANT Left 4/17/2023    Procedure: LEFT PHACOEMULSIFICATION, CATARACT, WITH INTRAOCULAR LENS IMPLANT;  Surgeon: Porsche Durán MD;  Location: [facility] OR     SEPTOPLASTY, TURBINOPLASTY, COMBINED Bilateral 7/9/2019    Procedure: ENDOSCOPIC SEPTOPLASTY, BILATERAL TURBINATE REDUCTION;  Surgeon: Alexander Avila MD;  Location: [facility] OR     TONSILLECTOMY         FH: reviewed.     Family History   Problem Relation Age of Onset     Anxiety Disorder Mother      Gout Father      Lung Cancer Father      Other Cancer Father      Glaucoma No family hx of      Macular Degeneration No family hx of         SH: reviewed.     Social History     Socioeconomic History     Marital status:      Spouse name: None     Number of children: 3     Years of education: None     Highest education level: None   Occupational History     Occupation: sales and marketing   Tobacco Use     Smoking status: Never     Smokeless tobacco: Never     Tobacco comments:     never smoked   Vaping Use     Vaping status: Never Used   Substance and Sexual Activity     Alcohol use: Yes     Comment: 2/3 beers a week     Drug use: Never     Sexual activity: Yes     Partners: Female   Other Topics Concern     Parent/sibling w/ CABG, MI or angioplasty before 65F 55M? Yes       ALLERGIES:   Allergies   Allergen Reactions     Erythromycin GI Disturbance     Upset stomach     Ethanol      Other reaction(s): stomach ache         HOME MEDICATIONS:     Prior to Admission medications    Medication Sig Start Date End Date Taking? Authorizing Provider   acetaminophen (TYLENOL) 500 MG tablet Take 500-1,000 mg by mouth every 8 hours as needed for mild pain   Yes Unknown, Entered By History   allopurinol (ZYLOPRIM) 100 MG tablet Take 1 tablet by mouth once daily 3/28/23  Yes  Jon Denson PA-C   amLODIPine (NORVASC) 5 MG tablet TAKE 1 & 1/2 (ONE & ONE-HALF) TABLETS BY MOUTH ONCE DAILY  Patient taking differently: Take 7.5 mg by mouth daily TAKE 1 & 1/2 (ONE & ONE-HALF) TABLETS BY MOUTH ONCE DAILY 1/17/23  Yes Jon Denson PA-C   Bromfenac Sodium POWD 1 Bottle 4 times daily 1 drop four times a day in the operative eye starting 1 day before surgery. Use until the bottle runs out.  Patient taking differently: 1 Bottle 3 times daily 1 drop four times a day in the operative eye starting 1 day before surgery. Use until the bottle runs out. 3/23/23  Yes Porsche Durán MD   Dexamethasone Acetate POWD 1 Bottle 4 times daily 1 drop four times a day in the operative eye starting 1 day before surgery. Use until the bottle runs out.  Patient taking differently: 1 Bottle 3 times daily 1 drop four times a day in the operative eye starting 1 day before surgery. Use until the bottle runs out. 3/23/23  Yes Porsche Durán MD   famotidine (PEPCID) 20 MG tablet Take 1 tablet (20 mg) by mouth nightly as needed 7/15/21  Yes Jon Denson PA-C   lisinopril (ZESTRIL) 20 MG tablet TAKE 1/2 TO 1 (ONE-HALF TO ONE) TABLET BY MOUTH ONCE DAILY (FOLLOW  DRS  CURRENT  DIRECTIONS  ON  DOSING)  Patient taking differently: Take 20 mg by mouth daily 3/28/23  Yes Jon Denson PA-C   LORazepam (ATIVAN) 0.5 MG tablet TAKE 1 TABLET BY MOUTH EVERY 8 HOURS AS NEEDED FOR ANXIETY 5/12/23  Yes Jon Denson PA-C   metoprolol succinate ER (TOPROL XL) 100 MG 24 hr tablet TAKE 1 & 1/2 (ONE & ONE-HALF) TABLETS BY MOUTH ONCE DAILY  Patient taking differently: Take 150 mg by mouth daily TAKE 1 & 1/2 (ONE & ONE-HALF) TABLETS BY MOUTH ONCE DAILY 1/17/23  Yes Jon Denson PA-C   MOUNJARO 2.5 MG/0.5ML pen INJECT 2.5 MG SUBCUTANEOUSLY EVERY 7 DAYS  Patient taking differently: Inject 2.5 mg Subcutaneous once a week Takes on Mondays 5/3/23  Yes Jon Denson PA-C   Moxifloxacin HCl POWD 1 Bottle 4  times daily 1 drop four times a day in the operative eye starting 1 day before surgery. Use until the bottle runs out.  Patient taking differently: 1 Bottle 3 times daily 1 drop four times a day in the operative eye starting 1 day before surgery. Use until the bottle runs out. 3/23/23  Yes Porsche Durán MD   ondansetron (ZOFRAN ODT) 4 MG ODT tab DISSOLVE 1 TABLET IN MOUTH NIGHTLY AS NEEDED FOR NAUSEA 5/12/23  Yes Jon Denson PA-C   pantoprazole (PROTONIX) 40 MG EC tablet Take 1 tablet by mouth twice daily 1/13/23  Yes Jon Denson PA-C   Probiotic Product (PROBIOTIC PO) Take 1 capsule by mouth daily   Yes Unknown, Entered By History   sildenafil (REVATIO) 20 MG tablet Take 1 -5 tabs daily prn  Patient taking differently: Take  mg by mouth daily as needed Take 1 -5 tabs daily prn 12/24/19  Yes Jon Denson PA-C   BELSOMRA 10 MG tablet TAKE 1 TABLET BY MOUTH NIGHTLY AS NEEDED FOR SLEEP  Patient not taking: Reported on 5/10/2023 4/9/23   Jon Denson PA-C   dorzolamide-timolol (COSOPT) 2-0.5 % ophthalmic solution Apply 1 drop to eye 2 times daily  Patient not taking: Reported on 5/10/2023 4/18/23   Porsche Durán MD   tamsulosin (FLOMAX) 0.4 MG capsule Take 1 capsule (0.4 mg) by mouth every evening  Patient not taking: Reported on 5/10/2023 1/19/23   Jon Denson PA-C   XARELTO ANTICOAGULANT 20 MG TABS tablet Take 1 tablet by mouth once daily with breakfast 3/4/23   Jon Denson PA-C   zolpidem ER (AMBIEN CR) 12.5 MG CR tablet TAKE 1 TABLET BY MOUTH NIGHTLY AS NEEDED 5/24/23   Jon Denson PA-C       CURRENT MEDICATIONS:    Current Facility-Administered Medications   Medication     [START ON 6/4/2023] acetaminophen (TYLENOL) tablet 650 mg     acetaminophen (TYLENOL) tablet 975 mg     benzocaine-menthol (CHLORASEPTIC) 6-10 MG lozenge 1 lozenge     bisacodyl (DULCOLAX) suppository 10 mg     ceFAZolin (ANCEF) 2 g in 100 mL D5W intermittent infusion      "HYDROmorphone (DILAUDID) injection 0.2 mg    Or     HYDROmorphone (DILAUDID) injection 0.4 mg     lactated ringers infusion     lidocaine (LMX4) cream     lidocaine 1 % 0.1-1 mL     magnesium hydroxide (MILK OF MAGNESIA) suspension 30 mL     naloxone (NARCAN) injection 0.2 mg    Or     naloxone (NARCAN) injection 0.4 mg    Or     naloxone (NARCAN) injection 0.2 mg    Or     naloxone (NARCAN) injection 0.4 mg     ondansetron (ZOFRAN ODT) ODT tab 4 mg    Or     ondansetron (ZOFRAN) injection 4 mg     oxyCODONE (ROXICODONE) tablet 5 mg     [START ON 2023] polyethylene glycol (MIRALAX) Packet 17 g     prochlorperazine (COMPAZINE) injection 5 mg    Or     prochlorperazine (COMPAZINE) tablet 5 mg     senna-docusate (SENOKOT-S/PERICOLACE) 8.6-50 MG per tablet 1 tablet     sodium chloride (PF) 0.9% PF flush 3 mL     sodium chloride (PF) 0.9% PF flush 3 mL     Facility-Administered Medications Ordered in Other Encounters   Medication     sodium chloride (PF) 0.9% PF flush 10 mL         ROS: 10 point ROS neg other than the symptoms noted above in the HPI.    PHYSICAL EXAMINATION:     BP (!) 140/78 (BP Location: Right arm, Patient Position: Semi-Ivory's)   Pulse 76   Temp 97.8  F (36.6  C) (Oral)   Resp 19   Ht 1.791 m (5' 10.51\")   Wt 115.5 kg (254 lb 10.1 oz)   SpO2 93%   BMI 36.01 kg/m    Temp (24hrs), Av  F (36.7  C), Min:97.7  F (36.5  C), Max:98.2  F (36.8  C)      BMI= Body mass index is 36.01 kg/m .      Intake/Output Summary (Last 24 hours) at 2023 1645  Last data filed at 2023 1600  Gross per 24 hour   Intake 958 ml   Output 250 ml   Net 708 ml       General: Awake, interactive, NAD.   HEENT: AT/NC. No icterus. Moist MM.    Neck: Supple.    Heart/CVS: Normal S1 and S2. Regular.   Chest/Respi: Normal work of breathing. CTA BL.   Abdomen/GI: Soft, non distended, non tender. No g/r.  Extremities/MSK: Distal pulses 2+, well perfused. Rest per ortho.   Neuro: Alert and oriented x4. Grossly non " focal.   Skin:  No new rash over exposed areas.       LABORATORY DATA: reviewed.     Recent Results (from the past 24 hour(s))   Glucose by meter    Collection Time: 06/01/23  8:07 AM   Result Value Ref Range    GLUCOSE BY METER POCT 118 (H) 70 - 99 mg/dL       Recent Results (from the past 24 hour(s))   POC US Guidance Needle Placement    Impression    Left interscalene block   XR Shoulder Left Port G/E 2 Views    Narrative    EXAM: XR SHOULDER LEFT PORT G/E 2 VIEWS  LOCATION: [facility]  DATE/TIME: 6/1/2023 2:32 PM CDT    INDICATION: Status post surgery  COMPARISON: None.      Impression    IMPRESSION: Postop changes of a left reverse total shoulder arthroplasty. No fracture.           Anastacio Palomino MD        This note was in part completed with Dragon, a speech recognition software. Some grammatical and transcription errors may have occurred. If you have any concern, please contact me for clarification.

## 2023-06-01 NOTE — OR NURSING
PACU to Inpatient Nursing Handoff    Patient Tee Hilton is a 72 year old male who speaks English.   Procedure Procedure(s):  Reverse arthroplasty shoulder   Surgeon(s) Primary: Ketty Botello MD  Resident - Assisting: Mars Lane MD     Allergies   Allergen Reactions     Erythromycin GI Disturbance     Upset stomach     Ethanol      Other reaction(s): stomach ache       Isolation  [unfilled]     Past Medical History   has a past medical history of Congestive heart failure (H) (2020), Gastroesophageal reflux disease, Heart disease (2018), Hypertension, Irregular heart beat, and Sleep apnea.    Anesthesia General with Block   Dermatome Level     Preop Meds acetaminophen (Tylenol) - time given: 0834   Nerve block Interscalene.  Location:left. Med:Exparel (liposomal bupivacaine). Time given: 1050   Intraop Meds dexamethasone (Decadron)  fentanyl (Sublimaze): 50 mcg total  ondansetron (Zofran): last given at 1323   Local Meds No   Antibiotics cefazolin (Ancef) - last given at 1103     Pain Patient Currently in Pain: yes   PACU meds  fentanyl (Sublimaze): 25 mcg (total dose) last given at 1348   hydromorphone (Dilaudid): 0.4 mg (total dose) last given at 1413    5 mg oxycodone last given at 1428     PCA / epidural No   Capnography     Telemetry ECG Rhythm: Normal sinus rhythm   Inpatient Telemetry Monitor Ordered? No        Labs Glucose Lab Results   Component Value Date     06/01/2023     03/09/2023    GLC 94 07/09/2021       Hgb Lab Results   Component Value Date    HGB 14.4 03/10/2023    HGB 13.8 07/09/2021       INR Lab Results   Component Value Date    INR 1.02 06/04/2007      PACU Imaging Completed     Wound/Incision Incision/Surgical Site 04/17/23 Left Eye (Active)   Number of days: 45       Incision/Surgical Site 06/01/23 Left Shoulder (Active)   Incision Assessment UTV 06/01/23 1337   Latoya-Incision Assessment UTV 06/01/23 1337   Closure ALEX 06/01/23 1337   Incision Drainage  Amount None 06/01/23 1337   Dressing Intervention Clean, dry, intact 06/01/23 1337   Number of days: 0      CMS        Equipment ice pack and shoulder sling   Other LDA       IV Access Peripheral IV 06/01/23 Right Hand (Active)   Site Assessment WDL 06/01/23 1337   Line Status Infusing 06/01/23 1337   Dressing Status clean;dry;intact 06/01/23 1337   Phlebitis Scale 0-->no symptoms 06/01/23 1337   Number of days: 0      Blood Products Not applicable EBL Minimal per CRNA mL   Intake/Output Date 06/01/23 0700 - 06/02/23 0659   Shift 3998-2861 3432-7064 4697-7834 24 Hour Total   INTAKE   I.V. 500   500   Shift Total(mL/kg) 500(4.33)   500(4.33)   OUTPUT   Shift Total(mL/kg)       Weight (kg) 115.5 115.5 115.5 115.5      Drains / Davis     Time of void PreOp Time of Void Prior to Procedure: 0830 (06/01/23 0846)    PostOp      Diapered? No   Bladder Scan     PO    tolerating sips     Vitals    B/P: 136/89  T: 98.2  F (36.8  C)    Temp src: Oral  P:  Pulse: 63 (06/01/23 1345)          R: 19  O2:  SpO2: 99 %    O2 Device: Simple face mask (06/01/23 1337)    Oxygen Delivery: 8 LPM (06/01/23 1337)         Family/support present significant other   Patient belongings  sent with patient     Patient transported on cart   DC meds/scripts (obs/outpt) Not applicable   Inpatient Pain Meds Released? Yes       Special needs/considerations None   Tasks needing completion None       Jailene Olivier, RN  [facility] [id number]

## 2023-06-01 NOTE — ANESTHESIA PROCEDURE NOTES
"Brachial plexus Procedure Note    Pre-Procedure   Staff -        Anesthesiologist:  Dewey Caldwell MD       Resident/Fellow: James Ritter MD       Performed By: anesthesiologist and resident       Procedure Start/Stop Times: 6/1/2023 10:50 AM  Timeout:       Correct Patient: Yes        Correct Procedure: Yes        Correct Site: Yes        Correct Position: Yes        Correct Laterality: Yes        Site Marked: Yes  Procedure Documentation  Procedure: Brachial plexus       Diagnosis: LEFT SHOULDER PAIN       Laterality: left       Patient Position: supine       Skin prep: Chloraprep       Local skin infiltrated with 3 mL of 1% lidocaine.  (interscalene approach).       Needle Type: short bevel       Needle Gauge: 21.        Needle Length (Inches): 4        Ultrasound guided       1. Ultrasound was used to identify targeted nerve, plexus, vascular marker, or fascial plane and place a needle adjacent to it in real-time.       2. Ultrasound was used to visualize the spread of anesthetic in close proximity to the above referenced structure.       3. A permanent image is entered into the patient's record.       4. The visualized anatomic structures appeared normal.    Assessment/Narrative         The placement was negative for: blood aspirated, painful injection and site bleeding       Paresthesias: No.       Bolus given via needle..        Secured via.        Insertion/Infusion Method: Single Shot       Complications: none    Medication(s) Administered   Bupivacaine 0.5% PF (Infiltration) - Infiltration   6 mL - 6/1/2023 10:50:00 AM  Bupivacaine liposome (Exparel) 1.3% LA inj susp (Infiltration) - Infiltration   10 mL - 6/1/2023 10:50:00 AM  Medication Administration Time: 6/1/2023 10:50 AM      FOR [facility] ([facility]) ONLY:   Pain Team Contact information: please page the Pain Team Via MyStream. Search \"Pain\". During daytime hours, please page the attending first. At night please page the resident " first.

## 2023-06-01 NOTE — ANESTHESIA CARE TRANSFER NOTE
Patient: Tee Hilton    Procedure: Procedure(s):  Reverse arthroplasty shoulder       Diagnosis: Primary osteoarthritis of left shoulder [M19.012]  Diagnosis Additional Information: No value filed.    Anesthesia Type:   General     Note:    Oropharynx: spontaneously breathing and oropharynx clear of all foreign objects  Level of Consciousness: awake  Oxygen Supplementation: face mask  Level of Supplemental Oxygen (L/min / FiO2): 6  Independent Airway: airway patency satisfactory and stable  Dentition: dentition unchanged  Vital Signs Stable: post-procedure vital signs reviewed and stable  Report to RN Given: handoff report given  Patient transferred to: PACU    Handoff Report: Identifed the Patient, Identified the Reponsible Provider, Reviewed the pertinent medical history, Discussed the surgical course, Reviewed Intra-OP anesthesia mangement and issues during anesthesia, Set expectations for post-procedure period and Allowed opportunity for questions and acknowledgement of understanding      Vitals:  Vitals Value Taken Time   /89 06/01/23 1345   Temp     Pulse 65 06/01/23 1347   Resp 20 06/01/23 1347   SpO2 99 % 06/01/23 1347   Vitals shown include unvalidated device data.    Electronically Signed By: Catalino Carballo MD  June 1, 2023  1:49 PM

## 2023-06-01 NOTE — BRIEF OP NOTE
[facility]    Brief Operative Note    Pre-operative diagnosis: Primary osteoarthritis of left shoulder [M19.012]  Post-operative diagnosis Same as pre-operative diagnosis    Procedure: Procedure(s):  Reverse arthroplasty shoulder  Surgeon: Surgeon(s) and Role:     * Ketty Botello MD - Primary     * Mars Lane MD - Resident - Assisting  Anesthesia: General with Block   Estimated Blood Loss: 200 ml    Drains: None  Specimens: * No specimens in log *  Findings:   B2 glenoid, cuff tendinopathy; end stage arthrosis.  Complications: None.  Implants:   Implant Name Type Inv. Item Serial No.  Lot No. LRB No. Used Action   IMP BASEPLATE RVRS SHLDR ZIM MED AUG [id number] - [id number] Total Joint Component/Insert IMP BASEPLATE RVRS SHLDR ZIM MED AUG [id number]  [employer] [id number] Left 1 Implanted   IMP SCR CENTRAL BIOMET REVERSE SHLDR 6.5X25MM [id number] - [id number] Metallic Hardware/[place] IMP SCR CENTRAL BIOMET REVERSE SHLDR 6.5X25MM [id number]  [employer] [id number] Left 1 Implanted   IMP SCR LOCKING BIOM REV SHLDR 3.5 HEX 4.25X77HQ 603125 - [id number] Metallic Hardware/[place] IMP SCR LOCKING BIOM REV SHLDR 3.5 HEX [id number]  [employer] [id number] Left 1 Implanted   IMP SCR LOCKING BIOM REV SHLDR 3.5 HEX 4.58Y71LT 064994 - [id number] Metallic Hardware/[place] IMP SCR LOCKING BIOM REV SHLDR 3.5 HEX [id number] 824233  [employer] [id number] Left 1 Implanted   IMP SCR LOCKING BIOM REV SHLDR 3.5 HEX 4.13E31EB 829018 - [id number] Metallic Hardware/[place] IMP SCR LOCKING BIOM REV SHLDR 3.5 HEX 4.70C52NN 862471  [employer] [id number] Left 1 Implanted   IMP SCR LOCKING BIOM REV SHLDR 3.5 HEX 4.40X98PY 561855 - [id number] Metallic Hardware/[place] IMP SCR LOCKING BIOM REV SHLDR 3.5 HEX 4.56K21ES 445791  [employer] [id number] Left 1 Implanted   IMP GLENOSPHERE ZIM VERSA-DIAL 40MM STD [id number] - [id number] Total Joint Component/Insert IMP  GLENOSPHERE ZIM VERSA-DIAL 40MM STD [id number]  [employer] [id number] Left 1 Implanted   STEM HUM 55MM 16MM CMPRH POR SEDRICK SHLDR RVRS SYS - [id number] Total Joint Component/Insert STEM HUM 55MM 16MM CMPRH POR SEDRICK SHLDR RVRS SYS  [employer] [id number] Left 1 Implanted   IMP BEARING HUMERAL ZIM 40MM STD PRLNG [id number] - [id number] Total Joint Component/Insert IMP BEARING HUMERAL ZIM 40MM STD PRLNG [id number]  [employer] [id number] Left 1 Implanted   IMP HUMERAL TRAY ZIM RVRS SHLDR STD [id number] - [id number] Total Joint Component/Insert IMP HUMERAL TRAY ZIM RVRS SHLDR STD [id number]  [employer] [id number] Left 1 Implanted

## 2023-06-01 NOTE — ANESTHESIA PROCEDURE NOTES
Airway       Patient location during procedure: OR       Procedure Start/Stop Times: 6/1/2023 11:07 AM  Staff -        Anesthesiologist:  Amada Loco MD       Resident/Fellow: Catalino Carballo MD       Performed By: resident  Consent for Airway        Urgency: elective  Indications and Patient Condition       Indications for airway management: chava-procedural       Induction type:intravenous       Mask difficulty assessment: 2 - vent by mask + OA or adjuvant +/- NMBA    Final Airway Details       Final airway type: endotracheal airway       Successful airway: ETT - single and Oral  Endotracheal Airway Details        ETT size (mm): 7.5       Cuffed: yes       Successful intubation technique: video laryngoscopy       VL Blade Size: MAC D Blade       Grade View of Cords: 1       Adjucts: stylet       Position: Right       Measured from: lips       Secured at (cm): 23       Bite block used: None    Post intubation assessment        Placement verified by: capnometry, equal breath sounds and chest rise        Number of attempts at approach: 1       Number of other approaches attempted: 0       Secured with: pink tape       Ease of procedure: easy       Dentition: Unchanged and Intact    Medication(s) Administered   Medication Administration Time: 6/1/2023 11:07 AM

## 2023-06-02 ENCOUNTER — APPOINTMENT (OUTPATIENT)
Dept: OCCUPATIONAL THERAPY | Facility: CLINIC | Age: 73
End: 2023-06-02
Attending: ORTHOPAEDIC SURGERY
Payer: COMMERCIAL

## 2023-06-02 VITALS
BODY MASS INDEX: 35.65 KG/M2 | SYSTOLIC BLOOD PRESSURE: 124 MMHG | WEIGHT: 254.63 LBS | DIASTOLIC BLOOD PRESSURE: 66 MMHG | RESPIRATION RATE: 16 BRPM | HEIGHT: 71 IN | OXYGEN SATURATION: 98 % | TEMPERATURE: 97.7 F | HEART RATE: 65 BPM

## 2023-06-02 LAB
ANION GAP SERPL CALCULATED.3IONS-SCNC: 11 MMOL/L (ref 7–15)
BUN SERPL-MCNC: 23.1 MG/DL (ref 8–23)
CALCIUM SERPL-MCNC: 9.4 MG/DL (ref 8.8–10.2)
CHLORIDE SERPL-SCNC: 99 MMOL/L (ref 98–107)
CREAT SERPL-MCNC: 1.59 MG/DL (ref 0.67–1.17)
DEPRECATED HCO3 PLAS-SCNC: 24 MMOL/L (ref 22–29)
GFR SERPL CREATININE-BSD FRML MDRD: 46 ML/MIN/1.73M2
GLUCOSE BLDC GLUCOMTR-MCNC: 151 MG/DL (ref 70–99)
GLUCOSE SERPL-MCNC: 123 MG/DL (ref 70–99)
HGB BLD-MCNC: 13.9 G/DL (ref 13.3–17.7)
POTASSIUM SERPL-SCNC: 4.8 MMOL/L (ref 3.4–5.3)
SODIUM SERPL-SCNC: 134 MMOL/L (ref 136–145)

## 2023-06-02 PROCEDURE — 250N000013 HC RX MED GY IP 250 OP 250 PS 637: Performed by: INTERNAL MEDICINE

## 2023-06-02 PROCEDURE — 97110 THERAPEUTIC EXERCISES: CPT | Mod: GO

## 2023-06-02 PROCEDURE — 36415 COLL VENOUS BLD VENIPUNCTURE: CPT | Performed by: INTERNAL MEDICINE

## 2023-06-02 PROCEDURE — 99214 OFFICE O/P EST MOD 30 MIN: CPT | Performed by: INTERNAL MEDICINE

## 2023-06-02 PROCEDURE — 250N000013 HC RX MED GY IP 250 OP 250 PS 637: Performed by: ORTHOPAEDIC SURGERY

## 2023-06-02 PROCEDURE — 250N000011 HC RX IP 250 OP 636: Performed by: ORTHOPAEDIC SURGERY

## 2023-06-02 PROCEDURE — 82962 GLUCOSE BLOOD TEST: CPT

## 2023-06-02 PROCEDURE — 85018 HEMOGLOBIN: CPT | Performed by: ORTHOPAEDIC SURGERY

## 2023-06-02 PROCEDURE — 97535 SELF CARE MNGMENT TRAINING: CPT | Mod: GO

## 2023-06-02 PROCEDURE — 82310 ASSAY OF CALCIUM: CPT | Performed by: INTERNAL MEDICINE

## 2023-06-02 PROCEDURE — 97165 OT EVAL LOW COMPLEX 30 MIN: CPT | Mod: GO

## 2023-06-02 PROCEDURE — 250N000013 HC RX MED GY IP 250 OP 250 PS 637: Performed by: STUDENT IN AN ORGANIZED HEALTH CARE EDUCATION/TRAINING PROGRAM

## 2023-06-02 RX ORDER — ACETAMINOPHEN 325 MG/1
650 TABLET ORAL EVERY 4 HOURS PRN
Qty: 60 TABLET | Refills: 0 | Status: SHIPPED | OUTPATIENT
Start: 2023-06-04 | End: 2023-07-19

## 2023-06-02 RX ORDER — AMOXICILLIN 250 MG
1 CAPSULE ORAL 2 TIMES DAILY
Qty: 30 TABLET | Refills: 0 | Status: SHIPPED | OUTPATIENT
Start: 2023-06-02 | End: 2023-07-19

## 2023-06-02 RX ORDER — POLYETHYLENE GLYCOL 3350 17 G/17G
17 POWDER, FOR SOLUTION ORAL DAILY
Qty: 10 PACKET | Refills: 0 | Status: SHIPPED | OUTPATIENT
Start: 2023-06-02 | End: 2023-07-19

## 2023-06-02 RX ORDER — OXYCODONE HYDROCHLORIDE 5 MG/1
5 TABLET ORAL EVERY 4 HOURS PRN
Qty: 26 TABLET | Refills: 0 | Status: SHIPPED | OUTPATIENT
Start: 2023-06-02 | End: 2023-06-07

## 2023-06-02 RX ORDER — HYDROXYZINE HYDROCHLORIDE 25 MG/1
25 TABLET, FILM COATED ORAL EVERY 6 HOURS PRN
Qty: 40 TABLET | Refills: 0 | Status: SHIPPED | OUTPATIENT
Start: 2023-06-02 | End: 2023-07-19

## 2023-06-02 RX ADMIN — METHOCARBAMOL 500 MG: 500 TABLET ORAL at 06:39

## 2023-06-02 RX ADMIN — RIVAROXABAN 20 MG: 10 TABLET, FILM COATED ORAL at 09:35

## 2023-06-02 RX ADMIN — OXYCODONE HYDROCHLORIDE 5 MG: 5 TABLET ORAL at 11:41

## 2023-06-02 RX ADMIN — ACETAMINOPHEN 975 MG: 325 TABLET ORAL at 06:39

## 2023-06-02 RX ADMIN — AMLODIPINE BESYLATE 7.5 MG: 5 TABLET ORAL at 07:58

## 2023-06-02 RX ADMIN — ZOLPIDEM TARTRATE 12.5 MG: 12.5 TABLET, EXTENDED RELEASE ORAL at 00:07

## 2023-06-02 RX ADMIN — METHOCARBAMOL 500 MG: 500 TABLET ORAL at 00:08

## 2023-06-02 RX ADMIN — METOPROLOL SUCCINATE 150 MG: 100 TABLET, EXTENDED RELEASE ORAL at 07:58

## 2023-06-02 RX ADMIN — OXYCODONE HYDROCHLORIDE 5 MG: 5 TABLET ORAL at 07:58

## 2023-06-02 RX ADMIN — SENNOSIDES AND DOCUSATE SODIUM 1 TABLET: 50; 8.6 TABLET ORAL at 07:58

## 2023-06-02 RX ADMIN — CEFAZOLIN SODIUM 2 G: 2 INJECTION, SOLUTION INTRAVENOUS at 02:54

## 2023-06-02 RX ADMIN — PANTOPRAZOLE SODIUM 40 MG: 40 TABLET, DELAYED RELEASE ORAL at 07:58

## 2023-06-02 ASSESSMENT — ACTIVITIES OF DAILY LIVING (ADL)
ADLS_ACUITY_SCORE: 26
ADLS_ACUITY_SCORE: 26
ADLS_ACUITY_SCORE: 31
ADLS_ACUITY_SCORE: 26
ADLS_ACUITY_SCORE: 26
ADLS_ACUITY_SCORE: 31

## 2023-06-02 NOTE — PLAN OF CARE
Goal Outcome Evaluation:  Patient vital signs are at baseline: Yes  Patient able to ambulate as they were prior to admission or with assist devices provided by therapies during their stay:  Yes  Patient MUST void prior to discharge:  Yes  Patient able to tolerate oral intake:  Yes  Pain has adequate pain control using Oral analgesics:  Yes  Does patient have an identified :  Yes  Has goal D/C date and time been discussed with patient:  Yes    VS: Temp: 97.7  F (36.5  C) Temp src: Oral BP: 132/71 Pulse: 77   Resp: 19 SpO2: 94 % O2 Device: None (Room air)    O2:  stable on RA. LS clear and equal bilaterally. Denies chest pain and SOB.    Output: Voids spontaneously without difficulty t / using beside urinal    Last BM: BM 6/1/23 denies abdominal discomfort. BS active / passing flatus.    Activity: Up with Assist of 1 gait belt ad blanca. Pt walked at 2030.   Skin: WDL except, left shoulder incision, sling in place   Pain: Pain managed with see MARs, pt says 0300 no pain, just stiff, arm and hand elevated with pillow.   CMS: Intact, AOx4. Says  tingling on the fingers   Dressing: C/D/I   Diet: Regular diet. Denies nausea/vomiting.     BG overnight    LDA: PIV SL    Equipment: IV pole, personal belongings,    Plan: Fall precautions maintained / Continue with plan of care. Call light within reach, pt able to make needs known.    Additional Info: Plan : Pain management

## 2023-06-02 NOTE — PLAN OF CARE
"  VS: /66 (BP Location: Right arm, Patient Position: Sitting)   Pulse 65   Temp 97.7  F (36.5  C) (Oral)   Resp 16   Ht 1.791 m (5' 10.51\")   Wt 115.5 kg (254 lb 10.1 oz)   SpO2 98%   BMI 36.01 kg/m       O2: Stable on room air   Output: Voids without difficulty in bathroom   Last BM: 6/1- before surgery. Passing flatus, active bowel sounds   Activity: Ax1 with gait belt   Skin: L arm surgical incision  Mepilex to coccyx   Pain: C/o mild L arm surgical incision pain managed with PRN medications and cold packs   CMS: Numbness to L shoulder and forearm. Tingling to L digits   Dressing: L shoulder surgical incision- scant drainage marked   Diet: Regular diet, thin liquids, takes pills whole   LDA: R PIV saline locked, patent   Equipment: IV pole, gait belt, personal belongings   Plan: Possible discharge to home today   Additional Info: A&Ox4. Denies headache, chest pain, SOB. Uses call light appropriately and makes needs known.        DISCHARGE SUMMARY    Pt discharging to: home  Transportation: wife  AVS given and discussed: yes  Stoplight Tool given and discussed: yes  Medications given: yes  Belongings returned: yes  Comments:         "

## 2023-06-02 NOTE — PROGRESS NOTES
Orthopaedic Surgery Progress Note 06/02/2023    Subjective  Per RN, patient did have some delirium last night around midnight. Suspect medication effects as patient received oxycodone, lorazepam, and zolpidem within three hours. Improved this AM. Pain well controlled. Does have some numbness and tingling in the operative extremity from his block. Tolerating diet without nausea or vomiting.  Voiding spontaneously.     Denies fevers, chills, chest pain, SOB. Has been ambulatory to chair.     Objective  Temp: 97.7  F (36.5  C) Temp src: Oral BP: 132/71 Pulse: 91   Resp: 18 SpO2: 93 % O2 Device: None (Room air) Oxygen Delivery: 8 LPM    Exam  Gen: No acute distress, resting comfortably in chair.   Resp: Non-labored breathing on NC   Cardio: Regular rate via peripheral pulse  MSK:  LUE:  - Dressings with dime sized amount of strikethrough   - Fires EPL, FPL, Intrinsics  - SILT medial//ulnar nerves; diminished in axillary regions, currently absent in radial distribution   - Radial pulse 2+, hand wwp          No lab results found in last 7 days.    Invalid input(s): SEDRATE      Assessment: Tee Hilton is a 72 year old male s/p left reverse total shoulder arthroplasty on 6/2/23 with Dr. Botello. Doing well.    Plan:  Orhto Primary  Activity: Up with assist.  Weight bearing status: NWB LUE x at least 6 weeks.  Antibiotics: Ancef x24 hours perioperatively.  Diet: Begin with clear fluids and progress diet as tolerated.  DVT prophylaxis: Okay to resume Xarelto POD1 and mechanical while in the hospital, discharge on PTA Xarelto   Bracing/Splinting: Sling to be kept clean, dry, and intact until follow-up.  Wound Care: Keep dressing in place for at least 7 days postop   Pain management: transition from IV to orals as tolerated.   X-rays: PACU   Physical Therapy:  ROM, ADL's.  Occupational Therapy: ADL's.  Labs: Trend Hgb on POD #1   Consults: PT, OT. medicine, appreciate assistance in caring for this patient.  Follow-up:  Clinic with Dr. Botello in 2 weeks     Disposition: Pending progress with therapies, pain control on orals, and medical stability, anticipate discharge to home with spouse on POD #1.    Alexandrea Ortiz MD, PGY1   Orthopaedic Surgery  [phone]     Please page me directly with any questions/concerns during regular weekday hours before 5 pm. If there is no response, if it is a weekend, or if it is during evening hours then please page the orthopedic surgery resident on call.       Future Appointments   Date Time Provider Department [place]   6/2/2023  8:15 AM Daphne Meyer, OT [facility] [place]   6/19/2023  8:30 AM Ketty Botello MD [facility] [place]   7/24/2023 11:00 AM Ketty Botello MD [facility]

## 2023-06-02 NOTE — DISCHARGE SUMMARY
ORTHOPAEDIC SURGERY DISCHARGE SUMMARY     Date of Admission: 6/1/2023  Date of Discharge: 6/2/2023 11:50 AM  Disposition: Home  Staff Physician: Ketty Botello, *  Primary Care Provider: Jon Denson    DISCHARGE DIAGNOSIS:  Primary osteoarthritis of left shoulder [M19.012]    PROCEDURES: Procedure(s):  Reverse arthroplasty shoulder on 6/1/2023    BRIEF HISTORY:  This is a 72 year old patient who has been followed in clinic. Please refer to that documentation for full details. Briefly, the patient has a history of arthritis of the left shoulder. The patient has failed conservative treatment of symptoms and desires more definitive intervention. Therefore, after reviewing non-operative and operative options including the risks and benefits associated with each, the patient elected to proceed with the above stated procedure.     HOSPITAL COURSE:    The patient was admitted following the above listed procedures for pain control and rehabilitation. Tee Hilton did well post-operatively. Medicine was consulted post operatively to aid in management of medical co-morbidities. The patient received routine nursing cares and at the time of discharge was medically stable. Vital signs were stable throughout admission. The patient is tolerating a regular diet and is voiding spontaneously. All PT/OT goals have been met for safe mobility. Pain is now controlled on oral medications which will be available on discharge. Stool softeners have been used while taking pain medications to help prevent constipation. Tee Hilton is deemed medically safe to discharge.     Antibiotics:  Ancef given periop and 24 hours postop.  DVT prophylaxis:  Mechanical, PTA Xarelto restarted on POD1   PT Progress:  Has met PT/OT goals for safe mobility.   Pain Meds:  Weaned off all IV pain meds by discharge.  Inpatient Events: No significant events or complications.    PHYSICAL EXAM:    Gen: No acute distress, resting comfortably in  chair.   Resp: Non-labored breathing on NC   Cardio: Regular rate via peripheral pulse  MSK:  LUE:  - Dressings with dime sized amount of strikethrough   - Fires EPL, FPL, Intrinsics  - SILT medial//ulnar nerves; diminished in axillary regions, currently absent in radial distribution   - Radial pulse 2+, hand wwp       FOLLOWUP:    Follow up with Dr. Botello at 2 weeks postoperatively.    Future Appointments   Date Time Provider Department Center   6/19/2023  8:30 AM Ketty Botello MD [facility]   7/24/2023 11:00 AM Ketty Botello MD [facility]       Orthopaedic Surgery appointments are at the [facility] ([address] [place] [address]). Call [phone] to schedule a follow-up appointment at this location with your provider.     PLANNED DISCHARGE ORDERS:     DVT Prophylaxis: Mechanical, Xarelto      Activity: NWB LUE      Wound Care: see Below      Current Discharge Medication List        START taking these medications    Details   hydrOXYzine (ATARAX) 25 MG tablet Take 1 tablet (25 mg) by mouth every 6 hours as needed for other (adjuvant pain)  Qty: 40 tablet, Refills: 0    Associated Diagnoses: Arthrosis of left shoulder      oxyCODONE (ROXICODONE) 5 MG tablet Take 1 tablet (5 mg) by mouth every 4 hours as needed for moderate pain  Qty: 26 tablet, Refills: 0    Associated Diagnoses: Arthrosis of left shoulder      polyethylene glycol (MIRALAX) 17 g packet Take 17 g by mouth daily  Qty: 10 packet, Refills: 0    Associated Diagnoses: Arthrosis of left shoulder      senna-docusate (SENOKOT-S/PERICOLACE) 8.6-50 MG tablet Take 1 tablet by mouth 2 times daily  Qty: 30 tablet, Refills: 0    Associated Diagnoses: Arthrosis of left shoulder      tiZANidine (ZANAFLEX) 4 MG tablet Take 0.5 tablets (2 mg) by mouth 3 times daily as needed for muscle spasms  Qty: 30 tablet, Refills: 0    Associated Diagnoses: Arthrosis of left shoulder           CONTINUE  these medications which have CHANGED    Details   acetaminophen (TYLENOL) 325 MG tablet Take 2 tablets (650 mg) by mouth every 4 hours as needed for other  Qty: 60 tablet, Refills: 0    Associated Diagnoses: Arthrosis of left shoulder           CONTINUE these medications which have NOT CHANGED    Details   allopurinol (ZYLOPRIM) 100 MG tablet Take 1 tablet by mouth once daily  Qty: 90 tablet, Refills: 0    Associated Diagnoses: Hyperuricemia      amLODIPine (NORVASC) 5 MG tablet TAKE 1 & 1/2 (ONE & ONE-HALF) TABLETS BY MOUTH ONCE DAILY  Qty: 135 tablet, Refills: 0    Associated Diagnoses: Essential hypertension      famotidine (PEPCID) 20 MG tablet Take 1 tablet (20 mg) by mouth nightly as needed  Qty: 60 tablet, Refills: 1    Associated Diagnoses: Abdominal pain, epigastric      lisinopril (ZESTRIL) 20 MG tablet TAKE 1/2 TO 1 (ONE-HALF TO ONE) TABLET BY MOUTH ONCE DAILY (FOLLOW  DRS  CURRENT  DIRECTIONS  ON  DOSING)  Qty: 90 tablet, Refills: 0    Associated Diagnoses: Essential hypertension; Benign essential hypertension      LORazepam (ATIVAN) 0.5 MG tablet TAKE 1 TABLET BY MOUTH EVERY 8 HOURS AS NEEDED FOR ANXIETY  Qty: 25 tablet, Refills: 0    Associated Diagnoses: Anxiety      metoprolol succinate ER (TOPROL XL) 100 MG 24 hr tablet TAKE 1 & 1/2 (ONE & ONE-HALF) TABLETS BY MOUTH ONCE DAILY  Qty: 135 tablet, Refills: 0    Associated Diagnoses: Chronic atrial fibrillation (H)      MOUNJARO 2.5 MG/0.5ML pen INJECT 2.5 MG SUBCUTANEOUSLY EVERY 7 DAYS  Qty: 4 mL, Refills: 0    Associated Diagnoses: Morbid obesity (H)      ondansetron (ZOFRAN ODT) 4 MG ODT tab DISSOLVE 1 TABLET IN MOUTH NIGHTLY AS NEEDED FOR NAUSEA  Qty: 30 tablet, Refills: 0    Associated Diagnoses: Nausea      pantoprazole (PROTONIX) 40 MG EC tablet Take 1 tablet by mouth twice daily  Qty: 180 tablet, Refills: 1    Associated Diagnoses: Dunbar's esophagus determined by biopsy      Probiotic Product (PROBIOTIC PO) Take 1 capsule by mouth daily       sildenafil (REVATIO) 20 MG tablet Take 1 -5 tabs daily prn  Qty: 30 tablet, Refills: 11    Associated Diagnoses: Other male erectile dysfunction      dorzolamide-timolol (COSOPT) 2-0.5 % ophthalmic solution Apply 1 drop to eye 2 times daily  Qty: 10 mL, Refills: 0    Comments: Any generic ok  Associated Diagnoses: Pseudophakia      XARELTO ANTICOAGULANT 20 MG TABS tablet Take 1 tablet by mouth once daily with breakfast  Qty: 90 tablet, Refills: 0    Associated Diagnoses: Chronic atrial fibrillation (H)      zolpidem ER (AMBIEN CR) 12.5 MG CR tablet TAKE 1 TABLET BY MOUTH NIGHTLY AS NEEDED  Qty: 30 tablet, Refills: 0    Associated Diagnoses: Primary insomnia               Discharge Procedure Orders   Reason for your hospital stay   Order Comments: : Tee Hilton is a 72 year old male s/p left reverse total shoulder arthroplasty on 6/2/23 with Dr. Botello     Activity   Order Comments: Your activity upon discharge: activity as tolerated    Activity: Up with assist.  Weight bearing status: NWB LUE x at least 6 weeks.     Order Specific Question Answer Comments   Is discharge order? Yes      When to contact your care team   Order Comments: Call Dr. Botello  if you have any of the following: temperature greater than 101.3  or less than 96.5,  increased shortness of breath, increased drainage, increased swelling, or increased pain.    Contact phone number is      Wound care and dressings   Order Comments: Instructions to care for your wound at home: ice to area for comfort, keep wound clean and dry, may get incision wet in shower but do not soak or scrub, reinforce dressing as needed, and remove dressing in 7 days.     Adult [facility] Follow-up and recommended labs and tests   Order Comments: Follow up with Dr Botello as scheduled.      Appointments at [facility] at [address] [place] [id number] ([facility])     Call [phone] if you haven't heard  regarding these appointments within 7 days of discharge.     Diet   Order Comments: Follow this diet upon discharge: Orders Placed This Encounter      Advance Diet as Tolerated: Regular Diet Adult     Order Specific Question Answer Comments   Is discharge order? Yes            Alexandrea Ortiz MD, PGY1   Orthopaedic Surgery  [phone]

## 2023-06-02 NOTE — PHARMACY-ADMISSION MEDICATION HISTORY
Pharmacist Admission Medication History    Admission medication history is complete. The information provided in this note is only as accurate as the sources available at the time of the update.    Medication reconciliation/reorder completed by provider prior to medication history? Yes    Information Source(s): Patient and medication history completed by Aguila Rockwell PharmD on 5/10/23 via phone    Pertinent Information: Confirmed Xarelto last taken on Monday 5/29/23. Other last doses were charted by RN pre-op.     Changes made to PTA medication list:    Added: None    Deleted: moxifloxacin, dexamethasone and bromfenac eye drops (no longer taking per patient), tamsulosin (not taking for months)     Changed: None    Medication Affordability:       Allergies reviewed with patient and updates made in EHR: by RN     Medication History Completed By: Ramirez Soto RPH 6/1/2023 9:15 PM    Prior to Admission medications    Medication Sig Last Dose Taking? Auth Provider Long Term End Date   acetaminophen (TYLENOL) 500 MG tablet Take 500-1,000 mg by mouth every 8 hours as needed for mild pain Unknown Yes Unknown, Entered By History     allopurinol (ZYLOPRIM) 100 MG tablet Take 1 tablet by mouth once daily 6/1/2023 at 0600 Yes Jon Denson PA-C     amLODIPine (NORVASC) 5 MG tablet TAKE 1 & 1/2 (ONE & ONE-HALF) TABLETS BY MOUTH ONCE DAILY  Patient taking differently: Take 7.5 mg by mouth daily TAKE 1 & 1/2 (ONE & ONE-HALF) TABLETS BY MOUTH ONCE DAILY 6/1/2023 at 0600 Yes Jon Denson PA-C Yes    famotidine (PEPCID) 20 MG tablet Take 1 tablet (20 mg) by mouth nightly as needed Unknown Yes Jon Denson PA-C     lisinopril (ZESTRIL) 20 MG tablet TAKE 1/2 TO 1 (ONE-HALF TO ONE) TABLET BY MOUTH ONCE DAILY (FOLLOW  DRS  CURRENT  DIRECTIONS  ON  DOSING)  Patient taking differently: Take 20 mg by mouth daily 5/31/2023 at 0800 Yes Jon Denson PA-C Yes    LORazepam (ATIVAN) 0.5 MG tablet TAKE 1 TABLET BY MOUTH  EVERY 8 HOURS AS NEEDED FOR ANXIETY 5/31/2023 Yes Jon Denson PA-C     metoprolol succinate ER (TOPROL XL) 100 MG 24 hr tablet TAKE 1 & 1/2 (ONE & ONE-HALF) TABLETS BY MOUTH ONCE DAILY  Patient taking differently: Take 150 mg by mouth daily TAKE 1 & 1/2 (ONE & ONE-HALF) TABLETS BY MOUTH ONCE DAILY 6/1/2023 at 0600 Yes Jon Denson PA-C Yes    MOUNJARO 2.5 MG/0.5ML pen INJECT 2.5 MG SUBCUTANEOUSLY EVERY 7 DAYS  Patient taking differently: Inject 2.5 mg Subcutaneous once a week Takes on Mondays Past Month Yes Jon Denson PA-C     ondansetron (ZOFRAN ODT) 4 MG ODT tab DISSOLVE 1 TABLET IN MOUTH NIGHTLY AS NEEDED FOR NAUSEA 6/1/2023 at 0600 Yes Jon Denson PA-C     pantoprazole (PROTONIX) 40 MG EC tablet Take 1 tablet by mouth twice daily 6/1/2023 at 0600 Yes Jon Denson PA-C     Probiotic Product (PROBIOTIC PO) Take 1 capsule by mouth daily Unknown Yes Unknown, Entered By History     sildenafil (REVATIO) 20 MG tablet Take 1 -5 tabs daily prn  Patient taking differently: Take  mg by mouth daily as needed Take 1 -5 tabs daily prn Unknown Yes Jon Denson PA-C Yes    dorzolamide-timolol (COSOPT) 2-0.5 % ophthalmic solution Apply 1 drop to eye 2 times daily  Patient not taking: Reported on 5/10/2023   Porsche Durán MD     XARELTO ANTICOAGULANT 20 MG TABS tablet Take 1 tablet by mouth once daily with breakfast 5/29/2023  Jon Denson PA-C Yes    zolpidem ER (AMBIEN CR) 12.5 MG CR tablet TAKE 1 TABLET BY MOUTH NIGHTLY AS NEEDED   Jon Denson PA-C

## 2023-06-02 NOTE — PROGRESS NOTES
[facility]  OUTPATIENT OCCUPATIONAL THERAPY  EVALUATION  PLAN OF TREATMENT FOR OUTPATIENT REHABILITATION  (COMPLETE FOR INITIAL CLAIMS ONLY)  Patient's Last Name, First Name, M.I.  YOB: 1950  YobaniTee  FRANSISCA                          Provider's Name  [facility] Medical Record No.  [id number]                             Onset Date:  06/01/23   Start of Care Date:  06/02/23   Type:     ___PT   _X_OT   ___SLP Medical Diagnosis:  s/p L reverse TSA                    OT Diagnosis:  Decreased safety/engagement in ADLs now with post surgical prec Visits from SOC:  1     See note for plan of treatment, functional goals and certification details    I CERTIFY THE NEED FOR THESE SERVICES FURNISHED UNDER        THIS PLAN OF TREATMENT AND WHILE UNDER MY CARE     (Physician co-signature of this document indicates review and certification of the therapy plan).               06/02/23 0821   Appointment Info   Signing Clinician's Name / Credentials (OT) Daphne Meyer OTR/L   Quick Adds   Quick Adds Certification   Living Environment   People in Home spouse   Current Living Arrangements house   Home Accessibility stairs to enter home;stairs within home   Number of Stairs, Main Entrance 4   Stair Railings, Main Entrance railings safe and in good condition  (throguh the garage)   Number of Stairs, Within Home, Primary greater than 10 stairs   Stair Railings, Within Home, Primary railings safe and in good condition  (does not need to go down)   Transportation Anticipated family or friend will provide   Living Environment Comments Pt reports living in single story house with wife. Has walk in shower, adjustable angle bed, plans to initially sleep in recliner chair.   Self-Care   Usual Activity Tolerance good   Current Activity Tolerance good   Regular Exercise Yes   Activity/Exercise Type other (see comments);biking   Exercise Amount/Frequency 3-5 times/wk    Equipment Currently Used at Home none   Fall history within last six months no   Activity/Exercise/Self-Care Comment Pt reports IND at baseline for ADLs. Wife reports she is able to assist with ADLs PRN.   Instrumental Activities of Daily Living (IADL)   Previous Responsibilities meal prep;housekeeping;laundry;shopping;medication management;yardwork  (has someone to assist with yardwork)   IADL Comments Pt reports IND with IADLs at baseline. Wife reports able to assist PRN.   General Information   Onset of Illness/Injury or Date of Surgery 06/01/23   Referring Physician Ketty Botello MD   Patient/Family Therapy Goal Statement (OT) to go home   Additional Occupational Profile Info/Pertinent History of Current Problem Per chart: 72 year old male s/p left reverse total shoulder arthroplasty   Existing Precautions/Restrictions weight bearing;shoulder   Limitations/Impairments insensate body part   Left Upper Extremity (Weight-bearing Status) non weight-bearing (NWB)   Right Upper Extremity (Weight-bearing Status) full weight-bearing (FWB)   General Observations and Info Activity Ambulate with assist  (Shoulder)   Cognitive Status Examination   Orientation Status orientation to person, place and time   Cognitive Status Comments Pt pleasant and cooperative throughout, appears grossly cognitively intact througohut observation/interview.   Visual Perception   Visual Impairment/Limitations corrective lenses full-time   Sensory   Sensory Comments numbness in L hand/arm new since surgery   Pain Assessment   Patient Currently in Pain Yes, see Vital Sign flowsheet  (2/10)   Posture   Posture forward head position;protracted shoulders   Range of Motion Comprehensive   General Range of Motion bilateral upper extremity ROM WFL   Comment, General Range of Motion LUE PROM WFL, see MMT for details   Strength Comprehensive (MMT)   General Manual Muscle Testing (MMT) Assessment upper extremity strength deficits identified    Upper Extremity (Manual Muscle Testing)   Comment, MMT: Upper Extremity Pt 1/5 elbow flex, 2-/5 wrist sup/pro, NP notified   Coordination   Upper Extremity Coordination No deficits were identified   Bed Mobility   Bed Mobility No deficits identified   Transfers   Transfers No deficits identified   Activities of Daily Living   BADL Assessment/Intervention bathing;upper body dressing;lower body dressing;toileting;grooming   Bathing Assessment/Intervention   Zachary Level (Bathing) minimum assist (75% patient effort)   Comment, (Bathing) Per clinical reasoning   Upper Body Dressing Assessment/Training   Position (Upper Body Dressing) edge of bed sitting   Zachary Level (Upper Body Dressing) moderate assist (50% patient effort)   Lower Body Dressing Assessment/Training   Comment, (Lower Body Dressing) SBA donning pants   Zachary Level (Lower Body Dressing) supervision   Grooming Assessment/Training   Position (Grooming) edge of bed sitting   Zachary Level (Grooming) set up   Toileting   Comment, (Toileting) SBA Per clinical reasoning   Clinical Impression   Criteria for Skilled Therapeutic Interventions Met (OT) Yes, treatment indicated   OT Diagnosis Decreased safety/engagement in ADLs now with post surgical prec   OT Problem List-Impairments impacting ADL problems related to;activity tolerance impaired;pain;sensation;strength;post-surgical precautions;range of motion (ROM)   Assessment of Occupational Performance 3-5 Performance Deficits   Identified Performance Deficits dressing, toileting, bathing, home mgmt   Planned Therapy Interventions (OT) ADL retraining;IADL retraining;ROM;strengthening;transfer training;home program guidelines;progressive activity/exercise;orthoic fitting/training   Clinical Decision Making Complexity (OT) low complexity   Risk & Benefits of therapy have been explained evaluation/treatment results reviewed;care plan/treatment goals reviewed;risks/benefits  reviewed;current/potential barriers reviewed;participants voiced agreement with care plan;participants included;patient;spouse/significant other   Clinical Impression Comments Pt would benefit from skilled OT services to progress IND and safety with ADLs/IADLs.   OT Total Evaluation Time   OT Eval, Low Complexity Minutes (57418) 10   Therapy Certification   Medical Diagnosis s/p L reverse TSA   Start of Care Date 06/02/23   Certification date from 06/02/23   Certification date to 06/02/23   OT Goals   Therapy Frequency (OT) One time eval and treatment   OT Predicted Duration/Target Date for Goal Attainment 06/02/23   OT Goals Upper Body Dressing;OT Goal 1;OT Goal 2   OT: Upper Body Dressing Moderate assist;within precautions;including orthotic   OT: Goal 1 Pt will verbalize 100% of precautions to progress safety with ADLs/IADLs.   OT: Goal 2 Pt will demonstrates IND with HEP within prec to progress LUE strength.   Interventions   Interventions Quick Adds Self-Care/Home Management;Therapeutic Procedures/Exercise;Therapeutic Activity   Self-Care/Home Management   Self-Care/Home Mgmt/ADL, Compensatory, Meal Prep Minutes (53530) 39   Symptoms Noted During/After Treatment (Meal Preparation/Planning Training) none   Treatment Detail/Skilled Intervention Pt greeted in supine upon arrival with wife present. Educated on RUE NWB/ROM precautions to be followed for at least 6 weeks. Pt verbalizing understanding. Increased time for skilled monitoring of vitals, /70 in supine. Completes supine>sit EOB IND. Educated on dressing techniques for UB dressing within prec. Pt demonstrating decreased AROM in elbow, wrist sup requiring increased assist for dressing. NP notified and assessing with pt sitting EOB. Educated wife on dressing assist. Educated on LB 1 handed dressing techniques. Pt completing with SBA and increased time. Educated on safety and wear schedule for sling. Initial attempt with therapist completing with Lucas SAMANO  pt and wife progressing to completing with wife providing mod A. Facilitated functional ambulation in hallway to progress safety with home based distances. Pt completing ~300' with SBA and noted fatigued. Pt requesting to sit outside room at end of session, notified RN of pt location, pt left in family lounge with wife present.   Therapeutic Procedures/Exercise   Therapeutic Procedure: strength, endurance, ROM, flexibillity minutes (39855) 8   Symptoms Noted During/After Treatment none   Treatment Detail/Skilled Intervention Educated on ROM exercises including codmans to be completed to progress activity tolerance, functional endurance for LUE within prec. Pt tolerating 10 reps of wrist flex,ext, composite fist, progressing to IND with codmans. Pt unable to complete pro/sup and elbow flex/ext 2/2 weakness/limited AROM. NP notified.   OT Discharge Planning   OT Plan d/c OT   OT Discharge Recommendation (DC Rec)   (defer to ortho)   OT Rationale for DC Rec Pt completing functional mobility with SBA, limited by decreased AROM in elbow, requiring Ax1 for management of sling, UB dressing. Has excellent assist at home.   OT Brief overview of current status SBA, Ax1 for UB dressing   Total Session Time   Timed Code Treatment Minutes 47   Total Session Time (sum of timed and untimed services) 57

## 2023-06-02 NOTE — PROGRESS NOTES
[facility]    Medicine Progress Note - Hospitalist Service, GOLD TEAM 18    Date of Admission:  6/1/2023    Assessment & Plan    Tee Hilton is a 72 year old male admitted on 6/1/2023 s/p  Reverse arthroplasty shoulderBy Dr. Botello.   PMH significant for A. Fib (on Xarelto) CHF, AUGUSTO, obesity, HTN, CKD 3, GOUT, GERD, anxiety, SNHL ( bilateral) and sciatica.     # Orthopedic Surgery:   POD 1     Post Op Management per Primary team.   Hemodynamics: stable currently.   Stop IV fluidsd   Pain control- per Primary team and/or Pain team.    Judicious use of narcotics.  Consider bowel regimen with narcotics.   Encourage Incentive spirometry to prevent atelectasis.  DVT prophylaxis- per Primary team.  Activity, antibiotics, wound and/or drain care - per Primary team.      # Anemia of acute blood loss:  Monitor hgb for anemia of acute blood loss. Transfuse for hgb <7.0. Pre op Hgb 14.4 . Post op hgb at 13.9  #Cardiovascular:.  No history of coronary artery disease.  Currently asymptomatic.  Stable vitals.     Echo 2021-The ejection fraction is 50-55%(borderline).Global right ventricular function is normal  - Afib- on Xarelto-held 72 hours prior to the procedure.  Ortho team Ok to resume Xeralto today per my discussion with tem   -Hypertension: Controlled.  Resume PTA amlodipine, metoprolol with hold parameters.    Hold PTA lisinopril for now.  Resume as appropriate.     #Obstructive sleep apnea: Does not use CPAP.  Monitor.  Oxygen as needed.     #GERD: Continue PPI.     #Hx of CKD 3  - Baseline Creatinine  1.54. POD#1 at 1.59  Monitor BMP.  Avoid nephrotoxics.     #Insomnia: Continue PTA Ambien.       Diet: Advance Diet as Tolerated: Regular Diet Adult  Diet    DVT Prophylaxis: Enoxaparin (Lovenox) SQ and DOAC  Davis Catheter: Not present  Lines: None     Cardiac Monitoring: None  Code Status: Full Code      Clinically Significant Risk Factors Present on Admission          "      # Drug Induced Coagulation Defect: home medication list includes an anticoagulant medication    # Hypertension: Noted on problem list      # Obesity: Estimated body mass index is 36.01 kg/m  as calculated from the following:    Height as of this encounter: 1.791 m (5' 10.51\").    Weight as of this encounter: 115.5 kg (254 lb 10.1 oz).            Disposition Plan      Expected Discharge Date: 06/02/2023                  Scooter Raman MD  Hospitalist Service, GOLD TEAM 18  [facility]  Securely message with [employer] (more info)  Text page via [facility] Paging/Directory   See signed in provider for up to date coverage information  ______________________________________________________________________    Interval History   Charts reviewed and patient examined. Up and having breakfast  Pain is slowly coming back, as the block is wearing   No fever or chills  Denies chest pin or SOB    Physical Exam   Vital Signs: Temp: 97.7  F (36.5  C) Temp src: Oral BP: 124/66 Pulse: 65   Resp: 16 SpO2: 98 % O2 Device: None (Room air) Oxygen Delivery: 8 LPM  Weight: 254 lbs 10.1 oz  General Appearance: Awake, alert and not in distress  Respiratory: Clear breath sounds bilaterally   Cardiovascular: Normal heart sounds. No murmurs   GI: Soft, non tender. Normal bowel sounds   Skin: No bruising or bleeding   MSK: Left shoulder in dressings and brace. No distal deficits   Other:Awake, alert and orientated X 3       Medical Decision Making       25 MINUTES SPENT BY ME on the date of service doing chart review, history, exam, documentation & further activities per the note.  MANAGEMENT DISCUSSED with the following over the past 24 hours: patient, family, bedside RN, care management and orthopedic team       Data     I have personally reviewed the following data over the past 24 hrs:    N/A  \   13.9   / N/A     134 (L) 99 23.1 (H) /  123 (H)   4.8 24 1.59 (H) \       "

## 2023-06-05 ENCOUNTER — TELEPHONE (OUTPATIENT)
Dept: ORTHOPEDICS | Facility: CLINIC | Age: 73
End: 2023-06-05
Payer: COMMERCIAL

## 2023-06-05 NOTE — TELEPHONE ENCOUNTER
Hello,  Pt had shoulder replacement L with Dr. Botello last week.  He has no fever and is able to do all of the exercices.  He is reporting swelling which concerns him because it is a change.  He has bruising on the forearm underneath going a lot further down towards the wrist than it was before.  Pt would like a call at  which is his wifes phone and they are there together.

## 2023-06-05 NOTE — TELEPHONE ENCOUNTER
Called Pt and wife and spoke with them regarding concerns of bruising and swelling on their operative arm. I informed Pt that swelling and bruising would be common, with best method to combat swelling being Elevation above heart and that is contraindicated at this time due to shoulder surgery. Eased concerns regarding movement of bruising distally and answered questions regarding sling use. All questions were answered at this time and the Pt stated they understood all instructions.    Theo MÁRQUEZ ATC

## 2023-06-05 NOTE — TELEPHONE ENCOUNTER
[facility] Call Center    Phone Message    May a detailed message be left on voicemail: yes     Reason for Call: Other: Patient is still waiting for a call back regarding swelling and concerns.     Action Taken: Other: [facility]    Travel Screening: Not Applicable

## 2023-06-07 ENCOUNTER — MYC REFILL (OUTPATIENT)
Dept: ORTHOPEDICS | Facility: CLINIC | Age: 73
End: 2023-06-07
Payer: COMMERCIAL

## 2023-06-07 DIAGNOSIS — M19.012 ARTHROSIS OF LEFT SHOULDER: ICD-10-CM

## 2023-06-07 RX ORDER — OXYCODONE HYDROCHLORIDE 5 MG/1
5 TABLET ORAL EVERY 6 HOURS PRN
Qty: 21 TABLET | Refills: 0 | Status: SHIPPED | OUTPATIENT
Start: 2023-06-07 | End: 2023-07-19

## 2023-06-11 NOTE — OP NOTE
"DATE OF PROCEDURE: 6/1/23    PREOPERATIVE DIAGNOSIS:   1. Left end stage glenohumeral arthrosis, B2 glenoid  2. Left shoulder rotator cuff disease with high grade partial tearing (subscap, supra, infra)    POSTOPERATIVE DIAGNOSIS:   1. Left end stage glenohumeral arthrosis, B2 glenoid  2. Left shoulder rotator cuff disease with high grade partial tearing (subscap, supra, infra)     PROCEDURE:   1. Left reverse total shoulder arthroplasty.     STAFF SURGEON: Ketty Botello MD.   ASSISTANT: Mars Lane MD    ANESTHESIA: General endotracheal anesthesia.   ESTIMATED BLOOD LOSS: 200 mL.   COMPLICATIONS: None.       IMPLANTS:   1. Biomet Comprehensive Shoulder Medium Augment Baseplate with 6.5 central screw, 4 peripheral locking screws)  2. Biomet Comprehensive Shoulder 40mm Glenosphere with \"C\" offset inferior  3. Biomet Comprehensive Shoulder Stem 16x55  4. Biomet Comprehensive ShoulderStandard tray and bearing    BRIEF PATIENT HISTORY: This patient is known to me from clinic where we have discussed the radiographic and physical exam findings. The patient's shoulder has been causing lifestyle limiting pain and he feels that nonoperative management in the form of activity modifications, therapy and injections have not adequately relieved these symptoms. Therefore, the patient wished to consider surgical options. We discussed reverse total shoulder arthroplasty including the risks and benefits and perioperative rehabilitation plan. The patient had the opportunity for questions to be answered and wished to proceed with surgery. Informed consent was completed.     DESCRIPTION OF PROCEDURE: The patient was identified in the preoperative area and the correct left shoulder was marked for surgery. The patient was provided an interscalene block by our anesthesia colleagues. He was taken to the operating room where he was surrendered to general endotracheal anesthesia. He was moved to the operating table in the " beachchair position with all bony prominences well padded. The head was placed in a head shah with the neck in neutral position. The left upper extremity was prepped and draped in the usual sterile fashion. A timeout was held in accordance with hospital policy, confirming correct patient, site, side, procedure and administration of IV antibiotics prior to incision. I completed a deltopectoral incision and approach. I identified the cephalic vein and brought this medially. I came underneath the deltoid and freed the subdeltoid adhesions. The rotator cuff was thin but without full thickness tearing. The biceps was tenotomized and tenodesed to the superior-lateral insertion of the pec. I palpated the axillary nerve medially and laterally performing a tug test confirming location and continuity of the nerve. This was performed multiple times throughout the procedure including once prior to closure. I identified the anterior circumflex humeral vessels and ligated and divided these. I then released the subscap and capsule off the anterior inferior humerus and dislocated the humerus anteriorly. There was eburnated bone over the humeral head. The superior cuff was inspected and there was significant high grade partial thickness tearing of the supra and infra which was most severe on the articular surface. Given this appearance and the B2 glenoid the reverse was felt to be the best option. I entered the humeral canal and reamed up to a size 16. I cut the humeral head in 30 degrees of retroversion. I prepared the proximal humerus for a size 16 reverse stem. I then turned my attention to the glenoid. I performed anterior inferior capsular releases and removed the labrum circumferentially. I prepared the glenoid for the baseplate which was impacted and screws were placed. I then applied the glenosphere and trialed a polyethylene liner. A standard tray and bearing provided forward elevation to 165 degrees with external  rotation at the side to 50 without booking anteriorly. There was also internal rotation to the abdomen and contralateral shoulder without impingement. I removed all trial humeral components. I impacted the stem in the appropriate version. I impacted the final polyethylene liner with the same range of motion as above. I repaired the subscapularis to the lesser tuberosity using bone tunnels. I then copiously irrigated the wound. I irrigated the wound then with 15 mL a sterile Betadine and 500 mL of saline. This was then irrigated out with saline. The wound was then closed in layered fashion with absorbable suture. Steri-Strips and soft sterile dressing were repaired. The arm was placed into an abduction sling and the patient was extubated and transported to recovery in stable condition. There were no apparent intraoperative complications.     POSTOPERATIVE PLAN:   1. The patient will be admitted to the Orthopedic Service and will begin physical therapy to mobilize out of bed.There will be no formal shoulder range of motion stretches for 4 weeks but the patient can begin immediate hand, wrist and elbow range of motion. He will begin shoulder ROM at 4 weeks and come out of the sling between 4-6 weeks.  2. The patient will be seen in the clinic for wound check at approximately 2 weeks.     AFSHIN BATES MD

## 2023-06-14 DIAGNOSIS — R11.0 NAUSEA: ICD-10-CM

## 2023-06-15 RX ORDER — ONDANSETRON 4 MG/1
TABLET, ORALLY DISINTEGRATING ORAL
Qty: 30 TABLET | Refills: 0 | Status: SHIPPED | OUTPATIENT
Start: 2023-06-15

## 2023-06-19 ENCOUNTER — OFFICE VISIT (OUTPATIENT)
Dept: ORTHOPEDICS | Facility: CLINIC | Age: 73
End: 2023-06-19
Payer: COMMERCIAL

## 2023-06-19 ENCOUNTER — TELEPHONE (OUTPATIENT)
Dept: ORTHOPEDICS | Facility: CLINIC | Age: 73
End: 2023-06-19

## 2023-06-19 DIAGNOSIS — M19.012 ARTHROSIS OF LEFT SHOULDER: Primary | ICD-10-CM

## 2023-06-19 PROCEDURE — 99024 POSTOP FOLLOW-UP VISIT: CPT | Performed by: ORTHOPAEDIC SURGERY

## 2023-06-19 NOTE — LETTER
6/19/2023         RE: Tee Hilton  [address] [facility] [address]        Dear Colleague,    Thank you for referring your patient, Tee Hilton, to the [facility]. Please see a copy of my visit note below.    CHIEF CONCERN: Status post left reverse total shoulder arthroplasty  DATE OF SURGERY: 6/1/23    HISTORY OF PRESENT ILLNESS: Mr. Hilton is an extremely pleasant 72 year-old man who is 2 weeks status post the above procedure.    EXAM:  Pleasant adult male in NAD  Respirations even and unlabored.  Left upper extremity: Incision clean, dry, and intact. Distally neurovascularly intact without deficits. Shoulder range of motion is not yet tested.    IMAGING: None new    ASSESSMENT:  1. Two weeks status post above procedure    PLAN:  Range of Motion: Continue passive and active assisted ROM of the shoulder per operative note.   Sling: Continue sling except for bathing/dressing/rehab  Pain medication: NSAIDs and Tylenol PRN  Follow up: in 4 weeks.  Will discontinue sling at that time and initiate AROM. Plan to advance to strengthening at 3 months          Again, thank you for allowing me to participate in the care of your patient.        Sincerely,        Ketty Botello MD

## 2023-06-19 NOTE — PROGRESS NOTES
CHIEF CONCERN: Status post left reverse total shoulder arthroplasty  DATE OF SURGERY: 6/1/23    HISTORY OF PRESENT ILLNESS: Mr. Hilton is an extremely pleasant 72 year-old man who is 2 weeks status post the above procedure.    EXAM:  Pleasant adult male in NAD  Respirations even and unlabored.  Left upper extremity: Incision clean, dry, and intact. Distally neurovascularly intact without deficits. Shoulder range of motion is not yet tested.    IMAGING: None new    ASSESSMENT:  1. Two weeks status post above procedure    PLAN:    Range of Motion: Continue passive and active assisted ROM of the shoulder per operative note.     Sling: Continue sling except for bathing/dressing/rehab    Pain medication: NSAIDs and Tylenol PRN    Follow up: in 4 weeks.  Will discontinue sling at that time and initiate AROM. Plan to advance to strengthening at 3 months

## 2023-06-19 NOTE — TELEPHONE ENCOUNTER
Left Voicemail (1st Attempt) for the patient to call back and schedule the following:      Specialty phone number: [phone]   Additonal Notes: Please schedule physical therapy  in 2 weeks around 7/3/2023     Emily young Procedure   Orthopedics, Podiatry, Sports Medicine, Ent ,Eye , Audiology, Adult Endocrine & Diabetes, Nutrition & Medication Therapy Management Specialties   [facility]

## 2023-06-19 NOTE — NURSING NOTE
Reason For Visit:   Chief Complaint   Patient presents with     Surgical Followup     2 wks s/p left reverse total shoulder arthroplasty DOS: 6/1/23 - taking tylenol but that is upsetting his stomach. Unsure if he can take anything else       PCP: Jon Denson  Ref: Dr Blanc     ?  No  Occupation Na.  Currently working? No.  Work status?  Retired.  Date of injury: na  Type of injury: NA.  Date of surgery: s/p left reverse total shoulder arthroplasty DOS: 6/1/23  Smoker: No  Request smoking cessation information: No     Right hand dominant    There were no vitals taken for this visit.    Skylar Tanner, ATC

## 2023-06-19 NOTE — PATIENT INSTRUCTIONS
Thanks for coming today.  Ortho/Sports Medicine Clinic  [address] [place] [address]    To schedule future appointments in Ortho Clinic, you may call [phone].    To schedule ordered imaging or an injection ordered by your provider:  Call Central Imaging Injection scheduling line: [phone]    MyChart available online at:  [email or web address]    Please call if any further questions or concerns ([phone]).  Clinic hours 8 am to 5 pm.    Return to clinic (call) if symptoms worsen or fail to improve.

## 2023-07-07 ENCOUNTER — THERAPY VISIT (OUTPATIENT)
Dept: PHYSICAL THERAPY | Facility: CLINIC | Age: 73
End: 2023-07-07
Attending: ORTHOPAEDIC SURGERY
Payer: COMMERCIAL

## 2023-07-07 DIAGNOSIS — Z47.1 AFTERCARE FOLLOWING LEFT SHOULDER JOINT REPLACEMENT SURGERY: ICD-10-CM

## 2023-07-07 DIAGNOSIS — G89.29 CHRONIC LEFT SHOULDER PAIN: Primary | ICD-10-CM

## 2023-07-07 DIAGNOSIS — M25.512 CHRONIC LEFT SHOULDER PAIN: Primary | ICD-10-CM

## 2023-07-07 DIAGNOSIS — Z96.612 AFTERCARE FOLLOWING LEFT SHOULDER JOINT REPLACEMENT SURGERY: ICD-10-CM

## 2023-07-07 DIAGNOSIS — M19.012 ARTHROSIS OF LEFT SHOULDER: ICD-10-CM

## 2023-07-07 PROCEDURE — 97110 THERAPEUTIC EXERCISES: CPT | Mod: GP | Performed by: PHYSICAL THERAPIST

## 2023-07-07 PROCEDURE — 97161 PT EVAL LOW COMPLEX 20 MIN: CPT | Mod: GP | Performed by: PHYSICAL THERAPIST

## 2023-07-07 NOTE — PROGRESS NOTES
"PHYSICAL THERAPY EVALUATION  Type of Visit: Evaluation    See electronic medical record for Abuse and Falls Screening details.    Subjective      Presenting condition or subjective complaint: New shoulder. 5 weeks s/p L reverse total shoulder arthroplasty by Dr. Botello. Pain is well controlled with no pain medications currently. He presents today wearing the sling  Date of onset: 06/01/23    Relevant medical history:     Dates & types of surgery: June 1 2023 shouler replacement    Prior diagnostic imaging/testing results: MRI     Prior therapy history for the same diagnosis, illness or injury: Yes [facility] summer    Prior Level of Function   Transfers:   Ambulation:   ADL:   IADL:     Living Environment  Social support: With a significant other or spouse   Type of home: House   Stairs to enter the home: Yes 18 Is there a railing: Yes   Ramp: No   Stairs inside the home: Yes 18 Is there a railing: Yes   Help at home: Home management tasks (cooking, cleaning); Home and Yard maintenance tasks  Equipment owned: Straight Cane; Crutches     Employment: Not Applicable    Hobbies/Interests:      Patient goals for therapy: Everything (get back to \"normal life\")    Pain assessment: See objective evaluation for additional pain details     Objective   SHOULDER EVALUATION  PAIN: Pain Level at best: 0/10  Pain Level at worst: 5/10  Pain Location: shoulder  Pain Quality: Aching and sore, \"heavy\"  Pain Frequency: intermittent  Pain is Worst: daytime  Pain is Exacerbated By: not much (limited motion)  Pain is Relieved By: has not tried much  Pain Progression: Improved  INTEGUMENTARY (edema, incisions): incisions healthy appearing, no redness, no warmth or tenderness in the area  POSTURE: WFL  GAIT:   Weightbearing Status:   Assistive Device(s):   Gait Deviations:   BALANCE/PROPRIOCEPTION:   WEIGHTBEARING ALIGNMENT:   ROM: PROM flexion to 125, abduction 80, ER 15    STRENGTH: not tested (post-op)  FLEXIBILITY:   SPECIAL TESTS: "   PALPATION: mild UT tenderness  JOINT MOBILITY:   CERVICAL SCREEN: WNL    Assessment & Plan   CLINICAL IMPRESSIONS   Medical Diagnosis: s/p left reverse total shoulder arthroplasty, arthrosis of left shoulder    Treatment Diagnosis: L shoulder pain   Impression/Assessment: Patient is a 72 year old male with left shoulder complaints.  The following significant findings have been identified: Pain, Decreased ROM/flexibility, Decreased joint mobility, Decreased strength, Impaired muscle performance and Decreased activity tolerance. These impairments interfere with their ability to perform self care tasks, work tasks, recreational activities, household chores and driving  as compared to previous level of function.     Clinical Decision Making (Complexity):   Clinical Presentation: Stable/Uncomplicated  Clinical Presentation Rationale: based on medical and personal factors listed in PT evaluation  Clinical Decision Making (Complexity): Low complexity    PLAN OF CARE  Treatment Interventions:  Modalities: Cryotherapy  Interventions: Manual Therapy, Neuromuscular Re-education, Therapeutic Activity, Therapeutic Exercise, Self-Care/Home Management    Long Term Goals     PT Goal 1  Goal Identifier: STG 1  Goal Description: Patient will be able to actively raise shoulder 120 degrees  Rationale: to maximize safety and independence with performance of ADLs and functional tasks;to maximize safety and independence within the home;to maximize safety and independence within the community;to maximize safety and independence with self cares  Target Date: 08/04/23  PT Goal 2  Goal Identifier: LTG 1  Goal Description: Patient will be able to lift 5 pounds to shoulder height without pain  Rationale: to maximize safety and independence with performance of ADLs and functional tasks;to maximize safety and independence within the home;to maximize safety and independence within the community;to maximize safety and independence with self  cares  Target Date: 09/29/23      Frequency of Treatment: 1x/week  Duration of Treatment: 12 visits    Recommended Referrals to Other Professionals: none  Education Assessment:   Learner/Method: No Barriers to Learning;Pictures/Video;Demonstration;Reading;Listening;Patient  Education Comments: Education on importance to continue wearing sling as prescribed, avoiding actively using arm    Risks and benefits of evaluation/treatment have been explained.   Patient/Family/caregiver agrees with Plan of Care.     Evaluation Time:     PT Eval, Low Complexity Minutes (06236): 15      Signing Clinician: Catalino Hernandez PT

## 2023-07-12 ENCOUNTER — THERAPY VISIT (OUTPATIENT)
Dept: PHYSICAL THERAPY | Facility: CLINIC | Age: 73
End: 2023-07-12
Attending: ORTHOPAEDIC SURGERY
Payer: COMMERCIAL

## 2023-07-12 DIAGNOSIS — Z96.612 AFTERCARE FOLLOWING LEFT SHOULDER JOINT REPLACEMENT SURGERY: Primary | ICD-10-CM

## 2023-07-12 DIAGNOSIS — Z47.1 AFTERCARE FOLLOWING LEFT SHOULDER JOINT REPLACEMENT SURGERY: Primary | ICD-10-CM

## 2023-07-12 PROCEDURE — 97110 THERAPEUTIC EXERCISES: CPT | Mod: GP | Performed by: PHYSICAL THERAPIST

## 2023-07-19 ENCOUNTER — OFFICE VISIT (OUTPATIENT)
Dept: FAMILY MEDICINE | Facility: CLINIC | Age: 73
End: 2023-07-19
Payer: COMMERCIAL

## 2023-07-19 VITALS
OXYGEN SATURATION: 98 % | TEMPERATURE: 97.5 F | HEIGHT: 69 IN | RESPIRATION RATE: 20 BRPM | DIASTOLIC BLOOD PRESSURE: 80 MMHG | SYSTOLIC BLOOD PRESSURE: 134 MMHG | WEIGHT: 246.6 LBS | HEART RATE: 71 BPM | BODY MASS INDEX: 36.53 KG/M2

## 2023-07-19 DIAGNOSIS — I50.22 CHRONIC SYSTOLIC CONGESTIVE HEART FAILURE (H): ICD-10-CM

## 2023-07-19 DIAGNOSIS — I48.20 CHRONIC ATRIAL FIBRILLATION (H): ICD-10-CM

## 2023-07-19 DIAGNOSIS — N18.32 STAGE 3B CHRONIC KIDNEY DISEASE (H): ICD-10-CM

## 2023-07-19 DIAGNOSIS — Z12.5 SCREENING FOR PROSTATE CANCER: ICD-10-CM

## 2023-07-19 DIAGNOSIS — Z00.00 ENCOUNTER FOR ANNUAL WELLNESS EXAM IN MEDICARE PATIENT: Primary | ICD-10-CM

## 2023-07-19 DIAGNOSIS — F51.01 PRIMARY INSOMNIA: ICD-10-CM

## 2023-07-19 DIAGNOSIS — I10 ESSENTIAL HYPERTENSION: ICD-10-CM

## 2023-07-19 LAB
ALBUMIN SERPL BCG-MCNC: 4.2 G/DL (ref 3.5–5.2)
ALP SERPL-CCNC: 82 U/L (ref 40–129)
ALT SERPL W P-5'-P-CCNC: 6 U/L (ref 0–70)
ANION GAP SERPL CALCULATED.3IONS-SCNC: 11 MMOL/L (ref 7–15)
AST SERPL W P-5'-P-CCNC: 16 U/L (ref 0–45)
BILIRUB SERPL-MCNC: 0.8 MG/DL
BUN SERPL-MCNC: 20.8 MG/DL (ref 8–23)
CALCIUM SERPL-MCNC: 9.8 MG/DL (ref 8.8–10.2)
CHLORIDE SERPL-SCNC: 101 MMOL/L (ref 98–107)
CREAT SERPL-MCNC: 1.46 MG/DL (ref 0.67–1.17)
DEPRECATED HCO3 PLAS-SCNC: 26 MMOL/L (ref 22–29)
GFR SERPL CREATININE-BSD FRML MDRD: 51 ML/MIN/1.73M2
GLUCOSE SERPL-MCNC: 107 MG/DL (ref 70–99)
POTASSIUM SERPL-SCNC: 5.4 MMOL/L (ref 3.4–5.3)
PROT SERPL-MCNC: 7.4 G/DL (ref 6.4–8.3)
PSA SERPL DL<=0.01 NG/ML-MCNC: 3.33 NG/ML (ref 0–6.5)
SODIUM SERPL-SCNC: 138 MMOL/L (ref 136–145)

## 2023-07-19 PROCEDURE — 36415 COLL VENOUS BLD VENIPUNCTURE: CPT | Performed by: PHYSICIAN ASSISTANT

## 2023-07-19 PROCEDURE — G0103 PSA SCREENING: HCPCS | Performed by: PHYSICIAN ASSISTANT

## 2023-07-19 PROCEDURE — G0439 PPPS, SUBSEQ VISIT: HCPCS | Performed by: PHYSICIAN ASSISTANT

## 2023-07-19 PROCEDURE — 80053 COMPREHEN METABOLIC PANEL: CPT | Performed by: PHYSICIAN ASSISTANT

## 2023-07-19 PROCEDURE — 99214 OFFICE O/P EST MOD 30 MIN: CPT | Mod: 25 | Performed by: PHYSICIAN ASSISTANT

## 2023-07-19 RX ORDER — ZOLPIDEM TARTRATE 12.5 MG/1
12.5 TABLET, FILM COATED, EXTENDED RELEASE ORAL
Qty: 30 TABLET | Refills: 0 | Status: SHIPPED | OUTPATIENT
Start: 2023-07-19 | End: 2023-09-17

## 2023-07-19 ASSESSMENT — PAIN SCALES - GENERAL: PAINLEVEL: NO PAIN (0)

## 2023-07-19 NOTE — PROGRESS NOTES
"      Daniella Oliveira is a 72 year old, presenting for the following health issues:  Heart Problem (Heart plan, was recommended that he needs this ), Wellness Visit, and Health Maintenance (Patient is not fasting)        7/19/2023     8:30 AM   Additional Questions   Roomed by Yina Everett CMA   Accompanied by None         7/19/2023     8:30 AM   Patient Reported Additional Medications   Patient reports taking the following new medications none     Heart Problem    History of Present Illness       Hypertension: He presents for follow up of hypertension.  He does not check blood pressure  regularly outside of the clinic. Outside blood pressures have been over 140/90. He does not follow a low salt diet.     He eats 2-3 servings of fruits and vegetables daily.He consumes 2 sweetened beverage(s) daily.He exercises with enough effort to increase his heart rate 30 to 60 minutes per day.  He exercises with enough effort to increase his heart rate 4 days per week.   He is taking medications regularly.     Recheck of htn and afib.   No chest pain/sob/palpitations/dizziness/ha's  History of chf. Ef's have shown improvement.   Annual Wellness Visit    Patient has been advised of split billing requirements and indicates understanding: Yes     Are you in the first 12 months of your Medicare Part B coverage?  No    Physical Health:    In general, how would you rate your overall physical health? good    Outside of work, how many days during the week do you exercise?4-5 days/week    Outside of work, approximately how many minutes a day do you exercise?45-60 minutes    If you drink alcohol do you typically have >3 drinks per day or >7 drinks per week? No    Do you usually eat at least 4 servings of fruit and vegetables a day, include whole grains & fiber and avoid regularly eating high fat or \"junk\" foods? No    Do you have any problems taking medications regularly? No    Do you have any side effects from medications? " none    Needs assistance for the following daily activities: no assistance needed    Which of the following safety concerns are present in your home?  none identified     Hearing impairment: Yes, Has hearing aides in both ears, doesn't always wear them    In the past 6 months, have you been bothered by leaking of urine? no    Mental Health:    In general, how would you rate your overall mental or emotional health? excellent  PHQ-2 Score:      Do you feel safe in your environment? Yes    Have you ever done Advance Care Planning? (For example, a Health Directive, POLST, or a discussion with a medical provider or your loved ones about your wishes)? Yes, patient states has an Advance Care Planning document and will bring a copy to the clinic.    Fall risk:  Fallen 2 or more times in the past year?: No  Any fall with injury in the past year?: No    Cognitive Screenin) Repeat 3 items (Leader, Season, Table)    2) Clock draw: NORMAL  3) 3 item recall: Recalls 3 objects  Results: 3 items recalled: COGNITIVE IMPAIRMENT LESS LIKELY    Mini-CogTM Copyright MIKY Toth. Licensed by the author for use in [facility]; reprinted with permission (soob@[facility]). All rights reserved.      Do you have sleep apnea, excessive snoring or daytime drowsiness?: yes    Social History     Tobacco Use     Smoking status: Never     Smokeless tobacco: Never     Tobacco comments:     never smoked   Substance Use Topics     Alcohol use: Yes     Comment: 2/3 beers a week             2021    10:57 AM   Alcohol Use   Prescreen: >3 drinks/day or >7 drinks/week? No     Do you have a current opioid prescription? No  Do you use any other controlled substances or medications that are not prescribed by a provider? None    Current providers sharing in care for this patient include:   Patient Care Team:  Jon Denson PA-C as PCP - General (Physician Assistant)  Jon Denson PA-C as Assigned PCP  Olena Beltran DO as MD  "(Gastroenterology)  Armando Alves MD as MD (Urology)  Jarrell Blanc MD as Assigned Musculoskeletal Provider  Porsche Durán MD as MD (Ophthalmology)  Porsche Durán MD as Assigned Surgical Provider  Jasmine Isabel APRN CNP as Nurse Practitioner (Anesthesiology)  Ketty Botello MD as MD (Orthopaedic Surgery)    Patient has been advised of split billing requirements and indicates understanding: Yes    I have reviewed Opioid Use Disorder and Substance Use Disorder risk factors and made any needed referrals.           Review of Systems   Constitutional, HEENT, cardiovascular, pulmonary, GI, , musculoskeletal, neuro, skin, endocrine and psych systems are negative, except as otherwise noted.      Objective    /80   Pulse 71   Temp 97.5  F (36.4  C) (Temporal)   Resp 20   Ht 1.746 m (5' 8.75\")   Wt 111.9 kg (246 lb 9.6 oz)   SpO2 98%   BMI 36.68 kg/m    Body mass index is 36.68 kg/m .  Physical Exam   GENERAL: healthy, alert and no distress  EYES: Eyes grossly normal to inspection, PERRL and conjunctivae and sclerae normal  HENT: ear canals and TM's normal, nose and mouth without ulcers or lesions  NECK: no adenopathy, no asymmetry, masses, or scars and thyroid normal to palpation  RESP: lungs clear to auscultation - no rales, rhonchi or wheezes  CV: irreg/irreg rate and rhythm associated with a fib. Normal ventricular rate response.   ABDOMEN: soft, nontender, no hepatosplenomegaly, no masses and bowel sounds normal  MS: no gross musculoskeletal defects noted, no edema  SKIN: no suspicious lesions or rashes  NEURO: Normal strength and tone, mentation intact and speech normal  PSYCH: mentation appears normal, affect normal/bright  Tee was seen today for heart problem, wellness visit and health maintenance.    Diagnoses and all orders for this visit:    Encounter for annual wellness exam in Medicare patient    Chronic systolic congestive heart failure (H)  -     " Echocardiogram Complete; Future  -     Comprehensive metabolic panel (BMP + Alb, Alk Phos, ALT, AST, Total. Bili, TP); Future    Primary insomnia  -     zolpidem ER (AMBIEN CR) 12.5 MG CR tablet; Take 1 tablet (12.5 mg) by mouth nightly as needed    Essential hypertension  -     Comprehensive metabolic panel (BMP + Alb, Alk Phos, ALT, AST, Total. Bili, TP); Future    Chronic atrial fibrillation (H)    Stage 3b chronic kidney disease (H)  -     Comprehensive metabolic panel (BMP + Alb, Alk Phos, ALT, AST, Total. Bili, TP); Future    Screening for prostate cancer  -     PSA, screen; Future      Continue all current meds.  Mediterranean diet  More exercise

## 2023-07-21 ENCOUNTER — THERAPY VISIT (OUTPATIENT)
Dept: PHYSICAL THERAPY | Facility: CLINIC | Age: 73
End: 2023-07-21
Payer: COMMERCIAL

## 2023-07-21 DIAGNOSIS — Z96.612 AFTERCARE FOLLOWING LEFT SHOULDER JOINT REPLACEMENT SURGERY: Primary | ICD-10-CM

## 2023-07-21 DIAGNOSIS — Z47.1 AFTERCARE FOLLOWING LEFT SHOULDER JOINT REPLACEMENT SURGERY: Primary | ICD-10-CM

## 2023-07-21 PROCEDURE — 97110 THERAPEUTIC EXERCISES: CPT | Mod: GP | Performed by: PHYSICAL THERAPIST

## 2023-07-24 ENCOUNTER — OFFICE VISIT (OUTPATIENT)
Dept: ORTHOPEDICS | Facility: CLINIC | Age: 73
End: 2023-07-24
Payer: COMMERCIAL

## 2023-07-24 ENCOUNTER — ANCILLARY PROCEDURE (OUTPATIENT)
Dept: GENERAL RADIOLOGY | Facility: CLINIC | Age: 73
End: 2023-07-24
Attending: ORTHOPAEDIC SURGERY
Payer: COMMERCIAL

## 2023-07-24 DIAGNOSIS — M19.012 ARTHROSIS OF LEFT SHOULDER: Primary | ICD-10-CM

## 2023-07-24 DIAGNOSIS — M25.512 LEFT SHOULDER PAIN: ICD-10-CM

## 2023-07-24 PROCEDURE — 73030 X-RAY EXAM OF SHOULDER: CPT | Mod: LT | Performed by: RADIOLOGY

## 2023-07-24 PROCEDURE — 99024 POSTOP FOLLOW-UP VISIT: CPT | Performed by: ORTHOPAEDIC SURGERY

## 2023-07-24 NOTE — NURSING NOTE
Reason For Visit:   Chief Complaint   Patient presents with    Surgical Followup     8 wks s/p left reverse total shoulder arthroplasty DOS: 6/1/23 - doing well           PCP: Jon Denson  Ref: Dr Blanc     ?  No  Occupation Na.  Currently working? No.  Work status?  Retired.  Date of injury: na  Type of injury: NA.  Date of surgery: s/p left reverse total shoulder arthroplasty DOS: 6/1/23  Smoker: No  Request smoking cessation information: No     Right hand dominant    There were no vitals taken for this visit.    Skylar Tanner, ATC

## 2023-07-24 NOTE — LETTER
7/24/2023         RE: Tee Hilton  [address] [facility] [id number]        Dear Colleague,    Thank you for referring your patient, Tee Hilton, to the [facility]. Please see a copy of my visit note below.    CHIEF CONCERN: Status post left reverse total shoulder arthroplasty  DATE OF SURGERY: 6/1/23    HISTORY OF PRESENT ILLNESS: Mr. Hilton is an extremely pleasant 72 year-old man who is 8 weeks status post the above procedure. He feels he is doing well.    EXAM:  Pleasant adult male in NAD  Respirations even and unlabored.  Left upper extremity: Distally neurovascularly intact without deficits. Shoulder range of motion is active FE to 145 and ER to 45.    IMAGING:  Left shoulder XRs demonstrate components are in good position without loosening or fracture.    ASSESSMENT:  1. Eight weeks status post above procedure    PLAN:  Range of Motion: Progress to active ROM of the shoulder and light strengthening per operative note.  Sling: Discontinued    Follow up: in 3 months or prn        Again, thank you for allowing me to participate in the care of your patient.        Sincerely,        Ketty Botello MD

## 2023-07-28 ENCOUNTER — THERAPY VISIT (OUTPATIENT)
Dept: PHYSICAL THERAPY | Facility: CLINIC | Age: 73
End: 2023-07-28
Attending: ORTHOPAEDIC SURGERY
Payer: COMMERCIAL

## 2023-07-28 DIAGNOSIS — Z47.1 AFTERCARE FOLLOWING LEFT SHOULDER JOINT REPLACEMENT SURGERY: Primary | ICD-10-CM

## 2023-07-28 DIAGNOSIS — Z96.612 AFTERCARE FOLLOWING LEFT SHOULDER JOINT REPLACEMENT SURGERY: Primary | ICD-10-CM

## 2023-07-28 PROCEDURE — 97110 THERAPEUTIC EXERCISES: CPT | Mod: GP | Performed by: PHYSICAL THERAPIST

## 2023-07-29 NOTE — PROGRESS NOTES
CHIEF CONCERN: Status post left reverse total shoulder arthroplasty  DATE OF SURGERY: 6/1/23    HISTORY OF PRESENT ILLNESS: Mr. Hilton is an extremely pleasant 72 year-old man who is 8 weeks status post the above procedure. He feels he is doing well.    EXAM:  Pleasant adult male in NAD  Respirations even and unlabored.  Left upper extremity: Distally neurovascularly intact without deficits. Shoulder range of motion is active FE to 145 and ER to 45.    IMAGING:  Left shoulder XRs demonstrate components are in good position without loosening or fracture.    ASSESSMENT:  1. Eight weeks status post above procedure    PLAN:  Range of Motion: Progress to active ROM of the shoulder and light strengthening per operative note.  Sling: Discontinued    Follow up: in 3 months or prn

## 2023-08-02 ENCOUNTER — THERAPY VISIT (OUTPATIENT)
Dept: PHYSICAL THERAPY | Facility: CLINIC | Age: 73
End: 2023-08-02
Payer: COMMERCIAL

## 2023-08-02 DIAGNOSIS — Z96.612 AFTERCARE FOLLOWING LEFT SHOULDER JOINT REPLACEMENT SURGERY: Primary | ICD-10-CM

## 2023-08-02 DIAGNOSIS — Z47.1 AFTERCARE FOLLOWING LEFT SHOULDER JOINT REPLACEMENT SURGERY: Primary | ICD-10-CM

## 2023-08-02 PROCEDURE — 97110 THERAPEUTIC EXERCISES: CPT | Mod: GP | Performed by: PHYSICAL THERAPIST

## 2023-08-08 ENCOUNTER — THERAPY VISIT (OUTPATIENT)
Dept: PHYSICAL THERAPY | Facility: CLINIC | Age: 73
End: 2023-08-08
Payer: COMMERCIAL

## 2023-08-08 DIAGNOSIS — Z47.1 AFTERCARE FOLLOWING LEFT SHOULDER JOINT REPLACEMENT SURGERY: Primary | ICD-10-CM

## 2023-08-08 DIAGNOSIS — Z96.612 AFTERCARE FOLLOWING LEFT SHOULDER JOINT REPLACEMENT SURGERY: Primary | ICD-10-CM

## 2023-08-08 PROCEDURE — 97110 THERAPEUTIC EXERCISES: CPT | Mod: GP | Performed by: PHYSICAL THERAPIST

## 2023-08-16 ENCOUNTER — THERAPY VISIT (OUTPATIENT)
Dept: PHYSICAL THERAPY | Facility: CLINIC | Age: 73
End: 2023-08-16
Payer: COMMERCIAL

## 2023-08-16 DIAGNOSIS — Z47.1 AFTERCARE FOLLOWING LEFT SHOULDER JOINT REPLACEMENT SURGERY: Primary | ICD-10-CM

## 2023-08-16 DIAGNOSIS — Z96.612 AFTERCARE FOLLOWING LEFT SHOULDER JOINT REPLACEMENT SURGERY: Primary | ICD-10-CM

## 2023-08-16 PROCEDURE — 97110 THERAPEUTIC EXERCISES: CPT | Mod: GP | Performed by: PHYSICAL THERAPIST

## 2023-08-23 ENCOUNTER — THERAPY VISIT (OUTPATIENT)
Dept: PHYSICAL THERAPY | Facility: CLINIC | Age: 73
End: 2023-08-23
Payer: COMMERCIAL

## 2023-08-23 DIAGNOSIS — Z96.612 AFTERCARE FOLLOWING LEFT SHOULDER JOINT REPLACEMENT SURGERY: Primary | ICD-10-CM

## 2023-08-23 DIAGNOSIS — Z47.1 AFTERCARE FOLLOWING LEFT SHOULDER JOINT REPLACEMENT SURGERY: Primary | ICD-10-CM

## 2023-08-23 PROCEDURE — 97110 THERAPEUTIC EXERCISES: CPT | Mod: GP | Performed by: PHYSICAL THERAPIST

## 2023-08-23 NOTE — PROGRESS NOTES
08/23/23 0500   Appointment Info   Signing clinician's name / credentials Pro Asuma, DPT, SCS   Total/Authorized Visits 12 (PT POC)   Visits Used 8   Medical Diagnosis s/p left reverse total shoulder arthroplasty, arthrosis of left shoulder   PT Tx Diagnosis L shoulder pain   Precautions/Limitations Range of Motion: Continue passive and active assisted ROM of the shoulder per operative note. Initiate AROM after 7/17   Other pertinent information 12 weeks post-op on 8/24   Progress Note/Certification   Onset of illness/injury or Date of Surgery 06/01/23   Therapy Frequency 1x/week   Predicted Duration 12 visits   Progress Note Due Date 07/24/23  (next visit with surgeon)   Progress Note Completed Date 08/23/23   PT Goal 1   Goal Identifier STG 1   Goal Description Patient will be able to actively raise shoulder 120 degrees   Rationale to maximize safety and independence with performance of ADLs and functional tasks;to maximize safety and independence within the home;to maximize safety and independence within the community;to maximize safety and independence with self cares   Goal Progress >120 degrees active left shoulder flexion and abduction   Target Date 08/04/23   Date Met 08/02/23   PT Goal 2   Goal Identifier LTG 1   Goal Description Patient will be able to lift 5 pounds to shoulder height without pain   Rationale to maximize safety and independence with performance of ADLs and functional tasks;to maximize safety and independence within the home;to maximize safety and independence within the community;to maximize safety and independence with self cares   Goal Progress able to lift up to 1 lb to shoulder level, heavier close to body   Target Date 09/29/23   Subjective Report   Subjective Report Tee reports his shoulder is doing really well.  He notes minimal pain or problems with it, although he notes it is not very strong yet when the arm is out away from his body.  He feels pretty strong with his arm  close to his body.   Objective Measure 1   Objective Measure Shoulder AROM   Details Left shoulder AROM:  flexion 150 abduction 130 ER 65  IR/EXT to T12-L1   Objective Measure 2   Objective Measure Shoulder strength   Details Left shoulder strength: flexion 4+/5 scaption 4/5  ER 5-/5  IR 5-/5   Treatment Interventions (PT)   Interventions Therapeutic Procedure/Exercise   Therapeutic Procedure/Exercise   Therapeutic Procedures: strength, endurance, ROM, flexibillity minutes (49367) 40   Ther Proc 1 UBE   Ther Proc 1 - Details x5 min   Ther Proc 2 Pulleys   Ther Proc 2 - Details Flexion and scaption x20 each; IR/EXT x10   Ther Proc 3 shoulder flexion   Ther Proc 3 - Details 6 oz x30   Ther Proc 4 scaption   Ther Proc 4 - Details 6 oz x30   Ther Proc 5 biceps curl   Ther Proc 5 - Details 5 lb x30   Ther Proc 6 seated weight stack row   Ther Proc 6 - Details 4 plates x30   PTRx Ther Proc 1 triceps press down   PTRx Ther Proc 1 - Details 2 plates x20   PTRx Ther Proc 2 shoulder IR   PTRx Ther Proc 2 - Details green band x30   PTRx Ther Proc 3 standing shoulder horizontal abduction   PTRx Ther Proc 3 - Details 1 lb x30   PTRx Ther Proc 4 supine ceiling punch   PTRx Ther Proc 4 - Details 2 lb x30   PTRx Ther Proc 5 countertop push up   PTRx Ther Proc 5 - Details partial range x20   PTRx Ther Proc 6 SL shoulder ER   PTRx Ther Proc 6 - Details 1 lb x30   PTRx Ther Proc 7 SL shoulder abduction   PTRx Ther Proc 7 - Details 6 oz x30 mid-range   Education   Learner/Method Patient   Plan   Home program advanced to more AROM, scapular strengthening   Plan for next session progress strengthening, possible DC if doing well   Total Session Time   Timed Code Treatment Minutes 40   Total Treatment Time (sum of timed and untimed services) 40       PLAN  Continue therapy per current plan of care.    Beginning/End Dates of Progress Note Reporting Period:  08/23/23 to 08/23/2023    Referring Provider:  Ketty Botello

## 2023-09-02 DIAGNOSIS — K22.70 BARRETT'S ESOPHAGUS DETERMINED BY BIOPSY: ICD-10-CM

## 2023-09-04 RX ORDER — PANTOPRAZOLE SODIUM 40 MG/1
TABLET, DELAYED RELEASE ORAL
Qty: 180 TABLET | Refills: 0 | Status: SHIPPED | OUTPATIENT
Start: 2023-09-04 | End: 2023-12-04

## 2023-09-06 ENCOUNTER — ANCILLARY PROCEDURE (OUTPATIENT)
Dept: CARDIOLOGY | Facility: CLINIC | Age: 73
End: 2023-09-06
Attending: PHYSICIAN ASSISTANT
Payer: COMMERCIAL

## 2023-09-06 DIAGNOSIS — I50.22 CHRONIC SYSTOLIC CONGESTIVE HEART FAILURE (H): ICD-10-CM

## 2023-09-06 LAB — BI-PLANE LVEF ECHO: NORMAL

## 2023-09-06 PROCEDURE — 93306 TTE W/DOPPLER COMPLETE: CPT | Performed by: INTERNAL MEDICINE

## 2023-09-06 RX ADMIN — Medication 5 ML: at 10:36

## 2023-09-13 ENCOUNTER — THERAPY VISIT (OUTPATIENT)
Dept: PHYSICAL THERAPY | Facility: CLINIC | Age: 73
End: 2023-09-13
Payer: COMMERCIAL

## 2023-09-13 DIAGNOSIS — Z96.612 AFTERCARE FOLLOWING LEFT SHOULDER JOINT REPLACEMENT SURGERY: Primary | ICD-10-CM

## 2023-09-13 DIAGNOSIS — Z47.1 AFTERCARE FOLLOWING LEFT SHOULDER JOINT REPLACEMENT SURGERY: Primary | ICD-10-CM

## 2023-09-13 PROCEDURE — 97110 THERAPEUTIC EXERCISES: CPT | Mod: GP | Performed by: PHYSICAL THERAPIST

## 2023-09-13 NOTE — PROGRESS NOTES
09/13/23 0500   Appointment Info   Signing clinician's name / credentials Pro Asuma, DPT, SCS   Total/Authorized Visits 12 (PT POC)   Visits Used 9   Medical Diagnosis s/p left reverse total shoulder arthroplasty, arthrosis of left shoulder   PT Tx Diagnosis L shoulder pain   Precautions/Limitations Range of Motion: Continue passive and active assisted ROM of the shoulder per operative note. Initiate AROM after 7/17   Other pertinent information 12 weeks post-op on 8/24   Progress Note/Certification   Onset of illness/injury or Date of Surgery 06/01/23   Therapy Frequency 1x/week   Predicted Duration 12 visits   Progress Note Due Date 07/24/23  (next visit with surgeon)   Progress Note Completed Date 09/13/23   PT Goal 1   Goal Identifier STG 1   Goal Description Patient will be able to actively raise shoulder 120 degrees   Rationale to maximize safety and independence with performance of ADLs and functional tasks;to maximize safety and independence within the home;to maximize safety and independence within the community;to maximize safety and independence with self cares   Goal Progress >120 degrees active left shoulder flexion and abduction   Target Date 08/04/23   Date Met 08/02/23   PT Goal 2   Goal Identifier LTG 1   Goal Description Patient will be able to lift 5 pounds to shoulder height without pain   Rationale to maximize safety and independence with performance of ADLs and functional tasks;to maximize safety and independence within the home;to maximize safety and independence within the community;to maximize safety and independence with self cares   Goal Progress can lift 2-3 lb to shoulder height   Target Date 09/29/23   Subjective Report   Subjective Report Tee reports his shoulder is feeling good overall.  It is not as strong as he would like yet and he has some difficulty reaching behind his back to don a coat.  He has returned to the gym and is doing some workouts there. He is consistently  getting his HEP done and this is going well.   Objective Measure 1   Objective Measure Shoulder AROM   Details Left shoulder AROM:  flexion 150 abduction 140 ER 65  IR/EXT to T12-L1   Objective Measure 2   Objective Measure Shoulder strength   Details Left shoulder strength: flexion 5-/5 scaption 5-/5  ER 5-/5  IR 5-/5   Treatment Interventions (PT)   Interventions Therapeutic Procedure/Exercise   Therapeutic Procedure/Exercise   Therapeutic Procedures: strength, endurance, ROM, flexibillity minutes (33201) 40   Ther Proc 1 UBE   Ther Proc 1 - Details x5 min   Ther Proc 2 Pulleys   Ther Proc 2 - Details Flexion and scaption x20 each; IR/EXT x10   Ther Proc 3 shoulder flexion   Ther Proc 3 - Details 1 lb x15; 2 lb x15   Ther Proc 4 scaption   Ther Proc 4 - Details 2 lb x15   Ther Proc 5 horizontal abduction   Ther Proc 5 - Details 2 lb x15   Ther Proc 6 cable column IR   Ther Proc 6 - Details 2 plates x15   PTRx Ther Proc 1 cable column ER   PTRx Ther Proc 1 - Details 1 plate x15   PTRx Ther Proc 2 sidelying shoulder abduction   PTRx Ther Proc 2 - Details 1 lb x15   PTRx Ther Proc 3 HEP updates, progresssions, review   PTRx Ther Proc 3 - Details 5 min   PTRx Ther Proc 5 countertop push up   PTRx Ther Proc 5 - Details full range x20   Education   Learner/Method Patient   Plan   Updates to plan of care f/u PRN if problems arise   Total Session Time   Timed Code Treatment Minutes 40   Total Treatment Time (sum of timed and untimed services) 40         PLAN  Continue HEP independently and progress as instructed.  Follow up with PT if having problems with pain, loss of motion/strength.    Beginning/End Dates of Progress Note Reporting Period:  09/13/23 to 09/13/2023    Referring Provider:  Ketty Botello

## 2023-09-26 ENCOUNTER — ANCILLARY PROCEDURE (OUTPATIENT)
Dept: GENERAL RADIOLOGY | Facility: CLINIC | Age: 73
End: 2023-09-26
Attending: PHYSICIAN ASSISTANT
Payer: COMMERCIAL

## 2023-09-26 ENCOUNTER — OFFICE VISIT (OUTPATIENT)
Dept: FAMILY MEDICINE | Facility: CLINIC | Age: 73
End: 2023-09-26
Payer: COMMERCIAL

## 2023-09-26 VITALS
RESPIRATION RATE: 18 BRPM | SYSTOLIC BLOOD PRESSURE: 134 MMHG | BODY MASS INDEX: 36.08 KG/M2 | OXYGEN SATURATION: 96 % | HEART RATE: 80 BPM | HEIGHT: 70 IN | TEMPERATURE: 97.3 F | WEIGHT: 252 LBS | DIASTOLIC BLOOD PRESSURE: 96 MMHG

## 2023-09-26 DIAGNOSIS — M25.562 ACUTE PAIN OF LEFT KNEE: ICD-10-CM

## 2023-09-26 DIAGNOSIS — S00.531A CONTUSION OF INTRAORAL SURFACE OF LIP: ICD-10-CM
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
## 2023-09-26 DIAGNOSIS — M25.531 RIGHT WRIST PAIN: ICD-10-CM

## 2023-09-26 PROCEDURE — 73110 X-RAY EXAM OF WRIST: CPT | Mod: TC | Performed by: RADIOLOGY

## 2023-09-26 PROCEDURE — 99214 OFFICE O/P EST MOD 30 MIN: CPT | Performed by: PHYSICIAN ASSISTANT

## 2023-09-26 PROCEDURE — 73562 X-RAY EXAM OF KNEE 3: CPT | Mod: TC | Performed by: RADIOLOGY

## 2023-09-26 ASSESSMENT — ENCOUNTER SYMPTOMS
PARESTHESIAS: 0
LIGHT-HEADEDNESS: 0
ARTHRALGIAS: 1
HEADACHES: 0
NAUSEA: 0
VOMITING: 0
WOUND: 1
DIZZINESS: 0
WEAKNESS: 0
NUMBNESS: 0

## 2023-09-26 ASSESSMENT — PAIN SCALES - GENERAL: PAINLEVEL: SEVERE PAIN (7)

## 2023-09-26 NOTE — PATIENT INSTRUCTIONS
Your lip is quite swollen, but the cut or your teeth hit it appears to be superficial and does not need stitches at this time.  Please apply ice, with a thin towel over top, to help prevent it from swelling further.    Your wrist x-rays do not show any signs of fracture.  Your wrist pain is likely due to a sprain.  This should improve over the next 4-6 weeks.  For pain relief you can use rest, Tylenol, and ice.    The x-rays of the left knee do not show any signs of new fracture.  You do have swelling of the fluid-filled sac, the bursa at this front of the knee.  Please monitor this area for signs of infection which would include redness, swelling, worsening pain, increased warmth.  For swelling you can use ice, with a thin towel over top, and compression.  For pain relief you can use Tylenol.      Please reach out with questions or concerns.  Follow-up in clinic for new or worsening symptoms.
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
## 2023-10-03 DIAGNOSIS — I48.20 CHRONIC ATRIAL FIBRILLATION (H): ICD-10-CM

## 2023-10-03 RX ORDER — METOPROLOL SUCCINATE 100 MG/1
TABLET, EXTENDED RELEASE ORAL
Qty: 135 TABLET | Refills: 0 | OUTPATIENT
Start: 2023-10-03

## 2023-10-12 DIAGNOSIS — I10 ESSENTIAL HYPERTENSION: ICD-10-CM

## 2023-10-12 RX ORDER — AMLODIPINE BESYLATE 5 MG/1
TABLET ORAL
Qty: 135 TABLET | Refills: 0 | OUTPATIENT
Start: 2023-10-12

## 2023-10-18 ENCOUNTER — TELEPHONE (OUTPATIENT)
Dept: FAMILY MEDICINE | Facility: CLINIC | Age: 73
End: 2023-10-18
Payer: COMMERCIAL

## 2023-10-18 NOTE — TELEPHONE ENCOUNTER
Medication was refilled on 10/16/23.  Notified patient.    Patient stated understanding and agreeable with the plan of care.     Pat RN BSN  Triage Nurse  [facility]

## 2023-10-18 NOTE — TELEPHONE ENCOUNTER
Medication Question or Refill    Contacts         Type Contact Phone/Fax    10/18/2023 07:59 AM CDT Phone (Incoming) Tee Hilton (Self) [phone] (H)            What medication are you calling about (include dose and sig)?:   zolpidem ER (AMBIEN CR) 12.5 MG CR tablet     Preferred Pharmacy:   [facility] - [place] [id number] [facility]  [address] [address]  Phone: [phone] Fax: [phone]      Controlled Substance Agreement on file:   CSA -- Patient Level:    CSA: None found at the patient level.       Who prescribed the medication?: Jon Denson    Do you need a refill? Yes    When did you use the medication last? today    Patient offered an appointment? No    Do you have any questions or concerns?  Yes: Please call patient, would like a confirmation that this has been filled.       Could we send this information to you in [facility] or would you prefer to receive a phone call?:   Patient would prefer a phone call   Okay to leave a detailed message?: Yes at Cell number on file:    Telephone Information:   Mobile [phone]

## 2023-11-02 ENCOUNTER — OFFICE VISIT (OUTPATIENT)
Dept: FAMILY MEDICINE | Facility: CLINIC | Age: 73
End: 2023-11-02
Payer: COMMERCIAL

## 2023-11-02 VITALS
RESPIRATION RATE: 20 BRPM | BODY MASS INDEX: 37.11 KG/M2 | HEART RATE: 68 BPM | TEMPERATURE: 97.5 F | SYSTOLIC BLOOD PRESSURE: 144 MMHG | OXYGEN SATURATION: 96 % | DIASTOLIC BLOOD PRESSURE: 84 MMHG | WEIGHT: 259.2 LBS | HEIGHT: 70 IN

## 2023-11-02 DIAGNOSIS — Z96.612 HISTORY OF LEFT SHOULDER REPLACEMENT: ICD-10-CM

## 2023-11-02 DIAGNOSIS — I10 ESSENTIAL HYPERTENSION: Primary | ICD-10-CM

## 2023-11-02 DIAGNOSIS — R42 LIGHT HEADEDNESS: ICD-10-CM

## 2023-11-02 LAB
ANION GAP SERPL CALCULATED.3IONS-SCNC: 10 MMOL/L (ref 7–15)
BASOPHILS # BLD AUTO: 0 10E3/UL (ref 0–0.2)
BASOPHILS NFR BLD AUTO: 0 %
BUN SERPL-MCNC: 23.8 MG/DL (ref 8–23)
CALCIUM SERPL-MCNC: 9.5 MG/DL (ref 8.8–10.2)
CHLORIDE SERPL-SCNC: 102 MMOL/L (ref 98–107)
CREAT SERPL-MCNC: 1.44 MG/DL (ref 0.67–1.17)
DEPRECATED HCO3 PLAS-SCNC: 26 MMOL/L (ref 22–29)
EGFRCR SERPLBLD CKD-EPI 2021: 52 ML/MIN/1.73M2
EOSINOPHIL # BLD AUTO: 0.2 10E3/UL (ref 0–0.7)
EOSINOPHIL NFR BLD AUTO: 2 %
ERYTHROCYTE [DISTWIDTH] IN BLOOD BY AUTOMATED COUNT: 13.9 % (ref 10–15)
GLUCOSE SERPL-MCNC: 98 MG/DL (ref 70–99)
HCT VFR BLD AUTO: 41.8 % (ref 40–53)
HGB BLD-MCNC: 13.6 G/DL (ref 13.3–17.7)
IMM GRANULOCYTES # BLD: 0 10E3/UL
IMM GRANULOCYTES NFR BLD: 0 %
LYMPHOCYTES # BLD AUTO: 1.2 10E3/UL (ref 0.8–5.3)
LYMPHOCYTES NFR BLD AUTO: 13 %
MCH RBC QN AUTO: 31.2 PG (ref 26.5–33)
MCHC RBC AUTO-ENTMCNC: 32.5 G/DL (ref 31.5–36.5)
MCV RBC AUTO: 96 FL (ref 78–100)
MONOCYTES # BLD AUTO: 1.1 10E3/UL (ref 0–1.3)
MONOCYTES NFR BLD AUTO: 12 %
NEUTROPHILS # BLD AUTO: 6.8 10E3/UL (ref 1.6–8.3)
NEUTROPHILS NFR BLD AUTO: 74 %
PLATELET # BLD AUTO: 325 10E3/UL (ref 150–450)
POTASSIUM SERPL-SCNC: 4.8 MMOL/L (ref 3.4–5.3)
RBC # BLD AUTO: 4.36 10E6/UL (ref 4.4–5.9)
SODIUM SERPL-SCNC: 138 MMOL/L (ref 135–145)
WBC # BLD AUTO: 9.3 10E3/UL (ref 4–11)

## 2023-11-02 PROCEDURE — 90471 IMMUNIZATION ADMIN: CPT | Performed by: PHYSICIAN ASSISTANT

## 2023-11-02 PROCEDURE — 80048 BASIC METABOLIC PNL TOTAL CA: CPT | Performed by: PHYSICIAN ASSISTANT

## 2023-11-02 PROCEDURE — 36415 COLL VENOUS BLD VENIPUNCTURE: CPT | Performed by: PHYSICIAN ASSISTANT

## 2023-11-02 PROCEDURE — 90662 IIV NO PRSV INCREASED AG IM: CPT | Performed by: PHYSICIAN ASSISTANT

## 2023-11-02 PROCEDURE — 85025 COMPLETE CBC W/AUTO DIFF WBC: CPT | Performed by: PHYSICIAN ASSISTANT

## 2023-11-02 PROCEDURE — 99214 OFFICE O/P EST MOD 30 MIN: CPT | Mod: 25 | Performed by: PHYSICIAN ASSISTANT

## 2023-11-02 RX ORDER — AMLODIPINE BESYLATE 10 MG/1
10 TABLET ORAL DAILY
Qty: 90 TABLET | Refills: 0 | Status: SHIPPED | OUTPATIENT
Start: 2023-11-02 | End: 2024-01-24

## 2023-11-02 RX ORDER — AMOXICILLIN 500 MG/1
CAPSULE ORAL
Qty: 4 CAPSULE | Refills: 0 | Status: SHIPPED | OUTPATIENT
Start: 2023-11-02

## 2023-11-02 ASSESSMENT — PAIN SCALES - GENERAL: PAINLEVEL: NO PAIN (0)
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
## 2023-11-07 ENCOUNTER — ANCILLARY PROCEDURE (OUTPATIENT)
Dept: ULTRASOUND IMAGING | Facility: CLINIC | Age: 73
End: 2023-11-07
Attending: PHYSICIAN ASSISTANT
Payer: COMMERCIAL

## 2023-11-07 DIAGNOSIS — R42 LIGHT HEADEDNESS: ICD-10-CM

## 2023-11-07 PROCEDURE — 93880 EXTRACRANIAL BILAT STUDY: CPT | Mod: TC | Performed by: RADIOLOGY

## 2023-12-14 DIAGNOSIS — I10 BENIGN ESSENTIAL HYPERTENSION: ICD-10-CM

## 2023-12-14 DIAGNOSIS — I10 ESSENTIAL HYPERTENSION: ICD-10-CM

## 2023-12-14 RX ORDER — LISINOPRIL 20 MG/1
20 TABLET ORAL DAILY
Qty: 90 TABLET | Refills: 0 | OUTPATIENT
Start: 2023-12-14

## 2023-12-14 NOTE — TELEPHONE ENCOUNTER
Pt calling. States he has a couple weeks of medication left. He will be leaving to go to [place] soon and was hoping to fill the medication prior to leaving to [place].     Routing to refill team.    Elva Vallejo RN  [facility]

## 2023-12-17 RX ORDER — LISINOPRIL 20 MG/1
20 TABLET ORAL DAILY
Qty: 90 TABLET | Refills: 0 | Status: SHIPPED | OUTPATIENT
Start: 2023-12-17 | End: 2023-12-27

## 2023-12-18 NOTE — TELEPHONE ENCOUNTER
NEXT APPT  With Family Practice (Jon Denson PA-C)  12/27/2023 at 11:20 AM    Hayley Pierce on 12/18/2023 at 6:54 AM

## 2023-12-26 NOTE — COMMUNITY RESOURCES LIST (ENGLISH)
12/26/2023   [facility] VIDDIX  N/A  For questions about this resource list or additional care needs, please contact your primary care clinic or care manager.  Phone: [phone]   Email: N/A   Address: [address] [place] [address]   Hours: N/A        Financial Stability       Utility payment assistance  1  [facility] [employer] ([employer]) - Energy Assistance Program Distance: 13.32 miles      In-Person, Phone/Virtual   [address] [place] [address]  Language: English  Hours: Mon - Fri 8:00 AM - 4:30 PM  Fees: Free   Phone: (222) 530-1108 Email: accap@accap.org Website: http://www.accap.org     2  [facility]  Office - [facility] Distance: 13.32 miles      Phone/Virtual   [address] [place] [address]  Language: English  Hours: Mon - Fri 8:30 AM - 12:00 PM , Mon - Fri 1:00 PM - 4:00 PM  Fees: Free   Phone: (358) 988-8275 Email: zakia@[facility].[employer].org Website: https://www.[employer].org/usn/          Important Numbers & Websites       Emergency Services   911  [facility] Services   311  Poison Control   (106) 162-8685  Suicide Prevention Lifeline   (836) 277-4942 (TALK)  Child Abuse Hotline   (684) 514-2082 (4-A-Child)  Sexual Assault Hotline   (396) 354-2131 (HOPE)  National Runaway Safeline   (857) 759-1582 (RUNAWAY)  All-Options Talkline   (151) 889-4664  Substance Abuse Referral   (888) 935-2607 (HELP)

## 2023-12-27 ENCOUNTER — OFFICE VISIT (OUTPATIENT)
Dept: FAMILY MEDICINE | Facility: CLINIC | Age: 73
End: 2023-12-27
Payer: COMMERCIAL

## 2023-12-27 VITALS
HEART RATE: 74 BPM | RESPIRATION RATE: 18 BRPM | HEIGHT: 69 IN | TEMPERATURE: 97.1 F | OXYGEN SATURATION: 97 % | BODY MASS INDEX: 37.15 KG/M2 | WEIGHT: 250.8 LBS | DIASTOLIC BLOOD PRESSURE: 74 MMHG | SYSTOLIC BLOOD PRESSURE: 128 MMHG

## 2023-12-27 DIAGNOSIS — I10 ESSENTIAL HYPERTENSION: Primary | ICD-10-CM

## 2023-12-27 DIAGNOSIS — R55 SYNCOPE, UNSPECIFIED SYNCOPE TYPE: ICD-10-CM

## 2023-12-27 DIAGNOSIS — I10 BENIGN ESSENTIAL HYPERTENSION: ICD-10-CM

## 2023-12-27 DIAGNOSIS — F51.01 PRIMARY INSOMNIA: ICD-10-CM

## 2023-12-27 PROCEDURE — 99214 OFFICE O/P EST MOD 30 MIN: CPT | Performed by: PHYSICIAN ASSISTANT

## 2023-12-27 RX ORDER — LISINOPRIL 20 MG/1
20 TABLET ORAL DAILY
Qty: 90 TABLET | Refills: 0 | Status: CANCELLED | OUTPATIENT
Start: 2023-12-27

## 2023-12-27 RX ORDER — LISINOPRIL 20 MG/1
20 TABLET ORAL DAILY
Qty: 90 TABLET | Refills: 1 | Status: SHIPPED | OUTPATIENT
Start: 2023-12-27 | End: 2024-06-10

## 2023-12-27 RX ORDER — OXYCODONE HYDROCHLORIDE 5 MG/1
5 TABLET ORAL
COMMUNITY
Start: 2023-12-24 | End: 2024-05-28

## 2023-12-27 ASSESSMENT — PAIN SCALES - GENERAL: PAINLEVEL: SEVERE PAIN (6)
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
## 2024-01-10 DIAGNOSIS — F51.01 PRIMARY INSOMNIA: ICD-10-CM

## 2024-01-10 NOTE — TELEPHONE ENCOUNTER
Routing to PCP.    [facility]    Patient in [place] and need prescription sent there.     Patient requesting 60 day supply.    Per patient needs MD co sign?    RN received call from patient regarding above.    Christine M Klisch, RN

## 2024-01-12 NOTE — TELEPHONE ENCOUNTER
Pharmacy did not receive medication    Please fax of belsomra 5mg to [phone]    Farhana Steven RN  [facility]

## 2024-01-15 NOTE — TELEPHONE ENCOUNTER
Patient is calling and stating that he would like Rx for Belsomra 5 mg tablet sent to [facility] pharmacy at [address] [facility]. He would like a call back to notify if this has been sent at [phone]. States that the pharmacy has not received anything.    Bridget Herbert RN

## 2024-01-16 NOTE — TELEPHONE ENCOUNTER
Notified patient that medication was sent.    Pat VELAN RN  Triage Nurse  [facility]

## 2024-01-17 DIAGNOSIS — I10 ESSENTIAL HYPERTENSION: ICD-10-CM

## 2024-01-17 DIAGNOSIS — I48.20 CHRONIC ATRIAL FIBRILLATION (H): ICD-10-CM

## 2024-01-17 RX ORDER — METOPROLOL SUCCINATE 100 MG/1
TABLET, EXTENDED RELEASE ORAL
Qty: 135 TABLET | Refills: 0 | OUTPATIENT
Start: 2024-01-17

## 2024-01-17 RX ORDER — AMLODIPINE BESYLATE 5 MG/1
TABLET ORAL
Qty: 135 TABLET | Refills: 0 | OUTPATIENT
Start: 2024-01-17

## 2024-02-22 DIAGNOSIS — I48.20 CHRONIC ATRIAL FIBRILLATION (H): ICD-10-CM

## 2024-02-23 RX ORDER — RIVAROXABAN 20 MG/1
TABLET, FILM COATED ORAL
Qty: 90 TABLET | Refills: 0 | Status: SHIPPED | OUTPATIENT
Start: 2024-02-23 | End: 2024-02-26

## 2024-02-26 NOTE — TELEPHONE ENCOUNTER
Patient calling needing this refill at a different pharmacy. Nurse resent intended prescription to the correct [facility].     Kylie Hummel RN

## 2024-03-20 ENCOUNTER — TELEPHONE (OUTPATIENT)
Dept: FAMILY MEDICINE | Facility: CLINIC | Age: 74
End: 2024-03-20

## 2024-03-20 NOTE — TELEPHONE ENCOUNTER
Prior Authorization Retail Medication Request    Medication/Dose: MOUNJARO 2.5 MG/0.5ML pen  Diagnosis and ICD code (if different than what is on RX):  E66.01   New/renewal/insurance change PA/secondary ins. PA:  Previously Tried and Failed:  na  Rationale:  na    Insurance   Primary:  [facility]  Insurance ID:  [id number]     Secondary (if applicable): MEDICARE  Insurance ID:  [id number]     Pharmacy Information (if different than what is on RX)  Name:  [facility]  Phone:  na  Fax:     [phone]

## 2024-04-01 ENCOUNTER — PATIENT OUTREACH (OUTPATIENT)
Dept: GASTROENTEROLOGY | Facility: CLINIC | Age: 74
End: 2024-04-01
Payer: COMMERCIAL

## 2024-04-01 NOTE — TELEPHONE ENCOUNTER
PA Initiation    Medication: MOUNJARO 2.5 MG/0.5ML SC SOPN  Insurance Company: Other (see comments)  Pharmacy Filling the Rx: [facility] - [place], [place] - [address] [facility]  Filling Pharmacy Phone: [phone]  Filling Pharmacy Fax: [phone]  Start Date: 4/1/2024         Note: Due to record-high volumes, our turn-around time is taking longer than usual . We are currently 10 business days behind in the pools.   We are working diligently to submit all requests in a timely manner and in the order they are received. Please only flag TRUE URGENT requests as high priority to the pool at this time.   If you have questions - please send a note/message in the active PA encounter and send back to the RPPA (Retail Pharmacy Prior Authorization) team [[id number]].    If you have more specific questions about our process please reach out to our supervisor Kylie Blackman.   Thank you!

## 2024-04-03 NOTE — TELEPHONE ENCOUNTER
PRIOR AUTHORIZATION DENIED    Medication: MOUNJARO 2.5 MG/0.5ML SC SOPN  Insurance Company: Other (see comments)  Denial Date: 4/1/2024  Denial Reason(s):           Appeal Information:         Patient Notified: NO

## 2024-04-12 PROBLEM — Z96.612 AFTERCARE FOLLOWING LEFT SHOULDER JOINT REPLACEMENT SURGERY: Status: RESOLVED | Noted: 2023-07-07 | Resolved: 2024-04-12

## 2024-04-12 PROBLEM — Z47.1 AFTERCARE FOLLOWING LEFT SHOULDER JOINT REPLACEMENT SURGERY: Status: RESOLVED | Noted: 2023-07-07 | Resolved: 2024-04-12

## 2024-04-12 NOTE — PROGRESS NOTES
Patient is discharged from PT.  Please refer to progress note dated 9/13/23 for last known functional status as well as final objective/subjective values.

## 2024-05-28 ENCOUNTER — OFFICE VISIT (OUTPATIENT)
Dept: FAMILY MEDICINE | Facility: CLINIC | Age: 74
End: 2024-05-28
Payer: COMMERCIAL

## 2024-05-28 VITALS
OXYGEN SATURATION: 98 % | BODY MASS INDEX: 38.39 KG/M2 | TEMPERATURE: 98.5 F | RESPIRATION RATE: 24 BRPM | SYSTOLIC BLOOD PRESSURE: 137 MMHG | HEART RATE: 92 BPM | DIASTOLIC BLOOD PRESSURE: 78 MMHG | WEIGHT: 259.2 LBS | HEIGHT: 69 IN

## 2024-05-28 DIAGNOSIS — I50.22 CHRONIC SYSTOLIC CONGESTIVE HEART FAILURE (H): Primary | ICD-10-CM

## 2024-05-28 DIAGNOSIS — E66.01 MORBID OBESITY (H): ICD-10-CM

## 2024-05-28 DIAGNOSIS — N18.32 STAGE 3B CHRONIC KIDNEY DISEASE (H): ICD-10-CM

## 2024-05-28 DIAGNOSIS — I48.20 CHRONIC ATRIAL FIBRILLATION (H): ICD-10-CM

## 2024-05-28 PROCEDURE — 99214 OFFICE O/P EST MOD 30 MIN: CPT | Performed by: PHYSICIAN ASSISTANT

## 2024-05-28 ASSESSMENT — PAIN SCALES - GENERAL: PAINLEVEL: NO PAIN (0)

## 2024-05-28 NOTE — PROGRESS NOTES
"    Subjective   Tee is a 73 year old, presenting for the following health issues:  RECHECK (Would like a recheck before moving to [place]) and Edema (Bilateral ankle swelling)        5/28/2024    11:56 AM   Additional Questions   Roomed by Yina Everett CMA   Accompanied by None         5/28/2024    11:56 AM   Patient Reported Additional Medications   Patient reports taking the following new medications none     History of Present Illness       Reason for visit:  Apnea onbesity    He eats 2-3 servings of fruits and vegetables daily.He consumes 1 sweetened beverage(s) daily.He exercises with enough effort to increase his heart rate 30 to 60 minutes per day.  He exercises with enough effort to increase his heart rate 3 or less days per week.   He is taking medications regularly.     Recheck of htn.  Recheck of obesity. Discussed a lower calorie diet and consistent mix of aerobic exercise and strength training. This will help maintain/treat his blood pressure.  Ckd. Stable.   No chest pain/sob/palpitations/dizziness/ha's  A fib. Stable. No orthopnea or pnd.       Review of Systems  Constitutional, HEENT, cardiovascular, pulmonary, GI, , musculoskeletal, neuro, skin, endocrine and psych systems are negative, except as otherwise noted.      Objective    /78   Pulse 92   Temp 98.5  F (36.9  C) (Temporal)   Resp 24   Ht 1.756 m (5' 9.13\")   Wt 117.6 kg (259 lb 3.2 oz)   SpO2 98%   BMI 38.13 kg/m    Body mass index is 38.13 kg/m .  Physical Exam   Eye exam - right eye normal lid, conjunctiva, cornea, pupil and fundus, left eye normal lid, conjunctiva, cornea, pupil and fundus.  Thyroid not palpable, not enlarged, no nodules detected.  CHEST:chest clear to IPPA, no tachypnea, retractions or cyanosis, and Heart exam detail:irregularly irregular rhythm, chest is clear without rales or wheezing, no pedal edema, no JVD, no hepatosplenomegaly.  Trace of leg edema.    Tee was seen today for recheck and " edema.    Diagnoses and all orders for this visit:    Chronic systolic congestive heart failure (H)  -     Albumin Random Urine Quantitative with Creat Ratio; Future  -     BASIC METABOLIC PANEL; Future    Chronic atrial fibrillation (H)    Morbid obesity (H)  -     tirzepatide (MOUNJARO) 5 MG/0.5ML pen; Inject 5 mg Subcutaneous every 7 days    Stage 3b chronic kidney disease (H)      Continue all current meds.  Exercise  Mediterranean diet.   Signed Electronically by: Jon Denson PA-C

## 2024-06-03 ENCOUNTER — LAB (OUTPATIENT)
Dept: LAB | Facility: CLINIC | Age: 74
End: 2024-06-03
Payer: COMMERCIAL

## 2024-06-03 DIAGNOSIS — I50.22 CHRONIC SYSTOLIC CONGESTIVE HEART FAILURE (H): Primary | ICD-10-CM

## 2024-06-03 LAB
ANION GAP SERPL CALCULATED.3IONS-SCNC: 10 MMOL/L (ref 7–15)
BUN SERPL-MCNC: 23.3 MG/DL (ref 8–23)
CALCIUM SERPL-MCNC: 9.3 MG/DL (ref 8.8–10.2)
CHLORIDE SERPL-SCNC: 101 MMOL/L (ref 98–107)
CHOLEST SERPL-MCNC: 199 MG/DL
CREAT SERPL-MCNC: 1.43 MG/DL (ref 0.67–1.17)
CREAT UR-MCNC: 210 MG/DL
DEPRECATED HCO3 PLAS-SCNC: 26 MMOL/L (ref 22–29)
EGFRCR SERPLBLD CKD-EPI 2021: 52 ML/MIN/1.73M2
FASTING STATUS PATIENT QL REPORTED: NO
FASTING STATUS PATIENT QL REPORTED: NO
GLUCOSE SERPL-MCNC: 105 MG/DL (ref 70–99)
HDLC SERPL-MCNC: 67 MG/DL
LDLC SERPL CALC-MCNC: 114 MG/DL
MICROALBUMIN UR-MCNC: 22.7 MG/L
MICROALBUMIN/CREAT UR: 10.81 MG/G CR (ref 0–17)
NONHDLC SERPL-MCNC: 132 MG/DL
POTASSIUM SERPL-SCNC: 4.6 MMOL/L (ref 3.4–5.3)
SODIUM SERPL-SCNC: 137 MMOL/L (ref 135–145)
TRIGL SERPL-MCNC: 88 MG/DL

## 2024-06-03 PROCEDURE — 82043 UR ALBUMIN QUANTITATIVE: CPT

## 2024-06-03 PROCEDURE — 80048 BASIC METABOLIC PNL TOTAL CA: CPT

## 2024-06-03 PROCEDURE — 36415 COLL VENOUS BLD VENIPUNCTURE: CPT

## 2024-06-03 PROCEDURE — 80061 LIPID PANEL: CPT

## 2024-06-03 PROCEDURE — 82570 ASSAY OF URINE CREATININE: CPT

## 2024-06-19 DIAGNOSIS — F51.01 PRIMARY INSOMNIA: ICD-10-CM

## 2024-06-20 NOTE — TELEPHONE ENCOUNTER
"Pharmacy in [place] calling, says patient is needing the Belsorma Rx that was sent to [facility] in [place] re-sent to [place] as the Rx cannot be transferred due to it being \"on hold\" and has not been filled at least once at [facility] as it has a new Rx number due to being a new Rx sent.    Pharmacist can see the 6/9/24 Belsomra at [facility] in [place] has NOT been filled, is \"on hold\").    A little confusing.  I advised our providers don't prescribe controlled meds over state lines.   Pharmacist says it is schedule II med so pharmacist feels provider should be able to send the Rx to [place], it is \"not a highly controlled med like a narcotic\".      Pharmacist says if it won't transmit electronically, it can be called in by phone.    I advised pharmacist to tell patient he needs to find a new provider if he's moved to [place].   Pharmacist suggests we put that on the Rx.      I see patient was seen 5/28/24:   Daniella Oliveira is a 73 year old, presenting for the following health issues:  RECHECK (Would like a recheck before moving to [place])      I added the note to the Rx per pharmacist's suggestion, routed high priority to Jon Denson to address.    Brandee BRISENO RN  [facility] Triage            "

## 2024-06-20 NOTE — TELEPHONE ENCOUNTER
Patient stated he moved and he will need all future refills to be sent to [facility] in [place] in [place] -[address]       He stated he is there now and needs his refill.      Betty Snyder   Lead    eal Sonya Christie

## 2024-07-25 ENCOUNTER — TELEPHONE (OUTPATIENT)
Dept: FAMILY MEDICINE | Facility: CLINIC | Age: 74
End: 2024-07-25
Payer: COMMERCIAL

## 2024-07-25 NOTE — TELEPHONE ENCOUNTER
Patient Quality Outreach    Patient is due for the following:   Physical Annual Wellness Visit    Next Steps:   Schedule a Annual Wellness Visit    Type of outreach:    Sent [address] [employer] message.      Questions for provider review:    None           Yina Everett

## 2024-09-07 ENCOUNTER — HEALTH MAINTENANCE LETTER (OUTPATIENT)
Age: 74
End: 2024-09-07

## 2024-10-11 DIAGNOSIS — I48.20 CHRONIC ATRIAL FIBRILLATION (H): ICD-10-CM

## 2024-10-11 RX ORDER — METOPROLOL SUCCINATE 100 MG/1
TABLET, EXTENDED RELEASE ORAL
Qty: 135 TABLET | Refills: 0 | Status: SHIPPED | OUTPATIENT
Start: 2024-10-11

## 2025-01-25 NOTE — TELEPHONE ENCOUNTER
Patient is in [place] and is requesting refills on med's.  Ambien sent today has to come from a MD.  Please refill other med's if appropriate and then send to Dr Cole or Dr Moe to sign Ambcassiem   4 = No assist / stand by assistance

## 2025-02-05 NOTE — TELEPHONE ENCOUNTER
Routing refill request to provider for review/approval because:  Drug not on the [facility] refill protocol         
[FreeTextEntry1] : wheezing ventolin in office long standing shoulder pain    30 minutes in discussion and management of problem and excluding any time spent in separate reportable services and/or teaching.

## (undated) DEVICE — POSITIONER ARMBOARD FOAM 1PAIR LF FP-ARMB1

## (undated) DEVICE — SU MONOCRYL 3-0 PS-1 27" Y936H

## (undated) DEVICE — EYE SHIELD PLASTIC

## (undated) DEVICE — DRAPE IOBAN INCISE 23X17" 6650EZ

## (undated) DEVICE — SOL NACL 0.9% INJ 1000ML BAG 2B1324X

## (undated) DEVICE — PREP BRUSH SURG SCRUB CHLOROXYLENOL PCMX 3% 371163

## (undated) DEVICE — GLOVE BIOGEL PI SZ 7.5 40875

## (undated) DEVICE — PREP CHLORAPREP 26ML TINTED HI-LITE ORANGE 930815

## (undated) DEVICE — EYE PACK CUSTOM ANTERIOR 30DEG TIP CENTURION PPK6682-04

## (undated) DEVICE — BLADE SAW SAGITTAL STRK 18X90X0.89MM  6118-089-090

## (undated) DEVICE — SOL WATER IRRIG 500ML BOTTLE 2F7113

## (undated) DEVICE — EYE CANN IRR 25GA CYSTOTOME 581610

## (undated) DEVICE — EYE CANN IRR 27GA ANTERIOR CHAMBER 581280

## (undated) DEVICE — DRSG AQUACEL AG HYDROFIBER  3.5X10" 422605

## (undated) DEVICE — SOL WATER IRRIG 1000ML BOTTLE 07139-09

## (undated) DEVICE — LINEN TOWEL PACK X5 5464

## (undated) DEVICE — PACK SET-UP STD 9102

## (undated) DEVICE — SU FIBERWIRE 2 38"  AR-7200

## (undated) DEVICE — SU CHROMIC 4-0 P-3 18" 1654G

## (undated) DEVICE — ENDO SHEATH STORZ SHARPSITE ENDOSCRUB 0DEG 4MM 1912000

## (undated) DEVICE — DRAPE U-DRAPE 1015NSD NON-STERILE

## (undated) DEVICE — ESU ELEC NDL 1" E1552

## (undated) DEVICE — NDL DENTAL 27GA LONG 8881400058

## (undated) DEVICE — GLOVE PROTEXIS W/NEU-THERA 7.5  2D73TE75

## (undated) DEVICE — NDL SPINAL 25GA 3.5" QUINCKE 405180

## (undated) DEVICE — DEVICE RETRIEVER HEWSON 71111579

## (undated) DEVICE — ESU PENCIL W/SMOKE EVAC NEPTUNE STRYKER 0703-046-000

## (undated) DEVICE — SUCTION MANIFOLD NEPTUNE 2 SYS 4 PORT 0702-020-000

## (undated) DEVICE — ESU CLEANER TIP 31142717

## (undated) DEVICE — EYE TIP IRRIGATION & ASPIRATION POLYMER CVD 0.3MM 8065751512

## (undated) DEVICE — IMM KIT SHOULDER STABILIZATION 7210573

## (undated) DEVICE — Device

## (undated) DEVICE — SCREW GUIDE AUGMENT REAM 110040300

## (undated) DEVICE — PACK CATARACT CUSTOM ASC SEY15CPUMC

## (undated) DEVICE — PACK MINOR SBA15MIFSE

## (undated) DEVICE — LINEN ORTHO PACK 5446

## (undated) DEVICE — GLOVE BIOGEL PI MICRO SZ 7.5 48575

## (undated) DEVICE — RESTRAINT LIMB HOLDER ANKLE/WRIST FOAM W/QUICK RELEASE 2533

## (undated) DEVICE — SOL HYDROGEN PEROXIDE 3% 4OZ BOTTLE F0010

## (undated) DEVICE — SPONGE COTTONOID 1/2X3" 80-1407

## (undated) DEVICE — STRAP KNEE/BODY 31143004

## (undated) DEVICE — SPECIMEN CONTAINER 4OZ

## (undated) DEVICE — LINEN DRAPE 54X72" 5467

## (undated) DEVICE — SU PROLENE 2-0 FS 18" 8685H

## (undated) DEVICE — SU MONOCRYL 2-0 SH 27" UND Y417H

## (undated) DEVICE — EYE PREP BETADINE 5% SOLUTION 30ML 0065-0411-30

## (undated) DEVICE — BIT DRILL BIOMET PERIPHERAL SCR 2.7MM SS 405889

## (undated) DEVICE — DRAPE POUCH INSTRUMENT 1018

## (undated) DEVICE — SUCTION IRR SYSTEM W/O TIP INTERPULSE HANDPIECE 0210-100-000

## (undated) DEVICE — DRAPE U-POUCH 34X29" 1067

## (undated) DEVICE — EYE KNIFE STILETTO VISITEC 1.1MM ANG 45DEG SIDEPORT 376620

## (undated) DEVICE — SOL NACL 0.9% IRRIG 1000ML BOTTLE 2F7124

## (undated) DEVICE — IMM KIT SHOULDER TMAX MASK FACE 7210559

## (undated) DEVICE — DRAPE SPLIT EENT 76X124" 3X28" 9447

## (undated) DEVICE — SOL WATER IRRIG 1000ML BOTTLE 2F7114

## (undated) DEVICE — SU ETHIBOND 2 V-37 4X30" MX69G

## (undated) DEVICE — GLOVE BIOGEL PI ULTRATOUCH G SZ 7.0 42170

## (undated) DEVICE — BIT DRILL BIOMET CENTRAL SCR 3.2MM SS 405883

## (undated) DEVICE — EYE KNIFE SLIT XSTAR VISITEC 2.4MM 45DEG BEVEL UP 373724

## (undated) DEVICE — SU ETHIBOND 0 CT-1 CR 8X18" CX21D

## (undated) DEVICE — ESU GROUND PAD ADULT W/CORD E7507

## (undated) DEVICE — SYR BULB IRRIG DOVER 60 ML LATEX FREE 67000

## (undated) DEVICE — SPONGE LAP 18X18" X8435

## (undated) DEVICE — SU SILK 2-0 TIE 12X30" A305H

## (undated) DEVICE — DRSG STERI STRIP 1/2X4" R1547

## (undated) DEVICE — BLADE TURBINATE INFERIOR 2MM ROTATE  1882040HR

## (undated) RX ORDER — CEFAZOLIN SODIUM/WATER 2 G/20 ML
SYRINGE (ML) INTRAVENOUS
Status: DISPENSED
Start: 2023-06-01

## (undated) RX ORDER — ACETAMINOPHEN 325 MG/1
TABLET ORAL
Status: DISPENSED
Start: 2023-06-01

## (undated) RX ORDER — FENTANYL CITRATE 50 UG/ML
INJECTION, SOLUTION INTRAMUSCULAR; INTRAVENOUS
Status: DISPENSED
Start: 2023-06-01

## (undated) RX ORDER — PHENYLEPHRINE HYDROCHLORIDE 10 MG/ML
INJECTION INTRAVENOUS
Status: DISPENSED
Start: 2019-07-09

## (undated) RX ORDER — ONDANSETRON 2 MG/ML
INJECTION INTRAMUSCULAR; INTRAVENOUS
Status: DISPENSED
Start: 2023-06-01

## (undated) RX ORDER — VANCOMYCIN HYDROCHLORIDE 1 G/20ML
INJECTION, POWDER, LYOPHILIZED, FOR SOLUTION INTRAVENOUS
Status: DISPENSED
Start: 2023-06-01

## (undated) RX ORDER — OXYCODONE HYDROCHLORIDE 5 MG/1
TABLET ORAL
Status: DISPENSED
Start: 2023-06-01

## (undated) RX ORDER — PROPOFOL 10 MG/ML
INJECTION, EMULSION INTRAVENOUS
Status: DISPENSED
Start: 2021-07-23

## (undated) RX ORDER — LIDOCAINE HYDROCHLORIDE AND EPINEPHRINE BITARTRATE 20; .01 MG/ML; MG/ML
INJECTION, SOLUTION SUBCUTANEOUS
Status: DISPENSED
Start: 2019-07-09

## (undated) RX ORDER — FENTANYL CITRATE 50 UG/ML
INJECTION, SOLUTION INTRAMUSCULAR; INTRAVENOUS
Status: DISPENSED
Start: 2019-07-09

## (undated) RX ORDER — TRANEXAMIC ACID 650 MG/1
TABLET ORAL
Status: DISPENSED
Start: 2023-06-01

## (undated) RX ORDER — FENTANYL CITRATE 50 UG/ML
INJECTION, SOLUTION INTRAMUSCULAR; INTRAVENOUS
Status: DISPENSED
Start: 2018-09-24

## (undated) RX ORDER — ACETAMINOPHEN 325 MG/1
TABLET ORAL
Status: DISPENSED
Start: 2023-04-17

## (undated) RX ORDER — DEXAMETHASONE SODIUM PHOSPHATE 4 MG/ML
INJECTION, SOLUTION INTRA-ARTICULAR; INTRALESIONAL; INTRAMUSCULAR; INTRAVENOUS; SOFT TISSUE
Status: DISPENSED
Start: 2019-07-09

## (undated) RX ORDER — ACETAMINOPHEN 10 MG/ML
INJECTION, SOLUTION INTRAVENOUS
Status: DISPENSED
Start: 2019-07-09

## (undated) RX ORDER — HYDROMORPHONE HYDROCHLORIDE 1 MG/ML
INJECTION, SOLUTION INTRAMUSCULAR; INTRAVENOUS; SUBCUTANEOUS
Status: DISPENSED
Start: 2023-06-01

## (undated) RX ORDER — GINSENG 100 MG
CAPSULE ORAL
Status: DISPENSED
Start: 2019-07-09

## (undated) RX ORDER — ACETAMINOPHEN 325 MG/1
TABLET ORAL
Status: DISPENSED
Start: 2019-07-09

## (undated) RX ORDER — OXYCODONE HCL 5 MG/5 ML
SOLUTION, ORAL ORAL
Status: DISPENSED
Start: 2019-07-09

## (undated) RX ORDER — LIDOCAINE HYDROCHLORIDE 10 MG/ML
INJECTION, SOLUTION EPIDURAL; INFILTRATION; INTRACAUDAL; PERINEURAL
Status: DISPENSED
Start: 2021-07-23

## (undated) RX ORDER — ONDANSETRON 2 MG/ML
INJECTION INTRAMUSCULAR; INTRAVENOUS
Status: DISPENSED
Start: 2019-07-09

## (undated) RX ORDER — CEFAZOLIN SODIUM 2 G/100ML
INJECTION, SOLUTION INTRAVENOUS
Status: DISPENSED
Start: 2019-07-09

## (undated) RX ORDER — FENTANYL CITRATE 50 UG/ML
INJECTION, SOLUTION INTRAMUSCULAR; INTRAVENOUS
Status: DISPENSED
Start: 2023-04-17

## (undated) RX ORDER — SODIUM CHLORIDE, SODIUM LACTATE, POTASSIUM CHLORIDE, CALCIUM CHLORIDE 600; 310; 30; 20 MG/100ML; MG/100ML; MG/100ML; MG/100ML
INJECTION, SOLUTION INTRAVENOUS
Status: DISPENSED
Start: 2023-06-01